# Patient Record
Sex: MALE | Race: WHITE | NOT HISPANIC OR LATINO | Employment: UNEMPLOYED | URBAN - METROPOLITAN AREA
[De-identification: names, ages, dates, MRNs, and addresses within clinical notes are randomized per-mention and may not be internally consistent; named-entity substitution may affect disease eponyms.]

---

## 2020-03-10 ENCOUNTER — TRANSFERRED RECORDS (OUTPATIENT)
Dept: HEALTH INFORMATION MANAGEMENT | Facility: CLINIC | Age: 60
End: 2020-03-10

## 2020-03-12 ENCOUNTER — TRANSFERRED RECORDS (OUTPATIENT)
Dept: HEALTH INFORMATION MANAGEMENT | Facility: CLINIC | Age: 60
End: 2020-03-12

## 2020-03-12 LAB
ALT SERPL-CCNC: 26 U/L (ref 14–63)
AST SERPL-CCNC: 20 U/L (ref 15–37)
CREAT SERPL-MCNC: 0.81 MG/DL (ref 0.67–1.17)
GFR SERPL CREATININE-BSD FRML MDRD: >60 ML/MIN/1.73M 2 (ref 60–150)
GLUCOSE SERPL-MCNC: 99 MG/DL (ref 74–100)
POTASSIUM SERPL-SCNC: 4 MMOL/L (ref 3.5–5.1)
TSH SERPL-ACNC: 0.43 UIU/ML (ref 0.35–3.74)

## 2020-03-13 ENCOUNTER — APPOINTMENT (OUTPATIENT)
Dept: GENERAL RADIOLOGY | Facility: CLINIC | Age: 60
DRG: 820 | End: 2020-03-13
Attending: STUDENT IN AN ORGANIZED HEALTH CARE EDUCATION/TRAINING PROGRAM
Payer: COMMERCIAL

## 2020-03-13 ENCOUNTER — APPOINTMENT (OUTPATIENT)
Dept: GENERAL RADIOLOGY | Facility: CLINIC | Age: 60
DRG: 820 | End: 2020-03-13
Attending: NEUROLOGICAL SURGERY
Payer: COMMERCIAL

## 2020-03-13 ENCOUNTER — HOSPITAL ENCOUNTER (INPATIENT)
Facility: CLINIC | Age: 60
LOS: 17 days | Discharge: HOME | DRG: 820 | End: 2020-03-30
Attending: NEUROLOGICAL SURGERY | Admitting: NEUROLOGICAL SURGERY
Payer: COMMERCIAL

## 2020-03-13 ENCOUNTER — APPOINTMENT (OUTPATIENT)
Dept: MRI IMAGING | Facility: CLINIC | Age: 60
DRG: 820 | End: 2020-03-13
Attending: STUDENT IN AN ORGANIZED HEALTH CARE EDUCATION/TRAINING PROGRAM
Payer: COMMERCIAL

## 2020-03-13 DIAGNOSIS — C83.78 BURKITT LYMPHOMA OF LYMPH NODES OF MULTIPLE REGIONS (H): Primary | ICD-10-CM

## 2020-03-13 PROBLEM — D49.2 SPINAL AXIS TUMOR: Status: ACTIVE | Noted: 2020-03-13

## 2020-03-13 LAB
ABO + RH BLD: NORMAL
ABO + RH BLD: NORMAL
ANION GAP SERPL CALCULATED.3IONS-SCNC: 4 MMOL/L (ref 3–14)
APTT PPP: 34 SEC (ref 22–37)
BASOPHILS # BLD AUTO: 0 10E9/L (ref 0–0.2)
BASOPHILS NFR BLD AUTO: 0.4 %
BLD GP AB SCN SERPL QL: NORMAL
BLOOD BANK CMNT PATIENT-IMP: NORMAL
BUN SERPL-MCNC: 13 MG/DL (ref 7–30)
CALCIUM SERPL-MCNC: 7.6 MG/DL (ref 8.5–10.1)
CHLORIDE SERPL-SCNC: 108 MMOL/L (ref 94–109)
CO2 SERPL-SCNC: 26 MMOL/L (ref 20–32)
CREAT SERPL-MCNC: 0.86 MG/DL (ref 0.66–1.25)
DIFFERENTIAL METHOD BLD: NORMAL
EOSINOPHIL # BLD AUTO: 0.2 10E9/L (ref 0–0.7)
EOSINOPHIL NFR BLD AUTO: 2.3 %
ERYTHROCYTE [DISTWIDTH] IN BLOOD BY AUTOMATED COUNT: 13.1 % (ref 10–15)
GFR SERPL CREATININE-BSD FRML MDRD: >90 ML/MIN/{1.73_M2}
GLUCOSE SERPL-MCNC: 98 MG/DL (ref 70–99)
HCT VFR BLD AUTO: 41.4 % (ref 40–53)
HGB BLD-MCNC: 13.8 G/DL (ref 13.3–17.7)
IMM GRANULOCYTES # BLD: 0 10E9/L (ref 0–0.4)
IMM GRANULOCYTES NFR BLD: 0.2 %
INR PPP: 1.07 (ref 0.86–1.14)
INTERPRETATION ECG - MUSE: NORMAL
LYMPHOCYTES # BLD AUTO: 1.4 10E9/L (ref 0.8–5.3)
LYMPHOCYTES NFR BLD AUTO: 17.1 %
MAGNESIUM SERPL-MCNC: 2.8 MG/DL (ref 1.6–2.3)
MCH RBC QN AUTO: 29.9 PG (ref 26.5–33)
MCHC RBC AUTO-ENTMCNC: 33.3 G/DL (ref 31.5–36.5)
MCV RBC AUTO: 90 FL (ref 78–100)
MONOCYTES # BLD AUTO: 0.8 10E9/L (ref 0–1.3)
MONOCYTES NFR BLD AUTO: 9.3 %
NEUTROPHILS # BLD AUTO: 5.7 10E9/L (ref 1.6–8.3)
NEUTROPHILS NFR BLD AUTO: 70.7 %
NRBC # BLD AUTO: 0 10*3/UL
NRBC BLD AUTO-RTO: 0 /100
PHOSPHATE SERPL-MCNC: 3.6 MG/DL (ref 2.5–4.5)
PLATELET # BLD AUTO: 301 10E9/L (ref 150–450)
POTASSIUM SERPL-SCNC: 4.3 MMOL/L (ref 3.4–5.3)
RBC # BLD AUTO: 4.61 10E12/L (ref 4.4–5.9)
SODIUM SERPL-SCNC: 139 MMOL/L (ref 133–144)
SPECIMEN EXP DATE BLD: NORMAL
WBC # BLD AUTO: 8.1 10E9/L (ref 4–11)

## 2020-03-13 PROCEDURE — 25800030 ZZH RX IP 258 OP 636: Performed by: STUDENT IN AN ORGANIZED HEALTH CARE EDUCATION/TRAINING PROGRAM

## 2020-03-13 PROCEDURE — 36415 COLL VENOUS BLD VENIPUNCTURE: CPT | Performed by: NEUROLOGICAL SURGERY

## 2020-03-13 PROCEDURE — 86901 BLOOD TYPING SEROLOGIC RH(D): CPT | Performed by: STUDENT IN AN ORGANIZED HEALTH CARE EDUCATION/TRAINING PROGRAM

## 2020-03-13 PROCEDURE — 12000004 ZZH R&B IMCU UMMC

## 2020-03-13 PROCEDURE — 80048 BASIC METABOLIC PNL TOTAL CA: CPT | Performed by: NEUROLOGICAL SURGERY

## 2020-03-13 PROCEDURE — 40000556 ZZH STATISTIC PERIPHERAL IV START W US GUIDANCE

## 2020-03-13 PROCEDURE — 72157 MRI CHEST SPINE W/O & W/DYE: CPT

## 2020-03-13 PROCEDURE — 25000132 ZZH RX MED GY IP 250 OP 250 PS 637: Performed by: STUDENT IN AN ORGANIZED HEALTH CARE EDUCATION/TRAINING PROGRAM

## 2020-03-13 PROCEDURE — 83735 ASSAY OF MAGNESIUM: CPT | Performed by: NEUROLOGICAL SURGERY

## 2020-03-13 PROCEDURE — 85025 COMPLETE CBC W/AUTO DIFF WBC: CPT | Performed by: NEUROLOGICAL SURGERY

## 2020-03-13 PROCEDURE — 93010 ELECTROCARDIOGRAM REPORT: CPT | Performed by: INTERNAL MEDICINE

## 2020-03-13 PROCEDURE — 86900 BLOOD TYPING SEROLOGIC ABO: CPT | Performed by: STUDENT IN AN ORGANIZED HEALTH CARE EDUCATION/TRAINING PROGRAM

## 2020-03-13 PROCEDURE — 25500064 ZZH RX 255 OP 636: Performed by: NEUROLOGICAL SURGERY

## 2020-03-13 PROCEDURE — 85610 PROTHROMBIN TIME: CPT | Performed by: STUDENT IN AN ORGANIZED HEALTH CARE EDUCATION/TRAINING PROGRAM

## 2020-03-13 PROCEDURE — 85730 THROMBOPLASTIN TIME PARTIAL: CPT | Performed by: STUDENT IN AN ORGANIZED HEALTH CARE EDUCATION/TRAINING PROGRAM

## 2020-03-13 PROCEDURE — 36415 COLL VENOUS BLD VENIPUNCTURE: CPT | Performed by: STUDENT IN AN ORGANIZED HEALTH CARE EDUCATION/TRAINING PROGRAM

## 2020-03-13 PROCEDURE — A9585 GADOBUTROL INJECTION: HCPCS | Performed by: NEUROLOGICAL SURGERY

## 2020-03-13 PROCEDURE — 86850 RBC ANTIBODY SCREEN: CPT | Performed by: STUDENT IN AN ORGANIZED HEALTH CARE EDUCATION/TRAINING PROGRAM

## 2020-03-13 PROCEDURE — 93005 ELECTROCARDIOGRAM TRACING: CPT

## 2020-03-13 PROCEDURE — 72082 X-RAY EXAM ENTIRE SPI 2/3 VW: CPT

## 2020-03-13 PROCEDURE — 71045 X-RAY EXAM CHEST 1 VIEW: CPT

## 2020-03-13 PROCEDURE — 84100 ASSAY OF PHOSPHORUS: CPT | Performed by: NEUROLOGICAL SURGERY

## 2020-03-13 PROCEDURE — 25000128 H RX IP 250 OP 636: Performed by: STUDENT IN AN ORGANIZED HEALTH CARE EDUCATION/TRAINING PROGRAM

## 2020-03-13 PROCEDURE — 25000131 ZZH RX MED GY IP 250 OP 636 PS 637: Performed by: STUDENT IN AN ORGANIZED HEALTH CARE EDUCATION/TRAINING PROGRAM

## 2020-03-13 RX ORDER — SODIUM CHLORIDE 9 MG/ML
INJECTION, SOLUTION INTRAVENOUS CONTINUOUS
Status: DISCONTINUED | OUTPATIENT
Start: 2020-03-13 | End: 2020-03-13

## 2020-03-13 RX ORDER — LEVOTHYROXINE SODIUM 100 UG/1
200 TABLET ORAL
Status: DISCONTINUED | OUTPATIENT
Start: 2020-03-14 | End: 2020-03-30 | Stop reason: HOSPADM

## 2020-03-13 RX ORDER — LIDOCAINE 40 MG/G
CREAM TOPICAL
Status: DISCONTINUED | OUTPATIENT
Start: 2020-03-13 | End: 2020-03-18

## 2020-03-13 RX ORDER — NALOXONE HYDROCHLORIDE 0.4 MG/ML
.1-.4 INJECTION, SOLUTION INTRAMUSCULAR; INTRAVENOUS; SUBCUTANEOUS
Status: DISCONTINUED | OUTPATIENT
Start: 2020-03-13 | End: 2020-03-27

## 2020-03-13 RX ORDER — OXYCODONE HYDROCHLORIDE 5 MG/1
5-10 TABLET ORAL
Status: DISCONTINUED | OUTPATIENT
Start: 2020-03-13 | End: 2020-03-30 | Stop reason: HOSPADM

## 2020-03-13 RX ORDER — GADOBUTROL 604.72 MG/ML
10 INJECTION INTRAVENOUS ONCE
Status: COMPLETED | OUTPATIENT
Start: 2020-03-13 | End: 2020-03-13

## 2020-03-13 RX ORDER — ACETAMINOPHEN 325 MG/1
650 TABLET ORAL EVERY 4 HOURS PRN
Status: DISCONTINUED | OUTPATIENT
Start: 2020-03-16 | End: 2020-03-23

## 2020-03-13 RX ORDER — AMOXICILLIN 250 MG
2 CAPSULE ORAL 2 TIMES DAILY
Status: DISCONTINUED | OUTPATIENT
Start: 2020-03-13 | End: 2020-03-30 | Stop reason: HOSPADM

## 2020-03-13 RX ORDER — POLYETHYLENE GLYCOL 3350 17 G/17G
17 POWDER, FOR SOLUTION ORAL DAILY
Status: DISCONTINUED | OUTPATIENT
Start: 2020-03-13 | End: 2020-03-24

## 2020-03-13 RX ORDER — PANTOPRAZOLE SODIUM 40 MG/1
40 TABLET, DELAYED RELEASE ORAL
Status: DISCONTINUED | OUTPATIENT
Start: 2020-03-14 | End: 2020-03-27

## 2020-03-13 RX ORDER — LABETALOL 20 MG/4 ML (5 MG/ML) INTRAVENOUS SYRINGE
10-40 EVERY 10 MIN PRN
Status: DISCONTINUED | OUTPATIENT
Start: 2020-03-13 | End: 2020-03-30 | Stop reason: HOSPADM

## 2020-03-13 RX ORDER — AMOXICILLIN 250 MG
1 CAPSULE ORAL 2 TIMES DAILY
Status: DISCONTINUED | OUTPATIENT
Start: 2020-03-13 | End: 2020-03-30 | Stop reason: HOSPADM

## 2020-03-13 RX ORDER — HYDROMORPHONE HYDROCHLORIDE 1 MG/ML
.4-.7 INJECTION, SOLUTION INTRAMUSCULAR; INTRAVENOUS; SUBCUTANEOUS ONCE
Status: COMPLETED | OUTPATIENT
Start: 2020-03-13 | End: 2020-03-13

## 2020-03-13 RX ORDER — ONDANSETRON 2 MG/ML
4 INJECTION INTRAMUSCULAR; INTRAVENOUS EVERY 6 HOURS PRN
Status: DISCONTINUED | OUTPATIENT
Start: 2020-03-13 | End: 2020-03-30 | Stop reason: HOSPADM

## 2020-03-13 RX ORDER — HYDRALAZINE HYDROCHLORIDE 20 MG/ML
10-20 INJECTION INTRAMUSCULAR; INTRAVENOUS EVERY 30 MIN PRN
Status: DISCONTINUED | OUTPATIENT
Start: 2020-03-13 | End: 2020-03-30 | Stop reason: HOSPADM

## 2020-03-13 RX ORDER — ACETAMINOPHEN 325 MG/1
975 TABLET ORAL EVERY 8 HOURS
Status: DISCONTINUED | OUTPATIENT
Start: 2020-03-13 | End: 2020-03-16

## 2020-03-13 RX ORDER — ONDANSETRON 4 MG/1
4 TABLET, ORALLY DISINTEGRATING ORAL EVERY 6 HOURS PRN
Status: DISCONTINUED | OUTPATIENT
Start: 2020-03-13 | End: 2020-03-30 | Stop reason: HOSPADM

## 2020-03-13 RX ORDER — DEXAMETHASONE 4 MG/1
4 TABLET ORAL EVERY 6 HOURS SCHEDULED
Status: DISCONTINUED | OUTPATIENT
Start: 2020-03-13 | End: 2020-03-15

## 2020-03-13 RX ADMIN — ACETAMINOPHEN 975 MG: 325 TABLET, FILM COATED ORAL at 03:37

## 2020-03-13 RX ADMIN — SODIUM CHLORIDE: 9 INJECTION, SOLUTION INTRAVENOUS at 16:04

## 2020-03-13 RX ADMIN — HYDRALAZINE HYDROCHLORIDE 10 MG: 20 INJECTION INTRAMUSCULAR; INTRAVENOUS at 18:09

## 2020-03-13 RX ADMIN — SENNOSIDES AND DOCUSATE SODIUM 2 TABLET: 8.6; 5 TABLET ORAL at 20:48

## 2020-03-13 RX ADMIN — OXYCODONE HYDROCHLORIDE 10 MG: 5 TABLET ORAL at 18:26

## 2020-03-13 RX ADMIN — DEXAMETHASONE 4 MG: 2 TABLET ORAL at 17:29

## 2020-03-13 RX ADMIN — GADOBUTROL 10 ML: 604.72 INJECTION INTRAVENOUS at 20:21

## 2020-03-13 RX ADMIN — ACETAMINOPHEN 975 MG: 325 TABLET, FILM COATED ORAL at 10:40

## 2020-03-13 RX ADMIN — OXYCODONE HYDROCHLORIDE 5 MG: 5 TABLET ORAL at 08:30

## 2020-03-13 RX ADMIN — SODIUM CHLORIDE: 9 INJECTION, SOLUTION INTRAVENOUS at 05:00

## 2020-03-13 RX ADMIN — DEXAMETHASONE 4 MG: 2 TABLET ORAL at 23:49

## 2020-03-13 RX ADMIN — SENNOSIDES AND DOCUSATE SODIUM 1 TABLET: 8.6; 5 TABLET ORAL at 08:31

## 2020-03-13 RX ADMIN — OXYCODONE HYDROCHLORIDE 5 MG: 5 TABLET ORAL at 07:08

## 2020-03-13 RX ADMIN — HYDRALAZINE HYDROCHLORIDE 10 MG: 20 INJECTION INTRAMUSCULAR; INTRAVENOUS at 20:51

## 2020-03-13 RX ADMIN — ACETAMINOPHEN 975 MG: 325 TABLET, FILM COATED ORAL at 20:48

## 2020-03-13 RX ADMIN — HYDROMORPHONE HYDROCHLORIDE 0.4 MG: 1 INJECTION, SOLUTION INTRAMUSCULAR; INTRAVENOUS; SUBCUTANEOUS at 19:27

## 2020-03-13 ASSESSMENT — ACTIVITIES OF DAILY LIVING (ADL)
ADLS_ACUITY_SCORE: 14
ADLS_ACUITY_SCORE: 14
ADLS_ACUITY_SCORE: 13
ADLS_ACUITY_SCORE: 11
ADLS_ACUITY_SCORE: 13

## 2020-03-13 ASSESSMENT — PAIN DESCRIPTION - DESCRIPTORS
DESCRIPTORS: ACHING
DESCRIPTORS: SORE
DESCRIPTORS: SORE

## 2020-03-13 NOTE — PLAN OF CARE
[id number]    Neuro: A&O; scheduled tylenol & prn oxycodone given for back discomfort.   CV: NSR, hydralzine given X1 for SBP >140.  Resp: RA, clear lung sounds  GI: Diet advanced to regular. scheduled senna given, BM this AM.  : voids spontaneously.   MIV discontinued this evening.  2 view spinal xray completed. Thoracic MRI to be completed this evening.   Wife at bedside & updated.     Notify neurosurgery w/ updates or concerns.

## 2020-03-13 NOTE — PROGRESS NOTES
Admission          3/13/2020  2:26 AM  -----------------------------------------------------------  Reason for admission:   Primary team notified of pt arrival.  Admitted from: OSH  Via: stretcher  Accompanied by: family  Belongings: Placed in closet; valuables sent home with family  Admission Profile: complete  Teaching: orientation to unit and call light- call light within reach, call don't fall, use of console, meal times, when to call for the RN, and enforced importance of safety   Access: PIV  Telemetry: no tele orders  Ht./Wt.: complete  2 RN Skin Assessment Completed By:  Kim ESPINAL and Garry RICHARDS   Pt status:    Temp:  [97.8  F (36.6  C)] 97.8  F (36.6  C)  Heart Rate:  [61-71] 61  Resp:  [16] 16  BP: (127-146)/(85-94) 127/85  SpO2:  [98 %] 98 %

## 2020-03-13 NOTE — H&P
[facility]       NEUROSURGERY H&P NOTE    HPI:  Mr. 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 Patient states his pain acutely worsened when he lifted a relative a couple days ago, and sometimes radiates to the left ribs. Also states when he flexes his neck, has shooting pain down to the feet. Denies weakness, numbness/tingling except baseline neuropathy in feet, balance issues except transiently when initiating gait, bowel/bladder incontinence.    PAST MEDICAL HISTORY: hypothyroidism, HLD, no known oncologic history    PAST SURGICAL HISTORY: no history of spinal surgery    FAMILY HISTORY: No oncologic history in family    SOCIAL HISTORY:   Social History     Tobacco Use     Smoking status: Not on file   Substance Use Topics     Alcohol use: Not on file   No smoking, aloohol, or other drugs. Lives in [place]. Works in auto repair.    MEDICATIONS:  No current outpatient medications on file.   Levothyroxine    Allergies:  No Known Allergies    ROS: 10 point ROS of systems including Constitutional, Eyes, Respiratory, Cardiovascular, Gastroenterology, Genitourinary, Integumentary, Muscularskeletal, Psychiatric were all negative except for pertinent positives noted in my HPI.    Physical exam:   Blood pressure 136/85, pulse 75, temperature 98  F (36.7  C), temperature source Oral, resp. rate 16, weight 98.1 kg (216 lb 4.3 oz), SpO2 98 %.  CV: Regular rate  PULM: breathing comfortably on room air  NEUROLOGIC:  -- Awake; Alert; oriented x 3  -- Follows commands briskly  -- Speech fluent, spontaneous. No aphasia or dysarthria.  -- no gaze preference. No apparent hemineglect.  Cranial Nerves:  -- visual fields full to confrontation, PERRL 3-2mm bilat  and brisk, extraocular movements intact  -- face symmetrical, tongue midline  -- sensory V1-V3 intact bilaterally  -- palate elevates symmetrically, uvula midline  -- hearing grossly intact bilat  -- Trapezii 5/5 strength bilat symmetric  -- Cerebellar: Finger nose finger without dysmetria    Motor:  Normal bulk / tone; no tremor, rigidity, or bradykinesia.  No muscle wasting or fasciculations  No Pronator Drift     Delt Bi Tri Hand Flexion/  Extension Iliopsoas Quadriceps Hamstrings Tibialis Anterior Gastroc    C5 C6 C7 C8/T1 L2 L3 L4-S1 L4 S1   R 5 5 5 5 5 5 5 5 5   L 5 5 5 5 5 5 5 5 5   Sensory:  intact to LT x 4 extremities     Reflexes:     Bi Tri BR Alexis Pat Ach Bab    C5-6 C7-8 C6 UMN L2-4 S1 UMN   R 2+ 2+ 2+ Norm 2+ 2+ Norm   L 2+ 2+ 2+ Norm 2+ 2+ Norm     Gait: deferred      IMAGING:  MRI thoracic spine (OSH):  Extradural extramedullary thoracic spine mass at T5-6, with moderate/severe cord compression, without cord signal change.     ASSESSMENT:  Mr. 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 Neurologically intact.    RECOMMENDATIONS:  - pta levothyroxine  - pain control  - bowel regimen, regular diet  - consider MRI cervical spine, thoracic spine with contrast  - surgical plan TBD  - dispo: 6B    The patient was discussed with Dr. Arnold, neurosurgery chief resident, and neurosurgery faculty Dr. Cespedes,and they agree with the above.    Sarath Carbajal MD  Neurosurgery Resident PGY2  I have seen this patient with the resident and formulated a plan and agree with this note.  AMP

## 2020-03-13 NOTE — PLAN OF CARE
Neuro: A&Ox4. Neuros intact.  Cardiac: No tele. HR 60-80s. SBPs 120-140s. Afebrile.   Respiratory: Sating >94% on RA.   GI/: Adequate urine output  Diet/appetite: NPO   Activity:  Up independently   Pain: C/o back and abdominal pain. Scheduled tylenol given, oxy prn available, declines overnight.   Skin: No new deficits noted.  LDA's: PIV x1 with NS @100mL/hr    Plan: Continue with POC. Notify primary team with changes.

## 2020-03-13 NOTE — LETTER
[address] [facility] [address] [address]  Phone: [phone]    March 30, 2020        Kobe Pedroza  [address] [place] [address]          To whom it may concern:    RE: Kobe Pedroza    Please excuse patient from work until 4/30.      Please contact me with any questions or concerns.      Sincerely,        Jocelyn Russell MD

## 2020-03-14 LAB
ALBUMIN SERPL-MCNC: 3.5 G/DL (ref 3.4–5)
ALP SERPL-CCNC: 74 U/L (ref 40–150)
ALT SERPL W P-5'-P-CCNC: 25 U/L (ref 0–70)
ANION GAP SERPL CALCULATED.3IONS-SCNC: 6 MMOL/L (ref 3–14)
AST SERPL W P-5'-P-CCNC: 20 U/L (ref 0–45)
BASOPHILS # BLD AUTO: 0 10E9/L (ref 0–0.2)
BASOPHILS NFR BLD AUTO: 0.2 %
BILIRUB DIRECT SERPL-MCNC: <0.1 MG/DL (ref 0–0.2)
BILIRUB SERPL-MCNC: 0.4 MG/DL (ref 0.2–1.3)
BUN SERPL-MCNC: 15 MG/DL (ref 7–30)
CALCIUM SERPL-MCNC: 8.4 MG/DL (ref 8.5–10.1)
CHLORIDE SERPL-SCNC: 106 MMOL/L (ref 94–109)
CO2 SERPL-SCNC: 23 MMOL/L (ref 20–32)
CREAT SERPL-MCNC: 0.74 MG/DL (ref 0.66–1.25)
DIFFERENTIAL METHOD BLD: ABNORMAL
EOSINOPHIL # BLD AUTO: 0 10E9/L (ref 0–0.7)
EOSINOPHIL NFR BLD AUTO: 0 %
ERYTHROCYTE [DISTWIDTH] IN BLOOD BY AUTOMATED COUNT: 13 % (ref 10–15)
GFR SERPL CREATININE-BSD FRML MDRD: >90 ML/MIN/{1.73_M2}
GLUCOSE BLDC GLUCOMTR-MCNC: 170 MG/DL (ref 70–99)
GLUCOSE SERPL-MCNC: 145 MG/DL (ref 70–99)
HBA1C MFR BLD: 5.5 % (ref 0–5.6)
HCT VFR BLD AUTO: 44.5 % (ref 40–53)
HGB BLD-MCNC: 15 G/DL (ref 13.3–17.7)
IMM GRANULOCYTES # BLD: 0 10E9/L (ref 0–0.4)
IMM GRANULOCYTES NFR BLD: 0.3 %
LYMPHOCYTES # BLD AUTO: 0.6 10E9/L (ref 0.8–5.3)
LYMPHOCYTES NFR BLD AUTO: 6.5 %
MAGNESIUM SERPL-MCNC: 2.6 MG/DL (ref 1.6–2.3)
MCH RBC QN AUTO: 29.8 PG (ref 26.5–33)
MCHC RBC AUTO-ENTMCNC: 33.7 G/DL (ref 31.5–36.5)
MCV RBC AUTO: 88 FL (ref 78–100)
MONOCYTES # BLD AUTO: 0.1 10E9/L (ref 0–1.3)
MONOCYTES NFR BLD AUTO: 1 %
NEUTROPHILS # BLD AUTO: 8.6 10E9/L (ref 1.6–8.3)
NEUTROPHILS NFR BLD AUTO: 92 %
NRBC # BLD AUTO: 0 10*3/UL
NRBC BLD AUTO-RTO: 0 /100
PHOSPHATE SERPL-MCNC: 3.6 MG/DL (ref 2.5–4.5)
PLATELET # BLD AUTO: 331 10E9/L (ref 150–450)
POTASSIUM SERPL-SCNC: 4.3 MMOL/L (ref 3.4–5.3)
PROT SERPL-MCNC: 7.1 G/DL (ref 6.8–8.8)
RBC # BLD AUTO: 5.04 10E12/L (ref 4.4–5.9)
SODIUM SERPL-SCNC: 136 MMOL/L (ref 133–144)
WBC # BLD AUTO: 9.3 10E9/L (ref 4–11)

## 2020-03-14 PROCEDURE — 85025 COMPLETE CBC W/AUTO DIFF WBC: CPT | Performed by: NEUROLOGICAL SURGERY

## 2020-03-14 PROCEDURE — 25000132 ZZH RX MED GY IP 250 OP 250 PS 637: Performed by: STUDENT IN AN ORGANIZED HEALTH CARE EDUCATION/TRAINING PROGRAM

## 2020-03-14 PROCEDURE — 99207 ZZC CONSULT E&M CHANGED TO INITIAL LEVEL: CPT | Performed by: PEDIATRICS

## 2020-03-14 PROCEDURE — 25000132 ZZH RX MED GY IP 250 OP 250 PS 637: Performed by: NURSE PRACTITIONER

## 2020-03-14 PROCEDURE — 80048 BASIC METABOLIC PNL TOTAL CA: CPT | Performed by: NEUROLOGICAL SURGERY

## 2020-03-14 PROCEDURE — 80076 HEPATIC FUNCTION PANEL: CPT | Performed by: NEUROLOGICAL SURGERY

## 2020-03-14 PROCEDURE — 36415 COLL VENOUS BLD VENIPUNCTURE: CPT | Performed by: NEUROLOGICAL SURGERY

## 2020-03-14 PROCEDURE — 84100 ASSAY OF PHOSPHORUS: CPT | Performed by: NEUROLOGICAL SURGERY

## 2020-03-14 PROCEDURE — 83735 ASSAY OF MAGNESIUM: CPT | Performed by: NEUROLOGICAL SURGERY

## 2020-03-14 PROCEDURE — 83036 HEMOGLOBIN GLYCOSYLATED A1C: CPT | Performed by: NEUROLOGICAL SURGERY

## 2020-03-14 PROCEDURE — 99222 1ST HOSP IP/OBS MODERATE 55: CPT | Performed by: PEDIATRICS

## 2020-03-14 PROCEDURE — 25000131 ZZH RX MED GY IP 250 OP 636 PS 637: Performed by: STUDENT IN AN ORGANIZED HEALTH CARE EDUCATION/TRAINING PROGRAM

## 2020-03-14 PROCEDURE — 00000146 ZZHCL STATISTIC GLUCOSE BY METER IP

## 2020-03-14 PROCEDURE — 12000001 ZZH R&B MED SURG/OB UMMC

## 2020-03-14 PROCEDURE — 99207 ZZC APP CREDIT; MD BILLING SHARED VISIT: CPT | Performed by: PHYSICIAN ASSISTANT

## 2020-03-14 RX ORDER — BISACODYL 10 MG
10 SUPPOSITORY, RECTAL RECTAL DAILY
Status: DISCONTINUED | OUTPATIENT
Start: 2020-03-14 | End: 2020-03-17

## 2020-03-14 RX ORDER — MAGNESIUM CARB/ALUMINUM HYDROX 105-160MG
296 TABLET,CHEWABLE ORAL ONCE
Status: COMPLETED | OUTPATIENT
Start: 2020-03-14 | End: 2020-03-14

## 2020-03-14 RX ADMIN — SENNOSIDES AND DOCUSATE SODIUM 2 TABLET: 8.6; 5 TABLET ORAL at 19:05

## 2020-03-14 RX ADMIN — MAGNESIUM CITRATE 296 ML: 1.75 LIQUID ORAL at 12:33

## 2020-03-14 RX ADMIN — ACETAMINOPHEN 975 MG: 325 TABLET, FILM COATED ORAL at 18:35

## 2020-03-14 RX ADMIN — DEXAMETHASONE 4 MG: 2 TABLET ORAL at 06:20

## 2020-03-14 RX ADMIN — SENNOSIDES AND DOCUSATE SODIUM 2 TABLET: 8.6; 5 TABLET ORAL at 08:20

## 2020-03-14 RX ADMIN — ACETAMINOPHEN 975 MG: 325 TABLET, FILM COATED ORAL at 11:59

## 2020-03-14 RX ADMIN — POLYETHYLENE GLYCOL 3350 17 G: 17 POWDER, FOR SOLUTION ORAL at 08:20

## 2020-03-14 RX ADMIN — DEXAMETHASONE 4 MG: 2 TABLET ORAL at 11:59

## 2020-03-14 RX ADMIN — LEVOTHYROXINE SODIUM 200 MCG: 0.1 TABLET ORAL at 08:20

## 2020-03-14 RX ADMIN — DEXAMETHASONE 4 MG: 2 TABLET ORAL at 18:35

## 2020-03-14 RX ADMIN — PANTOPRAZOLE SODIUM 40 MG: 40 TABLET, DELAYED RELEASE ORAL at 08:20

## 2020-03-14 RX ADMIN — ACETAMINOPHEN 975 MG: 325 TABLET, FILM COATED ORAL at 03:49

## 2020-03-14 ASSESSMENT — ACTIVITIES OF DAILY LIVING (ADL)
ADLS_ACUITY_SCORE: 13

## 2020-03-14 NOTE — CONSULTS
Internal Medicine Consult  Department of Internal Medicine     Patient Name: Kobe Pedroza MRN# [id number]   Age: 60 year old YOB: 1960      Date of Admission: 3/13/2020     Primary care provider: Garett Arriaga  Date of Service: 3/14/2020     Reason for consult: I was asked by Dr. Jaimes to consult on this patient for preoperative assessment for this patient undergoing resection of T5-6 epidural mass.      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 He was transferred to [facility] for further management.      Preoperative risk assessment: EKG with NSR, no ischemic changes. Reviewed most recent echo from 1/2018 which was unremarkable. Surgery is urgent and is intermediate risk. Functional capacity is >4 METs without symptoms. Patient's cardiac risk by revised cardiac risk index is 0.4%. ACS NSQIP lists their  cardiac risk similarly at 0.4%. Their risk of any complication is 9.8%, serious complications 7.8%, death 0.2%. They have not required urgent intubation in the past. It is certainly possible that they might require a longer time to recover from anesthesia than normal. Patient is currently euvolemic and is not on supplemental oxygen.      I do not think that any further testing is indicated at this time as all of their chronic issues seem to be maximized. I discussed the risks listed above with the patient and their family and my recommendations for no further testing or change in treatment prior to proceeding with surgery. Resume medications as soon as possible post-operatively.    Gallbladder sludge: There was question of cholecystitis at OSH, though gallbladder wall measuring up to 6 mm with CBD, LFTs,  WBC all wnl. Clinical picture not c/w acute biliary pathology. Discussed with General Surgery.   - No addl work-up at this time   - Follow-up with General Surgery as OP for further evaluation       Management of Medications & Comorbidities:   - No additional work-up or intervention required pre-operatively  - Pt reports hx of pre-diabetes, A1C 5.5% today which is below prediabetes threshold.    - Recommend BG checks qAM for now, increase to TID ac and at bedtime if persistently elevated.       Recommendations reviewed with attending physician, Dr. Gresham.   I have discussed our findings and recommendations with primary team.     Thank you for this opportunity to be involved in your patient's care. As all of the patient's medical conditions are at their baseline will signoff at this time. Please contact the triage hospitalist at job code [phone] if you need medicine to be involved at any time in the future.     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 He was transferred to [facility] for further management. Medicine consulted today for pre-operative evaluation and management of possible biliary pathology.     He had extensive work-up of his pain at OSH including CT a/p, gallbladder US, and KUB. CT a/p 3/10 demonstrated hyperdensity of gallbladder lumen suggestive of stones or sludge, no gallbladder wall edema. RUQ US 3/12 demonstrated tumefactive sludge filling nearly the entire lumen of the gallbladder, gallbladder wall measured up to 6 mmm, CBD wnl at 3 mm. There was concern for chronic cholecystitis, general surgery was  consulted and planned for lap cholecystectomy on 3/13. However, spinal MRI was then obtained and demonstrated his T5-6 mass, felt to be the more likely culprit of his pain so gallbladder was never removed.     He denies any RUQ pain, nausea, vomiting. Has some distention due to no BM in 5 days. Still passing flatus. He has never had any symptoms concerning for gallbladder colic. All of his pain has been mid-thoracic, at times wrapping around the left flank. No transaminitis, fever/chills, leukocytosis, or other s/s concerning for acute illness.     Was having intermittent chest pain in 2017, had negative cardiac work-up at the time. Stress test negative per pt report (results not available), CT coronary arteries 1/2018 with no significant stenosis, echo 1/2018 with LVEF 60-65%, normal systolic function, mild mitral valve regurgitation with no other notable valve dysfunction. Zio patch in 2/2018 also negative per pt report. No recent cardiac symptoms.            Review of Systems:   A 10 point ROS was performed and negative unless otherwise noted in HPI.     Cardiovascular: does not have underlying cardiac disease. At baseline, patient is able to perform work as , ambulate, go up stairs without difficulty. Currently, denies any chest pain or palpitations. No exertional symptoms.     Pulmonary: does not have underlying lung disease. Currently, denies any cough or dyspnea.     Prior Anesthesia: Yes, without complications.   History of DVT/PE: No    VASU: No  Screening: Snoring - No, Tiredness - No, Observed apneas - No, Blood pressure elevation - No, BMI > 35 - No, Age > 50 - Yes, Male - Yes  (0-2 risk factors = low risk, 3-4= intermediate, 5-8= high risk for underlying sleep apnea)    History of stroke - No, seizure - No, kidney disease - No, liver disease - No, thyroid disease - YES. Date-hx of thyroid cancer s/p total thyroidectomy with subsequent hypothyroidism., diabetes - pre-diabetic, neck/jaw pain or  stiffness - No    Past Medical History:   No past medical history on file.     Past Surgical History:   No past surgical history on file.     Social History:   Tobacco use: None  Alcohol use: Minimal  Illicit substances: None     Family History:   No known family history of anesthesia problems, bleeding or clotting problems, or premature cardiac death.     Immunizations:   There is no immunization history for the selected administration types on file for this patient.    Allergies:    No Known Allergies  Medications:     Current Facility-Administered Medications   Medication     [START ON 3/16/2020] acetaminophen (TYLENOL) tablet 650 mg     acetaminophen (TYLENOL) tablet 975 mg     dexamethasone (DECADRON) tablet 4 mg     hydrALAZINE (APRESOLINE) injection 10-20 mg     influenza recomb quadrivalent PF (FLUBLOK) injection 0.5 mL     labetalol (NORMODYNE/TRANDATE) syringe 10-40 mg     levothyroxine (SYNTHROID/LEVOTHROID) tablet 200 mcg     lidocaine (LMX4) cream     lidocaine 1 % 0.1-1 mL     naloxone (NARCAN) injection 0.1-0.4 mg     ondansetron (ZOFRAN-ODT) ODT tab 4 mg    Or     ondansetron (ZOFRAN) injection 4 mg     oxyCODONE (ROXICODONE) tablet 5-10 mg     pantoprazole (PROTONIX) EC tablet 40 mg     polyethylene glycol (MIRALAX) Packet 17 g     senna-docusate (SENOKOT-S/PERICOLACE) 8.6-50 MG per tablet 1 tablet    Or     senna-docusate (SENOKOT-S/PERICOLACE) 8.6-50 MG per tablet 2 tablet     sodium chloride (PF) 0.9% PF flush 3 mL        Review of Systems:   A complete ROS was performed and is negative other than what is stated in the HPI.      Physical Exam:   Blood pressure (!) 143/81, pulse 87, temperature 97.9  F (36.6  C), temperature source Oral, resp. rate 16, weight 98.1 kg (216 lb 4.3 oz), SpO2 94 %.    Constitutional: Awake and alert, in no apparent distress. Sitting up comfortably in bed.   Eyes: Sclera clear, anicteric   ENT: Mucous membranes moist. Dentition intact.   Respiratory: Breathing  comfortably on room air. Clear to auscultation bilaterally with no crackles, wheezing, or rhonchi. Good air entry throughout.   Cardiovascular:  RRR, normal S1/S2. No rubs or murmurs. Intact bilateral pedal pulses. No peripheral edema.   GI: Soft, non-tender, non-distended. Bowel sounds present. Negative Mcwilliams's sign.  Skin: Warm and dry. Good color. No jaundice. No visible rashes, lesions, or bruising of concern.   Neurologic: Alert and fully oriented. No focal deficits.       Data:     EKG 3/13/20: NSR with ventricular rate of 68, QTc 446, no ischemic changes.     ROUTINE IP LABS (Last four results)  Recent Labs   Lab 03/14/20  0439 03/13/20  0554    139   POTASSIUM 4.3 4.3   CHLORIDE 106 108   CO2 23 26   ANIONGAP 6 4   * 98   BUN 15 13   CR 0.74 0.86   RUTH 8.4* 7.6*   MAG 2.6* 2.8*   PHOS 3.6 3.6     Recent Labs   Lab 03/14/20  0439 03/13/20  0554   WBC 9.3 8.1   RBC 5.04 4.61   HGB 15.0 13.8   HCT 44.5 41.4   MCV 88 90   MCH 29.8 29.9   MCHC 33.7 33.3   RDW 13.0 13.1    301     Recent Labs   Lab 03/13/20  0337   INR 1.07        Glucose Values Latest Ref Rng & Units 3/13/2020 3/14/2020   Bedside Glucose (mg/dl )  - -- --   GLUCOSE 70 - 99 mg/dL 98 145(H)   Some recent data might be hidden        All labs personally reviewed in [facility].  See A&P for additional results.     Unresulted Labs Ordered in the Past 30 Days of this Admission     No orders found for last 31 day(s).           All labs and imaging reviewed.

## 2020-03-14 NOTE — PROGRESS NOTES
Neurosurgery Progress Note    Subjective:  MRI thoracic spine with epidural mass at T5-6    Objective:  /76 (BP Location: Right arm)   Pulse 87   Temp 98.5  F (36.9  C) (Oral)   Resp 17   Wt 98.1 kg (216 lb 4.3 oz)   SpO2 96%   Alert, oriented x3  CN 2-12 intact  5/5 strength in all extremities, no pronator drift  Sensation intact to light touch      Assessment:  Mr. 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 Neurologically intact.    Plan:  - pta levothyroxine  - pain control  - bowel regimen, regular diet  - started decadron 4mg q6hrs for spinal cord compression  - MRI thoracic spine completed  - surgical plan TBD  - dispo: 6B    Sarath Carbajal MD  Neurosurgery Resident PGY2    Please contact neurosurgery resident on call with questions.    Dial * [phone], enter [phone] when prompted.   I have seen this patient with the resident and formulated a plan and agree with this note.  AMP

## 2020-03-14 NOTE — PLAN OF CARE
[id number]  Pt admitted 3/13 from OSH for further cares of a spinal tumor around T5-6  Hx:  HLD, hypothyroidism.     Neuro: A/Ox4.   Cardiac: no tele orders.  AVSS except HTN PRN hydralazine given x1 for SBP > 140.    Respiratory: O2 sats stable on RA  GI/: voiding spontaneously. No BM this shift, last BM 3/13   Diet/appetite: regular diet, no nausea.   Activity:  Independent.   Pain: pt reports back pain. Declines pain medication. 0.4mg dilaudid given for MRI. Scheduled tylenol.   Skin: No new deficits noted.  LDA's: PIV SL   Labs/imaging: MRI completed this shift.      Plan: Continue with POC. Notify primary team with changes.

## 2020-03-14 NOTE — PLAN OF CARE
Pt transferred to unit around 1345 from 6B.      Reason for Admission: Pt admitted 3/13 from OSH for further cares of a spinal tumor around T5-6    Activity: Independent    Neuro: A&O x4. Pleasant and calm. Pt hs some numbness/tingling in bilateral feet per baseline.     GI/: Voiding spontaneously without difficulty. Pt having difficulty having a BM. Lat BM was 3/13/20.    Diet: Regular, tolerating well.     Incisions/Drains: None    IV Access: R PIV saline locked.     Vitals: VSS on RA    Pain: pt denied any pain or nausea.     New changes this shift: pt given magnesium citrate on 6B. Pt has dulcolax order if needed.     Plan: Pt scheduled for OR tomorrow morning. Continue with plan of care.

## 2020-03-14 NOTE — PLAN OF CARE
Shift:   VS: Temp: 97.3  F (36.3  C) Temp src: Oral BP: 131/81 Pulse: 87 Heart Rate: 99 Resp: 18 SpO2: 96 % O2 Device: None (Room air)    Pain: Back pain comfortably managed with tylenol  Neuro: A&OX4, calls appropriately  Cardiac:   WNL  Respiratory: RA  GI/Diet/Appetite: Good oral intake, no nausea reported  :  Adequate UO. Given stool softeners for constipation, no outcome yet, please follow up  LDA's: PIV SL  Skin: No new skin deficit noted  Activity: Up ad riki  Tests/Procedures:   Pertinent Labs/Lab Collection: BG with meals and at bedtime     Plan: Pt transferred to , report given to bedside RN. Continue with cares.

## 2020-03-14 NOTE — PLAN OF CARE
A: A&Ox4, calm and cooperative. VS unchanged, room air sating >92% denies and SOB or chest pain. No tele, apical pulse regular. Afebrile. Denies pain and nausea. Regular diet. GI/ WNL. Pt able to make needs known via call light. 6A overflow.     No intake/output data recorded.    Temp:  [97.8  F (36.6  C)-98.5  F (36.9  C)] 98.3  F (36.8  C)  Pulse:  [68-92] 87  Heart Rate:  [72-85] 80  Resp:  [16-17] 16  BP: (110-158)/(63-91) 133/84  SpO2:  [93 %-99 %] 95 %     R: Continue with POC. Notify primary team with changes.

## 2020-03-15 ENCOUNTER — ANESTHESIA EVENT (OUTPATIENT)
Dept: SURGERY | Facility: CLINIC | Age: 60
DRG: 820 | End: 2020-03-15
Payer: COMMERCIAL

## 2020-03-15 ENCOUNTER — APPOINTMENT (OUTPATIENT)
Dept: GENERAL RADIOLOGY | Facility: CLINIC | Age: 60
DRG: 820 | End: 2020-03-15
Attending: NEUROLOGICAL SURGERY
Payer: COMMERCIAL

## 2020-03-15 ENCOUNTER — ANESTHESIA (OUTPATIENT)
Dept: SURGERY | Facility: CLINIC | Age: 60
DRG: 820 | End: 2020-03-15
Payer: COMMERCIAL

## 2020-03-15 ENCOUNTER — APPOINTMENT (OUTPATIENT)
Dept: CT IMAGING | Facility: CLINIC | Age: 60
DRG: 820 | End: 2020-03-15
Attending: STUDENT IN AN ORGANIZED HEALTH CARE EDUCATION/TRAINING PROGRAM
Payer: COMMERCIAL

## 2020-03-15 LAB
ANION GAP SERPL CALCULATED.3IONS-SCNC: 7 MMOL/L (ref 3–14)
APTT PPP: 30 SEC (ref 22–37)
BASOPHILS # BLD AUTO: 0 10E9/L (ref 0–0.2)
BASOPHILS NFR BLD AUTO: 0.1 %
BUN SERPL-MCNC: 25 MG/DL (ref 7–30)
CALCIUM SERPL-MCNC: 8.2 MG/DL (ref 8.5–10.1)
CHLORIDE SERPL-SCNC: 107 MMOL/L (ref 94–109)
CO2 SERPL-SCNC: 23 MMOL/L (ref 20–32)
CREAT SERPL-MCNC: 0.8 MG/DL (ref 0.66–1.25)
DIFFERENTIAL METHOD BLD: ABNORMAL
EOSINOPHIL # BLD AUTO: 0 10E9/L (ref 0–0.7)
EOSINOPHIL NFR BLD AUTO: 0 %
ERYTHROCYTE [DISTWIDTH] IN BLOOD BY AUTOMATED COUNT: 13.1 % (ref 10–15)
GFR SERPL CREATININE-BSD FRML MDRD: >90 ML/MIN/{1.73_M2}
GLUCOSE BLDC GLUCOMTR-MCNC: 149 MG/DL (ref 70–99)
GLUCOSE SERPL-MCNC: 153 MG/DL (ref 70–99)
HCT VFR BLD AUTO: 45.5 % (ref 40–53)
HGB BLD-MCNC: 15 G/DL (ref 13.3–17.7)
IMM GRANULOCYTES # BLD: 0.1 10E9/L (ref 0–0.4)
IMM GRANULOCYTES NFR BLD: 0.5 %
INR PPP: 1.09 (ref 0.86–1.14)
LYMPHOCYTES # BLD AUTO: 0.8 10E9/L (ref 0.8–5.3)
LYMPHOCYTES NFR BLD AUTO: 4.2 %
MAGNESIUM SERPL-MCNC: 2.5 MG/DL (ref 1.6–2.3)
MCH RBC QN AUTO: 29.2 PG (ref 26.5–33)
MCHC RBC AUTO-ENTMCNC: 33 G/DL (ref 31.5–36.5)
MCV RBC AUTO: 89 FL (ref 78–100)
MONOCYTES # BLD AUTO: 0.6 10E9/L (ref 0–1.3)
MONOCYTES NFR BLD AUTO: 3 %
NEUTROPHILS # BLD AUTO: 16.9 10E9/L (ref 1.6–8.3)
NEUTROPHILS NFR BLD AUTO: 92.2 %
NRBC # BLD AUTO: 0 10*3/UL
NRBC BLD AUTO-RTO: 0 /100
PHOSPHATE SERPL-MCNC: 3.1 MG/DL (ref 2.5–4.5)
PLATELET # BLD AUTO: 371 10E9/L (ref 150–450)
POTASSIUM SERPL-SCNC: 4.5 MMOL/L (ref 3.4–5.3)
RBC # BLD AUTO: 5.13 10E12/L (ref 4.4–5.9)
SODIUM SERPL-SCNC: 137 MMOL/L (ref 133–144)
WBC # BLD AUTO: 18.4 10E9/L (ref 4–11)

## 2020-03-15 PROCEDURE — 25000566 ZZH SEVOFLURANE, EA 15 MIN: Performed by: NEUROLOGICAL SURGERY

## 2020-03-15 PROCEDURE — 36000072 ZZH SURGERY LEVEL 5 W FLUORO 1ST 30 MIN - UMMC: Performed by: NEUROLOGICAL SURGERY

## 2020-03-15 PROCEDURE — 27211024 ZZHC OR SUPPLY OTHER OPNP: Performed by: NEUROLOGICAL SURGERY

## 2020-03-15 PROCEDURE — 25000132 ZZH RX MED GY IP 250 OP 250 PS 637: Performed by: STUDENT IN AN ORGANIZED HEALTH CARE EDUCATION/TRAINING PROGRAM

## 2020-03-15 PROCEDURE — 25000125 ZZHC RX 250: Performed by: NEUROLOGICAL SURGERY

## 2020-03-15 PROCEDURE — 40000985 XR CHEST PORT 1 VW

## 2020-03-15 PROCEDURE — 37000008 ZZH ANESTHESIA TECHNICAL FEE, 1ST 30 MIN: Performed by: NEUROLOGICAL SURGERY

## 2020-03-15 PROCEDURE — 71250 CT THORAX DX C-: CPT

## 2020-03-15 PROCEDURE — 25800030 ZZH RX IP 258 OP 636: Performed by: STUDENT IN AN ORGANIZED HEALTH CARE EDUCATION/TRAINING PROGRAM

## 2020-03-15 PROCEDURE — 88239 TISSUE CULTURE TUMOR: CPT | Performed by: NEUROLOGICAL SURGERY

## 2020-03-15 PROCEDURE — 25000128 H RX IP 250 OP 636: Performed by: STUDENT IN AN ORGANIZED HEALTH CARE EDUCATION/TRAINING PROGRAM

## 2020-03-15 PROCEDURE — 40000014 ZZH STATISTIC ARTERIAL MONITORING DAILY

## 2020-03-15 PROCEDURE — 00000160 ZZHCL STATISTIC H-SPECIAL HANDLING: Performed by: NEUROLOGICAL SURGERY

## 2020-03-15 PROCEDURE — 88261 CHROMOSOME ANALYSIS 5: CPT | Performed by: NEUROLOGICAL SURGERY

## 2020-03-15 PROCEDURE — 27210995 ZZH RX 272: Performed by: NEUROLOGICAL SURGERY

## 2020-03-15 PROCEDURE — 88341 IMHCHEM/IMCYTCHM EA ADD ANTB: CPT | Performed by: NEUROLOGICAL SURGERY

## 2020-03-15 PROCEDURE — 12000001 ZZH R&B MED SURG/OB UMMC

## 2020-03-15 PROCEDURE — 71000014 ZZH RECOVERY PHASE 1 LEVEL 2 FIRST HR: Performed by: NEUROLOGICAL SURGERY

## 2020-03-15 PROCEDURE — 36415 COLL VENOUS BLD VENIPUNCTURE: CPT | Performed by: STUDENT IN AN ORGANIZED HEALTH CARE EDUCATION/TRAINING PROGRAM

## 2020-03-15 PROCEDURE — 36000070 ZZH SURGERY LEVEL 5 EA 15 ADDTL MIN - UMMC: Performed by: NEUROLOGICAL SURGERY

## 2020-03-15 PROCEDURE — 85025 COMPLETE CBC W/AUTO DIFF WBC: CPT | Performed by: NEUROLOGICAL SURGERY

## 2020-03-15 PROCEDURE — 88280 CHROMOSOME KARYOTYPE STUDY: CPT | Performed by: NEUROLOGICAL SURGERY

## 2020-03-15 PROCEDURE — 88275 CYTOGENETICS 100-300: CPT | Performed by: NEUROLOGICAL SURGERY

## 2020-03-15 PROCEDURE — 37000009 ZZH ANESTHESIA TECHNICAL FEE, EACH ADDTL 15 MIN: Performed by: NEUROLOGICAL SURGERY

## 2020-03-15 PROCEDURE — 88271 CYTOGENETICS DNA PROBE: CPT | Performed by: NEUROLOGICAL SURGERY

## 2020-03-15 PROCEDURE — 84100 ASSAY OF PHOSPHORUS: CPT | Performed by: NEUROLOGICAL SURGERY

## 2020-03-15 PROCEDURE — 88365 INSITU HYBRIDIZATION (FISH): CPT | Performed by: NEUROLOGICAL SURGERY

## 2020-03-15 PROCEDURE — 27211022 ZZHC OR IOM SUPPLIES OPNP: Performed by: NEUROLOGICAL SURGERY

## 2020-03-15 PROCEDURE — 00000159 ZZHCL STATISTIC H-SEND OUTS PREP: Performed by: NEUROLOGICAL SURGERY

## 2020-03-15 PROCEDURE — 85730 THROMBOPLASTIN TIME PARTIAL: CPT | Performed by: STUDENT IN AN ORGANIZED HEALTH CARE EDUCATION/TRAINING PROGRAM

## 2020-03-15 PROCEDURE — 88264 CHROMOSOME ANALYSIS 20-25: CPT | Performed by: NEUROLOGICAL SURGERY

## 2020-03-15 PROCEDURE — 88331 PATH CONSLTJ SURG 1 BLK 1SPC: CPT | Performed by: NEUROLOGICAL SURGERY

## 2020-03-15 PROCEDURE — 88307 TISSUE EXAM BY PATHOLOGIST: CPT | Performed by: NEUROLOGICAL SURGERY

## 2020-03-15 PROCEDURE — 36415 COLL VENOUS BLD VENIPUNCTURE: CPT | Performed by: NEUROLOGICAL SURGERY

## 2020-03-15 PROCEDURE — 88305 TISSUE EXAM BY PATHOLOGIST: CPT | Performed by: NEUROLOGICAL SURGERY

## 2020-03-15 PROCEDURE — 27210794 ZZH OR GENERAL SUPPLY STERILE: Performed by: NEUROLOGICAL SURGERY

## 2020-03-15 PROCEDURE — 80048 BASIC METABOLIC PNL TOTAL CA: CPT | Performed by: NEUROLOGICAL SURGERY

## 2020-03-15 PROCEDURE — 40000170 ZZH STATISTIC PRE-PROCEDURE ASSESSMENT II: Performed by: NEUROLOGICAL SURGERY

## 2020-03-15 PROCEDURE — 88342 IMHCHEM/IMCYTCHM 1ST ANTB: CPT | Performed by: NEUROLOGICAL SURGERY

## 2020-03-15 PROCEDURE — 25000131 ZZH RX MED GY IP 250 OP 636 PS 637: Performed by: STUDENT IN AN ORGANIZED HEALTH CARE EDUCATION/TRAINING PROGRAM

## 2020-03-15 PROCEDURE — 25000125 ZZHC RX 250: Performed by: STUDENT IN AN ORGANIZED HEALTH CARE EDUCATION/TRAINING PROGRAM

## 2020-03-15 PROCEDURE — 25000132 ZZH RX MED GY IP 250 OP 250 PS 637: Performed by: NURSE PRACTITIONER

## 2020-03-15 PROCEDURE — 40000277 XR SURGERY CARM FLUORO LESS THAN 5 MIN W STILLS: Mod: TC

## 2020-03-15 PROCEDURE — 85610 PROTHROMBIN TIME: CPT | Performed by: STUDENT IN AN ORGANIZED HEALTH CARE EDUCATION/TRAINING PROGRAM

## 2020-03-15 PROCEDURE — 00000146 ZZHCL STATISTIC GLUCOSE BY METER IP

## 2020-03-15 PROCEDURE — 83735 ASSAY OF MAGNESIUM: CPT | Performed by: NEUROLOGICAL SURGERY

## 2020-03-15 PROCEDURE — 40000275 ZZH STATISTIC RCP TIME EA 10 MIN

## 2020-03-15 RX ORDER — LEVOTHYROXINE SODIUM 175 UG/1
175 TABLET ORAL DAILY
COMMUNITY
Start: 2019-12-24 | End: 2022-02-03

## 2020-03-15 RX ORDER — FENTANYL CITRATE 50 UG/ML
INJECTION, SOLUTION INTRAMUSCULAR; INTRAVENOUS PRN
Status: DISCONTINUED | OUTPATIENT
Start: 2020-03-15 | End: 2020-03-15

## 2020-03-15 RX ORDER — SODIUM CHLORIDE, SODIUM LACTATE, POTASSIUM CHLORIDE, CALCIUM CHLORIDE 600; 310; 30; 20 MG/100ML; MG/100ML; MG/100ML; MG/100ML
INJECTION, SOLUTION INTRAVENOUS CONTINUOUS
Status: DISCONTINUED | OUTPATIENT
Start: 2020-03-15 | End: 2020-03-15 | Stop reason: HOSPADM

## 2020-03-15 RX ORDER — DEXAMETHASONE 4 MG/1
4 TABLET ORAL EVERY 12 HOURS SCHEDULED
Status: COMPLETED | OUTPATIENT
Start: 2020-03-15 | End: 2020-03-17

## 2020-03-15 RX ORDER — HYDRALAZINE HYDROCHLORIDE 20 MG/ML
2.5-5 INJECTION INTRAMUSCULAR; INTRAVENOUS EVERY 10 MIN PRN
Status: DISCONTINUED | OUTPATIENT
Start: 2020-03-15 | End: 2020-03-15 | Stop reason: HOSPADM

## 2020-03-15 RX ORDER — FENTANYL CITRATE 50 UG/ML
25-50 INJECTION, SOLUTION INTRAMUSCULAR; INTRAVENOUS
Status: DISCONTINUED | OUTPATIENT
Start: 2020-03-15 | End: 2020-03-15 | Stop reason: HOSPADM

## 2020-03-15 RX ORDER — LIDOCAINE HYDROCHLORIDE AND EPINEPHRINE 10; 10 MG/ML; UG/ML
INJECTION, SOLUTION INFILTRATION; PERINEURAL PRN
Status: DISCONTINUED | OUTPATIENT
Start: 2020-03-15 | End: 2020-03-15 | Stop reason: HOSPADM

## 2020-03-15 RX ORDER — LIDOCAINE HYDROCHLORIDE ANHYDROUS AND DEXTROSE MONOHYDRATE .8; 5 G/100ML; G/100ML
1 INJECTION, SOLUTION INTRAVENOUS CONTINUOUS
Status: DISCONTINUED | OUTPATIENT
Start: 2020-03-15 | End: 2020-03-15 | Stop reason: HOSPADM

## 2020-03-15 RX ORDER — PROPOFOL 10 MG/ML
INJECTION, EMULSION INTRAVENOUS PRN
Status: DISCONTINUED | OUTPATIENT
Start: 2020-03-15 | End: 2020-03-15

## 2020-03-15 RX ORDER — NALOXONE HYDROCHLORIDE 0.4 MG/ML
.1-.4 INJECTION, SOLUTION INTRAMUSCULAR; INTRAVENOUS; SUBCUTANEOUS
Status: ACTIVE | OUTPATIENT
Start: 2020-03-15 | End: 2020-03-16

## 2020-03-15 RX ORDER — LIDOCAINE HYDROCHLORIDE 20 MG/ML
INJECTION, SOLUTION INFILTRATION; PERINEURAL PRN
Status: DISCONTINUED | OUTPATIENT
Start: 2020-03-15 | End: 2020-03-15

## 2020-03-15 RX ORDER — PROPOFOL 10 MG/ML
INJECTION, EMULSION INTRAVENOUS CONTINUOUS PRN
Status: DISCONTINUED | OUTPATIENT
Start: 2020-03-15 | End: 2020-03-15

## 2020-03-15 RX ORDER — LABETALOL 20 MG/4 ML (5 MG/ML) INTRAVENOUS SYRINGE
10
Status: DISCONTINUED | OUTPATIENT
Start: 2020-03-15 | End: 2020-03-15 | Stop reason: HOSPADM

## 2020-03-15 RX ORDER — ONDANSETRON 2 MG/ML
INJECTION INTRAMUSCULAR; INTRAVENOUS PRN
Status: DISCONTINUED | OUTPATIENT
Start: 2020-03-15 | End: 2020-03-15

## 2020-03-15 RX ORDER — CEFAZOLIN SODIUM 1 G/3ML
1 INJECTION, POWDER, FOR SOLUTION INTRAMUSCULAR; INTRAVENOUS SEE ADMIN INSTRUCTIONS
Status: DISCONTINUED | OUTPATIENT
Start: 2020-03-15 | End: 2020-03-15 | Stop reason: HOSPADM

## 2020-03-15 RX ORDER — SODIUM CHLORIDE, SODIUM LACTATE, POTASSIUM CHLORIDE, CALCIUM CHLORIDE 600; 310; 30; 20 MG/100ML; MG/100ML; MG/100ML; MG/100ML
INJECTION, SOLUTION INTRAVENOUS CONTINUOUS PRN
Status: DISCONTINUED | OUTPATIENT
Start: 2020-03-15 | End: 2020-03-15

## 2020-03-15 RX ORDER — DEXAMETHASONE SODIUM PHOSPHATE 4 MG/ML
INJECTION, SOLUTION INTRA-ARTICULAR; INTRALESIONAL; INTRAMUSCULAR; INTRAVENOUS; SOFT TISSUE PRN
Status: DISCONTINUED | OUTPATIENT
Start: 2020-03-15 | End: 2020-03-15

## 2020-03-15 RX ORDER — ACETAMINOPHEN 325 MG/1
975 TABLET ORAL ONCE
Status: COMPLETED | OUTPATIENT
Start: 2020-03-15 | End: 2020-03-15

## 2020-03-15 RX ORDER — ONDANSETRON 2 MG/ML
4 INJECTION INTRAMUSCULAR; INTRAVENOUS EVERY 30 MIN PRN
Status: DISCONTINUED | OUTPATIENT
Start: 2020-03-15 | End: 2020-03-15 | Stop reason: HOSPADM

## 2020-03-15 RX ORDER — DEXAMETHASONE 2 MG/1
2 TABLET ORAL EVERY 12 HOURS SCHEDULED
Status: DISCONTINUED | OUTPATIENT
Start: 2020-03-17 | End: 2020-03-17

## 2020-03-15 RX ORDER — HYDROMORPHONE HYDROCHLORIDE 1 MG/ML
.3-.5 INJECTION, SOLUTION INTRAMUSCULAR; INTRAVENOUS; SUBCUTANEOUS EVERY 5 MIN PRN
Status: DISCONTINUED | OUTPATIENT
Start: 2020-03-15 | End: 2020-03-15 | Stop reason: HOSPADM

## 2020-03-15 RX ORDER — LIDOCAINE HYDROCHLORIDE ANHYDROUS AND DEXTROSE MONOHYDRATE .8; 5 G/100ML; G/100ML
1 INJECTION, SOLUTION INTRAVENOUS CONTINUOUS
Status: DISCONTINUED | OUTPATIENT
Start: 2020-03-15 | End: 2020-03-15

## 2020-03-15 RX ORDER — CEFAZOLIN SODIUM 2 G/100ML
2 INJECTION, SOLUTION INTRAVENOUS
Status: COMPLETED | OUTPATIENT
Start: 2020-03-15 | End: 2020-03-15

## 2020-03-15 RX ORDER — GABAPENTIN 300 MG/1
300 CAPSULE ORAL ONCE
Status: COMPLETED | OUTPATIENT
Start: 2020-03-15 | End: 2020-03-15

## 2020-03-15 RX ORDER — ONDANSETRON 4 MG/1
4 TABLET, ORALLY DISINTEGRATING ORAL EVERY 30 MIN PRN
Status: DISCONTINUED | OUTPATIENT
Start: 2020-03-15 | End: 2020-03-15 | Stop reason: HOSPADM

## 2020-03-15 RX ADMIN — PHENYLEPHRINE HYDROCHLORIDE 0.2 MCG/KG/MIN: 10 INJECTION INTRAVENOUS at 11:40

## 2020-03-15 RX ADMIN — PHENYLEPHRINE HYDROCHLORIDE 100 MCG: 10 INJECTION INTRAVENOUS at 15:05

## 2020-03-15 RX ADMIN — CEFAZOLIN 1 G: 1 INJECTION, POWDER, FOR SOLUTION INTRAMUSCULAR; INTRAVENOUS at 13:30

## 2020-03-15 RX ADMIN — ACETAMINOPHEN 975 MG: 325 TABLET, FILM COATED ORAL at 19:48

## 2020-03-15 RX ADMIN — PROPOFOL 40 MG: 10 INJECTION, EMULSION INTRAVENOUS at 12:05

## 2020-03-15 RX ADMIN — PROPOFOL 100 MCG/KG/MIN: 10 INJECTION, EMULSION INTRAVENOUS at 11:40

## 2020-03-15 RX ADMIN — PHENYLEPHRINE HYDROCHLORIDE 100 MCG: 10 INJECTION INTRAVENOUS at 13:10

## 2020-03-15 RX ADMIN — DEXAMETHASONE SODIUM PHOSPHATE 10 MG: 4 INJECTION, SOLUTION INTRA-ARTICULAR; INTRALESIONAL; INTRAMUSCULAR; INTRAVENOUS; SOFT TISSUE at 11:40

## 2020-03-15 RX ADMIN — ROCURONIUM BROMIDE 50 MG: 10 INJECTION INTRAVENOUS at 11:17

## 2020-03-15 RX ADMIN — PHENYLEPHRINE HYDROCHLORIDE 100 MCG: 10 INJECTION INTRAVENOUS at 11:17

## 2020-03-15 RX ADMIN — LIDOCAINE HYDROCHLORIDE 100 MG: 20 INJECTION, SOLUTION INFILTRATION; PERINEURAL at 11:17

## 2020-03-15 RX ADMIN — DEXAMETHASONE 4 MG: 4 TABLET ORAL at 19:49

## 2020-03-15 RX ADMIN — SENNOSIDES AND DOCUSATE SODIUM 2 TABLET: 8.6; 5 TABLET ORAL at 19:49

## 2020-03-15 RX ADMIN — PHENYLEPHRINE HYDROCHLORIDE 100 MCG: 10 INJECTION INTRAVENOUS at 11:45

## 2020-03-15 RX ADMIN — SODIUM CHLORIDE, POTASSIUM CHLORIDE, SODIUM LACTATE AND CALCIUM CHLORIDE: 600; 310; 30; 20 INJECTION, SOLUTION INTRAVENOUS at 11:09

## 2020-03-15 RX ADMIN — ACETAMINOPHEN 975 MG: 325 TABLET, FILM COATED ORAL at 03:59

## 2020-03-15 RX ADMIN — PROPOFOL 30 MG: 10 INJECTION, EMULSION INTRAVENOUS at 15:49

## 2020-03-15 RX ADMIN — PROPOFOL 20 MG: 10 INJECTION, EMULSION INTRAVENOUS at 16:04

## 2020-03-15 RX ADMIN — PHENYLEPHRINE HYDROCHLORIDE 100 MCG: 10 INJECTION INTRAVENOUS at 11:22

## 2020-03-15 RX ADMIN — FENTANYL CITRATE 50 MCG: 50 INJECTION, SOLUTION INTRAMUSCULAR; INTRAVENOUS at 12:50

## 2020-03-15 RX ADMIN — PROPOFOL 40 MG: 10 INJECTION, EMULSION INTRAVENOUS at 12:50

## 2020-03-15 RX ADMIN — FENTANYL CITRATE 50 MCG: 50 INJECTION, SOLUTION INTRAMUSCULAR; INTRAVENOUS at 11:17

## 2020-03-15 RX ADMIN — PROPOFOL 160 MG: 10 INJECTION, EMULSION INTRAVENOUS at 11:17

## 2020-03-15 RX ADMIN — SUGAMMADEX 50 MG: 100 INJECTION, SOLUTION INTRAVENOUS at 12:00

## 2020-03-15 RX ADMIN — PHENYLEPHRINE HYDROCHLORIDE 100 MCG: 10 INJECTION INTRAVENOUS at 13:25

## 2020-03-15 RX ADMIN — DEXAMETHASONE 4 MG: 2 TABLET ORAL at 00:26

## 2020-03-15 RX ADMIN — GABAPENTIN 300 MG: 100 CAPSULE ORAL at 10:03

## 2020-03-15 RX ADMIN — SODIUM CHLORIDE, POTASSIUM CHLORIDE, SODIUM LACTATE AND CALCIUM CHLORIDE: 600; 310; 30; 20 INJECTION, SOLUTION INTRAVENOUS at 13:43

## 2020-03-15 RX ADMIN — PROPOFOL 100 MCG/KG/MIN: 10 INJECTION, EMULSION INTRAVENOUS at 13:21

## 2020-03-15 RX ADMIN — ACETAMINOPHEN 975 MG: 325 TABLET, FILM COATED ORAL at 10:01

## 2020-03-15 RX ADMIN — ONDANSETRON 4 MG: 2 INJECTION INTRAMUSCULAR; INTRAVENOUS at 15:19

## 2020-03-15 RX ADMIN — LEVOTHYROXINE SODIUM 200 MCG: 0.1 TABLET ORAL at 06:30

## 2020-03-15 RX ADMIN — CEFAZOLIN SODIUM 2 G: 2 INJECTION, SOLUTION INTRAVENOUS at 11:40

## 2020-03-15 RX ADMIN — DEXAMETHASONE 4 MG: 2 TABLET ORAL at 06:30

## 2020-03-15 RX ADMIN — PANTOPRAZOLE SODIUM 40 MG: 40 TABLET, DELAYED RELEASE ORAL at 06:30

## 2020-03-15 RX ADMIN — SUFENTANIL CITRATE 0.2 MCG/KG/HR: 50 INJECTION EPIDURAL; INTRAVENOUS at 11:37

## 2020-03-15 ASSESSMENT — ACTIVITIES OF DAILY LIVING (ADL)
ADLS_ACUITY_SCORE: 13

## 2020-03-15 ASSESSMENT — VISUAL ACUITY: OU: NORMAL ACUITY;GLASSES

## 2020-03-15 NOTE — PLAN OF CARE
Status: Spinal tumor T5-6   Vitals: VSS  Neuros: A/Ox4. N/T to feet at baseline  IV: PIV SL   Diet: Regular. NPO @ midnight   Bowel status: BM x1  : Voiding spontaneously  Skin: WDL  Pain: No c/o pain  Activity: Up ad riki  Plan: Surgery @1000. Continue to follow POC

## 2020-03-15 NOTE — ANESTHESIA PROCEDURE NOTES
Arterial Line Procedure Note  Staff:     Anesthesiologist:  Kobe Morelos MD    Resident/CRNA:  Tony Schmitt MD    Arterial line performed by resident/CRNA in presence of a teaching physician    Location: In OR After Induction  Procedure Start/Stop Times:     patient identified, IV checked, site marked, risks and benefits discussed, informed consent, monitors and equipment checked and pre-op evaluation      Correct Patient: Yes      Correct Position: Yes      Correct Site: Yes      Correct Procedure: Yes      Correct Laterality:  N/A    Site Marked:  N/A  Line Placement:     Procedure:  Arterial Line    Insertion Site:  Radial    Insertion laterality:  Right    Skin Prep: Chloraprep      Patient Prep: mask, sterile gloves, hat and hand hygiene      Local skin infiltration:  None    Ultrasound Guided?: No      Catheter size:  20 gauge, Quick cath    Dressing:  Tegaderm    Complications:  None obvious    Arterial waveform: Yes      IBP within 10% of NIBP: Yes

## 2020-03-15 NOTE — ANESTHESIA CARE TRANSFER NOTE
Patient: Kobe Pedroza    Procedure(s):  LAMINECTOMY, SPINE, THORACIC, 2 levels, T-5, T-6    Diagnosis: Spinal cord tumor [D49.7]  Hemorrhage [R58]  Diagnosis Additional Information: No value filed.    Anesthesia Type:   General     Note:  Airway :Nasal Cannula  Patient transferred to:PACU  Comments: VSS. Breathing spontaneously at a regular rate with adequate tidal volumes and maintaining O2 sats on 2 LPM. Denies nausea or pain. No apparent complications from anesthesia.     Tony Schmitt MD  Anesthesiology Resident, CA-1    Handoff Report: Identifed the Patient, Identified the Reponsible Provider, Reviewed the pertinent medical history, Discussed the surgical course, Reviewed Intra-OP anesthesia mangement and issues during anesthesia, Set expectations for post-procedure period and Allowed opportunity for questions and acknowledgement of understanding      Vitals: (Last set prior to Anesthesia Care Transfer)    CRNA VITALS  3/15/2020 1600 - 3/15/2020 1636      3/15/2020             Resp Rate (set):  16                Electronically Signed By: Tony Schmitt MD  March 15, 2020  4:36 PM

## 2020-03-15 NOTE — ANESTHESIA PREPROCEDURE EVALUATION
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]  Hemorrhage [R58]   Procedure(s):  LAMINECTOMY, SPINE, THORACIC, 2 levels, possibly 3 levels, WITH NEOPLASM EXCISION  Laminoplasty Thoracic Posterior Two Levels     LABS:  CBC:   Lab Results   Component Value Date    WBC 9.3 2020    WBC 8.1 2020    HGB 15.0 2020    HGB 13.8 2020    HCT 44.5 2020    HCT 41.4 2020     2020     2020     BMP:   Lab Results   Component Value Date     2020     2020    POTASSIUM 4.3 2020    POTASSIUM 4.3 2020    CHLORIDE 106 2020    CHLORIDE 108 2020    CO2 23 2020    CO2 26 2020    BUN 15 2020    BUN 13 2020    CR 0.74 2020    CR 0.86 2020     (H) 2020    GLC 98 2020     COAGS:   Lab Results   Component Value Date    PTT 30 03/15/2020    INR 1.09 03/15/2020     POC:   Lab Results   Component Value Date     (H) 2020     OTHER:   Lab Results   Component Value Date    A1C 5.5 2020    RUTH 8.4 (L) 2020    PHOS 3.6 2020    MAG 2.6 (H) 2020    ALBUMIN 3.5 2020    PROTTOTAL 7.1 2020    ALT 25 2020    AST 20 2020    ALKPHOS 74 2020    BILITOTAL 0.4 2020        Preop Vitals    BP Readings from Last 3 Encounters:   03/15/20 119/72    Pulse Readings from Last 3 Encounters:   03/15/20 82      Resp Readings from Last 3 Encounters:   03/15/20 16    SpO2 Readings from Last 3 Encounters:   03/15/20 97%      Temp Readings from Last 1 Encounters:   03/15/20 36.2  C (97.2  F) (Oral)    Ht Readings from Last 1 Encounters:   No data found for Ht      Wt Readings from Last 1 Encounters:   20 98.1 kg (216 lb 4.3 oz)    There is no height or weight on file to calculate BMI.      LDA:  Peripheral IV 03/13/20 Right Lower forearm (Active)   Site Assessment WDL 03/15/20 0100   Line Status Saline locked 03/15/20 0100   Phlebitis Scale 0-->no symptoms 03/15/20 0100   Infiltration Scale 0 03/15/20 0100   Infiltration Site Treatment Method  None 03/15/20 0100   Extravasation? No 03/14/20 1353   Dressing Intervention New dressing  03/13/20 0454   Number of days: 2        No past medical history on file.   No past surgical history on file.   No Known Allergies     Anesthesia Evaluation     .             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 : . . . :. . Previous cardiac testing date:results:date: results:ECG reviewed date:3/13/20 results:NSR date: results:          METS/Exercise Tolerance:     Hematologic:         Musculoskeletal:         GI/Hepatic:  - neg GI/hepatic ROS       Renal/Genitourinary:  - ROS Renal section negative       Endo: Comment: thyroid cancer s/p thyroidectomy (2011), hypothyroidism  hx of pre-diabetes, A1C 5.5%        Psychiatric:  - neg psychiatric ROS       Infectious Disease:  - neg infectious disease ROS       Malignancy:   (+) Malignancy           Other:    - neg other ROS                 JZG FV AN PHYSICAL EXAM    Assessment:   ASA SCORE: 2    H&P: History and physical reviewed and following examination; no interval change.   Smoking Status:  Non-Smoker/Unknown   NPO Status: NPO Appropriate     Plan:   Anes. Type:  General   Pre-Medication: None   Induction:  IV (Standard)   Airway: ETT; Oral   Access/Monitoring: PIV; 2nd PIV; A-Line   Maintenance: Balanced     Drips/Meds: Ketamine; Sufentanil     Advanced Monitoring: BIS (FloTrac)     Postop Plan:   Postop Pain: Opioids  Postop Sedation/Airway: Not planned  Disposition: Inpatient/Admit     PONV Management:   Adult Risk Factors:, Non-Smoker, Postop  Opioids   Prevention: Ondansetron, Dexamethasone     CONSENT: Direct conversation   Plan and risks discussed with: Patient   Blood Products: Consented (ALL Blood Products)       Comments for Plan/Consent:  ERAS protocol for complex spine; goal directed fluid management; SSEP's/MEP's hence will avoid paralytic                 Kelby Torres MD

## 2020-03-15 NOTE — BRIEF OP NOTE
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]  Hemorrhage [R58]  Post-operative diagnosis Same as pre-operative diagnosis    Procedure: Procedure(s):  LAMINECTOMY, SPINE, THORACIC, 2 levels, T-5, T-6  Surgeon: Surgeon(s) and Role:     * Heather Cespedes MD - Primary     * Gurjit Arnold MD - Resident - Assisting     * Aleyda Rand MD - Resident - Assisting  Anesthesia: General   Estimated blood loss: 200 ml  Drains: Hemovac  Specimens:   ID Type Source Tests Collected by Time Destination   A : thoracic mass Tissue Spine, Thoracic SURGICAL PATHOLOGY EXAM Heather Cespedes MD 3/15/2020  2:06 PM    B : thoracic mass #2 Tissue Spine, Thoracic SURGICAL PATHOLOGY EXAM Heather Cespedes MD 3/15/2020  3:05 PM      Findings:   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   Complications: None.  Implants: * No implants in log *

## 2020-03-15 NOTE — PLAN OF CARE
Status: Spinal tumor T5-6   Vitals: VSS  Neuros: A/Ox4. N/T to feet at baseline  IV: PIV SL   Diet: Regular. NPO @ midnight   Bowel status: BM x1  : Voiding spontaneously  Skin: WDL  Pain: No c/o pain  Activity: Up ad riki, walked in cee x1  Social: Wife at bedside  Plan: Surgery tomorrow @1000. Continue to follow POC

## 2020-03-15 NOTE — ANESTHESIA POSTPROCEDURE EVALUATION
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     Anesthesia Type: General       Disposition: Admission   Postop Pain Control: Uneventful            Sign Out: Well controlled pain   PONV: No   Neuro/Psych: Uneventful            Sign Out: Acceptable/Baseline neuro status   Airway/Respiratory: Uneventful            Sign Out: Acceptable/Baseline resp. status   CV/Hemodynamics: Uneventful            Sign Out: Acceptable CV status   Other NRE:    DID A NON-ROUTINE EVENT OCCUR? YES    Event details/Postop Comments:  Arterial catheter failure         Last Anesthesia Record Vitals:  CRNA VITALS  3/15/2020 1600 - 3/15/2020 1700      3/15/2020             Resp Rate (set):  16          Last PACU Vitals:  Vitals Value Taken Time   /78 3/15/2020  5:20 PM   Temp 36.8  C (98.3  F) 3/15/2020  5:15 PM   Pulse 95 3/15/2020  5:20 PM   Resp 15 3/15/2020  5:15 PM   SpO2 97 % 3/15/2020  5:25 PM   Temp src     NIBP     Pulse     SpO2     Resp     Temp     Ht Rate     Temp 2     Vitals shown include unvalidated device data.      Electronically Signed By: Kobe Morelos MD, March 15, 2020, 5:26 PM

## 2020-03-15 NOTE — PROGRESS NOTES
[facility]  Neurosurgery Progress Note    Subjective: no acute events    Objective:   Temp:  [96.5  F (35.8  C)-98.5  F (36.9  C)] 97.2  F (36.2  C)  Pulse:  [79-82] 82  Heart Rate:  [72-99] 85  Resp:  [16-20] 16  BP: (112-143)/(45-81) 119/72  SpO2:  [92 %-97 %] 97 %  I/O last 3 completed shifts:  In: 400 [P.O.:400]  Out: -     Alert, oriented x3  CN II-XII intact  5/5 strength in all extremities, no pronator drift  Sensation intact to light touch    LABS  Recent Labs   Lab Test 03/14/20  0439 03/13/20  0554   WBC 9.3 8.1   HGB 15.0 13.8   MCV 88 90    301       Recent Labs   Lab Test 03/14/20  0439 03/13/20  0554    139   POTASSIUM 4.3 4.3   CHLORIDE 106 108   CO2 23 26   BUN 15 13   CR 0.74 0.86   ANIONGAP 6 4   RUTH 8.4* 7.6*   * 98       IMAGING  none    Assessment: Mr. 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 Neurologically intact.    Plan:  - NPO since midnight  - OR today  - pta levothyroxine  - pain control  - bowel regimen, regular diet  - decadron 4mg q6hrs for spinal cord compression  - dispo: 6B    Niko Jaimes MD  Neurosurgery Resident PGY-1    Please contact neurosurgery resident on call with questions.    Dial * * [phone], enter [phone] when prompted.     I have seen this patient with the resident and formulated a plan and agree with this note.  AMP

## 2020-03-16 ENCOUNTER — APPOINTMENT (OUTPATIENT)
Dept: CARDIOLOGY | Facility: CLINIC | Age: 60
DRG: 820 | End: 2020-03-16
Attending: STUDENT IN AN ORGANIZED HEALTH CARE EDUCATION/TRAINING PROGRAM
Payer: COMMERCIAL

## 2020-03-16 ENCOUNTER — APPOINTMENT (OUTPATIENT)
Dept: PHYSICAL THERAPY | Facility: CLINIC | Age: 60
DRG: 820 | End: 2020-03-16
Attending: STUDENT IN AN ORGANIZED HEALTH CARE EDUCATION/TRAINING PROGRAM
Payer: COMMERCIAL

## 2020-03-16 LAB
ANION GAP SERPL CALCULATED.3IONS-SCNC: 6 MMOL/L (ref 3–14)
BASOPHILS # BLD AUTO: 0 10E9/L (ref 0–0.2)
BASOPHILS NFR BLD AUTO: 0.1 %
BUN SERPL-MCNC: 33 MG/DL (ref 7–30)
CALCIUM SERPL-MCNC: 7.3 MG/DL (ref 8.5–10.1)
CHLORIDE SERPL-SCNC: 106 MMOL/L (ref 94–109)
CO2 SERPL-SCNC: 26 MMOL/L (ref 20–32)
COPATH REPORT: NORMAL
CREAT SERPL-MCNC: 1 MG/DL (ref 0.66–1.25)
DIFFERENTIAL METHOD BLD: ABNORMAL
EOSINOPHIL # BLD AUTO: 0 10E9/L (ref 0–0.7)
EOSINOPHIL NFR BLD AUTO: 0 %
ERYTHROCYTE [DISTWIDTH] IN BLOOD BY AUTOMATED COUNT: 13.6 % (ref 10–15)
GFR SERPL CREATININE-BSD FRML MDRD: 83 ML/MIN/{1.73_M2}
GLUCOSE SERPL-MCNC: 134 MG/DL (ref 70–99)
HCT VFR BLD AUTO: 37.9 % (ref 40–53)
HGB BLD-MCNC: 12.3 G/DL (ref 13.3–17.7)
IMM GRANULOCYTES # BLD: 0.2 10E9/L (ref 0–0.4)
IMM GRANULOCYTES NFR BLD: 1 %
LDH SERPL L TO P-CCNC: 218 U/L (ref 85–227)
LYMPHOCYTES # BLD AUTO: 1 10E9/L (ref 0.8–5.3)
LYMPHOCYTES NFR BLD AUTO: 6.2 %
MAGNESIUM SERPL-MCNC: 2.3 MG/DL (ref 1.6–2.3)
MCH RBC QN AUTO: 29.9 PG (ref 26.5–33)
MCHC RBC AUTO-ENTMCNC: 32.5 G/DL (ref 31.5–36.5)
MCV RBC AUTO: 92 FL (ref 78–100)
MONOCYTES # BLD AUTO: 1.9 10E9/L (ref 0–1.3)
MONOCYTES NFR BLD AUTO: 11.2 %
NEUTROPHILS # BLD AUTO: 13.7 10E9/L (ref 1.6–8.3)
NEUTROPHILS NFR BLD AUTO: 81.5 %
NRBC # BLD AUTO: 0 10*3/UL
NRBC BLD AUTO-RTO: 0 /100
PHOSPHATE SERPL-MCNC: 3.6 MG/DL (ref 2.5–4.5)
PLATELET # BLD AUTO: 337 10E9/L (ref 150–450)
POTASSIUM SERPL-SCNC: 4.7 MMOL/L (ref 3.4–5.3)
RBC # BLD AUTO: 4.12 10E12/L (ref 4.4–5.9)
SODIUM SERPL-SCNC: 138 MMOL/L (ref 133–144)
URATE SERPL-MCNC: 4.5 MG/DL (ref 3.5–7.2)
WBC # BLD AUTO: 16.7 10E9/L (ref 4–11)

## 2020-03-16 PROCEDURE — 88185 FLOWCYTOMETRY/TC ADD-ON: CPT | Performed by: NEUROLOGICAL SURGERY

## 2020-03-16 PROCEDURE — 84550 ASSAY OF BLOOD/URIC ACID: CPT | Performed by: STUDENT IN AN ORGANIZED HEALTH CARE EDUCATION/TRAINING PROGRAM

## 2020-03-16 PROCEDURE — 25000132 ZZH RX MED GY IP 250 OP 250 PS 637: Performed by: STUDENT IN AN ORGANIZED HEALTH CARE EDUCATION/TRAINING PROGRAM

## 2020-03-16 PROCEDURE — 93306 TTE W/DOPPLER COMPLETE: CPT

## 2020-03-16 PROCEDURE — 93306 TTE W/DOPPLER COMPLETE: CPT | Mod: 26 | Performed by: INTERNAL MEDICINE

## 2020-03-16 PROCEDURE — 97116 GAIT TRAINING THERAPY: CPT | Mod: GP

## 2020-03-16 PROCEDURE — 88184 FLOWCYTOMETRY/ TC 1 MARKER: CPT | Performed by: NEUROLOGICAL SURGERY

## 2020-03-16 PROCEDURE — 4A11X4G MONITORING OF PERIPHERAL NERVOUS ELECTRICAL ACTIVITY, INTRAOPERATIVE, EXTERNAL APPROACH: ICD-10-PCS | Performed by: NEUROLOGICAL SURGERY

## 2020-03-16 PROCEDURE — 25000132 ZZH RX MED GY IP 250 OP 250 PS 637: Performed by: NURSE PRACTITIONER

## 2020-03-16 PROCEDURE — 40001005 ZZHCL STATISTIC FLOW >15 ABY TC 88189: Performed by: NEUROLOGICAL SURGERY

## 2020-03-16 PROCEDURE — 85025 COMPLETE CBC W/AUTO DIFF WBC: CPT | Performed by: STUDENT IN AN ORGANIZED HEALTH CARE EDUCATION/TRAINING PROGRAM

## 2020-03-16 PROCEDURE — 36415 COLL VENOUS BLD VENIPUNCTURE: CPT | Performed by: STUDENT IN AN ORGANIZED HEALTH CARE EDUCATION/TRAINING PROGRAM

## 2020-03-16 PROCEDURE — 83735 ASSAY OF MAGNESIUM: CPT | Performed by: STUDENT IN AN ORGANIZED HEALTH CARE EDUCATION/TRAINING PROGRAM

## 2020-03-16 PROCEDURE — 87389 HIV-1 AG W/HIV-1&-2 AB AG IA: CPT | Performed by: STUDENT IN AN ORGANIZED HEALTH CARE EDUCATION/TRAINING PROGRAM

## 2020-03-16 PROCEDURE — 25000131 ZZH RX MED GY IP 250 OP 636 PS 637: Performed by: STUDENT IN AN ORGANIZED HEALTH CARE EDUCATION/TRAINING PROGRAM

## 2020-03-16 PROCEDURE — 0PH404Z INSERTION OF INTERNAL FIXATION DEVICE INTO THORACIC VERTEBRA, OPEN APPROACH: ICD-10-PCS | Performed by: NEUROLOGICAL SURGERY

## 2020-03-16 PROCEDURE — 80048 BASIC METABOLIC PNL TOTAL CA: CPT | Performed by: STUDENT IN AN ORGANIZED HEALTH CARE EDUCATION/TRAINING PROGRAM

## 2020-03-16 PROCEDURE — 97161 PT EVAL LOW COMPLEX 20 MIN: CPT | Mod: GP

## 2020-03-16 PROCEDURE — 86704 HEP B CORE ANTIBODY TOTAL: CPT | Performed by: STUDENT IN AN ORGANIZED HEALTH CARE EDUCATION/TRAINING PROGRAM

## 2020-03-16 PROCEDURE — 00NX0ZZ RELEASE THORACIC SPINAL CORD, OPEN APPROACH: ICD-10-PCS | Performed by: NEUROLOGICAL SURGERY

## 2020-03-16 PROCEDURE — 83615 LACTATE (LD) (LDH) ENZYME: CPT | Performed by: STUDENT IN AN ORGANIZED HEALTH CARE EDUCATION/TRAINING PROGRAM

## 2020-03-16 PROCEDURE — 87340 HEPATITIS B SURFACE AG IA: CPT | Performed by: STUDENT IN AN ORGANIZED HEALTH CARE EDUCATION/TRAINING PROGRAM

## 2020-03-16 PROCEDURE — 12000001 ZZH R&B MED SURG/OB UMMC

## 2020-03-16 PROCEDURE — 0PB40ZZ EXCISION OF THORACIC VERTEBRA, OPEN APPROACH: ICD-10-PCS | Performed by: NEUROLOGICAL SURGERY

## 2020-03-16 PROCEDURE — 00BX0ZX EXCISION OF THORACIC SPINAL CORD, OPEN APPROACH, DIAGNOSTIC: ICD-10-PCS | Performed by: NEUROLOGICAL SURGERY

## 2020-03-16 PROCEDURE — 86706 HEP B SURFACE ANTIBODY: CPT | Performed by: STUDENT IN AN ORGANIZED HEALTH CARE EDUCATION/TRAINING PROGRAM

## 2020-03-16 PROCEDURE — 84100 ASSAY OF PHOSPHORUS: CPT | Performed by: STUDENT IN AN ORGANIZED HEALTH CARE EDUCATION/TRAINING PROGRAM

## 2020-03-16 RX ORDER — ACETAMINOPHEN 325 MG/1
650 TABLET ORAL EVERY 4 HOURS
Status: DISCONTINUED | OUTPATIENT
Start: 2020-03-16 | End: 2020-03-20

## 2020-03-16 RX ORDER — METHOCARBAMOL 750 MG/1
750 TABLET, FILM COATED ORAL EVERY 6 HOURS PRN
Status: DISCONTINUED | OUTPATIENT
Start: 2020-03-16 | End: 2020-03-30 | Stop reason: HOSPADM

## 2020-03-16 RX ADMIN — ACETAMINOPHEN 650 MG: 325 TABLET, FILM COATED ORAL at 04:17

## 2020-03-16 RX ADMIN — DEXAMETHASONE 4 MG: 4 TABLET ORAL at 19:22

## 2020-03-16 RX ADMIN — ACETAMINOPHEN 650 MG: 325 TABLET, FILM COATED ORAL at 12:38

## 2020-03-16 RX ADMIN — ACETAMINOPHEN 650 MG: 325 TABLET, FILM COATED ORAL at 08:14

## 2020-03-16 RX ADMIN — DEXAMETHASONE 4 MG: 4 TABLET ORAL at 08:15

## 2020-03-16 RX ADMIN — OXYCODONE HYDROCHLORIDE 10 MG: 5 TABLET ORAL at 00:56

## 2020-03-16 RX ADMIN — SENNOSIDES AND DOCUSATE SODIUM 2 TABLET: 8.6; 5 TABLET ORAL at 19:22

## 2020-03-16 RX ADMIN — SENNOSIDES AND DOCUSATE SODIUM 2 TABLET: 8.6; 5 TABLET ORAL at 08:14

## 2020-03-16 RX ADMIN — ACETAMINOPHEN 650 MG: 325 TABLET, FILM COATED ORAL at 23:42

## 2020-03-16 RX ADMIN — ACETAMINOPHEN 650 MG: 325 TABLET, FILM COATED ORAL at 15:45

## 2020-03-16 RX ADMIN — ACETAMINOPHEN 650 MG: 325 TABLET, FILM COATED ORAL at 19:55

## 2020-03-16 RX ADMIN — METHOCARBAMOL 750 MG: 750 TABLET, FILM COATED ORAL at 15:08

## 2020-03-16 RX ADMIN — POLYETHYLENE GLYCOL 3350 17 G: 17 POWDER, FOR SOLUTION ORAL at 08:14

## 2020-03-16 RX ADMIN — LEVOTHYROXINE SODIUM 200 MCG: 0.1 TABLET ORAL at 08:15

## 2020-03-16 RX ADMIN — OXYCODONE HYDROCHLORIDE 5 MG: 5 TABLET ORAL at 13:10

## 2020-03-16 RX ADMIN — PANTOPRAZOLE SODIUM 40 MG: 40 TABLET, DELAYED RELEASE ORAL at 08:15

## 2020-03-16 ASSESSMENT — ACTIVITIES OF DAILY LIVING (ADL)
ADLS_ACUITY_SCORE: 15
ADLS_ACUITY_SCORE: 13
ADLS_ACUITY_SCORE: 15
ADLS_ACUITY_SCORE: 15

## 2020-03-16 ASSESSMENT — VISUAL ACUITY
OU: GLASSES;BASELINE
OU: GLASSES;BASELINE

## 2020-03-16 ASSESSMENT — PAIN DESCRIPTION - DESCRIPTORS: DESCRIPTORS: ACHING

## 2020-03-16 NOTE — CONSULTS
Hematology New Consult    Kobe Pedroza MRN# [id number]   Age: 60 year old YOB: 1960   Mar 12, 2020    Reason for consult: Epidural mass of T5-6 concerning for lymphoma.    HPI / COURSE  Kobe Pedroza is a 60 year old yo male with PMH of of thyroid cancer s/p thyroidectomy (2011), hypothyroidism on levothyroxine, who presented as a transfer to [facility] for epidural tumor.  He initially presented to outside hospital for acute on chronic mid back pain and constipation.  Further work-up with MRI showed thoracic extradural spinal tumor of T5-6 for the severe cord compression.  He was then transferred to neurosurgery service for tumor resection.  He underwent gross total resection of epidural tumor with Dr. Cespedes on 3/15/20. 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 Hematolgy was consulted for further management.    Pt states that he is doing well after the surgery. His pain has improved significantly. He does not report fevers, chills, night sweats, dysphagia or dysarthria, chest pain, palpitations, SOB, diarrhea, black stool. He tried to lose weight and lost about 30 lbs over a 6-month period, but has gained 10 lbs back.    ROS: Complete review of systems was negative or non-contributory with the exception of that noted above.    PERTINENT PAST MEDICAL/SURGICAL HISTORY  Thyroid cancer  Hypothyroidism  Thyroidectomy (2011)    SOCIAL / FAMILY HISTORY  Social History     Socioeconomic History     Marital status:      Spouse name: None     Number of children: None     Years of education: None     Highest education level: None   Occupational History     None   Social Needs     Financial resource strain: None     Food insecurity     Worry: None     Inability: None     Transportation needs     Medical: None     Non-medical: None   Tobacco Use     Smoking status: Never Smoker     Smokeless tobacco: Never Used   Substance and Sexual Activity     Alcohol use: None     Drug use: None     Sexual  activity: None   Lifestyle     Physical activity     Days per week: None     Minutes per session: None     Stress: None   Relationships     Social connections     Talks on phone: None     Gets together: None     Attends Scientologist service: None     Active member of club or organization: None     Attends meetings of clubs or organizations: None     Relationship status: None     Intimate partner violence     Fear of current or ex partner: None     Emotionally abused: None     Physically abused: None     Forced sexual activity: None   Other Topics Concern     Parent/sibling w/ CABG, MI or angioplasty before 65F 55M? Not Asked   Social History Narrative     None     Lives with wife. Mother-in-law is staying with them.    His an uncle who was diagnosed with tumor in his 50s and  at 75.  Son in law  from a germ cell tumor    MEDICATIONS  No current outpatient medications on file.     Medications Prior to Admission   Medication Sig Dispense Refill Last Dose     levothyroxine (SYNTHROID/LEVOTHROID) 200 MCG tablet Take 200 mcg by mouth daily        ALLERGIES  No Known Allergies    PHYSICAL EXAMINATION:  /76   Pulse 99   Temp 97.6  F (36.4  C) (Oral)   Resp 16   Wt 98.1 kg (216 lb 4.3 oz)   SpO2 98%   Constitutional: Awake and alert, lying in bed, not in acute distress.  Eyes: No scleral icterus. Eyes exhibit no discharge.  ENT/Mouth: Oral mucosa pink and moist  Cardiovascular: Normal rate, regular rhythm, S1, S2. No murmur or rub. No LE edema.  Respiratory: No respiratory distress. Clear to auscultation bilaterally. No wheezes.  Gastrointestinal: Soft. No distension. No tenderness or garding.  Neurological: AAOX3, grossly non-focal. Spinal drain in place, postop.  Psychiatric: Mentation and affect appear normal.  Skin: Skin is warm, not diaphoretic.  Hematologic/Lymphatic/Immunologic: No overt bleeding. No lymphadenopathy    DATA:  CBC  Recent Labs   Lab Test 20  0540 03/15/20  0733 20  0432  03/13/20  0554   WBC 16.7* 18.4* 9.3 8.1   HGB 12.3* 15.0 15.0 13.8   HCT 37.9* 45.5 44.5 41.4    371 331 301   MCV 92 89 88 90   RBC 4.12* 5.13 5.04 4.61   ANEU 13.7* 16.9* 8.6* 5.7       BMP  Recent Labs   Lab Test 03/16/20  0540 03/15/20  0733 03/14/20  0439 03/13/20  0554    137 136 139   POTASSIUM 4.7 4.5 4.3 4.3   CHLORIDE 106 107 106 108   CO2 26 23 23 26   BUN 33* 25 15 13   CR 1.00 0.80 0.74 0.86   RUTH 7.3* 8.2* 8.4* 7.6*   MAG 2.3 2.5* 2.6* 2.8*       LFT  Recent Labs   Lab Test 03/14/20  0439   PROTTOTAL 7.1   ALBUMIN 3.5   AST 20   ALT 25   BILITOTAL 0.4   ALKPHOS 74       Coagulation  Recent Labs   Lab Test 03/15/20  0428 03/13/20  0337   INR 1.09 1.07   PTT 30 34       MR THORACIC SPINE W/O & W CONTRAST 3/13/2020 8:21 PM     Provided History: T5-6 lesion, suspected epidural hematoma     Findings:  The external marker is at T12. The thoracic vertebrae are normally aligned.  Multilevel disc desiccation.     Crescentic T2 and T1 hypointense epidural lesion with restricted diffusion and mild diffuse enhancement centered in the left posterior epidural spinal canal at T5-T6. The mass also extends into the left T5-6 neural foramen. Severe associated spinal canal stenosis and moderate compression of the adjacent cord. No myelopathic cord signal or abnormal enhancement in the cord. Normal variant ventriculus  terminalis.     Multilevel mild bilateral thoracic facet hypertrophy. Scattered mild degenerative endplate signal changes including edema at L1-2, partially visualized. Mild posterior disc bulge at T7-8.                                                                      Impression: Epidural mass centered in the left posterolateral spinal canal at T5-T6 with severe spinal canal stenosis and cord compression but no myelopathic cord signal. Given enhancement of the mass, favor neoplasm such as meningioma. Less likely metastasis absent a history of known malignancy.]    Assessment and plan  60  year old yo male with PMH of of thyroid cancer s/p thyroidectomy (2011), hypothyroidism on levothyroxine, who presented as a transfer to [facility] for epidural tumor, now s/p gross total resection of epidural tumor, concerning for lymphoma.    Jonnathan is recovering well form surgery. Prelim pathology on frozen section was consistent with lymphoma. Final pathology is pending. Discussed with Jonnathan and wife that the treatment and prognosis will depend on the final pathology. Given the time course of his symptoms, it does not appear to be a very aggressive lymphoma. There is no evidence of tumor lysis. Will need PET/CT for further staging. Will likely need radiation therapy consult. Discussed with pathology, final report will come back on 3/17. Patient will stay in house until final path results. Depending on the final diagnosis, can be discharged home or transfer to  hem malignancy service.    - Await final pathology  - F/u HIV, Hep B, Hep C  - Continue Decadron and taper  - May need radiation oncology consult  - Further recommendations will depend on final pathology    Staffed w/ Dr. Ree Garza MD, PhD  Hem/Onc Fellow  Attending  The patient was seen and examined by me separate from the resident/fellow provider.The note above reflects my assessment and plan. I have personally reviewed today's labs,vital and radiology results. The points of care that were added by me are:    Will review path and do staging , likely need radiation but need specific dx for further therapy  Rob Keenan M.D.  [phone]

## 2020-03-16 NOTE — PROGRESS NOTES
"6A PT Eval     03/16/20 1600   Quick Adds   Type of Visit Initial PT Evaluation   Living Environment   Lives With spouse   Living Arrangements house   Home Accessibility stairs to enter home   Number of Stairs, Main Entrance 1   Transportation Anticipated car, drives self   Living Environment Comment Patient lives in 1 story home with 1 RAFAEL.   Self-Care   Usual Activity Tolerance excellent   Current Activity Tolerance good   Equipment Currently Used at Home none   Activity/Exercise/Self-Care Comment Patient fully IND at baseline, no falls or balance impairment   Functional Level Prior   Ambulation 0-->independent   Transferring 0-->independent   Toileting 0-->independent   Bathing 0-->independent   Communication 0-->understands/communicates without difficulty   Swallowing 0-->swallows foods/liquids without difficulty   Cognition 0 - no cognition issues reported   Fall history within last six months no   Which of the above functional risks had a recent onset or change? ambulation;transferring   Prior Functional Level Comment IND   General Information   Onset of Illness/Injury or Date of Surgery - Date 03/13/20   Referring Physician Jeffrey Morrison MD    Patient/Family Goals Statement To return home   Pertinent History of Current Problem (include personal factors and/or comorbidities that impact the POC) Per H&P \"Mr. 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 Neurologically intact. S/p OR 3/15 for T5-6 laminectomy, with gross total resection of epidural tumor, frozen pathology with lymphoma\"   Precautions/Limitations spinal precautions   General Info Comments Activity: up with assist   Cognitive Status Examination   Level of Consciousness " "alert   Follows Commands and Answers Questions 100% of the time   Personal Safety and Judgment intact   Memory intact   Pain Assessment   Patient Currently in Pain Yes, see Vital Sign flowsheet  (5/10, improved with gait)   Posture    Posture Forward head position   Range of Motion (ROM)   ROM Comment LE ROM grossly WFL   Strength   Strength Comments LE strength grossly WFL, 5/5   Bed Mobility   Bed Mobility Comments patient unable to perform supine<>sit within precautions before verbal instruction   Transfer Skills   Transfer Comments IND sit<>stand   Gait   Gait Comments slow gait speed, supinated foot position bilaterally, good dynamic balance   Balance   Balance Comments IND romberg eyes closed and IND single leg stance, appropriate reactive stepping   General Therapy Interventions   Planned Therapy Interventions ADL retraining;balance training;bed mobility training;gait training;ROM;strengthening;stretching;transfer training   Clinical Impression   Criteria for Skilled Therapeutic Intervention yes, treatment indicated   PT Diagnosis impaired functional mobility   Influenced by the following impairments back pain, impaired functional strenght, decreased activity tolerance   Functional limitations due to impairments bed mobility, gait, stairs   Clinical Presentation Stable/Uncomplicated   Clinical Presentation Rationale clinical judgement   Clinical Decision Making (Complexity) Low complexity   Therapy Frequency 3x/week   Predicted Duration of Therapy Intervention (days/wks) 2 weeks   Anticipated Discharge Disposition Home   Risk & Benefits of therapy have been explained Yes   Patient, Family & other staff in agreement with plan of care Yes   [facility] [employer] TM \"6 Clicks\"   2016, Trustees of [facility], under license to [employer].  All rights reserved.   6 Clicks Short Forms Basic Mobility Inpatient Short Form   [facility] [employer]  \"6 Clicks\" V.2 Basic Mobility Inpatient Short Form   1. " Turning from your back to your side while in a flat bed without using bedrails? 4 - None   2. Moving from lying on your back to sitting on the side of a flat bed without using bedrails? 4 - None   3. Moving to and from a bed to a chair (including a wheelchair)? 4 - None   4. Standing up from a chair using your arms (e.g., wheelchair, or bedside chair)? 4 - None   5. To walk in hospital room? 4 - None   6. Climbing 3-5 steps with a railing? 4 - None   Basic Mobility Raw Score (Score out of 24.Lower scores equate to lower levels of function) 24   Total Evaluation Time   Total Evaluation Time (Minutes) 8       Aneudy Cisse PT, DPT  Pager #[phone]

## 2020-03-16 NOTE — PLAN OF CARE
Status: T5-6 laminectomy  Vitals: VSS  Neuros: baseline neuropathy in feet, BLE 4/5  IV: PIV saline locked  Resp/trach: WNL  Diet: tolerated regular diet without difficulty  Bowel status: passing flatus, BS normoactive  : voiding without difficulty, scant drops of blood noted during first void after sofia removed  Skin: back incision c/d/I, hemovac removed this AM from superior portion of incision. Incision covered, dried drainage marked, no extension  Pain: managed effectively with PO tylenol  Activity: up with 1, GB  Social: no visitors this shift  Plan: possibly transferring to hem/onc this afternoon for treatment and monitoring of newly diagnosed lymphoma

## 2020-03-16 NOTE — PLAN OF CARE
6A PT Eval Complete, Treatment Initiated    PT -   Discharge Planner PT   Patient plan for discharge: home  Current status: patient educated in log roll transfer and performed mod I. Further, patient ambulated x800' IND in hallway for endurance training and performed x9 stairs IND.   Barriers to return to prior living situation: medical status  Recommendations for discharge: home  Rationale for recommendations: Patient demonstrates sufficient functional mobility for safe return home when medically stable.   Entered by: Aneudy Cisse 03/16/2020 5:01 PM

## 2020-03-16 NOTE — PLAN OF CARE
Status: Spinal tumor T5-6. POD #0 Lami T5-6  Vitals: VSS. Capno in place  Neuros: A/Ox4. N/T to feet at baseline  IV: PIVs SL   Diet: Regular  Bowel status: No BM   : Alex in place post surgery  Skin: Incision to lower neck covered with primapores, CDI, with hemovac drain in place   Pain: Pain relieved with scheduled Tylenol   Activity: Up ad riki  Plan:  Continue to follow POC

## 2020-03-16 NOTE — PROGRESS NOTES
[facility]  Neurosurgery Progress Note    Subjective: doing well post-op, back pain improved, frozen pathology lymphoma    Objective:   Temp:  [96  F (35.6  C)-98.8  F (37.1  C)] 97.6  F (36.4  C)  Pulse:  [95-99] 99  Heart Rate:  [74-98] 77  Resp:  [12-23] 16  BP: (115-137)/(60-92) 118/76  SpO2:  [92 %-98 %] 98 %  I/O last 3 completed shifts:  In: 2500 [I.V.:2500]  Out: 885 [Urine:605; Drains:80; Blood:200]    Alert, oriented x3  CN II-XII intact  5/5 strength in all extremities, no pronator drift  Sensation intact to light touch    Assessment: Mr. 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 Neurologically intact. S/p OR 3/15 for T5-6 laminectomy, with gross total resection of epidural tumor, frozen pathology with lymphoma.    Plan:  - Advance as tolerated  - pta levothyroxine  - pain control  - bowel regimen, regular diet  - decadron 4 day taper for treatment of spinal cord compression  - monitor hemovac output, remove when low output  - heme consulted for lymphoma  - PT/OT  - dispo: 6A    Saarth Carbajal MD  Neurosurgery Resident PGY2    Please contact neurosurgery resident on call with questions.    Dial * * [phone], enter [phone] when prompted.   I have seen this patient with the resident and formulated a plan and agree with this note.  AMP

## 2020-03-16 NOTE — OP NOTE
Procedure Date: 03/13/2020      PREOPERATIVE DIAGNOSES:     1.  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   2.  Back pain, thoracic back pain.      POSTOPERATIVE DIAGNOSES:     1.  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   2.  Back pain, thoracic back pain.      PROCEDURES:     1.  T5 to T6 laminoplasty for tumor resection.   2.  Intraoperative neuromonitoring with SSEPs, EMG and MEPs.      SURGEON:  Heather Cespedes MD      ASSISTANT:     1.  Gurjit Arnold MD   2.  Aleyda Rand MD      INDICATION FOR PROCEDURE:  Mr. Pedroza is a 60-year-old male who was transferred to [facility] for definitive care when a mass was found in his thoracic spine.  Mr. Pedroza had about a year's worth of back pain in this area, which was then acutely exacerbated about a week ago when he was lifting his mother up from a sitting position.  His back pain was significant and persisted for about a week.  He was evaluated and I think some of the thought was that it could be his gallbladder, but further investigation with an MRI revealed a T5-6 extradural mass that was compressing his spine.  He did not have any neurologic deficits other than radicular pain in the T5-6 distribution on the left-hand side.  The risks and benefits of the procedure were explained to the patient and he wished to proceed.      DESCRIPTION OF OPERATION:  After consent was obtained, the patient was brought to the operating room.  General endotracheal anesthesia was induced.  The patient's neuromonitoring was established We then proned him onto a Balbir table with a Pablo frame.  Neuromonitoring was established and found to be monitorable at baseline with no deficits.  We then localized with a C-arm from the skull down to the T5-6 area and then secondarily from the sacrum up to the T5-6 area.  This was confirmed to be the operative levels of T5-6.  We injected 10 mL of local anesthetic.  He was prepped and draped in the normal sterile fashion and  a proper timeout was performed.      After proper timeout, the incision was made.  Dissection ensued down the bilateral spinous process and lamina with monopolar cautery.  We established the T6 and T5 levels.  We confirmed these levels with x-ray and this was a proper spine timeout.      With an AM-8 drill bit, we drilled gutters bilaterally and essentially removed the T5 and T6 lamina with a #2 Kerrison.  We then exposed the spine and the tumor.  This seemed to be very vascular in nature; it did not have a capsule.  There was a large portion of the tumor that was attached to the bone.  The tumor was sent for frozen pathology, which returned as lymphoma.  We continued to remove the rest of the tumor. Then we cauterized all bleeding points.  We did not encounter any spinal fluid.  We essentially performed a gross total resection of all abnormal tumor tissue.  We removed the tumor from the bone and placed several dog bones for laminoplasty using the Synthes cranial fixation.  We obtained hemostasis with cautery and Surgiflo and reattached the T5-6 lamina with the cranial fixation set.        We placed a medium Hemovac drain, thoroughly irrigated the wound with antibiotic saline and proceeded with closure using 0 Vicryls for the muscle and fascial layers, 2-0 Vicryl for subcutaneous skin, 3-0 Monocryl and Exofin to finalize.  Sponge and needle counts were correct x2 at the end the procedure.  Neuromonitoring was periodically checked throughout the procedure.  All monitoring was found to be at baseline with no issues.      Dr. Cespedes was present for all critical portions of the procedure and immediately available for opening and closure.         DANIEL ABDI MD             D: 2020   T: 2020   MT: ABBY      Name:     JOHN CHOWDHURY   MRN:      [id number]        Account:        [id number]   :      1960           Procedure Date: 2020      Document: [id number]

## 2020-03-16 NOTE — PLAN OF CARE
Status: Here with spinal tumor T5-6 resulting in cord compression. POD1 Lami T5-6  Vitals: AVSS on RA  Neuros: A&Ox4. Baseline numbness and tingling  IV: PIV SL   Diet: Regular with good PO  Bowel status: No BM this shift  : will remove Alex this AM at 0600  Skin: Incision upper back covered with primipore, CDI, with hemovac drain in place - output of 40ml this shift  Pain: Back pain well controlled with PRN Oxycodone 10mg and Tyelnol   Activity: SBA with gb- has only walked small distances since surgery  Plan:  Discharge to home when medically stable. Will continue to monitor and follow with POC.

## 2020-03-17 ENCOUNTER — APPOINTMENT (OUTPATIENT)
Dept: PHYSICAL THERAPY | Facility: CLINIC | Age: 60
DRG: 820 | End: 2020-03-17
Attending: NEUROLOGICAL SURGERY
Payer: COMMERCIAL

## 2020-03-17 ENCOUNTER — TELEPHONE (OUTPATIENT)
Dept: NEUROSURGERY | Facility: CLINIC | Age: 60
End: 2020-03-17

## 2020-03-17 ENCOUNTER — APPOINTMENT (OUTPATIENT)
Dept: GENERAL RADIOLOGY | Facility: CLINIC | Age: 60
DRG: 820 | End: 2020-03-17
Attending: PHYSICIAN ASSISTANT
Payer: COMMERCIAL

## 2020-03-17 ENCOUNTER — APPOINTMENT (OUTPATIENT)
Dept: CT IMAGING | Facility: CLINIC | Age: 60
DRG: 820 | End: 2020-03-17
Attending: NURSE PRACTITIONER
Payer: COMMERCIAL

## 2020-03-17 LAB
ALBUMIN SERPL-MCNC: 2.7 G/DL (ref 3.4–5)
ALP SERPL-CCNC: 43 U/L (ref 40–150)
ALT SERPL W P-5'-P-CCNC: 18 U/L (ref 0–70)
ANION GAP SERPL CALCULATED.3IONS-SCNC: 6 MMOL/L (ref 3–14)
AST SERPL W P-5'-P-CCNC: 16 U/L (ref 0–45)
B2 MICROGLOB SERPL-MCNC: 2 MG/L
BASOPHILS # BLD AUTO: 0 10E9/L (ref 0–0.2)
BASOPHILS NFR BLD AUTO: 0.1 %
BILIRUB SERPL-MCNC: 0.5 MG/DL (ref 0.2–1.3)
BUN SERPL-MCNC: 25 MG/DL (ref 7–30)
CALCIUM SERPL-MCNC: 7.2 MG/DL (ref 8.5–10.1)
CHLORIDE SERPL-SCNC: 107 MMOL/L (ref 94–109)
CO2 SERPL-SCNC: 26 MMOL/L (ref 20–32)
CREAT SERPL-MCNC: 0.71 MG/DL (ref 0.66–1.25)
DIFFERENTIAL METHOD BLD: ABNORMAL
EOSINOPHIL # BLD AUTO: 0 10E9/L (ref 0–0.7)
EOSINOPHIL NFR BLD AUTO: 0.1 %
ERYTHROCYTE [DISTWIDTH] IN BLOOD BY AUTOMATED COUNT: 13.5 % (ref 10–15)
GFR SERPL CREATININE-BSD FRML MDRD: >90 ML/MIN/{1.73_M2}
GLUCOSE BLDC GLUCOMTR-MCNC: 98 MG/DL (ref 70–99)
GLUCOSE SERPL-MCNC: 120 MG/DL (ref 70–99)
HBV CORE AB SERPL QL IA: NONREACTIVE
HBV SURFACE AB SERPL IA-ACNC: 0 M[IU]/ML
HBV SURFACE AG SERPL QL IA: NONREACTIVE
HCT VFR BLD AUTO: 35.2 % (ref 40–53)
HCV AB SERPL QL IA: NONREACTIVE
HGB BLD-MCNC: 11.4 G/DL (ref 13.3–17.7)
HIV 1+2 AB+HIV1 P24 AG SERPL QL IA: NONREACTIVE
IGA SERPL-MCNC: 184 MG/DL (ref 84–499)
IGG SERPL-MCNC: 549 MG/DL (ref 610–1616)
IGM SERPL-MCNC: 60 MG/DL (ref 35–242)
IMM GRANULOCYTES # BLD: 0.2 10E9/L (ref 0–0.4)
IMM GRANULOCYTES NFR BLD: 1.7 %
KAPPA LC UR-MCNC: 1.4 MG/DL (ref 0.33–1.94)
KAPPA LC/LAMBDA SER: 1.56 {RATIO} (ref 0.26–1.65)
LAMBDA LC SERPL-MCNC: 0.9 MG/DL (ref 0.57–2.63)
LDH SERPL L TO P-CCNC: 272 U/L (ref 85–227)
LYMPHOCYTES # BLD AUTO: 1.1 10E9/L (ref 0.8–5.3)
LYMPHOCYTES NFR BLD AUTO: 8 %
MAGNESIUM SERPL-MCNC: 2.1 MG/DL (ref 1.6–2.3)
MCH RBC QN AUTO: 29.5 PG (ref 26.5–33)
MCHC RBC AUTO-ENTMCNC: 32.4 G/DL (ref 31.5–36.5)
MCV RBC AUTO: 91 FL (ref 78–100)
MONOCYTES # BLD AUTO: 2 10E9/L (ref 0–1.3)
MONOCYTES NFR BLD AUTO: 14.4 %
NEUTROPHILS # BLD AUTO: 10.6 10E9/L (ref 1.6–8.3)
NEUTROPHILS NFR BLD AUTO: 75.7 %
NRBC # BLD AUTO: 0 10*3/UL
NRBC BLD AUTO-RTO: 0 /100
PHOSPHATE SERPL-MCNC: 3.3 MG/DL (ref 2.5–4.5)
PHOSPHATE SERPL-MCNC: 4.1 MG/DL (ref 2.5–4.5)
PLATELET # BLD AUTO: 275 10E9/L (ref 150–450)
POTASSIUM SERPL-SCNC: 4 MMOL/L (ref 3.4–5.3)
PROT SERPL-MCNC: 5.6 G/DL (ref 6.8–8.8)
RBC # BLD AUTO: 3.86 10E12/L (ref 4.4–5.9)
SODIUM SERPL-SCNC: 139 MMOL/L (ref 133–144)
URATE SERPL-MCNC: 3.6 MG/DL (ref 3.5–7.2)
URATE SERPL-MCNC: 3.7 MG/DL (ref 3.5–7.2)
WBC # BLD AUTO: 14.1 10E9/L (ref 4–11)

## 2020-03-17 PROCEDURE — 90682 RIV4 VACC RECOMBINANT DNA IM: CPT | Performed by: NEUROLOGICAL SURGERY

## 2020-03-17 PROCEDURE — 82232 ASSAY OF BETA-2 PROTEIN: CPT | Performed by: NEUROLOGICAL SURGERY

## 2020-03-17 PROCEDURE — 82784 ASSAY IGA/IGD/IGG/IGM EACH: CPT | Performed by: NEUROLOGICAL SURGERY

## 2020-03-17 PROCEDURE — 25000132 ZZH RX MED GY IP 250 OP 250 PS 637: Performed by: STUDENT IN AN ORGANIZED HEALTH CARE EDUCATION/TRAINING PROGRAM

## 2020-03-17 PROCEDURE — 86706 HEP B SURFACE ANTIBODY: CPT | Performed by: NEUROLOGICAL SURGERY

## 2020-03-17 PROCEDURE — 84550 ASSAY OF BLOOD/URIC ACID: CPT | Performed by: NEUROLOGICAL SURGERY

## 2020-03-17 PROCEDURE — 97116 GAIT TRAINING THERAPY: CPT | Mod: GP

## 2020-03-17 PROCEDURE — 12000001 ZZH R&B MED SURG/OB UMMC

## 2020-03-17 PROCEDURE — 80053 COMPREHEN METABOLIC PANEL: CPT | Performed by: NEUROLOGICAL SURGERY

## 2020-03-17 PROCEDURE — 40000894 ZZH STATISTIC OT IP EVAL DEFER

## 2020-03-17 PROCEDURE — 87389 HIV-1 AG W/HIV-1&-2 AB AG IA: CPT | Performed by: NEUROLOGICAL SURGERY

## 2020-03-17 PROCEDURE — 25000131 ZZH RX MED GY IP 250 OP 636 PS 637: Performed by: STUDENT IN AN ORGANIZED HEALTH CARE EDUCATION/TRAINING PROGRAM

## 2020-03-17 PROCEDURE — 83883 ASSAY NEPHELOMETRY NOT SPEC: CPT | Performed by: NEUROLOGICAL SURGERY

## 2020-03-17 PROCEDURE — 25000131 ZZH RX MED GY IP 250 OP 636 PS 637: Performed by: PHYSICIAN ASSISTANT

## 2020-03-17 PROCEDURE — 84165 PROTEIN E-PHORESIS SERUM: CPT | Performed by: NEUROLOGICAL SURGERY

## 2020-03-17 PROCEDURE — 85025 COMPLETE CBC W/AUTO DIFF WBC: CPT | Performed by: NEUROLOGICAL SURGERY

## 2020-03-17 PROCEDURE — 70450 CT HEAD/BRAIN W/O DYE: CPT

## 2020-03-17 PROCEDURE — 00000146 ZZHCL STATISTIC GLUCOSE BY METER IP

## 2020-03-17 PROCEDURE — 84550 ASSAY OF BLOOD/URIC ACID: CPT | Performed by: PHYSICIAN ASSISTANT

## 2020-03-17 PROCEDURE — 40000986 XR CHEST PORT 1 VW

## 2020-03-17 PROCEDURE — 36569 INSJ PICC 5 YR+ W/O IMAGING: CPT

## 2020-03-17 PROCEDURE — 36415 COLL VENOUS BLD VENIPUNCTURE: CPT | Performed by: NEUROLOGICAL SURGERY

## 2020-03-17 PROCEDURE — 83615 LACTATE (LD) (LDH) ENZYME: CPT | Performed by: PHYSICIAN ASSISTANT

## 2020-03-17 PROCEDURE — 25000128 H RX IP 250 OP 636: Performed by: NEUROLOGICAL SURGERY

## 2020-03-17 PROCEDURE — 83735 ASSAY OF MAGNESIUM: CPT | Performed by: NEUROLOGICAL SURGERY

## 2020-03-17 PROCEDURE — 36415 COLL VENOUS BLD VENIPUNCTURE: CPT | Performed by: PHYSICIAN ASSISTANT

## 2020-03-17 PROCEDURE — 25000125 ZZHC RX 250: Performed by: STUDENT IN AN ORGANIZED HEALTH CARE EDUCATION/TRAINING PROGRAM

## 2020-03-17 PROCEDURE — 00000402 ZZHCL STATISTIC TOTAL PROTEIN: Performed by: NEUROLOGICAL SURGERY

## 2020-03-17 PROCEDURE — 84100 ASSAY OF PHOSPHORUS: CPT | Performed by: PHYSICIAN ASSISTANT

## 2020-03-17 PROCEDURE — 27211389 ZZ H KIT, 5 FR DL BIOFLO OPEN ENDED PICC

## 2020-03-17 PROCEDURE — 84100 ASSAY OF PHOSPHORUS: CPT | Performed by: NEUROLOGICAL SURGERY

## 2020-03-17 PROCEDURE — 86704 HEP B CORE ANTIBODY TOTAL: CPT | Performed by: NEUROLOGICAL SURGERY

## 2020-03-17 PROCEDURE — 99233 SBSQ HOSP IP/OBS HIGH 50: CPT | Performed by: INTERNAL MEDICINE

## 2020-03-17 PROCEDURE — 25000132 ZZH RX MED GY IP 250 OP 250 PS 637: Performed by: PHYSICIAN ASSISTANT

## 2020-03-17 PROCEDURE — 86803 HEPATITIS C AB TEST: CPT | Performed by: NEUROLOGICAL SURGERY

## 2020-03-17 PROCEDURE — 87340 HEPATITIS B SURFACE AG IA: CPT | Performed by: NEUROLOGICAL SURGERY

## 2020-03-17 RX ORDER — MAGNESIUM CARB/ALUMINUM HYDROX 105-160MG
296 TABLET,CHEWABLE ORAL ONCE
Status: COMPLETED | OUTPATIENT
Start: 2020-03-17 | End: 2020-03-17

## 2020-03-17 RX ORDER — HEPARIN SODIUM,PORCINE 10 UNIT/ML
2-5 VIAL (ML) INTRAVENOUS
Status: COMPLETED | OUTPATIENT
Start: 2020-03-17 | End: 2020-03-23

## 2020-03-17 RX ORDER — ALLOPURINOL 300 MG/1
300 TABLET ORAL DAILY
Status: DISCONTINUED | OUTPATIENT
Start: 2020-03-18 | End: 2020-03-27

## 2020-03-17 RX ORDER — HEPARIN SODIUM,PORCINE 10 UNIT/ML
5-10 VIAL (ML) INTRAVENOUS EVERY 24 HOURS
Status: DISCONTINUED | OUTPATIENT
Start: 2020-03-17 | End: 2020-03-30 | Stop reason: HOSPADM

## 2020-03-17 RX ORDER — HEPARIN SODIUM,PORCINE 10 UNIT/ML
5-10 VIAL (ML) INTRAVENOUS
Status: DISCONTINUED | OUTPATIENT
Start: 2020-03-17 | End: 2020-03-30 | Stop reason: HOSPADM

## 2020-03-17 RX ORDER — DEXAMETHASONE 4 MG/1
4 TABLET ORAL EVERY 6 HOURS SCHEDULED
Status: DISCONTINUED | OUTPATIENT
Start: 2020-03-17 | End: 2020-03-20

## 2020-03-17 RX ORDER — DEXAMETHASONE 4 MG/1
8 TABLET ORAL EVERY 12 HOURS SCHEDULED
Status: DISCONTINUED | OUTPATIENT
Start: 2020-03-17 | End: 2020-03-17

## 2020-03-17 RX ORDER — IBUPROFEN 200 MG
950 CAPSULE ORAL DAILY
Status: DISCONTINUED | OUTPATIENT
Start: 2020-03-18 | End: 2020-03-30 | Stop reason: HOSPADM

## 2020-03-17 RX ORDER — LIDOCAINE 40 MG/G
CREAM TOPICAL
Status: DISCONTINUED | OUTPATIENT
Start: 2020-03-17 | End: 2020-03-30 | Stop reason: HOSPADM

## 2020-03-17 RX ADMIN — ACETAMINOPHEN 650 MG: 325 TABLET, FILM COATED ORAL at 04:20

## 2020-03-17 RX ADMIN — PANTOPRAZOLE SODIUM 40 MG: 40 TABLET, DELAYED RELEASE ORAL at 07:42

## 2020-03-17 RX ADMIN — ACETAMINOPHEN 650 MG: 325 TABLET, FILM COATED ORAL at 08:26

## 2020-03-17 RX ADMIN — ACETAMINOPHEN 650 MG: 325 TABLET, FILM COATED ORAL at 13:18

## 2020-03-17 RX ADMIN — SENNOSIDES AND DOCUSATE SODIUM 2 TABLET: 8.6; 5 TABLET ORAL at 08:26

## 2020-03-17 RX ADMIN — MAGNESIUM CITRATE 296 ML: 1.75 LIQUID ORAL at 16:18

## 2020-03-17 RX ADMIN — POLYETHYLENE GLYCOL 3350 17 G: 17 POWDER, FOR SOLUTION ORAL at 08:27

## 2020-03-17 RX ADMIN — LEVOTHYROXINE SODIUM 200 MCG: 0.1 TABLET ORAL at 07:42

## 2020-03-17 RX ADMIN — SENNOSIDES AND DOCUSATE SODIUM 2 TABLET: 8.6; 5 TABLET ORAL at 19:31

## 2020-03-17 RX ADMIN — DEXAMETHASONE 4 MG: 4 TABLET ORAL at 18:10

## 2020-03-17 RX ADMIN — DEXAMETHASONE 4 MG: 4 TABLET ORAL at 08:26

## 2020-03-17 RX ADMIN — LIDOCAINE HYDROCHLORIDE 1 ML: 20 INJECTION, SOLUTION INFILTRATION; PERINEURAL at 15:21

## 2020-03-17 RX ADMIN — ACETAMINOPHEN 650 MG: 325 TABLET, FILM COATED ORAL at 16:18

## 2020-03-17 RX ADMIN — ACETAMINOPHEN 650 MG: 325 TABLET, FILM COATED ORAL at 19:32

## 2020-03-17 RX ADMIN — INFLUENZA A VIRUS A/BRISBANE/02/2018 (H1N1) RECOMBINANT HEMAGGLUTININ ANTIGEN, INFLUENZA A VIRUS A/KANSAS/14/2017 (H3N2) RECOMBINANT HEMAGGLUTININ ANTIGEN, INFLUENZA B VIRUS B/PHUKET/3073/2013 RECOMBINANT HEMAGGLUTININ ANTIGEN, AND INFLUENZA B VIRUS B/MARYLAND/15/2016 RECOMBINANT HEMAGGLUTININ ANTIGEN 0.5 ML: 45; 45; 45; 45 INJECTION INTRAMUSCULAR at 13:37

## 2020-03-17 ASSESSMENT — ACTIVITIES OF DAILY LIVING (ADL)
ADLS_ACUITY_SCORE: 15

## 2020-03-17 ASSESSMENT — PAIN DESCRIPTION - DESCRIPTORS
DESCRIPTORS: ACHING
DESCRIPTORS: ACHING

## 2020-03-17 NOTE — H&P
General acute hospital    History & Physical  Hematology / Oncology     Date of Admission:  3/13/2020  Date of Service (when I saw the patient):  03/17/2020    Assessment & Plan   Kobe Pedroza is a 60 year old male with a history of thyroid cancer status post thyroidectomy (2011), CAD, and hyperlipidemia who was transferred from [facility] to [facility] on 3/13 with a newly thoracic extradural mass at T5-6 with severe cord compression, now status post neurosurgical gross resection on 3/15 with initial pathology concerning for high-grade B-cell lymphoma. He was transferred to the Malignant Hematology team on 3/17 for further work-up and management of his newly diagnosed lymphoma.    HEME/ONC  # High-grade B-cell lymphoma  # Thoracic spine mass  # Spinal cord compression  Patient presented on 3/12 to [facility] with acute-on-chronic mid-thoracic back pain with some associated radicular symptoms. Per patient, no B-symptoms, though he did recently intentionally lose 35 lbs. 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 Patient had no appreciable neurological deficits. He was started on dexamethasone and underwent T5-6 laminectomy with gross total resection of epidural tumor on 3/15. Flow cytometry of the specimen was notable for CD10 positive and kappa monotypic B cells (83%). Surgical pathology and cytogenetics pending, but initial report is concerning for high-grade B-cell lymphoma.  - PET CT scheduled for tomorrow, 3/18, at 9:45 AM. Patient to be NPO at midnight in anticipation.  - Bone marrow biopsy scheduled for tomorrow, 3/18, at 12:30 PM.  - Echocardiogram obtained 3/16 with LVEF of 60-65%, no diastolic dysfunction.  - Baseline EKG (3/13) without evidence of ischemia or dysrhythmia.  - PICC line placement requested.  - Check viral serologies: HepC nonreactive, HepB and HIV pending  - Start high-dose  dexamethasone 4 mg Q6H. Continue daily PPI while on steroids.  - Trend TLS labs Q8H  - Start allopurinol 300 mg daily  - Further treatment recommendations pending pathology/cytogenetics results  - Plan discussed at length with patient and his wife (via FaceTime), who understand plan of care and upcoming procedures.    # Thyroid cancer status-post thyroidectomy  Status post thyroidectomy in 2011. TSH normal on admission.  - Continue PTA levothyroxine    # Normocytic anemia  Hgb 11.4 (?15.0 on 3/15). Likely due to acute blood loss in the setting of recent surgical intervention. No evidence of significant hemolysis.   - Follow daily CBC    # Leukocytosis  Presumably due to recent dexamethasone use.  - Follow daily CBC    MSK/NEURO  # Thoracic back pain  Due to T5-6 extradural tumor as detailed above. Patient now status post T5-6 laminectomy with gross total resction on 3/15. Today is POD #2. Would vac removed this morning.  - Post-op activity recommendations per NSG:    Larry Brace to be worn out of bed (orthotics consult placed)    No lifting >10 lbs, twisting, straining, or strenuous activity for at least 2 weeks  - Continue APAP, oxycodone, methocarbamol PRN for pain  - Patient should have scheduled outpatient neurosurgery follow-up in 2 weeks (may be via Tele-Health); NSG to coordinate    CV  # Coronary artery disease  # Hyperlipidemia  Followed by Dr. Zeng at [facility] ([place]). Diagnosed with mild, non-obstructive CAD in 2018. Calcium score at that time was 5.5. Baby ASA and low-dose statin therapy recommended. No previous cardiac caths or stents.  - Hold PTA statin in light of starting chemotherapy    FEN/GI  # Hypocalcemia  Mild. Ca 7.2, corrects to 8.2 for albumin.  - Start PO calcium citrate     # Constipation  Noted prior to presentation (~3/9). Patient reports decreased frequency of bowel movements. Has been responsive to laxative use. Last BM 3/15. No abdominal pain, no N/V. Still  passing gas. Abdominal exam benign; less likely obstruction. Query possible neurogenic component given evidence of cord compression. Taking opioids post-operatively, which may be contributing as well.  - Continue scheduled senna and Miralax  - Give Mag Citrate today, 3/18  - Consider imaging if patient develops abdominal pain, N/V, or other red flag symptoms    FEN:  -No IVF for now; encourage PO intake.  -Lyte replacement per protocol  -Regular diet as tolerated. NPO at midnight prior to planned PET CT.    Prophy/Misc:  -GI: Continue bowel regimen as above; continue PPI while on steroids.  -DVT: Mechanical only for now; hold off on pharmacologic prophylaxis until at least POD #3 per NSG.    Disposition: Anticipate discharge to home pending resolution of acute issues/initiation of treatment as appropriate, likely several more days.    Plan of care discussed with attending physician, Dr. Wright.    Lupis Alvarez PA-C  Hematology/Oncology  Pager: [phone]    Code Status : Full Code    Primary Care Physician   Garett Arriaga    History of Present Illness   History obtained from chart and discussed with the patient.    Kobe Pedroza is a 60 year old male with a history of thyroid cancer status post thyroidectomy (2011), CAD, and hyperlipidemia who was transferred from [facility] to [facility] on 3/13 with a newly thoracic extradural mass at T5-6 with severe cord compression, now status post neurosurgical gross resection on 3/15 with initial pathology concerning for high-grade B-cell lymphoma. He was transferred to the Malignant Hematology team on 3/17 for further work-up and management of his newly diagnosed lymphoma.    Patient reports that he initially presented with mid back pain about 10 days ago.  He saw his primary care provider several times about this problem.  They thought it was perhaps his gallbladder, but things did not improve.  He also had associated constipation which did not respond to  over-the-counter laxatives but did improve with magnesium citrate.  He denied any bowel or bladder incontinence or new lower extremity weakness or paresthesias.  He has chronic, stable numbness and tingling in his right toes dating back a number of years.  Despite attempts at conservative management, patient's pain symptoms did not improve, and he was ultimately referred for imaging of the thoracic spine which demonstrated an extradural paraspinal mass at T5-6 with associated severe cord compression.  In light of these findings, patient was transferred from [facility] to [facility] on 3/13 for further management.  He underwent T5-6 laminectomy and gross excision of the mass lesion on 3/15; definitive surgical pathology still pending, but flow cytometry consistent with a CD10 positive high-grade B-cell lymphoma.  In light of these findings, patient was transferred from the neurosurgical service to the malignant heme service for further work-up and management of his lymphoma.    Patient reports feeling generally well, though somewhat overwhelmed by his recent diagnosis.  He denies any fevers, chills, sweats, or unintentional weight loss, though he does note that he intentionally lost about 35 pounds in the last 6 months due to dietary changes.  He denies any chest pain or shortness of breath.  No abdominal pain, nausea, or vomiting.  He continues to pass gas.  He denies any abnormal bruising or bleeding.  He has no other acute complaints or concerns at this juncture.  Available results and plan of care reviewed with patient and his wife (via FaceTime due to visitor restrictions) who voiced understanding.  Patient will be transferred to  for additional work-up and initiation of chemotherapy.    Past Medical History    History reviewed. No pertinent past medical history.    Past Surgical History   Past Surgical History:   Procedure Laterality Date     LAMINECTOMY, EXCISE TUMOR THORACIC, COMBINED N/A 3/15/2020     Procedure: LAMINECTOMY, SPINE, THORACIC, 2 levels, T-5, T-6;  Surgeon: Heather Cespedes MD;  Location: [facility] OR       Prior to Admission Medications   Prior to Admission Medications   Prescriptions Last Dose Informant Patient Reported? Taking?   levothyroxine (SYNTHROID/LEVOTHROID) 200 MCG tablet   Yes Yes   Sig: Take 200 mcg by mouth daily      Facility-Administered Medications: None     Allergies   No Known Allergies    Social History   Social History     Socioeconomic History     Marital status:      Spouse name: Not on file     Number of children: Not on file     Years of education: Not on file     Highest education level: Not on file   Occupational History     Not on file   Social Needs     Financial resource strain: Not on file     Food insecurity     Worry: Not on file     Inability: Not on file     Transportation needs     Medical: Not on file     Non-medical: Not on file   Tobacco Use     Smoking status: Never Smoker     Smokeless tobacco: Never Used   Substance and Sexual Activity     Alcohol use: Not on file     Drug use: Not on file     Sexual activity: Not on file   Lifestyle     Physical activity     Days per week: Not on file     Minutes per session: Not on file     Stress: Not on file   Relationships     Social connections     Talks on phone: Not on file     Gets together: Not on file     Attends Oriental orthodox service: Not on file     Active member of club or organization: Not on file     Attends meetings of clubs or organizations: Not on file     Relationship status: Not on file     Intimate partner violence     Fear of current or ex partner: Not on file     Emotionally abused: Not on file     Physically abused: Not on file     Forced sexual activity: Not on file   Other Topics Concern     Parent/sibling w/ CABG, MI or angioplasty before 65F 55M? Not Asked   Social History Narrative     Not on file       Family History   History reviewed. No pertinent family history.    Review of Systems   A 10  point ROS is negative unless otherwise noted above in the HPI.    Physical Exam   Vital Signs with Ranges  Temp:  [96.9  F (36.1  C)-98.4  F (36.9  C)] 96.9  F (36.1  C)  Heart Rate:  [73-82] 73  Resp:  [16] 16  BP: (108-134)/(57-71) 122/67  SpO2:  [94 %-96 %] 96 %  216 lbs 4.34 oz    Constitutional: Pleasant and cooperative male. Awake, alert, NAD. Seated in a chair at the bedside.  HEENT: NC/AT, EOMI, sclera clear, conjunctiva normal  Respiratory: No increased work of breathing, CTAB, no crackles or wheezing.  Cardiovascular: RRR, no murmur noted. No peripheral edema, no calf tenderness.  GI: Normal bowel sounds, soft, and non-tender to palpation.  Skin: Warm, dry, well-perfused. No bruising, bleeding, rashes, or lesions on limited exam. Incision site to thoracic back c/d/i without surrounding erythema or drainage.  Neurologic: A&O. Answers questions appropriately. Moves all extremities spontaneously. Plantar/dorsiflexion strength 5/5 and symmetrical in the BLEs. Light touch sensation grossly intact to the bilateral lower extremities. Some subjectively decreased sensation to the toes of the right foot. Slight unsteadiness on standing, then normal gait without ataxia.  Neuropsychiatric: Calm, appropriate affect  Vascular access:  PIV on RUE CDI, non-tender, no surrounding erythema.      Recent Labs  CBC   Recent Labs   Lab 03/17/20  0556 03/16/20  0540 03/15/20  0733 03/14/20  0439   WBC 14.1* 16.7* 18.4* 9.3   RBC 3.86* 4.12* 5.13 5.04   HGB 11.4* 12.3* 15.0 15.0   HCT 35.2* 37.9* 45.5 44.5   MCV 91 92 89 88   MCH 29.5 29.9 29.2 29.8   MCHC 32.4 32.5 33.0 33.7   RDW 13.5 13.6 13.1 13.0    337 371 331       CMP   Recent Labs   Lab 03/17/20  0556 03/16/20  0540 03/15/20  0733 03/14/20  0439  03/12/20    138 137 136   < >  --    POTASSIUM 4.0 4.7 4.5 4.3   < > 4.0   CHLORIDE 107 106 107 106   < >  --    CO2 26 26 23 23   < >  --    ANIONGAP 6 6 7 6   < >  --    * 134* 153* 145*   < > 99   BUN 25  33* 25 15   < >  --    CR 0.71 1.00 0.80 0.74   < > 0.81   GFRESTIMATED >90 83 >90 >90   < > >60   GFRESTBLACK >90 >90 >90 >90   < >  --    RUTH 7.2* 7.3* 8.2* 8.4*   < >  --    MAG 2.1 2.3 2.5* 2.6*   < >  --    PHOS 4.1 3.6 3.1 3.6   < >  --    PROTTOTAL 5.6*  --   --  7.1  --   --    ALBUMIN 2.7*  --   --  3.5  --   --    BILITOTAL 0.5  --   --  0.4  --   --    ALKPHOS 43  --   --  74  --   --    AST 16  --   --  20  --  20   ALT 18  --   --  25  --  26    < > = values in this interval not displayed.       LFTs:   Recent Labs   Lab 03/17/20  0556   PROTTOTAL 5.6*   ALBUMIN 2.7*   BILITOTAL 0.5   ALKPHOS 43   AST 16   ALT 18       Coagulation Studies:   Recent Labs   Lab 03/15/20  0428   INR 1.09   PTT 30

## 2020-03-17 NOTE — PLAN OF CARE
/66 (BP Location: Right arm)   Pulse 99   Temp 97.6  F (36.4  C) (Oral)   Resp 16   Wt 98.1 kg (216 lb 4.3 oz)   SpO2 96%   Status: Pt admitted to unit s/p T5-6 laminectomy. lymphoma found, hemonc consulted  VS: VSS on RA  Neuro: A&O x4. Strengths 5/5. Neuropathy in feet at baseline.   Respiratory: Lung Sounds clear, denies SOB  GI: Regular diet; BS+, passing flatus. No BM this shift.   : voids spontaneously without difficulty  IV: PIV SL  Skin: Back incision dressing marked. No change in drainage.  Pain: Managed with PO Tylenol and PRN Robaxin.   Activity: Assist of one and GB  See flow sheets for further details and assessments. Continue to follow POC, notify MD of significant changes.

## 2020-03-17 NOTE — PROGRESS NOTES
"03/17/20, 2:10 PM, [facility] UNIT [address]D [facility] 7520/7520-01, male, Weight: 216 lbs 4.34 oz, Ordered by: Adenike RODRIGUEZ CNP, Dx: S/P T5-T6 laminoplasty; Spinal Axis Tumor (D49.2); Spinal cord compression, MRN #: [id number].    S:   Patient was seen today at 7520/7520-01 present for the evaluation/fitting/delivery of an OTS Universal clavicle splint (Ref # [id number]) on the back region. Patient appears pleasant.   Health History & Progression: Patient is currently S/P T5-T6 laminoplasty for an extradural mass and spinal compression in the thoracic region. On the Epic order it states a \"Figure of 8 brace; S/P Thoracic Laminectomy\". A Figure of 8 brace is typically worn for comfort and to serve as an aesthetic reminder of posture and shoulder positioning.    O:   Sensory: Patient vocalized sensation as Normal in both upper and lower limbs.  Appearance: WNL, suture lines in thoracic spinal region with stitches.  Patient has full ROM of b/l UE. Patient is able to sit on the side of the bed under his own volition for the fitting today.    A:     Patient can benefit from the OTS clavicle splint as it features an easy wraparound design. Enclosure of the brace is done with a double strap closure that is suitable for any size patients.  Upon fitting the brace the patient vocalized satisfaction with the fit and feel of the orthosis. The circumferences of the brace presented satisfactory upon donning the brace.  Patient signed the delivery ticket and was given the [facility] receipt information sheet.     Goal:   Restrict motion of the upper thoracic region to promote healing.    P:  Patient was given the verbal and written instructions on how to don/doff/care for the brace. Patient will utilize the orthosis for the duration of rehabilitation/PT in the hospital and at home activities upon discharge for the duration of treatment of patient ailment or until use of orthosis is no longer necessary. Will follow-up " PRN.    Electronically signed by Reese Long , LPO, MSPO.

## 2020-03-17 NOTE — PROGRESS NOTES
Neurosurgery Sign-Off Note:    Mr. Pedroza is a 60 year old male with newly diagnosed high-grade Lymphoma post-operative day #3 S/P T5-T6 Laminoplasty for resection of Thoracic Extradural Mass and Spinal Cord Compression. The patient is neurologically intact and stable for neurosurgical perspective. The patient has been transferred to the Heme/Onc Service to begin treatment for his Lymphoma.     Recommendations:   1) Larry Brace to be worn when out of bed as a reminder to patient not to Reach Forward - Orthotics Consult Placed.  2) Recommend No Lifting > 10 Lbs, Twisting, Straining or Strenuous Activity for at least 2 weeks.  3) Please continue Muscle Relaxants and Oxycodone for Post-Operative Pain Management.   4) Patient should follow-up with Tiffanie Trotter, Nurse Practitioner in 2 weeks for wound evaluation (Tele-Medicine Appointment OK -- We will coordinate).   5) OK to begin Chemoprophylaxis on Wednesday 3/18/2020.     Neurosurgery will sign off at this time. Please do not hesitate to call with any questions, concerns, or changes in the patient's condition.     Adenike Pelaez, MSN, ACNP-BC  Department of Neurosurgery  Pager:  *  *  * [phone] then dial [phone] when prompted

## 2020-03-17 NOTE — PROGRESS NOTES
[facility]  Neurosurgery Progress Note    Subjective: hemovac removed, final pathology with B cell lymphoma    Objective:   Temp:  [96.9  F (36.1  C)-98.4  F (36.9  C)] 96.9  F (36.1  C)  Heart Rate:  [73-82] 73  Resp:  [16] 16  BP: (108-134)/(57-71) 122/67  SpO2:  [94 %-96 %] 96 %  I/O last 3 completed shifts:  In: 360 [P.O.:360]  Out: 1775 [Urine:1775]    Alert, oriented x3  CN II-XII intact  5/5 strength in all extremities, no pronator drift  Sensation intact to light touch    Assessment: Mr. Pedroza is a 59 yo M with PMH hypothyroidism, HLD, no known oncologic history presenting with acute on chronic mid-thoracic back pain, found to have new thoracic extradural spinal tumor around T5-6 with severe cord compression, without cord signal change, transferred from [facility] to [facility] for further cares. Neurologically intact. S/p OR 3/15 for T5-6 laminectomy, with gross total resection of epidural tumor, final pathology with B cell lymphoma.    Plan:  - Advance as tolerated  - pta levothyroxine  - pain control  - bowel regimen, regular diet  - decadron 4 day taper for treatment of spinal cord compression  - transfer to heme/onc for further management of B cell lymphoma vs discharge home today  - discuss with radiation/oncology  - PT/OT: home  - dispo: 6A, home    Sarath Carbajal MD  Neurosurgery Resident PGY2    Please contact neurosurgery resident on call with questions.    Dial * * [phone], enter [phone] when prompted.   I have seen this patient with the resident and formulated a plan and agree with this note.  AMP

## 2020-03-17 NOTE — PLAN OF CARE
Status: Pt admitted to unit s/p T5-6 laminectomy.   Vitals: VSS on RA.   Neuros: A&O x4. Strengths 5/5. Neuropathy in feet at baseline.   IV: PIV SL.   Resp/trach: LS clear. Denies SOB.   Diet: Regular.   Bowel status: BS+, passing flatus. No BM this shift.   : Voiding without difficulty.   Skin: Back incision dressing CDI. No extension of drainage.   Pain: Managed with PO Tylenol and PRN Robaxin.   Activity: Assist of one and GB.   Social: Wife at bedside at start of shift.   Plan: Possibly transferring to hem/onc for treatment and monitoring of newly diagnosed lymphoma. Continue to monitor and follow plan of care.

## 2020-03-17 NOTE — PROCEDURES
[facility], [place]    Double Lumen PICC Placement    Date/Time: 3/17/2020 3:45 PM  Performed by: Lovely Hanna RN  Authorized by: Lupis Alvarez PA-C   Indications: vascular access    UNIVERSAL PROTOCOL   Site Marked: Yes  Prior Images Obtained and Reviewed:  Yes  Required items: Required blood products, implants, devices and special equipment available    Patient identity confirmed:  Verbally with patient and arm band  NA - No sedation, light sedation, or local anesthesia  Confirmation Checklist:  Patient's identity using two indicators, relevant allergies, procedure was appropriate and matched the consent or emergent situation and correct equipment/implants were available  Time out: Immediately prior to the procedure a time out was called    Universal Protocol: the Joint Commission Universal Protocol was followed    Preparation: Patient was prepped and draped in usual sterile fashion           ANESTHESIA    Anesthesia: See MAR for details  Local Anesthetic:  Lidocaine 1% without epinephrine  Anesthetic Total (mL):  1      SEDATION    Patient Sedated: No        Preparation: skin prepped with ChloraPrep  Skin prep agent: skin prep agent completely dried prior to procedure  Sterile barriers: maximum sterile barriers were used: cap, mask, sterile gown, sterile gloves, and large sterile sheet  Hand hygiene: hand hygiene performed prior to central venous catheter insertion  Type of line used: PICC  Catheter type: double lumen  Lumen type: non-valved  Catheter size: 5 Fr  : Bioflo DL open ended.  Lot number: [id number]  Placement method: venipuncture, MST, ultrasound and tip confirmation system  Number of attempts: 1  Successful placement: no  Orientation: right  Location: basilic vein (Vein diameter 0.59)  Arm circumference: adults 10 cm  Extremity circumference: 34  Visible catheter length: 1  Total catheter length: 44  Dressing and securement: chlorhexidine disc applied,  dressing applied, occlusive dressing applied, securement device, statlock and sterile dressing applied  Post procedure assessment: blood return through all ports and placement verified by x-ray  PROCEDURE   Patient Tolerance:  Patient tolerated the procedure well with no immediate complications

## 2020-03-17 NOTE — PLAN OF CARE
Discharge Planner OT   Patient plan for discharge: -  Current status: order received. Per discussion with PT, no acute OT needs identified. Will sign off and complete orders.   Barriers to return to prior living situation: defer to PT  Recommendations for discharge: defer to PT  Rationale for recommendations: no acute OT needs identified.        Entered by: Yue Castano 03/17/2020 7:14 AM

## 2020-03-17 NOTE — PLAN OF CARE
Pt transferred to 7D around 1200, all belongings sent w/ patient. 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 AO x 4. 5/5 all extremities, PT cleared pt to be up independent in room and hallways. Education provided for foam in & out. PIV SL. Regular diet. Voids spontaneously w/o difficulty. No BM this shift, pt refused supp, given AM BM meds PO. PET scan scheduled for tomorrow (3/18) at 0945. NPO at midnight.

## 2020-03-17 NOTE — TELEPHONE ENCOUNTER
Called patient to set up a telephone visit for a two week post op with Tiffanie Trottre after Laminectomy by Dr. Cespedes on 3/15/2020.    LVM/pt to call my direct number to discuss a date and time that works for him the week of March 30.

## 2020-03-17 NOTE — PROGRESS NOTES
Pt was a consult for pre-op clearance and we have otherwise signed off, please call medicine if questions or concerns (removing IM from care teams list)

## 2020-03-17 NOTE — PLAN OF CARE
PT: PT demonstrating IND with all mobility, ambulating IND in halls with no safety concerns, at this time pt is safe to ambulate in halls IND, discharge from PT.    Physical Therapy Discharge Summary    Reason for therapy discharge:    All goals and outcomes met, no further needs identified.    Progress towards therapy goal(s). See goals on Care Plan in [facility] electronic health record for goal details.  Goals met    Therapy recommendation(s):    Continue home exercise program.

## 2020-03-17 NOTE — PROGRESS NOTES
Hematology Progress Note    Patient: Kobe Pedroza MRN# [id number]   Age: 60 year old YOB: 1960       ASSESSMENT and PLAN:   60 year old yo male with PMH of of thyroid cancer s/p thyroidectomy (2011), hypothyroidism on levothyroxine, who presented as a transfer to [facility] for epidural tumor, now s/p gross total resection of epidural tumor, concerning for lymphoma.     Jonnathan is recovering well form surgery. Prelim pathology on frozen section was consistent with lymphoma. Discussed with hemepath on 3/17; consistent with a high grade lymphoma. Hemepath will do additional stainings. Discussed with Jonnathan and wife (Facetime); Jonnathan will stay in house for further staging and management. Reviewed that we'll need more information to prognosticate. There is no evidence of tumor lysis. Will transfer to heme malignancy service for further care.     - Await final pathology  - F/u HIV, Hep B, Hep C  - Continue Decadron and taper  - May need radiation oncology consult  - PET/CT ordered  - Echo done  - Heme malignancy team will take over for further management  - Accepting attending is Dr. Wright    Staffed w/ Dr. Ree Garza MD, PhD  Hem/Onc Fellow  Attending  The patient was seen and examined by me separate from the resident/fellow provider.The note above reflects my assessment and plan. I have personally reviewed today's labs,vital and radiology results. The points of care that were added by me are:    Discussed with pt and wife regard path showing high grade lymphoma Still need testing on tissue to determein exact sub type.Will transfer to Hem Malignancy service Discussed with Dr Wright and Dr Coy Keenan M.D.  [phone]          INTERVAL HISTORY:   No acute events reported overnight. Doing well. Denies pain. Denies fever, chills, chest pain, cough, or shortness of breath.    Current Facility-Administered Medications   Medication     acetaminophen (TYLENOL) tablet 650 mg      acetaminophen (TYLENOL) tablet 650 mg     bisacodyl (DULCOLAX) Suppository 10 mg     dexamethasone (DECADRON) tablet 2 mg     hydrALAZINE (APRESOLINE) injection 10-20 mg     influenza recomb quadrivalent PF (FLUBLOK) injection 0.5 mL     labetalol (NORMODYNE/TRANDATE) syringe 10-40 mg     levothyroxine (SYNTHROID/LEVOTHROID) tablet 200 mcg     lidocaine (LMX4) cream     lidocaine 1 % 0.1-1 mL     methocarbamol (ROBAXIN) tablet 750 mg     naloxone (NARCAN) injection 0.1-0.4 mg     ondansetron (ZOFRAN-ODT) ODT tab 4 mg    Or     ondansetron (ZOFRAN) injection 4 mg     oxyCODONE (ROXICODONE) tablet 5-10 mg     pantoprazole (PROTONIX) EC tablet 40 mg     polyethylene glycol (MIRALAX) Packet 17 g     senna-docusate (SENOKOT-S/PERICOLACE) 8.6-50 MG per tablet 1 tablet    Or     senna-docusate (SENOKOT-S/PERICOLACE) 8.6-50 MG per tablet 2 tablet     sodium chloride (PF) 0.9% PF flush 3 mL         PHYSICAL EXAM:   /67   Pulse 99   Temp 96.9  F (36.1  C) (Oral)   Resp 16   Wt 98.1 kg (216 lb 4.3 oz)   SpO2 96%   Wt Readings from Last 3 Encounters:   03/13/20 98.1 kg (216 lb 4.3 oz)     Constitutional: Awake and alert, sitting in chair, not in acute distress.  Eyes: No scleral icterus. Eyes exhibit no discharge.  ENT/Mouth: Oral mucosa pink and moist  Respiratory: Normal work of breathing  Neurological: AAOX3  Skin: Skin is warm, not diaphoretic.      LABS:     CBC  Recent Labs   Lab 03/17/20  0556 03/16/20  0540 03/15/20  0733 03/14/20  0439   WBC 14.1* 16.7* 18.4* 9.3   HGB 11.4* 12.3* 15.0 15.0   HCT 35.2* 37.9* 45.5 44.5    337 371 331   MCV 91 92 89 88   RBC 3.86* 4.12* 5.13 5.04   ANEU 10.6* 13.7* 16.9* 8.6*       BMP  Recent Labs   Lab 03/17/20  0556 03/16/20  0540 03/15/20  0733 03/14/20  0439    138 137 136   POTASSIUM 4.0 4.7 4.5 4.3   CHLORIDE 107 106 107 106   CO2 26 26 23 23   BUN 25 33* 25 15   CR 0.71 1.00 0.80 0.74   RUTH 7.2* 7.3* 8.2* 8.4*   MAG 2.1 2.3 2.5* 2.6*       LFT  Recent Labs    Lab 03/17/20  0556 03/14/20  0439 03/12/20   PROTTOTAL 5.6* 7.1  --    ALBUMIN 2.7* 3.5  --    AST 16 20 20   ALT 18 25 26   BILITOTAL 0.5 0.4  --    ALKPHOS 43 74  --        Coagulation  Recent Labs   Lab 03/15/20  0428 03/13/20  0337   INR 1.09 1.07   PTT 30 34

## 2020-03-17 NOTE — PLAN OF CARE
Pt transferred from  at approx 1300.  Pt c/o constipation but did not want suppository, mag citrate and milk of mag now available will pass on to humberto shift to offer pt. NPO at midnight. Flu shot given in R deltoid, verified w/ team that they wanted him to have it. Pt c/o back pain, tylenol given as scheduled q4hrs.

## 2020-03-18 ENCOUNTER — APPOINTMENT (OUTPATIENT)
Dept: PET IMAGING | Facility: CLINIC | Age: 60
DRG: 820 | End: 2020-03-18
Attending: INTERNAL MEDICINE
Payer: COMMERCIAL

## 2020-03-18 PROBLEM — C83.78: Status: ACTIVE | Noted: 2020-03-18

## 2020-03-18 LAB
ALBUMIN SERPL ELPH-MCNC: 3 G/DL (ref 3.7–5.1)
ALBUMIN SERPL-MCNC: 2.4 G/DL (ref 3.4–5)
ALP SERPL-CCNC: 80 U/L (ref 40–150)
ALPHA1 GLOB SERPL ELPH-MCNC: 0.4 G/DL (ref 0.2–0.4)
ALPHA2 GLOB SERPL ELPH-MCNC: 0.7 G/DL (ref 0.5–0.9)
ALT SERPL W P-5'-P-CCNC: 17 U/L (ref 0–70)
ANION GAP SERPL CALCULATED.3IONS-SCNC: 8 MMOL/L (ref 3–14)
AST SERPL W P-5'-P-CCNC: 16 U/L (ref 0–45)
AST SERPL W P-5'-P-CCNC: 38 U/L (ref 0–45)
B-GLOBULIN SERPL ELPH-MCNC: 0.6 G/DL (ref 0.6–1)
BASOPHILS # BLD AUTO: 0 10E9/L (ref 0–0.2)
BASOPHILS NFR BLD AUTO: 0.2 %
BILIRUB SERPL-MCNC: 0.5 MG/DL (ref 0.2–1.3)
BUN SERPL-MCNC: 18 MG/DL (ref 7–30)
CALCIUM SERPL-MCNC: 6.9 MG/DL (ref 8.5–10.1)
CHLORIDE SERPL-SCNC: 109 MMOL/L (ref 94–109)
CO2 SERPL-SCNC: 21 MMOL/L (ref 20–32)
COPATH REPORT: NORMAL
COPATH REPORT: NORMAL
CREAT SERPL-MCNC: 0.55 MG/DL (ref 0.66–1.25)
CREAT SERPL-MCNC: 0.66 MG/DL (ref 0.66–1.25)
DIFFERENTIAL METHOD BLD: ABNORMAL
EOSINOPHIL # BLD AUTO: 0 10E9/L (ref 0–0.7)
EOSINOPHIL NFR BLD AUTO: 0.2 %
ERYTHROCYTE [DISTWIDTH] IN BLOOD BY AUTOMATED COUNT: 13.4 % (ref 10–15)
FIBRINOGEN PPP-MCNC: 469 MG/DL (ref 200–420)
FIBRINOGEN PPP-MCNC: 546 MG/DL (ref 200–420)
GAMMA GLOB SERPL ELPH-MCNC: 0.5 G/DL (ref 0.7–1.6)
GFR SERPL CREATININE-BSD FRML MDRD: >90 ML/MIN/{1.73_M2}
GFR SERPL CREATININE-BSD FRML MDRD: >90 ML/MIN/{1.73_M2}
GLUCOSE SERPL-MCNC: 128 MG/DL (ref 70–99)
HCT VFR BLD AUTO: 34.5 % (ref 40–53)
HGB BLD-MCNC: 11.3 G/DL (ref 13.3–17.7)
IMM GRANULOCYTES # BLD: 0.2 10E9/L (ref 0–0.4)
IMM GRANULOCYTES NFR BLD: 1.7 %
INR PPP: 1.1 (ref 0.86–1.14)
INR PPP: 1.16 (ref 0.86–1.14)
LDH SERPL L TO P-CCNC: 216 U/L (ref 85–227)
LDH SERPL L TO P-CCNC: 246 U/L (ref 85–227)
LDH SERPL L TO P-CCNC: 298 U/L (ref 85–227)
LDH SERPL L TO P-CCNC: NORMAL U/L (ref 85–227)
LYMPHOCYTES # BLD AUTO: 0.8 10E9/L (ref 0.8–5.3)
LYMPHOCYTES NFR BLD AUTO: 5.9 %
M PROTEIN SERPL ELPH-MCNC: 0 G/DL
MAGNESIUM SERPL-MCNC: 2.3 MG/DL (ref 1.6–2.3)
MCH RBC QN AUTO: 30.3 PG (ref 26.5–33)
MCHC RBC AUTO-ENTMCNC: 32.8 G/DL (ref 31.5–36.5)
MCV RBC AUTO: 93 FL (ref 78–100)
MONOCYTES # BLD AUTO: 1.3 10E9/L (ref 0–1.3)
MONOCYTES NFR BLD AUTO: 9.4 %
NEUTROPHILS # BLD AUTO: 11 10E9/L (ref 1.6–8.3)
NEUTROPHILS NFR BLD AUTO: 82.6 %
NRBC # BLD AUTO: 0 10*3/UL
NRBC BLD AUTO-RTO: 0 /100
PHOSPHATE SERPL-MCNC: 2.7 MG/DL (ref 2.5–4.5)
PHOSPHATE SERPL-MCNC: 3.9 MG/DL (ref 2.5–4.5)
PHOSPHATE SERPL-MCNC: 4 MG/DL (ref 2.5–4.5)
PLATELET # BLD AUTO: 305 10E9/L (ref 150–450)
POTASSIUM SERPL-SCNC: 4.3 MMOL/L (ref 3.4–5.3)
POTASSIUM SERPL-SCNC: 4.8 MMOL/L (ref 3.4–5.3)
PROT PATTERN SERPL ELPH-IMP: ABNORMAL
PROT SERPL-MCNC: 5.4 G/DL (ref 6.8–8.8)
RBC # BLD AUTO: 3.73 10E12/L (ref 4.4–5.9)
SODIUM SERPL-SCNC: 138 MMOL/L (ref 133–144)
URATE SERPL-MCNC: 2.8 MG/DL (ref 3.5–7.2)
URATE SERPL-MCNC: 3.2 MG/DL (ref 3.5–7.2)
URATE SERPL-MCNC: 3.7 MG/DL (ref 3.5–7.2)
WBC # BLD AUTO: 13.3 10E9/L (ref 4–11)

## 2020-03-18 PROCEDURE — A9552 F18 FDG: HCPCS | Performed by: INTERNAL MEDICINE

## 2020-03-18 PROCEDURE — 36592 COLLECT BLOOD FROM PICC: CPT | Performed by: NURSE PRACTITIONER

## 2020-03-18 PROCEDURE — 84100 ASSAY OF PHOSPHORUS: CPT | Performed by: NURSE PRACTITIONER

## 2020-03-18 PROCEDURE — 25800030 ZZH RX IP 258 OP 636: Performed by: INTERNAL MEDICINE

## 2020-03-18 PROCEDURE — 88264 CHROMOSOME ANALYSIS 20-25: CPT | Performed by: PHYSICIAN ASSISTANT

## 2020-03-18 PROCEDURE — 25000128 H RX IP 250 OP 636: Performed by: INTERNAL MEDICINE

## 2020-03-18 PROCEDURE — 84450 TRANSFERASE (AST) (SGOT): CPT | Performed by: NURSE PRACTITIONER

## 2020-03-18 PROCEDURE — 88311 DECALCIFY TISSUE: CPT | Performed by: PHYSICIAN ASSISTANT

## 2020-03-18 PROCEDURE — 88342 IMHCHEM/IMCYTCHM 1ST ANTB: CPT | Performed by: PHYSICIAN ASSISTANT

## 2020-03-18 PROCEDURE — 88305 TISSUE EXAM BY PATHOLOGIST: CPT | Performed by: PHYSICIAN ASSISTANT

## 2020-03-18 PROCEDURE — 84550 ASSAY OF BLOOD/URIC ACID: CPT | Performed by: NURSE PRACTITIONER

## 2020-03-18 PROCEDURE — 25000128 H RX IP 250 OP 636: Performed by: PHYSICIAN ASSISTANT

## 2020-03-18 PROCEDURE — 36592 COLLECT BLOOD FROM PICC: CPT | Performed by: PHYSICIAN ASSISTANT

## 2020-03-18 PROCEDURE — 00000161 ZZHCL STATISTIC H-SPHEME PROCESS B/S: Performed by: PHYSICIAN ASSISTANT

## 2020-03-18 PROCEDURE — 25000132 ZZH RX MED GY IP 250 OP 250 PS 637: Performed by: NURSE PRACTITIONER

## 2020-03-18 PROCEDURE — 71260 CT THORAX DX C+: CPT

## 2020-03-18 PROCEDURE — 83615 LACTATE (LD) (LDH) ENZYME: CPT | Performed by: NURSE PRACTITIONER

## 2020-03-18 PROCEDURE — 85610 PROTHROMBIN TIME: CPT | Performed by: PHYSICIAN ASSISTANT

## 2020-03-18 PROCEDURE — 34300033 ZZH RX 343: Performed by: INTERNAL MEDICINE

## 2020-03-18 PROCEDURE — 12000001 ZZH R&B MED SURG/OB UMMC

## 2020-03-18 PROCEDURE — 82565 ASSAY OF CREATININE: CPT | Performed by: PHYSICIAN ASSISTANT

## 2020-03-18 PROCEDURE — 88185 FLOWCYTOMETRY/TC ADD-ON: CPT | Performed by: NURSE PRACTITIONER

## 2020-03-18 PROCEDURE — 85384 FIBRINOGEN ACTIVITY: CPT | Performed by: PHYSICIAN ASSISTANT

## 2020-03-18 PROCEDURE — 84550 ASSAY OF BLOOD/URIC ACID: CPT | Performed by: PHYSICIAN ASSISTANT

## 2020-03-18 PROCEDURE — 25000131 ZZH RX MED GY IP 250 OP 636 PS 637: Performed by: PHYSICIAN ASSISTANT

## 2020-03-18 PROCEDURE — 88237 TISSUE CULTURE BONE MARROW: CPT | Performed by: PHYSICIAN ASSISTANT

## 2020-03-18 PROCEDURE — 07DR3ZX EXTRACTION OF ILIAC BONE MARROW, PERCUTANEOUS APPROACH, DIAGNOSTIC: ICD-10-PCS | Performed by: PHYSICIAN ASSISTANT

## 2020-03-18 PROCEDURE — 84100 ASSAY OF PHOSPHORUS: CPT | Performed by: PHYSICIAN ASSISTANT

## 2020-03-18 PROCEDURE — 80053 COMPREHEN METABOLIC PANEL: CPT | Performed by: NURSE PRACTITIONER

## 2020-03-18 PROCEDURE — 88271 CYTOGENETICS DNA PROBE: CPT | Performed by: PHYSICIAN ASSISTANT

## 2020-03-18 PROCEDURE — 3E04305 INTRODUCTION OF OTHER ANTINEOPLASTIC INTO CENTRAL VEIN, PERCUTANEOUS APPROACH: ICD-10-PCS | Performed by: INTERNAL MEDICINE

## 2020-03-18 PROCEDURE — 40000424 ZZHCL STATISTIC BONE MARROW CORE PERF TC 38221: Performed by: PHYSICIAN ASSISTANT

## 2020-03-18 PROCEDURE — 85384 FIBRINOGEN ACTIVITY: CPT | Performed by: NURSE PRACTITIONER

## 2020-03-18 PROCEDURE — 88341 IMHCHEM/IMCYTCHM EA ADD ANTB: CPT | Performed by: PHYSICIAN ASSISTANT

## 2020-03-18 PROCEDURE — 99233 SBSQ HOSP IP/OBS HIGH 50: CPT | Performed by: INTERNAL MEDICINE

## 2020-03-18 PROCEDURE — 88313 SPECIAL STAINS GROUP 2: CPT | Performed by: PHYSICIAN ASSISTANT

## 2020-03-18 PROCEDURE — 40000795 ZZHCL STATISTIC DNA PROCESS AND HOLD: Performed by: NURSE PRACTITIONER

## 2020-03-18 PROCEDURE — 83615 LACTATE (LD) (LDH) ENZYME: CPT | Performed by: PHYSICIAN ASSISTANT

## 2020-03-18 PROCEDURE — 84132 ASSAY OF SERUM POTASSIUM: CPT | Performed by: NURSE PRACTITIONER

## 2020-03-18 PROCEDURE — 85025 COMPLETE CBC W/AUTO DIFF WBC: CPT | Performed by: PHYSICIAN ASSISTANT

## 2020-03-18 PROCEDURE — 40000611 ZZHCL STATISTIC MORPHOLOGY W/INTERP HEMEPATH TC 85060: Performed by: PHYSICIAN ASSISTANT

## 2020-03-18 PROCEDURE — 88280 CHROMOSOME KARYOTYPE STUDY: CPT | Performed by: PHYSICIAN ASSISTANT

## 2020-03-18 PROCEDURE — 88161 CYTOPATH SMEAR OTHER SOURCE: CPT | Performed by: PHYSICIAN ASSISTANT

## 2020-03-18 PROCEDURE — 83735 ASSAY OF MAGNESIUM: CPT | Performed by: NURSE PRACTITIONER

## 2020-03-18 PROCEDURE — 40001004 ZZHCL STATISTIC FLOW INT 9-15 ABY TC 88188: Performed by: NURSE PRACTITIONER

## 2020-03-18 PROCEDURE — 88275 CYTOGENETICS 100-300: CPT | Performed by: PHYSICIAN ASSISTANT

## 2020-03-18 PROCEDURE — 88184 FLOWCYTOMETRY/ TC 1 MARKER: CPT | Performed by: NURSE PRACTITIONER

## 2020-03-18 PROCEDURE — 83735 ASSAY OF MAGNESIUM: CPT | Performed by: STUDENT IN AN ORGANIZED HEALTH CARE EDUCATION/TRAINING PROGRAM

## 2020-03-18 PROCEDURE — 85610 PROTHROMBIN TIME: CPT | Performed by: NURSE PRACTITIONER

## 2020-03-18 PROCEDURE — 25000132 ZZH RX MED GY IP 250 OP 250 PS 637: Performed by: PHYSICIAN ASSISTANT

## 2020-03-18 RX ORDER — PROCHLORPERAZINE MALEATE 10 MG
10 TABLET ORAL EVERY 6 HOURS PRN
Status: DISCONTINUED | OUTPATIENT
Start: 2020-03-18 | End: 2020-03-27

## 2020-03-18 RX ORDER — NALOXONE HYDROCHLORIDE 0.4 MG/ML
.1-.4 INJECTION, SOLUTION INTRAMUSCULAR; INTRAVENOUS; SUBCUTANEOUS
Status: DISCONTINUED | OUTPATIENT
Start: 2020-03-18 | End: 2020-03-18

## 2020-03-18 RX ORDER — MEPERIDINE HYDROCHLORIDE 25 MG/ML
25 INJECTION INTRAMUSCULAR; INTRAVENOUS; SUBCUTANEOUS EVERY 30 MIN PRN
Status: DISCONTINUED | OUTPATIENT
Start: 2020-03-18 | End: 2020-03-23

## 2020-03-18 RX ORDER — IOPAMIDOL 755 MG/ML
60-135 INJECTION, SOLUTION INTRAVASCULAR ONCE
Status: COMPLETED | OUTPATIENT
Start: 2020-03-18 | End: 2020-03-18

## 2020-03-18 RX ORDER — LORAZEPAM 2 MG/ML
.5-1 INJECTION INTRAMUSCULAR EVERY 6 HOURS PRN
Status: DISCONTINUED | OUTPATIENT
Start: 2020-03-18 | End: 2020-03-27

## 2020-03-18 RX ORDER — EPINEPHRINE 1 MG/ML
0.3 INJECTION, SOLUTION, CONCENTRATE INTRAVENOUS EVERY 5 MIN PRN
Status: DISCONTINUED | OUTPATIENT
Start: 2020-03-18 | End: 2020-03-23

## 2020-03-18 RX ORDER — LORAZEPAM 0.5 MG/1
.5-1 TABLET ORAL EVERY 6 HOURS PRN
Status: DISCONTINUED | OUTPATIENT
Start: 2020-03-18 | End: 2020-03-27

## 2020-03-18 RX ORDER — DIPHENHYDRAMINE HCL 50 MG
50 CAPSULE ORAL ONCE
Status: COMPLETED | OUTPATIENT
Start: 2020-03-20 | End: 2020-03-20

## 2020-03-18 RX ORDER — SODIUM CHLORIDE 9 MG/ML
1000 INJECTION, SOLUTION INTRAVENOUS CONTINUOUS PRN
Status: DISCONTINUED | OUTPATIENT
Start: 2020-03-18 | End: 2020-03-23

## 2020-03-18 RX ORDER — ALBUTEROL SULFATE 90 UG/1
1-2 AEROSOL, METERED RESPIRATORY (INHALATION)
Status: DISCONTINUED | OUTPATIENT
Start: 2020-03-18 | End: 2020-03-23

## 2020-03-18 RX ORDER — ALLOPURINOL 300 MG/1
300 TABLET ORAL DAILY
Status: DISCONTINUED | OUTPATIENT
Start: 2020-03-18 | End: 2020-03-18

## 2020-03-18 RX ORDER — LEUCOVORIN CALCIUM 350 MG/17.5ML
200 INJECTION, POWDER, LYOPHILIZED, FOR SOLUTION INTRAMUSCULAR; INTRAVENOUS ONCE
Status: CANCELLED | OUTPATIENT
Start: 2020-03-28

## 2020-03-18 RX ORDER — ONDANSETRON 8 MG/1
16 TABLET, FILM COATED ORAL
Status: COMPLETED | OUTPATIENT
Start: 2020-03-18 | End: 2020-03-19

## 2020-03-18 RX ORDER — METHYLPREDNISOLONE SODIUM SUCCINATE 125 MG/2ML
125 INJECTION, POWDER, LYOPHILIZED, FOR SOLUTION INTRAMUSCULAR; INTRAVENOUS
Status: DISCONTINUED | OUTPATIENT
Start: 2020-03-18 | End: 2020-03-23

## 2020-03-18 RX ORDER — DEXAMETHASONE 4 MG/1
12 TABLET ORAL
Status: ACTIVE | OUTPATIENT
Start: 2020-03-18 | End: 2020-03-20

## 2020-03-18 RX ORDER — DIPHENHYDRAMINE HYDROCHLORIDE 50 MG/ML
50 INJECTION INTRAMUSCULAR; INTRAVENOUS
Status: DISCONTINUED | OUTPATIENT
Start: 2020-03-18 | End: 2020-03-23

## 2020-03-18 RX ORDER — ALBUTEROL SULFATE 0.83 MG/ML
2.5 SOLUTION RESPIRATORY (INHALATION)
Status: DISCONTINUED | OUTPATIENT
Start: 2020-03-18 | End: 2020-03-23

## 2020-03-18 RX ORDER — DEXAMETHASONE 4 MG/1
8 TABLET ORAL DAILY
Status: ACTIVE | OUTPATIENT
Start: 2020-03-20 | End: 2020-03-22

## 2020-03-18 RX ORDER — ACETAMINOPHEN 325 MG/1
650 TABLET ORAL ONCE
Status: COMPLETED | OUTPATIENT
Start: 2020-03-20 | End: 2020-03-20

## 2020-03-18 RX ORDER — LEUCOVORIN CALCIUM 350 MG/17.5ML
15 INJECTION, POWDER, LYOPHILIZED, FOR SOLUTION INTRAMUSCULAR; INTRAVENOUS EVERY 6 HOURS
Status: CANCELLED | OUTPATIENT
Start: 2020-03-28

## 2020-03-18 RX ADMIN — ACETAMINOPHEN 650 MG: 325 TABLET, FILM COATED ORAL at 17:00

## 2020-03-18 RX ADMIN — IOPAMIDOL 127 ML: 755 INJECTION, SOLUTION INTRAVENOUS at 10:19

## 2020-03-18 RX ADMIN — ACETAMINOPHEN 650 MG: 325 TABLET, FILM COATED ORAL at 20:21

## 2020-03-18 RX ADMIN — SENNOSIDES AND DOCUSATE SODIUM 2 TABLET: 8.6; 5 TABLET ORAL at 11:24

## 2020-03-18 RX ADMIN — SODIUM CHLORIDE, PRESERVATIVE FREE 5 ML: 5 INJECTION INTRAVENOUS at 13:58

## 2020-03-18 RX ADMIN — DEXAMETHASONE 4 MG: 4 TABLET ORAL at 14:08

## 2020-03-18 RX ADMIN — MIDAZOLAM 1 MG: 1 INJECTION INTRAMUSCULAR; INTRAVENOUS at 12:45

## 2020-03-18 RX ADMIN — SODIUM CHLORIDE 110 MG: 9 INJECTION, SOLUTION INTRAVENOUS at 21:36

## 2020-03-18 RX ADMIN — LEVOTHYROXINE SODIUM 200 MCG: 0.1 TABLET ORAL at 11:25

## 2020-03-18 RX ADMIN — ACETAMINOPHEN 650 MG: 325 TABLET, FILM COATED ORAL at 11:25

## 2020-03-18 RX ADMIN — CALCIUM CITRATE 200 MG (950 MG) TABLET 950 MG: at 11:25

## 2020-03-18 RX ADMIN — ALLOPURINOL 300 MG: 300 TABLET ORAL at 11:24

## 2020-03-18 RX ADMIN — FLUDEOXYGLUCOSE F-18 12.1 MCI.: 500 INJECTION, SOLUTION INTRAVENOUS at 09:15

## 2020-03-18 RX ADMIN — Medication 5 ML: at 14:15

## 2020-03-18 RX ADMIN — Medication 5 ML: at 13:59

## 2020-03-18 RX ADMIN — ACETAMINOPHEN 650 MG: 325 TABLET, FILM COATED ORAL at 00:46

## 2020-03-18 RX ADMIN — DEXAMETHASONE 4 MG: 4 TABLET ORAL at 00:46

## 2020-03-18 RX ADMIN — CYCLOPHOSPHAMIDE 1705 MG: 2 INJECTION, POWDER, FOR SOLUTION INTRAVENOUS; ORAL at 20:21

## 2020-03-18 RX ADMIN — PANTOPRAZOLE SODIUM 40 MG: 40 TABLET, DELAYED RELEASE ORAL at 11:25

## 2020-03-18 RX ADMIN — SENNOSIDES AND DOCUSATE SODIUM 2 TABLET: 8.6; 5 TABLET ORAL at 19:49

## 2020-03-18 RX ADMIN — ACETAMINOPHEN 650 MG: 325 TABLET, FILM COATED ORAL at 04:55

## 2020-03-18 RX ADMIN — DEXAMETHASONE 4 MG: 4 TABLET ORAL at 06:22

## 2020-03-18 RX ADMIN — VINCRISTINE SULFATE 2 MG: 1 INJECTION, SOLUTION INTRAVENOUS at 22:15

## 2020-03-18 RX ADMIN — DEXAMETHASONE 4 MG: 4 TABLET ORAL at 17:56

## 2020-03-18 RX ADMIN — FOSAPREPITANT 150 MG: 150 INJECTION, POWDER, LYOPHILIZED, FOR SOLUTION INTRAVENOUS at 19:50

## 2020-03-18 RX ADMIN — ONDANSETRON HYDROCHLORIDE 16 MG: 8 TABLET, FILM COATED ORAL at 19:49

## 2020-03-18 ASSESSMENT — ACTIVITIES OF DAILY LIVING (ADL)
ADLS_ACUITY_SCORE: 15
ADLS_ACUITY_SCORE: 13

## 2020-03-18 ASSESSMENT — PAIN DESCRIPTION - DESCRIPTORS
DESCRIPTORS: ACHING

## 2020-03-18 NOTE — PLAN OF CARE
Pt alert and oriented x4. Pt VSS. Pt denied nausea. Pt reported lower back pain. Pt receiving scheduled tylenol, and declined other interventions. Pt reported good appetite and ate all of meal. Pt had PICC placed, okay to use. Pt given mag citrate. Pt had small bm. Pt to be NPO @0000. Continue to monitor.

## 2020-03-18 NOTE — PROCEDURES
Bone Marrow Biopsy Procedure Note  Patient's Name: Kobe Pedroza  Date of Procedure: 3/18/2020     PROCEDURE:  Unilateral bone marrow biopsy and unilateral aspirate      INDICATION: Diagnostic Workup for New diagnosis of Burkitt's Lymphoma      PERFORMED BY:  Mckayla Alvarez PA-C     CONSENT:  Informed consent was obtained from the patient. The risks and benefits of the procedure were explained. The patient agreed to undergo the procedure. The consent form was signed and placed in the paper chart.      PREMEDICATION: Versed 1 mg IV     PROCEDURE SUMMARY:  The patient's identification was positively verified by ID band. Patient was laid in the prone position. Premedication with Versed 1 mg IV. The Left posterior iliac crest (LPIC) was prepped and draped in a sterile manner. The skin, deeper tissues, and periosteum of the LPIC were anesthetized with approximately 20 mL 1% lidocaine. Following this, a 3mm incision was made. The trephine needle was advanced into the LPIC bone cavity and a 9 mm core biopsy. Next, bone marrow aspiration was attempted multiple times but was unable to obtain proper bone marrow aspirate. Then a 4 mm core biopsy and 30 mm core biopsy were obtained. A total of 43 mm of core biopsies were obtained. Following the procedure, a sterile pressure dressing was applied to the bone marrow biopsy site.      COMPLICATIONS:  None. The patient tolerated the procedure very well with minimal discomfort.      RECOMMENDATIONS:  The patient was placed in the supine position to maintain pressure on the biopsy site.   The patient was instructed to lie flat for 45-60 minutes and not to remove the dressing or get it wet for 24 hours post-procedure.      TESTS ORDERED:  Morphology  Flow cytometry  Chromosomes  FISH   Molecular (hold)    Mckayla Alvarez PA-C  Hematology/Oncology  Pager: [phone]

## 2020-03-18 NOTE — PROGRESS NOTES
DATE/TIME  (DOT-TD, DOT-NOW) CHEMO CHECK ACTIVITY (REGIMEN & DOSE CHECK, DAY, DOSE #, NAME OF CHEMO #1)  CHEMO DRUG #2  CHEMO DRUG #3 NAME OF RN #1 (USE DOT-ME HERE) NAME OF RN#2 (2ND RN TO LOG IN SEPARATELY)   3/18/2020  5:28 PM   Modified R-CODOX-M / R-IVAC High Risk Protocol Double Check   CHACORTA Norton, CHACORTA     03/18/20  8:00 PM   Cytoxan dose #1   CHACORTA Norton RN     3/18/2020  9:20 PM   Doxorubicin dose #1 double check Vincristine dose #1 double check  CHACORTA Norton RN     03/19/20  4:50 PM   Cytoxan Dose #2   CHACORTA Massey RN     3/20/2020  4:41 PM   Rituxan Dose #1 double check   Mike Gould RN      3/27/2020  7:39 AM   Protocol check   Malou Riley, CHACORTA Bianchi, CHACORTA    3/27/2020  1:24 PM   Vincristine    Malou Riley, CHACORTA      03/27/20  3:36 PM   Methotrexate dose # 1   CHACORTA Vuong, RN

## 2020-03-18 NOTE — PLAN OF CARE
AVSS, afebrile. C/o low back pain, taking scheduled tylenol. Neuros q 4 hours intact. No bm overnight. Plan for PET scan at 09:45 today, NPO since MN. Bmbx planned for 12:30 today. Cont POC.

## 2020-03-18 NOTE — PLAN OF CARE
Jonnathan denies pain, neuro intact and gait is steady.  Had a PET Scan today and BMBX.  Tolerated both procedures well.  BMBX site CDI.  Back incision removed and site CDI.  Neuro surgeon said to leave site open to air if there is no drainage.  Pt said he had 2 stools today so Miralax was held and 2 Senna given.  PICC site CDI. TLS WNL.

## 2020-03-18 NOTE — PROGRESS NOTES
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 He was transferred to the Malignant Hematology team on 3/17 for further work-up and management of his newly diagnosed lymphoma.    HEME/ONC  # Burkitt's lymphoma  # Thoracic spine mass  # Spinal cord compression  Patient presented on 3/12 to [facility] with acute-on-chronic mid-thoracic back pain with some associated radicular symptoms. Per patient, no B-symptoms, though he did recently intentionally lose 35 lbs. 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 Patient had no appreciable neurological deficits. He was started on dexamethasone and underwent T5-6 laminectomy with gross total resection of epidural tumor on 3/15. Flow cytometry of the specimen was notable for CD10 positive and kappa monotypic B cells (83%). Surgical pathology and cytogenetics pending, but initial report is concerning for Burkitt's lymphoma.    - PET scan today at 9:45 AM  - Bone marrow biopsy today at 12:30 PM.  - ECHO (3/16) with LVEF of 60-65%, no diastolic dysfunction.  - Baseline EKG (3/13) without evidence of ischemia or dysrhythmia.  - PICC line placed.    - Continue high-dose dexamethasone 4 mg Q6H.    - Continue daily PPI while on steroids.  - TLS labs Q8H  - Continue allopurinol 300 mg daily    Treatment Plan:   - Further treatment recommendations pending pathology/cytogenetics results     # ID Prophylaxis  - Viral  serologies: HepC nonreactive, HepB negative and HIV negative  - Not currently neutropenic, will consider ID ppx with initiation of chemo or if patient becomes neutropenic    # Normocytic anemia  Hgb 11.3 (?15.0 on 3/15). Likely due to acute blood loss in the setting of recent surgical intervention. No evidence of significant hemolysis.   - Hgb remains steady from 3/16  - CBC daily  - Transfuse if Hgb <7, Platelets <10K     # Thyroid cancer status-post thyroidectomy  Status post thyroidectomy in 2011. TSH normal on admission.  - Continue PTA levothyroxine     # Leukocytosis  Presumably due to recent dexamethasone use.  - CBC daily     MSK/NEURO  # Thoracic back pain  Due to T5-6 extradural tumor as detailed above. Patient now status post T5-6 laminectomy with gross total resction on 3/15. Today is POD #4.   - Dressing is clean, dry and intact  - Post-op activity recommendations per NSG:   - Larry Brace to be worn out of bed (orthotics consult in place)   - No lifting >10 lbs, twisting, straining, or strenuous activity for at least 2 weeks  - Continue APAP, oxycodone, methocarbamol PRN for pain  - Patient should have scheduled outpatient neurosurgery follow-up in 2 weeks (may be via Tele-Health); NSG to coordinate     CV  # Coronary artery disease  # Hyperlipidemia  Followed by Dr. Zeng at [facility] Diagnosed with mild, non-obstructive CAD in 2018. Calcium score at that time was 5.5. Baby ASA and low-dose statin therapy recommended. No previous cardiac caths or stents.  - Hold PTA statin in light of starting chemotherapy     GI  # Hypocalcemia  Mild. Ca 7.2, corrects to 8.2 for albumin.  - Continue PO calcium citrate      # Constipation - resolving  Noted prior to presentation (~3/9). Patient reports decreased frequency of bowel movements. Has been responsive to laxative use.   - Last BM today  - No abdominal pain, no N/V. Still passing gas. Abdominal exam benign; less likely  "obstruction. Query possible neurogenic component given evidence of cord compression. Taking opioids post-operatively, which may be contributing as well.  - Continue scheduled senna and Miralax  - Mag Citrate today, 3/18  - Consider imaging if patient develops abdominal pain, N/V, or other red flag symptoms     FEN:  -No IVF for now; encourage PO intake.   -Lyte replacement per protocol  -Regular diet as tolerated.    Prophy/Misc:  - GI: Continue bowel regimen as above; continue PPI while on steroids.  - DVT: Mechanical only for now; hold off on pharmacologic prophylaxis until at least POD #3 per NSG.  - Activity: as tolerated and per neurosurg recommendations  - Diet: Regular as tolerated     Disposition: Anticipate discharge to home pending resolution of acute issues/initiation of treatment as appropriate, likely several more days.     Code  Full Code     Patient is seen and examined by Dr. Santizo.  Assessment and plan are discussed and delivered to the patient.    Mckayla Alvarez PA-C  Hematology/Oncology  Pager: [phone]    Interval History   This morning, Kobe states he is feeling well and \"getting better everyday\". He appears ready to continue with further workup of his diagnosis and to discuss his treatment options. He states that overall his weakness is improving and he is not having significant pain. He reports he had a bowel movement this morning that was \"stringy\" though does not report it as \"liquidy\".    He states he has been having intermittent chest pain which he has been having for the last few weeks. Reports he last had this pain yesterday but has not had it today. States he feels that it is connected to his back pain. States it is \"pretty similar\" to his previous chest pain from his original workup for chest pain. Denies any radiation of the pain, palpitations, reproducibility of pain.    He denies fever, chills, night sweats, appetite loss, sore throat, headache, change in vision, oral sores, " nausea, vomiting, constipation, abdominal pain, blood in stools, hematuria, dysuria, difficulty urinating, balance issues, changes to sensations, increased focal or non focal weakness, swelling or difficulty ambulating.    Physical Exam   Temp: 97  F (36.1  C) Temp src: Oral BP: 118/69 Pulse: 88 Heart Rate: 69 Resp: 18 SpO2: 94 % O2 Device: None (Room air)    Vitals:    03/13/20 0232 03/18/20 0753   Weight: 98.1 kg (216 lb 4.3 oz) 94 kg (207 lb 4.8 oz)     Vital Signs with Ranges  Temp:  [96.3  F (35.7  C)-98.1  F (36.7  C)] 97  F (36.1  C)  Pulse:  [88-94] 88  Heart Rate:  [69-85] 69  Resp:  [16-20] 18  BP: (118-141)/(60-84) 118/69  SpO2:  [93 %-96 %] 94 %  I/O last 3 completed shifts:  In: 420 [P.O.:400; I.V.:20]  Out: 1770 [Urine:1770]    Constitutional: Pleasant man lying in bed. Awake and conversational. Non- toxic appearing. No acute distress. Well developed, hydrated, and nourished.   HEENT: Normocephalic, atraumatic without tenderness or palpable masses/depressions. Sclerae anicteric. PERRLA. EOM intact. Moist mucus membranes without lesions, thrush, or exudates appreciated  Lymph: Neck supple.   Respiratory: Breathing comfortable with no increased work on room air. Good air exchange. No signs of respiratory distress or accessory muscle use. Lungs clear to auscultation bilaterally, no crackles or wheezing noted.  Cardiovascular: Regular rate and rhythm. Normal S1 and S2. No murmurs, rubs, or gallops. No peripheral edema.    GI: Abdomen is soft, non-distended, non-tender and without distension. Bowel sounds present and normoactive. No masses or hepatosplenomegaly appreciated.  Skin: Skin is clean, dry, intact. No jaundice appreciated.   Musculoskeletal/ Extremities: No redness, warmth, or swelling of the joints appreciated. Distal pulses palpable. Nailbeds pink and without cyanosis or clubbing.   Neurologic: Alert and oriented. Speech normal. Grossly nonfocal. Memory and thought process preserved. Motor  function normal with 5/5 muscle strength bilaterally to UE and LE. Sensation intact bilaterally. CN II-XII intact. Has some difficulty moving in bed on own.  Neuropsychiatric: Calm, affect congruent to situation. Appropriate mood and affect. Good judgment and insight. No visual/auditory hallucinations.         Medications       acetaminophen  650 mg Oral Q4H     allopurinol  300 mg Oral Daily     calcium citrate  950 mg Oral Daily     dexamethasone  4 mg Oral Q6H GUILLAUME     heparin lock flush  5-10 mL Intracatheter Q24H     iopamidol   mL Intravenous Once     levothyroxine  200 mcg Oral QAM AC     pantoprazole  40 mg Oral QAM AC     polyethylene glycol  17 g Oral Daily     senna-docusate  1 tablet Oral BID    Or     senna-docusate  2 tablet Oral BID     sodium chloride (PF)  10 mL Intracatheter Q8H     sodium chloride (PF)   mL Intravenous Once       Data   Results for orders placed or performed during the hospital encounter of 03/13/20 (from the past 24 hour(s))   Lactate Dehydrogenase   Result Value Ref Range    Lactate Dehydrogenase 272 (H) 85 - 227 U/L   XR Chest Port 1 View    Narrative    EXAM: XR CHEST PORT 1 VW  3/17/2020 3:23 PM      HISTORY: PICC placement    COMPARISON: CT: 3/15/2020.    FINDINGS: Single view of the chest. Right PICC tip projects in the  right atrium. Trachea is midline, heart size is normal. No focal  pulmonary opacity, pneumothorax, or pleural effusion.       Impression    IMPRESSION: No acute cardiopulmonary disease. Right upper extremity  PICC line with tip in right atrium.    I have personally reviewed the examination and initial interpretation  and I agree with the findings.    RADHA GARCIA MD   Double Lumen PICC Placement    Narrative    Lovely Hanna RN     3/17/2020  3:49 PM  [facility], [place]    Double Lumen PICC Placement    Date/Time: 3/17/2020 3:45 PM  Performed by: Lovely Hanna RN  Authorized by: Lupis Alvarez  NEDA Cabrera   Indications: vascular access    UNIVERSAL PROTOCOL   Site Marked: Yes  Prior Images Obtained and Reviewed:  Yes  Required items: Required blood products, implants, devices and special   equipment available    Patient identity confirmed:  Verbally with patient and arm band  NA - No sedation, light sedation, or local anesthesia  Confirmation Checklist:  Patient's identity using two indicators, relevant   allergies, procedure was appropriate and matched the consent or emergent   situation and correct equipment/implants were available  Time out: Immediately prior to the procedure a time out was called    Universal Protocol: the Joint [facility] Universal Protocol was followed    Preparation: Patient was prepped and draped in usual sterile fashion           ANESTHESIA    Anesthesia: See MAR for details  Local Anesthetic:  Lidocaine 1% without epinephrine  Anesthetic Total (mL):  1      SEDATION    Patient Sedated: No        Preparation: skin prepped with ChloraPrep  Skin prep agent: skin prep agent completely dried prior to procedure  Sterile barriers: maximum sterile barriers were used: cap, mask, sterile   gown, sterile gloves, and large sterile sheet  Hand hygiene: hand hygiene performed prior to central venous catheter   insertion  Type of line used: PICC  Catheter type: double lumen  Lumen type: non-valved  Catheter size: 5 Fr  : Bioflo DL open ended.  Lot number: [id number]  Placement method: venipuncture, MST, ultrasound and tip confirmation   system  Number of attempts: 1  Successful placement: no  Orientation: right  Location: basilic vein (Vein diameter 0.59)  Arm circumference: adults 10 cm  Extremity circumference: 34  Visible catheter length: 1  Total catheter length: 44  Dressing and securement: chlorhexidine disc applied, dressing applied,   occlusive dressing applied, securement device, statlock and sterile   dressing applied  Post procedure assessment: blood return through all ports and  placement   verified by x-ray  PROCEDURE   Patient Tolerance:  Patient tolerated the procedure well with no immediate   complications       Uric acid   Result Value Ref Range    Uric Acid 3.6 3.5 - 7.2 mg/dL   Phosphorus   Result Value Ref Range    Phosphorus 3.3 2.5 - 4.5 mg/dL   Comprehensive metabolic panel   Result Value Ref Range    Sodium 138 133 - 144 mmol/L    Potassium 4.8 3.4 - 5.3 mmol/L    Chloride 109 94 - 109 mmol/L    Carbon Dioxide 21 20 - 32 mmol/L    Anion Gap 8 3 - 14 mmol/L    Glucose 128 (H) 70 - 99 mg/dL    Urea Nitrogen 18 7 - 30 mg/dL    Creatinine 0.55 (L) 0.66 - 1.25 mg/dL    GFR Estimate >90 >60 mL/min/[1.73_m2]    GFR Estimate If Black >90 >60 mL/min/[1.73_m2]    Calcium 6.9 (L) 8.5 - 10.1 mg/dL    Bilirubin Total 0.5 0.2 - 1.3 mg/dL    Albumin 2.4 (L) 3.4 - 5.0 g/dL    Protein Total 5.4 (L) 6.8 - 8.8 g/dL    Alkaline Phosphatase 80 40 - 150 U/L    ALT 17 0 - 70 U/L    AST 38 0 - 45 U/L   Uric acid   Result Value Ref Range    Uric Acid 3.7 3.5 - 7.2 mg/dL   Phosphorus   Result Value Ref Range    Phosphorus 3.9 2.5 - 4.5 mg/dL   Lactate Dehydrogenase   Result Value Ref Range    Lactate Dehydrogenase Canceled, Test credited 85 - 227 U/L   INR   Result Value Ref Range    INR 1.16 (H) 0.86 - 1.14   Fibrinogen activity   Result Value Ref Range    Fibrinogen 469 (H) 200 - 420 mg/dL   Magnesium   Result Value Ref Range    Magnesium 2.3 1.6 - 2.3 mg/dL   INR   Result Value Ref Range    INR 1.10 0.86 - 1.14   Fibrinogen activity   Result Value Ref Range    Fibrinogen 546 (H) 200 - 420 mg/dL   CBC with platelets differential   Result Value Ref Range    WBC 13.3 (H) 4.0 - 11.0 10e9/L    RBC Count 3.73 (L) 4.4 - 5.9 10e12/L    Hemoglobin 11.3 (L) 13.3 - 17.7 g/dL    Hematocrit 34.5 (L) 40.0 - 53.0 %    MCV 93 78 - 100 fl    MCH 30.3 26.5 - 33.0 pg    MCHC 32.8 31.5 - 36.5 g/dL    RDW 13.4 10.0 - 15.0 %    Platelet Count 305 150 - 450 10e9/L    Diff Method Automated Method     % Neutrophils 82.6 %     % Lymphocytes 5.9 %    % Monocytes 9.4 %    % Eosinophils 0.2 %    % Basophils 0.2 %    % Immature Granulocytes 1.7 %    Nucleated RBCs 0 0 /100    Absolute Neutrophil 11.0 (H) 1.6 - 8.3 10e9/L    Absolute Lymphocytes 0.8 0.8 - 5.3 10e9/L    Absolute Monocytes 1.3 0.0 - 1.3 10e9/L    Absolute Eosinophils 0.0 0.0 - 0.7 10e9/L    Absolute Basophils 0.0 0.0 - 0.2 10e9/L    Abs Immature Granulocytes 0.2 0 - 0.4 10e9/L    Absolute Nucleated RBC 0.0    AST   Result Value Ref Range    AST 16 0 - 45 U/L   Potassium   Result Value Ref Range    Potassium 4.3 3.4 - 5.3 mmol/L   Lactate Dehydrogenase   Result Value Ref Range    Lactate Dehydrogenase 298 (H) 85 - 227 U/L

## 2020-03-19 LAB
ALBUMIN SERPL-MCNC: 2.6 G/DL (ref 3.4–5)
ALP SERPL-CCNC: 40 U/L (ref 40–150)
ALT SERPL W P-5'-P-CCNC: 19 U/L (ref 0–70)
ANION GAP SERPL CALCULATED.3IONS-SCNC: 7 MMOL/L (ref 3–14)
APTT PPP: 31 SEC (ref 22–37)
AST SERPL W P-5'-P-CCNC: 17 U/L (ref 0–45)
BASOPHILS # BLD AUTO: 0 10E9/L (ref 0–0.2)
BASOPHILS NFR BLD AUTO: 0.2 %
BILIRUB SERPL-MCNC: 0.3 MG/DL (ref 0.2–1.3)
BUN SERPL-MCNC: 25 MG/DL (ref 7–30)
CALCIUM SERPL-MCNC: 7.1 MG/DL (ref 8.5–10.1)
CHLORIDE SERPL-SCNC: 107 MMOL/L (ref 94–109)
CO2 SERPL-SCNC: 24 MMOL/L (ref 20–32)
COPATH REPORT: NORMAL
CREAT SERPL-MCNC: 0.64 MG/DL (ref 0.66–1.25)
CREAT SERPL-MCNC: 0.69 MG/DL (ref 0.66–1.25)
DIFFERENTIAL METHOD BLD: ABNORMAL
EOSINOPHIL # BLD AUTO: 0 10E9/L (ref 0–0.7)
EOSINOPHIL NFR BLD AUTO: 0 %
ERYTHROCYTE [DISTWIDTH] IN BLOOD BY AUTOMATED COUNT: 13.3 % (ref 10–15)
FIBRINOGEN PPP-MCNC: 502 MG/DL (ref 200–420)
GFR SERPL CREATININE-BSD FRML MDRD: >90 ML/MIN/{1.73_M2}
GFR SERPL CREATININE-BSD FRML MDRD: >90 ML/MIN/{1.73_M2}
GLUCOSE SERPL-MCNC: 131 MG/DL (ref 70–99)
HCT VFR BLD AUTO: 33.1 % (ref 40–53)
HGB BLD-MCNC: 10.7 G/DL (ref 13.3–17.7)
IMM GRANULOCYTES # BLD: 0.4 10E9/L (ref 0–0.4)
IMM GRANULOCYTES NFR BLD: 2.6 %
INR PPP: 1.12 (ref 0.86–1.14)
LDH SERPL L TO P-CCNC: 247 U/L (ref 85–227)
LDH SERPL L TO P-CCNC: 323 U/L (ref 85–227)
LYMPHOCYTES # BLD AUTO: 0.7 10E9/L (ref 0.8–5.3)
LYMPHOCYTES NFR BLD AUTO: 4.6 %
MAGNESIUM SERPL-MCNC: 2.3 MG/DL (ref 1.6–2.3)
MCH RBC QN AUTO: 29.6 PG (ref 26.5–33)
MCHC RBC AUTO-ENTMCNC: 32.3 G/DL (ref 31.5–36.5)
MCV RBC AUTO: 91 FL (ref 78–100)
MONOCYTES # BLD AUTO: 1 10E9/L (ref 0–1.3)
MONOCYTES NFR BLD AUTO: 6.1 %
NEUTROPHILS # BLD AUTO: 13.4 10E9/L (ref 1.6–8.3)
NEUTROPHILS NFR BLD AUTO: 86.5 %
NRBC # BLD AUTO: 0 10*3/UL
NRBC BLD AUTO-RTO: 0 /100
PHOSPHATE SERPL-MCNC: 3.5 MG/DL (ref 2.5–4.5)
PHOSPHATE SERPL-MCNC: 3.9 MG/DL (ref 2.5–4.5)
PLATELET # BLD AUTO: 299 10E9/L (ref 150–450)
POTASSIUM SERPL-SCNC: 4.2 MMOL/L (ref 3.4–5.3)
PROT SERPL-MCNC: 5.5 G/DL (ref 6.8–8.8)
RBC # BLD AUTO: 3.62 10E12/L (ref 4.4–5.9)
SODIUM SERPL-SCNC: 138 MMOL/L (ref 133–144)
URATE SERPL-MCNC: 2.9 MG/DL (ref 3.5–7.2)
URATE SERPL-MCNC: 3.5 MG/DL (ref 3.5–7.2)
WBC # BLD AUTO: 15.5 10E9/L (ref 4–11)

## 2020-03-19 PROCEDURE — 82565 ASSAY OF CREATININE: CPT | Performed by: PHYSICIAN ASSISTANT

## 2020-03-19 PROCEDURE — 25000132 ZZH RX MED GY IP 250 OP 250 PS 637: Performed by: NURSE PRACTITIONER

## 2020-03-19 PROCEDURE — 12000001 ZZH R&B MED SURG/OB UMMC

## 2020-03-19 PROCEDURE — 25000128 H RX IP 250 OP 636: Performed by: INTERNAL MEDICINE

## 2020-03-19 PROCEDURE — 83615 LACTATE (LD) (LDH) ENZYME: CPT | Performed by: PHYSICIAN ASSISTANT

## 2020-03-19 PROCEDURE — 84100 ASSAY OF PHOSPHORUS: CPT | Performed by: PHYSICIAN ASSISTANT

## 2020-03-19 PROCEDURE — 25000132 ZZH RX MED GY IP 250 OP 250 PS 637: Performed by: PHYSICIAN ASSISTANT

## 2020-03-19 PROCEDURE — 36592 COLLECT BLOOD FROM PICC: CPT | Performed by: NURSE PRACTITIONER

## 2020-03-19 PROCEDURE — 25000131 ZZH RX MED GY IP 250 OP 636 PS 637: Performed by: PHYSICIAN ASSISTANT

## 2020-03-19 PROCEDURE — 25800030 ZZH RX IP 258 OP 636: Performed by: INTERNAL MEDICINE

## 2020-03-19 PROCEDURE — 83735 ASSAY OF MAGNESIUM: CPT | Performed by: PHYSICIAN ASSISTANT

## 2020-03-19 PROCEDURE — 85730 THROMBOPLASTIN TIME PARTIAL: CPT | Performed by: PHYSICIAN ASSISTANT

## 2020-03-19 PROCEDURE — 85610 PROTHROMBIN TIME: CPT | Performed by: PHYSICIAN ASSISTANT

## 2020-03-19 PROCEDURE — 25000128 H RX IP 250 OP 636: Performed by: PHYSICIAN ASSISTANT

## 2020-03-19 PROCEDURE — 84550 ASSAY OF BLOOD/URIC ACID: CPT | Performed by: PHYSICIAN ASSISTANT

## 2020-03-19 PROCEDURE — 85384 FIBRINOGEN ACTIVITY: CPT | Performed by: PHYSICIAN ASSISTANT

## 2020-03-19 PROCEDURE — 84100 ASSAY OF PHOSPHORUS: CPT | Performed by: NURSE PRACTITIONER

## 2020-03-19 PROCEDURE — 80053 COMPREHEN METABOLIC PANEL: CPT | Performed by: PHYSICIAN ASSISTANT

## 2020-03-19 PROCEDURE — 99233 SBSQ HOSP IP/OBS HIGH 50: CPT | Performed by: INTERNAL MEDICINE

## 2020-03-19 PROCEDURE — 36415 COLL VENOUS BLD VENIPUNCTURE: CPT | Performed by: PHYSICIAN ASSISTANT

## 2020-03-19 PROCEDURE — 85025 COMPLETE CBC W/AUTO DIFF WBC: CPT | Performed by: NURSE PRACTITIONER

## 2020-03-19 RX ORDER — MAGNESIUM CARB/ALUMINUM HYDROX 105-160MG
296 TABLET,CHEWABLE ORAL DAILY PRN
Status: DISCONTINUED | OUTPATIENT
Start: 2020-03-19 | End: 2020-03-30 | Stop reason: HOSPADM

## 2020-03-19 RX ADMIN — ACETAMINOPHEN 650 MG: 325 TABLET, FILM COATED ORAL at 13:05

## 2020-03-19 RX ADMIN — ACETAMINOPHEN 650 MG: 325 TABLET, FILM COATED ORAL at 20:44

## 2020-03-19 RX ADMIN — DEXAMETHASONE 4 MG: 4 TABLET ORAL at 13:05

## 2020-03-19 RX ADMIN — CYCLOPHOSPHAMIDE 1705 MG: 2 INJECTION, POWDER, FOR SOLUTION INTRAVENOUS; ORAL at 17:51

## 2020-03-19 RX ADMIN — ACETAMINOPHEN 650 MG: 325 TABLET, FILM COATED ORAL at 08:51

## 2020-03-19 RX ADMIN — ACETAMINOPHEN 650 MG: 325 TABLET, FILM COATED ORAL at 04:59

## 2020-03-19 RX ADMIN — Medication 5 ML: at 19:03

## 2020-03-19 RX ADMIN — SENNOSIDES AND DOCUSATE SODIUM 2 TABLET: 8.6; 5 TABLET ORAL at 08:53

## 2020-03-19 RX ADMIN — ACETAMINOPHEN 650 MG: 325 TABLET, FILM COATED ORAL at 16:54

## 2020-03-19 RX ADMIN — DEXAMETHASONE 4 MG: 4 TABLET ORAL at 06:45

## 2020-03-19 RX ADMIN — POLYETHYLENE GLYCOL 3350 17 G: 17 POWDER, FOR SOLUTION ORAL at 08:52

## 2020-03-19 RX ADMIN — SENNOSIDES AND DOCUSATE SODIUM 2 TABLET: 8.6; 5 TABLET ORAL at 19:01

## 2020-03-19 RX ADMIN — ALLOPURINOL 300 MG: 300 TABLET ORAL at 08:52

## 2020-03-19 RX ADMIN — PANTOPRAZOLE SODIUM 40 MG: 40 TABLET, DELAYED RELEASE ORAL at 08:51

## 2020-03-19 RX ADMIN — ACETAMINOPHEN 650 MG: 325 TABLET, FILM COATED ORAL at 00:33

## 2020-03-19 RX ADMIN — ONDANSETRON HYDROCHLORIDE 16 MG: 8 TABLET, FILM COATED ORAL at 16:54

## 2020-03-19 RX ADMIN — DEXAMETHASONE 4 MG: 4 TABLET ORAL at 17:51

## 2020-03-19 RX ADMIN — Medication 5 ML: at 05:45

## 2020-03-19 RX ADMIN — CALCIUM CITRATE 200 MG (950 MG) TABLET 950 MG: at 08:52

## 2020-03-19 RX ADMIN — LEVOTHYROXINE SODIUM 200 MCG: 0.1 TABLET ORAL at 08:51

## 2020-03-19 RX ADMIN — DEXAMETHASONE 4 MG: 4 TABLET ORAL at 00:33

## 2020-03-19 ASSESSMENT — ACTIVITIES OF DAILY LIVING (ADL)
ADLS_ACUITY_SCORE: 13

## 2020-03-19 ASSESSMENT — MIFFLIN-ST. JEOR: SCORE: 1749.1

## 2020-03-19 ASSESSMENT — PAIN DESCRIPTION - DESCRIPTORS
DESCRIPTORS: ACHING;SORE
DESCRIPTORS: ACHING
DESCRIPTORS: ACHING;SORE
DESCRIPTORS: ACHING;SORE

## 2020-03-19 NOTE — PROGRESS NOTES
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 He is a newly diagnosed with Burkitt Lymphoma. He started R-CODOX-M/IVAC chemotherapy regimen (D1=3/18/2020). Today is Day 2.    Intervention:  Request received to meet with patient to assist with financial/insurance concerns.     SWer met with patient in his hospital room; introduced self and explained role. Patient stated that he is a  and has worked with the same company for over 40 years. Patient concerned about income and finances as he will be in the hospital receiving chemotherapy for an unknown time.     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 SWer encouraged patient to outreach to his employer to determine if he is able to obtain any assistance.     Patient stated that his wife is unemployed and is the primary caregiver to her 80-year-old mother. Due to the patient's new diagnosis, patient and spouse have decided to have his mother-in-law placed at a nursing home for the time being.     Patient is concerned about finances and inquired about options. SWer discussed three different financial assistance programs that the patient may be eligible for: [employer], [employer], and the [employer]. Brochures for organizations provided to patient. Patient inquired if he could  obtain short term disability; this writer explained that the patient would need to have been paying into a program for short term disability prior to this diagnosis. Patient stated that he has not been paying for a short term disability plan. SWer again also encouraged patient to check in with his employer to determine if his company has any assistance to their employees during crisis; patient expressed understanding.     No further questions or concerns reported by patient at this time. SWer provided contact information and encouraged him to call if any further needs arise.     Assessment:  See bedside nurse, medical team, PT/OT notes.    Plan:    Anticipated Disposition:  TBD    Barriers to d/c plan:  Medical stability    Follow Up:  SWer will continue to be available for any discharge planning and needs.    JONAS Redd Hematology/Oncology Social Worker  Phone: [phone]  Pager: [phone]  [email or web address]

## 2020-03-19 NOTE — PROGRESS NOTES
Nursing Focus: Chemotherapy  D: Positive blood return via PICC. Insertion site is clean/dry/intact, dressing intact with no complaints of pain.  Urine output is recorded in intake in Doc Flowsheet.    I: Premedications given per order (see electronic medical administration record). Dose #2 of Cytoxan infused over 1 hour. Reviewed pt teaching on chemotherapy side effects.  Pt denies need for further teaching. Chemotherapy double checked per protocol by two chemotherapy competent RN's.   A: Tolerating procedure well. Denies nausea and or pain.   P: Continue to monitor urine output and symptoms of nausea. Screen for symptoms of toxicity.

## 2020-03-19 NOTE — PROGRESS NOTES
Nursing Focus: Chemotherapy    D: Positive blood return via PICC. Insertion site is clean/dry/intact, dressing intact with no complaints of pain.  Urine output is recorded in intake in Doc Flowsheet.      I: Premedications given per order (see electronic medical administration record). Dose #1 of Cytoxan started to infuse over 1 hour, then Doxorubucin started to infuse over 30min, and finally vincristine infused over gravity. Reviewed pt teaching on chemotherapy side effects.  Pt denies need for further teaching. Chemotherapy double checked per protocol by two chemotherapy competent RN's.     A: Tolerating chemo well. Denies nausea and or pain. Anxious about receiving chemo but motivated to start treatment.    P: Continue to monitor urine output and symptoms of nausea. Screen for symptoms of toxicity.

## 2020-03-19 NOTE — PLAN OF CARE
Vss. Afebrile. Chemo day two this humberto with cytoxan at 1800. Neuros are now Q day, done for today and WDL noted. Kobe will have LP tomorrow at 1300. Rituxan in the humberto, after LP is complete. Pt did have BM today, pt stated it looks different and weird. Stool was dark brown, not black, and small gauge.  Labs not Q12. Back pain continues, but well controlled with scheduled tylenol. Continue with POC.

## 2020-03-19 NOTE — PROGRESS NOTES
[facility]    Hematology / Oncology Progress Note    Date of Service (when I saw the patient): 03/19/2020     Assessment & Plan   Kobe Pedroza is a 60 year old male with a history of thyroid cancer status post thyroidectomy (2011), CAD, and hyperlipidemia who was transferred from [facility] to [facility] on 3/13 with a newly thoracic extradural mass at T5-6 with severe cord compression, now status post neurosurgical gross resection on 3/15 with initial pathology concerning for high-grade B-cell lymphoma. He was transferred to the Malignant Hematology team on 3/17 for further work-up and management of his newly diagnosed lymphoma.   He is a newly diagnosed with Burkitt Lymphoma. He started R-CODOX-M/IVAC chemotherapy regimen (D1=3/18/2020). Today is Day 2.    Today:  - Today is D2  - LP and IT chemo scheduled for tomorrow (3/20) @ 1300 per chemo protocol  - LP and IT chemo scheduled for Monday (3/23) @ 1300 per chemo protocol  - Monitor TLS BID  - Daily Coags  - Start Lovenox on Saturday    - Hold for days with LP (hold tomorrow and Monday)  - Ordered Mag Citrate daily PRN per patient preference    HEME/ONC  # Burkitt's lymphoma  # Thoracic spine mass  # Spinal cord compression  Patient presented on 3/12 to [facility] with acute-on-chronic mid-thoracic back pain with some associated radicular symptoms. Per patient, no B-symptoms, though he did recently intentionally lose 35 lbs. 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 Patient had no appreciable neurological deficits. He was started on dexamethasone and underwent T5-6 laminectomy with gross total resection of epidural tumor on 3/15. Flow cytometry of the specimen was notable for CD10 positive and kappa monotypic B cells (83%). Confirmed Burkitt lymphoma with surgical pathology. Cytogenetics pending.    - PET scan completed 3/18  - Bone marrow  biopsy completed 3/18. Tolerated well with Versed 1mg IV  - ECHO (3/16) with LVEF of 60-65%, no diastolic dysfunction.  - Baseline EKG (3/13) without evidence of ischemia or dysrhythmia.  - PICC line placed.    - Continue high-dose dexamethasone 4 mg Q6H.    - Continue daily PPI while on steroids.  - TLS labs Q12h  - Continue allopurinol 300 mg daily    Treatment Plan: R-CODOX-M/IVAC (D1=3/18/2020). Today is D2  - Cyclophosphamide 1,705 mg IV for D1, D2  - Doxorubicin 110 mg IV D1  - Vincristine 2 mg IV D1, D10  - Rituximab 800 mg   - Methotrexate 3 g/m2 D10  - Leucovorin calcium injection 426 mg D11  - Neupogen injection 480 mcg daily for D1-D6    - LP and IT chemo scheduled for tomorrow (3/20) @ 1300 per chemo protocol   - cytarabine 50 mg, hydrocortisone sodium succinate 50 mg, methotrexate 12 mg   - LP and IT chemo scheduled for Monday (3/23) @ 1300 per chemo protocol    - cytarabine 50 mg, hydrocortisone sodium succinate 50 mg     - Supportive Medications:   - Allopurinol tablet 300 mg    - PRN Anti Emetics   - PRN Bowel Regimen   - PRN Pain control    # ID Prophylaxis  - Viral serologies: HepC nonreactive, HepB negative and HIV negative  - Not currently neutropenic, will consider ID ppx if patient becomes neutropenic    # Normocytic anemia  Hgb 11.3 (?15.0 on 3/15). Likely due to acute blood loss in the setting of recent surgical intervention. No evidence of significant hemolysis.   - Hgb remains steady from 3/16  - CBC daily  - Transfuse if Hgb <7, Platelets <10K     # Thyroid cancer status-post thyroidectomy  Status post thyroidectomy in 2011. TSH normal on admission.  - Continue PTA levothyroxine     # Leukocytosis  Presumably due to recent dexamethasone use.  - CBC daily     MSK/NEURO  # Thoracic back pain  Due to T5-6 extradural tumor as detailed above. Patient now status post T5-6 laminectomy with gross total resction on 3/15. Today is POD #4.   - Dressing is clean, dry and intact  - Post-op activity  recommendations per NSG:   - Godfrey Brace to be worn out of bed (orthotics consult in place)   - No lifting >10 lbs, twisting, straining, or strenuous activity for at least 2 weeks  - Continue APAP, oxycodone, methocarbamol PRN for pain  - Patient should have scheduled outpatient neurosurgery follow-up in 2 weeks (may be via Tele-Health); NSG to coordinate     CV  # Coronary artery disease  # Hyperlipidemia  Followed by Dr. Zeng at [facility] ([place]). Diagnosed with mild, non-obstructive CAD in 2018. Calcium score at that time was 5.5. Baby ASA and low-dose statin therapy recommended. No previous cardiac caths or stents.  - Hold PTA statin in light of starting chemotherapy     GI  # Hypocalcemia  Mild. Ca 7.2, corrects to 8.2 for albumin.  - Continue PO calcium citrate      # Constipation - resolving  Noted prior to presentation (~3/9). Patient reports decreased frequency of bowel movements. Has been responsive to laxative use.   - Last BM today  - No abdominal pain, no N/V. Still passing gas. Abdominal exam benign; less likely obstruction. Query possible neurogenic component given evidence of cord compression. Taking opioids post-operatively, which may be contributing as well.  - Continue scheduled senna and Miralax  - Mag Citrate today, 3/18  - Consider imaging if patient develops abdominal pain, N/V, or other red flag symptoms     FEN:  -No IVF for now; encourage PO intake.   -Lyte replacement per protocol  -Regular diet as tolerated.    Prophy/Misc:  - GI: Continue bowel regimen as above; continue PPI while on steroids.  - DVT: Mechanical only for now; Will start pharmacologic prophylaxis on 3/21; LP planned for tomorrow. Hold Lovenox if platelets <50K or if LP scheduled for the day  - Activity: as tolerated and per neurosurg recommendations   - PT consulted for increased weakness and risk of deconditioning with prolonged stay  - Diet: Regular as tolerated     Disposition: Anticipate  "discharge to home after cycle 1 of CODOX-M, likely 2 weeks from D1 of chemotherapy     Code  Full Code     Patient is seen and examined by Dr. Wahl and JOSHUA.  Assessment and plan are discussed and delivered to the patient.    Mckayla Alvarez PA-C  Hematology/Oncology  Pager: [phone]    Interval History   No acute events overnight.  Kobe states he is feeling \"pretty well\" today. He states overall he is glad there is a plan in place for treatment of this disease. He states the \"only\" side effect he has noticed is increased weakness. Discussed importance of staying as active as possible. Patient continues to be aware of bowel movements, states he has not yet had a bowel movement this morning. Patient requested PRN magnesium citrate to help control bowels. Reports that he has been feeling well otherwise and has not noticed any new side effects from chemotherapy initiation.  He has not noticed any pain from bone marrow biopsy site yesterday.  He denies fever, chills, night sweats, appetite loss, sore throat, headache, change in vision, oral sores, nausea, vomiting, constipation, diarrhea, abdominal pain, blood in stools, hematuria, dysuria, difficulty urinating, balance issues, changes to sensations, increased focal or non focal weakness, swelling or difficulty ambulating.  Discussed importance of nutrition, fluid intake, activity and keeping up with a good bowel regimen while on chemotherapy.   Discussed further plans of treatment, including LP with IT chemo.   Answered all questions at baseline.     Physical Exam   Temp: 97.2  F (36.2  C) Temp src: Oral BP: 120/75   Heart Rate: 74 Resp: 18 SpO2: 94 % O2 Device: None (Room air)    Vitals:    03/13/20 0232 03/18/20 0753   Weight: 98.1 kg (216 lb 4.3 oz) 94 kg (207 lb 4.8 oz)     Vital Signs with Ranges  Temp:  [95.9  F (35.5  C)-97.8  F (36.6  C)] 97.2  F (36.2  C)  Heart Rate:  [66-84] 74  Resp:  [18] 18  BP: (106-123)/(60-76) 120/75  SpO2:  [92 %-97 %] 94 %  I/O last 3 " completed shifts:  In: 1280 [P.O.:400; I.V.:50; IV Piggyback:830]  Out: 1050 [Urine:1050]    Constitutional: Pleasant man lying in bed. Awake and conversational. Non- toxic appearing. No acute distress. Well developed, hydrated, and nourished.   HEENT: Normocephalic, atraumatic without tenderness or palpable masses/depressions. Sclerae anicteric. PERRLA. EOM intact. Moist mucus membranes without lesions, thrush, or exudates appreciated  Lymph: Neck supple.   Respiratory: Breathing comfortable with no increased work on room air. Good air exchange. No signs of respiratory distress or accessory muscle use. Lungs clear to auscultation bilaterally, no crackles or wheezing noted.  Cardiovascular: Regular rate and rhythm. Normal S1 and S2. No murmurs, rubs, or gallops. No peripheral edema.    GI: Abdomen is soft, non-distended, non-tender and without distension. Bowel sounds present.   Skin: Skin is clean, dry, intact. No jaundice appreciated.   Musculoskeletal/ Extremities: No redness, warmth, or swelling of the joints appreciated. Distal pulses palpable. Nailbeds pink and without cyanosis or clubbing.   Neurologic: Alert and oriented. Speech normal. Grossly nonfocal. Memory and thought process preserved. Motor function normal with 5/5 muscle strength bilaterally to UE and LE. Sensation intact bilaterally. CN II-XII intact. Has some difficulty moving in bed on own.  Neuropsychiatric: Calm, affect congruent to situation. Appropriate mood and affect. Good judgment and insight. No visual/auditory hallucinations.         Medications     - MEDICATION INSTRUCTIONS -       sodium chloride         [START ON 3/20/2020] acetaminophen  650 mg Oral Once     acetaminophen  650 mg Oral Q4H     allopurinol  300 mg Oral Daily     calcium citrate  950 mg Oral Daily     cyclophosphamide (CYTOXAN) injection  800 mg/m2 (Treatment Plan Recorded) Intravenous Q22H     [START ON 3/23/2020] INTRATHECAL - Cytarabine and/or methotrexate and/or  Hydrocortisone   Intrathecal Once     INTRATHECAL - Cytarabine and/or methotrexate and/or Hydrocortisone   Intrathecal Once     [Held by provider] dexamethasone  12 mg Oral Q22H     dexamethasone  4 mg Oral Q6H GUILLAUME     [START ON 3/20/2020] dexamethasone  8 mg Oral Daily     [START ON 3/20/2020] diphenhydrAMINE  50 mg Oral Once     [START ON 3/21/2020] filgrastim (NEUPOGEN/GRANIX) subcutaneous  5 mcg/kg (Treatment Plan Recorded) Subcutaneous Daily     heparin lock flush  5-10 mL Intracatheter Q24H     levothyroxine  200 mcg Oral QAM AC     ondansetron  16 mg Oral Q22H     pantoprazole  40 mg Oral QAM AC     polyethylene glycol  17 g Oral Daily     [START ON 3/20/2020] riTUXimab (RITUXAN) infusion  375 mg/m2 (Treatment Plan Recorded) Intravenous Once     senna-docusate  1 tablet Oral BID    Or     senna-docusate  2 tablet Oral BID     sodium chloride (PF)  10 mL Intracatheter Q8H       Data   Results for orders placed or performed during the hospital encounter of 03/13/20 (from the past 24 hour(s))   Uric acid   Result Value Ref Range    Uric Acid 3.2 (L) 3.5 - 7.2 mg/dL   Phosphorus   Result Value Ref Range    Phosphorus 4.0 2.5 - 4.5 mg/dL   Lactate Dehydrogenase   Result Value Ref Range    Lactate Dehydrogenase 246 (H) 85 - 227 U/L   Creatinine   Result Value Ref Range    Creatinine 0.66 0.66 - 1.25 mg/dL    GFR Estimate >90 >60 mL/min/[1.73_m2]    GFR Estimate If Black >90 >60 mL/min/[1.73_m2]   Lactate Dehydrogenase   Result Value Ref Range    Lactate Dehydrogenase 216 85 - 227 U/L   Phosphorus   Result Value Ref Range    Phosphorus 2.7 2.5 - 4.5 mg/dL   Uric acid   Result Value Ref Range    Uric Acid 2.8 (L) 3.5 - 7.2 mg/dL   CBC with platelets differential   Result Value Ref Range    WBC 15.5 (H) 4.0 - 11.0 10e9/L    RBC Count 3.62 (L) 4.4 - 5.9 10e12/L    Hemoglobin 10.7 (L) 13.3 - 17.7 g/dL    Hematocrit 33.1 (L) 40.0 - 53.0 %    MCV 91 78 - 100 fl    MCH 29.6 26.5 - 33.0 pg    MCHC 32.3 31.5 - 36.5 g/dL     RDW 13.3 10.0 - 15.0 %    Platelet Count 299 150 - 450 10e9/L    Diff Method Automated Method     % Neutrophils 86.5 %    % Lymphocytes 4.6 %    % Monocytes 6.1 %    % Eosinophils 0.0 %    % Basophils 0.2 %    % Immature Granulocytes 2.6 %    Nucleated RBCs 0 0 /100    Absolute Neutrophil 13.4 (H) 1.6 - 8.3 10e9/L    Absolute Lymphocytes 0.7 (L) 0.8 - 5.3 10e9/L    Absolute Monocytes 1.0 0.0 - 1.3 10e9/L    Absolute Eosinophils 0.0 0.0 - 0.7 10e9/L    Absolute Basophils 0.0 0.0 - 0.2 10e9/L    Abs Immature Granulocytes 0.4 0 - 0.4 10e9/L    Absolute Nucleated RBC 0.0    INR   Result Value Ref Range    INR 1.12 0.86 - 1.14   Fibrinogen activity   Result Value Ref Range    Fibrinogen 502 (H) 200 - 420 mg/dL   Lactate Dehydrogenase   Result Value Ref Range    Lactate Dehydrogenase 247 (H) 85 - 227 U/L   Partial thromboplastin time   Result Value Ref Range    PTT 31 22 - 37 sec   Comprehensive metabolic panel   Result Value Ref Range    Sodium 138 133 - 144 mmol/L    Potassium 4.2 3.4 - 5.3 mmol/L    Chloride 107 94 - 109 mmol/L    Carbon Dioxide 24 20 - 32 mmol/L    Anion Gap 7 3 - 14 mmol/L    Glucose 131 (H) 70 - 99 mg/dL    Urea Nitrogen 25 7 - 30 mg/dL    Creatinine 0.64 (L) 0.66 - 1.25 mg/dL    GFR Estimate >90 >60 mL/min/[1.73_m2]    GFR Estimate If Black >90 >60 mL/min/[1.73_m2]    Calcium 7.1 (L) 8.5 - 10.1 mg/dL    Bilirubin Total 0.3 0.2 - 1.3 mg/dL    Albumin 2.6 (L) 3.4 - 5.0 g/dL    Protein Total 5.5 (L) 6.8 - 8.8 g/dL    Alkaline Phosphatase 40 40 - 150 U/L    ALT 19 0 - 70 U/L    AST 17 0 - 45 U/L   Magnesium   Result Value Ref Range    Magnesium 2.3 1.6 - 2.3 mg/dL   Phosphorus   Result Value Ref Range    Phosphorus 3.9 2.5 - 4.5 mg/dL   Uric acid   Result Value Ref Range    Uric Acid 3.5 3.5 - 7.2 mg/dL

## 2020-03-19 NOTE — PLAN OF CARE
AVSS, afebrile. C/o low back pain, scheduled tylenol given. Bmbx site dressing has dried blood, no change. Voiding well. Denies nausea. Neuro's unchanged. Cont POC.

## 2020-03-19 NOTE — PLAN OF CARE
AVSS on RA, afebrile. Reports back pain is controlled this evening. Denies n/v, SOB. Began chemotherapy regimen this evening, see chemo note. Overall tolerated chemo well. Educated pt on possible side effects and what to expect the next weeks. Asking appropriate questions. Neuro's remain intact. Bmbx site on lower back with dried blood. Incision site on upper back C/D/I. TLS labs WNL. Ambulating independently in room. Continue to monitor.

## 2020-03-20 ENCOUNTER — APPOINTMENT (OUTPATIENT)
Dept: GENERAL RADIOLOGY | Facility: CLINIC | Age: 60
DRG: 820 | End: 2020-03-20
Attending: PHYSICIAN ASSISTANT
Payer: COMMERCIAL

## 2020-03-20 ENCOUNTER — APPOINTMENT (OUTPATIENT)
Dept: PHYSICAL THERAPY | Facility: CLINIC | Age: 60
DRG: 820 | End: 2020-03-20
Attending: NEUROLOGICAL SURGERY
Payer: COMMERCIAL

## 2020-03-20 LAB
ALBUMIN SERPL-MCNC: 2.6 G/DL (ref 3.4–5)
ALP SERPL-CCNC: 40 U/L (ref 40–150)
ALT SERPL W P-5'-P-CCNC: 20 U/L (ref 0–70)
ANION GAP SERPL CALCULATED.3IONS-SCNC: 5 MMOL/L (ref 3–14)
AST SERPL W P-5'-P-CCNC: 12 U/L (ref 0–45)
BASOPHILS # BLD AUTO: 0 10E9/L (ref 0–0.2)
BASOPHILS NFR BLD AUTO: 0.1 %
BILIRUB SERPL-MCNC: 0.4 MG/DL (ref 0.2–1.3)
BUN SERPL-MCNC: 24 MG/DL (ref 7–30)
CALCIUM SERPL-MCNC: 7.2 MG/DL (ref 8.5–10.1)
CHLORIDE SERPL-SCNC: 108 MMOL/L (ref 94–109)
CO2 SERPL-SCNC: 27 MMOL/L (ref 20–32)
COPATH REPORT: NORMAL
CREAT SERPL-MCNC: 0.6 MG/DL (ref 0.66–1.25)
CREAT SERPL-MCNC: 0.64 MG/DL (ref 0.66–1.25)
DIFFERENTIAL METHOD BLD: ABNORMAL
EOSINOPHIL # BLD AUTO: 0 10E9/L (ref 0–0.7)
EOSINOPHIL NFR BLD AUTO: 0 %
ERYTHROCYTE [DISTWIDTH] IN BLOOD BY AUTOMATED COUNT: 13.4 % (ref 10–15)
FIBRINOGEN PPP-MCNC: 456 MG/DL (ref 200–420)
GFR SERPL CREATININE-BSD FRML MDRD: >90 ML/MIN/{1.73_M2}
GFR SERPL CREATININE-BSD FRML MDRD: >90 ML/MIN/{1.73_M2}
GLUCOSE CSF-MCNC: 76 MG/DL (ref 40–70)
GLUCOSE SERPL-MCNC: 146 MG/DL (ref 70–99)
GRAM STN SPEC: NORMAL
HCT VFR BLD AUTO: 33.2 % (ref 40–53)
HGB BLD-MCNC: 11 G/DL (ref 13.3–17.7)
HSV1 IGG SERPL QL IA: <0.2 AI (ref 0–0.8)
HSV2 IGG SERPL QL IA: <0.2 AI (ref 0–0.8)
IMM GRANULOCYTES # BLD: 0.1 10E9/L (ref 0–0.4)
IMM GRANULOCYTES NFR BLD: 1.1 %
INR PPP: 1.12 (ref 0.86–1.14)
LACTATE BLD-SCNC: 1.7 MMOL/L (ref 0.7–2)
LDH SERPL L TO P-CCNC: 283 U/L (ref 85–227)
LDH SERPL L TO P-CCNC: 288 U/L (ref 85–227)
LYMPHOCYTES # BLD AUTO: 0.4 10E9/L (ref 0.8–5.3)
LYMPHOCYTES NFR BLD AUTO: 2.9 %
MAGNESIUM SERPL-MCNC: 2.2 MG/DL (ref 1.6–2.3)
MCH RBC QN AUTO: 29.6 PG (ref 26.5–33)
MCHC RBC AUTO-ENTMCNC: 33.1 G/DL (ref 31.5–36.5)
MCV RBC AUTO: 90 FL (ref 78–100)
MONOCYTES # BLD AUTO: 1 10E9/L (ref 0–1.3)
MONOCYTES NFR BLD AUTO: 7.5 %
NEUTROPHILS # BLD AUTO: 11.5 10E9/L (ref 1.6–8.3)
NEUTROPHILS NFR BLD AUTO: 88.4 %
NRBC # BLD AUTO: 0 10*3/UL
NRBC BLD AUTO-RTO: 0 /100
PHOSPHATE SERPL-MCNC: 3.5 MG/DL (ref 2.5–4.5)
PHOSPHATE SERPL-MCNC: 3.5 MG/DL (ref 2.5–4.5)
PLATELET # BLD AUTO: 305 10E9/L (ref 150–450)
POTASSIUM SERPL-SCNC: 4.2 MMOL/L (ref 3.4–5.3)
PROT CSF-MCNC: 89 MG/DL (ref 15–60)
PROT SERPL-MCNC: 5.7 G/DL (ref 6.8–8.8)
RBC # BLD AUTO: 3.71 10E12/L (ref 4.4–5.9)
SODIUM SERPL-SCNC: 139 MMOL/L (ref 133–144)
SPECIMEN SOURCE: NORMAL
URATE SERPL-MCNC: 2.7 MG/DL (ref 3.5–7.2)
URATE SERPL-MCNC: 3.6 MG/DL (ref 3.5–7.2)
WBC # BLD AUTO: 13 10E9/L (ref 4–11)

## 2020-03-20 PROCEDURE — 36592 COLLECT BLOOD FROM PICC: CPT | Performed by: INTERNAL MEDICINE

## 2020-03-20 PROCEDURE — 86696 HERPES SIMPLEX TYPE 2 TEST: CPT | Performed by: NURSE PRACTITIONER

## 2020-03-20 PROCEDURE — 84100 ASSAY OF PHOSPHORUS: CPT | Performed by: PHYSICIAN ASSISTANT

## 2020-03-20 PROCEDURE — 84157 ASSAY OF PROTEIN OTHER: CPT | Performed by: PHYSICIAN ASSISTANT

## 2020-03-20 PROCEDURE — 25000132 ZZH RX MED GY IP 250 OP 250 PS 637: Performed by: NURSE PRACTITIONER

## 2020-03-20 PROCEDURE — 84550 ASSAY OF BLOOD/URIC ACID: CPT | Performed by: NURSE PRACTITIONER

## 2020-03-20 PROCEDURE — 12000001 ZZH R&B MED SURG/OB UMMC

## 2020-03-20 PROCEDURE — 25000131 ZZH RX MED GY IP 250 OP 636 PS 637: Performed by: PHYSICIAN ASSISTANT

## 2020-03-20 PROCEDURE — 36592 COLLECT BLOOD FROM PICC: CPT | Performed by: PHYSICIAN ASSISTANT

## 2020-03-20 PROCEDURE — 87070 CULTURE OTHR SPECIMN AEROBIC: CPT | Performed by: PHYSICIAN ASSISTANT

## 2020-03-20 PROCEDURE — 88184 FLOWCYTOMETRY/ TC 1 MARKER: CPT | Performed by: PHYSICIAN ASSISTANT

## 2020-03-20 PROCEDURE — 87205 SMEAR GRAM STAIN: CPT | Performed by: PHYSICIAN ASSISTANT

## 2020-03-20 PROCEDURE — 88185 FLOWCYTOMETRY/TC ADD-ON: CPT | Performed by: PHYSICIAN ASSISTANT

## 2020-03-20 PROCEDURE — 25000128 H RX IP 250 OP 636: Performed by: PHYSICIAN ASSISTANT

## 2020-03-20 PROCEDURE — 83605 ASSAY OF LACTIC ACID: CPT | Performed by: INTERNAL MEDICINE

## 2020-03-20 PROCEDURE — 85025 COMPLETE CBC W/AUTO DIFF WBC: CPT | Performed by: NURSE PRACTITIONER

## 2020-03-20 PROCEDURE — 25000132 ZZH RX MED GY IP 250 OP 250 PS 637: Performed by: INTERNAL MEDICINE

## 2020-03-20 PROCEDURE — 77003 FLUOROGUIDE FOR SPINE INJECT: CPT

## 2020-03-20 PROCEDURE — 97110 THERAPEUTIC EXERCISES: CPT | Mod: GP

## 2020-03-20 PROCEDURE — 97161 PT EVAL LOW COMPLEX 20 MIN: CPT | Mod: GP

## 2020-03-20 PROCEDURE — 83615 LACTATE (LD) (LDH) ENZYME: CPT | Performed by: PHYSICIAN ASSISTANT

## 2020-03-20 PROCEDURE — 99233 SBSQ HOSP IP/OBS HIGH 50: CPT | Performed by: INTERNAL MEDICINE

## 2020-03-20 PROCEDURE — 86695 HERPES SIMPLEX TYPE 1 TEST: CPT | Performed by: NURSE PRACTITIONER

## 2020-03-20 PROCEDURE — 87799 DETECT AGENT NOS DNA QUANT: CPT | Performed by: NURSE PRACTITIONER

## 2020-03-20 PROCEDURE — 80053 COMPREHEN METABOLIC PANEL: CPT | Performed by: NURSE PRACTITIONER

## 2020-03-20 PROCEDURE — 83735 ASSAY OF MAGNESIUM: CPT | Performed by: NURSE PRACTITIONER

## 2020-03-20 PROCEDURE — 89050 BODY FLUID CELL COUNT: CPT | Performed by: PHYSICIAN ASSISTANT

## 2020-03-20 PROCEDURE — 25800030 ZZH RX IP 258 OP 636: Performed by: INTERNAL MEDICINE

## 2020-03-20 PROCEDURE — 25000128 H RX IP 250 OP 636: Performed by: INTERNAL MEDICINE

## 2020-03-20 PROCEDURE — 87015 SPECIMEN INFECT AGNT CONCNTJ: CPT | Performed by: PHYSICIAN ASSISTANT

## 2020-03-20 PROCEDURE — 36592 COLLECT BLOOD FROM PICC: CPT | Performed by: NURSE PRACTITIONER

## 2020-03-20 PROCEDURE — 25000132 ZZH RX MED GY IP 250 OP 250 PS 637: Performed by: PHYSICIAN ASSISTANT

## 2020-03-20 PROCEDURE — 3E0R30M INTRODUCTION OF MONOCLONAL ANTIBODY INTO SPINAL CANAL, PERCUTANEOUS APPROACH: ICD-10-PCS | Performed by: PHYSICIAN ASSISTANT

## 2020-03-20 PROCEDURE — 84550 ASSAY OF BLOOD/URIC ACID: CPT | Performed by: PHYSICIAN ASSISTANT

## 2020-03-20 PROCEDURE — 84100 ASSAY OF PHOSPHORUS: CPT | Performed by: NURSE PRACTITIONER

## 2020-03-20 PROCEDURE — 25000125 ZZHC RX 250: Performed by: RADIOLOGY

## 2020-03-20 PROCEDURE — 82945 GLUCOSE OTHER FLUID: CPT | Performed by: PHYSICIAN ASSISTANT

## 2020-03-20 PROCEDURE — 40001004 ZZHCL STATISTIC FLOW INT 9-15 ABY TC 88188: Performed by: PHYSICIAN ASSISTANT

## 2020-03-20 PROCEDURE — 85610 PROTHROMBIN TIME: CPT | Performed by: NURSE PRACTITIONER

## 2020-03-20 PROCEDURE — 82565 ASSAY OF CREATININE: CPT | Performed by: PHYSICIAN ASSISTANT

## 2020-03-20 PROCEDURE — 85384 FIBRINOGEN ACTIVITY: CPT | Performed by: NURSE PRACTITIONER

## 2020-03-20 PROCEDURE — 83615 LACTATE (LD) (LDH) ENZYME: CPT | Performed by: NURSE PRACTITIONER

## 2020-03-20 RX ORDER — DEXAMETHASONE 0.5 MG/1
0.5 TABLET ORAL DAILY
Status: DISCONTINUED | OUTPATIENT
Start: 2020-03-30 | End: 2020-03-27

## 2020-03-20 RX ORDER — DEXAMETHASONE 1.5 MG/1
3 TABLET ORAL DAILY
Status: COMPLETED | OUTPATIENT
Start: 2020-03-24 | End: 2020-03-25

## 2020-03-20 RX ORDER — ACETAMINOPHEN 325 MG/1
650 TABLET ORAL EVERY 4 HOURS PRN
Status: DISCONTINUED | OUTPATIENT
Start: 2020-03-20 | End: 2020-03-30 | Stop reason: HOSPADM

## 2020-03-20 RX ORDER — DEXAMETHASONE 4 MG/1
4 TABLET ORAL DAILY
Status: COMPLETED | OUTPATIENT
Start: 2020-03-22 | End: 2020-03-23

## 2020-03-20 RX ORDER — PENTAMIDINE ISETHIONATE 300 MG/300MG
300 INHALANT RESPIRATORY (INHALATION)
Status: DISCONTINUED | OUTPATIENT
Start: 2020-03-21 | End: 2020-03-30 | Stop reason: HOSPADM

## 2020-03-20 RX ORDER — DEXAMETHASONE 1 MG
1 TABLET ORAL DAILY
Status: DISCONTINUED | OUTPATIENT
Start: 2020-03-28 | End: 2020-03-27

## 2020-03-20 RX ORDER — DEXAMETHASONE 2 MG/1
2 TABLET ORAL DAILY
Status: COMPLETED | OUTPATIENT
Start: 2020-03-26 | End: 2020-03-27

## 2020-03-20 RX ORDER — LIDOCAINE HYDROCHLORIDE 10 MG/ML
5 INJECTION, SOLUTION EPIDURAL; INFILTRATION; INTRACAUDAL; PERINEURAL ONCE
Status: COMPLETED | OUTPATIENT
Start: 2020-03-20 | End: 2020-03-20

## 2020-03-20 RX ORDER — DEXAMETHASONE 4 MG/1
4 TABLET ORAL EVERY 12 HOURS SCHEDULED
Status: COMPLETED | OUTPATIENT
Start: 2020-03-21 | End: 2020-03-21

## 2020-03-20 RX ORDER — ALBUTEROL SULFATE 0.83 MG/ML
2.5 SOLUTION RESPIRATORY (INHALATION)
Status: DISCONTINUED | OUTPATIENT
Start: 2020-03-21 | End: 2020-03-30 | Stop reason: HOSPADM

## 2020-03-20 RX ADMIN — Medication 5 ML: at 17:19

## 2020-03-20 RX ADMIN — LEVOTHYROXINE SODIUM 200 MCG: 0.1 TABLET ORAL at 08:28

## 2020-03-20 RX ADMIN — Medication 5 ML: at 08:52

## 2020-03-20 RX ADMIN — DIPHENHYDRAMINE HYDROCHLORIDE 50 MG: 50 CAPSULE ORAL at 15:47

## 2020-03-20 RX ADMIN — ACETAMINOPHEN 650 MG: 325 TABLET, FILM COATED ORAL at 00:40

## 2020-03-20 RX ADMIN — LIDOCAINE HYDROCHLORIDE 5 ML: 10 INJECTION, SOLUTION EPIDURAL; INFILTRATION; INTRACAUDAL; PERINEURAL at 13:35

## 2020-03-20 RX ADMIN — DEXAMETHASONE 4 MG: 4 TABLET ORAL at 06:55

## 2020-03-20 RX ADMIN — SENNOSIDES AND DOCUSATE SODIUM 2 TABLET: 8.6; 5 TABLET ORAL at 08:28

## 2020-03-20 RX ADMIN — ACETAMINOPHEN 650 MG: 325 TABLET, FILM COATED ORAL at 12:25

## 2020-03-20 RX ADMIN — Medication 5 ML: at 05:18

## 2020-03-20 RX ADMIN — ALLOPURINOL 300 MG: 300 TABLET ORAL at 08:28

## 2020-03-20 RX ADMIN — SENNOSIDES AND DOCUSATE SODIUM 2 TABLET: 8.6; 5 TABLET ORAL at 19:54

## 2020-03-20 RX ADMIN — DEXAMETHASONE 4 MG: 4 TABLET ORAL at 12:25

## 2020-03-20 RX ADMIN — ACETAMINOPHEN 650 MG: 325 TABLET, FILM COATED ORAL at 15:47

## 2020-03-20 RX ADMIN — CALCIUM CITRATE 200 MG (950 MG) TABLET 950 MG: at 08:28

## 2020-03-20 RX ADMIN — ENOXAPARIN SODIUM 40 MG: 40 INJECTION SUBCUTANEOUS at 19:54

## 2020-03-20 RX ADMIN — RITUXIMAB 800 MG: 10 INJECTION, SOLUTION INTRAVENOUS at 16:34

## 2020-03-20 RX ADMIN — DEXAMETHASONE 4 MG: 4 TABLET ORAL at 00:40

## 2020-03-20 RX ADMIN — PANTOPRAZOLE SODIUM 40 MG: 40 TABLET, DELAYED RELEASE ORAL at 08:28

## 2020-03-20 RX ADMIN — ACETAMINOPHEN 650 MG: 325 TABLET, FILM COATED ORAL at 08:28

## 2020-03-20 RX ADMIN — METHOTREXATE: 25 INJECTION, SOLUTION INTRA-ARTERIAL; INTRAMUSCULAR; INTRATHECAL; INTRAVENOUS at 13:48

## 2020-03-20 RX ADMIN — Medication 5 ML: at 20:45

## 2020-03-20 RX ADMIN — ACETAMINOPHEN 650 MG: 325 TABLET, FILM COATED ORAL at 04:49

## 2020-03-20 ASSESSMENT — PAIN DESCRIPTION - DESCRIPTORS
DESCRIPTORS: CRAMPING
DESCRIPTORS: ACHING;SORE
DESCRIPTORS: ACHING

## 2020-03-20 ASSESSMENT — ACTIVITIES OF DAILY LIVING (ADL)
ADLS_ACUITY_SCORE: 14
ADLS_ACUITY_SCORE: 14
ADLS_ACUITY_SCORE: 13
ADLS_ACUITY_SCORE: 13
ADLS_ACUITY_SCORE: 14
ADLS_ACUITY_SCORE: 13

## 2020-03-20 NOTE — PLAN OF CARE
Alert and oriented X 4. AVSS. Denies SOB or nausea or abdominal pain.  Denies back pain. C/o right leg cramp while ambulating to bathroom. Cramp resolved when pt returned to bed. Sleeping in between cares.

## 2020-03-20 NOTE — PLAN OF CARE
0830 - pt activated Lactic acid protocol with level at 1.7.  VSS and Mckayla Alvarez PA aware of this.    Jonnathan is Cycle 1 Day 3 R-CODOX-M and tolerating it well.  Denies nausea and eating well.  UAL, to shower and and working with PT.  TLS WNL.  No c/o back pain.  Back incision and BMBX site CDI and open to air.  TO IR for LP with IT Chemo - education done.  Plan to get Rituxan this evening.  Decadron taper ordered and Tylenol changed to prn.

## 2020-03-20 NOTE — PROGRESS NOTES
"   03/20/20 1200   Quick Adds   Type of Visit Initial PT Evaluation   Living Environment   Lives With spouse   Living Arrangements house   Home Accessibility stairs to enter home   Number of Stairs, Main Entrance 1   Transportation Anticipated car, drives self   Self-Care   Usual Activity Tolerance excellent   Current Activity Tolerance good   Equipment Currently Used at Home none   Functional Level Prior   Ambulation 0-->independent   Transferring 0-->independent   Toileting 0-->independent   Bathing 0-->independent   Communication 0-->understands/communicates without difficulty   Swallowing 0-->swallows foods/liquids without difficulty   Cognition 0 - no cognition issues reported   Fall history within last six months no   Which of the above functional risks had a recent onset or change? ambulation;transferring   Prior Functional Level Comment IND   General Information   Onset of Illness/Injury or Date of Surgery - Date 03/13/20   Referring Physician Jeffrey Morrison MD    Patient/Family Goals Statement To return home   Pertinent History of Current Problem (include personal factors and/or comorbidities that impact the POC) Per H&P \"Mr. Pedroza is a 61 yo M with PMH hypothyroidism, HLD, no known oncologic history presenting with acute on chronic mid-thoracic back pain, found to have new thoracic extradural spinal tumor around T5-6 with severe cord compression, without cord signal change, transferred [facility] to [facility] for further cares. Neurologically intact. S/p OR 3/15 for T5-6 laminectomy, with gross total resection of epidural tumor, frozen pathology with lymphoma\"   Precautions/Limitations spinal precautions   General Info Comments Activity: up with assist   Cognitive Status Examination   Orientation orientation to person, place and time   Level of Consciousness alert   Follows Commands and Answers Questions 100% of the time   Personal Safety and Judgment intact   Memory intact   Pain " "Assessment   Patient Currently in Pain Yes, see Vital Sign flowsheet  (5/10, improved with gait)   Posture    Posture Forward head position   Range of Motion (ROM)   ROM Comment LE ROM grossly WFL   Strength   Strength Comments LE strength grossly WFL, 5/5   Bed Mobility   Bed Mobility Comments patient unable to perform supine<>sit within precautions before verbal instruction   Transfer Skills   Transfer Comments IND sit<>stand   Gait   Gait Comments slow gait speed, supinated foot position bilaterally, good dynamic balance   General Therapy Interventions   Planned Therapy Interventions strengthening;progressive activity/exercise;home program guidelines;risk factor education   Clinical Impression   Criteria for Skilled Therapeutic Intervention yes, treatment indicated   PT Diagnosis impaired functional mobility   Influenced by the following impairments At risk for deconditioning due to chemo   Clinical Presentation Stable/Uncomplicated   Clinical Presentation Rationale clinical judgement   Clinical Decision Making (Complexity) Low complexity   Therapy Frequency 3x/week   Predicted Duration of Therapy Intervention (days/wks) 2 weeks   Anticipated Discharge Disposition Home   Risk & Benefits of therapy have been explained Yes   Patient, Family & other staff in agreement with plan of care Yes   [facility] [employer] \"6 Clicks\"   2016, Trustees of [facility], under license to [employer].  All rights reserved.   6 Clicks Short Forms Basic Mobility Inpatient Short Form   [facility] [employer]  \"6 Clicks\" V.2 Basic Mobility Inpatient Short Form   1. Turning from your back to your side while in a flat bed without using bedrails? 4 - None   2. Moving from lying on your back to sitting on the side of a flat bed without using bedrails? 4 - None   3. Moving to and from a bed to a chair (including a wheelchair)? 4 - None   4. Standing up from a chair using your arms (e.g., wheelchair, or bedside chair)? 4 - " None   5. To walk in hospital room? 4 - None   6. Climbing 3-5 steps with a railing? 4 - None   Basic Mobility Raw Score (Score out of 24.Lower scores equate to lower levels of function) 24   Total Evaluation Time   Total Evaluation Time (Minutes) 8

## 2020-03-20 NOTE — PROGRESS NOTES
[facility]    Hematology / Oncology Progress Note    Date of Service (when I saw the patient): 03/20/2020     Assessment & Plan   Kobe Pedroza is a 60 year old male with a history of thyroid cancer status post thyroidectomy (2011), CAD, and hyperlipidemia who was transferred from [facility] to [facility] on 3/13 with a newly thoracic extradural mass at T5-6 with severe cord compression, now status post neurosurgical gross resection on 3/15 with initial pathology concerning for high-grade B-cell lymphoma. He was transferred to the Malignant Hematology team on 3/17 for further work-up and management of his newly diagnosed lymphoma.   He is a newly diagnosed with Burkitt Lymphoma. He started R-CODOX-M/IVAC chemotherapy regimen (D1=3/18/2020). Today is Day 3.    Today:  - Today is D3  - LP and IT chemo scheduled for today (3/20) @ 1300 per chemo protocol  - LP and IT chemo scheduled for Monday (3/23) @ 1300 per chemo protocol  - Start dexamethasone taper today for 12 days  - Pentamidine ordered for Saturday (3/21)  - Monitor TLS BID  - Daily Coags  - Start Lovenox on Saturday    - Hold for days with LP (hold tomorrow and Monday)  - Ordered Mag Citrate daily PRN per patient preference    HEME/ONC  # Burkitt's lymphoma  # Thoracic spine mass  # Spinal cord compression  Patient presented on 3/12 to [facility] with acute-on-chronic mid-thoracic back pain with some associated radicular symptoms. Per patient, no B-symptoms, though he did recently intentionally lose 35 lbs. 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 Patient had no appreciable neurological deficits. He was started on dexamethasone and underwent T5-6 laminectomy with gross total resection of epidural tumor on 3/15. Flow cytometry of the specimen was notable for CD10 positive and kappa monotypic B cells (83%). Confirmed Burkitt lymphoma with  surgical pathology. Cytogenetics pending.    - PET scan completed 3/18  - Bone marrow biopsy completed 3/18. Tolerated well with Versed 1mg IV  - ECHO (3/16) with LVEF of 60-65%, no diastolic dysfunction.  - Baseline EKG (3/13) without evidence of ischemia or dysrhythmia.  - PICC line placed.    - Continue high-dose dexamethasone 4 mg Q6H.    - Continue daily PPI while on steroids.  - Start dexamethasone taper today for 12 days   - Start on 3/21 Dexamethasone 4 g Q12h po x 1 day; on 3/22 Dexamethasone 4 g Q24h po x 2 days; on 3/24 Dexamethasone 3 g Q24h po x 2 days;  on 3/26 Dexamethasone 2 g Q24h po x 2 days;  on 3/28 Dexamethasone 1 g Q24h po x 2 days;  on 3/30 Dexamethasone 0.5 g Q24h po x 2 days    - TLS labs Q12h  - Continue allopurinol 300 mg daily    Treatment Plan: R-CODOX-M/IVAC (D1=3/18/2020). Today is D3  - Cyclophosphamide 1,705 mg IV for D1, D2  - Doxorubicin 110 mg IV D1  - Vincristine 2 mg IV D1, D10  - Rituximab 800 mg   - Methotrexate 3 g/m2 D10  - Leucovorin calcium injection 426 mg D11  - Neupogen injection 480 mcg daily for D1-D6  - LP and IT chemo scheduled for tomorrow (3/20) @ 1300 per chemo protocol   - cytarabine 50 mg, hydrocortisone sodium succinate 50 mg, methotrexate 12 mg   - LP and IT chemo scheduled for Monday (3/23) @ 1300 per chemo protocol    - cytarabine 50 mg, hydrocortisone sodium succinate 50 mg   - Supportive Medications:   - Allopurinol tablet 300 mg    - PRN Anti Emetics   - PRN Bowel Regimen   - PRN Pain control    # ID Prophylaxis  - Viral serologies: HepC nonreactive, HepB negative and HIV negative  - Not currently neutropenic, will consider ID ppx if patient becomes neutropenic  - Negative HSV 1/2  - Pentamidine ordered for this Saturday (3/21)    # Normocytic anemia  Hgb 11.3 (?15.0 on 3/15). Likely due to acute blood loss in the setting of recent surgical intervention. No evidence of significant hemolysis.   - Hgb remains steady from 3/16  - CBC daily  - Transfuse if  Hgb <7, Platelets <10K     # Thyroid cancer status-post thyroidectomy  Status post thyroidectomy in 2011. TSH normal on admission.  - Continue PTA levothyroxine     # Leukocytosis  Presumably due to recent dexamethasone use.  - CBC daily     MSK/NEURO  # Thoracic back pain  Due to T5-6 extradural tumor as detailed above. Patient now status post T5-6 laminectomy with gross total resction on 3/15. Today is POD #4.   - Dressing is clean, dry and intact  - Post-op activity recommendations per NSG:   - [place] Brace to be worn out of bed (orthotics consult in place)   - No lifting >10 lbs, twisting, straining, or strenuous activity for at least 2 weeks  - Continue APAP, oxycodone, methocarbamol PRN for pain  - Patient should have scheduled outpatient neurosurgery follow-up in 2 weeks (may be via Tele-Health); NSG to coordinate     CV  # Coronary artery disease  # Hyperlipidemia  Followed by Dr. Zeng at [facility] Diagnosed with mild, non-obstructive CAD in 2018. Calcium score at that time was 5.5. Baby ASA and low-dose statin therapy recommended. No previous cardiac caths or stents.  - Hold PTA statin in light of starting chemotherapy     GI  # Hypocalcemia  Mild. Ca 7.2, corrects to 8.2 for albumin.  - Continue PO calcium citrate      # Constipation - resolved  Noted prior to presentation (~3/9). Patient reports decreased frequency of bowel movements. Has been responsive to laxative use.   - Has been having regular BM with current bowel regimen  - Continue scheduled senna and Miralax  - Mag Citrate provided PRN  - Consider imaging if patient develops abdominal pain, N/V, or other red flag symptoms     FEN:  -No IVF for now; encourage PO intake.   -Lyte replacement per protocol  -Regular diet as tolerated.    Prophy/Misc:  - GI: Continue bowel regimen as above; continue PPI while on steroids.  - DVT: Mechanical only for now; Will start pharmacologic prophylaxis on 3/21; LP planned for  "tomorrow. Hold Lovenox if platelets <50K or if LP scheduled for the day  - Activity: as tolerated and per neurosurg recommendations   - PT consulted for increased weakness and risk of deconditioning with prolonged stay  - Diet: Regular as tolerated     Disposition: Anticipate discharge to home after cycle 1 of CODOX-M, likely 2 weeks from D1 of chemotherapy     Code  Full Code     Patient is seen and examined by Dr. Wahl and JOSHUA.  Assessment and plan are discussed and delivered to the patient.    Mckayla Alvarez PA-C  Hematology/Oncology  Pager: [phone]    Interval History   No acute events overnight.  Kobe states he is feeling \"pretty good\" today. His spirits seem high today. Was making jokes throughout discussion. States he does not feel he is getting progressively weaker. Further emphasized importance of staying as active as possible. Patient continues to be aware of bowel movements and was able to go yesterday. Reports that he has been feeling well otherwise and has not noticed any new side effects from chemotherapy initiation.  States his back pain has improved and is not having any pain from the incision site.   He denies fever, chills, night sweats, appetite loss, sore throat, headache, change in vision, oral sores, nausea, vomiting, constipation, diarrhea, abdominal pain, blood in stools, hematuria, dysuria, difficulty urinating, balance issues, changes to sensations, increased focal or non focal weakness, swelling or difficulty ambulating.    Answered all questions at baseline.     Physical Exam   Temp: 96.2  F (35.7  C) Temp src: Oral BP: 139/72 Pulse: 68 Heart Rate: 83 Resp: 24 SpO2: 98 % O2 Device: None (Room air)    Vitals:    03/13/20 0232 03/18/20 0753 03/19/20 0900   Weight: 98.1 kg (216 lb 4.3 oz) 94 kg (207 lb 4.8 oz) 94.7 kg (208 lb 11.2 oz)     Vital Signs with Ranges  Temp:  [96.2  F (35.7  C)-97.8  F (36.6  C)] 96.2  F (35.7  C)  Pulse:  [68] 68  Heart Rate:  [66-83] 83  Resp:  [18-24] 24  BP: " (119-139)/(61-76) 139/72  SpO2:  [94 %-98 %] 98 %  I/O last 3 completed shifts:  In: 1615 [P.O.:960; I.V.:20; IV Piggyback:635]  Out: 2125 [Urine:2125]    Constitutional: Pleasant man lying in bed. Awake and conversational. Non- toxic appearing. No acute distress. Well developed, hydrated, and nourished.   HEENT: Normocephalic, atraumatic without tenderness or palpable masses/depressions. Sclerae anicteric. EOM intact. Moist mucus membranes without lesions, thrush, or exudates appreciated  Lymph: Neck supple.   Respiratory: Breathing comfortable with no increased work on room air. Good air exchange. No signs of respiratory distress or accessory muscle use. Lungs clear to auscultation bilaterally, no crackles or wheezing noted.  Cardiovascular: Regular rate and rhythm. Normal S1 and S2. No murmurs, rubs, or gallops. No peripheral edema.    GI: Abdomen is soft, non-distended, non-tender and without distension. Bowel sounds present.   Skin: Skin is clean, dry, intact. No jaundice appreciated.   Musculoskeletal/ Extremities: No redness, warmth, or swelling of the joints appreciated. Distal pulses palpable. Nailbeds pink and without cyanosis or clubbing.   Neurologic: Alert and oriented. Speech normal. Grossly nonfocal. Memory and thought process preserved. Motor function grossly intact. Sensation grossly intact. Neuropsychiatric: Calm, affect congruent to situation. Appropriate mood and affect. Good judgment and insight. No visual/auditory hallucinations.         Medications     - MEDICATION INSTRUCTIONS -       sodium chloride         acetaminophen  650 mg Oral Once     acetaminophen  650 mg Oral Q4H     allopurinol  300 mg Oral Daily     calcium citrate  950 mg Oral Daily     [START ON 3/23/2020] INTRATHECAL - Cytarabine and/or methotrexate and/or Hydrocortisone   Intrathecal Once     INTRATHECAL - Cytarabine and/or methotrexate and/or Hydrocortisone   Intrathecal Once     [Held by provider] dexamethasone  12 mg Oral  Q22H     dexamethasone  4 mg Oral Q6H [facility]     [Held by provider] dexamethasone  8 mg Oral Daily     diphenhydrAMINE  50 mg Oral Once     [START ON 3/21/2020] filgrastim (NEUPOGEN/GRANIX) subcutaneous  5 mcg/kg (Treatment Plan Recorded) Subcutaneous Daily     heparin lock flush  5-10 mL Intracatheter Q24H     levothyroxine  200 mcg Oral QAM AC     lidocaine (PF)  5 mL Subcutaneous Once     pantoprazole  40 mg Oral QAM AC     polyethylene glycol  17 g Oral Daily     riTUXimab (RITUXAN) infusion  375 mg/m2 (Treatment Plan Recorded) Intravenous Once     senna-docusate  1 tablet Oral BID    Or     senna-docusate  2 tablet Oral BID     sodium chloride (PF)  10 mL Intracatheter Q8H       Data   Results for orders placed or performed during the hospital encounter of 03/13/20 (from the past 24 hour(s))   Phosphorus   Result Value Ref Range    Phosphorus 3.5 2.5 - 4.5 mg/dL   Uric acid   Result Value Ref Range    Uric Acid 2.9 (L) 3.5 - 7.2 mg/dL   Lactate Dehydrogenase   Result Value Ref Range    Lactate Dehydrogenase 323 (H) 85 - 227 U/L   Creatinine   Result Value Ref Range    Creatinine 0.69 0.66 - 1.25 mg/dL    GFR Estimate >90 >60 mL/min/[1.73_m2]    GFR Estimate If Black >90 >60 mL/min/[1.73_m2]   CBC with platelets differential   Result Value Ref Range    WBC 13.0 (H) 4.0 - 11.0 10e9/L    RBC Count 3.71 (L) 4.4 - 5.9 10e12/L    Hemoglobin 11.0 (L) 13.3 - 17.7 g/dL    Hematocrit 33.2 (L) 40.0 - 53.0 %    MCV 90 78 - 100 fl    MCH 29.6 26.5 - 33.0 pg    MCHC 33.1 31.5 - 36.5 g/dL    RDW 13.4 10.0 - 15.0 %    Platelet Count 305 150 - 450 10e9/L    Diff Method Automated Method     % Neutrophils 88.4 %    % Lymphocytes 2.9 %    % Monocytes 7.5 %    % Eosinophils 0.0 %    % Basophils 0.1 %    % Immature Granulocytes 1.1 %    Nucleated RBCs 0 0 /100    Absolute Neutrophil 11.5 (H) 1.6 - 8.3 10e9/L    Absolute Lymphocytes 0.4 (L) 0.8 - 5.3 10e9/L    Absolute Monocytes 1.0 0.0 - 1.3 10e9/L    Absolute Eosinophils 0.0 0.0 - 0.7  10e9/L    Absolute Basophils 0.0 0.0 - 0.2 10e9/L    Abs Immature Granulocytes 0.1 0 - 0.4 10e9/L    Absolute Nucleated RBC 0.0    Magnesium   Result Value Ref Range    Magnesium 2.2 1.6 - 2.3 mg/dL   INR   Result Value Ref Range    INR 1.12 0.86 - 1.14   Fibrinogen activity   Result Value Ref Range    Fibrinogen 456 (H) 200 - 420 mg/dL   Comprehensive metabolic panel   Result Value Ref Range    Sodium 139 133 - 144 mmol/L    Potassium 4.2 3.4 - 5.3 mmol/L    Chloride 108 94 - 109 mmol/L    Carbon Dioxide 27 20 - 32 mmol/L    Anion Gap 5 3 - 14 mmol/L    Glucose 146 (H) 70 - 99 mg/dL    Urea Nitrogen 24 7 - 30 mg/dL    Creatinine 0.64 (L) 0.66 - 1.25 mg/dL    GFR Estimate >90 >60 mL/min/[1.73_m2]    GFR Estimate If Black >90 >60 mL/min/[1.73_m2]    Calcium 7.2 (L) 8.5 - 10.1 mg/dL    Bilirubin Total 0.4 0.2 - 1.3 mg/dL    Albumin 2.6 (L) 3.4 - 5.0 g/dL    Protein Total 5.7 (L) 6.8 - 8.8 g/dL    Alkaline Phosphatase 40 40 - 150 U/L    ALT 20 0 - 70 U/L    AST 12 0 - 45 U/L   Phosphorus   Result Value Ref Range    Phosphorus 3.5 2.5 - 4.5 mg/dL   Uric acid   Result Value Ref Range    Uric Acid 3.6 3.5 - 7.2 mg/dL   Lactate Dehydrogenase   Result Value Ref Range    Lactate Dehydrogenase 288 (H) 85 - 227 U/L   Herpes Simplex Virus 1 and 2 IgG   Result Value Ref Range    Herpes Simplex Virus Type 1 IgG <0.2 0.0 - 0.8 AI    Herpes Simplex Virus Type 2 IgG <0.2 0.0 - 0.8 AI   Lactic acid level STAT   Result Value Ref Range    Lactate for Sepsis Protocol 1.7 0.7 - 2.0 mmol/L

## 2020-03-20 NOTE — PLAN OF CARE
VSS, afebrile. Received Dose #2 Cytoxan w/o issues. BMBx site dressing removed, slight bruise noted, band aid placed. Continues w/ scheduled tylenol, denies other interventions for lower back pain. Plan is to have LP tomorrow (3/20), scheduled around 1300; to receive Rituxan post LP. No acute events. Continue to monitor & w/ POC.

## 2020-03-20 NOTE — PROGRESS NOTES
Nursing Focus: Chemotherapy    D: Positive blood return via PICC. Insertion site is clean/dry/intact, dressing intact with no complaints of pain.  Urine output is recorded in intake in Doc Flowsheet.      I: Premedications given per order (see electronic medical administration record). Dose #1 of Rituxan started to infuse via ramp-up method. Reviewed pt teaching on chemotherapy side effects.  Pt denies need for further teaching. Chemotherapy double checked per protocol by two chemotherapy competent RN's. Educated on s/s of reaction.     A: Tolerating Rituxan well. Denies nausea and or pain.     P: Continue to monitor urine output and symptoms of nausea. Screen for symptoms of toxicity.

## 2020-03-20 NOTE — OP NOTE
Procedure Date: 03/15/2020      PREOPERATIVE DIAGNOSES:   1. 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   2. Back pain, thoracic back pain.      POSTOPERATIVE DIAGNOSES:   1. 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   2. Back pain, thoracic back pain.      PROCEDURES:   1. T5 to T6 laminoplasty for tumor resection.   2. Intraoperative neuromonitoring with SSEPs, EMG and MEPs.      SURGEON:  Heather Cespedes MD      ASSISTANT:   1. Gurjit Arnold MD   2. Aleyda Rand MD      INDICATION FOR PROCEDURE:  Mr. Pedroza is a 60-year-old male who was transferred to [facility] for definitive care when a mass was found in his thoracic spine.  Mr. Pedroza had about a year's worth of back pain in this area, which was then acutely exacerbated about a week ago when he was lifting his mother up from a sitting position.  His back pain was significant and persisted for about a week.  He was evaluated and I think some of the thought was that it could be his gallbladder, but further investigation with an MRI revealed a T5-6 extradural mass that was compressing his spine.  He did not have any neurologic deficits other than radicular pain in the T5-6 distribution on the left-hand side.  The risks and benefits of the procedure were explained to the patient and he wished to proceed.      DESCRIPTION OF OPERATION:  After consent was obtained, the patient was brought to the operating room.  General endotracheal anesthesia was induced.  The patient's neuromonitoring was established We then proned him onto a Balbir table with a Pablo frame.  Neuromonitoring was established and found to be monitorable at baseline with no deficits.  We then localized with a C-arm from the skull down to the T5-6 area and then secondarily from the sacrum up to the T5-6 area.  This was confirmed to be the operative levels of T5-6.  We injected 10 mL of local anesthetic.  He was prepped and draped in the normal sterile fashion and a proper  timeout was performed.      After proper timeout, the incision was made.  Dissection ensued down the bilateral spinous process and lamina with monopolar cautery.  We established the T6 and T5 levels.  We confirmed these levels with x-ray and this was a proper spine timeout.      With an AM-8 drill bit, we drilled gutters bilaterally and essentially removed the T5 and T6 lamina with a #2 Kerrison.  We then exposed the spine and the tumor.  This seemed to be very vascular in nature; it did not have a capsule.  There was a large portion of the tumor that was attached to the bone.  The tumor was sent for frozen pathology, which returned as lymphoma.  We continued to remove the rest of the tumor as we could see.  Then we cauterized all bleeding points.  We did not encounter any spinal fluid.  We essentially performed a gross total resection of all abnormal tumor tissue.  We removed the tumor from the bone and placed several dog bones for laminoplasty using the Synthes cranial fixation.  We obtained hemostasis with cautery and Surgiflo and reattached the T5-6 lamina with the cranial fixation set.      We placed a medium Hemovac drain, thoroughly irrigated the wound with antibiotic saline and proceeded with closure using 0 Vicryls for the muscle and fascial layers, 2-0 Vicryl for subcutaneous skin, 3-0 Monocryl and Exofin to finalize.  Sponge and needle counts were correct x2 at the end the procedure.  Neuromonitoring was periodically checked throughout the procedure.  All monitoring was found to be at baseline with no issues.      Dr. Cespedes was present for all critical portions of the procedure and immediately available for opening and closure.      Revised DOS 03/20/2020  collette LEWIS was present for the critical portions of the operation and immediately available for the remainder.  WING ABDI MD             D: 03/16/2020   T: 03/16/2020   MT: ABBY      Name:     JOHN CHOWDHURY   MRN:      [id number]         Account:        [id number]   :      1960           Procedure Date: 03/15/2020      Document: [id number]

## 2020-03-20 NOTE — PROCEDURES
Date of Procedure: 3/20/2020  DIAGNOSIS: Burkitt Lymphoma   PROCEDURE: Intrathecal chemo administration   LOCATION: Radiology  INDICATION: Burkitt Lymphoma with R-CODOX-M/IVAC chemotherapy protocol  Lumbar puncture performed and CSF samples collected by the General Radiology team under fluoroscopy.    Chemotherapy was double-checked by this writer and Radiology RN. This writer then administered cytarabine 50 mg, hydrocortisone sodium succinate 50 mg, methotrexate 12 mg in sodium chloride 0.9% 6 mL intrathecal inj without apparent complication.  Complications: None immediately.   Chemo instillation performed by: Mckayla Alvarez PA-C under supervision by Lupis Alvarez PA-C  [phone]

## 2020-03-20 NOTE — PROGRESS NOTES
CLINICAL NUTRITION SERVICES    Reviewed nutrition risk factors due to LOS. Pt is tolerating diet, eating well per nursing documentation. No nutrition issues identified at this time. RD will follow via rounds at this time, unless consulted.    Selena Diaz RD, LD

## 2020-03-20 NOTE — PLAN OF CARE
Discharge Planner PT   Patient plan for discharge: home  Current status:  All bed mobility and transfers IND.  Ambulates 200ft with IND in order to increase functional activity tolerance. NuStep performed x10 min.    Barriers to return to prior living situation: medical status   Recommendations for discharge: currently mobilizing IND  Rationale for recommendations: baseline mobility       Entered by: Hiro Myers 03/20/2020 1:03 PM

## 2020-03-21 LAB
ALBUMIN SERPL-MCNC: 2.4 G/DL (ref 3.4–5)
ALP SERPL-CCNC: 42 U/L (ref 40–150)
ALT SERPL W P-5'-P-CCNC: 18 U/L (ref 0–70)
ANION GAP SERPL CALCULATED.3IONS-SCNC: 5 MMOL/L (ref 3–14)
AST SERPL W P-5'-P-CCNC: 5 U/L (ref 0–45)
BASOPHILS # BLD AUTO: 0 10E9/L (ref 0–0.2)
BASOPHILS NFR BLD AUTO: 0.1 %
BILIRUB SERPL-MCNC: 0.5 MG/DL (ref 0.2–1.3)
BUN SERPL-MCNC: 26 MG/DL (ref 7–30)
CALCIUM SERPL-MCNC: 7.2 MG/DL (ref 8.5–10.1)
CHLORIDE SERPL-SCNC: 108 MMOL/L (ref 94–109)
CO2 SERPL-SCNC: 26 MMOL/L (ref 20–32)
CREAT SERPL-MCNC: 0.65 MG/DL (ref 0.66–1.25)
CREAT SERPL-MCNC: 0.66 MG/DL (ref 0.66–1.25)
DIFFERENTIAL METHOD BLD: ABNORMAL
EOSINOPHIL # BLD AUTO: 0 10E9/L (ref 0–0.7)
EOSINOPHIL NFR BLD AUTO: 0.1 %
ERYTHROCYTE [DISTWIDTH] IN BLOOD BY AUTOMATED COUNT: 13.5 % (ref 10–15)
FIBRINOGEN PPP-MCNC: 415 MG/DL (ref 200–420)
GFR SERPL CREATININE-BSD FRML MDRD: >90 ML/MIN/{1.73_M2}
GFR SERPL CREATININE-BSD FRML MDRD: >90 ML/MIN/{1.73_M2}
GLUCOSE SERPL-MCNC: 103 MG/DL (ref 70–99)
HCT VFR BLD AUTO: 33.3 % (ref 40–53)
HGB BLD-MCNC: 10.9 G/DL (ref 13.3–17.7)
IMM GRANULOCYTES # BLD: 0.1 10E9/L (ref 0–0.4)
IMM GRANULOCYTES NFR BLD: 0.6 %
INR PPP: 1.08 (ref 0.86–1.14)
LDH SERPL L TO P-CCNC: 216 U/L (ref 85–227)
LDH SERPL L TO P-CCNC: 224 U/L (ref 85–227)
LYMPHOCYTES # BLD AUTO: 0.3 10E9/L (ref 0.8–5.3)
LYMPHOCYTES NFR BLD AUTO: 3.5 %
MAGNESIUM SERPL-MCNC: 2.2 MG/DL (ref 1.6–2.3)
MCH RBC QN AUTO: 29.5 PG (ref 26.5–33)
MCHC RBC AUTO-ENTMCNC: 32.7 G/DL (ref 31.5–36.5)
MCV RBC AUTO: 90 FL (ref 78–100)
MONOCYTES # BLD AUTO: 0.5 10E9/L (ref 0–1.3)
MONOCYTES NFR BLD AUTO: 6.2 %
NEUTROPHILS # BLD AUTO: 7.2 10E9/L (ref 1.6–8.3)
NEUTROPHILS NFR BLD AUTO: 89.5 %
NRBC # BLD AUTO: 0 10*3/UL
NRBC BLD AUTO-RTO: 0 /100
PHOSPHATE SERPL-MCNC: 2.9 MG/DL (ref 2.5–4.5)
PHOSPHATE SERPL-MCNC: 3.1 MG/DL (ref 2.5–4.5)
PLATELET # BLD AUTO: 269 10E9/L (ref 150–450)
POTASSIUM SERPL-SCNC: 3.9 MMOL/L (ref 3.4–5.3)
PROT SERPL-MCNC: 5.4 G/DL (ref 6.8–8.8)
RBC # BLD AUTO: 3.7 10E12/L (ref 4.4–5.9)
SODIUM SERPL-SCNC: 140 MMOL/L (ref 133–144)
URATE SERPL-MCNC: 2.5 MG/DL (ref 3.5–7.2)
URATE SERPL-MCNC: 3.2 MG/DL (ref 3.5–7.2)
WBC # BLD AUTO: 8.1 10E9/L (ref 4–11)

## 2020-03-21 PROCEDURE — 36592 COLLECT BLOOD FROM PICC: CPT | Performed by: PHYSICIAN ASSISTANT

## 2020-03-21 PROCEDURE — 25000132 ZZH RX MED GY IP 250 OP 250 PS 637: Performed by: PHYSICIAN ASSISTANT

## 2020-03-21 PROCEDURE — 84550 ASSAY OF BLOOD/URIC ACID: CPT | Performed by: PHYSICIAN ASSISTANT

## 2020-03-21 PROCEDURE — 85025 COMPLETE CBC W/AUTO DIFF WBC: CPT | Performed by: PHYSICIAN ASSISTANT

## 2020-03-21 PROCEDURE — 25000132 ZZH RX MED GY IP 250 OP 250 PS 637: Performed by: NURSE PRACTITIONER

## 2020-03-21 PROCEDURE — 25000125 ZZHC RX 250: Performed by: PHYSICIAN ASSISTANT

## 2020-03-21 PROCEDURE — 84100 ASSAY OF PHOSPHORUS: CPT | Performed by: PHYSICIAN ASSISTANT

## 2020-03-21 PROCEDURE — 82565 ASSAY OF CREATININE: CPT | Performed by: PHYSICIAN ASSISTANT

## 2020-03-21 PROCEDURE — 94642 AEROSOL INHALATION TREATMENT: CPT

## 2020-03-21 PROCEDURE — 83615 LACTATE (LD) (LDH) ENZYME: CPT | Performed by: PHYSICIAN ASSISTANT

## 2020-03-21 PROCEDURE — 25000131 ZZH RX MED GY IP 250 OP 636 PS 637: Performed by: PHYSICIAN ASSISTANT

## 2020-03-21 PROCEDURE — 80053 COMPREHEN METABOLIC PANEL: CPT | Performed by: PHYSICIAN ASSISTANT

## 2020-03-21 PROCEDURE — 25000128 H RX IP 250 OP 636: Performed by: INTERNAL MEDICINE

## 2020-03-21 PROCEDURE — 25000125 ZZHC RX 250: Performed by: INTERNAL MEDICINE

## 2020-03-21 PROCEDURE — 12000001 ZZH R&B MED SURG/OB UMMC

## 2020-03-21 PROCEDURE — 94640 AIRWAY INHALATION TREATMENT: CPT

## 2020-03-21 PROCEDURE — 83735 ASSAY OF MAGNESIUM: CPT | Performed by: PHYSICIAN ASSISTANT

## 2020-03-21 PROCEDURE — 85610 PROTHROMBIN TIME: CPT | Performed by: PHYSICIAN ASSISTANT

## 2020-03-21 PROCEDURE — 85384 FIBRINOGEN ACTIVITY: CPT | Performed by: PHYSICIAN ASSISTANT

## 2020-03-21 PROCEDURE — 25000128 H RX IP 250 OP 636: Performed by: PHYSICIAN ASSISTANT

## 2020-03-21 RX ADMIN — ALLOPURINOL 300 MG: 300 TABLET ORAL at 08:41

## 2020-03-21 RX ADMIN — SENNOSIDES AND DOCUSATE SODIUM 2 TABLET: 8.6; 5 TABLET ORAL at 08:42

## 2020-03-21 RX ADMIN — DEXAMETHASONE 4 MG: 4 TABLET ORAL at 08:42

## 2020-03-21 RX ADMIN — LEVOTHYROXINE SODIUM 200 MCG: 0.1 TABLET ORAL at 08:42

## 2020-03-21 RX ADMIN — PENTAMIDINE ISETHIONATE 300 MG: 300 INHALANT RESPIRATORY (INHALATION) at 10:27

## 2020-03-21 RX ADMIN — ACETAMINOPHEN 650 MG: 325 TABLET, FILM COATED ORAL at 19:59

## 2020-03-21 RX ADMIN — ALBUTEROL SULFATE 2.5 MG: 2.5 SOLUTION RESPIRATORY (INHALATION) at 10:27

## 2020-03-21 RX ADMIN — FILGRASTIM 480 MCG: 480 INJECTION, SOLUTION INTRAVENOUS; SUBCUTANEOUS at 08:42

## 2020-03-21 RX ADMIN — SODIUM CHLORIDE, PRESERVATIVE FREE 10 ML: 5 INJECTION INTRAVENOUS at 15:56

## 2020-03-21 RX ADMIN — PANTOPRAZOLE SODIUM 40 MG: 40 TABLET, DELAYED RELEASE ORAL at 08:42

## 2020-03-21 RX ADMIN — SENNOSIDES AND DOCUSATE SODIUM 2 TABLET: 8.6; 5 TABLET ORAL at 19:59

## 2020-03-21 RX ADMIN — Medication 5 ML: at 17:00

## 2020-03-21 RX ADMIN — CALCIUM CITRATE 200 MG (950 MG) TABLET 950 MG: at 08:42

## 2020-03-21 RX ADMIN — ENOXAPARIN SODIUM 40 MG: 40 INJECTION SUBCUTANEOUS at 19:58

## 2020-03-21 ASSESSMENT — ACTIVITIES OF DAILY LIVING (ADL)
ADLS_ACUITY_SCORE: 14

## 2020-03-21 ASSESSMENT — PAIN DESCRIPTION - DESCRIPTORS
DESCRIPTORS: SORE
DESCRIPTORS: ACHING;SORE

## 2020-03-21 ASSESSMENT — MIFFLIN-ST. JEOR: SCORE: 1731.86

## 2020-03-21 NOTE — PLAN OF CARE
AVSS on RA, afebrile. Had some back pain and soreness from LP done today, tylenol helped relieve. Had first dose of Rituxan this evening, tolerated the ramp-up method well without any s/s of reaction. Good appetite. Slept most of the evening. Connecting with family via [employer]. Continue to monitor.

## 2020-03-21 NOTE — PLAN OF CARE
AVSS. Acknowledges mild back pain r/t LP yesterday but declines interventions. LP site C/D/I and spinal incision WDL. Voiding well in urinal. No BM this shift. Slept between cares; pt felt like his nose was stuffy overnight causing him to breathe through his mouth but declined offers for nasal spray at this time. Up independently. Continue with POC.

## 2020-03-21 NOTE — PLAN OF CARE
Jonnathan is doing well today.  Denies any pain.  Back incision, BMBX and LP sites all CDI and open to air.  Denies nausea and eating well.  C/O being tired - pt encouraged to get OOB and walk around.  First dose of Neupogen started, teaching done. Plan for another IT chemo on Monday.

## 2020-03-21 NOTE — PROGRESS NOTES
[facility], [place]    Hematology / Oncology Progress Note    Date of Service (when I saw the patient): 03/21/2020     Assessment & Plan   Kobe Pedroza is a 60 year old male with a history of thyroid cancer status post thyroidectomy (2011), CAD, and hyperlipidemia who was transferred from [facility] to [facility] on 3/13 with a newly thoracic extradural mass at T5-6 with severe cord compression, now status post neurosurgical gross resection on 3/15 with initial pathology concerning for high-grade B-cell lymphoma. He transferred to the Malignant Hematology team on 3/17 for further work-up and management of his newly diagnosed with Burkitt Lymphoma. He started R-CODOX-M/IVAC chemotherapy regimen (D1=3/18/2020). Today is Day 4.    Today:  - Today is D4  - LP and IT chemo scheduled for Monday (3/23) @ 1300 per chemo protocol  - Dexamethasone taper today for 12 days  - Pentamidine today (3/21)  - Monitor TLS BID  - Lovenox today, hold tomorrow for LP on Monday    HEME/ONC  # Burkitt's lymphoma  # Thoracic spine mass  # Spinal cord compression  Patient presented on 3/12 to [facility] with acute-on-chronic mid-thoracic back pain with some associated radicular symptoms. Per patient, no B-symptoms, though he did recently intentionally lose 35 lbs. 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 Patient had no appreciable neurological deficits. He was started on dexamethasone and underwent T5-6 laminectomy with gross total resection of epidural tumor on 3/15. Flow cytometry of the specimen was notable for CD10 positive and kappa monotypic B cells (83%). Confirmed Burkitt lymphoma with surgical pathology. Cytogenetics pending.  - PET scan completed 3/18  - Bone marrow biopsy completed 3/18. Tolerated well with Versed 1mg IV  - ECHO (3/16) with LVEF of 60-65%, no diastolic dysfunction.  - Baseline EKG (3/13) without evidence  of ischemia or dysrhythmia.  - PICC line placed.  - Continue high-dose dexamethasone 4 mg Q6H.    - Continue daily PPI while on steroids.  - Start dexamethasone taper today for 12 days   - Start on 3/21 Dexamethasone 4 g Q12h po x 1 day; on 3/22 Dexamethasone 4 g Q24h po x 2 days; on 3/24 Dexamethasone 3 g Q24h po x 2 days;  on 3/26 Dexamethasone 2 g Q24h po x 2 days;  on 3/28 Dexamethasone 1 g Q24h po x 2 days;  on 3/30 Dexamethasone 0.5 g Q24h po x 2 days  - TLS labs Q12h, DIC labs daily   - Continue allopurinol 300 mg daily    Treatment Plan: R-CODOX-M/IVAC (D1=3/18/2020). Today is D4  - Cyclophosphamide 1,705 mg IV for D1, D2  - Doxorubicin 110 mg IV D1  - Vincristine 2 mg IV D1, D10  - Rituximab 800 mg   - Methotrexate 3 g/m2 D10  - Leucovorin calcium injection 426 mg D11  - Neupogen injection 480 mcg daily for D1-D6  - LP and IT chemo scheduled for tomorrow (3/20) @ 1300 per chemo protocol   - cytarabine 50 mg, hydrocortisone sodium succinate 50 mg, methotrexate 12 mg   - LP and IT chemo scheduled for Monday (3/23) @ 1300 per chemo protocol    - cytarabine 50 mg, hydrocortisone sodium succinate 50 mg   - Supportive Medications:   - Allopurinol tablet 300 mg    - PRN Anti Emetics   - PRN Bowel Regimen   - PRN Pain control    # ID Prophylaxis  - Viral serologies: HepC nonreactive, HepB negative and HIV negative  - Not currently neutropenic, will consider ID ppx if patient becomes neutropenic  - Negative HSV 1/2  - Pentamidine today (3/21)    # Normocytic anemia  Hgb 10.9. Likely due to acute blood loss in the setting of recent surgical intervention. No evidence of significant hemolysis.   - CBC daily  - Transfuse if Hgb <7, Platelets <10K     # Thyroid cancer status-post thyroidectomy  Status post thyroidectomy in 2011. TSH normal on admission.  - Continue PTA levothyroxine     # Leukocytosis, resolved  Presumably due to recent dexamethasone use.  - CBC daily     MSK/NEURO  # Thoracic back pain  Due to T5-6  extradural tumor as detailed above. Patient now status post T5-6 laminectomy with gross total resction on 3/15. Today is POD #4.   - Post-op activity recommendations per NSG:   - Larry Brace to be worn out of bed (orthotics consult in place)   - No lifting >10 lbs, twisting, straining, or strenuous activity for at least 2 weeks  - Continue APAP, oxycodone, methocarbamol PRN for pain  - Patient should have scheduled outpatient neurosurgery follow-up in 2 weeks (may be via Tele-Health); NSG will coordinate      CV  # Coronary artery disease  # Hyperlipidemia  Followed by Dr. Zeng at [facility] [place] ([place] Diagnosed with mild, non-obstructive CAD in 2018. Calcium score at that time was 5.5. Baby ASA and low-dose statin therapy recommended. No previous cardiac caths or stents.  - Hold PTA statin in light of starting chemotherapy     GI  # Hypocalcemia  Mild. Ca 7.2 today, corrects to 8.2 for albumin.  - Continue PO calcium citrate      # Constipation - resolved  Noted prior to presentation. Patient reports decreased frequency of bowel movements. Has been responsive to laxative use.   - Has been having regular BM with current bowel regimen  - Continue scheduled senna and Miralax  - Mag Citrate provided PRN     FEN:  - No IVF for now; encourage PO intake.   - Lyte replacement per protocol  - Regular diet as tolerated.    Prophy/Misc:  - GI: Continue bowel regimen as above; continue PPI while on steroids.  - DVT: Prophylaxis with lovenox today 3/21; Hold Lovenox if platelets <50K. Also will hold 3/22 prior to LP.  - Activity: as tolerated and per neurosurg recommendations, PT consulted  - Diet: Regular as tolerated     Disposition: Anticipate discharge to home after cycle 1 of CODOX-M, likely 2 weeks from D1 of chemotherapy     Code  Full Code     Patient discussed with Dr. Wahl.     Viktoria Garcia MD  Heme-Onc [facility]  Pager [phone]    Interval History   No acute events overnight. He denies  any significant pain. His appetite is good today and he is eating Serbian toast this morning. He denies any nausea or vomiting. He plans to walk more today. Afebrile. No other concerns.     Physical Exam   Temp: 96.6  F (35.9  C) Temp src: Oral BP: 125/66   Heart Rate: 60 Resp: 16 SpO2: 97 % O2 Device: None (Room air)    Vitals:    03/13/20 0232 03/18/20 0753 03/19/20 0900   Weight: 98.1 kg (216 lb 4.3 oz) 94 kg (207 lb 4.8 oz) 94.7 kg (208 lb 11.2 oz)     Vital Signs with Ranges  Temp:  [96.2  F (35.7  C)-98  F (36.7  C)] 96.6  F (35.9  C)  Heart Rate:  [60-93] 60  Resp:  [16-24] 16  BP: (104-139)/(49-80) 125/66  SpO2:  [94 %-98 %] 97 %  I/O last 3 completed shifts:  In: 950 [P.O.:880; I.V.:70]  Out: 2200 [Urine:2200]    Constitutional: Alert, pleasant, sitting up in bed eating Serbian toast, NAD.  HEENT: AT/NC. Anicteric sclera. EOMI. No oral lesions. MMM.  Respiratory: Clear to auscultation bilaterally. No wheezing or crackles. Breathing comfortably on room air.   Cardiovascular: RRR. Normal S1/S2. No murmurs.  GI: Soft, non-tender, non-distended. Normoactive bowel sounds. No guarding or rebound.  Skin: Warm and well perfused. No rash or lesions.   Musculoskeletal/ Extremities: No peripheral edema.   Neurologic: Alert and oriented x3. CNII-XII grossly intact. Moving all extremities. No focal deficits.   Access: E PIC c/d/i.     Medications     - MEDICATION INSTRUCTIONS -       sodium chloride         albuterol  2.5 mg Nebulization Q28 Days    And     pentamidine  300 mg Inhalation Q28 Days     allopurinol  300 mg Oral Daily     calcium citrate  950 mg Oral Daily     [START ON 3/23/2020] INTRATHECAL - Cytarabine and/or methotrexate and/or Hydrocortisone   Intrathecal Once     [START ON 3/30/2020] dexamethasone  0.5 mg Oral Daily     [START ON 3/28/2020] dexamethasone  1 mg Oral Daily     [START ON 3/26/2020] dexamethasone  2 mg Oral Daily     [START ON 3/24/2020] dexamethasone  3 mg Oral Daily     dexamethasone   4 mg Oral Q12H GUILLAUME     [START ON 3/22/2020] dexamethasone  4 mg Oral Daily     [Held by provider] dexamethasone  8 mg Oral Daily     enoxaparin ANTICOAGULANT  40 mg Subcutaneous Q24H     filgrastim (NEUPOGEN/GRANIX) subcutaneous  5 mcg/kg (Treatment Plan Recorded) Subcutaneous Daily     heparin lock flush  5-10 mL Intracatheter Q24H     levothyroxine  200 mcg Oral QAM AC     pantoprazole  40 mg Oral QAM AC     polyethylene glycol  17 g Oral Daily     senna-docusate  1 tablet Oral BID    Or     senna-docusate  2 tablet Oral BID     sodium chloride (PF)  10 mL Intracatheter Q8H       Data   Results for orders placed or performed during the hospital encounter of 03/13/20 (from the past 24 hour(s))   Lactic acid level STAT   Result Value Ref Range    Lactate for Sepsis Protocol 1.7 0.7 - 2.0 mmol/L   Glucose CSF:   Result Value Ref Range    Glucose CSF 76 (H) 40 - 70 mg/dL   Protein total CSF:   Result Value Ref Range    Protein Total CSF 89 (H) 15 - 60 mg/dL   Gram stain    Specimen: Cerebral spinal fluid; Cerebrospinal fluid   Result Value Ref Range    Specimen Description Cerebrospinal fluid     Gram Stain No organisms seen     Gram Stain       Moderate  WBC'S seen  predominantly mononuclear cells      Gram Stain       Quantification of host cells and microbiological organisms was done on a cytocentrifuged   preparation.     Cell count with differential CSF:   Result Value Ref Range    WBC CSF 2 0 - 5 /uL    RBC CSF 11 (H) 0 - 2 /uL    Tube Number 4 #    Color CSF Colorless CLRL^Colorless    Appearance CSF Clear CLER^Clear   XR Lumbar Punc Intrathecal Chemo Admin    Narrative    Intrathecal chemo instillation induction by fluoroscopic-guided Lumbar  Puncture 3/20/2020    History: New Dx of Burkitt Lymphoma for IT chemotherapy    Procedure note: Verbal and written consent for lumbar puncture was  obtained from the patient, and benefits and risk of the procedure were  explained, including but not limited to worsening  headache,  hemorrhage, infection, lower extremity pain, or nerve root injury. The  patient was sterilely prepped and draped with the patient in the left  lateral decubitus position, over the lower back. Under fluoroscopic  guidance, the interlaminar spaces were noted. 1% lidocaine was  administered for local anesthetic over the L2-3 interlaminar space,  and a 22 gauge 3.5 inch needle was advanced into the thecal sac under  fluoroscopic guidance. There was initial show of clear CSF.  Approximately 13 cc of CSF were collected.    The needle was removed with the stylet in place. There was no  immediate complication associated with the procedure. Samples were  sent for the requested laboratory testing.    Estimated blood loss: Less than 1 cc.    Fluoroscopic time: 18 seconds    Dose: 9.57 mGy    Oncology team was present for chemo instillation.      Impression    Impression: Successful fluoroscopic guided lumbar puncture and  subsequent chemo instillation without immediate complication.    I, KENAN SEHIKH MD, attest that I was immediately available to  provide guidance and assistance during the entirety of the procedure.     I have personally reviewed the examination and initial interpretation  and I agree with the findings.    KENAN SHEIKH MD   Phosphorus   Result Value Ref Range    Phosphorus 3.5 2.5 - 4.5 mg/dL   Uric acid   Result Value Ref Range    Uric Acid 2.7 (L) 3.5 - 7.2 mg/dL   Lactate Dehydrogenase   Result Value Ref Range    Lactate Dehydrogenase 283 (H) 85 - 227 U/L   Creatinine   Result Value Ref Range    Creatinine 0.60 (L) 0.66 - 1.25 mg/dL    GFR Estimate >90 >60 mL/min/[1.73_m2]    GFR Estimate If Black >90 >60 mL/min/[1.73_m2]   INR   Result Value Ref Range    INR 1.08 0.86 - 1.14   Fibrinogen activity   Result Value Ref Range    Fibrinogen 415 200 - 420 mg/dL   Lactate Dehydrogenase   Result Value Ref Range    Lactate Dehydrogenase 216 85 - 227 U/L   Phosphorus   Result Value Ref Range     Phosphorus 3.1 2.5 - 4.5 mg/dL   Uric acid   Result Value Ref Range    Uric Acid 3.2 (L) 3.5 - 7.2 mg/dL   Comprehensive metabolic panel   Result Value Ref Range    Sodium 140 133 - 144 mmol/L    Potassium 3.9 3.4 - 5.3 mmol/L    Chloride 108 94 - 109 mmol/L    Carbon Dioxide 26 20 - 32 mmol/L    Anion Gap 5 3 - 14 mmol/L    Glucose 103 (H) 70 - 99 mg/dL    Urea Nitrogen 26 7 - 30 mg/dL    Creatinine 0.66 0.66 - 1.25 mg/dL    GFR Estimate >90 >60 mL/min/[1.73_m2]    GFR Estimate If Black >90 >60 mL/min/[1.73_m2]    Calcium 7.2 (L) 8.5 - 10.1 mg/dL    Bilirubin Total 0.5 0.2 - 1.3 mg/dL    Albumin 2.4 (L) 3.4 - 5.0 g/dL    Protein Total 5.4 (L) 6.8 - 8.8 g/dL    Alkaline Phosphatase 42 40 - 150 U/L    ALT 18 0 - 70 U/L    AST 5 0 - 45 U/L   CBC with platelets differential   Result Value Ref Range    WBC 8.1 4.0 - 11.0 10e9/L    RBC Count 3.70 (L) 4.4 - 5.9 10e12/L    Hemoglobin 10.9 (L) 13.3 - 17.7 g/dL    Hematocrit 33.3 (L) 40.0 - 53.0 %    MCV 90 78 - 100 fl    MCH 29.5 26.5 - 33.0 pg    MCHC 32.7 31.5 - 36.5 g/dL    RDW 13.5 10.0 - 15.0 %    Platelet Count 269 150 - 450 10e9/L    Diff Method Automated Method     % Neutrophils 89.5 %    % Lymphocytes 3.5 %    % Monocytes 6.2 %    % Eosinophils 0.1 %    % Basophils 0.1 %    % Immature Granulocytes 0.6 %    Nucleated RBCs 0 0 /100    Absolute Neutrophil 7.2 1.6 - 8.3 10e9/L    Absolute Lymphocytes 0.3 (L) 0.8 - 5.3 10e9/L    Absolute Monocytes 0.5 0.0 - 1.3 10e9/L    Absolute Eosinophils 0.0 0.0 - 0.7 10e9/L    Absolute Basophils 0.0 0.0 - 0.2 10e9/L    Abs Immature Granulocytes 0.1 0 - 0.4 10e9/L    Absolute Nucleated RBC 0.0    Magnesium   Result Value Ref Range    Magnesium 2.2 1.6 - 2.3 mg/dL

## 2020-03-22 LAB
ALBUMIN SERPL-MCNC: 2.4 G/DL (ref 3.4–5)
ALP SERPL-CCNC: 43 U/L (ref 40–150)
ALT SERPL W P-5'-P-CCNC: 16 U/L (ref 0–70)
ANION GAP SERPL CALCULATED.3IONS-SCNC: 6 MMOL/L (ref 3–14)
AST SERPL W P-5'-P-CCNC: 6 U/L (ref 0–45)
BASOPHILS # BLD AUTO: 0 10E9/L (ref 0–0.2)
BASOPHILS NFR BLD AUTO: 0 %
BILIRUB SERPL-MCNC: 0.6 MG/DL (ref 0.2–1.3)
BUN SERPL-MCNC: 26 MG/DL (ref 7–30)
BURR CELLS BLD QL SMEAR: SLIGHT
CALCIUM SERPL-MCNC: 7.4 MG/DL (ref 8.5–10.1)
CHLORIDE SERPL-SCNC: 108 MMOL/L (ref 94–109)
CO2 SERPL-SCNC: 25 MMOL/L (ref 20–32)
CREAT SERPL-MCNC: 0.6 MG/DL (ref 0.66–1.25)
CREAT SERPL-MCNC: 0.64 MG/DL (ref 0.66–1.25)
DIFFERENTIAL METHOD BLD: ABNORMAL
EOSINOPHIL # BLD AUTO: 0 10E9/L (ref 0–0.7)
EOSINOPHIL NFR BLD AUTO: 0 %
ERYTHROCYTE [DISTWIDTH] IN BLOOD BY AUTOMATED COUNT: 13.5 % (ref 10–15)
FIBRINOGEN PPP-MCNC: 380 MG/DL (ref 200–420)
GFR SERPL CREATININE-BSD FRML MDRD: >90 ML/MIN/{1.73_M2}
GFR SERPL CREATININE-BSD FRML MDRD: >90 ML/MIN/{1.73_M2}
GLUCOSE SERPL-MCNC: 93 MG/DL (ref 70–99)
HCT VFR BLD AUTO: 31.7 % (ref 40–53)
HGB BLD-MCNC: 10.3 G/DL (ref 13.3–17.7)
INR PPP: 1.07 (ref 0.86–1.14)
LDH SERPL L TO P-CCNC: 173 U/L (ref 85–227)
LDH SERPL L TO P-CCNC: 185 U/L (ref 85–227)
LYMPHOCYTES # BLD AUTO: 0.8 10E9/L (ref 0.8–5.3)
LYMPHOCYTES NFR BLD AUTO: 2.5 %
MAGNESIUM SERPL-MCNC: 1.9 MG/DL (ref 1.6–2.3)
MCH RBC QN AUTO: 29.8 PG (ref 26.5–33)
MCHC RBC AUTO-ENTMCNC: 32.5 G/DL (ref 31.5–36.5)
MCV RBC AUTO: 92 FL (ref 78–100)
MONOCYTES # BLD AUTO: 0 10E9/L (ref 0–1.3)
MONOCYTES NFR BLD AUTO: 0 %
NEUTROPHILS # BLD AUTO: 30.9 10E9/L (ref 1.6–8.3)
NEUTROPHILS NFR BLD AUTO: 97.5 %
OVALOCYTES BLD QL SMEAR: SLIGHT
PHOSPHATE SERPL-MCNC: 3.2 MG/DL (ref 2.5–4.5)
PHOSPHATE SERPL-MCNC: 3.3 MG/DL (ref 2.5–4.5)
PLATELET # BLD AUTO: 244 10E9/L (ref 150–450)
PLATELET # BLD EST: ABNORMAL 10*3/UL
POIKILOCYTOSIS BLD QL SMEAR: SLIGHT
POTASSIUM SERPL-SCNC: 3.4 MMOL/L (ref 3.4–5.3)
POTASSIUM SERPL-SCNC: 3.6 MMOL/L (ref 3.4–5.3)
PROT SERPL-MCNC: 5.3 G/DL (ref 6.8–8.8)
RBC # BLD AUTO: 3.46 10E12/L (ref 4.4–5.9)
SODIUM SERPL-SCNC: 140 MMOL/L (ref 133–144)
URATE SERPL-MCNC: 2.4 MG/DL (ref 3.5–7.2)
URATE SERPL-MCNC: 2.9 MG/DL (ref 3.5–7.2)
WBC # BLD AUTO: 31.7 10E9/L (ref 4–11)

## 2020-03-22 PROCEDURE — 36592 COLLECT BLOOD FROM PICC: CPT | Performed by: PHYSICIAN ASSISTANT

## 2020-03-22 PROCEDURE — 84100 ASSAY OF PHOSPHORUS: CPT | Performed by: PHYSICIAN ASSISTANT

## 2020-03-22 PROCEDURE — 25000128 H RX IP 250 OP 636: Performed by: INTERNAL MEDICINE

## 2020-03-22 PROCEDURE — 25000131 ZZH RX MED GY IP 250 OP 636 PS 637: Performed by: PHYSICIAN ASSISTANT

## 2020-03-22 PROCEDURE — 82565 ASSAY OF CREATININE: CPT | Performed by: PHYSICIAN ASSISTANT

## 2020-03-22 PROCEDURE — 84132 ASSAY OF SERUM POTASSIUM: CPT | Performed by: PHYSICIAN ASSISTANT

## 2020-03-22 PROCEDURE — 84550 ASSAY OF BLOOD/URIC ACID: CPT | Performed by: PHYSICIAN ASSISTANT

## 2020-03-22 PROCEDURE — 85025 COMPLETE CBC W/AUTO DIFF WBC: CPT | Performed by: NURSE PRACTITIONER

## 2020-03-22 PROCEDURE — 25000132 ZZH RX MED GY IP 250 OP 250 PS 637: Performed by: NURSE PRACTITIONER

## 2020-03-22 PROCEDURE — 83735 ASSAY OF MAGNESIUM: CPT | Performed by: PHYSICIAN ASSISTANT

## 2020-03-22 PROCEDURE — 25000128 H RX IP 250 OP 636: Performed by: PHYSICIAN ASSISTANT

## 2020-03-22 PROCEDURE — 25000132 ZZH RX MED GY IP 250 OP 250 PS 637: Performed by: PHYSICIAN ASSISTANT

## 2020-03-22 PROCEDURE — 80053 COMPREHEN METABOLIC PANEL: CPT | Performed by: PHYSICIAN ASSISTANT

## 2020-03-22 PROCEDURE — 83615 LACTATE (LD) (LDH) ENZYME: CPT | Performed by: PHYSICIAN ASSISTANT

## 2020-03-22 PROCEDURE — 85384 FIBRINOGEN ACTIVITY: CPT | Performed by: PHYSICIAN ASSISTANT

## 2020-03-22 PROCEDURE — 25000132 ZZH RX MED GY IP 250 OP 250 PS 637: Performed by: STUDENT IN AN ORGANIZED HEALTH CARE EDUCATION/TRAINING PROGRAM

## 2020-03-22 PROCEDURE — 99233 SBSQ HOSP IP/OBS HIGH 50: CPT | Mod: GC | Performed by: INTERNAL MEDICINE

## 2020-03-22 PROCEDURE — 12000001 ZZH R&B MED SURG/OB UMMC

## 2020-03-22 PROCEDURE — 85610 PROTHROMBIN TIME: CPT | Performed by: PHYSICIAN ASSISTANT

## 2020-03-22 PROCEDURE — 36592 COLLECT BLOOD FROM PICC: CPT | Performed by: NURSE PRACTITIONER

## 2020-03-22 RX ORDER — LORATADINE 10 MG/1
10 TABLET ORAL DAILY
Status: DISCONTINUED | OUTPATIENT
Start: 2020-03-22 | End: 2020-03-30 | Stop reason: HOSPADM

## 2020-03-22 RX ADMIN — LEVOTHYROXINE SODIUM 200 MCG: 0.1 TABLET ORAL at 08:20

## 2020-03-22 RX ADMIN — FILGRASTIM 480 MCG: 480 INJECTION, SOLUTION INTRAVENOUS; SUBCUTANEOUS at 08:21

## 2020-03-22 RX ADMIN — LORATADINE 10 MG: 10 TABLET ORAL at 12:04

## 2020-03-22 RX ADMIN — DEXAMETHASONE 4 MG: 4 TABLET ORAL at 08:20

## 2020-03-22 RX ADMIN — Medication 5 ML: at 05:21

## 2020-03-22 RX ADMIN — Medication 5 ML: at 17:41

## 2020-03-22 RX ADMIN — CALCIUM CITRATE 200 MG (950 MG) TABLET 950 MG: at 08:20

## 2020-03-22 RX ADMIN — SODIUM CHLORIDE, PRESERVATIVE FREE 5 ML: 5 INJECTION INTRAVENOUS at 12:05

## 2020-03-22 RX ADMIN — SENNOSIDES AND DOCUSATE SODIUM 2 TABLET: 8.6; 5 TABLET ORAL at 19:28

## 2020-03-22 RX ADMIN — ALLOPURINOL 300 MG: 300 TABLET ORAL at 08:20

## 2020-03-22 RX ADMIN — ACETAMINOPHEN 650 MG: 325 TABLET, FILM COATED ORAL at 19:28

## 2020-03-22 RX ADMIN — PANTOPRAZOLE SODIUM 40 MG: 40 TABLET, DELAYED RELEASE ORAL at 08:20

## 2020-03-22 RX ADMIN — SENNOSIDES AND DOCUSATE SODIUM 2 TABLET: 8.6; 5 TABLET ORAL at 08:21

## 2020-03-22 RX ADMIN — ACETAMINOPHEN 650 MG: 325 TABLET, FILM COATED ORAL at 11:07

## 2020-03-22 ASSESSMENT — ACTIVITIES OF DAILY LIVING (ADL)
ADLS_ACUITY_SCORE: 14

## 2020-03-22 ASSESSMENT — PAIN DESCRIPTION - DESCRIPTORS
DESCRIPTORS: ACHING
DESCRIPTORS: ACHING

## 2020-03-22 ASSESSMENT — MIFFLIN-ST. JEOR: SCORE: 1725.96

## 2020-03-22 NOTE — PLAN OF CARE
AVSS, afebrile. Denies pain/nausea. Voiding well in urinal; up independently to bathroom. Slept between cares. WBC count 31.7 this AM compared to 8.1 yesterday and ANC was 30.9 compared to 7.2 yesterday; received first dose of neupogen yesterday. Continue with POC.

## 2020-03-22 NOTE — PROGRESS NOTES
[facility], [place]    Hematology / Oncology Progress Note    Date of Service (when I saw the patient): 03/22/2020     Assessment & Plan   Kobe Pedroza is a 60 year old male with a history of thyroid cancer status post thyroidectomy (2011), CAD, and hyperlipidemia who was transferred from [facility] to [facility] on 3/13 with a newly thoracic extradural mass at T5-6 with severe cord compression, now status post neurosurgical gross resection on 3/15 with initial pathology concerning for high-grade B-cell lymphoma. He transferred to the Malignant Hematology team on 3/17 for further work-up and management of his newly diagnosed with Burkitt Lymphoma. He started R-CODOX-M/IVAC chemotherapy regimen (D1=3/18/2020). Today is Day 5.    Today:  - Today is D5  - LP and IT chemo scheduled for Monday (3/23) @ 1300 per chemo protocol  - Continue Dexamethasone taper for 12 days  - Monitor TLS BID  - Lovenox on hold today for LP on Monday  -Start claritin for aches, potentially from neupogen     HEME/ONC  # Burkitt's lymphoma  # Thoracic spine mass  # Spinal cord compression  Patient presented on 3/12 to [facility] with acute-on-chronic mid-thoracic back pain with some associated radicular symptoms. Per patient, no B-symptoms, though he did recently intentionally lose 35 lbs. 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 Patient had no appreciable neurological deficits. He was started on dexamethasone and underwent T5-6 laminectomy with gross total resection of epidural tumor on 3/15. Flow cytometry of the specimen was notable for CD10 positive and kappa monotypic B cells (83%). Confirmed Burkitt lymphoma with surgical pathology. Cytogenetics pending.  - PET scan completed 3/18  - Bone marrow biopsy completed 3/18. Tolerated well with Versed 1mg IV  - ECHO (3/16) with LVEF of 60-65%, no diastolic dysfunction.  - Baseline EKG  (3/13) without evidence of ischemia or dysrhythmia.  - PICC line placed.   - Continue daily PPI while on steroids.  - dexamethasone taper for 12 days   - Started on 3/21 Dexamethasone 4 g Q12h po x 1 day; on 3/22 Dexamethasone 4 g Q24h po x 2 days; on 3/24 Dexamethasone 3 g Q24h po x 2 days;  on 3/26 Dexamethasone 2 g Q24h po x 2 days;  on 3/28 Dexamethasone 1 g Q24h po x 2 days;  on 3/30 Dexamethasone 0.5 g Q24h po x 2 days  - TLS labs Q12h, DIC labs daily   - Continue allopurinol 300 mg daily    Treatment Plan: R-CODOX-M/IVAC (D1=3/18/2020). Today is D5  - Cyclophosphamide 1,705 mg IV for D1, D2  - Doxorubicin 110 mg IV D1  - Vincristine 2 mg IV D1, D10  - Rituximab 800 mg   - Methotrexate 3 g/m2 D10  - Leucovorin calcium injection 426 mg D11  - Neupogen injection 480 mcg daily for D1-D6  - LP and IT chemo scheduled for tomorrow (3/20) @ 1300 per chemo protocol   - cytarabine 50 mg, hydrocortisone sodium succinate 50 mg, methotrexate 12 mg   - LP and IT chemo scheduled for Monday (3/23) @ 1300 per chemo protocol    - cytarabine 50 mg, hydrocortisone sodium succinate 50 mg   - Supportive Medications:   - Allopurinol tablet 300 mg    - PRN Anti Emetics   - PRN Bowel Regimen   - PRN Pain control    # ID Prophylaxis  - Viral serologies: HepC nonreactive, HepB negative and HIV negative  - Not currently neutropenic, will consider ID ppx if patient becomes neutropenic  - Negative HSV 1/2  - Pentamidine given on (3/21)    # Normocytic anemia  Hgb 10.9. Likely due to acute blood loss in the setting of recent surgical intervention. No evidence of significant hemolysis.   - CBC daily  - Transfuse if Hgb <7, Platelets <10K     # Thyroid cancer status-post thyroidectomy  Status post thyroidectomy in 2011. TSH normal on admission.  - Continue PTA levothyroxine     # Leukocytosis  Presumably due to recent dexamethasone use and neupogen  No infectious symptoms  - CBC daily     MSK/NEURO  # Thoracic back pain  Due to T5-6  extradural tumor as detailed above. Patient now status post T5-6 laminectomy with gross total resction on 3/15. Today is POD #4.   - Post-op activity recommendations per NSG:   - [place] Brace to be worn out of bed (orthotics consult in place)   - No lifting >10 lbs, twisting, straining, or strenuous activity for at least 2 weeks  - Continue APAP, oxycodone, methocarbamol PRN for pain  - Patient should have scheduled outpatient neurosurgery follow-up in 2 weeks (may be via Tele-Health); NSG will coordinate      CV  # Coronary artery disease  # Hyperlipidemia  Followed by Dr. Zeng at [facility] ([place]). Diagnosed with mild, non-obstructive CAD in 2018. Calcium score at that time was 5.5. Baby ASA and low-dose statin therapy recommended. No previous cardiac caths or stents.  - Hold PTA statin in light of starting chemotherapy     GI  # Hypocalcemia  Mild. Ca 7.4 today, corrects to 8.4 for albumin.  - Continue PO calcium citrate      # Constipation - resolved  Noted prior to presentation. Patient reports decreased frequency of bowel movements. Has been responsive to laxative use.   - Has been having regular BM with current bowel regimen  - Continue scheduled senna and Miralax  - Mag Citrate provided PRN     FEN:  - No IVF for now; encourage PO intake.   - Lyte replacement per protocol  - Regular diet as tolerated.    Prophy/Misc:  - GI: Continue bowel regimen as above; continue PPI while on steroids.  - DVT: Prophylaxis with lovenox today 3/21; Hold Lovenox if platelets <50K. Also will hold 3/22 prior to LP.  - Activity: as tolerated and per neurosurg recommendations, PT consulted  - Diet: Regular as tolerated     Disposition: Anticipate discharge to home after cycle 1 of CODOX-M, likely 2 weeks from D1 of chemotherapy     Code  Full Code     Patient discussed with Dr. Wahl.     Interval History   No events overnight. Feels okay this morning. No nausea/vomiting. Appetite is okay. BMs are fair.  Does not feel consipated or bloated.     Physical Exam   Temp: 97.2  F (36.2  C) Temp src: Oral BP: 135/69   Heart Rate: 68 Resp: 18 SpO2: 99 % O2 Device: None (Room air)    Vitals:    03/18/20 0753 03/19/20 0900 03/21/20 1143   Weight: 94 kg (207 lb 4.8 oz) 94.7 kg (208 lb 11.2 oz) 92.9 kg (204 lb 14.4 oz)     Vital Signs with Ranges  Temp:  [96.6  F (35.9  C)-98.7  F (37.1  C)] 97.2  F (36.2  C)  Heart Rate:  [68-79] 68  Resp:  [16-18] 18  BP: (119-135)/(62-77) 135/69  SpO2:  [95 %-99 %] 99 %  I/O last 3 completed shifts:  In: 400 [P.O.:400]  Out: 730 [Urine:730]    Constitutional: Alert, pleasant, sitting up in bed eating [place] toast, NAD.  HEENT: AT/NC. Anicteric sclera. EOMI. No oral lesions. MMM.  Respiratory: Clear to auscultation bilaterally. No wheezing or crackles. Breathing comfortably on room air.   Cardiovascular: RRR. Normal S1/S2. No murmurs.  GI: Soft, non-tender, non-distended. Normoactive bowel sounds. No guarding or rebound.  Skin: Warm and well perfused. No rash or lesions.   Musculoskeletal/ Extremities: No peripheral edema.   Neurologic: Alert and oriented x3. CNII-XII grossly intact. Moving all extremities. No focal deficits.   Access: E PICC c/d/i.     Medications     - MEDICATION INSTRUCTIONS -       sodium chloride         albuterol  2.5 mg Nebulization Q28 Days    And     pentamidine  300 mg Inhalation Q28 Days     allopurinol  300 mg Oral Daily     calcium citrate  950 mg Oral Daily     [START ON 3/23/2020] INTRATHECAL - Cytarabine and/or methotrexate and/or Hydrocortisone   Intrathecal Once     [START ON 3/30/2020] dexamethasone  0.5 mg Oral Daily     [START ON 3/28/2020] dexamethasone  1 mg Oral Daily     [START ON 3/26/2020] dexamethasone  2 mg Oral Daily     [START ON 3/24/2020] dexamethasone  3 mg Oral Daily     dexamethasone  4 mg Oral Daily     filgrastim (NEUPOGEN/GRANIX) subcutaneous  5 mcg/kg (Treatment Plan Recorded) Subcutaneous Daily     heparin lock flush  5-10 mL  Intracatheter Q24H     levothyroxine  200 mcg Oral QAM AC     pantoprazole  40 mg Oral QAM AC     polyethylene glycol  17 g Oral Daily     senna-docusate  1 tablet Oral BID    Or     senna-docusate  2 tablet Oral BID     sodium chloride (PF)  10 mL Intracatheter Q8H       Data   Results for orders placed or performed during the hospital encounter of 03/13/20 (from the past 24 hour(s))   Creatinine   Result Value Ref Range    Creatinine 0.65 (L) 0.66 - 1.25 mg/dL    GFR Estimate >90 >60 mL/min/[1.73_m2]    GFR Estimate If Black >90 >60 mL/min/[1.73_m2]   Phosphorus   Result Value Ref Range    Phosphorus 2.9 2.5 - 4.5 mg/dL   Uric acid   Result Value Ref Range    Uric Acid 2.5 (L) 3.5 - 7.2 mg/dL   Lactate Dehydrogenase   Result Value Ref Range    Lactate Dehydrogenase 224 85 - 227 U/L   CBC with platelets differential   Result Value Ref Range    WBC 31.7 (H) 4.0 - 11.0 10e9/L    RBC Count 3.46 (L) 4.4 - 5.9 10e12/L    Hemoglobin 10.3 (L) 13.3 - 17.7 g/dL    Hematocrit 31.7 (L) 40.0 - 53.0 %    MCV 92 78 - 100 fl    MCH 29.8 26.5 - 33.0 pg    MCHC 32.5 31.5 - 36.5 g/dL    RDW 13.5 10.0 - 15.0 %    Platelet Count 244 150 - 450 10e9/L    Diff Method Manual Differential     % Neutrophils 97.5 %    % Lymphocytes 2.5 %    % Monocytes 0.0 %    % Eosinophils 0.0 %    % Basophils 0.0 %    Absolute Neutrophil 30.9 (H) 1.6 - 8.3 10e9/L    Absolute Lymphocytes 0.8 0.8 - 5.3 10e9/L    Absolute Monocytes 0.0 0.0 - 1.3 10e9/L    Absolute Eosinophils 0.0 0.0 - 0.7 10e9/L    Absolute Basophils 0.0 0.0 - 0.2 10e9/L    Poikilocytosis Slight     Ovalocytes Slight     Vidhi Cells Slight     Platelet Estimate Confirming automated cell count    INR   Result Value Ref Range    INR 1.07 0.86 - 1.14   Fibrinogen activity   Result Value Ref Range    Fibrinogen 380 200 - 420 mg/dL   Comprehensive metabolic panel   Result Value Ref Range    Sodium 140 133 - 144 mmol/L    Potassium 3.6 3.4 - 5.3 mmol/L    Chloride 108 94 - 109 mmol/L    Carbon  Dioxide 25 20 - 32 mmol/L    Anion Gap 6 3 - 14 mmol/L    Glucose 93 70 - 99 mg/dL    Urea Nitrogen 26 7 - 30 mg/dL    Creatinine 0.60 (L) 0.66 - 1.25 mg/dL    GFR Estimate >90 >60 mL/min/[1.73_m2]    GFR Estimate If Black >90 >60 mL/min/[1.73_m2]    Calcium 7.4 (L) 8.5 - 10.1 mg/dL    Bilirubin Total 0.6 0.2 - 1.3 mg/dL    Albumin 2.4 (L) 3.4 - 5.0 g/dL    Protein Total 5.3 (L) 6.8 - 8.8 g/dL    Alkaline Phosphatase 43 40 - 150 U/L    ALT 16 0 - 70 U/L    AST 6 0 - 45 U/L   Phosphorus   Result Value Ref Range    Phosphorus 3.3 2.5 - 4.5 mg/dL   Uric acid   Result Value Ref Range    Uric Acid 2.9 (L) 3.5 - 7.2 mg/dL   Lactate Dehydrogenase   Result Value Ref Range    Lactate Dehydrogenase 173 85 - 227 U/L   Magnesium   Result Value Ref Range    Magnesium 1.9 1.6 - 2.3 mg/dL

## 2020-03-22 NOTE — PLAN OF CARE
AVSS on RA, afebrile. C/o mild back pain, tylenol given x1 with a decrease in pain. Denies n/v, SOB. Continues to complain of fatigue, encouraged pt to ambulate around unit which he did twice this evening. Good appetite, voiding well. Continue to monitor.

## 2020-03-22 NOTE — PLAN OF CARE
Jonnathan is Cycle 1 Day 5 of GRAAL-R.  Denies nausea and eating well.  Neupogen given and pt c/o increased aches.  Tylenol given and Claritin stared for bone aches.  Two hours later pt said he felt better.  TLS labs WNL and checked bid.  UAL and encouraged to walk halls.

## 2020-03-23 ENCOUNTER — APPOINTMENT (OUTPATIENT)
Dept: GENERAL RADIOLOGY | Facility: CLINIC | Age: 60
DRG: 820 | End: 2020-03-23
Attending: PHYSICIAN ASSISTANT
Payer: COMMERCIAL

## 2020-03-23 ENCOUNTER — APPOINTMENT (OUTPATIENT)
Dept: PHYSICAL THERAPY | Facility: CLINIC | Age: 60
DRG: 820 | End: 2020-03-23
Attending: NEUROLOGICAL SURGERY
Payer: COMMERCIAL

## 2020-03-23 LAB
ALBUMIN SERPL-MCNC: 2.5 G/DL (ref 3.4–5)
ALP SERPL-CCNC: 50 U/L (ref 40–150)
ALT SERPL W P-5'-P-CCNC: 17 U/L (ref 0–70)
ANION GAP SERPL CALCULATED.3IONS-SCNC: 6 MMOL/L (ref 3–14)
APPEARANCE CSF: CLEAR
AST SERPL W P-5'-P-CCNC: 8 U/L (ref 0–45)
BASOPHILS # BLD AUTO: 0 10E9/L (ref 0–0.2)
BASOPHILS NFR BLD AUTO: 0 %
BILIRUB SERPL-MCNC: 0.5 MG/DL (ref 0.2–1.3)
BUN SERPL-MCNC: 20 MG/DL (ref 7–30)
CALCIUM SERPL-MCNC: 7.1 MG/DL (ref 8.5–10.1)
CHLORIDE SERPL-SCNC: 106 MMOL/L (ref 94–109)
CO2 SERPL-SCNC: 26 MMOL/L (ref 20–32)
COLOR CSF: COLORLESS
COPATH REPORT: NORMAL
CREAT SERPL-MCNC: 0.51 MG/DL (ref 0.66–1.25)
CREAT SERPL-MCNC: 0.62 MG/DL (ref 0.66–1.25)
DIFFERENTIAL METHOD BLD: ABNORMAL
EBV DNA # SPEC NAA+PROBE: 5262 {COPIES}/ML
EBV DNA SPEC NAA+PROBE-LOG#: 3.7 {LOG_COPIES}/ML
EOSINOPHIL # BLD AUTO: 0 10E9/L (ref 0–0.7)
EOSINOPHIL NFR BLD AUTO: 0 %
ERYTHROCYTE [DISTWIDTH] IN BLOOD BY AUTOMATED COUNT: 13.2 % (ref 10–15)
FIBRINOGEN PPP-MCNC: 392 MG/DL (ref 200–420)
GFR SERPL CREATININE-BSD FRML MDRD: >90 ML/MIN/{1.73_M2}
GFR SERPL CREATININE-BSD FRML MDRD: >90 ML/MIN/{1.73_M2}
GLUCOSE CSF-MCNC: 64 MG/DL (ref 40–70)
GLUCOSE SERPL-MCNC: 88 MG/DL (ref 70–99)
GRAM STN SPEC: NORMAL
HCT VFR BLD AUTO: 28.2 % (ref 40–53)
HGB BLD-MCNC: 9.5 G/DL (ref 13.3–17.7)
INR PPP: 1.07 (ref 0.86–1.14)
LDH SERPL L TO P-CCNC: 189 U/L (ref 85–227)
LDH SERPL L TO P-CCNC: 190 U/L (ref 85–227)
LYMPHOCYTES # BLD AUTO: 0.9 10E9/L (ref 0.8–5.3)
LYMPHOCYTES NFR BLD AUTO: 4.2 %
Lab: NORMAL
MAGNESIUM SERPL-MCNC: 1.9 MG/DL (ref 1.6–2.3)
MCH RBC QN AUTO: 30.4 PG (ref 26.5–33)
MCHC RBC AUTO-ENTMCNC: 33.7 G/DL (ref 31.5–36.5)
MCV RBC AUTO: 90 FL (ref 78–100)
MONOCYTES # BLD AUTO: 0 10E9/L (ref 0–1.3)
MONOCYTES NFR BLD AUTO: 0 %
NEUTROPHILS # BLD AUTO: 21.1 10E9/L (ref 1.6–8.3)
NEUTROPHILS NFR BLD AUTO: 95.8 %
PHOSPHATE SERPL-MCNC: 2.8 MG/DL (ref 2.5–4.5)
PHOSPHATE SERPL-MCNC: 3.5 MG/DL (ref 2.5–4.5)
PLATELET # BLD AUTO: 217 10E9/L (ref 150–450)
PLATELET # BLD EST: ABNORMAL 10*3/UL
POTASSIUM SERPL-SCNC: 3.3 MMOL/L (ref 3.4–5.3)
POTASSIUM SERPL-SCNC: 4 MMOL/L (ref 3.4–5.3)
PROT CSF-MCNC: 69 MG/DL (ref 15–60)
PROT SERPL-MCNC: 5.3 G/DL (ref 6.8–8.8)
RBC # BLD AUTO: 3.13 10E12/L (ref 4.4–5.9)
RBC # CSF MANUAL: 11 /UL (ref 0–2)
RBC MORPH BLD: NORMAL
SODIUM SERPL-SCNC: 139 MMOL/L (ref 133–144)
SPECIMEN SOURCE: NORMAL
TUBE # CSF: 4 #
URATE SERPL-MCNC: 2.2 MG/DL (ref 3.5–7.2)
URATE SERPL-MCNC: 2.6 MG/DL (ref 3.5–7.2)
WBC # BLD AUTO: 22 10E9/L (ref 4–11)
WBC # CSF MANUAL: 2 /UL (ref 0–5)

## 2020-03-23 PROCEDURE — 84100 ASSAY OF PHOSPHORUS: CPT | Performed by: PHYSICIAN ASSISTANT

## 2020-03-23 PROCEDURE — 82945 GLUCOSE OTHER FLUID: CPT | Performed by: PHYSICIAN ASSISTANT

## 2020-03-23 PROCEDURE — 3E0R305 INTRODUCTION OF OTHER ANTINEOPLASTIC INTO SPINAL CANAL, PERCUTANEOUS APPROACH: ICD-10-PCS | Performed by: PHYSICIAN ASSISTANT

## 2020-03-23 PROCEDURE — 84550 ASSAY OF BLOOD/URIC ACID: CPT | Performed by: PHYSICIAN ASSISTANT

## 2020-03-23 PROCEDURE — 25000132 ZZH RX MED GY IP 250 OP 250 PS 637: Performed by: STUDENT IN AN ORGANIZED HEALTH CARE EDUCATION/TRAINING PROGRAM

## 2020-03-23 PROCEDURE — 88184 FLOWCYTOMETRY/ TC 1 MARKER: CPT | Performed by: PHYSICIAN ASSISTANT

## 2020-03-23 PROCEDURE — 85610 PROTHROMBIN TIME: CPT | Performed by: PHYSICIAN ASSISTANT

## 2020-03-23 PROCEDURE — 36592 COLLECT BLOOD FROM PICC: CPT | Performed by: NURSE PRACTITIONER

## 2020-03-23 PROCEDURE — 36592 COLLECT BLOOD FROM PICC: CPT | Performed by: PHYSICIAN ASSISTANT

## 2020-03-23 PROCEDURE — 25000132 ZZH RX MED GY IP 250 OP 250 PS 637: Performed by: NURSE PRACTITIONER

## 2020-03-23 PROCEDURE — 25000131 ZZH RX MED GY IP 250 OP 636 PS 637: Performed by: PHYSICIAN ASSISTANT

## 2020-03-23 PROCEDURE — 84132 ASSAY OF SERUM POTASSIUM: CPT | Performed by: PHYSICIAN ASSISTANT

## 2020-03-23 PROCEDURE — 25000132 ZZH RX MED GY IP 250 OP 250 PS 637: Performed by: PHYSICIAN ASSISTANT

## 2020-03-23 PROCEDURE — 87205 SMEAR GRAM STAIN: CPT | Performed by: PHYSICIAN ASSISTANT

## 2020-03-23 PROCEDURE — 40001004 ZZHCL STATISTIC FLOW INT 9-15 ABY TC 88188: Performed by: PHYSICIAN ASSISTANT

## 2020-03-23 PROCEDURE — 82565 ASSAY OF CREATININE: CPT | Performed by: PHYSICIAN ASSISTANT

## 2020-03-23 PROCEDURE — 83615 LACTATE (LD) (LDH) ENZYME: CPT | Performed by: PHYSICIAN ASSISTANT

## 2020-03-23 PROCEDURE — 89050 BODY FLUID CELL COUNT: CPT | Performed by: PHYSICIAN ASSISTANT

## 2020-03-23 PROCEDURE — 87070 CULTURE OTHR SPECIMN AEROBIC: CPT | Performed by: PHYSICIAN ASSISTANT

## 2020-03-23 PROCEDURE — 83735 ASSAY OF MAGNESIUM: CPT | Performed by: PHYSICIAN ASSISTANT

## 2020-03-23 PROCEDURE — 12000001 ZZH R&B MED SURG/OB UMMC

## 2020-03-23 PROCEDURE — 25000128 H RX IP 250 OP 636: Performed by: PHYSICIAN ASSISTANT

## 2020-03-23 PROCEDURE — 25000128 H RX IP 250 OP 636: Performed by: INTERNAL MEDICINE

## 2020-03-23 PROCEDURE — 96450 CHEMOTHERAPY INTO CNS: CPT

## 2020-03-23 PROCEDURE — 25000125 ZZHC RX 250: Performed by: RADIOLOGY

## 2020-03-23 PROCEDURE — 87015 SPECIMEN INFECT AGNT CONCNTJ: CPT | Performed by: PHYSICIAN ASSISTANT

## 2020-03-23 PROCEDURE — 85384 FIBRINOGEN ACTIVITY: CPT | Performed by: PHYSICIAN ASSISTANT

## 2020-03-23 PROCEDURE — 85025 COMPLETE CBC W/AUTO DIFF WBC: CPT | Performed by: NURSE PRACTITIONER

## 2020-03-23 PROCEDURE — 97110 THERAPEUTIC EXERCISES: CPT | Mod: GP

## 2020-03-23 PROCEDURE — 84157 ASSAY OF PROTEIN OTHER: CPT | Performed by: PHYSICIAN ASSISTANT

## 2020-03-23 PROCEDURE — 99233 SBSQ HOSP IP/OBS HIGH 50: CPT | Performed by: INTERNAL MEDICINE

## 2020-03-23 PROCEDURE — 88185 FLOWCYTOMETRY/TC ADD-ON: CPT | Performed by: PHYSICIAN ASSISTANT

## 2020-03-23 PROCEDURE — 80053 COMPREHEN METABOLIC PANEL: CPT | Performed by: PHYSICIAN ASSISTANT

## 2020-03-23 RX ORDER — NICOTINE POLACRILEX 4 MG
15-30 LOZENGE BUCCAL
Status: DISCONTINUED | OUTPATIENT
Start: 2020-03-23 | End: 2020-03-30 | Stop reason: HOSPADM

## 2020-03-23 RX ORDER — POTASSIUM CHLORIDE 750 MG/1
20-40 TABLET, EXTENDED RELEASE ORAL
Status: DISCONTINUED | OUTPATIENT
Start: 2020-03-23 | End: 2020-03-30 | Stop reason: HOSPADM

## 2020-03-23 RX ORDER — LIDOCAINE HYDROCHLORIDE 10 MG/ML
10 INJECTION, SOLUTION EPIDURAL; INFILTRATION; INTRACAUDAL; PERINEURAL ONCE
Status: COMPLETED | OUTPATIENT
Start: 2020-03-23 | End: 2020-03-23

## 2020-03-23 RX ORDER — POTASSIUM CL/LIDO/0.9 % NACL 10MEQ/0.1L
10 INTRAVENOUS SOLUTION, PIGGYBACK (ML) INTRAVENOUS
Status: DISCONTINUED | OUTPATIENT
Start: 2020-03-23 | End: 2020-03-30 | Stop reason: HOSPADM

## 2020-03-23 RX ORDER — POTASSIUM CHLORIDE 1.5 G/1.58G
20-40 POWDER, FOR SOLUTION ORAL
Status: DISCONTINUED | OUTPATIENT
Start: 2020-03-23 | End: 2020-03-30 | Stop reason: HOSPADM

## 2020-03-23 RX ORDER — DEXTROSE MONOHYDRATE 25 G/50ML
25-50 INJECTION, SOLUTION INTRAVENOUS
Status: DISCONTINUED | OUTPATIENT
Start: 2020-03-23 | End: 2020-03-30 | Stop reason: HOSPADM

## 2020-03-23 RX ORDER — POTASSIUM CHLORIDE 29.8 MG/ML
20 INJECTION INTRAVENOUS
Status: DISCONTINUED | OUTPATIENT
Start: 2020-03-23 | End: 2020-03-30 | Stop reason: HOSPADM

## 2020-03-23 RX ORDER — POTASSIUM CHLORIDE 7.45 MG/ML
10 INJECTION INTRAVENOUS
Status: DISCONTINUED | OUTPATIENT
Start: 2020-03-23 | End: 2020-03-30 | Stop reason: HOSPADM

## 2020-03-23 RX ORDER — MAGNESIUM SULFATE HEPTAHYDRATE 40 MG/ML
4 INJECTION, SOLUTION INTRAVENOUS EVERY 4 HOURS PRN
Status: DISCONTINUED | OUTPATIENT
Start: 2020-03-23 | End: 2020-03-30 | Stop reason: HOSPADM

## 2020-03-23 RX ADMIN — SENNOSIDES AND DOCUSATE SODIUM 1 TABLET: 8.6; 5 TABLET ORAL at 20:42

## 2020-03-23 RX ADMIN — Medication 5 ML: at 05:19

## 2020-03-23 RX ADMIN — ALLOPURINOL 300 MG: 300 TABLET ORAL at 08:00

## 2020-03-23 RX ADMIN — Medication 5 ML: at 17:04

## 2020-03-23 RX ADMIN — SODIUM CHLORIDE, PRESERVATIVE FREE 10 ML: 5 INJECTION INTRAVENOUS at 11:35

## 2020-03-23 RX ADMIN — FILGRASTIM 480 MCG: 480 INJECTION, SOLUTION INTRAVENOUS; SUBCUTANEOUS at 08:00

## 2020-03-23 RX ADMIN — DEXAMETHASONE 4 MG: 4 TABLET ORAL at 08:01

## 2020-03-23 RX ADMIN — PANTOPRAZOLE SODIUM 40 MG: 40 TABLET, DELAYED RELEASE ORAL at 08:01

## 2020-03-23 RX ADMIN — ACETAMINOPHEN 650 MG: 325 TABLET, FILM COATED ORAL at 11:56

## 2020-03-23 RX ADMIN — SENNOSIDES AND DOCUSATE SODIUM 2 TABLET: 8.6; 5 TABLET ORAL at 08:00

## 2020-03-23 RX ADMIN — LIDOCAINE HYDROCHLORIDE 5 ML: 10 INJECTION, SOLUTION EPIDURAL; INFILTRATION; INTRACAUDAL; PERINEURAL at 13:59

## 2020-03-23 RX ADMIN — ACETAMINOPHEN 650 MG: 325 TABLET, FILM COATED ORAL at 16:02

## 2020-03-23 RX ADMIN — LEVOTHYROXINE SODIUM 200 MCG: 0.1 TABLET ORAL at 08:01

## 2020-03-23 RX ADMIN — CALCIUM CITRATE 200 MG (950 MG) TABLET 950 MG: at 08:00

## 2020-03-23 RX ADMIN — CYTARABINE: 100 INJECTION INTRATHECAL; INTRAVENOUS; SUBCUTANEOUS at 13:45

## 2020-03-23 RX ADMIN — LORATADINE 10 MG: 10 TABLET ORAL at 08:01

## 2020-03-23 ASSESSMENT — ACTIVITIES OF DAILY LIVING (ADL)
ADLS_ACUITY_SCORE: 14
ADLS_ACUITY_SCORE: 13
ADLS_ACUITY_SCORE: 14

## 2020-03-23 ASSESSMENT — PAIN DESCRIPTION - DESCRIPTORS
DESCRIPTORS: ACHING
DESCRIPTORS: ACHING

## 2020-03-23 ASSESSMENT — MIFFLIN-ST. JEOR: SCORE: 1728.23

## 2020-03-23 NOTE — PROCEDURES
Date of Procedure: 3/23/2020    DIAGNOSIS:  Burkitt Lymphoma   PROCEDURE: Intrathecal chemo administration   LOCATION: Radiology  INDICATION: Burkitt Lymphoma with R-CODOX-M/IVAC chemotherapy protocol  Lumbar puncture performed and CSF samples collected by the General Radiology team under fluoroscopy.    Chemotherapy was double-checked by this writer and Radiology RN. This writer then administered cytarabine (PF) (CYTOSAR) 50 mg, hydrocortisone sodium succinate PF (solu-CORTEF) 50 mg in sodium chloride (PF) 0.9% PF 6 mL intrathecal inj (PF) over 3-5 minutes without apparent complication.  Complications: None immediately.   Chemo instillation performed by: Mckayla Alvarez PA-C under supervision by Lupis Alvarez PA-C  [phone]

## 2020-03-23 NOTE — PLAN OF CARE
[id number]: AVSS. Denied pain overnight. Good urine output. LP with IT chemo today @ 1300. Continue with POC.

## 2020-03-23 NOTE — PLAN OF CARE
Discharge Planner PT   Patient plan for discharge: Home  Current status:  All bed mobility and transfers IND. Complaint of calf tightness today R>L. Participates in NuStep for strength/endurance. Reviewed in room HEP.  Barriers to return to prior living situation: Medical status   Recommendations for discharge: Home  Rationale for recommendations: Pt mobilizing IND. May benefit from OP cancer rehab pending progression       Entered by: Jose Fuentes 03/23/2020 11:01 AM      7D

## 2020-03-23 NOTE — PLAN OF CARE
AVSS on RA, afebrile. C/o upper back discomfort, tylenol x1 with relief. Denies n/v, SOB. Lovenox held this evening in anticipation for LP w/ IT chemo tomorrow. Good appetite, voiding well. TLS labs WNL. Continue to monitor.

## 2020-03-23 NOTE — PROCEDURES
[facility] Procedure Note          Lumbar Puncture and Intrathecal Chemotherapy Administration:      Time: 2:00 PM  Performed by: Estelle Cuello MD  Authorized by: Tanja Smith MD    Indications: Intrathecal Chemotherapy Administration  Consent given by: Patient who states understanding of the procedure being performed after discussing the risks, benefits and alternatives.    Prior to the start of the procedure and with procedural staff participation, I verbally confirmed the patient s identity using two indicators, relevant allergies, that the procedure was appropriate and matched the consent or emergent situation, and that the correct equipment/implants were available. Immediately prior to starting the procedure I conducted the Time Out with the procedural staff and re-confirmed the patient s name, procedure, and site/side. (The Joint Commission universal protocol was followed.) Yes    Under sterile conditions the patient was positioned prone. Betadine solution and sterile drapes were utilized.  Local anesthetic at the site: 2 ml of lidocaine 1% without epinephrine from the LP tray  A 22 G spinal needle was inserted at the L 3-4 interspace.  Opening Pressurewas not checked.  A total of 6mL of clear and colorless spinal fluid was obtained and sent to the laboratory.   After the needle was removed, a bandaid and pressure were applied and the patient was instructed to stay horizontal until the results were back.    Complications:  None    Patient tolerance: Patient tolerated the procedure well with no immediate complications.

## 2020-03-23 NOTE — PLAN OF CARE
"  Vitals stable, afebrile. Continues w/ mild back pain and bone aches, tylenol given x1 per pt request. Denies nausea, good PO intake. Up ad riki. Voiding well, reports normal BM's. Potassium 3.3, pt refused K replacement, stated he wanted to \"eat some bananas\" instead. Bananas ordered and pt has been snacking on them. PA aware. LP complete at 1300, pt tolerated well. Continue to monitor and w/ POC.   "

## 2020-03-24 LAB
ALBUMIN SERPL-MCNC: 2.7 G/DL (ref 3.4–5)
ALP SERPL-CCNC: 58 U/L (ref 40–150)
ALT SERPL W P-5'-P-CCNC: 15 U/L (ref 0–70)
ANION GAP SERPL CALCULATED.3IONS-SCNC: 7 MMOL/L (ref 3–14)
AST SERPL W P-5'-P-CCNC: 11 U/L (ref 0–45)
BASOPHILS # BLD AUTO: 0 10E9/L (ref 0–0.2)
BASOPHILS NFR BLD AUTO: 0 %
BILIRUB SERPL-MCNC: 0.6 MG/DL (ref 0.2–1.3)
BUN SERPL-MCNC: 17 MG/DL (ref 7–30)
CALCIUM SERPL-MCNC: 7.2 MG/DL (ref 8.5–10.1)
CHLORIDE SERPL-SCNC: 106 MMOL/L (ref 94–109)
CO2 SERPL-SCNC: 26 MMOL/L (ref 20–32)
COPATH REPORT: NORMAL
CREAT SERPL-MCNC: 0.55 MG/DL (ref 0.66–1.25)
CREAT SERPL-MCNC: 0.56 MG/DL (ref 0.66–1.25)
DIFFERENTIAL METHOD BLD: ABNORMAL
EOSINOPHIL # BLD AUTO: 0 10E9/L (ref 0–0.7)
EOSINOPHIL NFR BLD AUTO: 0 %
ERYTHROCYTE [DISTWIDTH] IN BLOOD BY AUTOMATED COUNT: 13.2 % (ref 10–15)
FIBRINOGEN PPP-MCNC: 374 MG/DL (ref 200–420)
GFR SERPL CREATININE-BSD FRML MDRD: >90 ML/MIN/{1.73_M2}
GFR SERPL CREATININE-BSD FRML MDRD: >90 ML/MIN/{1.73_M2}
GLUCOSE SERPL-MCNC: 153 MG/DL (ref 70–99)
HCT VFR BLD AUTO: 29.8 % (ref 40–53)
HGB BLD-MCNC: 9.8 G/DL (ref 13.3–17.7)
INR PPP: 1.03 (ref 0.86–1.14)
LDH SERPL L TO P-CCNC: 159 U/L (ref 85–227)
LDH SERPL L TO P-CCNC: 170 U/L (ref 85–227)
LYMPHOCYTES # BLD AUTO: 0.1 10E9/L (ref 0.8–5.3)
LYMPHOCYTES NFR BLD AUTO: 0.9 %
MAGNESIUM SERPL-MCNC: 1.9 MG/DL (ref 1.6–2.3)
MCH RBC QN AUTO: 29.6 PG (ref 26.5–33)
MCHC RBC AUTO-ENTMCNC: 32.9 G/DL (ref 31.5–36.5)
MCV RBC AUTO: 90 FL (ref 78–100)
MONOCYTES # BLD AUTO: 0 10E9/L (ref 0–1.3)
MONOCYTES NFR BLD AUTO: 0 %
NEUTROPHILS # BLD AUTO: 11.9 10E9/L (ref 1.6–8.3)
NEUTROPHILS NFR BLD AUTO: 99.1 %
PHOSPHATE SERPL-MCNC: 2.8 MG/DL (ref 2.5–4.5)
PHOSPHATE SERPL-MCNC: 2.9 MG/DL (ref 2.5–4.5)
PLATELET # BLD AUTO: 214 10E9/L (ref 150–450)
PLATELET # BLD EST: ABNORMAL 10*3/UL
POTASSIUM SERPL-SCNC: 3.2 MMOL/L (ref 3.4–5.3)
POTASSIUM SERPL-SCNC: 4 MMOL/L (ref 3.4–5.3)
POTASSIUM SERPL-SCNC: 4.4 MMOL/L (ref 3.4–5.3)
PROT SERPL-MCNC: 5.4 G/DL (ref 6.8–8.8)
RBC # BLD AUTO: 3.31 10E12/L (ref 4.4–5.9)
RBC MORPH BLD: NORMAL
SODIUM SERPL-SCNC: 139 MMOL/L (ref 133–144)
URATE SERPL-MCNC: 1.8 MG/DL (ref 3.5–7.2)
URATE SERPL-MCNC: 2.3 MG/DL (ref 3.5–7.2)
WBC # BLD AUTO: 12 10E9/L (ref 4–11)

## 2020-03-24 PROCEDURE — 84550 ASSAY OF BLOOD/URIC ACID: CPT | Performed by: PHYSICIAN ASSISTANT

## 2020-03-24 PROCEDURE — 36592 COLLECT BLOOD FROM PICC: CPT | Performed by: INTERNAL MEDICINE

## 2020-03-24 PROCEDURE — 84100 ASSAY OF PHOSPHORUS: CPT | Performed by: PHYSICIAN ASSISTANT

## 2020-03-24 PROCEDURE — 25000128 H RX IP 250 OP 636: Performed by: PHYSICIAN ASSISTANT

## 2020-03-24 PROCEDURE — 25000132 ZZH RX MED GY IP 250 OP 250 PS 637: Performed by: STUDENT IN AN ORGANIZED HEALTH CARE EDUCATION/TRAINING PROGRAM

## 2020-03-24 PROCEDURE — 25000132 ZZH RX MED GY IP 250 OP 250 PS 637: Performed by: NURSE PRACTITIONER

## 2020-03-24 PROCEDURE — 83735 ASSAY OF MAGNESIUM: CPT | Performed by: PHYSICIAN ASSISTANT

## 2020-03-24 PROCEDURE — 80053 COMPREHEN METABOLIC PANEL: CPT | Performed by: PHYSICIAN ASSISTANT

## 2020-03-24 PROCEDURE — 82565 ASSAY OF CREATININE: CPT | Performed by: PHYSICIAN ASSISTANT

## 2020-03-24 PROCEDURE — 25000132 ZZH RX MED GY IP 250 OP 250 PS 637: Performed by: PHYSICIAN ASSISTANT

## 2020-03-24 PROCEDURE — 85025 COMPLETE CBC W/AUTO DIFF WBC: CPT | Performed by: PHYSICIAN ASSISTANT

## 2020-03-24 PROCEDURE — 36592 COLLECT BLOOD FROM PICC: CPT | Performed by: PHYSICIAN ASSISTANT

## 2020-03-24 PROCEDURE — 83615 LACTATE (LD) (LDH) ENZYME: CPT | Performed by: PHYSICIAN ASSISTANT

## 2020-03-24 PROCEDURE — 99233 SBSQ HOSP IP/OBS HIGH 50: CPT | Performed by: INTERNAL MEDICINE

## 2020-03-24 PROCEDURE — 25000131 ZZH RX MED GY IP 250 OP 636 PS 637: Performed by: PHYSICIAN ASSISTANT

## 2020-03-24 PROCEDURE — 84132 ASSAY OF SERUM POTASSIUM: CPT | Performed by: PHYSICIAN ASSISTANT

## 2020-03-24 PROCEDURE — 25000128 H RX IP 250 OP 636: Performed by: INTERNAL MEDICINE

## 2020-03-24 PROCEDURE — 84132 ASSAY OF SERUM POTASSIUM: CPT | Performed by: INTERNAL MEDICINE

## 2020-03-24 PROCEDURE — 85610 PROTHROMBIN TIME: CPT | Performed by: PHYSICIAN ASSISTANT

## 2020-03-24 PROCEDURE — 85384 FIBRINOGEN ACTIVITY: CPT | Performed by: PHYSICIAN ASSISTANT

## 2020-03-24 PROCEDURE — 12000001 ZZH R&B MED SURG/OB UMMC

## 2020-03-24 RX ORDER — POLYETHYLENE GLYCOL 3350 17 G/17G
17 POWDER, FOR SOLUTION ORAL DAILY PRN
Status: DISCONTINUED | OUTPATIENT
Start: 2020-03-24 | End: 2020-03-30 | Stop reason: HOSPADM

## 2020-03-24 RX ADMIN — SODIUM CHLORIDE, PRESERVATIVE FREE 5 ML: 5 INJECTION INTRAVENOUS at 12:37

## 2020-03-24 RX ADMIN — FILGRASTIM 480 MCG: 480 INJECTION, SOLUTION INTRAVENOUS; SUBCUTANEOUS at 09:13

## 2020-03-24 RX ADMIN — PANTOPRAZOLE SODIUM 40 MG: 40 TABLET, DELAYED RELEASE ORAL at 09:00

## 2020-03-24 RX ADMIN — SENNOSIDES AND DOCUSATE SODIUM 1 TABLET: 8.6; 5 TABLET ORAL at 16:02

## 2020-03-24 RX ADMIN — CALCIUM CITRATE 200 MG (950 MG) TABLET 950 MG: at 09:00

## 2020-03-24 RX ADMIN — Medication 5 ML: at 05:08

## 2020-03-24 RX ADMIN — ENOXAPARIN SODIUM 40 MG: 40 INJECTION SUBCUTANEOUS at 09:54

## 2020-03-24 RX ADMIN — ALLOPURINOL 300 MG: 300 TABLET ORAL at 09:00

## 2020-03-24 RX ADMIN — Medication 5 ML: at 17:43

## 2020-03-24 RX ADMIN — POTASSIUM CHLORIDE 40 MEQ: 750 TABLET, EXTENDED RELEASE ORAL at 09:02

## 2020-03-24 RX ADMIN — POTASSIUM CHLORIDE 20 MEQ: 750 TABLET, EXTENDED RELEASE ORAL at 11:45

## 2020-03-24 RX ADMIN — LEVOTHYROXINE SODIUM 200 MCG: 0.1 TABLET ORAL at 09:01

## 2020-03-24 RX ADMIN — LORATADINE 10 MG: 10 TABLET ORAL at 09:01

## 2020-03-24 RX ADMIN — DEXAMETHASONE 3 MG: 1.5 TABLET ORAL at 09:00

## 2020-03-24 RX ADMIN — SENNOSIDES AND DOCUSATE SODIUM 1 TABLET: 8.6; 5 TABLET ORAL at 09:01

## 2020-03-24 ASSESSMENT — ACTIVITIES OF DAILY LIVING (ADL)
ADLS_ACUITY_SCORE: 13

## 2020-03-24 ASSESSMENT — MIFFLIN-ST. JEOR: SCORE: 1729.14

## 2020-03-24 NOTE — PLAN OF CARE
"/74 (BP Location: Left arm)   Pulse 69   Temp 97.8  F (36.6  C) (Oral)   Resp 18   Ht 1.74 m (5' 8.5\")   Wt 92.6 kg (204 lb 1.6 oz)   SpO2 99%   BMI 30.58 kg/m      VSS. Afebrile. Pt denies shortness of breath or chest pain. LS clear. Satting in high 90s on RA. CMS intact. Laminectomy incision w/ erythema, CDI. LP site w/ band aide, CDI. Pt sleeping well overnight, up ad riki to use bathroom. Pt denies pain or nausea.  Continue plan of care   "

## 2020-03-24 NOTE — PLAN OF CARE
AVSS.  Pt A/Ox4.  Had LP with IT chemo today, tolerated well.  Complained of mild backache after, relief with tylenol.  Up independently.  Neuros intact.  Will continue to monitor.

## 2020-03-24 NOTE — PROGRESS NOTES
[facility]    Hematology / Oncology Progress Note    Date of Service (when I saw the patient): 03/24/2020     Assessment & Plan   Kobe Pedroza is a 60 year old male with a history of thyroid cancer status post thyroidectomy (2011), CAD, and hyperlipidemia who was transferred from [facility] to [facility] on 3/13 with a newly thoracic extradural mass at T5-6 with severe cord compression, now status post neurosurgical gross resection on 3/15 with initial pathology concerning for high-grade B-cell lymphoma. He transferred to the Malignant Hematology team on 3/17 for further work-up and management of his newly diagnosed with Burkitt Lymphoma. He started R-CODOX-M/IVAC chemotherapy regimen (D1=3/18/2020). Today is Day 7    Today:  - Today is D7  - LP and IT chemo completed yesterday, tolerated well. No signs of headache or dizziness  - Continue Dexamethasone taper for 12 days  - Monitor TLS BID  - Restarted Lovenox today for VTE ppx as platelets are at 214K    HEME/ONC  # Burkitt's lymphoma  # Thoracic spine mass  # Spinal cord compression  Patient presented on 3/12 to [facility] with acute-on-chronic mid-thoracic back pain with some associated radicular symptoms. Per patient, no B-symptoms, though he did recently intentionally lose 35 lbs. 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 Patient had no appreciable neurological deficits. He was started on dexamethasone and underwent T5-6 laminectomy with gross total resection of epidural tumor on 3/15. Flow cytometry of the specimen was notable for CD10 positive and kappa monotypic B cells (83%). Confirmed Burkitt lymphoma with surgical pathology. Cytogenetics pending.  - PET scan completed 3/18  - Bone marrow biopsy completed 3/18. Tolerated well with Versed 1mg IV  - ECHO (3/16) with LVEF of 60-65%, no diastolic dysfunction.  - Baseline EKG (3/13) without  evidence of ischemia or dysrhythmia.  - PICC line placed.   - Continue daily PPI while on steroids.  - Dexamethasone taper for 12 days   - on 3/24 Dexamethasone 3 g Q24h po x 2 days;  on 3/26 Dexamethasone 2 g Q24h po x 2 days;  on 3/28 Dexamethasone 1 g Q24h po x 2 days;  on 3/30 Dexamethasone 0.5 g Q24h po x 2 days  - TLS labs Q12h, DIC labs daily   - Continue allopurinol 300 mg daily    Treatment Plan: R-CODOX-M/IVAC (D1=3/18/2020). Today is D6  - Cyclophosphamide 1,705 mg IV for D1, D2  - Doxorubicin 110 mg IV D1  - Vincristine 2 mg IV D1, D10  - Rituximab 800 mg   - Methotrexate 3 g/m2 D10  - Leucovorin calcium injection 426 mg D11  - Neupogen injection 480 mcg daily for D1-D6  - LP and IT chemo scheduled for tomorrow (3/20) @ 1300 per chemo protocol   - cytarabine 50 mg, hydrocortisone sodium succinate 50 mg, methotrexate 12 mg   - LP and IT chemo scheduled for Monday (3/23) @ 1300 per chemo protocol    - cytarabine 50 mg, hydrocortisone sodium succinate 50 mg   - Supportive Medications:   - Allopurinol tablet 300 mg    - PRN Anti Emetics   - PRN Bowel Regimen   - PRN Pain control    # ID Prophylaxis  - Viral serologies: HepC nonreactive, HepB negative and HIV negative  - Not currently neutropenic, will consider ID ppx if patient becomes neutropenic  - Negative HSV 1/2  - Pentamidine given on (3/21)    # Normocytic anemia  - CBC daily  - Transfuse if Hgb <7, Platelets <10K     # Thyroid cancer status-post thyroidectomy  Status post thyroidectomy in 2011. TSH normal on admission.  - Continue PTA levothyroxine     # Leukocytosis - resolving  Presumably due to recent dexamethasone use and neupogen  No infectious symptoms  - CBC daily     MSK/NEURO  # Thoracic back pain - resolving  Due to T5-6 extradural tumor as detailed above. Patient now status post T5-6 laminectomy with gross total resction on 3/15. Today is POD #4.   - Post-op activity recommendations per NSG:   - Larry Brace to be worn out of bed  (orthotics consult in place)   - No lifting >10 lbs, twisting, straining, or strenuous activity for at least 2 weeks  - Continue APAP, oxycodone, methocarbamol PRN for pain  - Patient should have scheduled outpatient neurosurgery follow-up in 2 weeks (may be via Tele-Health); NSG will coordinate      CV  # Coronary artery disease  # Hyperlipidemia  Follow[place] by Dr. Zeng at [facility] [facility] [place] Diagnosed with mild, non-obstructive CAD in 2018. Calcium score at that time was 5.5. Baby ASA and low-dose statin therapy recommended. No previous cardiac caths or stents.  - Hold PTA statin in light of starting chemotherapy     GI  # Hypocalcemia  Mild. Ca 7.4 today, corrects to 8.4 for albumin.  - Continue PO calcium citrate      # Constipation - resolved  Noted prior to presentation. Patient reports decreased frequency of bowel movements. Has been responsive to laxative use.   - Has been having regular BM with current bowel regimen  - Continue scheduled senna   - Change scheduled Miralax to PRN  - Mag Citrate provided PRN     # Hypokalemia  - intermittent hypokalemia  - continue replacements per protocol  - will consider adding K 20 meq po daily if continues to remain low persistently  - Continue to monitor    FEN:  - No IVF for now; encourage PO intake.   - Lyte replacement per protocol  - Regular diet as tolerated.    Prophy/Misc:  - GI: Continue bowel regimen as above; continue PPI while on steroids.  - DVT: Restarted Lovenox 40mg subQ today; Hold Lovenox if platelets <50K.  - Activity: as tolerated and per neurosurg recommendations, PT consulted  - Diet: Regular as tolerated     Disposition: Anticipate discharge to home after cycle 1 of CODOX-M, likely 2 weeks from D1 of chemotherapy. Will get methotrexate at the end of the week. Will likely be able to discharge once methotrexate levels clear.      Code  Full Code     Patient is seen and examined by Dr. Wright and I who agrees with the assessment  "and plan are discussed above.    Mckayla Alvarez PA-C  Hematology/Oncology  Pager: #[phone]    Interval History   No acute events overnight.   Kobe reports that he is feeling \"great\" today. States he has not had any pain today and was able to control any pain he had yesterday with \"only a couple tylenol\". Reports his bowel movements are regular with the regimen he is currently on. Appetite intact. Encourage po fluid intake. Denies headache, vision changes, sore throat, oral sores, SOB, cough, chest pain, abdominal pain, constipation, diarrhea, N/V, hematuria, dysuria, tingling in hands or feet, difficulty ambulating or new swelling.  All questions answered at bedside.    Physical Exam   Temp: 98  F (36.7  C) Temp src: Oral BP: 111/49 Pulse: 69 Heart Rate: 77 Resp: 20 SpO2: 98 % O2 Device: None (Room air)    Vitals:    03/21/20 1143 03/22/20 0836 03/23/20 0744   Weight: 92.9 kg (204 lb 14.4 oz) 92.4 kg (203 lb 9.6 oz) 92.6 kg (204 lb 1.6 oz)     Vital Signs with Ranges  Temp:  [96.5  F (35.8  C)-98.4  F (36.9  C)] 98  F (36.7  C)  Pulse:  [69-70] 69  Heart Rate:  [70-83] 77  Resp:  [16-20] 20  BP: (111-138)/(49-77) 111/49  SpO2:  [96 %-99 %] 98 %  I/O last 3 completed shifts:  In: 1250 [P.O.:1200; I.V.:50]  Out: 1100 [Urine:1100]    Constitutional: Pleasant man sitting at edge of bed. Awake and conversational. Non- toxic appearing. No acute distress. Well developed, hydrated, and nourished.   HEENT: Normocephalic, atraumatic without tenderness or palpable masses/depressions. Sclerae anicteric. EOM intact. Moist mucus membranes without lesions, thrush, or exudates appreciated  Lymph: Neck supple.   Respiratory: Breathing comfortable with no increased work on room air. Good air exchange. No signs of respiratory distress or accessory muscle use. Lungs clear to auscultation bilaterally, no crackles or wheezing noted.  Cardiovascular: Regular rate and rhythm. Normal S1 and S2. No murmurs, rubs, or gallops. No peripheral " edema.    GI: Abdomen is soft, non-distended, non-tender and without distension. Bowel sounds present.   Skin: Skin is clean, dry, intact. No jaundice appreciated.   Musculoskeletal/ Extremities: No redness, warmth, or swelling of the joints appreciated. Distal pulses palpable. Nailbeds pink and without cyanosis or clubbing.   Neurologic: Alert and oriented. Speech normal. Grossly nonfocal. Memory and thought process preserved. Motor function grossly intact. Sensation grossly intact.   Neuropsychiatric: Calm, affect congruent to situation. Appropriate mood and affect. Good judgment and insight. No visual/auditory hallucinations.     Medications       albuterol  2.5 mg Nebulization Q28 Days    And     pentamidine  300 mg Inhalation Q28 Days     allopurinol  300 mg Oral Daily     calcium citrate  950 mg Oral Daily     [START ON 3/30/2020] dexamethasone  0.5 mg Oral Daily     [START ON 3/28/2020] dexamethasone  1 mg Oral Daily     [START ON 3/26/2020] dexamethasone  2 mg Oral Daily     dexamethasone  3 mg Oral Daily     filgrastim (NEUPOGEN/GRANIX) subcutaneous  5 mcg/kg (Treatment Plan Recorded) Subcutaneous Daily     heparin lock flush  5-10 mL Intracatheter Q24H     levothyroxine  200 mcg Oral QAM AC     loratadine  10 mg Oral Daily     pantoprazole  40 mg Oral QAM AC     polyethylene glycol  17 g Oral Daily     senna-docusate  1 tablet Oral BID    Or     senna-docusate  2 tablet Oral BID     sodium chloride (PF)  10 mL Intracatheter Q8H       Data   Results for orders placed or performed during the hospital encounter of 03/13/20 (from the past 24 hour(s))   Glucose CSF:   Result Value Ref Range    Glucose CSF 64 40 - 70 mg/dL   Protein total CSF:   Result Value Ref Range    Protein Total CSF 69 (H) 15 - 60 mg/dL   Gram stain    Specimen: Cerebral spinal fluid; Cerebrospinal fluid   Result Value Ref Range    Specimen Description Cerebrospinal fluid     Special Requests TUBE 2     Gram Stain No organisms seen      Gram Stain Few  WBC'S seen       Gram Stain       Quantification of host cells and microbiological organisms was done on a cytocentrifuged   preparation.     CSF Culture Aerobic Bacterial    Specimen: Cerebral spinal fluid; Cerebrospinal fluid   Result Value Ref Range    Specimen Description Cerebrospinal fluid     Special Requests TUBE 2     Culture Micro Culture negative monitoring continues    Cell count with differential CSF:   Result Value Ref Range    WBC CSF 1 0 - 5 /uL    RBC CSF 20 (H) 0 - 2 /uL    Tube Number 4 #    Color CSF Colorless CLRL^Colorless    Appearance CSF Clear CLER^Clear   XR Lumbar Punc Intrathecal Chemo Admin    Narrative    Lumbar Puncture and Intrathecal Chemotherapy Administration using  Fluoroscopy    History:  Burkitt Lymphoma for IT chemotherapy..    Procedure note: Verbal and written consent for lumbar puncture was  obtained from the patient, and benefits and risk of the procedure were  explained, including but not limited to worsening headache,  hemorrhage, infection, lower extremity pain, or nerve root injury. The  patient was sterilely prepped and draped with the patient in the prone  position, over the lower back. Under fluoroscopic guidance, the  interlaminar spaces were noted. 1% lidocaine was administered for  local anesthetic over the L2-3 interlaminar space, and a 22 gauge 3.5  inch needle was advanced into the thecal sac under fluoroscopic  guidance.  There was initial show of clear CSF. Approximately 6 cc of  CSF were collected. Intrathecal chemotherapy is administered by  Hematology team.    The needle was removed with the stylet in place. There was no  immediate complication associated with the procedure. Samples were  sent for the requested laboratory testing.      Estimated blood loss: Less than 1 cc.    Fluoroscopic time: 29 seconds.    Dose: 9.02 mGy.      Impression    Impression: Successful fluoroscopic guided lumbar puncture and  subsequent chemotherapy  administration without immediate complication.    I, CORBIN TREVINO MD, attest that I was present for all critical  portions of the procedure and was immediately available to provide  guidance and assistance during the remainder of the procedure.        I have personally reviewed the examination and initial interpretation  and I agree with the findings.    CORBIN TREVINO MD   Creatinine   Result Value Ref Range    Creatinine 0.51 (L) 0.66 - 1.25 mg/dL    GFR Estimate >90 >60 mL/min/[1.73_m2]    GFR Estimate If Black >90 >60 mL/min/[1.73_m2]   Phosphorus   Result Value Ref Range    Phosphorus 2.8 2.5 - 4.5 mg/dL   Uric acid   Result Value Ref Range    Uric Acid 2.2 (L) 3.5 - 7.2 mg/dL   Lactate Dehydrogenase   Result Value Ref Range    Lactate Dehydrogenase 190 85 - 227 U/L   Potassium   Result Value Ref Range    Potassium 4.0 3.4 - 5.3 mmol/L   INR   Result Value Ref Range    INR 1.03 0.86 - 1.14   Fibrinogen activity   Result Value Ref Range    Fibrinogen 374 200 - 420 mg/dL   Comprehensive metabolic panel   Result Value Ref Range    Sodium 139 133 - 144 mmol/L    Potassium 3.2 (L) 3.4 - 5.3 mmol/L    Chloride 106 94 - 109 mmol/L    Carbon Dioxide 26 20 - 32 mmol/L    Anion Gap 7 3 - 14 mmol/L    Glucose 153 (H) 70 - 99 mg/dL    Urea Nitrogen 17 7 - 30 mg/dL    Creatinine 0.55 (L) 0.66 - 1.25 mg/dL    GFR Estimate >90 >60 mL/min/[1.73_m2]    GFR Estimate If Black >90 >60 mL/min/[1.73_m2]    Calcium 7.2 (L) 8.5 - 10.1 mg/dL    Bilirubin Total 0.6 0.2 - 1.3 mg/dL    Albumin 2.7 (L) 3.4 - 5.0 g/dL    Protein Total 5.4 (L) 6.8 - 8.8 g/dL    Alkaline Phosphatase 58 40 - 150 U/L    ALT 15 0 - 70 U/L    AST 11 0 - 45 U/L   Phosphorus   Result Value Ref Range    Phosphorus 2.8 2.5 - 4.5 mg/dL   Uric acid   Result Value Ref Range    Uric Acid 2.3 (L) 3.5 - 7.2 mg/dL   Lactate Dehydrogenase   Result Value Ref Range    Lactate Dehydrogenase 170 85 - 227 U/L   CBC with platelets differential   Result Value Ref Range    WBC  12.0 (H) 4.0 - 11.0 10e9/L    RBC Count 3.31 (L) 4.4 - 5.9 10e12/L    Hemoglobin 9.8 (L) 13.3 - 17.7 g/dL    Hematocrit 29.8 (L) 40.0 - 53.0 %    MCV 90 78 - 100 fl    MCH 29.6 26.5 - 33.0 pg    MCHC 32.9 31.5 - 36.5 g/dL    RDW 13.2 10.0 - 15.0 %    Platelet Count 214 150 - 450 10e9/L    Diff Method Manual Differential     % Neutrophils 99.1 %    % Lymphocytes 0.9 %    % Monocytes 0.0 %    % Eosinophils 0.0 %    % Basophils 0.0 %    Absolute Neutrophil 11.9 (H) 1.6 - 8.3 10e9/L    Absolute Lymphocytes 0.1 (L) 0.8 - 5.3 10e9/L    Absolute Monocytes 0.0 0.0 - 1.3 10e9/L    Absolute Eosinophils 0.0 0.0 - 0.7 10e9/L    Absolute Basophils 0.0 0.0 - 0.2 10e9/L    RBC Morphology Normal     Platelet Estimate Confirming automated cell count    Magnesium   Result Value Ref Range    Magnesium 1.9 1.6 - 2.3 mg/dL

## 2020-03-24 NOTE — PLAN OF CARE
"/71   Pulse 69   Temp 96.7  F (35.9  C) (Axillary)   Resp 20   Ht 1.74 m (5' 8.5\")   Wt 92.7 kg (204 lb 4.8 oz)   SpO2 96%   BMI 30.61 kg/m      Neuro: A&Ox4.   Cardiac: VSS. Afebrile   Respiratory: Sating 96% on RA.  GI/: Good urine output per urinal. No BM  Diet/appetite: Tolerating diet. Eating well.  Activity:  Independent in room. Took a shower   Pain: Denies  Skin: Upper back with sutures- slightly red- OLIVER, Lower back puncture site with band aid c/d/i  LDA's: R picc HL'd    Replaced K+ 3.2 per order. Level recheck scheduled at 3:45pm     "

## 2020-03-25 ENCOUNTER — APPOINTMENT (OUTPATIENT)
Dept: PHYSICAL THERAPY | Facility: CLINIC | Age: 60
DRG: 820 | End: 2020-03-25
Attending: NEUROLOGICAL SURGERY
Payer: COMMERCIAL

## 2020-03-25 LAB
ALBUMIN SERPL-MCNC: 2.9 G/DL (ref 3.4–5)
ALP SERPL-CCNC: 55 U/L (ref 40–150)
ALT SERPL W P-5'-P-CCNC: 18 U/L (ref 0–70)
ANION GAP SERPL CALCULATED.3IONS-SCNC: 6 MMOL/L (ref 3–14)
APPEARANCE CSF: CLEAR
AST SERPL W P-5'-P-CCNC: 7 U/L (ref 0–45)
BACTERIA SPEC CULT: NO GROWTH
BASOPHILS # BLD AUTO: 0 10E9/L (ref 0–0.2)
BASOPHILS NFR BLD AUTO: 1.8 %
BILIRUB SERPL-MCNC: 0.4 MG/DL (ref 0.2–1.3)
BUN SERPL-MCNC: 21 MG/DL (ref 7–30)
CALCIUM SERPL-MCNC: 7.5 MG/DL (ref 8.5–10.1)
CHLORIDE SERPL-SCNC: 105 MMOL/L (ref 94–109)
CO2 SERPL-SCNC: 27 MMOL/L (ref 20–32)
COLOR CSF: COLORLESS
CREAT SERPL-MCNC: 0.57 MG/DL (ref 0.66–1.25)
CREAT SERPL-MCNC: 0.6 MG/DL (ref 0.66–1.25)
DIFFERENTIAL METHOD BLD: ABNORMAL
EOSINOPHIL # BLD AUTO: 0.1 10E9/L (ref 0–0.7)
EOSINOPHIL NFR BLD AUTO: 4.6 %
ERYTHROCYTE [DISTWIDTH] IN BLOOD BY AUTOMATED COUNT: 13.1 % (ref 10–15)
FIBRINOGEN PPP-MCNC: 380 MG/DL (ref 200–420)
GFR SERPL CREATININE-BSD FRML MDRD: >90 ML/MIN/{1.73_M2}
GFR SERPL CREATININE-BSD FRML MDRD: >90 ML/MIN/{1.73_M2}
GLUCOSE SERPL-MCNC: 99 MG/DL (ref 70–99)
HCT VFR BLD AUTO: 30.8 % (ref 40–53)
HGB BLD-MCNC: 10 G/DL (ref 13.3–17.7)
INR PPP: 1.02 (ref 0.86–1.14)
LDH SERPL L TO P-CCNC: 148 U/L (ref 85–227)
LDH SERPL L TO P-CCNC: 153 U/L (ref 85–227)
LYMPHOCYTES # BLD AUTO: 0.5 10E9/L (ref 0.8–5.3)
LYMPHOCYTES NFR BLD AUTO: 25.7 %
MAGNESIUM SERPL-MCNC: 1.9 MG/DL (ref 1.6–2.3)
MCH RBC QN AUTO: 29.5 PG (ref 26.5–33)
MCHC RBC AUTO-ENTMCNC: 32.5 G/DL (ref 31.5–36.5)
MCV RBC AUTO: 91 FL (ref 78–100)
MONOCYTES # BLD AUTO: 0 10E9/L (ref 0–1.3)
MONOCYTES NFR BLD AUTO: 0 %
NEUTROPHILS # BLD AUTO: 1.3 10E9/L (ref 1.6–8.3)
NEUTROPHILS NFR BLD AUTO: 67.9 %
PHOSPHATE SERPL-MCNC: 2.3 MG/DL (ref 2.5–4.5)
PHOSPHATE SERPL-MCNC: 3.3 MG/DL (ref 2.5–4.5)
PLATELET # BLD AUTO: 215 10E9/L (ref 150–450)
PLATELET # BLD EST: ABNORMAL 10*3/UL
POTASSIUM SERPL-SCNC: 3.6 MMOL/L (ref 3.4–5.3)
POTASSIUM SERPL-SCNC: 3.7 MMOL/L (ref 3.4–5.3)
PROT SERPL-MCNC: 5.6 G/DL (ref 6.8–8.8)
RBC # BLD AUTO: 3.39 10E12/L (ref 4.4–5.9)
RBC # CSF MANUAL: 20 /UL (ref 0–2)
RBC MORPH BLD: NORMAL
SODIUM SERPL-SCNC: 138 MMOL/L (ref 133–144)
SPECIMEN SOURCE: NORMAL
TUBE # CSF: 4 #
URATE SERPL-MCNC: 1.9 MG/DL (ref 3.5–7.2)
URATE SERPL-MCNC: 2.2 MG/DL (ref 3.5–7.2)
WBC # BLD AUTO: 1.9 10E9/L (ref 4–11)
WBC # CSF MANUAL: 1 /UL (ref 0–5)

## 2020-03-25 PROCEDURE — 25000131 ZZH RX MED GY IP 250 OP 636 PS 637: Performed by: PHYSICIAN ASSISTANT

## 2020-03-25 PROCEDURE — 84550 ASSAY OF BLOOD/URIC ACID: CPT | Performed by: PHYSICIAN ASSISTANT

## 2020-03-25 PROCEDURE — 25000132 ZZH RX MED GY IP 250 OP 250 PS 637: Performed by: PHYSICIAN ASSISTANT

## 2020-03-25 PROCEDURE — 83735 ASSAY OF MAGNESIUM: CPT | Performed by: NURSE PRACTITIONER

## 2020-03-25 PROCEDURE — 84100 ASSAY OF PHOSPHORUS: CPT | Performed by: PHYSICIAN ASSISTANT

## 2020-03-25 PROCEDURE — 25000132 ZZH RX MED GY IP 250 OP 250 PS 637: Performed by: STUDENT IN AN ORGANIZED HEALTH CARE EDUCATION/TRAINING PROGRAM

## 2020-03-25 PROCEDURE — 12000001 ZZH R&B MED SURG/OB UMMC

## 2020-03-25 PROCEDURE — 83615 LACTATE (LD) (LDH) ENZYME: CPT | Performed by: PHYSICIAN ASSISTANT

## 2020-03-25 PROCEDURE — 97110 THERAPEUTIC EXERCISES: CPT | Mod: GP | Performed by: PHYSICAL THERAPIST

## 2020-03-25 PROCEDURE — 25000132 ZZH RX MED GY IP 250 OP 250 PS 637: Performed by: NURSE PRACTITIONER

## 2020-03-25 PROCEDURE — 85610 PROTHROMBIN TIME: CPT | Performed by: PHYSICIAN ASSISTANT

## 2020-03-25 PROCEDURE — 85384 FIBRINOGEN ACTIVITY: CPT | Performed by: PHYSICIAN ASSISTANT

## 2020-03-25 PROCEDURE — 25800030 ZZH RX IP 258 OP 636: Performed by: PHYSICIAN ASSISTANT

## 2020-03-25 PROCEDURE — 25000125 ZZHC RX 250: Performed by: PHYSICIAN ASSISTANT

## 2020-03-25 PROCEDURE — 36592 COLLECT BLOOD FROM PICC: CPT | Performed by: PHYSICIAN ASSISTANT

## 2020-03-25 PROCEDURE — 83735 ASSAY OF MAGNESIUM: CPT | Performed by: PHYSICIAN ASSISTANT

## 2020-03-25 PROCEDURE — 25000128 H RX IP 250 OP 636: Performed by: PHYSICIAN ASSISTANT

## 2020-03-25 PROCEDURE — 85025 COMPLETE CBC W/AUTO DIFF WBC: CPT | Performed by: PHYSICIAN ASSISTANT

## 2020-03-25 PROCEDURE — 80053 COMPREHEN METABOLIC PANEL: CPT | Performed by: PHYSICIAN ASSISTANT

## 2020-03-25 PROCEDURE — 97530 THERAPEUTIC ACTIVITIES: CPT | Mod: GP | Performed by: PHYSICAL THERAPIST

## 2020-03-25 PROCEDURE — 25000128 H RX IP 250 OP 636: Performed by: INTERNAL MEDICINE

## 2020-03-25 PROCEDURE — 84132 ASSAY OF SERUM POTASSIUM: CPT | Performed by: PHYSICIAN ASSISTANT

## 2020-03-25 PROCEDURE — 82565 ASSAY OF CREATININE: CPT | Performed by: PHYSICIAN ASSISTANT

## 2020-03-25 RX ADMIN — PANTOPRAZOLE SODIUM 40 MG: 40 TABLET, DELAYED RELEASE ORAL at 08:31

## 2020-03-25 RX ADMIN — DEXAMETHASONE 3 MG: 1.5 TABLET ORAL at 08:31

## 2020-03-25 RX ADMIN — CALCIUM CITRATE 200 MG (950 MG) TABLET 950 MG: at 08:31

## 2020-03-25 RX ADMIN — LORATADINE 10 MG: 10 TABLET ORAL at 08:31

## 2020-03-25 RX ADMIN — Medication 5 ML: at 17:52

## 2020-03-25 RX ADMIN — SENNOSIDES AND DOCUSATE SODIUM 1 TABLET: 8.6; 5 TABLET ORAL at 20:21

## 2020-03-25 RX ADMIN — ALLOPURINOL 300 MG: 300 TABLET ORAL at 08:31

## 2020-03-25 RX ADMIN — FILGRASTIM 480 MCG: 480 INJECTION, SOLUTION INTRAVENOUS; SUBCUTANEOUS at 08:31

## 2020-03-25 RX ADMIN — SODIUM CHLORIDE, PRESERVATIVE FREE 5 ML: 5 INJECTION INTRAVENOUS at 09:41

## 2020-03-25 RX ADMIN — SENNOSIDES AND DOCUSATE SODIUM 1 TABLET: 8.6; 5 TABLET ORAL at 08:31

## 2020-03-25 RX ADMIN — ENOXAPARIN SODIUM 40 MG: 40 INJECTION SUBCUTANEOUS at 08:31

## 2020-03-25 RX ADMIN — POTASSIUM PHOSPHATE, MONOBASIC AND POTASSIUM PHOSPHATE, DIBASIC 15 MMOL: 224; 236 INJECTION, SOLUTION INTRAVENOUS at 20:21

## 2020-03-25 RX ADMIN — LEVOTHYROXINE SODIUM 200 MCG: 0.1 TABLET ORAL at 08:31

## 2020-03-25 ASSESSMENT — ACTIVITIES OF DAILY LIVING (ADL)
ADLS_ACUITY_SCORE: 14
ADLS_ACUITY_SCORE: 13
ADLS_ACUITY_SCORE: 14
ADLS_ACUITY_SCORE: 14

## 2020-03-25 ASSESSMENT — MIFFLIN-ST. JEOR: SCORE: 1725.96

## 2020-03-25 NOTE — PLAN OF CARE
[id number]    No acute change overnight, pt slept most of the night.     NEURO: Alert and oriented x4.   Pt c/o being feeling foggy at 0430, vitals stable.      RESPIRATORY: Stable at RA, denies dizziness, and SOB.    CARDIO: AVSS, denies dizziness, and extremity pain.      GI/:  Denies N/V, BS active, void urine spontaneously without saving.       SKIN: Intact.      ACTIVITY: Independent.      PAIN: Denies pain.    DLA: PICC, heparin locked.     BG: No     PLAN: Continue monitoring.

## 2020-03-25 NOTE — PLAN OF CARE
Jonnathan has been afe and WBC dropping while on Neupogen subcutaneous.  Reviewed chemo side effects and lab valves today with Jonnathan.  Denies nausea and eating well  Up to shower and needs encouragement to get out of bed.  Back incision slightly red and swelling -assessed by MD. Sarah WRAY contacted surgeons -pt has self dissolving sutures and will monitor site.  LP and BMBX sites are CDI.  PICC site also CDI

## 2020-03-25 NOTE — PLAN OF CARE
AVSS. Afebrile, no c/o pain, up ind. K+ recheck 4.4; no further replacement necessary. Pt resting comfortably this shift; says he's a bit more tired today. 1 bowel movement this shift    Order for daily neuro checks; neuros intact

## 2020-03-25 NOTE — PROGRESS NOTES
[facility]    Hematology / Oncology Progress Note    Date of Service (when I saw the patient): 03/25/2020     Assessment & Plan   Kobe Pedroza is a 60 year old male with a history of thyroid cancer status post thyroidectomy (2011), CAD, and hyperlipidemia who was transferred from [facility] to [facility] on 3/13 with a newly thoracic extradural mass at T5-6 with severe cord compression, now status post neurosurgical gross resection on 3/15 with initial pathology concerning for high-grade B-cell lymphoma. He transferred to the Malignant Hematology team on 3/17 for further work-up and management of his newly diagnosed with Burkitt Lymphoma. He started R-CODOX-M/IVAC chemotherapy regimen (D1=3/18/2020). Today is Day 8    Today:  - Today is D8  - Continue Dexamethasone taper for 12 days  - Monitor TLS BID  - Not neutropenic, will start fluconazole and levaquin when he becomes neutropenic  - Plan for high dose methotrexate and vincristine on D10 (Friday)   - Off neupogen per protocol    HEME/ONC  # Burkitt's lymphoma  # Thoracic spine mass  # Spinal cord compression  Patient presented on 3/12 to [facility] with acute-on-chronic mid-thoracic back pain with some associated radicular symptoms. Per patient, no B-symptoms, though he did recently intentionally lose 35 lbs. 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 Patient had no appreciable neurological deficits. He was started on dexamethasone and underwent T5-6 laminectomy with gross total resection of epidural tumor on 3/15. Flow cytometry of the specimen was notable for CD10 positive and kappa monotypic B cells (83%). Confirmed Burkitt lymphoma with surgical pathology. Cytogenetics pending.  - PET scan completed 3/18  - Bone marrow biopsy completed 3/18. Tolerated well with Versed 1mg IV  - ECHO (3/16) with LVEF of 60-65%, no diastolic dysfunction.  -  Baseline EKG (3/13) without evidence of ischemia or dysrhythmia.  - PICC line placed.   - Continue daily PPI while on steroids.  - Dexamethasone taper for 12 days   - on 3/24 Dexamethasone 3 g Q24h po x 2 days;  on 3/26 Dexamethasone 2 g Q24h po x 2 days;  on 3/28 Dexamethasone 1 g Q24h po x 2 days;  on 3/30 Dexamethasone 0.5 g Q24h po x 2 days  - TLS labs Q12h, DIC labs daily   - Continue allopurinol 300 mg daily    Treatment Plan: R-CODOX-M/IVAC (D1=3/18/2020). Today is D8  - Cyclophosphamide 1,705 mg IV for D1, D2  - Doxorubicin 110 mg IV D1  - Vincristine 2 mg IV D1, D10  - Rituximab 800 mg   - Methotrexate 3 g/m2 D10  - Leucovorin calcium injection 426 mg D11  - Neupogen injection 480 mcg daily for D1-D6  - LP and IT chemo scheduled for tomorrow (3/20) @ 1300 per chemo protocol   - cytarabine 50 mg, hydrocortisone sodium succinate 50 mg, methotrexate 12 mg   - LP and IT chemo scheduled for Monday (3/23) @ 1300 per chemo protocol    - cytarabine 50 mg, hydrocortisone sodium succinate 50 mg   - Supportive Medications:   - Allopurinol tablet 300 mg    - PRN Anti Emetics   - PRN Bowel Regimen   - PRN Pain control    # ID Prophylaxis  - Viral serologies: HepC nonreactive, HepB negative and HIV negative  - Not yet neutropenic, will start fluconazole and levaquin when his counts drop below ANC <1000  - Negative HSV 1/2, no need for acyclovir  - Pentamidine given on (3/21)    # Leukopenia 2/2 to chemotherapy  # Normocytic anemia  - CBC daily  - Transfuse if Hgb <7, Platelets <10K     # Thyroid cancer status-post thyroidectomy  Status post thyroidectomy in 2011. TSH normal on admission.  - Continue PTA levothyroxine     MSK/NEURO  # Thoracic back pain - resolving  Due to T5-6 extradural tumor as detailed above. Patient now status post T5-6 laminectomy with gross total resction on 3/15. Today is POD #4.   - Post-op activity recommendations per NSG:   - [employer] [employer] to be worn out of bed (orthotics consult in place)   -  No lifting >10 lbs, twisting, straining, or strenuous activity for at least 2 weeks  - Continue APAP, oxycodone, methocarbamol PRN for pain  - Patient should have scheduled outpatient neurosurgery follow-up in 2 weeks (may be via Tele-Health); NSG will coordinate  - Surgical site erythematous, though no warmth or fluctuance noted   - Continue to monitor, Sutures used were absorbable      CV  # Coronary artery disease  # Hyperlipidemia  Followed by Dr. Zeng at [facility] ([place]). Diagnosed with mild, non-obstructive CAD in 2018. Calcium score at that time was 5.5. Baby ASA and low-dose statin therapy recommended. No previous cardiac caths or stents.  - Hold PTA statin in light of starting chemotherapy     GI  # Hypocalcemia  Mild. Ca 7.4 today, corrects to 8.4 for albumin.  - Continue PO calcium citrate      # Constipation - resolved  Noted prior to presentation. Patient reports decreased frequency of bowel movements. Has been responsive to laxative use.   - Has been having regular BM with current bowel regimen  - Continue scheduled senna   - Change scheduled Miralax to PRN  - Mag Citrate provided PRN     # Hypokalemia  - intermittent hypokalemia  - continue replacements per protocol  - will consider adding K 20 meq po daily if continues to remain low persistently  - Continue to monitor    FEN:  - No IVF for now; encourage PO intake.   - Lyte replacement per protocol  - Regular diet as tolerated.    Prophy/Misc:  - GI: Continue bowel regimen as above; continue PPI while on steroids.  - DVT: Lovenox 40mg subQ; Hold Lovenox if platelets <50K.  - Activity: as tolerated and per neurosurg recommendations, PT consulted  - Diet: Regular as tolerated     Disposition: Anticipate discharge to home after cycle 1 of CODOX-M, likely 2 weeks from D1 of chemotherapy. Will get methotrexate at the end of the week. Will likely be able to discharge once methotrexate levels clear.      Code  Full Code     Patient is  "seen and examined individually by . Drew and I who agrees with the assessment and plan are discussed above.    Mckayla Alvarez PA-C  Hematology/Oncology  Pager: #[phone]    Interval History   No acute events overnight.   Kobe reports that he is feeling well. States that he had some \"fuzziness\" yesterday though states he was not too concerned and may just be related to being in the hospital room. Denies any pain today. Reports his bowel movements are regular with the regimen he is currently on. Appetite intact. Encourage po fluid intake. Denies headache, vision changes, sore throat, oral sores, SOB, cough, chest pain, abdominal pain, constipation, diarrhea, N/V, hematuria, dysuria, tingling in hands or feet, difficulty ambulating or new swelling.  All questions answered at bedside.    Physical Exam   Temp: 98  F (36.7  C) Temp src: Oral BP: 121/69 Pulse: 70 Heart Rate: 79 Resp: 20 SpO2: 96 % O2 Device: None (Room air)    Vitals:    03/23/20 0744 03/24/20 0900 03/25/20 0749   Weight: 92.6 kg (204 lb 1.6 oz) 92.7 kg (204 lb 4.8 oz) 92.4 kg (203 lb 9.6 oz)     Vital Signs with Ranges  Temp:  [96.7  F (35.9  C)-98.8  F (37.1  C)] 98  F (36.7  C)  Pulse:  [70] 70  Heart Rate:  [71-82] 79  Resp:  [20] 20  BP: (108-121)/(50-82) 121/69  SpO2:  [96 %-97 %] 96 %  I/O last 3 completed shifts:  In: 1040 [P.O.:980; I.V.:60]  Out: 2390 [Urine:2390]    Constitutional: Pleasant man lying in bed. Awake and conversational. Non- toxic appearing. No acute distress. Well developed, hydrated, and nourished.   HEENT: Normocephalic, atraumatic without tenderness or palpable masses/depressions. Sclerae anicteric. EOM intact. Moist mucus membranes without lesions, thrush, or exudates appreciated  Lymph: Neck supple.   Respiratory: Breathing comfortable with no increased work on room air. Good air exchange. No signs of respiratory distress or accessory muscle use. Lungs clear to auscultation bilaterally, no crackles or wheezing " noted.  Cardiovascular: Regular rate and rhythm. Normal S1 and S2. No murmurs, rubs, or gallops. No peripheral edema.    GI: Abdomen is soft, non-distended, non-tender and without distension. Bowel sounds present.   Skin: Skin is clean, dry, intact. No jaundice appreciated.   Musculoskeletal/ Extremities: No redness, warmth, or swelling of the joints appreciated. Distal pulses palpable. Nailbeds pink and without cyanosis or clubbing.   Neurologic: Alert and oriented. Speech normal. Grossly nonfocal. Memory and thought process preserved. Motor function grossly intact. Sensation grossly intact. Able to turn in bed with ease  Neuropsychiatric: Calm, affect congruent to situation. Appropriate mood and affect. Good judgment and insight. No visual/auditory hallucinations.     Medications       albuterol  2.5 mg Nebulization Q28 Days    And     pentamidine  300 mg Inhalation Q28 Days     allopurinol  300 mg Oral Daily     calcium citrate  950 mg Oral Daily     [START ON 3/30/2020] dexamethasone  0.5 mg Oral Daily     [START ON 3/28/2020] dexamethasone  1 mg Oral Daily     [START ON 3/26/2020] dexamethasone  2 mg Oral Daily     enoxaparin ANTICOAGULANT  40 mg Subcutaneous Q24H     filgrastim (NEUPOGEN/GRANIX) subcutaneous  5 mcg/kg (Treatment Plan Recorded) Subcutaneous Daily     heparin lock flush  5-10 mL Intracatheter Q24H     levothyroxine  200 mcg Oral QAM AC     loratadine  10 mg Oral Daily     pantoprazole  40 mg Oral QAM AC     senna-docusate  1 tablet Oral BID    Or     senna-docusate  2 tablet Oral BID     sodium chloride (PF)  10 mL Intracatheter Q8H       Data   Results for orders placed or performed during the hospital encounter of 03/13/20 (from the past 24 hour(s))   Potassium   Result Value Ref Range    Potassium 4.0 3.4 - 5.3 mmol/L   Creatinine   Result Value Ref Range    Creatinine 0.56 (L) 0.66 - 1.25 mg/dL    GFR Estimate >90 >60 mL/min/[1.73_m2]    GFR Estimate If Black >90 >60 mL/min/[1.73_m2]    Phosphorus   Result Value Ref Range    Phosphorus 2.9 2.5 - 4.5 mg/dL   Uric acid   Result Value Ref Range    Uric Acid 1.8 (L) 3.5 - 7.2 mg/dL   Lactate Dehydrogenase   Result Value Ref Range    Lactate Dehydrogenase 159 85 - 227 U/L   Potassium   Result Value Ref Range    Potassium 4.4 3.4 - 5.3 mmol/L   INR   Result Value Ref Range    INR 1.02 0.86 - 1.14   Fibrinogen activity   Result Value Ref Range    Fibrinogen 380 200 - 420 mg/dL   Comprehensive metabolic panel   Result Value Ref Range    Sodium 138 133 - 144 mmol/L    Potassium 3.6 3.4 - 5.3 mmol/L    Chloride 105 94 - 109 mmol/L    Carbon Dioxide 27 20 - 32 mmol/L    Anion Gap 6 3 - 14 mmol/L    Glucose 99 70 - 99 mg/dL    Urea Nitrogen 21 7 - 30 mg/dL    Creatinine 0.57 (L) 0.66 - 1.25 mg/dL    GFR Estimate >90 >60 mL/min/[1.73_m2]    GFR Estimate If Black >90 >60 mL/min/[1.73_m2]    Calcium 7.5 (L) 8.5 - 10.1 mg/dL    Bilirubin Total 0.4 0.2 - 1.3 mg/dL    Albumin 2.9 (L) 3.4 - 5.0 g/dL    Protein Total 5.6 (L) 6.8 - 8.8 g/dL    Alkaline Phosphatase 55 40 - 150 U/L    ALT 18 0 - 70 U/L    AST 7 0 - 45 U/L   Phosphorus   Result Value Ref Range    Phosphorus 3.3 2.5 - 4.5 mg/dL   Uric acid   Result Value Ref Range    Uric Acid 2.2 (L) 3.5 - 7.2 mg/dL   Lactate Dehydrogenase   Result Value Ref Range    Lactate Dehydrogenase 148 85 - 227 U/L   CBC with platelets differential   Result Value Ref Range    WBC 1.9 (L) 4.0 - 11.0 10e9/L    RBC Count 3.39 (L) 4.4 - 5.9 10e12/L    Hemoglobin 10.0 (L) 13.3 - 17.7 g/dL    Hematocrit 30.8 (L) 40.0 - 53.0 %    MCV 91 78 - 100 fl    MCH 29.5 26.5 - 33.0 pg    MCHC 32.5 31.5 - 36.5 g/dL    RDW 13.1 10.0 - 15.0 %    Platelet Count 215 150 - 450 10e9/L    Diff Method Manual Differential     % Neutrophils 67.9 %    % Lymphocytes 25.7 %    % Monocytes 0.0 %    % Eosinophils 4.6 %    % Basophils 1.8 %    Absolute Neutrophil 1.3 (L) 1.6 - 8.3 10e9/L    Absolute Lymphocytes 0.5 (L) 0.8 - 5.3 10e9/L    Absolute Monocytes 0.0  0.0 - 1.3 10e9/L    Absolute Eosinophils 0.1 0.0 - 0.7 10e9/L    Absolute Basophils 0.0 0.0 - 0.2 10e9/L    RBC Morphology Normal     Platelet Estimate Confirming automated cell count    Magnesium   Result Value Ref Range    Magnesium 1.9 1.6 - 2.3 mg/dL

## 2020-03-25 NOTE — PLAN OF CARE
Discharge Planner PT   Patient plan for discharge: home  Current status: Pt up ambulated 150 ft x 2 to/from rehab gym with SBA. Endorses mild unsteadiness 2/2 neuropathy and prolonged hospital stay. Tolerated 19 minutes of ambulation on TM for aerobic exercise, 1.4-2.2 mph, BUE support on TM for safety. VSS.   Barriers to return to prior living situation: medical status  Recommendations for discharge: home with OP cancer rehab  Rationale for recommendations: improve activity tolerance, strength and balance.        Entered by: Mckayla Garcia 03/25/2020 4:28 PM

## 2020-03-26 ENCOUNTER — APPOINTMENT (OUTPATIENT)
Dept: PHYSICAL THERAPY | Facility: CLINIC | Age: 60
DRG: 820 | End: 2020-03-26
Attending: NEUROLOGICAL SURGERY
Payer: COMMERCIAL

## 2020-03-26 LAB
ALBUMIN SERPL-MCNC: 2.8 G/DL (ref 3.4–5)
ALP SERPL-CCNC: 54 U/L (ref 40–150)
ALT SERPL W P-5'-P-CCNC: 23 U/L (ref 0–70)
ANION GAP SERPL CALCULATED.3IONS-SCNC: 6 MMOL/L (ref 3–14)
AST SERPL W P-5'-P-CCNC: 9 U/L (ref 0–45)
BILIRUB SERPL-MCNC: 0.3 MG/DL (ref 0.2–1.3)
BUN SERPL-MCNC: 23 MG/DL (ref 7–30)
CALCIUM SERPL-MCNC: 7.2 MG/DL (ref 8.5–10.1)
CHLORIDE SERPL-SCNC: 106 MMOL/L (ref 94–109)
CO2 SERPL-SCNC: 26 MMOL/L (ref 20–32)
CREAT SERPL-MCNC: 0.6 MG/DL (ref 0.66–1.25)
CREAT SERPL-MCNC: 0.6 MG/DL (ref 0.66–1.25)
DIFFERENTIAL METHOD BLD: ABNORMAL
ERYTHROCYTE [DISTWIDTH] IN BLOOD BY AUTOMATED COUNT: 12.9 % (ref 10–15)
FIBRINOGEN PPP-MCNC: 398 MG/DL (ref 200–420)
GFR SERPL CREATININE-BSD FRML MDRD: >90 ML/MIN/{1.73_M2}
GFR SERPL CREATININE-BSD FRML MDRD: >90 ML/MIN/{1.73_M2}
GLUCOSE SERPL-MCNC: 96 MG/DL (ref 70–99)
HCT VFR BLD AUTO: 30 % (ref 40–53)
HGB BLD-MCNC: 9.7 G/DL (ref 13.3–17.7)
INR PPP: 0.96 (ref 0.86–1.14)
LDH SERPL L TO P-CCNC: 128 U/L (ref 85–227)
LDH SERPL L TO P-CCNC: 131 U/L (ref 85–227)
MAGNESIUM SERPL-MCNC: 1.9 MG/DL (ref 1.6–2.3)
MCH RBC QN AUTO: 29.6 PG (ref 26.5–33)
MCHC RBC AUTO-ENTMCNC: 32.3 G/DL (ref 31.5–36.5)
MCV RBC AUTO: 92 FL (ref 78–100)
PHOSPHATE SERPL-MCNC: 2.8 MG/DL (ref 2.5–4.5)
PHOSPHATE SERPL-MCNC: 3.5 MG/DL (ref 2.5–4.5)
PLATELET # BLD AUTO: 192 10E9/L (ref 150–450)
POTASSIUM SERPL-SCNC: 3.6 MMOL/L (ref 3.4–5.3)
POTASSIUM SERPL-SCNC: 3.9 MMOL/L (ref 3.4–5.3)
PROT SERPL-MCNC: 5.7 G/DL (ref 6.8–8.8)
RBC # BLD AUTO: 3.28 10E12/L (ref 4.4–5.9)
SODIUM SERPL-SCNC: 139 MMOL/L (ref 133–144)
URATE SERPL-MCNC: 1.7 MG/DL (ref 3.5–7.2)
URATE SERPL-MCNC: 1.8 MG/DL (ref 3.5–7.2)
WBC # BLD AUTO: 0.4 10E9/L (ref 4–11)

## 2020-03-26 PROCEDURE — 97110 THERAPEUTIC EXERCISES: CPT | Mod: GP | Performed by: PHYSICAL THERAPIST

## 2020-03-26 PROCEDURE — 25000132 ZZH RX MED GY IP 250 OP 250 PS 637: Performed by: PHYSICIAN ASSISTANT

## 2020-03-26 PROCEDURE — 25000128 H RX IP 250 OP 636: Performed by: PHYSICIAN ASSISTANT

## 2020-03-26 PROCEDURE — 84132 ASSAY OF SERUM POTASSIUM: CPT | Performed by: PHYSICIAN ASSISTANT

## 2020-03-26 PROCEDURE — 83735 ASSAY OF MAGNESIUM: CPT | Performed by: PHYSICIAN ASSISTANT

## 2020-03-26 PROCEDURE — 84550 ASSAY OF BLOOD/URIC ACID: CPT | Performed by: PHYSICIAN ASSISTANT

## 2020-03-26 PROCEDURE — 25000132 ZZH RX MED GY IP 250 OP 250 PS 637: Performed by: NURSE PRACTITIONER

## 2020-03-26 PROCEDURE — 36592 COLLECT BLOOD FROM PICC: CPT | Performed by: PHYSICIAN ASSISTANT

## 2020-03-26 PROCEDURE — 83615 LACTATE (LD) (LDH) ENZYME: CPT | Performed by: PHYSICIAN ASSISTANT

## 2020-03-26 PROCEDURE — 84100 ASSAY OF PHOSPHORUS: CPT | Performed by: PHYSICIAN ASSISTANT

## 2020-03-26 PROCEDURE — 85027 COMPLETE CBC AUTOMATED: CPT

## 2020-03-26 PROCEDURE — 36415 COLL VENOUS BLD VENIPUNCTURE: CPT | Performed by: PHYSICIAN ASSISTANT

## 2020-03-26 PROCEDURE — 85610 PROTHROMBIN TIME: CPT | Performed by: PHYSICIAN ASSISTANT

## 2020-03-26 PROCEDURE — 25000131 ZZH RX MED GY IP 250 OP 636 PS 637: Performed by: PHYSICIAN ASSISTANT

## 2020-03-26 PROCEDURE — 97112 NEUROMUSCULAR REEDUCATION: CPT | Mod: GP | Performed by: PHYSICAL THERAPIST

## 2020-03-26 PROCEDURE — 80053 COMPREHEN METABOLIC PANEL: CPT | Performed by: PHYSICIAN ASSISTANT

## 2020-03-26 PROCEDURE — 12000001 ZZH R&B MED SURG/OB UMMC

## 2020-03-26 PROCEDURE — 85384 FIBRINOGEN ACTIVITY: CPT | Performed by: PHYSICIAN ASSISTANT

## 2020-03-26 PROCEDURE — 83735 ASSAY OF MAGNESIUM: CPT | Performed by: NURSE PRACTITIONER

## 2020-03-26 PROCEDURE — 82565 ASSAY OF CREATININE: CPT | Performed by: PHYSICIAN ASSISTANT

## 2020-03-26 PROCEDURE — 25000132 ZZH RX MED GY IP 250 OP 250 PS 637: Performed by: STUDENT IN AN ORGANIZED HEALTH CARE EDUCATION/TRAINING PROGRAM

## 2020-03-26 PROCEDURE — 25000128 H RX IP 250 OP 636: Performed by: INTERNAL MEDICINE

## 2020-03-26 RX ORDER — ACYCLOVIR 200 MG/1
400 CAPSULE ORAL 2 TIMES DAILY
Status: DISCONTINUED | OUTPATIENT
Start: 2020-03-26 | End: 2020-03-30 | Stop reason: HOSPADM

## 2020-03-26 RX ORDER — LEVOFLOXACIN 250 MG/1
250 TABLET, FILM COATED ORAL DAILY
Status: DISCONTINUED | OUTPATIENT
Start: 2020-03-26 | End: 2020-03-30 | Stop reason: HOSPADM

## 2020-03-26 RX ORDER — FLUCONAZOLE 200 MG/1
200 TABLET ORAL DAILY
Status: DISCONTINUED | OUTPATIENT
Start: 2020-03-26 | End: 2020-03-30 | Stop reason: HOSPADM

## 2020-03-26 RX ADMIN — Medication 5 ML: at 05:33

## 2020-03-26 RX ADMIN — LEVOFLOXACIN 250 MG: 250 TABLET, FILM COATED ORAL at 11:37

## 2020-03-26 RX ADMIN — PANTOPRAZOLE SODIUM 40 MG: 40 TABLET, DELAYED RELEASE ORAL at 09:37

## 2020-03-26 RX ADMIN — LORATADINE 10 MG: 10 TABLET ORAL at 09:37

## 2020-03-26 RX ADMIN — ALLOPURINOL 300 MG: 300 TABLET ORAL at 09:37

## 2020-03-26 RX ADMIN — ACYCLOVIR 400 MG: 200 CAPSULE ORAL at 11:37

## 2020-03-26 RX ADMIN — LEVOTHYROXINE SODIUM 200 MCG: 0.1 TABLET ORAL at 09:37

## 2020-03-26 RX ADMIN — SENNOSIDES AND DOCUSATE SODIUM 2 TABLET: 8.6; 5 TABLET ORAL at 19:03

## 2020-03-26 RX ADMIN — CALCIUM CITRATE 200 MG (950 MG) TABLET 950 MG: at 09:37

## 2020-03-26 RX ADMIN — ENOXAPARIN SODIUM 40 MG: 40 INJECTION SUBCUTANEOUS at 09:38

## 2020-03-26 RX ADMIN — DEXAMETHASONE 2 MG: 2 TABLET ORAL at 09:37

## 2020-03-26 RX ADMIN — FLUCONAZOLE 200 MG: 200 TABLET ORAL at 11:37

## 2020-03-26 RX ADMIN — SODIUM CHLORIDE, PRESERVATIVE FREE 5 ML: 5 INJECTION INTRAVENOUS at 14:30

## 2020-03-26 RX ADMIN — SENNOSIDES AND DOCUSATE SODIUM 2 TABLET: 8.6; 5 TABLET ORAL at 09:38

## 2020-03-26 RX ADMIN — FILGRASTIM 480 MCG: 480 INJECTION, SOLUTION INTRAVENOUS; SUBCUTANEOUS at 09:38

## 2020-03-26 RX ADMIN — Medication 5 ML: at 00:22

## 2020-03-26 RX ADMIN — ACYCLOVIR 400 MG: 200 CAPSULE ORAL at 19:03

## 2020-03-26 ASSESSMENT — MIFFLIN-ST. JEOR: SCORE: 1735.94

## 2020-03-26 ASSESSMENT — ACTIVITIES OF DAILY LIVING (ADL)
ADLS_ACUITY_SCORE: 13
ADLS_ACUITY_SCORE: 13
ADLS_ACUITY_SCORE: 14
ADLS_ACUITY_SCORE: 13

## 2020-03-26 NOTE — PLAN OF CARE
AVSS, no c/o pain or nausea. Phos replaced for a value of 2.3.; phos is getting drawn b.i.d. at 1730 and 0530. Back incision still somewhat red with some swelling will continue to monitor site.  LP and BMBX sites are CDI

## 2020-03-26 NOTE — PLAN OF CARE
[id number]    Pt slept most of the night, no acute event.     NEURO: Alert and oriented x4.      RESPIRATORY: Room Air, denies dizziness, and SOB. Lungs sound, clear, equal bilateral.     CARDIO: Afebrile, VSS, denies dizziness, and extremity pain.      GI/:  Denies N/V, BS active, AUOP.      SKIN: Intact.      ACTIVITY: Independent      PAIN: Denies pain.    DLA: PICC, double lumen. Hep locked.     BG: No     PLAN: Continue monitoring.

## 2020-03-26 NOTE — PLAN OF CARE
Discharge Planner PT   Patient plan for discharge: home  Current status: Pt ambulated 900 ft with SBA. Completed static and dynamic balance activities. Issued strength and balance HEPs.   Barriers to return to prior living situation: medical status  Recommendations for discharge: home with OP cancer rehab  Rationale for recommendations: improve activity tolerance, strength and balance.

## 2020-03-26 NOTE — PROGRESS NOTES
[facility], [place]    Hematology / Oncology Progress Note    Date of Service (when I saw the patient): 03/26/2020     Assessment & Plan   Kobe Pedroza is a 60 year old male with a history of thyroid cancer status post thyroidectomy (2011), CAD, and hyperlipidemia who was transferred from [facility] to [facility] on 3/13 with a newly thoracic extradural mass at T5-6 with severe cord compression, now status post neurosurgical gross resection on 3/15 with initial pathology concerning for high-grade B-cell lymphoma. He transferred to the Malignant Hematology team on 3/17 for further work-up and management of his newly diagnosed with Burkitt Lymphoma. He started R-CODOX-M/IVAC chemotherapy regimen (D1=3/18/2020). Today is Day 9    Today:  - Today is D9  - Will get high dose methotrexate and vincristine on D10 (tomorrow)  - Will likely be able to discharge when methotrexate clears    - Plan for twice weekly labs and a follow up appointment roughly one week after discharge  - Stopper daily neuro checks; patient has been stable without any focal neuro changes  - Continue Dexamethasone taper for 12 days  - Monitor TLS BID  - Now neutropenic, started acyclovir, fluconazole and levaquin today for ID ppx  - Off neupogen per protocol    HEME/ONC  # Burkitt's lymphoma  # Thoracic spine mass  # Spinal cord compression  Patient presented on 3/12 to [facility] with acute-on-chronic mid-thoracic back pain with some associated radicular symptoms. Per patient, no B-symptoms, though he did recently intentionally lose 35 lbs. 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 Patient had no appreciable neurological deficits. He was started on dexamethasone and underwent T5-6 laminectomy with gross total resection of epidural tumor on 3/15. Flow cytometry of the specimen was notable for CD10 positive and kappa monotypic B cells (83%).  Confirmed Burkitt lymphoma with surgical pathology. Cytogenetics pending.  - PET scan completed 3/18  - Bone marrow biopsy completed 3/18. Tolerated well with Versed 1mg IV  - ECHO (3/16) with LVEF of 60-65%, no diastolic dysfunction.  - Baseline EKG (3/13) without evidence of ischemia or dysrhythmia.  - PICC line placed.   - Continue daily PPI while on steroids.  - Dexamethasone taper for 12 days   - on 3/24 Dexamethasone 3 g Q24h po x 2 days;  on 3/26 Dexamethasone 2 g Q24h po x 2 days;  on 3/28 Dexamethasone 1 g Q24h po x 2 days;  on 3/30 Dexamethasone 0.5 g Q24h po x 2 days  - TLS labs Q12h, DIC labs daily     Treatment Plan: R-CODOX-M/IVAC (D1=3/18/2020). Today is D9  - Cyclophosphamide 1,705 mg IV for D1, D2  - Doxorubicin 110 mg IV D1  - Vincristine 2 mg IV D1, D10  - Rituximab 800 mg   - Methotrexate 3 g/m2 D10  - Leucovorin calcium injection 426 mg D11  - Neupogen injection 480 mcg daily for D1-D6  - LP and IT chemo (3/20); cytarabine 50 mg, hydrocortisone sodium succinate 50 mg, methotrexate 12 mg   - LP and IT chemo (3/23); cytarabine 50 mg, hydrocortisone sodium succinate 50 mg   - Supportive Medications:   - Allopurinol tablet 300 mg    - PRN Anti Emetics; PRN Bowel Regimen; PRN Pain control    # ID Prophylaxis  - Viral serologies: HepC nonreactive, HepB negative and HIV negative  - Now neutropenic  - Start fluconazole 200mg daily and levaquin 250 mg daily   - Start acyclovir 400 mg BID due to past exposure of VZV  - Negative HSV 1/2, no need for acyclovir  - Pentamidine given on (3/21)    # Leukopenia 2/2 to chemotherapy  # Normocytic anemia  - CBC daily  - Transfuse if Hgb <7, Platelets <10K     # Thyroid cancer status-post thyroidectomy  Status post thyroidectomy in 2011. TSH normal on admission.  - Continue PTA levothyroxine     MSK/NEURO  # Thoracic back pain - resolving  Due to T5-6 extradural tumor as detailed above. Patient now status post T5-6 laminectomy with gross total resction on 3/15.  Today is POD #4.   - Post-op activity recommendations per NSG:   - Stanley Brace to be worn out of bed (orthotics consult in place)   - No lifting >10 lbs, twisting, straining, or strenuous activity for at least 2 weeks  - Continue APAP, oxycodone, methocarbamol PRN for pain  - Patient should have scheduled outpatient neurosurgery follow-up in 2 weeks (may be via Tele-Health); NSG will coordinate  - Surgical site erythematous, though no warmth or fluctuance noted   - Continue to monitor, Sutures used were absorbable      CV  # Coronary artery disease  # Hyperlipidemia  Followed by Dr. Zeng at [facility] Diagnosed with mild, non-obstructive CAD in 2018. Calcium score at that time was 5.5. Baby ASA and low-dose statin therapy recommended. No previous cardiac caths or stents.  - Hold PTA statin in light of starting chemotherapy     GI  # Hypocalcemia  Mild. Ca 7.4 today, corrects to 8.4 for albumin.  - Continue PO calcium citrate      # Constipation - resolved  Noted prior to presentation. Patient reports decreased frequency of bowel movements. Has been responsive to laxative use.   - Has been having regular BM with current bowel regimen  - Continue scheduled senna   - Change scheduled Miralax to PRN  - Mag Citrate provided PRN     # Hypokalemia  - intermittent hypokalemia  - continue replacements per protocol  - will consider adding K 20 meq po daily if continues to remain low persistently  - Continue to monitor    FEN:  - No IVF for now; encourage PO intake.   - Lyte replacement per protocol  - Regular diet as tolerated.    Prophy/Misc:  - GI: Continue bowel regimen as above; continue PPI while on steroids.  - DVT: Lovenox 40mg subQ; Hold Lovenox if platelets <50K.  - Activity: as tolerated and per neurosurg recommendations, PT consulted  - Diet: Regular as tolerated     Disposition: Anticipate discharge to home after cycle 1 of CODOX-M, likely 2 weeks from D1 of chemotherapy. Will get  "methotrexate at the end of the week. Will likely be able to discharge once methotrexate levels clear. Plan for twice weekly labs and a follow up appointment roughly one week after discharge     Code  Full Code     Patient is seen and examined individually by Dr. Russell and I who agrees with the assessment and plan are discussed above.    Mckayla Alvarez PA-C  Hematology/Oncology  Pager: #[phone]    Interval History   No acute events overnight.   Kobe was ambulating the halls with rehab today. States he has been \"doing pretty well\". Reports some pain in his feet after ambulating. Denies any other pain today. Reports his last bowel movement was yesterday. Appetite intact. Encourage po fluid intake. States he may have had some balance \"issues\" while walking the halls today. Denies headache, vision changes, sore throat, oral sores, SOB, cough, chest pain, abdominal pain, constipation, diarrhea, N/V, hematuria, dysuria, tingling in hands or feet, difficulty ambulating or new swelling.  Discussed need for outpatient labs at discharge, patient is in the [facility] and states that [employer] or [employer] will work but thinks that [employer] is closer.  All questions answered at bedside.    Physical Exam   Temp: 96.7  F (35.9  C) Temp src: Oral BP: 118/69 Pulse: 77 Heart Rate: 90 Resp: 18 SpO2: 98 % O2 Device: None (Room air)    Vitals:    03/24/20 0900 03/25/20 0749 03/26/20 0815   Weight: 92.7 kg (204 lb 4.8 oz) 92.4 kg (203 lb 9.6 oz) 93.4 kg (205 lb 12.8 oz)     Vital Signs with Ranges  Temp:  [96.7  F (35.9  C)-98.8  F (37.1  C)] 96.7  F (35.9  C)  Pulse:  [77] 77  Heart Rate:  [72-92] 90  Resp:  [18-20] 18  BP: (117-124)/(67-75) 118/69  SpO2:  [96 %-98 %] 98 %  I/O last 3 completed shifts:  In: 50 [I.V.:50]  Out: 600 [Urine:600]    Constitutional: Pleasant man sitting up in chair. Awake and conversational. Non- toxic appearing. No acute distress. Well developed, hydrated, and nourished.   HEENT: Normocephalic, atraumatic " without tenderness or palpable masses/depressions. Sclerae anicteric. EOM intact. Moist mucus membranes without lesions, thrush, or exudates appreciated  Lymph: Neck supple.   Respiratory: Breathing comfortable with no increased work on room air. Good air exchange. No signs of respiratory distress or accessory muscle use. Lungs clear to auscultation bilaterally, no crackles or wheezing noted.  Cardiovascular: Regular rate and rhythm. Normal S1 and S2. No murmurs, rubs, or gallops. No peripheral edema.    GI: Abdomen is soft, non-distended, non-tender and without distension. Bowel sounds present.   Skin: Skin is clean, dry, intact. No jaundice appreciated.   Musculoskeletal/ Extremities: No redness, warmth, or swelling of the joints appreciated. Distal pulses palpable. Nailbeds pink and without cyanosis or clubbing. Walking the halls with ease.  Neurologic: Alert and oriented. Speech normal. Grossly nonfocal. Memory and thought process preserved. Motor function grossly intact. Sensation grossly intact.    Neuropsychiatric: Calm, affect congruent to situation. Appropriate mood and affect. Good judgment and insight. No visual/auditory hallucinations.     Medications       acyclovir  400 mg Oral BID     albuterol  2.5 mg Nebulization Q28 Days    And     pentamidine  300 mg Inhalation Q28 Days     allopurinol  300 mg Oral Daily     calcium citrate  950 mg Oral Daily     [START ON 3/30/2020] dexamethasone  0.5 mg Oral Daily     [START ON 3/28/2020] dexamethasone  1 mg Oral Daily     dexamethasone  2 mg Oral Daily     enoxaparin ANTICOAGULANT  40 mg Subcutaneous Q24H     fluconazole  200 mg Oral Daily     heparin lock flush  5-10 mL Intracatheter Q24H     levofloxacin  250 mg Oral Daily     levothyroxine  200 mcg Oral QAM AC     loratadine  10 mg Oral Daily     pantoprazole  40 mg Oral QAM AC     senna-docusate  1 tablet Oral BID    Or     senna-docusate  2 tablet Oral BID     sodium chloride (PF)  10 mL Intracatheter Q8H        Data   Results for orders placed or performed during the hospital encounter of 03/13/20 (from the past 24 hour(s))   Creatinine   Result Value Ref Range    Creatinine 0.60 (L) 0.66 - 1.25 mg/dL    GFR Estimate >90 >60 mL/min/[1.73_m2]    GFR Estimate If Black >90 >60 mL/min/[1.73_m2]   Phosphorus   Result Value Ref Range    Phosphorus 2.3 (L) 2.5 - 4.5 mg/dL   Uric acid   Result Value Ref Range    Uric Acid 1.9 (L) 3.5 - 7.2 mg/dL   Lactate Dehydrogenase   Result Value Ref Range    Lactate Dehydrogenase 153 85 - 227 U/L   Potassium   Result Value Ref Range    Potassium 3.7 3.4 - 5.3 mmol/L   INR   Result Value Ref Range    INR 0.96 0.86 - 1.14   Fibrinogen activity   Result Value Ref Range    Fibrinogen 398 200 - 420 mg/dL   Comprehensive metabolic panel   Result Value Ref Range    Sodium 139 133 - 144 mmol/L    Potassium 3.6 3.4 - 5.3 mmol/L    Chloride 106 94 - 109 mmol/L    Carbon Dioxide 26 20 - 32 mmol/L    Anion Gap 6 3 - 14 mmol/L    Glucose 96 70 - 99 mg/dL    Urea Nitrogen 23 7 - 30 mg/dL    Creatinine 0.60 (L) 0.66 - 1.25 mg/dL    GFR Estimate >90 >60 mL/min/[1.73_m2]    GFR Estimate If Black >90 >60 mL/min/[1.73_m2]    Calcium 7.2 (L) 8.5 - 10.1 mg/dL    Bilirubin Total 0.3 0.2 - 1.3 mg/dL    Albumin 2.8 (L) 3.4 - 5.0 g/dL    Protein Total 5.7 (L) 6.8 - 8.8 g/dL    Alkaline Phosphatase 54 40 - 150 U/L    ALT 23 0 - 70 U/L    AST 9 0 - 45 U/L   Phosphorus   Result Value Ref Range    Phosphorus 3.5 2.5 - 4.5 mg/dL   Uric acid   Result Value Ref Range    Uric Acid 1.8 (L) 3.5 - 7.2 mg/dL   Lactate Dehydrogenase   Result Value Ref Range    Lactate Dehydrogenase 128 85 - 227 U/L   CBC with platelets differential   Result Value Ref Range    WBC 0.4 (LL) 4.0 - 11.0 10e9/L    RBC Count 3.28 (L) 4.4 - 5.9 10e12/L    Hemoglobin 9.7 (L) 13.3 - 17.7 g/dL    Hematocrit 30.0 (L) 40.0 - 53.0 %    MCV 92 78 - 100 fl    MCH 29.6 26.5 - 33.0 pg    MCHC 32.3 31.5 - 36.5 g/dL    RDW 12.9 10.0 - 15.0 %    Platelet Count  192 150 - 450 10e9/L    Diff Method WBC <0.5, Diff not done    Magnesium   Result Value Ref Range    Magnesium 1.9 1.6 - 2.3 mg/dL

## 2020-03-26 NOTE — PLAN OF CARE
Jonnathan has been afe with WBC dropping to 400.  On Wan 1 Day 9 of protocol.  Plan for Vincristine and Methotrexate tomorrow. Is c/o fatigue and has been up in chair and working with PT.  Eating well, denies nausea.  No change in back incision

## 2020-03-27 LAB
ALBUMIN SERPL-MCNC: 2.9 G/DL (ref 3.4–5)
ALP SERPL-CCNC: 58 U/L (ref 40–150)
ALT SERPL W P-5'-P-CCNC: 20 U/L (ref 0–70)
ANION GAP SERPL CALCULATED.3IONS-SCNC: 6 MMOL/L (ref 3–14)
AST SERPL W P-5'-P-CCNC: 6 U/L (ref 0–45)
BILIRUB SERPL-MCNC: 0.4 MG/DL (ref 0.2–1.3)
BUN SERPL-MCNC: 22 MG/DL (ref 7–30)
CALCIUM SERPL-MCNC: 7.5 MG/DL (ref 8.5–10.1)
CHLORIDE SERPL-SCNC: 106 MMOL/L (ref 94–109)
CO2 SERPL-SCNC: 27 MMOL/L (ref 20–32)
CREAT SERPL-MCNC: 0.55 MG/DL (ref 0.66–1.25)
CREAT SERPL-MCNC: 0.63 MG/DL (ref 0.66–1.25)
DIFFERENTIAL METHOD BLD: ABNORMAL
ERYTHROCYTE [DISTWIDTH] IN BLOOD BY AUTOMATED COUNT: 12.8 % (ref 10–15)
FIBRINOGEN PPP-MCNC: 401 MG/DL (ref 200–420)
GFR SERPL CREATININE-BSD FRML MDRD: >90 ML/MIN/{1.73_M2}
GFR SERPL CREATININE-BSD FRML MDRD: >90 ML/MIN/{1.73_M2}
GLUCOSE SERPL-MCNC: 103 MG/DL (ref 70–99)
HCT VFR BLD AUTO: 28.5 % (ref 40–53)
HGB BLD-MCNC: 9.4 G/DL (ref 13.3–17.7)
INR PPP: 0.97 (ref 0.86–1.14)
LDH SERPL L TO P-CCNC: 134 U/L (ref 85–227)
LDH SERPL L TO P-CCNC: 134 U/L (ref 85–227)
MAGNESIUM SERPL-MCNC: 1.9 MG/DL (ref 1.6–2.3)
MCH RBC QN AUTO: 30 PG (ref 26.5–33)
MCHC RBC AUTO-ENTMCNC: 33 G/DL (ref 31.5–36.5)
MCV RBC AUTO: 91 FL (ref 78–100)
PH UR STRIP: 7.5 PH (ref 5–7)
PH UR STRIP: 7.5 PH (ref 5–7)
PH UR STRIP: 8 PH (ref 5–7)
PHOSPHATE SERPL-MCNC: 2.4 MG/DL (ref 2.5–4.5)
PHOSPHATE SERPL-MCNC: 3.4 MG/DL (ref 2.5–4.5)
PLATELET # BLD AUTO: 168 10E9/L (ref 150–450)
POTASSIUM SERPL-SCNC: 3.7 MMOL/L (ref 3.4–5.3)
POTASSIUM SERPL-SCNC: 3.7 MMOL/L (ref 3.4–5.3)
PROT SERPL-MCNC: 5.6 G/DL (ref 6.8–8.8)
RBC # BLD AUTO: 3.13 10E12/L (ref 4.4–5.9)
SODIUM SERPL-SCNC: 138 MMOL/L (ref 133–144)
URATE SERPL-MCNC: 1.8 MG/DL (ref 3.5–7.2)
URATE SERPL-MCNC: 2 MG/DL (ref 3.5–7.2)
WBC # BLD AUTO: 0.4 10E9/L (ref 4–11)

## 2020-03-27 PROCEDURE — 80053 COMPREHEN METABOLIC PANEL: CPT | Performed by: PHYSICIAN ASSISTANT

## 2020-03-27 PROCEDURE — 36592 COLLECT BLOOD FROM PICC: CPT | Performed by: PHYSICIAN ASSISTANT

## 2020-03-27 PROCEDURE — 25000128 H RX IP 250 OP 636: Performed by: PHYSICIAN ASSISTANT

## 2020-03-27 PROCEDURE — 83615 LACTATE (LD) (LDH) ENZYME: CPT | Performed by: PHYSICIAN ASSISTANT

## 2020-03-27 PROCEDURE — 85610 PROTHROMBIN TIME: CPT | Performed by: PHYSICIAN ASSISTANT

## 2020-03-27 PROCEDURE — 84132 ASSAY OF SERUM POTASSIUM: CPT | Performed by: PHYSICIAN ASSISTANT

## 2020-03-27 PROCEDURE — 25000132 ZZH RX MED GY IP 250 OP 250 PS 637: Performed by: NURSE PRACTITIONER

## 2020-03-27 PROCEDURE — 12000001 ZZH R&B MED SURG/OB UMMC

## 2020-03-27 PROCEDURE — 25000131 ZZH RX MED GY IP 250 OP 636 PS 637: Performed by: INTERNAL MEDICINE

## 2020-03-27 PROCEDURE — 82565 ASSAY OF CREATININE: CPT | Performed by: PHYSICIAN ASSISTANT

## 2020-03-27 PROCEDURE — 85027 COMPLETE CBC AUTOMATED: CPT

## 2020-03-27 PROCEDURE — 85384 FIBRINOGEN ACTIVITY: CPT | Performed by: PHYSICIAN ASSISTANT

## 2020-03-27 PROCEDURE — 25000132 ZZH RX MED GY IP 250 OP 250 PS 637: Performed by: STUDENT IN AN ORGANIZED HEALTH CARE EDUCATION/TRAINING PROGRAM

## 2020-03-27 PROCEDURE — 25000132 ZZH RX MED GY IP 250 OP 250 PS 637: Performed by: PHYSICIAN ASSISTANT

## 2020-03-27 PROCEDURE — 81003 URINALYSIS AUTO W/O SCOPE: CPT | Performed by: INTERNAL MEDICINE

## 2020-03-27 PROCEDURE — 25000125 ZZHC RX 250: Performed by: INTERNAL MEDICINE

## 2020-03-27 PROCEDURE — 84100 ASSAY OF PHOSPHORUS: CPT | Performed by: PHYSICIAN ASSISTANT

## 2020-03-27 PROCEDURE — 25800030 ZZH RX IP 258 OP 636: Performed by: PHYSICIAN ASSISTANT

## 2020-03-27 PROCEDURE — 25000128 H RX IP 250 OP 636: Performed by: INTERNAL MEDICINE

## 2020-03-27 PROCEDURE — 84550 ASSAY OF BLOOD/URIC ACID: CPT | Performed by: PHYSICIAN ASSISTANT

## 2020-03-27 PROCEDURE — 25000125 ZZHC RX 250: Performed by: PHYSICIAN ASSISTANT

## 2020-03-27 PROCEDURE — 25000131 ZZH RX MED GY IP 250 OP 636 PS 637: Performed by: PHYSICIAN ASSISTANT

## 2020-03-27 PROCEDURE — 83735 ASSAY OF MAGNESIUM: CPT | Performed by: PHYSICIAN ASSISTANT

## 2020-03-27 PROCEDURE — 25800030 ZZH RX IP 258 OP 636: Performed by: INTERNAL MEDICINE

## 2020-03-27 RX ORDER — METHYLPREDNISOLONE SODIUM SUCCINATE 125 MG/2ML
125 INJECTION, POWDER, LYOPHILIZED, FOR SOLUTION INTRAMUSCULAR; INTRAVENOUS
Status: DISCONTINUED | OUTPATIENT
Start: 2020-03-27 | End: 2020-03-30 | Stop reason: HOSPADM

## 2020-03-27 RX ORDER — LEUCOVORIN CALCIUM 350 MG/17.5ML
200 INJECTION, POWDER, LYOPHILIZED, FOR SOLUTION INTRAMUSCULAR; INTRAVENOUS ONCE
Status: COMPLETED | OUTPATIENT
Start: 2020-03-28 | End: 2020-03-28

## 2020-03-27 RX ORDER — ALLOPURINOL 300 MG/1
300 TABLET ORAL DAILY
Status: DISCONTINUED | OUTPATIENT
Start: 2020-03-28 | End: 2020-03-30 | Stop reason: HOSPADM

## 2020-03-27 RX ORDER — SODIUM BICARBONATE 84 MG/ML
50 INJECTION, SOLUTION INTRAVENOUS
Status: DISCONTINUED | OUTPATIENT
Start: 2020-03-27 | End: 2020-03-30 | Stop reason: HOSPADM

## 2020-03-27 RX ORDER — EPINEPHRINE 1 MG/ML
0.3 INJECTION, SOLUTION, CONCENTRATE INTRAVENOUS EVERY 5 MIN PRN
Status: DISCONTINUED | OUTPATIENT
Start: 2020-03-27 | End: 2020-03-30 | Stop reason: HOSPADM

## 2020-03-27 RX ORDER — DEXAMETHASONE 4 MG/1
8 TABLET ORAL DAILY
Status: COMPLETED | OUTPATIENT
Start: 2020-03-28 | End: 2020-03-29

## 2020-03-27 RX ORDER — NALOXONE HYDROCHLORIDE 0.4 MG/ML
.1-.4 INJECTION, SOLUTION INTRAMUSCULAR; INTRAVENOUS; SUBCUTANEOUS
Status: DISCONTINUED | OUTPATIENT
Start: 2020-03-27 | End: 2020-03-30 | Stop reason: HOSPADM

## 2020-03-27 RX ORDER — LEUCOVORIN CALCIUM 350 MG/17.5ML
15 INJECTION, POWDER, LYOPHILIZED, FOR SOLUTION INTRAMUSCULAR; INTRAVENOUS EVERY 6 HOURS
Status: DISCONTINUED | OUTPATIENT
Start: 2020-03-27 | End: 2020-03-30 | Stop reason: HOSPADM

## 2020-03-27 RX ORDER — MEPERIDINE HYDROCHLORIDE 25 MG/ML
25 INJECTION INTRAMUSCULAR; INTRAVENOUS; SUBCUTANEOUS EVERY 30 MIN PRN
Status: DISCONTINUED | OUTPATIENT
Start: 2020-03-27 | End: 2020-03-30 | Stop reason: HOSPADM

## 2020-03-27 RX ORDER — LORAZEPAM 0.5 MG/1
.5-1 TABLET ORAL EVERY 6 HOURS PRN
Status: DISCONTINUED | OUTPATIENT
Start: 2020-03-27 | End: 2020-03-30 | Stop reason: HOSPADM

## 2020-03-27 RX ORDER — ONDANSETRON 8 MG/1
16 TABLET, FILM COATED ORAL ONCE
Status: COMPLETED | OUTPATIENT
Start: 2020-03-27 | End: 2020-03-27

## 2020-03-27 RX ORDER — PROCHLORPERAZINE MALEATE 10 MG
10 TABLET ORAL EVERY 6 HOURS PRN
Status: DISCONTINUED | OUTPATIENT
Start: 2020-03-27 | End: 2020-03-30 | Stop reason: HOSPADM

## 2020-03-27 RX ORDER — SODIUM CHLORIDE 9 MG/ML
1000 INJECTION, SOLUTION INTRAVENOUS CONTINUOUS PRN
Status: DISCONTINUED | OUTPATIENT
Start: 2020-03-27 | End: 2020-03-30 | Stop reason: HOSPADM

## 2020-03-27 RX ORDER — FAMOTIDINE 20 MG/1
20 TABLET, FILM COATED ORAL 2 TIMES DAILY
Status: DISCONTINUED | OUTPATIENT
Start: 2020-03-28 | End: 2020-03-30 | Stop reason: HOSPADM

## 2020-03-27 RX ORDER — DEXAMETHASONE 4 MG/1
12 TABLET ORAL ONCE
Status: COMPLETED | OUTPATIENT
Start: 2020-03-27 | End: 2020-03-27

## 2020-03-27 RX ORDER — DIPHENHYDRAMINE HYDROCHLORIDE 50 MG/ML
50 INJECTION INTRAMUSCULAR; INTRAVENOUS
Status: DISCONTINUED | OUTPATIENT
Start: 2020-03-27 | End: 2020-03-30 | Stop reason: HOSPADM

## 2020-03-27 RX ORDER — ALBUTEROL SULFATE 0.83 MG/ML
2.5 SOLUTION RESPIRATORY (INHALATION)
Status: DISCONTINUED | OUTPATIENT
Start: 2020-03-27 | End: 2020-03-30 | Stop reason: HOSPADM

## 2020-03-27 RX ORDER — ALBUTEROL SULFATE 90 UG/1
1-2 AEROSOL, METERED RESPIRATORY (INHALATION)
Status: DISCONTINUED | OUTPATIENT
Start: 2020-03-27 | End: 2020-03-30 | Stop reason: HOSPADM

## 2020-03-27 RX ORDER — LORAZEPAM 2 MG/ML
.5-1 INJECTION INTRAMUSCULAR EVERY 6 HOURS PRN
Status: DISCONTINUED | OUTPATIENT
Start: 2020-03-27 | End: 2020-03-30 | Stop reason: HOSPADM

## 2020-03-27 RX ADMIN — LEUCOVORIN CALCIUM 32 MG: 350 INJECTION, POWDER, LYOPHILIZED, FOR SOLUTION INTRAMUSCULAR; INTRAVENOUS at 22:03

## 2020-03-27 RX ADMIN — VINCRISTINE SULFATE 2 MG: 1 INJECTION, SOLUTION INTRAVENOUS at 13:31

## 2020-03-27 RX ADMIN — CALCIUM CITRATE 200 MG (950 MG) TABLET 950 MG: at 09:04

## 2020-03-27 RX ADMIN — METHOTREXATE 6.39 G: 25 INJECTION, SOLUTION INTRA-ARTERIAL; INTRAMUSCULAR; INTRATHECAL; INTRAVENOUS at 15:47

## 2020-03-27 RX ADMIN — ACYCLOVIR 400 MG: 200 CAPSULE ORAL at 09:04

## 2020-03-27 RX ADMIN — SODIUM BICARBONATE: 84 INJECTION, SOLUTION INTRAVENOUS at 20:24

## 2020-03-27 RX ADMIN — ONDANSETRON HYDROCHLORIDE 16 MG: 8 TABLET, FILM COATED ORAL at 14:58

## 2020-03-27 RX ADMIN — LEVOFLOXACIN 250 MG: 250 TABLET, FILM COATED ORAL at 11:55

## 2020-03-27 RX ADMIN — PANTOPRAZOLE SODIUM 40 MG: 40 TABLET, DELAYED RELEASE ORAL at 09:05

## 2020-03-27 RX ADMIN — ACYCLOVIR 400 MG: 200 CAPSULE ORAL at 20:32

## 2020-03-27 RX ADMIN — Medication 5 ML: at 05:35

## 2020-03-27 RX ADMIN — ENOXAPARIN SODIUM 40 MG: 40 INJECTION SUBCUTANEOUS at 09:05

## 2020-03-27 RX ADMIN — ALLOPURINOL 300 MG: 300 TABLET ORAL at 09:04

## 2020-03-27 RX ADMIN — LORATADINE 10 MG: 10 TABLET ORAL at 09:05

## 2020-03-27 RX ADMIN — SODIUM BICARBONATE: 84 INJECTION, SOLUTION INTRAVENOUS at 14:56

## 2020-03-27 RX ADMIN — POTASSIUM PHOSPHATE, MONOBASIC AND POTASSIUM PHOSPHATE, DIBASIC 15 MMOL: 224; 236 INJECTION, SOLUTION INTRAVENOUS at 20:33

## 2020-03-27 RX ADMIN — DEXAMETHASONE 12 MG: 4 TABLET ORAL at 14:59

## 2020-03-27 RX ADMIN — SODIUM BICARBONATE: 84 INJECTION, SOLUTION INTRAVENOUS at 10:48

## 2020-03-27 RX ADMIN — SENNOSIDES AND DOCUSATE SODIUM 1 TABLET: 8.6; 5 TABLET ORAL at 20:32

## 2020-03-27 RX ADMIN — LEVOTHYROXINE SODIUM 200 MCG: 0.1 TABLET ORAL at 09:05

## 2020-03-27 RX ADMIN — FLUCONAZOLE 200 MG: 200 TABLET ORAL at 11:54

## 2020-03-27 RX ADMIN — SENNOSIDES AND DOCUSATE SODIUM 1 TABLET: 8.6; 5 TABLET ORAL at 09:52

## 2020-03-27 RX ADMIN — DEXAMETHASONE 2 MG: 2 TABLET ORAL at 09:05

## 2020-03-27 ASSESSMENT — ACTIVITIES OF DAILY LIVING (ADL)
ADLS_ACUITY_SCORE: 13

## 2020-03-27 ASSESSMENT — MIFFLIN-ST. JEOR: SCORE: 1736.4

## 2020-03-27 NOTE — PROGRESS NOTES
"CLINICAL NUTRITION SERVICES - ASSESSMENT NOTE     Nutrition Prescription    RECOMMENDATIONS FOR MDs/PROVIDERS TO ORDER:  None at this time     Malnutrition Status:    Unable to determine due to unable to obtain all parameters of malnutrition validation    Recommendations already ordered by Registered Dietitian (RD):  None at this time     Future/Additional Recommendations:  Encourage patient to consume at least 75% of meals TID or the equivalent with snacks/supplements.  If consuming less than this offer supplements, scheduled snacks, and/or consider ordering calorie counts to assess PO intake adequacy.       REASON FOR ASSESSMENT  Kobe Pedroza is a/an 60 year old male assessed by the dietitian for [facility]    NUTRITION HISTORY  Obtained information from chart, pt unavailable during attempts to interview. Per chart, PMH includes newly diagnosed Burkitt Lymphoma.  Pt admit with R-CODOX-M/IVAC chemo regimen.    CURRENT NUTRITION ORDERS  Diet: Regular  Intake/Tolerance: good appetite and eating mostly 100% of meals documented since admission.    LABS  Labs reviewed  - Cr 0.55 (L), may indicate low muscle mass  - K+, Mg++, Phos WNL    MEDICATIONS  Medications reviewed  - Calcium citrate (950 mg/day)    ANTHROPOMETRICS  Height: 174 cm (5' 8.5\")  Most Recent Weight: 93.4 kg (205 lb 14.4 oz)    IBW: 64.8 kg  BMI: Normal BMI  Weight History:   Weights this admission (limited weights available from PTA).  Wt has been mostly stable since admission, question accuracy of admission weight as it dropped 9 lbs 5 days later.    03/27/20 93.4 kg (205 lb 14.4 oz)   03/26/20  93.4 kg (205 lb 12.8 oz)    03/25/20  92.4 kg (203 lb 9.6 oz)    03/24/20  92.7 kg (204 lb 4.8 oz)    03/23/20  92.6 kg (204 lb 1.6 oz)    03/22/20  92.4 kg (203 lb 9.6 oz)    03/21/20  92.9 kg (204 lb 14.4 oz)    03/19/20  94.7 kg (208 lb 11.2 oz)    03/18/20   94 kg (207 lb 4.8 oz)    03/13/20 98.1 kg (216 lb 4.3 oz)      Dosing Weight: 72 kg (adjusted based on " actual/lowest wt this admit 92.4 kg on 3/22 and IBW)    ASSESSED NUTRITION NEEDS  Estimated Energy Needs: 9778-7707 kcals/day (30 - 35 kcals/kg )  Justification: Increased needs with chemo  Estimated Protein Needs:  grams protein/day (1.2 - 1.5 grams of pro/kg)  Justification: Increased needs with chemo  Estimated Fluid Needs: (1 mL/kcal)   Justification: Maintenance, or other per provider pending fluid status    PHYSICAL FINDINGS  See malnutrition section below.     MALNUTRITION  % Intake: Unable to assess  % Weight Loss: Unable to assess  Subcutaneous Fat Loss: Available per MD/provider request  Muscle Loss: Available per MD/provider request  Fluid Accumulation/Edema: None noted per chart review  Malnutrition Diagnosis: Unable to determine due to unable to obtain all parameters of malnutrition validation    NUTRITION DIAGNOSIS  Predicted inadequate nutrient intake (protein-energy) related to good appetite and eating mostly 100% of meals documented, but potential for PO intake decline with hospital LOS/medical course     INTERVENTIONS  Implementation  Nutrition Education: Unable to complete due to pt unavailable     Goals  Patient to consume % of nutritionally adequate meal trays TID, or the equivalent with supplements/snacks.     Monitoring/Evaluation  Progress toward goals will be monitored and evaluated per protocol.     Stella Partida, PRINCESS, LD   7D RD Pager: [phone]

## 2020-03-27 NOTE — PLAN OF CARE
D AVSS with sat's 97% on room air. Heart regular and lungs clear. Voiced no c/o pain or nausea during the night and slept well between cares. Up to bathroom independently. Upper back dissolvable sutures are c/d/I. Patient in good spirits and slept well between cares.   I Vital's, assessment and med's per order.   A Resting in bed with call light in reach.   P Continue to monitor and update MD with changes.

## 2020-03-27 NOTE — PLAN OF CARE
Jonnathan has been afe, up in chair and eating well.  Is on Cycle 1 Day 10 of protocol.  Received Vincristine this afternoon.  Infused via PICC with + blood return over 15 minutes.  IV Bicarb flush started at 1100 in prep for Methotrexate this evening.  Education done regarding Vincristine and Methotrexate.   Plan to check urine pH at 1445.  BM x2 this am.

## 2020-03-27 NOTE — PLAN OF CARE
Pt alert and oriented x4. Pt VSS. Pt denied pain and nausea. Pt reported fair appetite and ate 75% of meal. Pt ambulating in room independently. Pt's last bm 3/25/20. Pt having good U/O. Pt to have Vincristine and Methotrexate tomorrow. Continue to monitor.

## 2020-03-27 NOTE — PROGRESS NOTES
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 He transferred to the Malignant Hematology team on 3/17 for further work-up and management of his newly diagnosed with Burkitt Lymphoma. He started R-CODOX-M/IVAC chemotherapy regimen (D1=3/18/2020). Today is Day 10    Today:  - Today is D10  - High dose methotrexate and vincristine today, okay to give with ANC of 400  - Will likely be able to discharge when methotrexate clears    - Plan for twice weekly labs and a follow up appointment roughly one week after discharge  - Finished dexamethasone taper, will get 12mg today and 8mg tomorrow per protocol for R-CODOX-M  - Monitor TLS BID  - Continue acyclovir, fluconazole and levaquin today for ID ppx  - Switched to H2 blockers for GI ppx from PPIs due to methotrexate    HEME/ONC  # Burkitt's lymphoma  # Thoracic spine mass  # Spinal cord compression  Patient presented on 3/12 to [facility] with acute-on-chronic mid-thoracic back pain with some associated radicular symptoms. Per patient, no B-symptoms, though he did recently intentionally lose 35 lbs. 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 Patient had no appreciable neurological deficits. He was started on dexamethasone and underwent T5-6 laminectomy with gross total resection of epidural tumor on 3/15. Flow cytometry of the specimen was notable for CD10 positive and kappa monotypic B cells (83%).  Confirmed Burkitt lymphoma with surgical pathology. Cytogenetics pending.  - PET scan completed 3/18  - Bone marrow biopsy completed 3/18. Tolerated well with Versed 1mg IV  - ECHO (3/16) with LVEF of 60-65%, no diastolic dysfunction.  - Baseline EKG (3/13) without evidence of ischemia or dysrhythmia.  - PICC line placed.   - Continue daily PPI while on steroids.  - Dexamethasone taper finished   - TLS labs Q12h, DIC labs daily     Treatment Plan: R-CODOX-M/IVAC (D1=3/18/2020). Today is D10  - Cyclophosphamide 1,705 mg IV for D1, D2  - Doxorubicin 110 mg IV D1  - Vincristine 2 mg IV D1, D10  - Rituximab 800 mg   - Methotrexate 3 g/m2 D10  - Leucovorin calcium injection 426 mg D11  - Will get Dexamethasone 12mg today and 8mg tomorrow per protocol today  - Neupogen injection 480 mcg daily for D1-D6  - LP and IT chemo (3/20); cytarabine 50 mg, hydrocortisone sodium succinate 50 mg, methotrexate 12 mg   - LP and IT chemo (3/23); cytarabine 50 mg, hydrocortisone sodium succinate 50 mg   - Supportive Medications:   - Allopurinol tablet 300 mg    - PRN Anti Emetics; PRN Bowel Regimen; PRN Pain control    # ID Prophylaxis  - Viral serologies: HepC nonreactive, HepB negative and HIV negative  - Now neutropenic  - Continue fluconazole 200mg daily and levaquin 250 mg daily   - Continue acyclovir 400 mg BID due to past exposure of VZV  - Negative HSV 1/2, no need for acyclovir  - Pentamidine given on (3/21)    # Leukopenia 2/2 to chemotherapy  # Normocytic anemia  - CBC daily  - Transfuse if Hgb <7, Platelets <10K     # Thyroid cancer status-post thyroidectomy  Status post thyroidectomy in 2011. TSH normal on admission.  - Continue PTA levothyroxine     MSK/NEURO  # Thoracic back pain - resolving  Due to T5-6 extradural tumor as detailed above. Patient now status post T5-6 laminectomy with gross total resction on 3/15. Today is POD #4.   - Post-op activity recommendations per NSG:   - [place] [employer]ace to be worn out of bed  (orthotics consult in place)   - No lifting >10 lbs, twisting, straining, or strenuous activity for at least 2 weeks  - Continue APAP, oxycodone, methocarbamol PRN for pain  - Patient should have scheduled outpatient neurosurgery follow-up in 2 weeks (may be via Tele-Health); NSG will coordinate  - Surgical site erythematous, though no warmth or fluctuance noted   - Continue to monitor, Sutures used were absorbable      CV  # Coronary artery disease  # Hyperlipidemia  Followed by Dr. Zeng at [facility] ([place] Diagnosed with mild, non-obstructive CAD in 2018. Calcium score at that time was 5.5. Baby ASA and low-dose statin therapy recommended. No previous cardiac caths or stents.  - Hold PTA statin in light of starting chemotherapy     GI  # Hypocalcemia  Mild. Ca 7.4 today, corrects to 8.4 for albumin.  - Continue PO calcium citrate      # Constipation - resolved  Noted prior to presentation. Patient reports decreased frequency of bowel movements. Has been responsive to laxative use.   - Has been having regular BM with current bowel regimen  - Continue scheduled senna   - Change scheduled Miralax to PRN  - Mag Citrate provided PRN     # Hypokalemia  - intermittent hypokalemia  - continue replacements per protocol  - will consider adding K 20 meq po daily if continues to remain low persistently  - Continue to monitor    FEN:  - No IVF for now; encourage PO intake.   - Lyte replacement per protocol  - Regular diet as tolerated.    Prophy/Misc:  - GI: Continue bowel regimen as above; - Switched to H2 blockers for GI ppx from PPIs due to methotrexate  - DVT: Lovenox 40mg subQ; Hold Lovenox if platelets <50K.  - Activity: as tolerated and per neurosurg recommendations, PT consulted  - Diet: Regular as tolerated     Disposition: Anticipate discharge to home after cycle 1 of CODOX-M, likely 2 weeks from D1 of chemotherapy. Will get methotrexate at the end of the week. Will likely be able to discharge  once methotrexate levels clear. Plan for twice weekly labs and a follow up appointment roughly one week after discharge     Code  Full Code     Patient is seen and examined individually by Dr. Russell and I who agrees with the assessment and plan are discussed above.    Mckayla Alvarez PA-C  Hematology/Oncology  Pager: #[phone]    Interval History   No acute events overnight.   Kobe was in good spirits today and reports that he is feeling well. Reports some tingling and numbness in his feet though states that this has been normal for the past few years. Reports pain in his feet at night which resolves with walking around. Denies any other pain today. Reports his last bowel movement was today. Appetite intact. Encourage po fluid intake. Denies headache, vision changes, sore throat, oral sores, SOB, cough, chest pain, abdominal pain, constipation, diarrhea, N/V, hematuria, dysuria, tingling in hands or feet, difficulty ambulating or new swelling.  All questions answered at bedside.    Physical Exam   Temp: 97.3  F (36.3  C) Temp src: Oral BP: 123/70   Heart Rate: 73 Resp: 18 SpO2: 97 % O2 Device: None (Room air)    Vitals:    03/25/20 0749 03/26/20 0815 03/27/20 0728   Weight: 92.4 kg (203 lb 9.6 oz) 93.4 kg (205 lb 12.8 oz) 93.4 kg (205 lb 14.4 oz)     Vital Signs with Ranges  Temp:  [97.3  F (36.3  C)-98.5  F (36.9  C)] 97.3  F (36.3  C)  Heart Rate:  [] 73  Resp:  [18] 18  BP: (115-126)/(67-71) 123/70  SpO2:  [96 %-98 %] 97 %  I/O last 3 completed shifts:  In: 440 [P.O.:400; I.V.:40]  Out: 775 [Urine:775]    Constitutional: Pleasant man sitting up in chair. Awake and conversational. Non- toxic appearing. No acute distress. Well developed, hydrated, and nourished.   HEENT: Normocephalic, atraumatic without tenderness or palpable masses/depressions. Sclerae anicteric. EOM intact. Moist mucus membranes without lesions, thrush, or exudates appreciated  Lymph: Neck supple.   Respiratory: Breathing comfortable with no  increased work on room air. Good air exchange. No signs of respiratory distress or accessory muscle use. Lungs clear to auscultation bilaterally, no crackles or wheezing noted.  Cardiovascular: Regular rate and rhythm. Normal S1 and S2. No murmurs, rubs, or gallops. No peripheral edema.    GI: Abdomen is soft, non-distended, non-tender and without distension. Bowel sounds present.   Skin: Skin is clean, dry, intact. No jaundice appreciated.   Musculoskeletal/ Extremities: No redness, warmth, or swelling of the joints appreciated. Distal pulses palpable. Nailbeds pink and without cyanosis or clubbing. Walking the halls with ease.  Neurologic: Alert and oriented. Speech normal. Grossly nonfocal. Memory and thought process preserved. Motor function grossly intact. Sensation grossly intact.    Neuropsychiatric: Calm, affect congruent to situation. Appropriate mood and affect. Good judgment and insight. No visual/auditory hallucinations.     Medications     IV infusion builder WITH additives       - MEDICATION INSTRUCTIONS -       sodium chloride         acyclovir  400 mg Oral BID     albuterol  2.5 mg Nebulization Q28 Days    And     pentamidine  300 mg Inhalation Q28 Days     allopurinol  300 mg Oral Daily     allopurinol  300 mg Oral Daily     calcium citrate  950 mg Oral Daily     IV infusion builder WITH additives   Intravenous Once     [START ON 3/30/2020] dexamethasone  0.5 mg Oral Daily     [START ON 3/28/2020] dexamethasone  1 mg Oral Daily     dexamethasone  12 mg Oral Once     [START ON 3/28/2020] dexamethasone  8 mg Oral Daily     enoxaparin ANTICOAGULANT  40 mg Subcutaneous Q24H     [START ON 3/29/2020] filgrastim (NEUPOGEN/GRANIX) subcutaneous  5 mcg/kg (Treatment Plan Recorded) Subcutaneous Daily     fluconazole  200 mg Oral Daily     heparin lock flush  5-10 mL Intracatheter Q24H     [START ON 3/28/2020] leucovorin calcium  15 mg/m2 (Treatment Plan Recorded) Intravenous Q6H     [START ON 3/28/2020]  leucovorin calcium  200 mg/m2 (Treatment Plan Recorded) Intravenous Once     levofloxacin  250 mg Oral Daily     levothyroxine  200 mcg Oral QAM AC     loratadine  10 mg Oral Daily     methotrexate (RHEUMATREX) infusion  3 g/m2 (Treatment Plan Recorded) Intravenous Once     ondansetron  16 mg Oral Once     pantoprazole  40 mg Oral QAM AC     senna-docusate  1 tablet Oral BID    Or     senna-docusate  2 tablet Oral BID     sodium chloride (PF)  10 mL Intracatheter Q8H     vinCRIStine (ONCOVIN) infusion  2 mg Intravenous Once       Data   Results for orders placed or performed during the hospital encounter of 03/13/20 (from the past 24 hour(s))   Creatinine   Result Value Ref Range    Creatinine 0.60 (L) 0.66 - 1.25 mg/dL    GFR Estimate >90 >60 mL/min/[1.73_m2]    GFR Estimate If Black >90 >60 mL/min/[1.73_m2]   Phosphorus   Result Value Ref Range    Phosphorus 2.8 2.5 - 4.5 mg/dL   Uric acid   Result Value Ref Range    Uric Acid 1.7 (L) 3.5 - 7.2 mg/dL   Lactate Dehydrogenase   Result Value Ref Range    Lactate Dehydrogenase 131 85 - 227 U/L   Potassium   Result Value Ref Range    Potassium 3.9 3.4 - 5.3 mmol/L   INR   Result Value Ref Range    INR 0.97 0.86 - 1.14   Fibrinogen activity   Result Value Ref Range    Fibrinogen 401 200 - 420 mg/dL   Comprehensive metabolic panel   Result Value Ref Range    Sodium 138 133 - 144 mmol/L    Potassium 3.7 3.4 - 5.3 mmol/L    Chloride 106 94 - 109 mmol/L    Carbon Dioxide 27 20 - 32 mmol/L    Anion Gap 6 3 - 14 mmol/L    Glucose 103 (H) 70 - 99 mg/dL    Urea Nitrogen 22 7 - 30 mg/dL    Creatinine 0.55 (L) 0.66 - 1.25 mg/dL    GFR Estimate >90 >60 mL/min/[1.73_m2]    GFR Estimate If Black >90 >60 mL/min/[1.73_m2]    Calcium 7.5 (L) 8.5 - 10.1 mg/dL    Bilirubin Total 0.4 0.2 - 1.3 mg/dL    Albumin 2.9 (L) 3.4 - 5.0 g/dL    Protein Total 5.6 (L) 6.8 - 8.8 g/dL    Alkaline Phosphatase 58 40 - 150 U/L    ALT 20 0 - 70 U/L    AST 6 0 - 45 U/L   Phosphorus   Result Value Ref Range     Phosphorus 3.4 2.5 - 4.5 mg/dL   Uric acid   Result Value Ref Range    Uric Acid 1.8 (L) 3.5 - 7.2 mg/dL   Lactate Dehydrogenase   Result Value Ref Range    Lactate Dehydrogenase 134 85 - 227 U/L   CBC with platelets differential   Result Value Ref Range    WBC 0.4 (LL) 4.0 - 11.0 10e9/L    RBC Count 3.13 (L) 4.4 - 5.9 10e12/L    Hemoglobin 9.4 (L) 13.3 - 17.7 g/dL    Hematocrit 28.5 (L) 40.0 - 53.0 %    MCV 91 78 - 100 fl    MCH 30.0 26.5 - 33.0 pg    MCHC 33.0 31.5 - 36.5 g/dL    RDW 12.8 10.0 - 15.0 %    Platelet Count 168 150 - 450 10e9/L    Diff Method WBC <0.5, Diff not done    Magnesium   Result Value Ref Range    Magnesium 1.9 1.6 - 2.3 mg/dL

## 2020-03-28 LAB
ALBUMIN SERPL-MCNC: 2.8 G/DL (ref 3.4–5)
ALP SERPL-CCNC: 58 U/L (ref 40–150)
ALT SERPL W P-5'-P-CCNC: 27 U/L (ref 0–70)
ANION GAP SERPL CALCULATED.3IONS-SCNC: 7 MMOL/L (ref 3–14)
AST SERPL W P-5'-P-CCNC: 13 U/L (ref 0–45)
BACTERIA SPEC CULT: NO GROWTH
BASOPHILS # BLD AUTO: 0 10E9/L (ref 0–0.2)
BASOPHILS NFR BLD AUTO: 0 %
BILIRUB SERPL-MCNC: 0.4 MG/DL (ref 0.2–1.3)
BUN SERPL-MCNC: 13 MG/DL (ref 7–30)
CALCIUM SERPL-MCNC: 7 MG/DL (ref 8.5–10.1)
CHLORIDE SERPL-SCNC: 102 MMOL/L (ref 94–109)
CO2 SERPL-SCNC: 29 MMOL/L (ref 20–32)
CREAT SERPL-MCNC: 0.6 MG/DL (ref 0.66–1.25)
CREAT SERPL-MCNC: 0.7 MG/DL (ref 0.66–1.25)
DIFFERENTIAL METHOD BLD: ABNORMAL
EOSINOPHIL # BLD AUTO: 0 10E9/L (ref 0–0.7)
EOSINOPHIL NFR BLD AUTO: 0 %
ERYTHROCYTE [DISTWIDTH] IN BLOOD BY AUTOMATED COUNT: 12.3 % (ref 10–15)
FIBRINOGEN PPP-MCNC: 401 MG/DL (ref 200–420)
GFR SERPL CREATININE-BSD FRML MDRD: >90 ML/MIN/{1.73_M2}
GFR SERPL CREATININE-BSD FRML MDRD: >90 ML/MIN/{1.73_M2}
GLUCOSE SERPL-MCNC: 152 MG/DL (ref 70–99)
HCT VFR BLD AUTO: 27.1 % (ref 40–53)
HGB BLD-MCNC: 9.2 G/DL (ref 13.3–17.7)
INR PPP: 1.03 (ref 0.86–1.14)
LDH SERPL L TO P-CCNC: 136 U/L (ref 85–227)
LDH SERPL L TO P-CCNC: 143 U/L (ref 85–227)
LYMPHOCYTES # BLD AUTO: 0.2 10E9/L (ref 0.8–5.3)
LYMPHOCYTES NFR BLD AUTO: 17.8 %
Lab: NORMAL
MAGNESIUM SERPL-MCNC: 2.1 MG/DL (ref 1.6–2.3)
MCH RBC QN AUTO: 29.5 PG (ref 26.5–33)
MCHC RBC AUTO-ENTMCNC: 33.9 G/DL (ref 31.5–36.5)
MCV RBC AUTO: 87 FL (ref 78–100)
METAMYELOCYTES # BLD: 0 10E9/L
METAMYELOCYTES NFR BLD MANUAL: 4.1 %
MONOCYTES # BLD AUTO: 0.2 10E9/L (ref 0–1.3)
MONOCYTES NFR BLD AUTO: 23.3 %
MTX SERPL-SCNC: 5.37 UMOL/L
NEUTROPHILS # BLD AUTO: 0.5 10E9/L (ref 1.6–8.3)
NEUTROPHILS NFR BLD AUTO: 54.8 %
PH UR STRIP: 8 PH (ref 5–7)
PHOSPHATE SERPL-MCNC: 2.1 MG/DL (ref 2.5–4.5)
PHOSPHATE SERPL-MCNC: 3.4 MG/DL (ref 2.5–4.5)
PLATELET # BLD AUTO: 174 10E9/L (ref 150–450)
POTASSIUM SERPL-SCNC: 3.4 MMOL/L (ref 3.4–5.3)
POTASSIUM SERPL-SCNC: 3.4 MMOL/L (ref 3.4–5.3)
PROT SERPL-MCNC: 5.5 G/DL (ref 6.8–8.8)
RBC # BLD AUTO: 3.12 10E12/L (ref 4.4–5.9)
RBC MORPH BLD: NORMAL
SODIUM SERPL-SCNC: 139 MMOL/L (ref 133–144)
SPECIMEN SOURCE: NORMAL
URATE SERPL-MCNC: 2.1 MG/DL (ref 3.5–7.2)
URATE SERPL-MCNC: 2.6 MG/DL (ref 3.5–7.2)
WBC # BLD AUTO: 0.9 10E9/L (ref 4–11)

## 2020-03-28 PROCEDURE — 36592 COLLECT BLOOD FROM PICC: CPT | Performed by: PHYSICIAN ASSISTANT

## 2020-03-28 PROCEDURE — 83615 LACTATE (LD) (LDH) ENZYME: CPT | Performed by: PHYSICIAN ASSISTANT

## 2020-03-28 PROCEDURE — 83735 ASSAY OF MAGNESIUM: CPT | Performed by: PHYSICIAN ASSISTANT

## 2020-03-28 PROCEDURE — 85384 FIBRINOGEN ACTIVITY: CPT | Performed by: PHYSICIAN ASSISTANT

## 2020-03-28 PROCEDURE — 81003 URINALYSIS AUTO W/O SCOPE: CPT | Performed by: INTERNAL MEDICINE

## 2020-03-28 PROCEDURE — 85610 PROTHROMBIN TIME: CPT | Performed by: PHYSICIAN ASSISTANT

## 2020-03-28 PROCEDURE — 82565 ASSAY OF CREATININE: CPT | Performed by: PHYSICIAN ASSISTANT

## 2020-03-28 PROCEDURE — 25000132 ZZH RX MED GY IP 250 OP 250 PS 637: Performed by: INTERNAL MEDICINE

## 2020-03-28 PROCEDURE — 85025 COMPLETE CBC W/AUTO DIFF WBC: CPT | Performed by: PHYSICIAN ASSISTANT

## 2020-03-28 PROCEDURE — 25000128 H RX IP 250 OP 636: Performed by: PHYSICIAN ASSISTANT

## 2020-03-28 PROCEDURE — 25000128 H RX IP 250 OP 636: Performed by: INTERNAL MEDICINE

## 2020-03-28 PROCEDURE — 84100 ASSAY OF PHOSPHORUS: CPT | Performed by: PHYSICIAN ASSISTANT

## 2020-03-28 PROCEDURE — 25000132 ZZH RX MED GY IP 250 OP 250 PS 637: Performed by: NURSE PRACTITIONER

## 2020-03-28 PROCEDURE — 25800030 ZZH RX IP 258 OP 636: Performed by: PHYSICIAN ASSISTANT

## 2020-03-28 PROCEDURE — 25800030 ZZH RX IP 258 OP 636: Performed by: INTERNAL MEDICINE

## 2020-03-28 PROCEDURE — 80053 COMPREHEN METABOLIC PANEL: CPT | Performed by: PHYSICIAN ASSISTANT

## 2020-03-28 PROCEDURE — 84550 ASSAY OF BLOOD/URIC ACID: CPT | Performed by: PHYSICIAN ASSISTANT

## 2020-03-28 PROCEDURE — 80299 QUANTITATIVE ASSAY DRUG: CPT | Performed by: PHYSICIAN ASSISTANT

## 2020-03-28 PROCEDURE — 25000132 ZZH RX MED GY IP 250 OP 250 PS 637: Performed by: PHYSICIAN ASSISTANT

## 2020-03-28 PROCEDURE — 25000132 ZZH RX MED GY IP 250 OP 250 PS 637: Performed by: STUDENT IN AN ORGANIZED HEALTH CARE EDUCATION/TRAINING PROGRAM

## 2020-03-28 PROCEDURE — 25000131 ZZH RX MED GY IP 250 OP 636 PS 637: Performed by: INTERNAL MEDICINE

## 2020-03-28 PROCEDURE — 12000001 ZZH R&B MED SURG/OB UMMC

## 2020-03-28 PROCEDURE — 25000125 ZZHC RX 250: Performed by: PHYSICIAN ASSISTANT

## 2020-03-28 PROCEDURE — 25000125 ZZHC RX 250: Performed by: INTERNAL MEDICINE

## 2020-03-28 PROCEDURE — 84132 ASSAY OF SERUM POTASSIUM: CPT | Performed by: PHYSICIAN ASSISTANT

## 2020-03-28 RX ADMIN — ENOXAPARIN SODIUM 40 MG: 40 INJECTION SUBCUTANEOUS at 08:51

## 2020-03-28 RX ADMIN — FLUCONAZOLE 200 MG: 200 TABLET ORAL at 12:16

## 2020-03-28 RX ADMIN — DEXAMETHASONE 8 MG: 4 TABLET ORAL at 08:52

## 2020-03-28 RX ADMIN — SENNOSIDES AND DOCUSATE SODIUM 1 TABLET: 8.6; 5 TABLET ORAL at 20:18

## 2020-03-28 RX ADMIN — ALLOPURINOL 300 MG: 300 TABLET ORAL at 08:52

## 2020-03-28 RX ADMIN — SODIUM BICARBONATE: 84 INJECTION, SOLUTION INTRAVENOUS at 21:23

## 2020-03-28 RX ADMIN — ACYCLOVIR 400 MG: 200 CAPSULE ORAL at 20:18

## 2020-03-28 RX ADMIN — LEVOTHYROXINE SODIUM 200 MCG: 0.1 TABLET ORAL at 08:52

## 2020-03-28 RX ADMIN — SODIUM BICARBONATE: 84 INJECTION, SOLUTION INTRAVENOUS at 05:00

## 2020-03-28 RX ADMIN — LEVOFLOXACIN 250 MG: 250 TABLET, FILM COATED ORAL at 12:16

## 2020-03-28 RX ADMIN — ACYCLOVIR 400 MG: 200 CAPSULE ORAL at 08:52

## 2020-03-28 RX ADMIN — Medication 5 ML: at 04:04

## 2020-03-28 RX ADMIN — LEUCOVORIN CALCIUM 32 MG: 350 INJECTION, POWDER, LYOPHILIZED, FOR SOLUTION INTRAMUSCULAR; INTRAVENOUS at 04:02

## 2020-03-28 RX ADMIN — FAMOTIDINE 20 MG: 20 TABLET ORAL at 08:52

## 2020-03-28 RX ADMIN — FAMOTIDINE 20 MG: 20 TABLET ORAL at 20:18

## 2020-03-28 RX ADMIN — Medication 5 ML: at 05:41

## 2020-03-28 RX ADMIN — POTASSIUM PHOSPHATE, MONOBASIC AND POTASSIUM PHOSPHATE, DIBASIC 15 MMOL: 224; 236 INJECTION, SOLUTION INTRAVENOUS at 21:23

## 2020-03-28 RX ADMIN — LEUCOVORIN CALCIUM 32 MG: 350 INJECTION, POWDER, LYOPHILIZED, FOR SOLUTION INTRAMUSCULAR; INTRAVENOUS at 22:09

## 2020-03-28 RX ADMIN — LORATADINE 10 MG: 10 TABLET ORAL at 08:52

## 2020-03-28 RX ADMIN — CALCIUM CITRATE 200 MG (950 MG) TABLET 950 MG: at 08:52

## 2020-03-28 RX ADMIN — SODIUM BICARBONATE: 84 INJECTION, SOLUTION INTRAVENOUS at 09:26

## 2020-03-28 RX ADMIN — SODIUM BICARBONATE: 84 INJECTION, SOLUTION INTRAVENOUS at 15:08

## 2020-03-28 RX ADMIN — LEUCOVORIN CALCIUM 426 MG: 350 INJECTION, POWDER, LYOPHILIZED, FOR SOLUTION INTRAMUSCULAR; INTRAVENOUS at 16:22

## 2020-03-28 RX ADMIN — SENNOSIDES AND DOCUSATE SODIUM 1 TABLET: 8.6; 5 TABLET ORAL at 08:52

## 2020-03-28 RX ADMIN — SODIUM BICARBONATE: 84 INJECTION, SOLUTION INTRAVENOUS at 00:43

## 2020-03-28 ASSESSMENT — ACTIVITIES OF DAILY LIVING (ADL)
ADLS_ACUITY_SCORE: 13

## 2020-03-28 ASSESSMENT — PAIN DESCRIPTION - DESCRIPTORS: DESCRIPTORS: DISCOMFORT;BURNING

## 2020-03-28 ASSESSMENT — MIFFLIN-ST. JEOR: SCORE: 1754.99

## 2020-03-28 NOTE — PLAN OF CARE
Nursing Focus: Chemotherapy    D: Positive blood return via PICC. Insertion site is clean/dry/intact, dressing intact with no complaints of pain.  Urine output is recorded in intake in Doc Flowsheet.      I: Premedications given per order (see electronic medical administration record). Dose #1 of methotrexate started to infuse over 4 hours. Reviewed pt teaching on chemotherapy side effects.  Pt denies need for further teaching. Chemotherapy double checked per protocol by two chemotherapy competent RN's.     A: Tolerating procedure well. Denies nausea and or pain.     P: Continue to monitor urine output and symptoms of nausea. Screen for symptoms of toxicity.

## 2020-03-28 NOTE — PLAN OF CARE
[id number]:    VSS, afebrile, A&Ox4. Denies pain, nausea, SOB. Methotrexate level 5.37 this morning. Urine pH 8.0 @ 1100. Next pH check due at 1900. 426mg leucovorin dose given at 1600. Next dose scheduled for 2200. D5 + 0.2% NS + sodium bicarb infusing continuously via PICC. BM x 1 this morning. Voiding adequately. Regular diet, good appetite. No acute events. Continue to monitor & follow POC.

## 2020-03-28 NOTE — PROGRESS NOTES
[facility]    Hematology / Oncology Progress Note     Assessment & Plan   Kobe Pedroza is a 60 year old male with a history of thyroid cancer status post thyroidectomy (2011), CAD, and hyperlipidemia who was transferred from [facility] to [facility] on 3/13 with a newly thoracic extradural mass at T5-6 with severe cord compression, now status post neurosurgical gross resection on 3/15 with initial pathology concerning for high-grade B-cell lymphoma. He transferred to the Malignant Hematology team on 3/17 for further work-up and management of his newly diagnosed Burkitt Lymphoma. He started R-CODOX-M/IVAC chemotherapy regimen (D1=3/18/2020).    Plan today:  - Today is D11  - Methotrexate level 5.37, CTM  - Will likely be able to discharge when methotrexate clears    - Plan for twice weekly labs and a follow up appointment roughly one week after discharge  - Finished dexamethasone taper, will get 8mg today per chemo protocol   - Monitor TLS BID    HEME/ONC  # Burkitt's lymphoma  # Thoracic spine mass  # Spinal cord compression  Patient presented on 3/12 to [facility] with acute-on-chronic mid-thoracic back pain with some associated radicular symptoms. Per patient, no B-symptoms, though he did recently intentionally lose 35 lbs. 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 Patient had no appreciable neurological deficits. He was started on dexamethasone and underwent T5-6 laminectomy with gross total resection of epidural tumor on 3/15. Flow cytometry of the specimen was notable for CD10 positive and kappa monotypic B cells (83%). Confirmed Burkitt lymphoma with surgical pathology. Cytogenetics pending.  - PET scan completed 3/18  - Bone marrow biopsy completed 3/18. Tolerated well with Versed 1mg IV  - ECHO (3/16) with LVEF of 60-65%, no diastolic dysfunction.  - Baseline EKG (3/13) without evidence of  ischemia or dysrhythmia.  - PICC line placed.  - Dexamethasone taper finished   - TLS labs Q12h, DIC labs daily     Treatment Plan: R-CODOX-M/IVAC (D1=3/18/2020). Today is D11  - Cyclophosphamide 1,705 mg IV for D1, D2  - Doxorubicin 110 mg IV D1  - Vincristine 2 mg IV D1, D10  - Rituximab 800 mg   - Methotrexate 3 g/m2 D10  - Leucovorin calcium injection 426 mg D11  - Will get Dexamethasone 8mg today per protocol  - Neupogen injection 480 mcg daily for D1-D6  - LP and IT chemo (3/20); cytarabine 50 mg, hydrocortisone sodium succinate 50 mg, methotrexate 12 mg   - LP and IT chemo (3/23); cytarabine 50 mg, hydrocortisone sodium succinate 50 mg   - Supportive Medications:   - Allopurinol tablet 300 mg    - PRN Anti Emetics; PRN Bowel Regimen; PRN Pain control    # ID Prophylaxis  - Viral serologies: HepC nonreactive, HepB negative and HIV negative  - Now neutropenic  - Continue fluconazole 200mg daily and levaquin 250 mg daily   - Continue acyclovir 400 mg BID due to past exposure of VZV  - Negative HSV 1/2, no need for acyclovir  - Pentamidine given on (3/21)    # Leukopenia 2/2 to chemotherapy  # Normocytic anemia  - CBC daily  - Transfuse if Hgb <7, Platelets <10K     # Thyroid cancer status-post thyroidectomy  Status post thyroidectomy in 2011. TSH normal on admission.  - Continue PTA levothyroxine     MSK/NEURO  # Thoracic back pain - resolving  Due to T5-6 extradural tumor as detailed above. Patient now status post T5-6 laminectomy with gross total resction on 3/15. Today is POD #5.   - Post-op activity recommendations per NSG:   - Larry Brace to be worn out of bed (orthotics consult in place)   - No lifting >10 lbs, twisting, straining, or strenuous activity for at least 2 weeks  - Continue APAP, oxycodone, methocarbamol PRN for pain  - Patient should have scheduled outpatient neurosurgery follow-up in 2 weeks (may be via [facility]); NSG will coordinate  - CTM surgical site; sutures used were absorbable       CV  # Coronary artery disease  # Hyperlipidemia  Followed by Dr. Zeng at [facility] ([place]). Diagnosed with mild, non-obstructive CAD in 2018. Calcium score at that time was 5.5. Baby ASA and low-dose statin therapy recommended. No previous cardiac caths or stents.  - Hold PTA statin in light of starting chemotherapy     GI  # Hypocalcemia  Mild, corrects for low albumin.   - Continue PO calcium citrate      # Constipation - resolved  Noted prior to presentation. Patient reports decreased frequency of bowel movements. Has been responsive to laxative use.   - Has been having regular BM with current bowel regimen  - Continue scheduled senna   - Change scheduled Miralax to PRN  - Mag Citrate provided PRN     # Hypokalemia  - intermittent hypokalemia  - continue replacements per protocol  - will consider adding K 20 meq po daily if continues to remain low persistently  - Continue to monitor    FEN:  - No IVF for now; encourage PO intake.   - Lyte replacement per protocol  - Regular diet as tolerated.    Prophy/Misc:  - GI: Continue bowel regimen as above; Switched to H2 blockers for GI ppx from PPIs due to methotrexate  - DVT: Lovenox 40mg subQ; Hold Lovenox if platelets <50K.  - Activity: as tolerated and per neurosurg recommendations, PT consulted  - Diet: Regular as tolerated     Disposition: Anticipate discharge to home after cycle 1 of CODOX-M, likely 2 weeks from D1 of chemotherapy. Will get methotrexate at the end of the week. Will likely be able to discharge once methotrexate levels clear. Plan for twice weekly labs and a follow up appointment roughly one week after discharge     Code  Full Code     Mine Gaines DNP, APRN, NP-C  Hematology/Oncology  Pager: [phone]    Interval History   No acute events overnight.   Jonnathan reports feeling fine today. Denies significant back pain, maybe some tightness over sutures. Denies need for opioids. Had a good BM this AM. Denies fever, chills,  dizziness, chest or abdominal pain, N/V/D/C. Urinating frequently. Denies sore throat, mouth sores. Up ad riki.    Physical Exam   Temp: 98  F (36.7  C) Temp src: Oral BP: 138/70   Heart Rate: 98 Resp: 18 SpO2: 98 % O2 Device: None (Room air)    Vitals:    03/26/20 0815 03/27/20 0728 03/28/20 0900   Weight: 93.4 kg (205 lb 12.8 oz) 93.4 kg (205 lb 14.4 oz) 95.3 kg (210 lb)     Vital Signs with Ranges  Temp:  [96.1  F (35.6  C)-98  F (36.7  C)] 98  F (36.7  C)  Heart Rate:  [67-98] 98  Resp:  [18] 18  BP: (114-138)/(59-74) 138/70  SpO2:  [95 %-98 %] 98 %  I/O last 3 completed shifts:  In: 4677.5 [P.O.:240; I.V.:3937.5; IV Piggyback:500]  Out: 5450 [Urine:5450]    Constitutional: Pleasant man resting in bed in NAD. Alert and interactive.   HEENT: NCAT. Sclerae anicteric. MMM, thick film over tongue does not look like thrush, no redness, no ulcers.  Respiratory: Non labored breathing on room air. Good air exchange. No signs of respiratory distress or accessory muscle use. Lungs clear to auscultation bilaterally, no crackles or wheezing noted.  Cardiovascular: Regular rate and rhythm. No murmur or rub. No peripheral edema.    GI: NABS. Abdomen is soft, non-distended, non-tender.  Skin: Skin is clean, dry, intact. No jaundice appreciated.   Musculoskeletal/ Extremities: Grossly normal, no tenderness or swelling, moves independently. T-spine surgical site mild erythema, no discharge, no crepitus, mild crusting over sutures, non tender.   Neurologic: Alert and oriented. Speech normal. Grossly nonfocal.    Neuropsychiatric: Calm, affect congruent to situation.      Medications     IV infusion builder WITH additives 225 mL/hr at 03/28/20 0926     - MEDICATION INSTRUCTIONS -       sodium chloride         acyclovir  400 mg Oral BID     albuterol  2.5 mg Nebulization Q28 Days    And     pentamidine  300 mg Inhalation Q28 Days     allopurinol  300 mg Oral Daily     calcium citrate  950 mg Oral Daily     dexamethasone  8 mg Oral  Daily     enoxaparin ANTICOAGULANT  40 mg Subcutaneous Q24H     famotidine  20 mg Oral BID     [START ON 3/29/2020] filgrastim (NEUPOGEN/GRANIX) subcutaneous  5 mcg/kg (Treatment Plan Recorded) Subcutaneous Daily     fluconazole  200 mg Oral Daily     heparin lock flush  5-10 mL Intracatheter Q24H     leucovorin calcium  15 mg/m2 (Treatment Plan Recorded) Intravenous Q6H     leucovorin calcium  200 mg/m2 (Treatment Plan Recorded) Intravenous Once     levofloxacin  250 mg Oral Daily     levothyroxine  200 mcg Oral QAM AC     loratadine  10 mg Oral Daily     senna-docusate  1 tablet Oral BID    Or     senna-docusate  2 tablet Oral BID     sodium chloride (PF)  10 mL Intracatheter Q8H       Data   Results for orders placed or performed during the hospital encounter of 03/13/20 (from the past 24 hour(s))   pH urine POCT   Result Value Ref Range    pH Urine 8.0 5.0 - 7.0 pH   Creatinine   Result Value Ref Range    Creatinine 0.63 (L) 0.66 - 1.25 mg/dL    GFR Estimate >90 >60 mL/min/[1.73_m2]    GFR Estimate If Black >90 >60 mL/min/[1.73_m2]   Phosphorus   Result Value Ref Range    Phosphorus 2.4 (L) 2.5 - 4.5 mg/dL   Uric acid   Result Value Ref Range    Uric Acid 2.0 (L) 3.5 - 7.2 mg/dL   Lactate Dehydrogenase   Result Value Ref Range    Lactate Dehydrogenase 134 85 - 227 U/L   Potassium   Result Value Ref Range    Potassium 3.7 3.4 - 5.3 mmol/L   pH urine POCT   Result Value Ref Range    pH Urine 7.5 5.0 - 7.0 pH   pH urine POCT   Result Value Ref Range    pH Urine 7.5 5.0 - 7.0 pH   pH urine POCT   Result Value Ref Range    pH Urine 8.0 5.0 - 7.0 pH   INR   Result Value Ref Range    INR 1.03 0.86 - 1.14   Fibrinogen activity   Result Value Ref Range    Fibrinogen 401 200 - 420 mg/dL   Comprehensive metabolic panel   Result Value Ref Range    Sodium 139 133 - 144 mmol/L    Potassium 3.4 3.4 - 5.3 mmol/L    Chloride 102 94 - 109 mmol/L    Carbon Dioxide 29 20 - 32 mmol/L    Anion Gap 7 3 - 14 mmol/L    Glucose 152 (H)  70 - 99 mg/dL    Urea Nitrogen 13 7 - 30 mg/dL    Creatinine 0.60 (L) 0.66 - 1.25 mg/dL    GFR Estimate >90 >60 mL/min/[1.73_m2]    GFR Estimate If Black >90 >60 mL/min/[1.73_m2]    Calcium 7.0 (L) 8.5 - 10.1 mg/dL    Bilirubin Total 0.4 0.2 - 1.3 mg/dL    Albumin 2.8 (L) 3.4 - 5.0 g/dL    Protein Total 5.5 (L) 6.8 - 8.8 g/dL    Alkaline Phosphatase 58 40 - 150 U/L    ALT 27 0 - 70 U/L    AST 13 0 - 45 U/L   Phosphorus   Result Value Ref Range    Phosphorus 3.4 2.5 - 4.5 mg/dL   Uric acid   Result Value Ref Range    Uric Acid 2.6 (L) 3.5 - 7.2 mg/dL   Lactate Dehydrogenase   Result Value Ref Range    Lactate Dehydrogenase 136 85 - 227 U/L   Methotrexate level   Result Value Ref Range    Methotrexate Level 5.37 umol/L   CBC with platelets differential   Result Value Ref Range    WBC 0.9 (LL) 4.0 - 11.0 10e9/L    RBC Count 3.12 (L) 4.4 - 5.9 10e12/L    Hemoglobin 9.2 (L) 13.3 - 17.7 g/dL    Hematocrit 27.1 (L) 40.0 - 53.0 %    MCV 87 78 - 100 fl    MCH 29.5 26.5 - 33.0 pg    MCHC 33.9 31.5 - 36.5 g/dL    RDW 12.3 10.0 - 15.0 %    Platelet Count 174 150 - 450 10e9/L    Diff Method Manual Differential     % Neutrophils 54.8 %    % Lymphocytes 17.8 %    % Monocytes 23.3 %    % Eosinophils 0.0 %    % Basophils 0.0 %    % Metamyelocytes 4.1 %    Absolute Neutrophil 0.5 (L) 1.6 - 8.3 10e9/L    Absolute Lymphocytes 0.2 (L) 0.8 - 5.3 10e9/L    Absolute Monocytes 0.2 0.0 - 1.3 10e9/L    Absolute Eosinophils 0.0 0.0 - 0.7 10e9/L    Absolute Basophils 0.0 0.0 - 0.2 10e9/L    Absolute Metamyelocytes 0.0 0 10e9/L    RBC Morphology Normal    Magnesium   Result Value Ref Range    Magnesium 2.1 1.6 - 2.3 mg/dL   pH urine POCT   Result Value Ref Range    pH Urine 8.0 5.0 - 7.0 pH       I have seen, interviewed, and examined the patient independently.  I have reviewed the vital signs and labs.  This note reflects my assessment and plan.  Feels well, tolerated chemo well, monitor MTX  Mackenzie Lerner MD/PhD

## 2020-03-28 NOTE — PLAN OF CARE
[id number]: Afebrile, VSS on RA. Denies pain/nausea. Urine pH at 0300 was 8.0 Next pH due at 1100. Voiding spontaneously, good UO. Up independently, ambulating halls. Sleeping between cares. Continue with POC.

## 2020-03-28 NOTE — PLAN OF CARE
VSS. Pt up independently. Methotrexate finished this shift. Pt denies pain or nausea. Will need urine pH at 2300. Phosphorus replaced, recheck in am. Hourly rounding done. Will continue to monitor.     Problem: Adult Inpatient Plan of Care  Goal: Plan of Care Review  3/27/2020 2244 by Micaela Lopez, RN  Outcome: No Change     Problem: Adult Inpatient Plan of Care  Goal: Patient-Specific Goal (Individualization)  3/27/2020 2244 by Micaela Lopez, RN  Outcome: No Change     Problem: Adult Inpatient Plan of Care  Goal: Absence of Hospital-Acquired Illness or Injury  3/27/2020 2244 by Micaela Lopez, RN  Outcome: No Change     Problem: Adult Inpatient Plan of Care  Goal: Optimal Comfort and Wellbeing  3/27/2020 2244 by Micaela Lopez, RN  Outcome: No Change     Problem: Adult Inpatient Plan of Care  Goal: Readiness for Transition of Care  3/27/2020 2244 by Micaela Lopez, RN  Outcome: No Change     Problem: Pain Acute  Goal: Optimal Pain Control  3/27/2020 2244 by Micaela Lopez, RN  Outcome: No Change

## 2020-03-29 LAB
ALBUMIN SERPL-MCNC: 2.6 G/DL (ref 3.4–5)
ALP SERPL-CCNC: 56 U/L (ref 40–150)
ALT SERPL W P-5'-P-CCNC: 29 U/L (ref 0–70)
ANION GAP SERPL CALCULATED.3IONS-SCNC: 6 MMOL/L (ref 3–14)
AST SERPL W P-5'-P-CCNC: 14 U/L (ref 0–45)
BASOPHILS # BLD AUTO: 0 10E9/L (ref 0–0.2)
BASOPHILS NFR BLD AUTO: 0 %
BILIRUB SERPL-MCNC: 0.3 MG/DL (ref 0.2–1.3)
BUN SERPL-MCNC: 16 MG/DL (ref 7–30)
CALCIUM SERPL-MCNC: 7.1 MG/DL (ref 8.5–10.1)
CHLORIDE SERPL-SCNC: 103 MMOL/L (ref 94–109)
CO2 SERPL-SCNC: 32 MMOL/L (ref 20–32)
CREAT SERPL-MCNC: 0.75 MG/DL (ref 0.66–1.25)
CREAT SERPL-MCNC: 0.85 MG/DL (ref 0.66–1.25)
DIFFERENTIAL METHOD BLD: ABNORMAL
EOSINOPHIL # BLD AUTO: 0 10E9/L (ref 0–0.7)
EOSINOPHIL NFR BLD AUTO: 0 %
ERYTHROCYTE [DISTWIDTH] IN BLOOD BY AUTOMATED COUNT: 12.6 % (ref 10–15)
FIBRINOGEN PPP-MCNC: 358 MG/DL (ref 200–420)
GFR SERPL CREATININE-BSD FRML MDRD: >90 ML/MIN/{1.73_M2}
GFR SERPL CREATININE-BSD FRML MDRD: >90 ML/MIN/{1.73_M2}
GLUCOSE SERPL-MCNC: 172 MG/DL (ref 70–99)
HCT VFR BLD AUTO: 25.7 % (ref 40–53)
HGB BLD-MCNC: 8.6 G/DL (ref 13.3–17.7)
INR PPP: 1.02 (ref 0.86–1.14)
LDH SERPL L TO P-CCNC: 151 U/L (ref 85–227)
LDH SERPL L TO P-CCNC: 171 U/L (ref 85–227)
LYMPHOCYTES # BLD AUTO: 0.2 10E9/L (ref 0.8–5.3)
LYMPHOCYTES NFR BLD AUTO: 15 %
MAGNESIUM SERPL-MCNC: 2.1 MG/DL (ref 1.6–2.3)
MCH RBC QN AUTO: 29.9 PG (ref 26.5–33)
MCHC RBC AUTO-ENTMCNC: 33.5 G/DL (ref 31.5–36.5)
MCV RBC AUTO: 89 FL (ref 78–100)
MONOCYTES # BLD AUTO: 0 10E9/L (ref 0–1.3)
MONOCYTES NFR BLD AUTO: 2 %
MTX SERPL-SCNC: 0.26 UMOL/L
NEUTROPHILS # BLD AUTO: 1.1 10E9/L (ref 1.6–8.3)
NEUTROPHILS NFR BLD AUTO: 83 %
PH UR STRIP: 7.5 PH (ref 5–7)
PH UR STRIP: 8 PH (ref 5–7)
PH UR STRIP: 8.5 PH (ref 5–7)
PHOSPHATE SERPL-MCNC: 2.9 MG/DL (ref 2.5–4.5)
PHOSPHATE SERPL-MCNC: 3.8 MG/DL (ref 2.5–4.5)
PLATELET # BLD AUTO: 159 10E9/L (ref 150–450)
PLATELET # BLD EST: ABNORMAL 10*3/UL
POTASSIUM SERPL-SCNC: 3.3 MMOL/L (ref 3.4–5.3)
POTASSIUM SERPL-SCNC: 3.4 MMOL/L (ref 3.4–5.3)
POTASSIUM SERPL-SCNC: 3.5 MMOL/L (ref 3.4–5.3)
PROT SERPL-MCNC: 5.2 G/DL (ref 6.8–8.8)
RBC # BLD AUTO: 2.88 10E12/L (ref 4.4–5.9)
RBC MORPH BLD: NORMAL
SODIUM SERPL-SCNC: 140 MMOL/L (ref 133–144)
URATE SERPL-MCNC: 2.5 MG/DL (ref 3.5–7.2)
URATE SERPL-MCNC: 2.6 MG/DL (ref 3.5–7.2)
WBC # BLD AUTO: 1.3 10E9/L (ref 4–11)

## 2020-03-29 PROCEDURE — 25000132 ZZH RX MED GY IP 250 OP 250 PS 637: Performed by: NURSE PRACTITIONER

## 2020-03-29 PROCEDURE — 85025 COMPLETE CBC W/AUTO DIFF WBC: CPT | Performed by: NURSE PRACTITIONER

## 2020-03-29 PROCEDURE — 25000132 ZZH RX MED GY IP 250 OP 250 PS 637: Performed by: STUDENT IN AN ORGANIZED HEALTH CARE EDUCATION/TRAINING PROGRAM

## 2020-03-29 PROCEDURE — 25000132 ZZH RX MED GY IP 250 OP 250 PS 637: Performed by: INTERNAL MEDICINE

## 2020-03-29 PROCEDURE — 83735 ASSAY OF MAGNESIUM: CPT | Performed by: NURSE PRACTITIONER

## 2020-03-29 PROCEDURE — 85610 PROTHROMBIN TIME: CPT | Performed by: NURSE PRACTITIONER

## 2020-03-29 PROCEDURE — 81003 URINALYSIS AUTO W/O SCOPE: CPT | Performed by: INTERNAL MEDICINE

## 2020-03-29 PROCEDURE — 25000128 H RX IP 250 OP 636: Performed by: PHYSICIAN ASSISTANT

## 2020-03-29 PROCEDURE — 25800030 ZZH RX IP 258 OP 636: Performed by: INTERNAL MEDICINE

## 2020-03-29 PROCEDURE — 36592 COLLECT BLOOD FROM PICC: CPT | Performed by: INTERNAL MEDICINE

## 2020-03-29 PROCEDURE — 84132 ASSAY OF SERUM POTASSIUM: CPT | Performed by: PHYSICIAN ASSISTANT

## 2020-03-29 PROCEDURE — 25000132 ZZH RX MED GY IP 250 OP 250 PS 637: Performed by: PHYSICIAN ASSISTANT

## 2020-03-29 PROCEDURE — 36592 COLLECT BLOOD FROM PICC: CPT | Performed by: PHYSICIAN ASSISTANT

## 2020-03-29 PROCEDURE — 25000131 ZZH RX MED GY IP 250 OP 636 PS 637: Performed by: INTERNAL MEDICINE

## 2020-03-29 PROCEDURE — 85384 FIBRINOGEN ACTIVITY: CPT | Performed by: NURSE PRACTITIONER

## 2020-03-29 PROCEDURE — 36415 COLL VENOUS BLD VENIPUNCTURE: CPT | Performed by: NURSE PRACTITIONER

## 2020-03-29 PROCEDURE — 83615 LACTATE (LD) (LDH) ENZYME: CPT | Performed by: PHYSICIAN ASSISTANT

## 2020-03-29 PROCEDURE — 83615 LACTATE (LD) (LDH) ENZYME: CPT | Performed by: NURSE PRACTITIONER

## 2020-03-29 PROCEDURE — 25000128 H RX IP 250 OP 636: Performed by: INTERNAL MEDICINE

## 2020-03-29 PROCEDURE — 80299 QUANTITATIVE ASSAY DRUG: CPT | Performed by: NURSE PRACTITIONER

## 2020-03-29 PROCEDURE — 84132 ASSAY OF SERUM POTASSIUM: CPT | Performed by: INTERNAL MEDICINE

## 2020-03-29 PROCEDURE — 82565 ASSAY OF CREATININE: CPT | Performed by: PHYSICIAN ASSISTANT

## 2020-03-29 PROCEDURE — 99233 SBSQ HOSP IP/OBS HIGH 50: CPT | Performed by: INTERNAL MEDICINE

## 2020-03-29 PROCEDURE — 25000125 ZZHC RX 250: Performed by: INTERNAL MEDICINE

## 2020-03-29 PROCEDURE — 80053 COMPREHEN METABOLIC PANEL: CPT | Performed by: NURSE PRACTITIONER

## 2020-03-29 PROCEDURE — 84100 ASSAY OF PHOSPHORUS: CPT | Performed by: NURSE PRACTITIONER

## 2020-03-29 PROCEDURE — 12000001 ZZH R&B MED SURG/OB UMMC

## 2020-03-29 PROCEDURE — 84100 ASSAY OF PHOSPHORUS: CPT | Performed by: PHYSICIAN ASSISTANT

## 2020-03-29 PROCEDURE — 84550 ASSAY OF BLOOD/URIC ACID: CPT | Performed by: NURSE PRACTITIONER

## 2020-03-29 PROCEDURE — 84550 ASSAY OF BLOOD/URIC ACID: CPT | Performed by: PHYSICIAN ASSISTANT

## 2020-03-29 RX ADMIN — POTASSIUM CHLORIDE 20 MEQ: 29.8 INJECTION, SOLUTION INTRAVENOUS at 10:05

## 2020-03-29 RX ADMIN — LEUCOVORIN CALCIUM 32 MG: 350 INJECTION, POWDER, LYOPHILIZED, FOR SOLUTION INTRAMUSCULAR; INTRAVENOUS at 21:29

## 2020-03-29 RX ADMIN — LORATADINE 10 MG: 10 TABLET ORAL at 08:09

## 2020-03-29 RX ADMIN — LEUCOVORIN CALCIUM 32 MG: 350 INJECTION, POWDER, LYOPHILIZED, FOR SOLUTION INTRAMUSCULAR; INTRAVENOUS at 10:05

## 2020-03-29 RX ADMIN — SODIUM BICARBONATE: 84 INJECTION, SOLUTION INTRAVENOUS at 07:24

## 2020-03-29 RX ADMIN — FAMOTIDINE 20 MG: 20 TABLET ORAL at 08:09

## 2020-03-29 RX ADMIN — ACYCLOVIR 400 MG: 200 CAPSULE ORAL at 08:09

## 2020-03-29 RX ADMIN — Medication 5 ML: at 23:36

## 2020-03-29 RX ADMIN — SENNOSIDES AND DOCUSATE SODIUM 1 TABLET: 8.6; 5 TABLET ORAL at 19:45

## 2020-03-29 RX ADMIN — SODIUM BICARBONATE: 84 INJECTION, SOLUTION INTRAVENOUS at 17:09

## 2020-03-29 RX ADMIN — SODIUM BICARBONATE: 84 INJECTION, SOLUTION INTRAVENOUS at 01:59

## 2020-03-29 RX ADMIN — POTASSIUM CHLORIDE 20 MEQ: 29.8 INJECTION, SOLUTION INTRAVENOUS at 08:08

## 2020-03-29 RX ADMIN — LEUCOVORIN CALCIUM 32 MG: 350 INJECTION, POWDER, LYOPHILIZED, FOR SOLUTION INTRAMUSCULAR; INTRAVENOUS at 04:02

## 2020-03-29 RX ADMIN — SODIUM BICARBONATE: 84 INJECTION, SOLUTION INTRAVENOUS at 11:41

## 2020-03-29 RX ADMIN — LEVOFLOXACIN 250 MG: 250 TABLET, FILM COATED ORAL at 11:41

## 2020-03-29 RX ADMIN — FLUCONAZOLE 200 MG: 200 TABLET ORAL at 11:41

## 2020-03-29 RX ADMIN — CALCIUM CITRATE 200 MG (950 MG) TABLET 950 MG: at 08:09

## 2020-03-29 RX ADMIN — ACYCLOVIR 400 MG: 200 CAPSULE ORAL at 19:45

## 2020-03-29 RX ADMIN — SENNOSIDES AND DOCUSATE SODIUM 1 TABLET: 8.6; 5 TABLET ORAL at 08:09

## 2020-03-29 RX ADMIN — ALLOPURINOL 300 MG: 300 TABLET ORAL at 08:09

## 2020-03-29 RX ADMIN — FAMOTIDINE 20 MG: 20 TABLET ORAL at 19:45

## 2020-03-29 RX ADMIN — Medication 5 ML: at 04:02

## 2020-03-29 RX ADMIN — DEXAMETHASONE 8 MG: 4 TABLET ORAL at 08:09

## 2020-03-29 RX ADMIN — LEVOTHYROXINE SODIUM 200 MCG: 0.1 TABLET ORAL at 08:09

## 2020-03-29 RX ADMIN — LEUCOVORIN CALCIUM 32 MG: 350 INJECTION, POWDER, LYOPHILIZED, FOR SOLUTION INTRAMUSCULAR; INTRAVENOUS at 16:18

## 2020-03-29 RX ADMIN — SODIUM BICARBONATE: 84 INJECTION, SOLUTION INTRAVENOUS at 21:51

## 2020-03-29 ASSESSMENT — ACTIVITIES OF DAILY LIVING (ADL)
ADLS_ACUITY_SCORE: 13

## 2020-03-29 ASSESSMENT — MIFFLIN-ST. JEOR: SCORE: 1755.9

## 2020-03-29 NOTE — PROGRESS NOTES
[facility], [place]    Hematology / Oncology Progress Note     Assessment & Plan   Kobe Pedroza is a 60 year old male with a history of thyroid cancer status post thyroidectomy (2011), CAD, and hyperlipidemia who was transferred from [facility] to [facility] on 3/13 with a newly thoracic extradural mass at T5-6 with severe cord compression now s/p neurosurgical gross resection on 3/15 with initial pathology concerning for high-grade B-cell lymphoma. He transferred to the Heme-Malignancy team on 3/17 for further work-up and management of his newly diagnosed Burkitt Lymphoma. He started R-CODOX-M/IVAC chemotherapy regimen (D1=3/18/2020).    Plan today:  - Today is D12  - Methotrexate level 0.26  - Will likely be able to discharge when methotrexate clears, possibly in the next 1-2 days   - Plan for twice weekly labs and a follow up appointment roughly one week after discharge  - Finished dexamethasone taper, will get 8mg today per chemo protocol   - Monitor TLS BID    HEME/ONC  # Burkitt's lymphoma  # Thoracic spine mass  # Spinal cord compression  Patient presented on 3/12 to [facility] with acute-on-chronic mid-thoracic back pain with some associated radicular symptoms. Per patient, no B-symptoms, though he did recently intentionally lose 35 lbs. 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 Patient had no appreciable neurological deficits. He was started on dexamethasone and underwent T5-6 laminectomy with gross total resection of epidural tumor on 3/15. Flow cytometry of the specimen was notable for CD10 positive and kappa monotypic B cells (83%). Confirmed Burkitt lymphoma with surgical pathology. Cytogenetics pending.  - PET scan completed 3/18  - Bone marrow biopsy completed 3/18. Tolerated well with Versed 1mg IV.  - ECHO (3/16) with LVEF of 60-65%, no diastolic dysfunction. Baseline EKG (3/13) without  evidence of ischemia or dysrhythmia.  - PICC line placed.  - Dexamethasone taper finished   - TLS labs Q12h, DIC labs daily     Treatment Plan: R-CODOX-M/IVAC (D1=3/18/2020). Today is D12  - Cyclophosphamide 1,705 mg IV for D1, D2  - Doxorubicin 110 mg IV D1  - Vincristine 2 mg IV D1, D10  - Rituximab 800 mg   - Methotrexate 3 g/m2 D10  - Leucovorin calcium injection 426 mg D11  - Will get Dexamethasone 8mg today per protocol  - Neupogen injection 480 mcg daily for D1-D6  - LP and IT chemo (3/20); cytarabine 50 mg, hydrocortisone sodium succinate 50 mg, methotrexate 12 mg   - LP and IT chemo (3/23); cytarabine 50 mg, hydrocortisone sodium succinate 50 mg   - Supportive Medications:   - Allopurinol tablet 300 mg    - PRN anti-emetics; PRN bowel Regimen; PRN pain control    # ID Prophylaxis  - Viral serologies: HepC nonreactive, HepB negative and HIV negative  - Continue fluconazole 200mg daily and levaquin 250 mg daily   - Continue acyclovir 400 mg BID due to past exposure of VZV  - Negative HSV 1/2, no need for acyclovir  - Pentamidine given 3/21    # Leukopenia 2/2 to chemotherapy  # Normocytic anemia  - CBC daily  - Transfuse if Hgb <7, Platelets <10K     # Thyroid cancer status-post thyroidectomy  Status post thyroidectomy in 2011. TSH normal on admission.  - Continue PTA levothyroxine     MSK/NEURO  # Thoracic back pain, resolving  Due to T5-6 extradural tumor as detailed above. Patient now status post T5-6 laminectomy with gross total resction on 3/15.   - Post-op activity recommendations per NSG:   - [place] Brace to be worn out of bed (orthotics consult in place)   - No lifting >10 lbs, twisting, straining, or strenuous activity for at least 2 weeks  - Continue APAP, oxycodone, methocarbamol PRN for pain  - Patient should have scheduled outpatient neurosurgery follow-up in 2 weeks (may be via [facility]); NSG will coordinate  - CTM surgical site; sutures used were absorbable      CV  # Coronary artery  disease  # Hyperlipidemia  Followed by Dr. Zeng at [facility] ([place]). Diagnosed with mild, non-obstructive CAD in 2018. Calcium score at that time was 5.5. Baby ASA and low-dose statin therapy recommended. No previous cardiac caths or stents.  - Hold PTA statin in light of starting chemotherapy     GI  # Hypocalcemia  Mild, corrects for low albumin.   - Continue PO calcium citrate      # Constipation, resolved  Noted prior to presentation. Patient reports decreased frequency of bowel movements. Has been responsive to laxative use.   - Regular BM with current bowel regimen  - Continue scheduled senna   - Change scheduled Miralax to PRN  - Mag Citrate provided PRN     # Hypokalemia  - Continue replacements per protocol    FEN:  - No IVF for now; encourage PO intake.   - Lyte replacement per protocol  - Regular diet as tolerated.    Prophy/Misc:  - GI: Continue bowel regimen as above; Switched to H2 blockers for GI ppx from PPIs due to methotrexate  - DVT: Lovenox 40mg subQ; Hold Lovenox if platelets <50K.  - Activity: As tolerated and per neurosurg recommendations, PT consulted  - Diet: Regular as tolerated     Disposition: Anticipate discharge to home after cycle 1 of CODOX-M, will likely be able to discharge once methotrexate levels clear. Plan for twice weekly labs and a follow up appointment roughly one week after discharge     Code  Full Code     Viktoria Garcia MD  Heme-Onc [facility]  Pager: [phone]    Interval History   No acute events overnight. He is up independently and took a shower this morning. He denies any pain. He had a bowel movement this morning. He reports his appetite is good. He is eating 3 meals per day. He denies any nausea or vomiting. He is looking forward to getting out of the hospital.    Physical Exam   Temp: 97.2  F (36.2  C) Temp src: Oral BP: 117/78 Pulse: 87 Heart Rate: 77 Resp: 18 SpO2: 96 % O2 Device: None (Room air)    Vitals:    03/26/20 0815 03/27/20 0728  03/28/20 0900   Weight: 93.4 kg (205 lb 12.8 oz) 93.4 kg (205 lb 14.4 oz) 95.3 kg (210 lb)     Vital Signs with Ranges  Temp:  [96.7  F (35.9  C)-98.4  F (36.9  C)] 97.2  F (36.2  C)  Pulse:  [74-87] 87  Heart Rate:  [77-98] 77  Resp:  [18-20] 18  BP: (112-138)/(59-78) 117/78  SpO2:  [94 %-98 %] 96 %  I/O last 3 completed shifts:  In: 2740 [I.V.:2740]  Out: 4925 [Urine:4925]    Constitutional: Alert, pleasant, NAD.  HEENT: NC/AT. Anicteric sclera. EOMI. Nares patent without congestion. MMM. No oral lesions.  Respiratory: Clear to auscultation bilaterally. No wheezing or crackles. Good air entry bilaterally.   Cardiovascular: RRR. Normal S1/S2. No murmurs.   GI: Soft, non-tender, non-distended. Normoactive bowel sounds. No guarding and rebound.  Skin: Warm and well perfused. No rash or lesions.   Musculoskeletal/ Extremities: No peripheral edema.  Neurologic: Alert, oriented x3. CNII-XII grossly intact. Moving all extremities.   Neuropsychiatric: Calm, affect congruent to situation.   Access: E PICC c/d/i.     Medications     IV infusion builder WITH additives 225 mL/hr at 03/29/20 0159     - MEDICATION INSTRUCTIONS -       sodium chloride         acyclovir  400 mg Oral BID     albuterol  2.5 mg Nebulization Q28 Days    And     pentamidine  300 mg Inhalation Q28 Days     allopurinol  300 mg Oral Daily     calcium citrate  950 mg Oral Daily     dexamethasone  8 mg Oral Daily     enoxaparin ANTICOAGULANT  40 mg Subcutaneous Q24H     famotidine  20 mg Oral BID     filgrastim (NEUPOGEN/GRANIX) subcutaneous  5 mcg/kg (Treatment Plan Recorded) Subcutaneous Daily     fluconazole  200 mg Oral Daily     heparin lock flush  5-10 mL Intracatheter Q24H     leucovorin calcium  15 mg/m2 (Treatment Plan Recorded) Intravenous Q6H     levofloxacin  250 mg Oral Daily     levothyroxine  200 mcg Oral QAM AC     loratadine  10 mg Oral Daily     senna-docusate  1 tablet Oral BID    Or     senna-docusate  2 tablet Oral BID     sodium  chloride (PF)  10 mL Intracatheter Q8H       Data   Results for orders placed or performed during the hospital encounter of 03/13/20 (from the past 24 hour(s))   pH urine POCT   Result Value Ref Range    pH Urine 8.0 5.0 - 7.0 pH   Creatinine   Result Value Ref Range    Creatinine 0.70 0.66 - 1.25 mg/dL    GFR Estimate >90 >60 mL/min/[1.73_m2]    GFR Estimate If Black >90 >60 mL/min/[1.73_m2]   Phosphorus   Result Value Ref Range    Phosphorus 2.1 (L) 2.5 - 4.5 mg/dL   Uric acid   Result Value Ref Range    Uric Acid 2.1 (L) 3.5 - 7.2 mg/dL   Lactate Dehydrogenase   Result Value Ref Range    Lactate Dehydrogenase 143 85 - 227 U/L   Potassium   Result Value Ref Range    Potassium 3.4 3.4 - 5.3 mmol/L   pH urine POCT   Result Value Ref Range    pH Urine 8.0 5.0 - 7.0 pH   pH urine POCT   Result Value Ref Range    pH Urine 7.5 5.0 - 7.0 pH   CBC with platelets differential   Result Value Ref Range    WBC 1.3 (L) 4.0 - 11.0 10e9/L    RBC Count 2.88 (L) 4.4 - 5.9 10e12/L    Hemoglobin 8.6 (L) 13.3 - 17.7 g/dL    Hematocrit 25.7 (L) 40.0 - 53.0 %    MCV 89 78 - 100 fl    MCH 29.9 26.5 - 33.0 pg    MCHC 33.5 31.5 - 36.5 g/dL    RDW 12.6 10.0 - 15.0 %    Platelet Count 159 150 - 450 10e9/L    Diff Method Manual Differential     % Neutrophils 83.0 %    % Lymphocytes 15.0 %    % Monocytes 2.0 %    % Eosinophils 0.0 %    % Basophils 0.0 %    Absolute Neutrophil 1.1 (L) 1.6 - 8.3 10e9/L    Absolute Lymphocytes 0.2 (L) 0.8 - 5.3 10e9/L    Absolute Monocytes 0.0 0.0 - 1.3 10e9/L    Absolute Eosinophils 0.0 0.0 - 0.7 10e9/L    Absolute Basophils 0.0 0.0 - 0.2 10e9/L    RBC Morphology Normal     Platelet Estimate Confirming automated cell count    Magnesium   Result Value Ref Range    Magnesium 2.1 1.6 - 2.3 mg/dL   INR   Result Value Ref Range    INR 1.02 0.86 - 1.14   Fibrinogen activity   Result Value Ref Range    Fibrinogen 358 200 - 420 mg/dL   Comprehensive metabolic panel   Result Value Ref Range    Sodium 140 133 - 144  mmol/L    Potassium 3.3 (L) 3.4 - 5.3 mmol/L    Chloride 103 94 - 109 mmol/L    Carbon Dioxide 32 20 - 32 mmol/L    Anion Gap 6 3 - 14 mmol/L    Glucose 172 (H) 70 - 99 mg/dL    Urea Nitrogen 16 7 - 30 mg/dL    Creatinine 0.75 0.66 - 1.25 mg/dL    GFR Estimate >90 >60 mL/min/[1.73_m2]    GFR Estimate If Black >90 >60 mL/min/[1.73_m2]    Calcium 7.1 (L) 8.5 - 10.1 mg/dL    Bilirubin Total 0.3 0.2 - 1.3 mg/dL    Albumin 2.6 (L) 3.4 - 5.0 g/dL    Protein Total 5.2 (L) 6.8 - 8.8 g/dL    Alkaline Phosphatase 56 40 - 150 U/L    ALT 29 0 - 70 U/L    AST 14 0 - 45 U/L   Phosphorus   Result Value Ref Range    Phosphorus 3.8 2.5 - 4.5 mg/dL   Uric acid   Result Value Ref Range    Uric Acid 2.6 (L) 3.5 - 7.2 mg/dL   Lactate Dehydrogenase   Result Value Ref Range    Lactate Dehydrogenase 151 85 - 227 U/L   Methotrexate level   Result Value Ref Range    Methotrexate Level 0.26 umol/L     I have seen, interviewed, and examined the patient independently.  I have reviewed the vital signs and labs.  This note reflects my assessment and plan.      Feels well, eager to go home. Methotrexate now at 0.26, may or may not be low enough to discharge tomorrow, possible Tuesday. I told him that.    Mackenzie Lerner MD/PhD

## 2020-03-29 NOTE — PLAN OF CARE
[id number]:    VSS, afebrile, A&Ox4. Denies pain, nausea, SOB. Methotrexate level 0.26 this AM. Continue w/ scheduled leucovorin rescue & sodium bicarb infusion. Urine pH 8.0 at 1130, next check due at 1930. Potassium replaced for 3.2. Recheck 3.4. Appetite is good. Voiding adequately. Up ad riki, ambulating hallways. No acute events. Patient is hoping he will be able to discharge in the morning if his methotrexate level clears.

## 2020-03-29 NOTE — PLAN OF CARE
[id number]: Afebrile, VSS on RA. Denies pain/nausea. Urine pH at 0345 was 7.5. Next pH due at 1145. Up independently to the bathroom, good UO. Unable to sleep through the night because up frequently to void. Showered.Continue to monitor.

## 2020-03-30 ENCOUNTER — VIRTUAL VISIT (OUTPATIENT)
Dept: NEUROSURGERY | Facility: CLINIC | Age: 60
End: 2020-03-30
Payer: COMMERCIAL

## 2020-03-30 VITALS
BODY MASS INDEX: 31.73 KG/M2 | OXYGEN SATURATION: 97 % | DIASTOLIC BLOOD PRESSURE: 71 MMHG | WEIGHT: 214.2 LBS | HEIGHT: 69 IN | RESPIRATION RATE: 18 BRPM | SYSTOLIC BLOOD PRESSURE: 126 MMHG | TEMPERATURE: 98.6 F | HEART RATE: 90 BPM

## 2020-03-30 DIAGNOSIS — D49.2 SPINAL AXIS TUMOR: Primary | ICD-10-CM

## 2020-03-30 LAB
ALBUMIN SERPL-MCNC: 2.7 G/DL (ref 3.4–5)
ALP SERPL-CCNC: 52 U/L (ref 40–150)
ALT SERPL W P-5'-P-CCNC: 33 U/L (ref 0–70)
ANION GAP SERPL CALCULATED.3IONS-SCNC: 5 MMOL/L (ref 3–14)
AST SERPL W P-5'-P-CCNC: 17 U/L (ref 0–45)
BASOPHILS # BLD AUTO: 0 10E9/L (ref 0–0.2)
BASOPHILS NFR BLD AUTO: 0 %
BILIRUB SERPL-MCNC: 0.2 MG/DL (ref 0.2–1.3)
BUN SERPL-MCNC: 17 MG/DL (ref 7–30)
CALCIUM SERPL-MCNC: 6.7 MG/DL (ref 8.5–10.1)
CHLORIDE SERPL-SCNC: 102 MMOL/L (ref 94–109)
CO2 SERPL-SCNC: 32 MMOL/L (ref 20–32)
CREAT SERPL-MCNC: 0.79 MG/DL (ref 0.66–1.25)
DIFFERENTIAL METHOD BLD: ABNORMAL
EOSINOPHIL # BLD AUTO: 0 10E9/L (ref 0–0.7)
EOSINOPHIL NFR BLD AUTO: 0 %
ERYTHROCYTE [DISTWIDTH] IN BLOOD BY AUTOMATED COUNT: 12.7 % (ref 10–15)
FIBRINOGEN PPP-MCNC: 310 MG/DL (ref 200–420)
GFR SERPL CREATININE-BSD FRML MDRD: >90 ML/MIN/{1.73_M2}
GLUCOSE SERPL-MCNC: 150 MG/DL (ref 70–99)
HCT VFR BLD AUTO: 25.9 % (ref 40–53)
HGB BLD-MCNC: 8.5 G/DL (ref 13.3–17.7)
IMM GRANULOCYTES # BLD: 0 10E9/L (ref 0–0.4)
IMM GRANULOCYTES NFR BLD: 3.6 %
INR PPP: 1.05 (ref 0.86–1.14)
LDH SERPL L TO P-CCNC: 175 U/L (ref 85–227)
LYMPHOCYTES # BLD AUTO: 0.1 10E9/L (ref 0.8–5.3)
LYMPHOCYTES NFR BLD AUTO: 15.7 %
MAGNESIUM SERPL-MCNC: 2 MG/DL (ref 1.6–2.3)
MCH RBC QN AUTO: 29.7 PG (ref 26.5–33)
MCHC RBC AUTO-ENTMCNC: 32.8 G/DL (ref 31.5–36.5)
MCV RBC AUTO: 91 FL (ref 78–100)
MONOCYTES # BLD AUTO: 0 10E9/L (ref 0–1.3)
MONOCYTES NFR BLD AUTO: 2.4 %
MTX SERPL-SCNC: 0.05 UMOL/L
NEUTROPHILS # BLD AUTO: 0.7 10E9/L (ref 1.6–8.3)
NEUTROPHILS NFR BLD AUTO: 78.3 %
NRBC # BLD AUTO: 0 10*3/UL
NRBC BLD AUTO-RTO: 0 /100
PH UR STRIP: 8 PH (ref 5–7)
PHOSPHATE SERPL-MCNC: 3.9 MG/DL (ref 2.5–4.5)
PLATELET # BLD AUTO: 131 10E9/L (ref 150–450)
POTASSIUM SERPL-SCNC: 3.2 MMOL/L (ref 3.4–5.3)
PROT SERPL-MCNC: 5.2 G/DL (ref 6.8–8.8)
RBC # BLD AUTO: 2.86 10E12/L (ref 4.4–5.9)
SODIUM SERPL-SCNC: 140 MMOL/L (ref 133–144)
URATE SERPL-MCNC: 2.4 MG/DL (ref 3.5–7.2)
WBC # BLD AUTO: 0.8 10E9/L (ref 4–11)

## 2020-03-30 PROCEDURE — 25000125 ZZHC RX 250: Performed by: INTERNAL MEDICINE

## 2020-03-30 PROCEDURE — 80299 QUANTITATIVE ASSAY DRUG: CPT | Performed by: PHYSICIAN ASSISTANT

## 2020-03-30 PROCEDURE — 25800030 ZZH RX IP 258 OP 636: Performed by: INTERNAL MEDICINE

## 2020-03-30 PROCEDURE — 85384 FIBRINOGEN ACTIVITY: CPT | Performed by: PHYSICIAN ASSISTANT

## 2020-03-30 PROCEDURE — 25000132 ZZH RX MED GY IP 250 OP 250 PS 637: Performed by: PHYSICIAN ASSISTANT

## 2020-03-30 PROCEDURE — 81003 URINALYSIS AUTO W/O SCOPE: CPT | Performed by: INTERNAL MEDICINE

## 2020-03-30 PROCEDURE — 25000132 ZZH RX MED GY IP 250 OP 250 PS 637: Performed by: NURSE PRACTITIONER

## 2020-03-30 PROCEDURE — 80053 COMPREHEN METABOLIC PANEL: CPT | Performed by: PHYSICIAN ASSISTANT

## 2020-03-30 PROCEDURE — 84550 ASSAY OF BLOOD/URIC ACID: CPT | Performed by: PHYSICIAN ASSISTANT

## 2020-03-30 PROCEDURE — 84132 ASSAY OF SERUM POTASSIUM: CPT | Performed by: PHYSICIAN ASSISTANT

## 2020-03-30 PROCEDURE — 25000132 ZZH RX MED GY IP 250 OP 250 PS 637: Performed by: INTERNAL MEDICINE

## 2020-03-30 PROCEDURE — 25000128 H RX IP 250 OP 636: Performed by: PHYSICIAN ASSISTANT

## 2020-03-30 PROCEDURE — 25000128 H RX IP 250 OP 636: Performed by: INTERNAL MEDICINE

## 2020-03-30 PROCEDURE — 82565 ASSAY OF CREATININE: CPT | Performed by: PHYSICIAN ASSISTANT

## 2020-03-30 PROCEDURE — 85025 COMPLETE CBC W/AUTO DIFF WBC: CPT | Performed by: NURSE PRACTITIONER

## 2020-03-30 PROCEDURE — 84100 ASSAY OF PHOSPHORUS: CPT | Performed by: PHYSICIAN ASSISTANT

## 2020-03-30 PROCEDURE — 36592 COLLECT BLOOD FROM PICC: CPT | Performed by: NURSE PRACTITIONER

## 2020-03-30 PROCEDURE — 83735 ASSAY OF MAGNESIUM: CPT | Performed by: PHYSICIAN ASSISTANT

## 2020-03-30 PROCEDURE — 85610 PROTHROMBIN TIME: CPT | Performed by: PHYSICIAN ASSISTANT

## 2020-03-30 PROCEDURE — 25000132 ZZH RX MED GY IP 250 OP 250 PS 637: Performed by: STUDENT IN AN ORGANIZED HEALTH CARE EDUCATION/TRAINING PROGRAM

## 2020-03-30 PROCEDURE — 83615 LACTATE (LD) (LDH) ENZYME: CPT | Performed by: PHYSICIAN ASSISTANT

## 2020-03-30 RX ORDER — ACYCLOVIR 200 MG/1
400 CAPSULE ORAL 2 TIMES DAILY
Qty: 60 CAPSULE | Refills: 0 | Status: SHIPPED | OUTPATIENT
Start: 2020-03-30 | End: 2020-04-14

## 2020-03-30 RX ORDER — PENTAMIDINE ISETHIONATE 300 MG/300MG
300 INHALANT RESPIRATORY (INHALATION)
Status: ON HOLD
Start: 2020-03-30 | End: 2020-04-08

## 2020-03-30 RX ORDER — LEVOFLOXACIN 250 MG/1
250 TABLET, FILM COATED ORAL DAILY
Qty: 30 TABLET | Refills: 0 | Status: SHIPPED | OUTPATIENT
Start: 2020-03-31 | End: 2020-04-07

## 2020-03-30 RX ORDER — FLUCONAZOLE 200 MG/1
200 TABLET ORAL DAILY
Qty: 30 TABLET | Refills: 0 | Status: SHIPPED | OUTPATIENT
Start: 2020-03-31 | End: 2020-04-07

## 2020-03-30 RX ORDER — POTASSIUM CHLORIDE 1.5 G/1.58G
40 POWDER, FOR SOLUTION ORAL DAILY
Qty: 14 PACKET | Refills: 0 | Status: SHIPPED | OUTPATIENT
Start: 2020-03-30 | End: 2020-04-07

## 2020-03-30 RX ORDER — AMOXICILLIN 250 MG
1 CAPSULE ORAL 2 TIMES DAILY
Qty: 60 TABLET | Refills: 0 | Status: SHIPPED | OUTPATIENT
Start: 2020-03-30 | End: 2020-12-10

## 2020-03-30 RX ADMIN — ACYCLOVIR 400 MG: 200 CAPSULE ORAL at 08:09

## 2020-03-30 RX ADMIN — CALCIUM CITRATE 200 MG (950 MG) TABLET 950 MG: at 08:09

## 2020-03-30 RX ADMIN — LEVOFLOXACIN 250 MG: 250 TABLET, FILM COATED ORAL at 11:55

## 2020-03-30 RX ADMIN — FAMOTIDINE 20 MG: 20 TABLET ORAL at 08:09

## 2020-03-30 RX ADMIN — Medication 5 ML: at 05:44

## 2020-03-30 RX ADMIN — SENNOSIDES AND DOCUSATE SODIUM 1 TABLET: 8.6; 5 TABLET ORAL at 08:10

## 2020-03-30 RX ADMIN — Medication 5 ML: at 04:02

## 2020-03-30 RX ADMIN — ALLOPURINOL 300 MG: 300 TABLET ORAL at 08:09

## 2020-03-30 RX ADMIN — LEUCOVORIN CALCIUM 32 MG: 350 INJECTION, POWDER, LYOPHILIZED, FOR SOLUTION INTRAMUSCULAR; INTRAVENOUS at 04:00

## 2020-03-30 RX ADMIN — LEVOTHYROXINE SODIUM 200 MCG: 0.1 TABLET ORAL at 08:10

## 2020-03-30 RX ADMIN — LORATADINE 10 MG: 10 TABLET ORAL at 08:09

## 2020-03-30 RX ADMIN — FLUCONAZOLE 200 MG: 200 TABLET ORAL at 11:55

## 2020-03-30 RX ADMIN — POTASSIUM CHLORIDE 40 MEQ: 750 TABLET, EXTENDED RELEASE ORAL at 08:15

## 2020-03-30 RX ADMIN — SODIUM BICARBONATE: 84 INJECTION, SOLUTION INTRAVENOUS at 02:49

## 2020-03-30 ASSESSMENT — MIFFLIN-ST. JEOR: SCORE: 1774.04

## 2020-03-30 ASSESSMENT — ACTIVITIES OF DAILY LIVING (ADL)
ADLS_ACUITY_SCORE: 13

## 2020-03-30 NOTE — PLAN OF CARE
Jonnathan's Methotrexate level was .05 this am.  Bicarb IVF and Leucovorin stopped.  Plan for discharge to home today.  PICC removed without difficulty and site CDI.  Drsg with bacitracin and gauze covered by Tegaderm.  Pt instructed to remove it tomorrow.  Reviewed discharge medications and instructions.  Pt is aware of [facility] clinic appt for Neulasta at 1215 tomorrow.  Then April 1 with jay.  Wife was able to talk with Dr Russell over the phone.

## 2020-03-30 NOTE — CONSULTS
Brief Social Work Services Progress Note    SW consult placed to assist patient with financial/insurance concerns.    Moon met with patient in his hospital room. Patient stated that he started the process of applying for Social Security Disability. Patient explained that he will need his medical records sent to the Social Security Administration for determination. He continued by stating that his records will need to indicate a need to out of work for a year or more. This writer explained that the Social Security Administration will request and receive medical documentation confirming disability from his healthcare providers. Patient confirmed understanding and stated that he just wanted to make sure his medical team indicates his need to be away from work due to his diagnosis and treatment needs. Patient further explained that he had already discussed needs/request with medical providers and hopes he will get approval.     Patient expected to discharge to home today. Patient reports no further concerns or needs from this writer. Patient thanked this writer for visiting.    JONAS Redd  7D Hematology/Oncology Social Worker  Phone: [phone]  Pager: [phone]  [email or web address]

## 2020-03-30 NOTE — PLAN OF CARE
[id number]: Afebrile, VSS on RA. Denies pain/nausea. Continues w/ leucovorin rescue and sodium bicarb infusion. Urine pH at 0345 was 8.0. Next pH due at 1145. Pt eager for methotrexate level this morning as he is hoping to go home.

## 2020-03-30 NOTE — DISCHARGE INSTRUCTIONS
[facility] Triage and after hours / weekends / holidays:  [phone]    Please call the triage or after hours line if you experience a temperature greater than or equal to 100.5, shaking chills, have uncontrolled nausea, vomiting and/or diarrhea, dizziness, shortness of breath, chest pain, bleeding, unexplained bruising, or if you have any other new/concerning symptoms, questions or concerns.      If you are having any concerning symptoms or wish to speak to a provider before your next infusion visit, please call your care coordinator or triage to notify them so we can adequately serve you.     If you need a refill on a narcotic prescription or other medication, please call before your infusion appointment.       Discharge Instructions for Chemotherapy  Your healthcare provider prescribed a type of medicine therapy for you called chemotherapy. Healthcare providers prescribe chemotherapy for many different types of illnesses, including cancer. There are many types of chemotherapy. This sheet provides some general  information on how you can take care of yourself after your chemotherapy. Talk with your provider about specific things you should do based on your individual treatment plan.   Mouth care  Don t be discouraged if you get mouth sores, even if you are following all your healthcare provider s instructions. Many people get mouth sores as a side effect of chemotherapy. Here s what you can do to prevent mouth sores:    Keep your mouth clean. Brush your teeth with a soft-bristle toothbrush after every meal.    Ask if you should use a toothpaste with fluoride, or a mixture of 1 teaspoon of salt in 8-ounces of water to brush your teeth.     Use an oral swab or special soft toothbrush if your gums bleed during regular brushing.    Don't use dental floss if it causes your gums to bleed.    Use any mouthwashes given to you as directed.    If you can t tolerate regular methods, use salt and baking soda to clean your  mouth. Mix 1 teaspoon of salt and 1 teaspoon of baking soda in 1 quart of warm water. Swish and spit.    If you wear dentures, you may be told to wear them only when you eat, ask your healthcare provider. Clean dentures twice a day and soak in antimicrobial solution when you aren't wearing them. Rinse your mouth after each meal.     Watch your mouth and tongue for white patches. This may be a sign of a type of yeast infection (thrush), a common side effect of chemotherapy. Be sure to tell your healthcare provider about these patches. Medicine can be prescribed to treat it.  Other home care  Here's what else you can do:    Try to exercise. Exercise keeps you strong and keeps your heart and lungs active. Walking and yoga are good types of exercise.     Keep clean. During chemotherapy, your body can t fight infection very well. Take short baths or showers.  ? Wash your hands before you eat and after going to the bathroom.  ? Use moisturizing soap. Chemotherapy can make your skin dry.  ? Apply moisturizing lotion several times a day to help relieve dry skin.  ? Don t take very hot or very cold showers or baths.    Don t be surprised if your chemotherapy causes slight burns to your skin--usually on the hands and feet. Some medicines used in high doses cause this to happen. Ask for a special cream to help relieve the burn and protect your skin.    Avoid people who are sick with illnesses and diseases you could catch, such as colds, flu, measles, or chicken pox as well as people who have recently had vaccinations for these illnesses.     Let your healthcare provider know if your throat is sore. You may have an infection that needs treatment.    Remember, many patients feel sick and lose their appetites during treatment. Eat small meals several times a day to keep your strength up:  ? Choose bland foods with little taste or smell if you are reacting strongly to food.  ? Be sure to cook all food thoroughly. This kills  bacteria and helps you avoid infection.  ? Eat foods that are soft. Soft foods are less likely to cause stomach irritation.  ? Try to eat a variety of foods for a well-balanced diet. Drink plenty of fluids and eat foods with fiber to avoid constipation if okay with your provider.   When to call your healthcare provider  Call your healthcare provider right away if you have any of the following:    Unexplained bleeding    Trouble concentrating    Ongoing fatigue    Shortness of breath, wheezing, trouble breathing, or bad cough    Rapid, irregular heartbeat, or chest pain    Dizziness, lightheadedness    Constant feeling of being cold    Hives or a cut or rash that swells, turns red, feels hot or painful, or begins to ooze    Burning when you urinate    Fever of 100.4 F (38 C) or higher, or as directed by your healthcare provider  Date Last Reviewed: 5/1/2016 2000-2019 [employer]. [address] [place] [address], [place] [address]. All rights reserved. This information is not intended as a substitute for professional medical care. Always follow your healthcare professional's instructions.

## 2020-03-30 NOTE — PLAN OF CARE
Physical Therapy Discharge Summary    Reason for therapy discharge:    Discharged to home.    Progress towards therapy goal(s). See goals on Care Plan in [facility] electronic health record for goal details.  Goals met    Therapy recommendation(s):    Continued therapy is recommended.  Rationale/Recommendations:  Pt would benefit from additional skilled [facility] Cancer Rehab Program at discharge.

## 2020-03-30 NOTE — DISCHARGE SUMMARY
[facility], [place]    Discharge Summary  Hematology / Oncology    Date of Admission:  3/13/2020  Date of Discharge:  3/30/2020  Discharging Provider: Adenike Muñoz PA-C    Discharge Diagnoses   Patient Active Problem List   Diagnosis     Spinal axis tumor     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)   Pancytopenia secondary to chemotherapy     History of Present Illness   Kobe Pedroza is a 60 year old male with a history of thyroid cancer status post thyroidectomy (2011), CAD, and hyperlipidemia who was transferred from [facility] to [facility] on 3/13 with a newly thoracic extradural mass at T5-6 with severe cord compression now s/p neurosurgical gross resection on 3/15 with initial pathology concerning for high-grade B-cell lymphoma. He transferred to the Heme-Malignancy team on 3/17 for further work-up and management of his newly diagnosed Burkitt Lymphoma. He started R-CODOX-M/IVAC chemotherapy regimen (D1=3/18/2020), today is day 13.    Hospital Course   The following problems were addressed during this stay:     HEME/ONC  # Burkitt's lymphoma  # Thoracic spine mass  # Spinal cord compression  Patient presented on 3/12 to [facility] with acute-on-chronic mid-thoracic back pain with some associated radicular symptoms. Per patient, no B-symptoms, though he did recently intentionally lose 35 lbs. 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 Patient had no appreciable neurological deficits. He was started on dexamethasone and underwent T5-6 laminectomy with gross total resection of epidural tumor on 3/15. Flow cytometry of the specimen was notable for CD10 positive and kappa monotypic B cells (83%). Confirmed Burkitt lymphoma with surgical pathology. Cytogenetics pending. PET scan and bone marrow biopsy completed on 3/18.  Started R-CODOX-M/IVAC (D1=3/18/2020), tolerated well,  methotrexate 0.05 and ready to go home on day of discharge.      --Will need 3x weekly labs, [employer] messaged with request  --Appointment for neulasta made for tomorrow  --Has follow-up on 4/1 w/BOB and labs  --Has follow-up with neurosurgery today     # ID Prophylaxis  - Viral serologies: HepC nonreactive, HepB negative and HIV negative  - Continue fluconazole 200mg, evaquin 250 mg and ACV daily  - Pentamidine given 3/21, will need q28 days     # Thyroid cancer status-post thyroidectomy  Status post thyroidectomy in 2011. TSH normal on admission.  - Continue PTA levothyroxine     MSK/NEURO  # Thoracic back pain, resolving  Due to T5-6 extradural tumor as detailed above. Patient now status post T5-6 laminectomy with gross total resction on 3/15.   - Post-op activity recommendations per NSG:               - Larry Brace to be worn out of bed (orthotics consult in place)               - No lifting >10 lbs, twisting, straining, or strenuous activity for at least 2 weeks  - Has NSG outpatient follow-up scheduled for today  - CTM surgical site; sutures used were absorbable      CV  # Coronary artery disease  # Hyperlipidemia  Followed by Dr. Zeng at [facility] ([place]). Diagnosed with mild, non-obstructive CAD in 2018. Calcium score at that time was 5.5. Baby ASA and low-dose statin therapy recommended. No previous cardiac caths or stents.  - Hold PTA statin in light of starting chemotherapy     GI  # Hypocalcemia  Mild, corrects for low albumin.   - Continue PO calcium citrate      # Hypokalemia  - Continue at home for another week, further orders per primary onc     #Misc  - Activity: As tolerated and per neurosurg recommendations     Disposition: Discharge today follow-up per above    Adenike Muñoz PA-C        Pending Results   Unresulted Labs Ordered in the Past 30 Days of this Admission     Date and Time Order Name Status Description    3/17/2020 1800 CHROMOSOME BONE MARROW With  Professional Interpretation In process     3/16/2020 1007 CHROMOSOME MALIGNANT TISSUE In process         Constitutional: very pleasant, in NAD  ENT: mmm, neck is supple  Respiratory: fine crackles right base otherwise CTAB, normal effort  Cardiovascular: nl s1/s2 no MRGs  GI: abdomen is soft, NT, +BS  Skin: warm, dry  Musculoskeletal: trace edema bilat lower extremities  Neurologic: awake/alert, speech is clear, answers questions appropriately      Code Status   Full Code    Primary Care Physician   Garett Arriaga      Discharge Disposition   Discharged to home  Condition at discharge: Good    Consultations This Hospital Stay   VASCULAR ACCESS CARE ADULT IP CONSULT  INTERNAL MEDICINE ADULT IP CONSULT FOR [place]  HEMATOLOGY ADULT IP CONSULT  PHYSICAL THERAPY ADULT IP CONSULT  OCCUPATIONAL THERAPY ADULT IP CONSULT  VASCULAR ACCESS ADULT IP CONSULT  ORTHOSIS BRACE IP CONSULT  PHYSICAL THERAPY ADULT IP CONSULT  SOCIAL WORK IP CONSULT    Discharge Orders   No discharge procedures on file.  Discharge Medications   Current Discharge Medication List      CONTINUE these medications which have NOT CHANGED    Details   levothyroxine (SYNTHROID/LEVOTHROID) 200 MCG tablet Take 200 mcg by mouth daily           Allergies   No Known Allergies  Data   Most Recent 3 CBC's:  Recent Labs   Lab Test 03/30/20  0552 03/29/20  0534 03/28/20  0542   WBC 0.8* 1.3* 0.9*   HGB 8.5* 8.6* 9.2*   MCV 91 89 87   * 159 174      Most Recent 3 BMP's:  Recent Labs   Lab Test 03/30/20  0552 03/29/20  1730 03/29/20  1324 03/29/20  0534  03/28/20  0542     --   --  140  --  139   POTASSIUM 3.2* 3.5 3.4 3.3*   < > 3.4   CHLORIDE 102  --   --  103  --  102   CO2 32  --   --  32  --  29   BUN 17  --   --  16  --  13   CR 0.79 0.85  --  0.75   < > 0.60*   ANIONGAP 5  --   --  6  --  7   RUTH 6.7*  --   --  7.1*  --  7.0*   *  --   --  172*  --  152*    < > = values in this interval not displayed.     Most Recent 2 LFT's:  Recent Labs    Lab Test 03/30/20  0552 03/29/20  0534   AST 17 14   ALT 33 29   ALKPHOS 52 56   BILITOTAL 0.2 0.3     Most Recent INR's and Anticoagulation Dosing History:  Anticoagulation Dose History     Recent Dosing and Labs Latest Ref Rng & Units 3/24/2020 3/25/2020 3/26/2020 3/27/2020 3/28/2020 3/29/2020 3/30/2020    INR 0.86 - 1.14 1.03 1.02 0.96 0.97 1.03 1.02 1.05        Most Recent 3 Troponin's:No lab results found.  Most Recent Cholesterol Panel:No lab results found.  Most Recent 6 Bacteria Isolates From Any Culture (See EPIC Reports for Culture Details):  Recent Labs   Lab Test 03/23/20  1345 03/20/20  1340   CULT No growth No growth     Most Recent TSH, T4 and A1c Labs:  Recent Labs   Lab Test 03/14/20  0439 03/12/20   TSH  --  0.43   A1C 5.5  --

## 2020-03-30 NOTE — PROGRESS NOTES
[id number]:    AVSS on RA. Pt offers no complaints of pain or nausea. Urine pH at 1930 was 8.5, next pH due to 2330. Pt remains on leucovorin rescue and sodium bicarb infusion. Pt anxious to go home tomorrow pending AM methotrexate levels. Continue plan of care.

## 2020-03-30 NOTE — PROGRESS NOTES
"Kobe Pedroza is a 60 year old male who is being evaluated via telephone visit for 90-day global post-operative period     The patient has been notified of following:     \"This telephone visit will be conducted via a call between you and your physician/provider. We have found that certain health care needs can be provided without the need for a physical exam.  This service lets us provide the care you need with a short phone conversation.  If a prescription is necessary we can send it directly to your pharmacy.  If lab work is needed we can place an order for that and you can then stop by our lab to have the test done at a later time.      Physician has received verbal consent for a Telephone Visit from the patient?    Yes     Kobe Pedroza complains of    Back pain - surgical follow up   Chief Complaint   Patient presents with     Telephone     [facility] TELEPHONE VISIT - 2 WK POST OP     PROCEDURES:      3/16/2020   1. T5 to T6 laminoplasty for tumor resection.        History of Present Illness:   Mr. Pedroza is a 60-year-old male who was transferred to [facility] for definitive care when a mass was found in his thoracic spine.  Mr. Pedroza had about a year's worth of back pain in this area, which was then acutely exacerbated when he was lifting his mother up from a sitting position.  His back pain was significant and persisted for about a week.  He was evaluated and imaging with MRI revealed a T5-6 extradural mass that was compressing his spine.  He did not have any neurologic deficits other than radicular pain in the T5-6 distribution on the left-hand side.  The risks and benefits of the procedure were explained to the patient and he wished to proceed.       I have reviewed and updated the patient's Allergies and Medication List.    Today, He is being discharged from the hospital.    He states that his back pain is \"better than ever\"  Pre-op pain in mid-back is \"gone\".   He noticed this pain resolved immediately " "following his surgery.    He just has some \"tension\" at the incision site.   Nurses looked at incision every day while inpatient and reported;  No redness, or swelling ,or drainage.   He has no radiating pain, numbness, or tingling around chest or left side.   No nerve pain .   Baseline neuropathy in feet   Ambulating  independently without assistive device  He has not needed pain medication, just occas Tylenol       ALLERGIES  Patient has no known allergies.      Assessment:   1. T5 to T6 laminoplasty for tumor resection.       Plan:    ~He will upload photo of incision  ~Continue Jewitt Brace when upright for approx 4-6 weeks post-op  ~No need for further Neurosurgery follow-up at this time  ~Continue per recommendations of Oncology service      At the end of the visit, all the patient's questions and concerns had been addressed and the patient was agreeable with the plan of care as outlined above. The patient has our office contact information and knows to call with any questions, concerns, or changes in his condition.       Phone call duration:  14 minutes    Tiffanie Trotter DNP  Neurosurgery Nurse Practitioner  [facility]  [phone]      "

## 2020-03-31 ENCOUNTER — ALLIED HEALTH/NURSE VISIT (OUTPATIENT)
Dept: ONCOLOGY | Facility: CLINIC | Age: 60
End: 2020-03-31
Attending: INTERNAL MEDICINE
Payer: COMMERCIAL

## 2020-03-31 ENCOUNTER — APPOINTMENT (OUTPATIENT)
Dept: LAB | Facility: CLINIC | Age: 60
End: 2020-03-31
Attending: INTERNAL MEDICINE
Payer: COMMERCIAL

## 2020-03-31 VITALS
DIASTOLIC BLOOD PRESSURE: 72 MMHG | OXYGEN SATURATION: 99 % | SYSTOLIC BLOOD PRESSURE: 131 MMHG | WEIGHT: 213.9 LBS | HEART RATE: 75 BPM | TEMPERATURE: 97.8 F | BODY MASS INDEX: 32.05 KG/M2 | RESPIRATION RATE: 16 BRPM

## 2020-03-31 DIAGNOSIS — C83.78 BURKITT LYMPHOMA OF LYMPH NODES OF MULTIPLE REGIONS (H): Primary | ICD-10-CM

## 2020-03-31 LAB
ALBUMIN SERPL-MCNC: 3.2 G/DL (ref 3.4–5)
ALP SERPL-CCNC: 62 U/L (ref 40–150)
ALT SERPL W P-5'-P-CCNC: 41 U/L (ref 0–70)
ANION GAP SERPL CALCULATED.3IONS-SCNC: 6 MMOL/L (ref 3–14)
AST SERPL W P-5'-P-CCNC: 15 U/L (ref 0–45)
BASOPHILS # BLD AUTO: 0 10E9/L (ref 0–0.2)
BASOPHILS NFR BLD AUTO: 0.9 %
BILIRUB SERPL-MCNC: 0.2 MG/DL (ref 0.2–1.3)
BUN SERPL-MCNC: 22 MG/DL (ref 7–30)
CALCIUM SERPL-MCNC: 7.7 MG/DL (ref 8.5–10.1)
CHLORIDE SERPL-SCNC: 104 MMOL/L (ref 94–109)
CO2 SERPL-SCNC: 27 MMOL/L (ref 20–32)
CREAT SERPL-MCNC: 0.87 MG/DL (ref 0.66–1.25)
DIFFERENTIAL METHOD BLD: ABNORMAL
EOSINOPHIL # BLD AUTO: 0 10E9/L (ref 0–0.7)
EOSINOPHIL NFR BLD AUTO: 0 %
ERYTHROCYTE [DISTWIDTH] IN BLOOD BY AUTOMATED COUNT: 12.6 % (ref 10–15)
GFR SERPL CREATININE-BSD FRML MDRD: >90 ML/MIN/{1.73_M2}
GLUCOSE SERPL-MCNC: 90 MG/DL (ref 70–99)
HCT VFR BLD AUTO: 31.7 % (ref 40–53)
HGB BLD-MCNC: 10.7 G/DL (ref 13.3–17.7)
IMM GRANULOCYTES # BLD: 0 10E9/L (ref 0–0.4)
IMM GRANULOCYTES NFR BLD: 2.6 %
LYMPHOCYTES # BLD AUTO: 0.4 10E9/L (ref 0.8–5.3)
LYMPHOCYTES NFR BLD AUTO: 33 %
MCH RBC QN AUTO: 29.8 PG (ref 26.5–33)
MCHC RBC AUTO-ENTMCNC: 33.8 G/DL (ref 31.5–36.5)
MCV RBC AUTO: 88 FL (ref 78–100)
MONOCYTES # BLD AUTO: 0.1 10E9/L (ref 0–1.3)
MONOCYTES NFR BLD AUTO: 4.3 %
NEUTROPHILS # BLD AUTO: 0.7 10E9/L (ref 1.6–8.3)
NEUTROPHILS NFR BLD AUTO: 59.2 %
NRBC # BLD AUTO: 0 10*3/UL
NRBC BLD AUTO-RTO: 0 /100
PLATELET # BLD AUTO: 170 10E9/L (ref 150–450)
POTASSIUM SERPL-SCNC: 4 MMOL/L (ref 3.4–5.3)
PROT SERPL-MCNC: 6.3 G/DL (ref 6.8–8.8)
RBC # BLD AUTO: 3.59 10E12/L (ref 4.4–5.9)
SODIUM SERPL-SCNC: 138 MMOL/L (ref 133–144)
WBC # BLD AUTO: 1.2 10E9/L (ref 4–11)

## 2020-03-31 PROCEDURE — 96372 THER/PROPH/DIAG INJ SC/IM: CPT

## 2020-03-31 PROCEDURE — 85025 COMPLETE CBC W/AUTO DIFF WBC: CPT | Performed by: INTERNAL MEDICINE

## 2020-03-31 PROCEDURE — 80053 COMPREHEN METABOLIC PANEL: CPT | Performed by: PHYSICIAN ASSISTANT

## 2020-03-31 PROCEDURE — 85025 COMPLETE CBC W/AUTO DIFF WBC: CPT | Performed by: PHYSICIAN ASSISTANT

## 2020-03-31 PROCEDURE — 25000128 H RX IP 250 OP 636: Mod: ZF | Performed by: INTERNAL MEDICINE

## 2020-03-31 RX ADMIN — PEGFILGRASTIM 6 MG: 6 INJECTION SUBCUTANEOUS at 13:08

## 2020-03-31 ASSESSMENT — PAIN SCALES - GENERAL: PAINLEVEL: NO PAIN (0)

## 2020-03-31 NOTE — NURSING NOTE
Chief Complaint   Patient presents with     Blood Draw     no lab orders. JIC green gel and purple sent to lab. vs taken      Blood drawn via vpt by RN in lab. JIC green gel and purple sent to lab. VS taken. Pt checked into next appointment.   Sandhya Heard RN

## 2020-03-31 NOTE — PATIENT INSTRUCTIONS
Patient Education     Pegfilgrastim injection  Brand Names: Fulphila, Neuflorence, UDENYCA  What is this medicine?  PEGFILGRASTIM (PEG chacha gra stim) is a long-acting granulocyte colony-stimulating factor that stimulates the growth of neutrophils, a type of white blood cell important in the body's fight against infection. It is used to reduce the incidence of fever and infection in patients with certain types of cancer who are receiving chemotherapy that affects the bone marrow, and to increase survival after being exposed to high doses of radiation.  How should I use this medicine?  This medicine is for injection under the skin. If you get this medicine at home, you will be taught how to prepare and give the pre-filled syringe or how to use the On-body Injector. Refer to the patient Instructions for Use for detailed instructions. Use exactly as directed. Tell your healthcare provider immediately if you suspect that the On-body Injector may not have performed as intended or if you suspect the use of the On-body Injector resulted in a missed or partial dose.  It is important that you put your used needles and syringes in a special sharps container. Do not put them in a trash can. If you do not have a sharps container, call your pharmacist or healthcare provider to get one.  Talk to your pediatrician regarding the use of this medicine in children. While this drug may be prescribed for selected conditions, precautions do apply.  What side effects may I notice from receiving this medicine?  Side effects that you should report to your doctor or health care professional as soon as possible:    allergic reactions like skin rash, itching or hives, swelling of the face, lips, or tongue    back pain    dizziness    fever    pain, redness, or irritation at site where injected    pinpoint red spots on the skin    red or dark-brown urine    shortness of breath or breathing problems    stomach or side pain, or pain at the  shoulder    swelling    tiredness    trouble passing urine or change in the amount of urine  Side effects that usually do not require medical attention (report to your doctor or health care professional if they continue or are bothersome):    bone pain    muscle pain  What may interact with this medicine?  Interactions have not been studied.  Give your health care provider a list of all the medicines, herbs, non-prescription drugs, or dietary supplements you use. Also tell them if you smoke, drink alcohol, or use illegal drugs. Some items may interact with your medicine.  What if I miss a dose?  It is important not to miss your dose. Call your doctor or health care professional if you miss your dose. If you miss a dose due to an On-body Injector failure or leakage, a new dose should be administered as soon as possible using a single prefilled syringe for manual use.  Where should I keep my medicine?  Keep out of the reach of children.  If you are using this medicine at home, you will be instructed on how to store it. Throw away any unused medicine after the expiration date on the label.  What should I tell my health care provider before I take this medicine?  They need to know if you have any of these conditions:    kidney disease    latex allergy    ongoing radiation therapy    sickle cell disease    skin reactions to acrylic adhesives (On-Body Injector only)    an unusual or allergic reaction to pegfilgrastim, filgrastim, other medicines, foods, dyes, or preservatives    pregnant or trying to get pregnant    breast-feeding  What should I watch for while using this medicine?  You may need blood work done while you are taking this medicine.  If you are going to need a MRI, CT scan, or other procedure, tell your doctor that you are using this medicine (On-Body Injector only).  NOTE:This sheet is a summary. It may not cover all possible information. If you have questions about this medicine, talk to your doctor,  pharmacist, or health care provider. Copyright  2019 [employer]

## 2020-04-01 ENCOUNTER — VIRTUAL VISIT (OUTPATIENT)
Dept: ONCOLOGY | Facility: CLINIC | Age: 60
End: 2020-04-01
Attending: PHYSICIAN ASSISTANT
Payer: COMMERCIAL

## 2020-04-01 DIAGNOSIS — C83.78 BURKITT LYMPHOMA OF LYMPH NODES OF MULTIPLE REGIONS (H): Primary | ICD-10-CM

## 2020-04-01 PROCEDURE — 99214 OFFICE O/P EST MOD 30 MIN: CPT | Mod: TEL | Performed by: PHYSICIAN ASSISTANT

## 2020-04-01 PROCEDURE — 40000114 ZZH STATISTIC NO CHARGE CLINIC VISIT

## 2020-04-01 NOTE — PROGRESS NOTES
"Kobe Pedroza is a 60 year old male who is being evaluated via a billable telephone visit.      The patient has been notified of following:     \"This telephone visit will be conducted via a call between you and your physician/provider. We have found that certain health care needs can be provided without the need for a physical exam.  This service lets us provide the care you need with a short phone conversation.  If a prescription is necessary we can send it directly to your pharmacy.  If lab work is needed we can place an order for that and you can then stop by our lab to have the test done at a later time.    If during the course of the call the physician/provider feels a telephone visit is not appropriate, you will not be charged for this service.\"     Patient has given verbal consent for Telephone visit?  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    ALLERGIES  Patient has no known allergies.     No needs. No concerns.  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 He was transferred to the Malignant Hematology team on 3/17 for further work-up and management of his newly diagnosed lymphoma.Per patient, no B-symptoms, though he did recently intentionally lose 35 lbs. MRI of the T-spine on 3/13 demonstrated an extradural thoracic spine " "mass at T5-6 with severe cord compression. Patient had no appreciable neurological deficits. He was started on dexamethasone and underwent T5-6 laminectomy with gross total resection of epidural tumor on 3/15. Flow cytometry of the specimen was notable for CD10 positive and kappa monotypic B cells (83%). Surgical pathology and cytogenetics confirming Burkitts.     C1D1 R-CODOX-M/IVAC 3/18/2020    INTERVAL HISTORY: I talked to Jonnathan and his wife Mckayla over the phone today.  Overall he is feeling pretty stable. No nausea/vomiting.  Eating pretty well, drinking about 4-5 glasses of fluids a day. No dizziness or lightheadedness.  Was feeling puffy from all the fluids and feels he has been urinating quite a bit and feeling less puffy. Weight relatively stable. His back feels \"tight\" but not painful.  He hasn't been doing too much exercise or moving around.  No radicular pain.  No rash.  Mood OK.  Sleeping OK. 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  He has not been going to work, but is concerned given he is the sole income for he and his wife.        Current Outpatient Medications:      acyclovir (ZOVIRAX) 200 MG capsule, Take 2 capsules (400 mg) by mouth 2 times daily, Disp: 60 capsule, Rfl: 0     fluconazole (DIFLUCAN) 200 MG tablet, Take 1 tablet (200 mg) by mouth daily, Disp: 30 tablet, Rfl: 0     levofloxacin (LEVAQUIN) 250 MG tablet, Take 1 tablet (250 mg) by mouth daily, Disp: 30 tablet, Rfl: 0     levothyroxine (SYNTHROID/LEVOTHROID) 200 MCG tablet, Take 200 mcg by mouth daily, Disp: , Rfl:      pentamidine (NEBUPENT) 300 MG neb solution, Inhale 300 mg into the lungs every 28 days, Disp:  , Rfl:      potassium chloride (KLOR-CON) 20 MEQ packet, Take 40 mEq by mouth daily, Disp: 14 packet, Rfl: 0     senna-docusate (SENOKOT-S/PERICOLACE) 8.6-50 MG tablet, Take 1 tablet by mouth 2 times daily, Disp: 60 tablet, Rfl: 0     pegfilgrastim (NEULASTA) 6 MG/0.6ML injection, Inject 0.6 mLs (6 mg) Subcutaneous " once for 1 dose, Disp: 0.6 mL, Rfl: 0       3/28/2020 05:42 3/29/2020 05:34 3/30/2020 05:52 3/31/2020 12:28   WBC 0.9 (LL) 1.3 (L) 0.8 (LL) 1.2 (L)   Hemoglobin 9.2 (L) 8.6 (L) 8.5 (L) 10.7 (L)   Hematocrit 27.1 (L) 25.7 (L) 25.9 (L) 31.7 (L)   Platelet Count 174 159 131 (L) 170   RBC Count 3.12 (L) 2.88 (L) 2.86 (L) 3.59 (L)   MCV 87 89 91 88   MCH 29.5 29.9 29.7 29.8   MCHC 33.9 33.5 32.8 33.8   RDW 12.3 12.6 12.7 12.6   Diff Method Manual Differential Manual Differential Automated Method Automated Method   % Neutrophils 54.8 83.0 78.3 59.2   % Lymphocytes 17.8 15.0 15.7 33.0   % Monocytes 23.3 2.0 2.4 4.3   % Eosinophils 0.0 0.0 0.0 0.0   % Basophils 0.0 0.0 0.0 0.9   % Metamyelocytes 4.1      % Immature Granulocytes   3.6 2.6   Nucleated RBCs   0 0   Absolute Neutrophil 0.5 (L) 1.1 (L) 0.7 (L) 0.7 (L)      3/31/2020 12:28   Sodium 138   Potassium 4.0   Chloride 104   Carbon Dioxide 27   Urea Nitrogen 22   Creatinine 0.87   GFR Estimate >90   GFR Estimate If Black >90   Calcium 7.7 (L)   Anion Gap 6   Albumin 3.2 (L)   Protein Total 6.3 (L)   Bilirubin Total 0.2   Alkaline Phosphatase 62   ALT 41   AST 15     ASSESSMENT/PLAN:  60 year old with newly diagnosed Burkitt's lymphoma, presented with spinal thoracic mass, s/p gross resection.        HEME/ONC  # Burkitt's lymphoma, initial presentation with a thoracic mass.    Patient presented on 3/12 to [facility] with acute-on-chronic mid-thoracic back pain with some associated radicular symptoms. Per patient, no B-symptoms, though he did recently intentionally lose 35 lbs. 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 Patient had no appreciable neurological deficits. He was started on dexamethasone and underwent T5-6 laminectomy with gross total resection of epidural tumor on 3/15. Flow cytometry of the specimen was notable for CD10 positive and kappa monotypic B cells (83%). Confirmed Burkitt  lymphoma with surgical pathology. Cytogenetics pending. PET scan and bone marrow biopsy completed on 3/18. No disease in marrow or CSF.  Started R-CODOX-M/IVAC (D1=3/18/2020).  Had Neulasta on 3/31.  Doing well today, would anticipate admission next week.    --labs 4/3 and 4/6  --anticipate admission for C2 next week, given counts     # ID Prophylaxis  - Viral serologies: HepC nonreactive, HepB negative and HIV negative  - Continue fluconazole 200mg, levaquin 250 mg and ACV daily  - Pentamidine given 3/21, will need q28 days     # Thyroid cancer status-post thyroidectomy  Status post thyroidectomy in 2011. TSH normal on admission.  - Continue PTA levothyroxine    MSK/NEURO  # Thoracic back pain - resolving  Due to T5-6 extradural tumor as detailed above. Patient now status post T5-6 laminectomy with gross total resction on 3/15.  Recently saw NSG.  Encouraged walking.  Not taking any analgesics, OK for Tylenol .    Final plan: labs every ~3 days, anticipate admission next week  Patient would like to discuss with SW options for financial assistance. Will discuss with Kaylee.     Phone call duration: 20 minutes    yLubov Sin PA-C

## 2020-04-02 ENCOUNTER — CARE COORDINATION (OUTPATIENT)
Dept: ONCOLOGY | Facility: CLINIC | Age: 60
End: 2020-04-02

## 2020-04-02 ENCOUNTER — TELEPHONE (OUTPATIENT)
Dept: ONCOLOGY | Facility: CLINIC | Age: 60
End: 2020-04-02

## 2020-04-02 NOTE — PROGRESS NOTES
Writer was going to call patient to go over plan for labs at nearby [facility] when able and noted that wife had called in for Jonnathan with pain. Writer called Mckayla (wife) in case Jonnathan not able to talk, he was in the background. He was awakened in the middle of the night with severe sudden sharp pain, originating at his LP site down his back., as described in earlier note. He has since took the Ibuprofen as Doctor Adriana ok 'd and put a heating pad on it. The pain was at 7-8 per him, now at a 3. Denies any neurological symptoms, no loss of bowel or bladder.     He had neulasta on Tuesday.They had not been notified that they recalled to take Claritin. Instructed they could take Claritin. Educated on Neulasta and how it works and why the pain could be related to it. He is much better. He will continue to monitor and will call back if it does not continue to keep improving or symptoms get worse again.     Doctor Adriana aware of the above.     Labs stable, on their way up. [facility] is tylor to do labs when asked. They also can do transfusions pending labs. Writer talked to [facility] infusions at [phone]    Writer called back talked with Jonnathan  this afternoon after talking with [facility]. Pain continues to get better. Minimal pain now although he states he has not been up much walking to see. Neurologically intact, no symptoms.     Plan will be for writer to call Jonnathan in the am to access his pain and situation then decided labs and plan from there. Currently he is scheduled to see Daya and labs at [facility] tomorrow. Daya added on when pain was severe and source unknown.     They instructed to call sooner if any changes. They have 24 hour resource number and writer number if needed.     Jonnathan and Mckayla expressed good understanding of the above.

## 2020-04-02 NOTE — TELEPHONE ENCOUNTER
Pt and wife calling to triage to report severe pain at the LP site from 3/23/20. Wraps around to his hips. Does not appear red, swollen or tender to touch. Woke him from sleep this am. Has taken 1000mg tylenol every 4 hours without much improvement.    Paged to Dr Wright @ [phone]    Ok to try one dose of ibuprofen 400mg. Continue tylenol 1000mg every 4 hours.    See Daya Sin PA-C for in person visit on Friday. If sx worsen or pain not controlled, to be seen in ED. If site becomes red, swollen, tender to touch or rash develops, to notify triage.     Pts wife Mckayla notified and understands plan.     Msg sent to scheduling to add

## 2020-04-03 ENCOUNTER — CARE COORDINATION (OUTPATIENT)
Dept: ONCOLOGY | Facility: CLINIC | Age: 60
End: 2020-04-03

## 2020-04-03 ENCOUNTER — TELEPHONE (OUTPATIENT)
Dept: CARE COORDINATION | Facility: CLINIC | Age: 60
End: 2020-04-03

## 2020-04-03 ENCOUNTER — TRANSFERRED RECORDS (OUTPATIENT)
Dept: HEALTH INFORMATION MANAGEMENT | Facility: CLINIC | Age: 60
End: 2020-04-03

## 2020-04-03 ENCOUNTER — DOCUMENTATION ONLY (OUTPATIENT)
Dept: ONCOLOGY | Facility: CLINIC | Age: 60
End: 2020-04-03

## 2020-04-03 LAB — COPATH REPORT: NORMAL

## 2020-04-03 NOTE — PROGRESS NOTES
Verbally got report that labs done today at [facility] were all improved over 4/1. They will fax the results to [facility] now. Writer will call patient with preliminary results. To have repeat labs and provider visit on Monday 4/6. No transfusions needed this weekend. Not neutropenic. Can stop Levaquin and Fluconazole per Doctor Ever Wright.     When called they said they got word that they do not need to come in on Monday. Writer will clarify as the plan is not to admit until mid week for next cycle.     Jonnathan and Mckayla expressed good understanding of the above.      Appreciative of call and update. They said the process at [facility] went slick. It is only 10 minutes from their house. Informed him when stable we can do this. They are able to do transfusions also. He does need to see/video conference providers at [facility].

## 2020-04-03 NOTE — PROGRESS NOTES
Lab orders faxed to [facility] @ [phone].    Gayle Springer CMA ([facility])

## 2020-04-03 NOTE — TELEPHONE ENCOUNTER
Social Work Note: Telephone Call  Oncology Clinic    Data/Intervention:  Patient Name:  Kobe Pedroaz  /Age:  1960 (60 year old)    Call From:  GHASSAN contacted patient and wife by phone.  Reason for Call:  Referral from NEDA to reach out to patient regarding financial resources.     Assessment:    SW spoke with patient and wife over the phone.  Both expressed concerns about finances as patient was sole income earner for the household and is unable to work at this time due to his diagnosis and treatment.  SW gave education about eligibility for financial assistance through oncology grants.  Patient has completed application for SSDI at this time.  SW discussed bridging waiting period with grants through [employer] and [employer].  General kaitlin and [employer] applications completed and submitted. SW gave wife education on setting up patient portal through [employer] and applying for Patient Aid Fund.  She indicated ability of follow up on this independently. SW also gave education about copay assistance program through S; however, fund is closed to pt's diagnosis at this time.  Family directed to check on this periodically and encouraged to apply when fund is open again. Wife indicated understanding of education provided and appreciation for assistance.    Plan:  GHASSAN contact information provided for any other needs, questions or concerns.  SW available to assist.     Soo Yeon Han, MSW, [facility]  Pager: [phone]  Phone: [phone]

## 2020-04-06 ENCOUNTER — TRANSFERRED RECORDS (OUTPATIENT)
Dept: HEALTH INFORMATION MANAGEMENT | Facility: CLINIC | Age: 60
End: 2020-04-06

## 2020-04-06 ENCOUNTER — DOCUMENTATION ONLY (OUTPATIENT)
Dept: ONCOLOGY | Facility: CLINIC | Age: 60
End: 2020-04-06

## 2020-04-06 DIAGNOSIS — C83.78 BURKITT LYMPHOMA OF LYMPH NODES OF MULTIPLE REGIONS (H): Primary | ICD-10-CM

## 2020-04-06 LAB
% IMMATURE GRANULOCYTES: 24.3 (ref 0–1)
ABS BASOPHILS: 0.07 CELLS/MM3 (ref 0–0.2)
ABS BASOPHILS: 0.09 CELLS/MM3 (ref 0–0.2)
ABS EOSINOPHILS: 0 CELLS/MM3 (ref 0.04–0.54)
ABS EOSINOPHILS: 0 CELLS/MM3 (ref 0.04–0.54)
ABS IMMATURE GRANULOCYTES: 1.19 (ref 0–0.1)
ABS IMMATURE GRANULOCYTES: 5.12 (ref 0–0.1)
ABS LYMPHOCYTES: 0.48 CELLS/MM3 (ref 0.76–4)
ABS LYMPHOCYTES: 0.96 CELLS/MM3 (ref 0.76–4)
ABS MONOCYTE: 1.12 CELLS/MM3 (ref 0–0.9)
ABS MONOCYTE: 4.33 CELLS/MM3 (ref 0–0.9)
ABS NEUTROPHILS: 10.53 CELLS/MM3 (ref 2.1–7.5)
ABS NEUTROPHILS: 4.13 CELLS/MM3 (ref 2.1–7.5)
ALBUMIN SERPL-MCNC: 3.4 G/DL (ref 3.4–5)
ALP SERPL-CCNC: 95 U/L (ref 46–116)
ALT SERPL-CCNC: 49 U/L (ref 14–63)
AST SERPL-CCNC: 25 U/L (ref 15–37)
BASOPHILS NFR BLD AUTO: 0.4 % (ref 0–1)
BILIRUB SERPL-MCNC: 0.2 MG/DL (ref 0.2–1)
BUN SERPL-MCNC: 27 MG/DL (ref 7–18)
CALCIUM SERPL-MCNC: 8.2 MG/DL (ref 8.5–10.1)
CHLORIDE SERPLBLD-SCNC: 103 MMOL/L (ref 98–107)
CREAT SERPL-MCNC: 1.23 MG/DL (ref 0.67–1.17)
EOSINOPHIL NFR BLD AUTO: 0 % (ref 0–5)
EOSINOPHIL NFR BLD AUTO: 0 % (ref 0–5)
GFR SERPL CREATININE-BSD FRML MDRD: >60 ML/MIN/1.73M2
GLOBULIN: 3.4 G/DL (ref 1.4–4.8)
GLUCOSE SERPL-MCNC: 112 MG/DL (ref 74–100)
HCO3 SERPL-SCNC: 26 MMOL/L (ref 21–32)
HCT VFR BLD AUTO: 30.5 % (ref 40–52)
HCT VFR BLD AUTO: 32.9 % (ref 40–52)
HEMOGLOBIN: 10.2 G/DL (ref 13–18)
HEMOGLOBIN: 11.1 G/DL (ref 13–18)
IMMATURE GRANULOCYTES: 17 % (ref 0–1)
LYMPHOCYTES NFR BLD AUTO: 4.6 % (ref 18–40)
LYMPHOCYTES NFR BLD AUTO: 6.9 % (ref 18–40)
MCH RBC QN AUTO: 30 PG (ref 27–34)
MCH RBC QN AUTO: 30 PG (ref 27–34)
MCHC RBC AUTO-ENTMCNC: 33 G/DL (ref 32–36)
MCHC RBC AUTO-ENTMCNC: 34 G/DL (ref 32–36)
MCV RBC AUTO: 88 FL (ref 80–96)
MCV RBC AUTO: 90 FL (ref 80–96)
MONOCYTES # BLD AUTO: 1 10*3/UL (ref 0–1)
MONOCYTES NFR BLD AUTO: 16 % (ref 3–13)
MONOCYTES NFR BLD AUTO: 20.6 % (ref 3–13)
NEUTROPHILS NFR BLD AUTO: 50.1 % (ref 45–75)
NEUTROPHILS NFR BLD AUTO: 59.1 % (ref 45–75)
NUCLEATED RBC/100 WBC: 0.8 (ref 0–0)
NUCLEATED RBC/100 WBC: 0.9 (ref 0–0)
NUCLEATED RBCS: 0.06 (ref 0–0)
NUCLEATED RBCS: 0.16 (ref 0–0)
OTHER: 12.9 % (ref 11.6–14.4)
OTHER: 14.4 % (ref 11.6–14.4)
OTHER: 41.2 FL (ref 35.1–43.9)
OTHER: 42.6 % (ref 35.1–43.9)
PLATELET # BLD AUTO: 126 10^9/L (ref 150–420)
PLATELET # BLD AUTO: 210 10^9/L (ref 450–420)
POTASSIUM SERPL-SCNC: 4.1 MMOL/L (ref 3.5–5.1)
PROT SERPL-MCNC: 6.8 G/DL (ref 6.4–8.2)
RBC # BLD AUTO: 3.38 10^12/L (ref 4.4–6)
RBC # BLD AUTO: 3.72 10^12/L (ref 4.4–6)
SODIUM SERPL-SCNC: 141 MMOL/L (ref 136–145)
WBC # BLD AUTO: 21 10^9/L (ref 4–11)
WBC # BLD AUTO: 7 10^9/L (ref 4–11)

## 2020-04-06 NOTE — PROGRESS NOTES
Lab orders printed and faxed to [facility], fax # [phone] per request of [facility].  Confirmation was verified via rightfax.    Whitley Huffman, CMA

## 2020-04-07 ENCOUNTER — VIRTUAL VISIT (OUTPATIENT)
Dept: ONCOLOGY | Facility: CLINIC | Age: 60
End: 2020-04-07
Attending: PHYSICIAN ASSISTANT
Payer: COMMERCIAL

## 2020-04-07 DIAGNOSIS — C83.78 BURKITT LYMPHOMA OF LYMPH NODES OF MULTIPLE REGIONS (H): ICD-10-CM

## 2020-04-07 LAB — COPATH REPORT: NORMAL

## 2020-04-07 PROCEDURE — 99215 OFFICE O/P EST HI 40 MIN: CPT | Mod: TEL | Performed by: PHYSICIAN ASSISTANT

## 2020-04-07 PROCEDURE — 40000114 ZZH STATISTIC NO CHARGE CLINIC VISIT

## 2020-04-07 RX ORDER — FLUCONAZOLE 200 MG/1
200 TABLET ORAL DAILY
Qty: 30 TABLET | Refills: 0 | Status: ON HOLD | COMMUNITY
Start: 2020-04-07 | End: 2020-05-26

## 2020-04-07 RX ORDER — LEVOFLOXACIN 250 MG/1
250 TABLET, FILM COATED ORAL DAILY
Qty: 30 TABLET | Refills: 0 | Status: ON HOLD | COMMUNITY
Start: 2020-04-07 | End: 2020-05-26

## 2020-04-07 NOTE — LETTER
"4/7/2020      RE: Kobe Pedroza  [address]  [facility] [address]       Kobe Pedroza is a 60 year old male who is being evaluated via a billable telephone visit.      The patient has been notified of following:     \"This telephone visit will be conducted via a call between you and your physician/provider. We have found that certain health care needs can be provided without the need for a physical exam.  This service lets us provide the care you need with a short phone conversation.  If a prescription is necessary we can send it directly to your pharmacy.  If lab work is needed we can place an order for that and you can then stop by our lab to have the test done at a later time.    Telephone visits are billed at different rates depending on your insurance coverage. During this emergency period, for some insurers they may be billed the same as an in-person visit.  Please reach out to your insurance provider with any questions.    If during the course of the call the physician/provider feels a telephone visit is not appropriate, you will not be charged for this service.\"    Patient has given verbal consent for Telephone visit?  Yes    Kobe Pedroza complains of    Chief Complaint   Patient presents with     Telephone     Burkitt Lymphoma   Pt has headaches.  Ritu Howard MA      I have reviewed and updated the patient's Past Medical History, Social History, Family History and Medication List.    ALLERGIES  Patient has no known allergies.     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 " "He was transferred to the Malignant Hematology team on 3/17 for further work-up and management of his newly diagnosed lymphoma.Per patient, no B-symptoms, though he did recently intentionally lose 35 lbs. 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 Patient had no appreciable neurological deficits. He was started on dexamethasone and underwent T5-6 laminectomy with gross total resection of epidural tumor on 3/15. Flow cytometry of the specimen was notable for CD10 positive and kappa monotypic B cells (83%). Surgical pathology and cytogenetics confirming Burkitts.      C1D1 R-CODOX-M/IVAC 3/18/2020     INTERVAL HISTORY: I talked to Jonnathan and his wife Mckayla over the phone today.  Overall he is feeling pretty well and getting stronger daily. No nausea/vomiting.  Eating pretty well, pushed his fluids to 64 oz daily.  Water, gatorade, OJ, etc. No dizziness or lightheadedness.  Was feeling puffy from all the fluids initially but weight is low 203 range and stable. No more MADHAVI. His back feels \"tight\" but not painful.  He hasn't been doing too much exercise but did try and walk outside yesterday and today and overall things are feeling better and better.  No radicular pain.  No rash.  Mood OK.  Sleeping OK. Occasional mild headache resolves with Tylenol.     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  He has not been going to work, but is concerned given he is the sole income for he and his wife.  I had Kaylee our SW call him and he felt this was helpful.      Current Outpatient Medications   Medication Sig Dispense Refill     acyclovir (ZOVIRAX) 200 MG capsule Take 2 capsules (400 mg) by mouth 2 times daily 60 capsule 0     fluconazole (DIFLUCAN) 200 MG tablet ONLY TAKE IF ANC <1000 30 tablet 0     levofloxacin (LEVAQUIN) 250 MG tablet ONLY TAKE IF ANC <1000 30 tablet 0     levothyroxine (SYNTHROID/LEVOTHROID) 200 MCG tablet Take 200 mcg by mouth daily "       senna-docusate (SENOKOT-S/PERICOLACE) 8.6-50 MG tablet Take 1 tablet by mouth 2 times daily 60 tablet 0     pentamidine (NEBUPENT) 300 MG neb solution Inhale 300 mg into the lungs every 28 days               3/31/2020 12:28 4/3/2020 00:00 4/6/2020 00:00   WBC 1.2 (L) 7.0 21.0 (A)   Hemoglobin 10.7 (L) 11.1 (A) 10.2 (A)   Hematocrit 31.7 (L) 32.9 (A) 30.5 (A)   Platelet Count 170 126 (A) 210 (A)   RBC Count 3.59 (L) 3.72 (A) 3.38 (A)   MCV 88 88 90   MCH 29.8 30 30   MCHC 33.8 34 33   RDW 12.6     Diff Method Automated Method     % Neutrophils 59.2 59.1 50.1   % Lymphocytes 33.0 6.9 (A) 4.6 (A)   % Monocytes 4.3 16 (A) 20.6 (A)   % Eosinophils 0.0 0 0   % Basophils 0.9  0.4   Immature Granulocytes  17 (A)    % Immature Granulocytes 2.6  24.3 (A)   Nucleated RBCs 0 0.06 (A) 0.16 (A)   Nucleated RBC/100 WBC  0.9 (A) 0.8 (A)   Absolute Neutrophil 0.7 (L)     Abs Neutrophils  4.13 10.53 (A)      3/31/2020 12:28 4/6/2020 00:00   Sodium 138 141   Potassium 4.0 4.1   Chloride 104 103   Carbon Dioxide 27    Urea Nitrogen 22 27 (A)   Creatinine 0.87 1.23 (A)   GFR Estimate >90 >60   GFR Estimate If Black >90    Calcium 7.7 (L) 8.2 (A)   Anion Gap 6    Albumin 3.2 (L) 3.4   Protein Total 6.3 (L) 6.8   Bilirubin Total 0.2 0.2   Alkaline Phosphatase 62 95   ALT 41 49   AST 15 25   Globulin  3.4       ASSESSMENT/PLAN:  60 year old with newly diagnosed Burkitt's lymphoma, presented with spinal thoracic mass, s/p gross resection.          HEME/ONC  # Burkitt's lymphoma, initial presentation with a thoracic mass.    Patient presented on 3/12 to [facility] with acute-on-chronic mid-thoracic back pain with some associated radicular symptoms. Per patient, no B-symptoms, though he did recently intentionally lose 35 lbs. 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 Patient had no appreciable neurological deficits. He was started on dexamethasone and underwent  T5-6 laminectomy with gross total resection of epidural tumor on 3/15. Flow cytometry of the specimen was notable for CD10 positive and kappa monotypic B cells (83%). Confirmed Burkitt lymphoma with surgical pathology. Cytogenetics pending. PET scan showed extensive visceral and bony disease. Bone marrow biopsy completed on 3/18 showed suspicious invovlement. No disease in CSF.  Started R-CODOX-M/IVAC (D1=3/18/2020).  Had Neulasta on 3/31.  Doing well today, counts are recovered. We reviewed the IVAC portion of this next cycle, 5 days of chemo and LP with IT chemo.   --4/7 PICC and admission    Follow up (daya to set this up!):   4/15- labs/Neulasta  4/17- local labs    4/18- possible transfusions [facility]  4/20- local labs  4/21- possible transfusions [facility] and Daya televisit  4/22- local labs with 4/23 possible transfusions at [facility]  4/27- PET/CT and 4/29 Dr Wright with admission for C2     # ID Prophylaxis  - Viral serologies: HepC nonreactive, HepB negative and HIV negative  - If ANC <1.0 fluconazole 200mg, levaquin 250 mg  - ppx ACV   - Pentamidine given 3/21, will need q28 days     # Thyroid cancer status-post thyroidectomy  Status post thyroidectomy in 2011. TSH normal on admission.  - Continue PTA levothyroxine     # Thoracic back pain - resolving  Due to T5-6 extradural tumor as detailed above. Patient now status post T5-6 laminectomy with gross total resction on 3/15.  Recently saw NSG.  Encouraged walking.  Not taking any analgesics, OK for Tylenol. Overall doing extremely well.             Phone call duration: 40 minutes     NEDA Stinson PA-C

## 2020-04-07 NOTE — PROGRESS NOTES
"Kobe Pedroza is a 60 year old male who is being evaluated via a billable telephone visit.      The patient has been notified of following:     \"This telephone visit will be conducted via a call between you and your physician/provider. We have found that certain health care needs can be provided without the need for a physical exam.  This service lets us provide the care you need with a short phone conversation.  If a prescription is necessary we can send it directly to your pharmacy.  If lab work is needed we can place an order for that and you can then stop by our lab to have the test done at a later time.    Telephone visits are billed at different rates depending on your insurance coverage. During this emergency period, for some insurers they may be billed the same as an in-person visit.  Please reach out to your insurance provider with any questions.    If during the course of the call the physician/provider feels a telephone visit is not appropriate, you will not be charged for this service.\"    Patient has given verbal consent for Telephone visit?  Yes    Kobe Pedroza complains of    Chief Complaint   Patient presents with     Telephone     Burkitt Lymphoma   Pt has headaches.  Ritu Howard MA      I have reviewed and updated the patient's Past Medical History, Social History, Family History and Medication List.    ALLERGIES  Patient has no known allergies.     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 He was transferred to the Malignant Hematology team on 3/17 for further work-up and " "management of his newly diagnosed lymphoma.Per patient, no B-symptoms, though he did recently intentionally lose 35 lbs. 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 Patient had no appreciable neurological deficits. He was started on dexamethasone and underwent T5-6 laminectomy with gross total resection of epidural tumor on 3/15. Flow cytometry of the specimen was notable for CD10 positive and kappa monotypic B cells (83%). Surgical pathology and cytogenetics confirming Burkitts.      C1D1 R-CODOX-M/IVAC 3/18/2020     INTERVAL HISTORY: I talked to Jonnathan and his wife Mckayla over the phone today.  Overall he is feeling pretty well and getting stronger daily. No nausea/vomiting.  Eating pretty well, pushed his fluids to 64 oz daily.  Water, gatorade, OJ, etc. No dizziness or lightheadedness.  Was feeling puffy from all the fluids initially but weight is low 203 range and stable. No more MADHAVI. His back feels \"tight\" but not painful.  He hasn't been doing too much exercise but did try and walk outside yesterday and today and overall things are feeling better and better.  No radicular pain.  No rash.  Mood OK.  Sleeping OK. Occasional mild headache resolves with Tylenol.     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  He has not been going to work, but is concerned given he is the sole income for he and his wife.  I had Kaylee our SW call him and he felt this was helpful.      Current Outpatient Medications   Medication Sig Dispense Refill     acyclovir (ZOVIRAX) 200 MG capsule Take 2 capsules (400 mg) by mouth 2 times daily 60 capsule 0     fluconazole (DIFLUCAN) 200 MG tablet ONLY TAKE IF ANC <1000 30 tablet 0     levofloxacin (LEVAQUIN) 250 MG tablet ONLY TAKE IF ANC <1000 30 tablet 0     levothyroxine (SYNTHROID/LEVOTHROID) 200 MCG tablet Take 200 mcg by mouth daily       senna-docusate (SENOKOT-S/PERICOLACE) 8.6-50 MG tablet Take 1 tablet by mouth " 2 times daily 60 tablet 0     pentamidine (NEBUPENT) 300 MG neb solution Inhale 300 mg into the lungs every 28 days               3/31/2020 12:28 4/3/2020 00:00 4/6/2020 00:00   WBC 1.2 (L) 7.0 21.0 (A)   Hemoglobin 10.7 (L) 11.1 (A) 10.2 (A)   Hematocrit 31.7 (L) 32.9 (A) 30.5 (A)   Platelet Count 170 126 (A) 210 (A)   RBC Count 3.59 (L) 3.72 (A) 3.38 (A)   MCV 88 88 90   MCH 29.8 30 30   MCHC 33.8 34 33   RDW 12.6     Diff Method Automated Method     % Neutrophils 59.2 59.1 50.1   % Lymphocytes 33.0 6.9 (A) 4.6 (A)   % Monocytes 4.3 16 (A) 20.6 (A)   % Eosinophils 0.0 0 0   % Basophils 0.9  0.4   Immature Granulocytes  17 (A)    % Immature Granulocytes 2.6  24.3 (A)   Nucleated RBCs 0 0.06 (A) 0.16 (A)   Nucleated RBC/100 WBC  0.9 (A) 0.8 (A)   Absolute Neutrophil 0.7 (L)     Abs Neutrophils  4.13 10.53 (A)      3/31/2020 12:28 4/6/2020 00:00   Sodium 138 141   Potassium 4.0 4.1   Chloride 104 103   Carbon Dioxide 27    Urea Nitrogen 22 27 (A)   Creatinine 0.87 1.23 (A)   GFR Estimate >90 >60   GFR Estimate If Black >90    Calcium 7.7 (L) 8.2 (A)   Anion Gap 6    Albumin 3.2 (L) 3.4   Protein Total 6.3 (L) 6.8   Bilirubin Total 0.2 0.2   Alkaline Phosphatase 62 95   ALT 41 49   AST 15 25   Globulin  3.4       ASSESSMENT/PLAN:  60 year old with newly diagnosed Burkitt's lymphoma, presented with spinal thoracic mass, s/p gross resection.          HEME/ONC  # Burkitt's lymphoma, initial presentation with a thoracic mass.    Patient presented on 3/12 to [facility] with acute-on-chronic mid-thoracic back pain with some associated radicular symptoms. Per patient, no B-symptoms, though he did recently intentionally lose 35 lbs. 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 Patient had no appreciable neurological deficits. He was started on dexamethasone and underwent T5-6 laminectomy with gross total resection of epidural tumor on 3/15. Flow  cytometry of the specimen was notable for CD10 positive and kappa monotypic B cells (83%). Confirmed Burkitt lymphoma with surgical pathology. Cytogenetics pending. PET scan showed extensive visceral and bony disease. Bone marrow biopsy completed on 3/18 showed suspicious invovlement. No disease in CSF.  Started R-CODOX-M/IVAC (D1=3/18/2020).  Had Neulasta on 3/31.  Doing well today, counts are recovered. We reviewed the IVAC portion of this next cycle, 5 days of chemo and LP with IT chemo.   --4/7 PICC and admission    Follow up (daya to set this up!):   4/15- labs/Neulasta  4/17- local labs    4/18- possible transfusions [facility]  4/20- local labs  4/21- possible transfusions [facility] and [facility] televisit  4/22- local labs with 4/23 possible transfusions at [facility]  4/27- PET/CT and 4/29 Dr Wright with admission for C2     # ID Prophylaxis  - Viral serologies: HepC nonreactive, HepB negative and HIV negative  - If ANC <1.0 fluconazole 200mg, levaquin 250 mg  - ppx ACV   - Pentamidine given 3/21, will need q28 days     # Thyroid cancer status-post thyroidectomy  Status post thyroidectomy in 2011. TSH normal on admission.  - Continue PTA levothyroxine     # Thoracic back pain - resolving  Due to T5-6 extradural tumor as detailed above. Patient now status post T5-6 laminectomy with gross total resction on 3/15.  Recently saw NSG.  Encouraged walking.  Not taking any analgesics, OK for Tylenol. Overall doing extremely well.             Phone call duration: 40 minutes     Lyubov Sin PA-C

## 2020-04-08 ENCOUNTER — HOSPITAL ENCOUNTER (INPATIENT)
Facility: CLINIC | Age: 60
LOS: 5 days | Discharge: HOME | DRG: 847 | End: 2020-04-13
Attending: INTERNAL MEDICINE | Admitting: INTERNAL MEDICINE
Payer: COMMERCIAL

## 2020-04-08 ENCOUNTER — HOSPITAL ENCOUNTER (OUTPATIENT)
Dept: VASCULAR ULTRASOUND | Facility: CLINIC | Age: 60
DRG: 847 | End: 2020-04-08
Attending: PHYSICIAN ASSISTANT
Payer: COMMERCIAL

## 2020-04-08 ENCOUNTER — HOSPITAL ENCOUNTER (OUTPATIENT)
Dept: GENERAL RADIOLOGY | Facility: CLINIC | Age: 60
End: 2020-04-08
Attending: PHYSICIAN ASSISTANT
Payer: COMMERCIAL

## 2020-04-08 DIAGNOSIS — C83.78 BURKITT LYMPHOMA OF LYMPH NODES OF MULTIPLE REGIONS (H): Primary | ICD-10-CM

## 2020-04-08 DIAGNOSIS — C83.78 BURKITT LYMPHOMA OF LYMPH NODES OF MULTIPLE REGIONS (H): ICD-10-CM

## 2020-04-08 PROBLEM — C83.70 BURKITT LYMPHOMA (H): Status: ACTIVE | Noted: 2020-04-08

## 2020-04-08 LAB
ABO + RH BLD: NORMAL
ABO + RH BLD: NORMAL
ALBUMIN SERPL-MCNC: 3.4 G/DL (ref 3.4–5)
ALP SERPL-CCNC: 104 U/L (ref 40–150)
ALT SERPL W P-5'-P-CCNC: 43 U/L (ref 0–70)
ANION GAP SERPL CALCULATED.3IONS-SCNC: 8 MMOL/L (ref 3–14)
ANISOCYTOSIS BLD QL SMEAR: SLIGHT
APTT PPP: 30 SEC (ref 22–37)
AST SERPL W P-5'-P-CCNC: 21 U/L (ref 0–45)
BASOPHILS # BLD AUTO: 0.2 10E9/L (ref 0–0.2)
BASOPHILS NFR BLD AUTO: 0.8 %
BILIRUB SERPL-MCNC: 0.2 MG/DL (ref 0.2–1.3)
BLD GP AB SCN SERPL QL: NORMAL
BLOOD BANK CMNT PATIENT-IMP: NORMAL
BUN SERPL-MCNC: 21 MG/DL (ref 7–30)
CALCIUM SERPL-MCNC: 8.3 MG/DL (ref 8.5–10.1)
CHLORIDE SERPL-SCNC: 106 MMOL/L (ref 94–109)
CO2 SERPL-SCNC: 26 MMOL/L (ref 20–32)
CREAT SERPL-MCNC: 0.88 MG/DL (ref 0.66–1.25)
DIFFERENTIAL METHOD BLD: ABNORMAL
EOSINOPHIL # BLD AUTO: 0 10E9/L (ref 0–0.7)
EOSINOPHIL NFR BLD AUTO: 0 %
ERYTHROCYTE [DISTWIDTH] IN BLOOD BY AUTOMATED COUNT: 15 % (ref 10–15)
FIBRINOGEN PPP-MCNC: 401 MG/DL (ref 200–420)
GFR SERPL CREATININE-BSD FRML MDRD: >90 ML/MIN/{1.73_M2}
GLUCOSE SERPL-MCNC: 86 MG/DL (ref 70–99)
HCT VFR BLD AUTO: 29.9 % (ref 40–53)
HGB BLD-MCNC: 9.8 G/DL (ref 13.3–17.7)
INR PPP: 1.07 (ref 0.86–1.14)
LACTATE BLD-SCNC: 2.6 MMOL/L (ref 0.7–2)
LDH SERPL L TO P-CCNC: 377 U/L (ref 85–227)
LDH SERPL L TO P-CCNC: 508 U/L (ref 85–227)
LYMPHOCYTES # BLD AUTO: 1 10E9/L (ref 0.8–5.3)
LYMPHOCYTES NFR BLD AUTO: 3.4 %
MAGNESIUM SERPL-MCNC: 2.3 MG/DL (ref 1.6–2.3)
MCH RBC QN AUTO: 30.3 PG (ref 26.5–33)
MCHC RBC AUTO-ENTMCNC: 32.8 G/DL (ref 31.5–36.5)
MCV RBC AUTO: 93 FL (ref 78–100)
METAMYELOCYTES # BLD: 1.5 10E9/L
METAMYELOCYTES NFR BLD MANUAL: 5.1 %
MONOCYTES # BLD AUTO: 3.7 10E9/L (ref 0–1.3)
MONOCYTES NFR BLD AUTO: 12.7 %
MYELOCYTES # BLD: 2.2 10E9/L
MYELOCYTES NFR BLD MANUAL: 7.6 %
NEUTROPHILS # BLD AUTO: 20.6 10E9/L (ref 1.6–8.3)
NEUTROPHILS NFR BLD AUTO: 70.4 %
PHOSPHATE SERPL-MCNC: 4 MG/DL (ref 2.5–4.5)
PLATELET # BLD AUTO: 244 10E9/L (ref 150–450)
PLATELET # BLD EST: ABNORMAL 10*3/UL
POLYCHROMASIA BLD QL SMEAR: SLIGHT
POTASSIUM SERPL-SCNC: 3.7 MMOL/L (ref 3.4–5.3)
POTASSIUM SERPL-SCNC: 3.9 MMOL/L (ref 3.4–5.3)
PROT SERPL-MCNC: 6.7 G/DL (ref 6.8–8.8)
RBC # BLD AUTO: 3.23 10E12/L (ref 4.4–5.9)
SODIUM SERPL-SCNC: 140 MMOL/L (ref 133–144)
SPECIMEN EXP DATE BLD: NORMAL
URATE SERPL-MCNC: 4.2 MG/DL (ref 3.5–7.2)
URATE SERPL-MCNC: 5.6 MG/DL (ref 3.5–7.2)
WBC # BLD AUTO: 29.3 10E9/L (ref 4–11)

## 2020-04-08 PROCEDURE — 25000132 ZZH RX MED GY IP 250 OP 250 PS 637: Performed by: PHYSICIAN ASSISTANT

## 2020-04-08 PROCEDURE — 25000131 ZZH RX MED GY IP 250 OP 636 PS 637: Performed by: PHYSICIAN ASSISTANT

## 2020-04-08 PROCEDURE — 83605 ASSAY OF LACTIC ACID: CPT | Performed by: INTERNAL MEDICINE

## 2020-04-08 PROCEDURE — 83735 ASSAY OF MAGNESIUM: CPT | Performed by: PHYSICIAN ASSISTANT

## 2020-04-08 PROCEDURE — 86900 BLOOD TYPING SEROLOGIC ABO: CPT | Performed by: PHYSICIAN ASSISTANT

## 2020-04-08 PROCEDURE — 85384 FIBRINOGEN ACTIVITY: CPT | Performed by: INTERNAL MEDICINE

## 2020-04-08 PROCEDURE — 25800030 ZZH RX IP 258 OP 636: Performed by: PHYSICIAN ASSISTANT

## 2020-04-08 PROCEDURE — 25000128 H RX IP 250 OP 636: Performed by: PHYSICIAN ASSISTANT

## 2020-04-08 PROCEDURE — 40000986 XR CHEST 1 VW

## 2020-04-08 PROCEDURE — 80053 COMPREHEN METABOLIC PANEL: CPT | Performed by: PHYSICIAN ASSISTANT

## 2020-04-08 PROCEDURE — 84132 ASSAY OF SERUM POTASSIUM: CPT | Performed by: INTERNAL MEDICINE

## 2020-04-08 PROCEDURE — 84550 ASSAY OF BLOOD/URIC ACID: CPT | Performed by: INTERNAL MEDICINE

## 2020-04-08 PROCEDURE — 86850 RBC ANTIBODY SCREEN: CPT | Performed by: PHYSICIAN ASSISTANT

## 2020-04-08 PROCEDURE — 84550 ASSAY OF BLOOD/URIC ACID: CPT | Performed by: PHYSICIAN ASSISTANT

## 2020-04-08 PROCEDURE — C1751 CATH, INF, PER/CENT/MIDLINE: HCPCS

## 2020-04-08 PROCEDURE — 3E0430M INTRODUCTION OF MONOCLONAL ANTIBODY INTO CENTRAL VEIN, PERCUTANEOUS APPROACH: ICD-10-PCS | Performed by: INTERNAL MEDICINE

## 2020-04-08 PROCEDURE — 3E04305 INTRODUCTION OF OTHER ANTINEOPLASTIC INTO CENTRAL VEIN, PERCUTANEOUS APPROACH: ICD-10-PCS | Performed by: INTERNAL MEDICINE

## 2020-04-08 PROCEDURE — 12000012 ZZH R&B MS OVERFLOW UMMC

## 2020-04-08 PROCEDURE — 83615 LACTATE (LD) (LDH) ENZYME: CPT | Performed by: INTERNAL MEDICINE

## 2020-04-08 PROCEDURE — 27211417 ZZ H KIT, VPS RHYTHM STYLET

## 2020-04-08 PROCEDURE — 86901 BLOOD TYPING SEROLOGIC RH(D): CPT | Performed by: PHYSICIAN ASSISTANT

## 2020-04-08 PROCEDURE — 85730 THROMBOPLASTIN TIME PARTIAL: CPT | Performed by: INTERNAL MEDICINE

## 2020-04-08 PROCEDURE — 85025 COMPLETE CBC W/AUTO DIFF WBC: CPT | Performed by: PHYSICIAN ASSISTANT

## 2020-04-08 PROCEDURE — 25000125 ZZHC RX 250: Performed by: PHYSICIAN ASSISTANT

## 2020-04-08 PROCEDURE — 83615 LACTATE (LD) (LDH) ENZYME: CPT | Performed by: PHYSICIAN ASSISTANT

## 2020-04-08 PROCEDURE — 84100 ASSAY OF PHOSPHORUS: CPT | Performed by: PHYSICIAN ASSISTANT

## 2020-04-08 PROCEDURE — 36569 INSJ PICC 5 YR+ W/O IMAGING: CPT

## 2020-04-08 PROCEDURE — 85610 PROTHROMBIN TIME: CPT | Performed by: INTERNAL MEDICINE

## 2020-04-08 RX ORDER — ACYCLOVIR 200 MG/1
400 CAPSULE ORAL 2 TIMES DAILY
Status: DISCONTINUED | OUTPATIENT
Start: 2020-04-08 | End: 2020-04-13 | Stop reason: HOSPADM

## 2020-04-08 RX ORDER — ALLOPURINOL 300 MG/1
300 TABLET ORAL DAILY
Status: DISCONTINUED | OUTPATIENT
Start: 2020-04-08 | End: 2020-04-08

## 2020-04-08 RX ORDER — ALBUTEROL SULFATE 0.83 MG/ML
2.5 SOLUTION RESPIRATORY (INHALATION)
Status: CANCELLED | OUTPATIENT
Start: 2020-04-08

## 2020-04-08 RX ORDER — METHYLPREDNISOLONE SODIUM SUCCINATE 125 MG/2ML
125 INJECTION, POWDER, LYOPHILIZED, FOR SOLUTION INTRAMUSCULAR; INTRAVENOUS
Status: DISCONTINUED | OUTPATIENT
Start: 2020-04-08 | End: 2020-04-13 | Stop reason: HOSPADM

## 2020-04-08 RX ORDER — SODIUM CHLORIDE 9 MG/ML
1000 INJECTION, SOLUTION INTRAVENOUS CONTINUOUS PRN
Status: CANCELLED
Start: 2020-04-08

## 2020-04-08 RX ORDER — PREDNISOLONE ACETATE 10 MG/ML
2 SUSPENSION/ DROPS OPHTHALMIC 4 TIMES DAILY
Status: CANCELLED | OUTPATIENT
Start: 2020-04-08

## 2020-04-08 RX ORDER — ALBUTEROL SULFATE 0.83 MG/ML
2.5 SOLUTION RESPIRATORY (INHALATION)
Status: DISCONTINUED | OUTPATIENT
Start: 2020-04-08 | End: 2020-04-13 | Stop reason: HOSPADM

## 2020-04-08 RX ORDER — HEPARIN SODIUM,PORCINE 10 UNIT/ML
2-5 VIAL (ML) INTRAVENOUS
Status: DISCONTINUED | OUTPATIENT
Start: 2020-04-08 | End: 2020-04-09 | Stop reason: HOSPADM

## 2020-04-08 RX ORDER — NALOXONE HYDROCHLORIDE 0.4 MG/ML
.1-.4 INJECTION, SOLUTION INTRAMUSCULAR; INTRAVENOUS; SUBCUTANEOUS
Status: CANCELLED | OUTPATIENT
Start: 2020-04-08

## 2020-04-08 RX ORDER — ACETAMINOPHEN 325 MG/1
650 TABLET ORAL ONCE
Status: CANCELLED
Start: 2020-04-08

## 2020-04-08 RX ORDER — DIPHENHYDRAMINE HCL 25 MG
50 CAPSULE ORAL ONCE
Status: CANCELLED
Start: 2020-04-08

## 2020-04-08 RX ORDER — LORAZEPAM 0.5 MG/1
.5-1 TABLET ORAL EVERY 6 HOURS PRN
Status: CANCELLED
Start: 2020-04-08

## 2020-04-08 RX ORDER — ACETAMINOPHEN 325 MG/1
650 TABLET ORAL ONCE
Status: COMPLETED | OUTPATIENT
Start: 2020-04-08 | End: 2020-04-08

## 2020-04-08 RX ORDER — METHYLPREDNISOLONE SODIUM SUCCINATE 125 MG/2ML
125 INJECTION, POWDER, LYOPHILIZED, FOR SOLUTION INTRAMUSCULAR; INTRAVENOUS
Status: CANCELLED
Start: 2020-04-08

## 2020-04-08 RX ORDER — ALBUTEROL SULFATE 90 UG/1
1-2 AEROSOL, METERED RESPIRATORY (INHALATION)
Status: CANCELLED
Start: 2020-04-08

## 2020-04-08 RX ORDER — EPINEPHRINE 1 MG/ML
0.3 INJECTION, SOLUTION, CONCENTRATE INTRAVENOUS EVERY 5 MIN PRN
Status: DISCONTINUED | OUTPATIENT
Start: 2020-04-08 | End: 2020-04-13 | Stop reason: HOSPADM

## 2020-04-08 RX ORDER — MEPERIDINE HYDROCHLORIDE 25 MG/ML
25 INJECTION INTRAMUSCULAR; INTRAVENOUS; SUBCUTANEOUS EVERY 30 MIN PRN
Status: DISCONTINUED | OUTPATIENT
Start: 2020-04-08 | End: 2020-04-13 | Stop reason: HOSPADM

## 2020-04-08 RX ORDER — BUTALBITAL, ACETAMINOPHEN AND CAFFEINE 50; 325; 40 MG/1; MG/1; MG/1
1 TABLET ORAL EVERY 4 HOURS PRN
Status: DISCONTINUED | OUTPATIENT
Start: 2020-04-08 | End: 2020-04-13 | Stop reason: HOSPADM

## 2020-04-08 RX ORDER — ONDANSETRON 2 MG/ML
4 INJECTION INTRAMUSCULAR; INTRAVENOUS EVERY 6 HOURS PRN
Status: DISCONTINUED | OUTPATIENT
Start: 2020-04-08 | End: 2020-04-13 | Stop reason: HOSPADM

## 2020-04-08 RX ORDER — FAMOTIDINE 10 MG
10 TABLET ORAL 2 TIMES DAILY
Status: DISCONTINUED | OUTPATIENT
Start: 2020-04-08 | End: 2020-04-13 | Stop reason: HOSPADM

## 2020-04-08 RX ORDER — ALLOPURINOL 300 MG/1
300 TABLET ORAL DAILY
Status: DISCONTINUED | OUTPATIENT
Start: 2020-04-08 | End: 2020-04-13 | Stop reason: HOSPADM

## 2020-04-08 RX ORDER — DEXAMETHASONE 4 MG/1
8 TABLET ORAL DAILY
Status: CANCELLED | OUTPATIENT
Start: 2020-04-13

## 2020-04-08 RX ORDER — NALOXONE HYDROCHLORIDE 0.4 MG/ML
.1-.4 INJECTION, SOLUTION INTRAMUSCULAR; INTRAVENOUS; SUBCUTANEOUS
Status: DISCONTINUED | OUTPATIENT
Start: 2020-04-08 | End: 2020-04-13 | Stop reason: HOSPADM

## 2020-04-08 RX ORDER — PROCHLORPERAZINE MALEATE 10 MG
10 TABLET ORAL EVERY 6 HOURS PRN
Status: CANCELLED
Start: 2020-04-08

## 2020-04-08 RX ORDER — AMOXICILLIN 250 MG
1 CAPSULE ORAL 2 TIMES DAILY
Status: DISCONTINUED | OUTPATIENT
Start: 2020-04-08 | End: 2020-04-11

## 2020-04-08 RX ORDER — LORAZEPAM 2 MG/ML
.5-1 INJECTION INTRAMUSCULAR EVERY 6 HOURS PRN
Status: CANCELLED | OUTPATIENT
Start: 2020-04-08

## 2020-04-08 RX ORDER — DEXAMETHASONE 4 MG/1
12 TABLET ORAL EVERY 24 HOURS
Status: CANCELLED | OUTPATIENT
Start: 2020-04-08

## 2020-04-08 RX ORDER — EPINEPHRINE 1 MG/ML
0.3 INJECTION, SOLUTION INTRAMUSCULAR; SUBCUTANEOUS EVERY 5 MIN PRN
Status: CANCELLED | OUTPATIENT
Start: 2020-04-08

## 2020-04-08 RX ORDER — ONDANSETRON 8 MG/1
8 TABLET, FILM COATED ORAL EVERY 12 HOURS
Status: CANCELLED | OUTPATIENT
Start: 2020-04-08

## 2020-04-08 RX ORDER — SODIUM CHLORIDE 9 MG/ML
1000 INJECTION, SOLUTION INTRAVENOUS CONTINUOUS PRN
Status: DISCONTINUED | OUTPATIENT
Start: 2020-04-08 | End: 2020-04-13 | Stop reason: HOSPADM

## 2020-04-08 RX ORDER — LORAZEPAM 2 MG/ML
.5-1 INJECTION INTRAMUSCULAR EVERY 6 HOURS PRN
Status: DISCONTINUED | OUTPATIENT
Start: 2020-04-08 | End: 2020-04-13 | Stop reason: HOSPADM

## 2020-04-08 RX ORDER — LORAZEPAM 0.5 MG/1
.5-1 TABLET ORAL EVERY 6 HOURS PRN
Status: DISCONTINUED | OUTPATIENT
Start: 2020-04-08 | End: 2020-04-13 | Stop reason: HOSPADM

## 2020-04-08 RX ORDER — DIPHENHYDRAMINE HCL 50 MG
50 CAPSULE ORAL ONCE
Status: COMPLETED | OUTPATIENT
Start: 2020-04-08 | End: 2020-04-08

## 2020-04-08 RX ORDER — ALBUTEROL SULFATE 90 UG/1
1-2 AEROSOL, METERED RESPIRATORY (INHALATION)
Status: DISCONTINUED | OUTPATIENT
Start: 2020-04-08 | End: 2020-04-13 | Stop reason: HOSPADM

## 2020-04-08 RX ORDER — DIPHENHYDRAMINE HYDROCHLORIDE 50 MG/ML
50 INJECTION INTRAMUSCULAR; INTRAVENOUS
Status: DISCONTINUED | OUTPATIENT
Start: 2020-04-08 | End: 2020-04-13 | Stop reason: HOSPADM

## 2020-04-08 RX ORDER — DEXAMETHASONE 4 MG/1
12 TABLET ORAL EVERY 24 HOURS
Status: COMPLETED | OUTPATIENT
Start: 2020-04-08 | End: 2020-04-12

## 2020-04-08 RX ORDER — MEPERIDINE HYDROCHLORIDE 25 MG/ML
25 INJECTION INTRAMUSCULAR; INTRAVENOUS; SUBCUTANEOUS EVERY 30 MIN PRN
Status: CANCELLED | OUTPATIENT
Start: 2020-04-08

## 2020-04-08 RX ORDER — LIDOCAINE 40 MG/G
CREAM TOPICAL
Status: DISCONTINUED | OUTPATIENT
Start: 2020-04-08 | End: 2020-04-09 | Stop reason: HOSPADM

## 2020-04-08 RX ORDER — LEVOFLOXACIN 250 MG/1
250 TABLET, FILM COATED ORAL DAILY
Status: DISCONTINUED | OUTPATIENT
Start: 2020-04-08 | End: 2020-04-08

## 2020-04-08 RX ORDER — ACETAMINOPHEN 325 MG/1
650 TABLET ORAL EVERY 4 HOURS PRN
Status: DISCONTINUED | OUTPATIENT
Start: 2020-04-08 | End: 2020-04-13 | Stop reason: HOSPADM

## 2020-04-08 RX ORDER — ONDANSETRON 8 MG/1
8 TABLET, FILM COATED ORAL EVERY 12 HOURS
Status: DISCONTINUED | OUTPATIENT
Start: 2020-04-08 | End: 2020-04-11

## 2020-04-08 RX ORDER — POTASSIUM CHLORIDE 1.5 G/1.58G
40 POWDER, FOR SOLUTION ORAL DAILY
COMMUNITY
End: 2020-04-14

## 2020-04-08 RX ORDER — FLUCONAZOLE 200 MG/1
200 TABLET ORAL DAILY
Status: DISCONTINUED | OUTPATIENT
Start: 2020-04-08 | End: 2020-04-08

## 2020-04-08 RX ORDER — DEXAMETHASONE 4 MG/1
8 TABLET ORAL DAILY
Status: DISCONTINUED | OUTPATIENT
Start: 2020-04-13 | End: 2020-04-13 | Stop reason: HOSPADM

## 2020-04-08 RX ORDER — DIPHENHYDRAMINE HYDROCHLORIDE 50 MG/ML
50 INJECTION INTRAMUSCULAR; INTRAVENOUS
Status: CANCELLED
Start: 2020-04-08

## 2020-04-08 RX ORDER — PROCHLORPERAZINE MALEATE 5 MG
10 TABLET ORAL EVERY 6 HOURS PRN
Status: DISCONTINUED | OUTPATIENT
Start: 2020-04-08 | End: 2020-04-13 | Stop reason: HOSPADM

## 2020-04-08 RX ORDER — PREDNISOLONE ACETATE 10 MG/ML
2 SUSPENSION/ DROPS OPHTHALMIC 4 TIMES DAILY
Status: DISPENSED | OUTPATIENT
Start: 2020-04-08 | End: 2020-04-12

## 2020-04-08 RX ORDER — ALLOPURINOL 300 MG/1
300 TABLET ORAL DAILY
Status: CANCELLED | OUTPATIENT
Start: 2020-04-08

## 2020-04-08 RX ADMIN — ACETAMINOPHEN 650 MG: 325 TABLET, FILM COATED ORAL at 15:51

## 2020-04-08 RX ADMIN — ENOXAPARIN SODIUM 40 MG: 40 INJECTION SUBCUTANEOUS at 14:22

## 2020-04-08 RX ADMIN — ONDANSETRON HYDROCHLORIDE 8 MG: 8 TABLET, FILM COATED ORAL at 17:23

## 2020-04-08 RX ADMIN — FAMOTIDINE 10 MG: 10 TABLET, FILM COATED ORAL at 19:27

## 2020-04-08 RX ADMIN — IFOSFAMIDE 3195 MG: 1 INJECTION, SOLUTION INTRAVENOUS at 22:30

## 2020-04-08 RX ADMIN — PREDNISOLONE ACETATE 2 DROP: 10 SUSPENSION/ DROPS OPHTHALMIC at 17:23

## 2020-04-08 RX ADMIN — DIPHENHYDRAMINE HYDROCHLORIDE 50 MG: 50 CAPSULE ORAL at 15:50

## 2020-04-08 RX ADMIN — ACYCLOVIR 400 MG: 200 CAPSULE ORAL at 19:28

## 2020-04-08 RX ADMIN — DEXAMETHASONE 12 MG: 4 TABLET ORAL at 17:23

## 2020-04-08 RX ADMIN — ETOPOSIDE 130 MG: 20 INJECTION, SOLUTION, CONCENTRATE INTRAVENOUS at 21:18

## 2020-04-08 RX ADMIN — FAMOTIDINE 10 MG: 10 TABLET, FILM COATED ORAL at 14:21

## 2020-04-08 RX ADMIN — MESNA 1070 MG: 100 INJECTION, SOLUTION INTRAVENOUS at 21:51

## 2020-04-08 RX ADMIN — ALLOPURINOL 300 MG: 300 TABLET ORAL at 14:22

## 2020-04-08 RX ADMIN — PREDNISOLONE ACETATE 2 DROP: 10 SUSPENSION/ DROPS OPHTHALMIC at 19:27

## 2020-04-08 RX ADMIN — CYTARABINE 4260 MG: 100 INJECTION, SOLUTION INTRATHECAL; INTRAVENOUS; SUBCUTANEOUS at 18:09

## 2020-04-08 RX ADMIN — SENNOSIDES AND DOCUSATE SODIUM 1 TABLET: 8.6; 5 TABLET ORAL at 19:28

## 2020-04-08 RX ADMIN — RITUXIMAB 800 MG: 10 INJECTION, SOLUTION INTRAVENOUS at 16:27

## 2020-04-08 ASSESSMENT — MIFFLIN-ST. JEOR: SCORE: 1757.5

## 2020-04-08 ASSESSMENT — ACTIVITIES OF DAILY LIVING (ADL)
ADLS_ACUITY_SCORE: 13

## 2020-04-08 NOTE — PROCEDURES
[facility] [place]    Double Lumen PICC Placement    Date/Time: 4/8/2020 10:28 AM  Performed by: Karla Capps RN  Authorized by: Lyubov Sin PA-C   Indications: vascular access    UNIVERSAL PROTOCOL   Site Marked: Yes  Prior Images Obtained and Reviewed:  Yes  Required items: Required blood products, implants, devices and special equipment available    Patient identity confirmed:  Verbally with patient and arm band  NA - No sedation, light sedation, or local anesthesia  Confirmation Checklist:  Patient's identity using two indicators, relevant allergies, procedure was appropriate and matched the consent or emergent situation and correct equipment/implants were available  Time out: Immediately prior to the procedure a time out was called    Universal Protocol: the Joint Commission Universal Protocol was followed    Preparation: Patient was prepped and draped in usual sterile fashion           ANESTHESIA    Local Anesthetic: Lidocaine 1% without epinephrine  Anesthetic Total (mL):  4.5      SEDATION    Patient Sedated: No        Preparation: skin prepped with ChloraPrep  Skin prep agent: skin prep agent completely dried prior to procedure  Sterile barriers: maximum sterile barriers were used: cap, mask, sterile gown, sterile gloves, and large sterile sheet  Hand hygiene: hand hygiene performed prior to central venous catheter insertion  Type of line used: PICC and Power PICC  Catheter type: double lumen  Lumen type: valved  Catheter size: 5 Fr  Brand: other (see comment) ([employer])  Lot number: [id number]  Placement method: venipuncture, MST, ultrasound and tip confirmation system  Number of attempts: 1  Successful placement: yes  Orientation: right  Location: basilic vein (0.57 cm vein diameter)  Arm circumference: adults 10 cm  Extremity circumference: 34  Visible catheter length: 1  Total catheter length: 38  Dressing and securement: chlorhexidine disc applied, statlock  and sterile dressing applied  Post procedure assessment: placement verified by x-ray, free fluid flow and blood return through all ports  PROCEDURE   Patient Tolerance:  Patient tolerated the procedure well with no immediate complications

## 2020-04-08 NOTE — PROGRESS NOTES
..Patient admitted to:5c  Admitted from:home  Arrived by:car  Reason for admission:chemo  Patient accompanied by:self  Belongings:in room  Teaching:admission  Skin double check completed by:Lisa huffman RN

## 2020-04-08 NOTE — LETTER
April 8, 2020      Kobe Pedroza  [address]  [facility] [address]        To Whom It May Concern:    Kobe Pedroza  was first admitted March 13th, 2020. Please excuse him from work due to illness March 13th, 2020 through July 31st, 2020      Sincerely,    Jeanine Hernandez PA-C   Hematology/Oncology   Pager: [phone]

## 2020-04-08 NOTE — H&P
[facility]    History and Physical  Hematology / Oncology     Date of Admission:  4/8/2020  Date of Service (when I saw the patient): 04/08/20    Assessment & Plan   Kobe Pedroza is a 60 year old male with a history of thyroid cancer status post thyroidectomy (2011), CAD, hyperlipidemia and newly diagnosed Burkitt's Lymphoma. Started R-CODOX-M / R-IVAC chemotherapy regimen (D1=3/18/2020). Now admitted for scheduled Cycle 2 R-IVAC.       HEME/ONC  # Burkitt's lymphoma  Followed by Dr. Wright. Presented on 3/12/ 20 to [facility] with acute-on-chronic mid-thoracic back pain with some associated radicular symptoms. Per patient, no B-symptoms, though he did recently have an intentional 35 lb wt loss. 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 Patient had no appreciable neurological deficits. He was started on dexamethasone and underwent T5-6 laminectomy with gross total resection of epidural tumor on 3/15. Flow cytometry of the specimen was notable for CD10 positive and kappa monotypic B cells (83%). Confirmed Burkitt lymphoma with surgical pathology. Flow cytometry of the specimen was notable for CD10 positive and kappa monotypic B cells (83%). Confirmed Burkitt lymphoma with surgical pathology. PET scan showed extensive visceral and bony disease. Bone marrow biopsy completed on 3/18 showed suspicious involvement with rare polytypic B cells (0.8%). No disease in CSF. Started  R-CODOX-M / R-IVAC (D1=3/18/2020), tolerated Cycle 1 very well without complications. Had Neulasta on 3/31.   - PICC line placed on admission 4/8/20.   - CBC and CMP stable on admission, counts recovered. Meeting parameters to proceed with chemotherapy.   - TLS labs Q12h, DIC labs daily. Allopurinol 300 mg daily      Treatment Plan: Cycle 2 R-/IVAC (D1=4/8/2020). Today is D1.   - Cytarabine 2g/m2 (4,260 mg) BID D1, D2   -  Etoposide 60 mg/m2 (130 mg) daily Days 1-5    - Ifosfamide 1500 mg/m2 (3,195 mg) Days 1-5    - Rituximab 375 mg/m2 (800 mg) Day 1     - Mesna 500 mg/m2 Days 1-5    - Hydrocortisone sodium soccinate 50 mg, Methotrexate 12 mg IT Day 5 --- scheduled with XR 4/13 @ 1300    - Pre Meds: Tylenol 650 mg, Benadryl 50 mg, Zofran 8 mg BID, Dex 12 mg BID Days 1-5, 8 mg daily Day 6-7    - Neulasta Day 6 -- requested 4/15 with labs outpatient at [facility]     - Supportive: Allopurinol tablet 300 mg, Pred Forte susp QID Days 1-4     # ID Prophylaxis  - Viral serologies: HepC nonreactive, HepB negative and HIV negative  - HOLD fluconazole 200mg daily and levaquin 250 mg daily while ANC >1.0   - Continue acyclovir 400 mg BID due to past exposure of VZV   - Pentamidine given 3/21      # Thoracic back pain, resolving  Due to T5-6 extradural tumor as detailed above. Patient now status post T5-6 laminectomy with gross total resction on 3/15/20.  Recently saw NSG.  Encouraged walking. Not taking any analgesics.   - PRN Tylenol       CV  # Coronary artery disease  # Hyperlipidemia  Followed by Dr. Zeng at [facility] Diagnosed with mild, non-obstructive CAD in 2018. 81 mg ASA and low-dose statin therapy recommended. No previous cardiac caths or stents.  - Hold PTA statin and ASA      ENDO  # Hx of Thyroid cancer status-post thyroidectomy  Status post thyroidectomy in 2011. TSH normal on 3/12/20.  - Continue PTA levothyroxine     FEN  - No IVF for now; encourage PO intake.   - Lyte replacement per protocol  - Regular diet as tolerated     Ppx  - GI: Pepcid (Avoid PPI while on Methotrexate)   - DVT: Lovenox 40mg subQ; Hold Lovenox if platelets <50K.     Code: FULL (confirmed on admission)      Follow up:   4/15- labs/Neulasta   4/17- local labs ([facility])    4/18- possible transfusions [facility]   4/20- local labs ([facility])    4/21- possible transfusions [facility] and [facility] televisit   4/22- local labs with 4/23  possible transfusions at [facility]   4/27- PET/CT and 4/29 Dr Wright with admission for C2    Dispo: Anticipate discharge to home after cycle 1 of CODOX-M, will likely be able to discharge once methotrexate levels clear. Plan for twice weekly labs and a follow up appointment roughly one week after discharge    Patient discussed with staff attending, Dr Valle.     Jeanine Hernandez PA-C   Hematology/Oncology   Pager: [phone]     Patient has been seen and evaluated by me. I have reviewed today's vital signs, medications, labs and imaging results. I have discussed the plan with the team and agree with the findings and plan in this Admission note.      Admitted for cycle 2 R IVAC.   No ongoing GI  or resp sx. No recent infections.  Eating well by his report and prior pain in back and leg weakness mostly gone.  Exam with healing and dry T spine incision.  Lungs clear;  Abd soft  No LE edema  ENT neg with no mucosal lesions.    Reivewed chemo plan again briefly.  Questions answered.  Orders checked and ok to proceed.  Deng Valle MD        Code Status   Full Code    Primary Care Physician   Garett Arriaga    Chief Complaint   # Burkitt's lymphoma    History is obtained from the patient and chart review     History of Present Illness   Kobe Pedroza is a 60 year old male with a history of thyroid cancer status post thyroidectomy (2011), CAD, hyperlipidemia and newly diagnosed Burkitt Lymphoma. He started R-CODOX-M/IVAC chemotherapy regimen (D1=3/18/2020). Now admitted for scheduled R-IVAC.     On admission reports reports to be feeling very well. Tolerated first cycle without issue. Appetite is good, drinking lots of fluids. Back pain nearly resolved, not taking any analgesics at home. Mild frontal headache yesterday after eating too much ice cream, since improved. No vision changes, nausea, vomiting, incontinence, weakness or paraesthesias. Taking Senna daily, regular BMs. Hardest part right now is  being away from family members, grand kids able to drive by yesterday. Overall doing well.     Past Medical History    I have reviewed this patient's medical history and updated it with pertinent information if needed.   No past medical history on file.    Past Surgical History   I have reviewed this patient's surgical history and updated it with pertinent information if needed.  Past Surgical History:   Procedure Laterality Date     LAMINECTOMY, EXCISE TUMOR THORACIC, COMBINED N/A 3/15/2020    Procedure: LAMINECTOMY, SPINE, THORACIC, 2 levels, T-5, T-6;  Surgeon: Heather Cespedes MD;  Location: [facility]       Prior to Admission Medications   Prior to Admission Medications   Prescriptions Last Dose Informant Patient Reported? Taking?   acyclovir (ZOVIRAX) 200 MG capsule   No No   Sig: Take 2 capsules (400 mg) by mouth 2 times daily   fluconazole (DIFLUCAN) 200 MG tablet   Yes No   Sig: ONLY TAKE IF ANC <1000   levofloxacin (LEVAQUIN) 250 MG tablet   Yes No   Sig: ONLY TAKE IF ANC <1000   levothyroxine (SYNTHROID/LEVOTHROID) 200 MCG tablet   Yes No   Sig: Take 200 mcg by mouth daily   pentamidine (NEBUPENT) 300 MG neb solution   No No   Sig: Inhale 300 mg into the lungs every 28 days   senna-docusate (SENOKOT-S/PERICOLACE) 8.6-50 MG tablet   No No   Sig: Take 1 tablet by mouth 2 times daily      Facility-Administered Medications: None     Allergies   No Known Allergies    Social History   I have reviewed this patient's social history and updated it with pertinent information if needed. Kobe Pedroza  reports that he has never smoked. He has never used smokeless tobacco.    Family History   I have reviewed this patient's family history and updated it with pertinent information if needed.   No family history on file.    Review of Systems   The 10 point Review of Systems is negative other than noted in the HPI or here.     Physical Exam                      Vital Signs with Ranges     0 lbs 0 oz    Constitutional:  Pleasant male seen sitting in chair. No apparent distress, and appears stated age.   Eyes: Lids and lashes normal, sclera clear, conjunctiva normal.   ENT: Normocephalic, without obvious abnormality, atraumatic, oral pharynx with moist mucus membranes, tonsils without erythema or exudates, gums normal and good dentition.   Respiratory: No increased work of breathing, good air exchange, clear to auscultation bilaterally, no crackles or wheezing.  Cardiovascular: Normal apical impulse, regular rate and rhythm, normal S1 and S2, and no murmur noted.  GI: No masses or scars. +BS. Soft. No tenderness on palpation.  Lymph/Hematologic: No cervical lymphadenopathy and no supraclavicular lymphadenopathy.  Skin: No bruising or bleeding, normal skin color, no rashes, no lesions, no jaundice. Alopecia.   Extremities: There is no redness, warmth, or swelling of the joints. No lower extremity edema. No cyanosis.  Neurologic: Awake, alert, oriented to name, place and time.    Vascular access: R PICC c/d/i     Data   Results for orders placed or performed during the hospital encounter of 04/08/20 (from the past 24 hour(s))   XR Chest 1 View    Narrative    Exam: XR CHEST 1 VW, 4/8/2020 10:14 AM    Indication: PICC verification    Comparison: 3/17/2019, 3/15/2019    Findings:   A single PA view of the chest was obtained. The right arm PICC tip  projects over the low SVC, retracted since the previous exam. The  cardiomediastinal silhouette is within normal limits. Mildly decreased  lung volumes. No pneumothorax or pleural effusion. Streaky perihilar  opacities likely represent vascular crowding or atelectasis given low  lung volumes. No focal airspace opacities. The visualized upper  abdomen appears normal. No acute osseous abnormalities.      Impression    Impression:   The right arm PICC tip projects over the low SVC. No pneumothorax.    KENAN HANNAH MD   Double Lumen PICC Placement    Narrative    Karla Capps RN      4/8/2020 10:32 AM  [facility] [place]    Double Lumen PICC Placement    Date/Time: 4/8/2020 10:28 AM  Performed by: Karla Capps RN  Authorized by: Lyubov Sin PA-C   Indications: vascular access    UNIVERSAL PROTOCOL   Site Marked: Yes  Prior Images Obtained and Reviewed:  Yes  Required items: Required blood products, implants, devices and special   equipment available    Patient identity confirmed:  Verbally with patient and arm band  NA - No sedation, light sedation, or local anesthesia  Confirmation Checklist:  Patient's identity using two indicators, relevant   allergies, procedure was appropriate and matched the consent or emergent   situation and correct equipment/implants were available  Time out: Immediately prior to the procedure a time out was called    Universal Protocol: the Joint Commission Universal Protocol was followed    Preparation: Patient was prepped and draped in usual sterile fashion           ANESTHESIA    Local Anesthetic: Lidocaine 1% without epinephrine  Anesthetic Total (mL):  4.5      SEDATION    Patient Sedated: No        Preparation: skin prepped with ChloraPrep  Skin prep agent: skin prep agent completely dried prior to procedure  Sterile barriers: maximum sterile barriers were used: cap, mask, sterile   gown, sterile gloves, and large sterile sheet  Hand hygiene: hand hygiene performed prior to central venous catheter   insertion  Type of line used: PICC and Power PICC  Catheter type: double lumen  Lumen type: valved  Catheter size: 5 Fr  Brand: other (see comment) ([employer])  Lot number: [id number]  Placement method: venipuncture, MST, ultrasound and tip confirmation   system  Number of attempts: 1  Successful placement: yes  Orientation: right  Location: basilic vein (0.57 cm vein diameter)  Arm circumference: adults 10 cm  Extremity circumference: 34  Visible catheter length: 1  Total catheter length: 38  Dressing and securement:  chlorhexidine disc applied, statlock and sterile   dressing applied  Post procedure assessment: placement verified by x-ray, free fluid flow   and blood return through all ports  PROCEDURE   Patient Tolerance:  Patient tolerated the procedure well with no immediate   complications

## 2020-04-08 NOTE — PROGRESS NOTES
D/I:  Patient arrived to  for R-IVAC chemotherapy.  PICC placed prior to admission.  Chemotherapy released & double checked by 2 chemo competent RNs.  Admission assessment done and admission questions completed.  Oriented patient to room, call light & menu.    A:  Patient appears to be resting comfortably in a recliner chair.    P:  Initiate plan of care.  Notify MD with any changes in patient's status.

## 2020-04-08 NOTE — PHARMACY-ADMISSION MEDICATION HISTORY
Admission medication history interview status for the 4/8/2020 admission is complete. See Epic admission navigator for allergy information, pharmacy, prior to admission medications and immunization status.     Medication history interview sources:  Patient, Surescripts fill history    Changes made to PTA medication list (reason)  Added: None  Deleted: None  Changed: None    Additional medication history information (including reliability of information, actions taken by pharmacist):  -Patient was an accurate historian of his medications  -He reports not taking his fluconazole or levofloxacin for the last few days  -He also hasn't taken the potassium chloride packets in about 3-4 days      Prior to Admission medications    Medication Sig Last Dose Taking? Auth Provider   acyclovir (ZOVIRAX) 200 MG capsule Take 2 capsules (400 mg) by mouth 2 times daily 4/8/2020 at Unknown time Yes Adenike Ríos PA-C   fluconazole (DIFLUCAN) 200 MG tablet ONLY TAKE IF ANC <1000 Past Week at Unknown time Yes Lyubov Sin PA-C   levofloxacin (LEVAQUIN) 250 MG tablet ONLY TAKE IF ANC <1000 Past Week at Unknown time Yes Lyubov Sin PA-C   levothyroxine (SYNTHROID/LEVOTHROID) 200 MCG tablet Take 200 mcg by mouth daily 4/8/2020 at Unknown time Yes Reported, Patient   potassium chloride (KLOR-CON) 20 MEQ packet Take 40 mEq by mouth daily Past Week at Unknown time Yes Reported, Patient   senna-docusate (SENOKOT-S/PERICOLACE) 8.6-50 MG tablet Take 1 tablet by mouth 2 times daily 4/7/2020 at Unknown time Yes Adenike Ríos PA-C     Medication history completed by:  Gris Gonsales, Pharm D  April 8, 2020

## 2020-04-08 NOTE — PROGRESS NOTES
Nursing Focus: Chemotherapy  D: Positive blood return via PICC. Insertion site is clean/dry/intact, dressing intact with no complaints of pain.  Urine output is recorded in intake in Doc Flowsheet.    I: acetaminophen, Benadryl and Zofran, dexamethasone given per order (see electronic medical administration record). Dose #1 of 1 rituxan started to infuse over 90 minutes. Dose #1 of 4 cytarabine started to infuse over 3 hours. Dose #1 of 4 etoposide started to infuse over 1 hour. Dose #1 of 4 mesna started to infuse over 15 minutes. Dose #1 of 4 Ifosfamide started to infuse over 1 hour. Reviewed pt teaching on chemotherapy side effects.  Pt denies need for further teaching. Chemotherapy double checked per protocol by two chemotherapy competent RN's.   A: Tolerating procedure well. Denies nausea and or pain.   P: Continue to monitor urine output and symptoms of nausea. Screen for symptoms of toxicity.

## 2020-04-09 LAB
ALBUMIN SERPL-MCNC: 3 G/DL (ref 3.4–5)
ALP SERPL-CCNC: 85 U/L (ref 40–150)
ALT SERPL W P-5'-P-CCNC: 39 U/L (ref 0–70)
ANION GAP SERPL CALCULATED.3IONS-SCNC: 7 MMOL/L (ref 3–14)
ANISOCYTOSIS BLD QL SMEAR: SLIGHT
APTT PPP: 26 SEC (ref 22–37)
AST SERPL W P-5'-P-CCNC: 14 U/L (ref 0–45)
BASOPHILS # BLD AUTO: 0 10E9/L (ref 0–0.2)
BASOPHILS NFR BLD AUTO: 0 %
BILIRUB SERPL-MCNC: 0.2 MG/DL (ref 0.2–1.3)
BUN SERPL-MCNC: 21 MG/DL (ref 7–30)
CALCIUM SERPL-MCNC: 7.7 MG/DL (ref 8.5–10.1)
CHLORIDE SERPL-SCNC: 108 MMOL/L (ref 94–109)
CO2 SERPL-SCNC: 25 MMOL/L (ref 20–32)
CREAT SERPL-MCNC: 0.84 MG/DL (ref 0.66–1.25)
DIFFERENTIAL METHOD BLD: ABNORMAL
EOSINOPHIL # BLD AUTO: 0 10E9/L (ref 0–0.7)
EOSINOPHIL NFR BLD AUTO: 0 %
ERYTHROCYTE [DISTWIDTH] IN BLOOD BY AUTOMATED COUNT: 15.5 % (ref 10–15)
FIBRINOGEN PPP-MCNC: 440 MG/DL (ref 200–420)
GFR SERPL CREATININE-BSD FRML MDRD: >90 ML/MIN/{1.73_M2}
GLUCOSE SERPL-MCNC: 201 MG/DL (ref 70–99)
HCT VFR BLD AUTO: 28.1 % (ref 40–53)
HGB BLD-MCNC: 9.1 G/DL (ref 13.3–17.7)
INR PPP: 1.09 (ref 0.86–1.14)
LDH SERPL L TO P-CCNC: 404 U/L (ref 85–227)
LYMPHOCYTES # BLD AUTO: 0.5 10E9/L (ref 0.8–5.3)
LYMPHOCYTES NFR BLD AUTO: 1.7 %
MAGNESIUM SERPL-MCNC: 2.1 MG/DL (ref 1.6–2.3)
MCH RBC QN AUTO: 30.3 PG (ref 26.5–33)
MCHC RBC AUTO-ENTMCNC: 32.4 G/DL (ref 31.5–36.5)
MCV RBC AUTO: 94 FL (ref 78–100)
METAMYELOCYTES # BLD: 0.7 10E9/L
METAMYELOCYTES NFR BLD MANUAL: 2.6 %
MONOCYTES # BLD AUTO: 0.7 10E9/L (ref 0–1.3)
MONOCYTES NFR BLD AUTO: 2.6 %
MYELOCYTES # BLD: 0.7 10E9/L
MYELOCYTES NFR BLD MANUAL: 2.6 %
NEUTROPHILS # BLD AUTO: 24.7 10E9/L (ref 1.6–8.3)
NEUTROPHILS NFR BLD AUTO: 89.6 %
NRBC # BLD AUTO: 0.2 10*3/UL
NRBC BLD AUTO-RTO: 1 /100
PHOSPHATE SERPL-MCNC: 2.6 MG/DL (ref 2.5–4.5)
PLATELET # BLD AUTO: 251 10E9/L (ref 150–450)
PLATELET # BLD EST: ABNORMAL 10*3/UL
POLYCHROMASIA BLD QL SMEAR: SLIGHT
POTASSIUM SERPL-SCNC: 4.2 MMOL/L (ref 3.4–5.3)
PROMYELOCYTES # BLD MANUAL: 0.2 10E9/L
PROMYELOCYTES NFR BLD MANUAL: 0.9 %
PROT SERPL-MCNC: 6.1 G/DL (ref 6.8–8.8)
RBC # BLD AUTO: 3 10E12/L (ref 4.4–5.9)
SODIUM SERPL-SCNC: 140 MMOL/L (ref 133–144)
URATE SERPL-MCNC: 4.8 MG/DL (ref 3.5–7.2)
WBC # BLD AUTO: 27.6 10E9/L (ref 4–11)

## 2020-04-09 PROCEDURE — 25000132 ZZH RX MED GY IP 250 OP 250 PS 637: Performed by: PHYSICIAN ASSISTANT

## 2020-04-09 PROCEDURE — 83735 ASSAY OF MAGNESIUM: CPT | Performed by: PHYSICIAN ASSISTANT

## 2020-04-09 PROCEDURE — 12000012 ZZH R&B MS OVERFLOW UMMC

## 2020-04-09 PROCEDURE — 84550 ASSAY OF BLOOD/URIC ACID: CPT | Performed by: PHYSICIAN ASSISTANT

## 2020-04-09 PROCEDURE — 80053 COMPREHEN METABOLIC PANEL: CPT | Performed by: PHYSICIAN ASSISTANT

## 2020-04-09 PROCEDURE — 25000128 H RX IP 250 OP 636: Performed by: PHYSICIAN ASSISTANT

## 2020-04-09 PROCEDURE — 25800030 ZZH RX IP 258 OP 636: Performed by: PHYSICIAN ASSISTANT

## 2020-04-09 PROCEDURE — 25000131 ZZH RX MED GY IP 250 OP 636 PS 637: Performed by: PHYSICIAN ASSISTANT

## 2020-04-09 PROCEDURE — 85025 COMPLETE CBC W/AUTO DIFF WBC: CPT | Performed by: PHYSICIAN ASSISTANT

## 2020-04-09 PROCEDURE — 84100 ASSAY OF PHOSPHORUS: CPT | Performed by: PHYSICIAN ASSISTANT

## 2020-04-09 PROCEDURE — 85610 PROTHROMBIN TIME: CPT | Performed by: PHYSICIAN ASSISTANT

## 2020-04-09 PROCEDURE — 85384 FIBRINOGEN ACTIVITY: CPT | Performed by: PHYSICIAN ASSISTANT

## 2020-04-09 PROCEDURE — 83615 LACTATE (LD) (LDH) ENZYME: CPT | Performed by: PHYSICIAN ASSISTANT

## 2020-04-09 PROCEDURE — 85730 THROMBOPLASTIN TIME PARTIAL: CPT | Performed by: PHYSICIAN ASSISTANT

## 2020-04-09 RX ORDER — HEPARIN SODIUM,PORCINE 10 UNIT/ML
5 VIAL (ML) INTRAVENOUS
Status: CANCELLED | OUTPATIENT
Start: 2020-04-09

## 2020-04-09 RX ORDER — HEPARIN SODIUM (PORCINE) LOCK FLUSH IV SOLN 100 UNIT/ML 100 UNIT/ML
5 SOLUTION INTRAVENOUS
Status: CANCELLED | OUTPATIENT
Start: 2020-04-09

## 2020-04-09 RX ADMIN — ACYCLOVIR 400 MG: 200 CAPSULE ORAL at 19:58

## 2020-04-09 RX ADMIN — ACETAMINOPHEN 650 MG: 325 TABLET, FILM COATED ORAL at 23:05

## 2020-04-09 RX ADMIN — CYTARABINE 4260 MG: 100 INJECTION, SOLUTION INTRATHECAL; INTRAVENOUS; SUBCUTANEOUS at 18:07

## 2020-04-09 RX ADMIN — FAMOTIDINE 10 MG: 10 TABLET, FILM COATED ORAL at 19:58

## 2020-04-09 RX ADMIN — SENNOSIDES AND DOCUSATE SODIUM 1 TABLET: 8.6; 5 TABLET ORAL at 08:21

## 2020-04-09 RX ADMIN — MESNA 1070 MG: 100 INJECTION, SOLUTION INTRAVENOUS at 06:48

## 2020-04-09 RX ADMIN — MESNA 1070 MG: 100 INJECTION, SOLUTION INTRAVENOUS at 22:26

## 2020-04-09 RX ADMIN — MESNA 1070 MG: 100 INJECTION, SOLUTION INTRAVENOUS at 02:52

## 2020-04-09 RX ADMIN — PREDNISOLONE ACETATE 2 DROP: 10 SUSPENSION/ DROPS OPHTHALMIC at 08:23

## 2020-04-09 RX ADMIN — FAMOTIDINE 10 MG: 10 TABLET, FILM COATED ORAL at 08:21

## 2020-04-09 RX ADMIN — DEXAMETHASONE 12 MG: 4 TABLET ORAL at 17:33

## 2020-04-09 RX ADMIN — IFOSFAMIDE 3195 MG: 1 INJECTION, SOLUTION INTRAVENOUS at 22:52

## 2020-04-09 RX ADMIN — PREDNISOLONE ACETATE 2 DROP: 10 SUSPENSION/ DROPS OPHTHALMIC at 20:00

## 2020-04-09 RX ADMIN — SENNOSIDES AND DOCUSATE SODIUM 1 TABLET: 8.6; 5 TABLET ORAL at 19:58

## 2020-04-09 RX ADMIN — ONDANSETRON HYDROCHLORIDE 8 MG: 8 TABLET, FILM COATED ORAL at 03:04

## 2020-04-09 RX ADMIN — CYTARABINE 4260 MG: 100 INJECTION, SOLUTION INTRATHECAL; INTRAVENOUS; SUBCUTANEOUS at 06:52

## 2020-04-09 RX ADMIN — ACETAMINOPHEN 650 MG: 325 TABLET, FILM COATED ORAL at 16:14

## 2020-04-09 RX ADMIN — ETOPOSIDE 130 MG: 20 INJECTION, SOLUTION, CONCENTRATE INTRAVENOUS at 21:18

## 2020-04-09 RX ADMIN — ACYCLOVIR 400 MG: 200 CAPSULE ORAL at 08:20

## 2020-04-09 RX ADMIN — ALLOPURINOL 300 MG: 300 TABLET ORAL at 08:21

## 2020-04-09 RX ADMIN — ACETAMINOPHEN 650 MG: 325 TABLET, FILM COATED ORAL at 09:37

## 2020-04-09 RX ADMIN — PREDNISOLONE ACETATE 2 DROP: 10 SUSPENSION/ DROPS OPHTHALMIC at 11:52

## 2020-04-09 RX ADMIN — PREDNISOLONE ACETATE 2 DROP: 10 SUSPENSION/ DROPS OPHTHALMIC at 17:33

## 2020-04-09 RX ADMIN — ONDANSETRON HYDROCHLORIDE 8 MG: 8 TABLET, FILM COATED ORAL at 17:33

## 2020-04-09 RX ADMIN — LEVOTHYROXINE SODIUM 200 MCG: 0.07 TABLET ORAL at 08:21

## 2020-04-09 RX ADMIN — ENOXAPARIN SODIUM 40 MG: 40 INJECTION SUBCUTANEOUS at 11:50

## 2020-04-09 ASSESSMENT — ACTIVITIES OF DAILY LIVING (ADL)
ADLS_ACUITY_SCORE: 13

## 2020-04-09 ASSESSMENT — PAIN DESCRIPTION - DESCRIPTORS
DESCRIPTORS: HEADACHE
DESCRIPTORS: HEADACHE

## 2020-04-09 NOTE — PROGRESS NOTES
[facility], [place]    Hematology / Oncology Progress Note    Date of Service (when I saw the patient): 04/09/2020     Assessment & Plan   Kobe Pedroza is a 60 year old male with a history of thyroid cancer status post thyroidectomy (2011), CAD, hyperlipidemia and newly diagnosed Burkitt's Lymphoma. Started R-CODOX-M / R-IVAC chemotherapy regimen (D1=3/18/2020). Now admitted for scheduled Cycle 2 R-IVAC.         HEME/ONC  # Burkitt's lymphoma  Followed by Dr. Wright. Presented on 3/12/ 20 to [facility] with acute-on-chronic mid-thoracic back pain with some associated radicular symptoms. Per patient, no B-symptoms, though he did recently have an intentional 35 lb wt loss. 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 Patient had no appreciable neurological deficits. He was started on dexamethasone and underwent T5-6 laminectomy with gross total resection of epidural tumor on 3/15. Flow cytometry of the specimen was notable for CD10 positive and kappa monotypic B cells (83%). Confirmed Burkitt lymphoma with surgical pathology. Flow cytometry of the specimen was notable for CD10 positive and kappa monotypic B cells (83%). Confirmed Burkitt lymphoma with surgical pathology. PET scan showed extensive visceral and bony disease. Bone marrow biopsy completed on 3/18 showed suspicious involvement with rare polytypic B cells (0.8%). No disease in CSF. Started  R-CODOX-M / R-IVAC (D1=3/18/2020), tolerated Cycle 1 very well without complications. Had Neulasta on 3/31.   - PICC line placed on admission 4/8/20.   - CBC and CMP stable on admission, counts recovered. Met parameters to proceed with chemotherapy.   - TLS and DIC labs daily. Allopurinol 300 mg daily.       Treatment Plan: Cycle 2 R-/IVAC (D1=4/8/2020). Today is Day 2.               - Cytarabine 2g/m2 (4,260 mg) BID D1, D2               - Etoposide 60  mg/m2 (130 mg) daily Days 1-5                - Ifosfamide 1500 mg/m2 (3,195 mg) Days 1-5                - Rituximab 375 mg/m2 (800 mg) Day 1                 - Mesna 500 mg/m2 Days 1-5                - Hydrocortisone sodium soccinate 50 mg, Methotrexate 12 mg IT Day 5 --- scheduled with XR 4/13 @ 1300                - Pre Meds: Tylenol 650 mg, Benadryl 50 mg, Zofran 8 mg BID, Dex 12 mg BID Days 1-5, 8 mg daily Day 6-7                - Neulasta Day 6 -- requested 4/15 with labs outpatient at [facility]                 - Supportive: Allopurinol tablet 300 mg, Pred Forte susp QID Days 1-4     # ID Prophylaxis  - Viral serologies: HepC nonreactive, HepB negative and HIV negative  - HOLD fluconazole 200mg daily and levaquin 250 mg daily while ANC >1.0   - Continue acyclovir 400 mg BID due to past exposure of VZV   - Pentamidine given 3/21      # Thoracic back pain, resolving  Due to T5-6 extradural tumor as detailed above. Patient now status post T5-6 laminectomy with gross total resction on 3/15/20.  Recently saw NSG.  Encouraged walking. Not taking any analgesics.   - PRN Tylenol       CV  # Coronary artery disease  # Hyperlipidemia  Followed by Dr. Zeng at [facility] ([place]). Diagnosed with mild, non-obstructive CAD in 2018. 81 mg ASA and low-dose statin therapy recommended. No previous cardiac caths or stents.  - Hold PTA statin and ASA      ENDO  # Hx of Thyroid cancer status-post thyroidectomy  Status post thyroidectomy in 2011. TSH normal on 3/12/20.  - Continue PTA levothyroxine      FEN  - No IVF for now; encourage PO intake.   - Lyte replacement per protocol  - Regular diet as tolerated     Ppx  - GI: Pepcid (Avoid PPI while on Methotrexate)   - DVT: Lovenox 40mg; Hold if platelets <50K.     Code: FULL (confirmed on admission)       Follow up:               4/15- labs/Neulasta               4/17- local labs ([facility])                4/18- possible transfusions [facility]               4/20- local  labs ([facility])                4/21- possible transfusions [facility] [place] [facility] [place]visit               4/22- local labs with 4/23 possible transfusions [place] [facility]               4/27- PET/CT and 4/29 Dr Wright with admission for C2     Dispo: Anticipate discharge to home after completion of chemotherapy 4/13.      Patient discussed with staff attending, Dr Valle.      Jeanine Hernandez PA-C   Hematology/Oncology   Pager: [phone]                         Patient has been seen and evaluated by me. I have reviewed today's vital signs, medications, labs and imaging results. I have discussed the plan with the team and agree with the findings and plan in this note.      Chemo started s acute complications.  Feeling tired but no GI  or resp sx  Lungs clear and cor reg. No rash or edema; No ptech  Abd not tender and no rebound    Continguing chemo as written.  No new issues as outlined.    Deng Valle MD        Interval History   No acute events overnight. Denies any side effects noticed with chemotherapy. Slept well. Appetite remains good, trying to increase fluid intake. BM yesterday, good UOP. Afebrile, denies NV, dyspnea, pain or edema. All questions answered and plan reviewed.      Physical Exam   Temp: 97.8  F (36.6  C) Temp src: Oral BP: 112/72 Pulse: 75 Heart Rate: 77 Resp: 16 SpO2: 97 % O2 Device: None (Room air)    Vitals:    04/08/20 1049   Weight: 95.5 kg (210 lb 8.6 oz)     Vital Signs with Ranges  Temp:  [97.8  F (36.6  C)-98.6  F (37  C)] 97.8  F (36.6  C)  Pulse:  [75-84] 75  Heart Rate:  [77-87] 77  Resp:  [16-24] 16  BP: (111-135)/(64-95) 112/72  SpO2:  [94 %-98 %] 97 %  I/O last 3 completed shifts:  In: 2572 [P.O.:720; I.V.:240; IV Piggyback:1612]  Out: 950 [Urine:950]    Constitutional: Pleasant male seen sitting in chair. No apparent distress, and appears stated age.   Eyes: Lids and lashes normal, sclera clear, conjunctiva normal.   ENT: Normocephalic, without obvious abnormality,  atraumatic, oral pharynx with moist mucus membranes, tonsils without erythema or exudates, gums normal and good dentition.   Respiratory: No increased work of breathing, good air exchange, clear to auscultation bilaterally, no crackles or wheezing.  Cardiovascular: Normal apical impulse, regular rate and rhythm, normal S1 and S2, and no murmur noted.  GI: No masses or scars. +BS. Soft. No tenderness on palpation.  Lymph/Hematologic: No cervical lymphadenopathy and no supraclavicular lymphadenopathy.  Skin: No bruising or bleeding, normal skin color, no rashes, no jaundice. Alopecia. Well healing linear lesion along spine.  Extremities: There is no redness, warmth, or swelling of the joints. No lower extremity edema. No cyanosis.  Neurologic: Awake, alert, oriented to name, place and time.    Vascular access: R PICC c/d/i         Medications     - MEDICATION INSTRUCTIONS -       - MEDICATION INSTRUCTIONS -       sodium chloride         acyclovir  400 mg Oral BID     allopurinol  300 mg Oral Daily     cytarabine (CYTOSAR) infusion  2 g/m2 (Treatment Plan Recorded) Intravenous Q12H     dexamethasone  12 mg Oral Q24H     [START ON 4/13/2020] dexamethasone  8 mg Oral Daily     enoxaparin ANTICOAGULANT  40 mg Subcutaneous Q24H     etoposide (TOPOSAR) infusion  60 mg/m2 (Treatment Plan Recorded) Intravenous Q24H     famotidine  10 mg Oral BID     [START ON 4/13/2020] filgrastim (NEUPOGEN/GRANIX) subcutaneous  5 mcg/kg (Treatment Plan Recorded) Subcutaneous Daily at 8 pm     [START ON 4/13/2020] INTRATHECAL - Cytarabine and/or methotrexate and/or Hydrocortisone   Intrathecal Once     ifosfamide (IFEX) infusion  1,500 mg/m2 (Treatment Plan Recorded) Intravenous Q24H     levothyroxine  200 mcg Oral Daily     mesna (MESNEX) infusion  500 mg/m2 (Treatment Plan Recorded) Intravenous Q24H     mesna (MESNEX) infusion  500 mg/m2 (Treatment Plan Recorded) Intravenous Q24H     mesna (MESNEX) infusion  500 mg/m2 (Treatment Plan  Recorded) Intravenous Q24H     ondansetron  8 mg Oral Q12H     prednisoLONE acetate  2 drop Both Eyes 4x Daily     senna-docusate  1 tablet Oral BID       Data   Results for orders placed or performed during the hospital encounter of 04/08/20 (from the past 24 hour(s))   CBC with platelets differential   Result Value Ref Range    WBC 29.3 (H) 4.0 - 11.0 10e9/L    RBC Count 3.23 (L) 4.4 - 5.9 10e12/L    Hemoglobin 9.8 (L) 13.3 - 17.7 g/dL    Hematocrit 29.9 (L) 40.0 - 53.0 %    MCV 93 78 - 100 fl    MCH 30.3 26.5 - 33.0 pg    MCHC 32.8 31.5 - 36.5 g/dL    RDW 15.0 10.0 - 15.0 %    Platelet Count 244 150 - 450 10e9/L    Diff Method Manual Differential     % Neutrophils 70.4 %    % Lymphocytes 3.4 %    % Monocytes 12.7 %    % Eosinophils 0.0 %    % Basophils 0.8 %    % Metamyelocytes 5.1 %    % Myelocytes 7.6 %    Absolute Neutrophil 20.6 (H) 1.6 - 8.3 10e9/L    Absolute Lymphocytes 1.0 0.8 - 5.3 10e9/L    Absolute Monocytes 3.7 (H) 0.0 - 1.3 10e9/L    Absolute Eosinophils 0.0 0.0 - 0.7 10e9/L    Absolute Basophils 0.2 0.0 - 0.2 10e9/L    Absolute Metamyelocytes 1.5 (H) 0 10e9/L    Absolute Myelocytes 2.2 (H) 0 10e9/L    Anisocytosis Slight     Polychromasia Slight     Platelet Estimate Confirming automated cell count    Comprehensive metabolic panel   Result Value Ref Range    Sodium 140 133 - 144 mmol/L    Potassium 3.9 3.4 - 5.3 mmol/L    Chloride 106 94 - 109 mmol/L    Carbon Dioxide 26 20 - 32 mmol/L    Anion Gap 8 3 - 14 mmol/L    Glucose 86 70 - 99 mg/dL    Urea Nitrogen 21 7 - 30 mg/dL    Creatinine 0.88 0.66 - 1.25 mg/dL    GFR Estimate >90 >60 mL/min/[1.73_m2]    GFR Estimate If Black >90 >60 mL/min/[1.73_m2]    Calcium 8.3 (L) 8.5 - 10.1 mg/dL    Bilirubin Total 0.2 0.2 - 1.3 mg/dL    Albumin 3.4 3.4 - 5.0 g/dL    Protein Total 6.7 (L) 6.8 - 8.8 g/dL    Alkaline Phosphatase 104 40 - 150 U/L    ALT 43 0 - 70 U/L    AST 21 0 - 45 U/L   Magnesium   Result Value Ref Range    Magnesium 2.3 1.6 - 2.3 mg/dL    Phosphorus   Result Value Ref Range    Phosphorus 4.0 2.5 - 4.5 mg/dL   Uric acid   Result Value Ref Range    Uric Acid 5.6 3.5 - 7.2 mg/dL   Lactate Dehydrogenase   Result Value Ref Range    Lactate Dehydrogenase 508 (H) 85 - 227 U/L   ABO/Rh type and screen   Result Value Ref Range    ABO A     RH(D) Pos     Antibody Screen Neg     Test Valid Only At          [facility]    Specimen Expires 04/11/2020    Lactic acid level STAT   Result Value Ref Range    Lactate for Sepsis Protocol 2.6 (H) 0.7 - 2.0 mmol/L   Partial thromboplastin time   Result Value Ref Range    PTT 30 22 - 37 sec   Potassium   Result Value Ref Range    Potassium 3.7 3.4 - 5.3 mmol/L   Uric acid   Result Value Ref Range    Uric Acid 4.2 3.5 - 7.2 mg/dL   Lactate Dehydrogenase   Result Value Ref Range    Lactate Dehydrogenase 377 (H) 85 - 227 U/L   INR   Result Value Ref Range    INR 1.07 0.86 - 1.14   Fibrinogen activity   Result Value Ref Range    Fibrinogen 401 200 - 420 mg/dL   CBC with platelets differential   Result Value Ref Range    WBC 27.6 (H) 4.0 - 11.0 10e9/L    RBC Count 3.00 (L) 4.4 - 5.9 10e12/L    Hemoglobin 9.1 (L) 13.3 - 17.7 g/dL    Hematocrit 28.1 (L) 40.0 - 53.0 %    MCV 94 78 - 100 fl    MCH 30.3 26.5 - 33.0 pg    MCHC 32.4 31.5 - 36.5 g/dL    RDW 15.5 (H) 10.0 - 15.0 %    Platelet Count 251 150 - 450 10e9/L    Diff Method Manual Differential     % Neutrophils 89.6 %    % Lymphocytes 1.7 %    % Monocytes 2.6 %    % Eosinophils 0.0 %    % Basophils 0.0 %    % Metamyelocytes 2.6 %    % Myelocytes 2.6 %    % Promyelocytes 0.9 %    Nucleated RBCs 1 (H) 0 /100    Absolute Neutrophil 24.7 (H) 1.6 - 8.3 10e9/L    Absolute Lymphocytes 0.5 (L) 0.8 - 5.3 10e9/L    Absolute Monocytes 0.7 0.0 - 1.3 10e9/L    Absolute Eosinophils 0.0 0.0 - 0.7 10e9/L    Absolute Basophils 0.0 0.0 - 0.2 10e9/L    Absolute Metamyelocytes 0.7 (H) 0 10e9/L    Absolute Myelocytes 0.7 (H) 0 10e9/L    Absolute  Promyeloctyes 0.2 (H) 0 10e9/L    Absolute Nucleated RBC 0.2     Anisocytosis Slight     Polychromasia Slight     Platelet Estimate Confirming automated cell count    Comprehensive metabolic panel   Result Value Ref Range    Sodium 140 133 - 144 mmol/L    Potassium 4.2 3.4 - 5.3 mmol/L    Chloride 108 94 - 109 mmol/L    Carbon Dioxide 25 20 - 32 mmol/L    Anion Gap 7 3 - 14 mmol/L    Glucose 201 (H) 70 - 99 mg/dL    Urea Nitrogen 21 7 - 30 mg/dL    Creatinine 0.84 0.66 - 1.25 mg/dL    GFR Estimate >90 >60 mL/min/[1.73_m2]    GFR Estimate If Black >90 >60 mL/min/[1.73_m2]    Calcium 7.7 (L) 8.5 - 10.1 mg/dL    Bilirubin Total 0.2 0.2 - 1.3 mg/dL    Albumin 3.0 (L) 3.4 - 5.0 g/dL    Protein Total 6.1 (L) 6.8 - 8.8 g/dL    Alkaline Phosphatase 85 40 - 150 U/L    ALT 39 0 - 70 U/L    AST 14 0 - 45 U/L   Uric acid   Result Value Ref Range    Uric Acid 4.8 3.5 - 7.2 mg/dL   INR   Result Value Ref Range    INR 1.09 0.86 - 1.14   Partial thromboplastin time   Result Value Ref Range    PTT 26 22 - 37 sec   Fibrinogen activity   Result Value Ref Range    Fibrinogen 440 (H) 200 - 420 mg/dL   Phosphorus   Result Value Ref Range    Phosphorus 2.6 2.5 - 4.5 mg/dL   Magnesium   Result Value Ref Range    Magnesium 2.1 1.6 - 2.3 mg/dL   Lactate Dehydrogenase   Result Value Ref Range    Lactate Dehydrogenase 404 (H) 85 - 227 U/L

## 2020-04-09 NOTE — PLAN OF CARE
"Vital signs:  Temp: 97.6  F (36.4  C) Temp src: Oral BP: 126/80 Pulse: 75 Heart Rate: 82 Resp: 16 SpO2: 96 % O2 Device: None (Room air)   Height: 174 cm (5' 8.5\") Weight: 95.5 kg (210 lb 8.6 oz)  Estimated body mass index is 31.54 kg/m  as calculated from the following:    Height as of this encounter: 1.74 m (5' 8.5\").    Weight as of this encounter: 95.5 kg (210 lb 8.6 oz).     Kobe's cytarabine completed mid-morning without issue.  Both ports are currently saline-locked.  His only complaint this shift has been a headache and he received tylenol times one per his request.  He has been on his phone talking with family and watching TV to help pass the time along with walking in his room.  Appetite remains solid and he denied nausea.  He is to receive additional chemotherapies this evening.      Problem: Adult Inpatient Plan of Care  Goal: Plan of Care Review  4/9/2020 1322 by Silva Montelongo RN  Flowsheets (Taken 4/9/2020 1322)  Plan of Care Reviewed With: patient  Progress: no change     Problem: Neurotoxicity (Chemotherapy Effects)  Goal: Neurotoxicity Symptom Control  Outcome: No Change     Problem: Neutropenia (Chemotherapy Effects)  Goal: Absence of Infection  Outcome: No Change     "

## 2020-04-09 NOTE — PLAN OF CARE
Vss. Denies pain and nausea. Cytarabine running over 3 hours per orders. Please pay special attention to mesna doses in relation to ifosfamide.     Problem: Adult Inpatient Plan of Care  Goal: Plan of Care Review  Outcome: No Change  Flowsheets (Taken 4/8/2020 8924 by Monica Jimenez RN)  Plan of Care Reviewed With: patient

## 2020-04-09 NOTE — PROGRESS NOTES
Day 1 of IVAC. Rituxan, cytarabine, etoposide, and mesna infused without complications. Good blood return. Ifosfamide started to infuse. Cerebellar neuros intact and baseline initiated. TLS labs q 12 hours. RR 24. Lactic acid level 2.6, drawn for sepsis protocol. MD paged and came to see pt. Vitals stable. Denies nausea and pain. Good urine output and oral intake.

## 2020-04-09 NOTE — PROGRESS NOTES
Sepsis Evaluation Progress Note    I was called to see Kobe Pedroza due to abnormal vital signs triggering the Sepsis SIRS screening alert. He is not known to have an infection.     Physical Exam   Vital Signs:  Temp: 98.6  F (37  C) Temp src: Oral BP: 124/64 Pulse: 75 Heart Rate: 80 Resp: 24 SpO2: 95 % O2 Device: None (Room air)      Lab:  Lactate for Sepsis Protocol   Date Value Ref Range Status   04/08/2020 2.6 (H) 0.7 - 2.0 mmol/L Final     Comment:     Significant value called to and read back by   JAZMYN PEDERSON 5C RN @ 2113 4/8/2020 BY AW         The patient is at baseline mental status.     The rest of their physical exam is significant for clear lungs    Assessment & Plan   NO EVIDENCE OF SEPSIS at this time.  Vital sign, physical exam, and lab findings are likely due to chemotherapy administration, malignancy.    Disposition: The patient will remain on the current unit. We will continue to monitor this patient closely..  Kennedy Orellana MD    Sepsis Criteria   Sepsis: 2+ SIRS criteria due to infection  Severe Sepsis: Sepsis AND 1+ new sign of acute organ dysfunction (Note: lactate >2 is organ dysfunction)  Septic Shock: Sepsis AND hypotension despite volume resuscitation with 30 ml/kg crystalloid

## 2020-04-10 LAB
ALBUMIN SERPL-MCNC: 2.9 G/DL (ref 3.4–5)
ALP SERPL-CCNC: 78 U/L (ref 40–150)
ALT SERPL W P-5'-P-CCNC: 36 U/L (ref 0–70)
ANION GAP SERPL CALCULATED.3IONS-SCNC: 5 MMOL/L (ref 3–14)
ANISOCYTOSIS BLD QL SMEAR: SLIGHT
APTT PPP: 26 SEC (ref 22–37)
AST SERPL W P-5'-P-CCNC: 15 U/L (ref 0–45)
BASOPHILS # BLD AUTO: 0 10E9/L (ref 0–0.2)
BASOPHILS NFR BLD AUTO: 0 %
BILIRUB SERPL-MCNC: 0.2 MG/DL (ref 0.2–1.3)
BUN SERPL-MCNC: 21 MG/DL (ref 7–30)
CALCIUM SERPL-MCNC: 7.6 MG/DL (ref 8.5–10.1)
CHLORIDE SERPL-SCNC: 109 MMOL/L (ref 94–109)
CO2 SERPL-SCNC: 26 MMOL/L (ref 20–32)
CREAT SERPL-MCNC: 0.73 MG/DL (ref 0.66–1.25)
DIFFERENTIAL METHOD BLD: ABNORMAL
EOSINOPHIL # BLD AUTO: 0 10E9/L (ref 0–0.7)
EOSINOPHIL NFR BLD AUTO: 0 %
ERYTHROCYTE [DISTWIDTH] IN BLOOD BY AUTOMATED COUNT: 15.3 % (ref 10–15)
FIBRINOGEN PPP-MCNC: 369 MG/DL (ref 200–420)
GFR SERPL CREATININE-BSD FRML MDRD: >90 ML/MIN/{1.73_M2}
GLUCOSE SERPL-MCNC: 164 MG/DL (ref 70–99)
HCT VFR BLD AUTO: 24.9 % (ref 40–53)
HGB BLD-MCNC: 8.1 G/DL (ref 13.3–17.7)
INR PPP: 1.08 (ref 0.86–1.14)
LDH SERPL L TO P-CCNC: 333 U/L (ref 85–227)
LYMPHOCYTES # BLD AUTO: 0.2 10E9/L (ref 0.8–5.3)
LYMPHOCYTES NFR BLD AUTO: 0.9 %
MAGNESIUM SERPL-MCNC: 2.3 MG/DL (ref 1.6–2.3)
MCH RBC QN AUTO: 30 PG (ref 26.5–33)
MCHC RBC AUTO-ENTMCNC: 32.5 G/DL (ref 31.5–36.5)
MCV RBC AUTO: 92 FL (ref 78–100)
METAMYELOCYTES # BLD: 0.2 10E9/L
METAMYELOCYTES NFR BLD MANUAL: 0.9 %
MONOCYTES # BLD AUTO: 0.2 10E9/L (ref 0–1.3)
MONOCYTES NFR BLD AUTO: 0.9 %
NEUTROPHILS # BLD AUTO: 17 10E9/L (ref 1.6–8.3)
NEUTROPHILS NFR BLD AUTO: 97.3 %
PHOSPHATE SERPL-MCNC: 3.8 MG/DL (ref 2.5–4.5)
PLATELET # BLD AUTO: 228 10E9/L (ref 150–450)
PLATELET # BLD EST: ABNORMAL 10*3/UL
POTASSIUM SERPL-SCNC: 3.8 MMOL/L (ref 3.4–5.3)
PROT SERPL-MCNC: 5.8 G/DL (ref 6.8–8.8)
RBC # BLD AUTO: 2.7 10E12/L (ref 4.4–5.9)
SODIUM SERPL-SCNC: 140 MMOL/L (ref 133–144)
URATE SERPL-MCNC: 4.5 MG/DL (ref 3.5–7.2)
WBC # BLD AUTO: 17.5 10E9/L (ref 4–11)

## 2020-04-10 PROCEDURE — 85384 FIBRINOGEN ACTIVITY: CPT | Performed by: PHYSICIAN ASSISTANT

## 2020-04-10 PROCEDURE — 85025 COMPLETE CBC W/AUTO DIFF WBC: CPT | Performed by: PHYSICIAN ASSISTANT

## 2020-04-10 PROCEDURE — 83735 ASSAY OF MAGNESIUM: CPT | Performed by: PHYSICIAN ASSISTANT

## 2020-04-10 PROCEDURE — 84550 ASSAY OF BLOOD/URIC ACID: CPT | Performed by: PHYSICIAN ASSISTANT

## 2020-04-10 PROCEDURE — 25000131 ZZH RX MED GY IP 250 OP 636 PS 637: Performed by: PHYSICIAN ASSISTANT

## 2020-04-10 PROCEDURE — 85730 THROMBOPLASTIN TIME PARTIAL: CPT | Performed by: PHYSICIAN ASSISTANT

## 2020-04-10 PROCEDURE — 83615 LACTATE (LD) (LDH) ENZYME: CPT | Performed by: PHYSICIAN ASSISTANT

## 2020-04-10 PROCEDURE — 25800030 ZZH RX IP 258 OP 636: Performed by: PHYSICIAN ASSISTANT

## 2020-04-10 PROCEDURE — 80053 COMPREHEN METABOLIC PANEL: CPT | Performed by: PHYSICIAN ASSISTANT

## 2020-04-10 PROCEDURE — 25000128 H RX IP 250 OP 636: Performed by: PHYSICIAN ASSISTANT

## 2020-04-10 PROCEDURE — 85610 PROTHROMBIN TIME: CPT | Performed by: PHYSICIAN ASSISTANT

## 2020-04-10 PROCEDURE — 12000012 ZZH R&B MS OVERFLOW UMMC

## 2020-04-10 PROCEDURE — 25000132 ZZH RX MED GY IP 250 OP 250 PS 637: Performed by: PHYSICIAN ASSISTANT

## 2020-04-10 PROCEDURE — 84100 ASSAY OF PHOSPHORUS: CPT | Performed by: PHYSICIAN ASSISTANT

## 2020-04-10 RX ADMIN — ACYCLOVIR 400 MG: 200 CAPSULE ORAL at 19:05

## 2020-04-10 RX ADMIN — MESNA 1070 MG: 100 INJECTION, SOLUTION INTRAVENOUS at 03:11

## 2020-04-10 RX ADMIN — LEVOTHYROXINE SODIUM 200 MCG: 0.07 TABLET ORAL at 08:56

## 2020-04-10 RX ADMIN — ACYCLOVIR 400 MG: 200 CAPSULE ORAL at 08:55

## 2020-04-10 RX ADMIN — FAMOTIDINE 10 MG: 10 TABLET, FILM COATED ORAL at 08:57

## 2020-04-10 RX ADMIN — ETOPOSIDE 130 MG: 20 INJECTION, SOLUTION, CONCENTRATE INTRAVENOUS at 20:46

## 2020-04-10 RX ADMIN — MESNA 1070 MG: 100 INJECTION, SOLUTION INTRAVENOUS at 06:19

## 2020-04-10 RX ADMIN — BUTALBITAL, ACETAMINOPHEN, AND CAFFEINE 1 TABLET: 50; 325; 40 TABLET ORAL at 06:37

## 2020-04-10 RX ADMIN — CYTARABINE 4260 MG: 100 INJECTION, SOLUTION INTRATHECAL; INTRAVENOUS; SUBCUTANEOUS at 06:11

## 2020-04-10 RX ADMIN — IFOSFAMIDE 3195 MG: 1 INJECTION, SOLUTION INTRAVENOUS at 21:51

## 2020-04-10 RX ADMIN — DEXAMETHASONE 12 MG: 4 TABLET ORAL at 17:32

## 2020-04-10 RX ADMIN — BUTALBITAL, ACETAMINOPHEN, AND CAFFEINE 1 TABLET: 50; 325; 40 TABLET ORAL at 11:41

## 2020-04-10 RX ADMIN — MESNA 1070 MG: 100 INJECTION, SOLUTION INTRAVENOUS at 21:32

## 2020-04-10 RX ADMIN — ENOXAPARIN SODIUM 40 MG: 40 INJECTION SUBCUTANEOUS at 11:36

## 2020-04-10 RX ADMIN — FAMOTIDINE 10 MG: 10 TABLET, FILM COATED ORAL at 19:06

## 2020-04-10 RX ADMIN — PREDNISOLONE ACETATE 2 DROP: 10 SUSPENSION/ DROPS OPHTHALMIC at 11:37

## 2020-04-10 RX ADMIN — SENNOSIDES AND DOCUSATE SODIUM 1 TABLET: 8.6; 5 TABLET ORAL at 08:57

## 2020-04-10 RX ADMIN — ALLOPURINOL 300 MG: 300 TABLET ORAL at 08:57

## 2020-04-10 RX ADMIN — PREDNISOLONE ACETATE 2 DROP: 10 SUSPENSION/ DROPS OPHTHALMIC at 17:33

## 2020-04-10 RX ADMIN — PREDNISOLONE ACETATE 2 DROP: 10 SUSPENSION/ DROPS OPHTHALMIC at 08:59

## 2020-04-10 RX ADMIN — ONDANSETRON HYDROCHLORIDE 8 MG: 8 TABLET, FILM COATED ORAL at 17:32

## 2020-04-10 RX ADMIN — SENNOSIDES AND DOCUSATE SODIUM 1 TABLET: 8.6; 5 TABLET ORAL at 17:36

## 2020-04-10 RX ADMIN — PREDNISOLONE ACETATE 2 DROP: 10 SUSPENSION/ DROPS OPHTHALMIC at 22:29

## 2020-04-10 RX ADMIN — BUTALBITAL, ACETAMINOPHEN, AND CAFFEINE 1 TABLET: 50; 325; 40 TABLET ORAL at 03:08

## 2020-04-10 ASSESSMENT — MIFFLIN-ST. JEOR: SCORE: 1776.37

## 2020-04-10 ASSESSMENT — PAIN DESCRIPTION - DESCRIPTORS
DESCRIPTORS: HEADACHE
DESCRIPTORS: HEADACHE

## 2020-04-10 ASSESSMENT — ACTIVITIES OF DAILY LIVING (ADL)
ADLS_ACUITY_SCORE: 13

## 2020-04-10 NOTE — PROGRESS NOTES
Nursing Focus: Chemotherapy  D: Positive blood return via PICC. Insertion site is clean/dry/intact, dressing intact with no complaints of pain.  Urine output is recorded in intake in Doc Flowsheet.      I: Premedications given per order (see electronic medical administration record). Dose #2 of etoposide started to infuse over 1 hour. Reviewed pt teaching on chemotherapy side effects.  Pt denies need for further teaching. Chemotherapy double checked per protocol by two chemotherapy competent RN's.     A: Tolerating procedure well. Denies nausea and or pain.     P: Continue to monitor urine output and symptoms of nausea. Screen for symptoms of toxicity.

## 2020-04-10 NOTE — PLAN OF CARE
VSS on RA. Afebrile. Denies nausea and SOB. C/o headache, tylenol given 2 with some relief. Up independently voiding using urinal with good UOP. Tolerating chemo well. Good appetite. No acute events, continue to monitor.

## 2020-04-10 NOTE — PLAN OF CARE
VSS. Denies nausea. Received prn x1 for c/o H/A and held Zofran. Cytarabine currently infusion.     Problem: Adult Inpatient Plan of Care  Goal: Plan of Care Review  4/10/2020 0603 by Deborah Blanc RN  Outcome: No Change  Flowsheets (Taken 4/10/2020 0000)  Plan of Care Reviewed With: patient

## 2020-04-10 NOTE — PROGRESS NOTES
Nursing Focus: Chemotherapy    D: Positive blood return via PICC. Insertion site is clean/dry/intact, dressing intact with no complaints of pain.  Urine output is recorded in intake in Doc Flowsheet.      I: Premedications given per order (see electronic medical administration record). Dose #2 of ifosfamide started to infuse over 1 hour. Reviewed pt teaching on chemotherapy side effects.  Pt denies need for further teaching. Chemotherapy double checked per protocol by two chemotherapy competent RN's.     A: Tolerating chemo well. Denies nausea and or pain.     P: Continue to monitor urine output and symptoms of nausea. Screen for symptoms of toxicity.

## 2020-04-10 NOTE — PLAN OF CARE
"Vital signs:  Temp: 97.9  F (36.6  C) Temp src: Oral BP: 107/65 Pulse: 84 Heart Rate: 91 Resp: 16 SpO2: 100 % O2 Device: None (Room air)   Height: 174 cm (5' 8.5\") Weight: 97.4 kg (214 lb 11.2 oz)  Estimated body mass index is 32.17 kg/m  as calculated from the following:    Height as of this encounter: 1.74 m (5' 8.5\").    Weight as of this encounter: 97.4 kg (214 lb 11.2 oz).     Jonnathan's cytarabine (4th dose) completed mid-morning without any issue.  Ports are saline-locked and he has been up and independent in his room.  Appetite remains stable and he denies having any nausea.  Fioricet given times one per his request.  He has been walking and talking on his phone in his room.  Additional chemotherapies due tonight.  To continue his plan of care.       Problem: Adult Inpatient Plan of Care  Goal: Plan of Care Review  4/10/2020 1308 by Silva Montelongo RN  Flowsheets (Taken 4/10/2020 1308)  Plan of Care Reviewed With: patient  Progress: no change     Problem: Nausea and Vomiting (Chemotherapy Effects)  Goal: Fluid and Electrolyte Balance  Outcome: No Change     Problem: Neurotoxicity (Chemotherapy Effects)  Goal: Neurotoxicity Symptom Control  Outcome: No Change     Problem: Neutropenia (Chemotherapy Effects)  Goal: Absence of Infection  Outcome: No Change     "

## 2020-04-10 NOTE — PROGRESS NOTES
Nursing Focus: Chemotherapy    D: Positive blood return via PICC. Insertion site is clean/dry/intact, dressing intact with no complaints of pain.  Urine output is recorded in intake in Doc Flowsheet.      I: Premedications given per order (see electronic medical administration record). Dose #3 of Cytarabine started to infuse over 3hours. Reviewed pt teaching on chemotherapy side effects.  Pt denies need for further teaching. Chemotherapy double checked per protocol by two chemotherapy competent RN's.     A: Tolerating chemo well. Denies nausea and or pain. Neuro check intact and unchanged from baseline.    P: Continue to monitor urine output and symptoms of nausea. Screen for symptoms of toxicity.

## 2020-04-10 NOTE — PROGRESS NOTES
[facility], [place]    Hematology / Oncology Progress Note     Assessment & Plan   Kobe Pedroza is a 60 year old male with a history of thyroid cancer status post thyroidectomy (2011), CAD, hyperlipidemia and newly diagnosed Burkitt's Lymphoma. Started R-CODOX-M / R-IVAC chemotherapy regimen (D1=3/18/2020). Now admitted for scheduled Cycle 2 R-IVAC.         HEME/ONC  # Burkitt's lymphoma  Followed by Dr. Wright. Presented on 3/12/ 20 to [facility] with acute-on-chronic mid-thoracic back pain with some associated radicular symptoms. Per patient, no B-symptoms, though he did recently have an intentional 35 lb wt loss. 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 Patient had no appreciable neurological deficits. He was started on dexamethasone and underwent T5-6 laminectomy with gross total resection of epidural tumor on 3/15. Flow cytometry of the specimen was notable for CD10 positive and kappa monotypic B cells (83%). Confirmed Burkitt lymphoma with surgical pathology. Flow cytometry of the specimen was notable for CD10 positive and kappa monotypic B cells (83%). Confirmed Burkitt lymphoma with surgical pathology. PET scan showed extensive visceral and bony disease. Bone marrow biopsy completed on 3/18 showed suspicious involvement with rare polytypic B cells (0.8%). No disease in CSF. Started  R-CODOX-M / R-IVAC (D1=3/18/2020), tolerated Cycle 1 very well without complications. Had Neulasta on 3/31.   - PICC line placed on admission 4/8/20.   - CBC and CMP stable on admission, counts recovered. Met parameters to proceed with chemotherapy.   - TLS and DIC labs daily. Allopurinol 300 mg daily.                   Treatment Plan: Cycle 2 R-/IVAC (D1=4/8/2020). Today is Day 3.               - Cytarabine 2g/m2 (4,260 mg) BID D1, D2               - Etoposide 60 mg/m2 (130 mg) daily Days 1-5                -  Ifosfamide 1500 mg/m2 (3,195 mg) Days 1-5                - Rituximab 375 mg/m2 (800 mg) Day 1                 - Mesna 500 mg/m2 Days 1-5                - Hydrocortisone sodium soccinate 50 mg, Methotrexate 12 mg IT Day 5 --- scheduled with XR 4/13 @  1300                - Pre Meds: Tylenol 650 mg, Benadryl 50 mg, Zofran 8 mg BID, Dex 12 mg BID Days 1-5, 8 mg daily  Day 6-7                - Neulasta Day 6 -- requested 4/15 with labs outpatient at [facility] [place]                 - Supportive: Allopurinol tablet 300 mg, Pred Forte susp QID Days 1-4     # ID Prophylaxis  - Viral serologies: HepC nonreactive, HepB negative and HIV negative  - HOLD fluconazole 200mg daily and levaquin 250 mg daily while ANC >1.0   - Continue acyclovir 400 mg BID due to past exposure of VZV   - Pentamidine given 3/21      # Thoracic back pain, resolving  Due to T5-6 extradural tumor as detailed above. Patient now status post T5-6 laminectomy with gross total resction on 3/15/20.  Recently saw NSG.  Encouraged walking. Not taking any analgesics.   - PRN Tylenol     # Intermittent headache  Patient c/o headache on 4/10, resolved with fioricet.  He denies any other neurological complaints.   - continue fioricet as needed  - continue to monitor for any CNS symptoms      CV  # Coronary artery disease  # Hyperlipidemia  [facility] Diagnosed with mild, non-obstructive CAD in 2018. 81 mg ASA and low-dose statin therapy recommended. No previous cardiac caths or stents.  - Hold PTA statin and ASA      ENDO  # Hx of Thyroid cancer status-post thyroidectomy  Status post thyroidectomy in 2011. TSH normal on 3/12/20.  - Continue PTA levothyroxine      FEN  - No IVF for now; encourage PO intake.   - Lyte replacement per protocol  - Regular diet as tolerated     Ppx  - GI: Pepcid (Avoid PPI while on Methotrexate)   - DVT: Lovenox 40mg; Hold if platelets <50K.     Code: FULL (confirmed on admission)       Follow  up:               4/15- labs/Neulasta               4/17- local labs ([facility])                4/18- possible transfusions [facility]               4/20- local labs ([facility])                4/21- possible transfusions [facility] and [facility] televisit               4/22- local labs with 4/23 possible transfusions at [facility]               4/27- PET/CT and 4/29 Dr Wright with admission for C2     Dispo: Anticipate discharge to home after completion of chemotherapy 4/13.     The plan was staffed with Dr. Tori Muñoz PA-C     Patient has been seen and evaluated by me. I have reviewed today's vital signs, medications, labs and imaging results. I have discussed the plan with the team and agree with the findings and plan in this note.    Ongoing chemo started s GI upset. Some HA but no fcs or bleeding  Lungs clear and abd soft. No notable ankle edema   No signs of bleeding    Counts ok and metabolic parameters stable.  other plans as noted.  Deng Valle MD          Interval History   He c/o intermittent headaches.  Last headache resolved around 2am this morning.  He denies vision changes, focal weakness, dizziness, chest pain, sob, cough, abdominal pain, diarrhea, urinary complaints.  His back pain is well-controlled.      Physical Exam   Temp: 97.8  F (36.6  C) Temp src: Oral BP: 119/71 Pulse: 84 Heart Rate: 79 Resp: 16 SpO2: 98 % O2 Device: None (Room air)    Vitals:    04/08/20 1049 04/10/20 0831   Weight: 95.5 kg (210 lb 8.6 oz) 97.4 kg (214 lb 11.2 oz)     Vital Signs with Ranges  Temp:  [97.3  F (36.3  C)-98.5  F (36.9  C)] 97.8  F (36.6  C)  Pulse:  [84-97] 84  Heart Rate:  [77-84] 79  Resp:  [16-18] 16  BP: (112-126)/(62-80) 119/71  SpO2:  [95 %-98 %] 98 %  I/O last 3 completed shifts:  In: 2084 [P.O.:400; I.V.:1154; IV Piggyback:530]  Out: 2925 [Urine:2925]    Constitutional: very pleasant, in NAD  ENT: mmm, neck is supple  Respiratory: CTAB, normal effort  Cardiovascular: nl s1/s2  no MRGs  GI: abdomen is soft, NT, +BS  Skin: warm, dry, incision on thoracic back c/d/i appears to be healing appropriately  Musculoskeletal: trace edema bilat lower extremities  Neurologic: awake/alert, speech is clear, answers questions appropriately        Medications     - MEDICATION INSTRUCTIONS -       - MEDICATION INSTRUCTIONS -       sodium chloride         acyclovir  400 mg Oral BID     allopurinol  300 mg Oral Daily     cytarabine (CYTOSAR) infusion  2 g/m2 (Treatment Plan Recorded) Intravenous Q12H     dexamethasone  12 mg Oral Q24H     [START ON 4/13/2020] dexamethasone  8 mg Oral Daily     enoxaparin ANTICOAGULANT  40 mg Subcutaneous Q24H     etoposide (TOPOSAR) infusion  60 mg/m2 (Treatment Plan Recorded) Intravenous Q24H     famotidine  10 mg Oral BID     [START ON 4/13/2020] filgrastim (NEUPOGEN/GRANIX) subcutaneous  5 mcg/kg (Treatment Plan Recorded) Subcutaneous Daily at 8 pm     [START ON 4/13/2020] INTRATHECAL - Cytarabine and/or methotrexate and/or Hydrocortisone   Intrathecal Once     ifosfamide (IFEX) infusion  1,500 mg/m2 (Treatment Plan Recorded) Intravenous Q24H     levothyroxine  200 mcg Oral Daily     mesna (MESNEX) infusion  500 mg/m2 (Treatment Plan Recorded) Intravenous Q24H     mesna (MESNEX) infusion  500 mg/m2 (Treatment Plan Recorded) Intravenous Q24H     mesna (MESNEX) infusion  500 mg/m2 (Treatment Plan Recorded) Intravenous Q24H     ondansetron  8 mg Oral Q12H     prednisoLONE acetate  2 drop Both Eyes 4x Daily     senna-docusate  1 tablet Oral BID       Data   Results for orders placed or performed during the hospital encounter of 04/08/20 (from the past 24 hour(s))   CBC with platelets differential   Result Value Ref Range    WBC 17.5 (H) 4.0 - 11.0 10e9/L    RBC Count 2.70 (L) 4.4 - 5.9 10e12/L    Hemoglobin 8.1 (L) 13.3 - 17.7 g/dL    Hematocrit 24.9 (L) 40.0 - 53.0 %    MCV 92 78 - 100 fl    MCH 30.0 26.5 - 33.0 pg    MCHC 32.5 31.5 - 36.5 g/dL    RDW 15.3 (H) 10.0 - 15.0 %     Platelet Count 228 150 - 450 10e9/L    Diff Method Manual Differential     % Neutrophils 97.3 %    % Lymphocytes 0.9 %    % Monocytes 0.9 %    % Eosinophils 0.0 %    % Basophils 0.0 %    % Metamyelocytes 0.9 %    Absolute Neutrophil 17.0 (H) 1.6 - 8.3 10e9/L    Absolute Lymphocytes 0.2 (L) 0.8 - 5.3 10e9/L    Absolute Monocytes 0.2 0.0 - 1.3 10e9/L    Absolute Eosinophils 0.0 0.0 - 0.7 10e9/L    Absolute Basophils 0.0 0.0 - 0.2 10e9/L    Absolute Metamyelocytes 0.2 (H) 0 10e9/L    Anisocytosis Slight     Platelet Estimate Confirming automated cell count    Comprehensive metabolic panel   Result Value Ref Range    Sodium 140 133 - 144 mmol/L    Potassium 3.8 3.4 - 5.3 mmol/L    Chloride 109 94 - 109 mmol/L    Carbon Dioxide 26 20 - 32 mmol/L    Anion Gap 5 3 - 14 mmol/L    Glucose 164 (H) 70 - 99 mg/dL    Urea Nitrogen 21 7 - 30 mg/dL    Creatinine 0.73 0.66 - 1.25 mg/dL    GFR Estimate >90 >60 mL/min/[1.73_m2]    GFR Estimate If Black >90 >60 mL/min/[1.73_m2]    Calcium 7.6 (L) 8.5 - 10.1 mg/dL    Bilirubin Total 0.2 0.2 - 1.3 mg/dL    Albumin 2.9 (L) 3.4 - 5.0 g/dL    Protein Total 5.8 (L) 6.8 - 8.8 g/dL    Alkaline Phosphatase 78 40 - 150 U/L    ALT 36 0 - 70 U/L    AST 15 0 - 45 U/L   Uric acid   Result Value Ref Range    Uric Acid 4.5 3.5 - 7.2 mg/dL   INR   Result Value Ref Range    INR 1.08 0.86 - 1.14   Partial thromboplastin time   Result Value Ref Range    PTT 26 22 - 37 sec   Fibrinogen activity   Result Value Ref Range    Fibrinogen 369 200 - 420 mg/dL   Phosphorus   Result Value Ref Range    Phosphorus 3.8 2.5 - 4.5 mg/dL   Magnesium   Result Value Ref Range    Magnesium 2.3 1.6 - 2.3 mg/dL   Lactate Dehydrogenase   Result Value Ref Range    Lactate Dehydrogenase 333 (H) 85 - 227 U/L

## 2020-04-11 LAB
ALBUMIN SERPL-MCNC: 3 G/DL (ref 3.4–5)
ALP SERPL-CCNC: 72 U/L (ref 40–150)
ALT SERPL W P-5'-P-CCNC: 36 U/L (ref 0–70)
ANION GAP SERPL CALCULATED.3IONS-SCNC: 6 MMOL/L (ref 3–14)
APTT PPP: 24 SEC (ref 22–37)
AST SERPL W P-5'-P-CCNC: 18 U/L (ref 0–45)
BASOPHILS # BLD AUTO: 0 10E9/L (ref 0–0.2)
BASOPHILS NFR BLD AUTO: 0.1 %
BILIRUB SERPL-MCNC: 0.2 MG/DL (ref 0.2–1.3)
BUN SERPL-MCNC: 20 MG/DL (ref 7–30)
CALCIUM SERPL-MCNC: 7.1 MG/DL (ref 8.5–10.1)
CHLORIDE SERPL-SCNC: 110 MMOL/L (ref 94–109)
CO2 SERPL-SCNC: 25 MMOL/L (ref 20–32)
CREAT SERPL-MCNC: 0.76 MG/DL (ref 0.66–1.25)
DIFFERENTIAL METHOD BLD: ABNORMAL
EOSINOPHIL # BLD AUTO: 0 10E9/L (ref 0–0.7)
EOSINOPHIL NFR BLD AUTO: 0 %
ERYTHROCYTE [DISTWIDTH] IN BLOOD BY AUTOMATED COUNT: 15.6 % (ref 10–15)
FIBRINOGEN PPP-MCNC: 366 MG/DL (ref 200–420)
GFR SERPL CREATININE-BSD FRML MDRD: >90 ML/MIN/{1.73_M2}
GLUCOSE SERPL-MCNC: 164 MG/DL (ref 70–99)
HCT VFR BLD AUTO: 25.5 % (ref 40–53)
HGB BLD-MCNC: 8.3 G/DL (ref 13.3–17.7)
IMM GRANULOCYTES # BLD: 0.1 10E9/L (ref 0–0.4)
IMM GRANULOCYTES NFR BLD: 0.6 %
INR PPP: 1.07 (ref 0.86–1.14)
LDH SERPL L TO P-CCNC: 290 U/L (ref 85–227)
LYMPHOCYTES # BLD AUTO: 0.1 10E9/L (ref 0.8–5.3)
LYMPHOCYTES NFR BLD AUTO: 0.5 %
MAGNESIUM SERPL-MCNC: 2.3 MG/DL (ref 1.6–2.3)
MCH RBC QN AUTO: 30 PG (ref 26.5–33)
MCHC RBC AUTO-ENTMCNC: 32.5 G/DL (ref 31.5–36.5)
MCV RBC AUTO: 92 FL (ref 78–100)
MONOCYTES # BLD AUTO: 0.2 10E9/L (ref 0–1.3)
MONOCYTES NFR BLD AUTO: 1.5 %
NEUTROPHILS # BLD AUTO: 10.1 10E9/L (ref 1.6–8.3)
NEUTROPHILS NFR BLD AUTO: 97.3 %
NRBC # BLD AUTO: 0 10*3/UL
NRBC BLD AUTO-RTO: 0 /100
PHOSPHATE SERPL-MCNC: 3.3 MG/DL (ref 2.5–4.5)
PLATELET # BLD AUTO: 193 10E9/L (ref 150–450)
POTASSIUM SERPL-SCNC: 3.9 MMOL/L (ref 3.4–5.3)
PROT SERPL-MCNC: 5.9 G/DL (ref 6.8–8.8)
RBC # BLD AUTO: 2.77 10E12/L (ref 4.4–5.9)
SODIUM SERPL-SCNC: 141 MMOL/L (ref 133–144)
URATE SERPL-MCNC: 3.8 MG/DL (ref 3.5–7.2)
WBC # BLD AUTO: 10.4 10E9/L (ref 4–11)

## 2020-04-11 PROCEDURE — 25000128 H RX IP 250 OP 636: Performed by: PHYSICIAN ASSISTANT

## 2020-04-11 PROCEDURE — 80053 COMPREHEN METABOLIC PANEL: CPT | Performed by: PHYSICIAN ASSISTANT

## 2020-04-11 PROCEDURE — 25000132 ZZH RX MED GY IP 250 OP 250 PS 637: Performed by: PHYSICIAN ASSISTANT

## 2020-04-11 PROCEDURE — 85384 FIBRINOGEN ACTIVITY: CPT | Performed by: PHYSICIAN ASSISTANT

## 2020-04-11 PROCEDURE — 25800030 ZZH RX IP 258 OP 636: Performed by: PHYSICIAN ASSISTANT

## 2020-04-11 PROCEDURE — 25000128 H RX IP 250 OP 636: Performed by: STUDENT IN AN ORGANIZED HEALTH CARE EDUCATION/TRAINING PROGRAM

## 2020-04-11 PROCEDURE — 84550 ASSAY OF BLOOD/URIC ACID: CPT | Performed by: PHYSICIAN ASSISTANT

## 2020-04-11 PROCEDURE — 12000012 ZZH R&B MS OVERFLOW UMMC

## 2020-04-11 PROCEDURE — 83615 LACTATE (LD) (LDH) ENZYME: CPT | Performed by: PHYSICIAN ASSISTANT

## 2020-04-11 PROCEDURE — 85610 PROTHROMBIN TIME: CPT | Performed by: PHYSICIAN ASSISTANT

## 2020-04-11 PROCEDURE — 85025 COMPLETE CBC W/AUTO DIFF WBC: CPT | Performed by: PHYSICIAN ASSISTANT

## 2020-04-11 PROCEDURE — 25000132 ZZH RX MED GY IP 250 OP 250 PS 637: Performed by: STUDENT IN AN ORGANIZED HEALTH CARE EDUCATION/TRAINING PROGRAM

## 2020-04-11 PROCEDURE — 83735 ASSAY OF MAGNESIUM: CPT | Performed by: PHYSICIAN ASSISTANT

## 2020-04-11 PROCEDURE — 85730 THROMBOPLASTIN TIME PARTIAL: CPT | Performed by: PHYSICIAN ASSISTANT

## 2020-04-11 PROCEDURE — 25000131 ZZH RX MED GY IP 250 OP 636 PS 637: Performed by: PHYSICIAN ASSISTANT

## 2020-04-11 PROCEDURE — 84100 ASSAY OF PHOSPHORUS: CPT | Performed by: PHYSICIAN ASSISTANT

## 2020-04-11 RX ORDER — CELECOXIB 200 MG/1
200 CAPSULE ORAL ONCE
Status: COMPLETED | OUTPATIENT
Start: 2020-04-11 | End: 2020-04-11

## 2020-04-11 RX ORDER — AMOXICILLIN 250 MG
2 CAPSULE ORAL 2 TIMES DAILY
Status: DISCONTINUED | OUTPATIENT
Start: 2020-04-11 | End: 2020-04-11

## 2020-04-11 RX ORDER — POLYETHYLENE GLYCOL 3350 17 G/17G
17 POWDER, FOR SOLUTION ORAL DAILY
Status: DISCONTINUED | OUTPATIENT
Start: 2020-04-11 | End: 2020-04-13 | Stop reason: HOSPADM

## 2020-04-11 RX ORDER — AMOXICILLIN 250 MG
3 CAPSULE ORAL 2 TIMES DAILY
Status: DISCONTINUED | OUTPATIENT
Start: 2020-04-11 | End: 2020-04-13 | Stop reason: HOSPADM

## 2020-04-11 RX ORDER — ONDANSETRON 4 MG/1
4 TABLET, FILM COATED ORAL EVERY 12 HOURS
Status: DISCONTINUED | OUTPATIENT
Start: 2020-04-11 | End: 2020-04-13 | Stop reason: HOSPADM

## 2020-04-11 RX ORDER — CELECOXIB 50 MG/1
50 CAPSULE ORAL ONCE
Status: DISCONTINUED | OUTPATIENT
Start: 2020-04-11 | End: 2020-04-11

## 2020-04-11 RX ADMIN — DEXAMETHASONE 12 MG: 4 TABLET ORAL at 17:49

## 2020-04-11 RX ADMIN — ALLOPURINOL 300 MG: 300 TABLET ORAL at 07:50

## 2020-04-11 RX ADMIN — PREDNISOLONE ACETATE 2 DROP: 10 SUSPENSION/ DROPS OPHTHALMIC at 12:42

## 2020-04-11 RX ADMIN — MESNA 1070 MG: 100 INJECTION, SOLUTION INTRAVENOUS at 21:42

## 2020-04-11 RX ADMIN — SENNOSIDES AND DOCUSATE SODIUM 1 TABLET: 8.6; 5 TABLET ORAL at 07:50

## 2020-04-11 RX ADMIN — LEVOTHYROXINE SODIUM 200 MCG: 0.07 TABLET ORAL at 07:49

## 2020-04-11 RX ADMIN — ACYCLOVIR 400 MG: 200 CAPSULE ORAL at 07:48

## 2020-04-11 RX ADMIN — FAMOTIDINE 10 MG: 10 TABLET, FILM COATED ORAL at 07:50

## 2020-04-11 RX ADMIN — CELECOXIB 200 MG: 200 CAPSULE ORAL at 10:32

## 2020-04-11 RX ADMIN — PREDNISOLONE ACETATE 2 DROP: 10 SUSPENSION/ DROPS OPHTHALMIC at 16:00

## 2020-04-11 RX ADMIN — ONDANSETRON HYDROCHLORIDE 4 MG: 4 TABLET, FILM COATED ORAL at 17:50

## 2020-04-11 RX ADMIN — ETOPOSIDE 130 MG: 20 INJECTION, SOLUTION, CONCENTRATE INTRAVENOUS at 20:37

## 2020-04-11 RX ADMIN — MESNA 1070 MG: 100 INJECTION, SOLUTION INTRAVENOUS at 03:40

## 2020-04-11 RX ADMIN — SENNOSIDES AND DOCUSATE SODIUM 2 TABLET: 8.6; 5 TABLET ORAL at 19:11

## 2020-04-11 RX ADMIN — FAMOTIDINE 10 MG: 10 TABLET, FILM COATED ORAL at 19:11

## 2020-04-11 RX ADMIN — PREDNISOLONE ACETATE 2 DROP: 10 SUSPENSION/ DROPS OPHTHALMIC at 20:31

## 2020-04-11 RX ADMIN — PREDNISOLONE ACETATE 2 DROP: 10 SUSPENSION/ DROPS OPHTHALMIC at 07:50

## 2020-04-11 RX ADMIN — POLYETHYLENE GLYCOL 3350 17 G: 17 POWDER, FOR SOLUTION ORAL at 10:34

## 2020-04-11 RX ADMIN — MESNA 1070 MG: 100 INJECTION, SOLUTION INTRAVENOUS at 06:14

## 2020-04-11 RX ADMIN — ONDANSETRON HYDROCHLORIDE 8 MG: 8 TABLET, FILM COATED ORAL at 06:14

## 2020-04-11 RX ADMIN — BUTALBITAL, ACETAMINOPHEN, AND CAFFEINE 1 TABLET: 50; 325; 40 TABLET ORAL at 07:48

## 2020-04-11 RX ADMIN — ENOXAPARIN SODIUM 40 MG: 40 INJECTION SUBCUTANEOUS at 10:38

## 2020-04-11 RX ADMIN — ACYCLOVIR 400 MG: 200 CAPSULE ORAL at 19:11

## 2020-04-11 RX ADMIN — IFOSFAMIDE 3195 MG: 1 INJECTION, SOLUTION INTRAVENOUS at 21:53

## 2020-04-11 ASSESSMENT — PAIN DESCRIPTION - DESCRIPTORS
DESCRIPTORS: HEADACHE

## 2020-04-11 ASSESSMENT — ACTIVITIES OF DAILY LIVING (ADL)
ADLS_ACUITY_SCORE: 13

## 2020-04-11 ASSESSMENT — MIFFLIN-ST. JEOR: SCORE: 1769.5

## 2020-04-11 NOTE — PLAN OF CARE
AVSS. pt. c/o headache this morning. Took fioricet with little relief. Also tried celebrex, heat and cold. Had some relief with this. Had straining with stooling yesterday. Wanted senna increased so that start tonight. Also miralax added on and took that this morning. No stool yet today. Will get chemo again tonight. Continue POC.   Problem: Adult Inpatient Plan of Care  Goal: Plan of Care Review  4/11/2020 1353 by Jessica Dave RN  Outcome: No Change     Problem: Adult Inpatient Plan of Care  Goal: Patient-Specific Goal (Individualization)  4/11/2020 1353 by Jessica Dave, RN  Outcome: No Change     Problem: Adult Inpatient Plan of Care  Goal: Absence of Hospital-Acquired Illness or Injury  4/11/2020 1353 by Jessica Dave, RN  Outcome: No Change     Problem: Adult Inpatient Plan of Care  Goal: Optimal Comfort and Wellbeing  4/11/2020 1353 by Jessica Dave, RN  Outcome: No Change     Problem: Neutropenia (Chemotherapy Effects)  Goal: Absence of Infection  4/11/2020 1353 by Jessica Dave, RN  Outcome: No Change     Problem: Thrombocytopenia Bleeding Risk (Chemotherapy Effects)  Goal: Absence of Bleeding  4/11/2020 1353 by Jessica Dave, RN  Outcome: No Change

## 2020-04-11 NOTE — PROGRESS NOTES
[facility], [place]    Hematology / Oncology Progress Note     Assessment & Plan   Kobe Pedroza is a 60 year old male with a history of thyroid cancer status post thyroidectomy (2011), CAD, hyperlipidemia and newly diagnosed Burkitt's Lymphoma. Started R-CODOX-M / R-IVAC chemotherapy regimen (D1=3/18/2020). Now admitted for scheduled Cycle 2 R-IVAC.      TODAY:  - C2D4 of R-IVAC  - Adding miralax daily and increasing senna from 1 tab to 2 tabs  - Trying one time dose of celebrex for headache, resistant to fioricet. Celebrex interacts with high dose methotrexate, but methotrexate not being given until tomorrow    HEME/ONC  # Burkitt's lymphoma  Followed by Dr. Wright. Presented on 3/12/ 20 to [facility] with acute-on-chronic mid-thoracic back pain with some associated radicular symptoms. Per patient, no B-symptoms, though he did recently have an intentional 35 lb wt loss. 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 Patient had no appreciable neurological deficits. He was started on dexamethasone and underwent T5-6 laminectomy with gross total resection of epidural tumor on 3/15. Flow cytometry of the specimen was notable for CD10 positive and kappa monotypic B cells (83%). Confirmed Burkitt lymphoma with surgical pathology. Flow cytometry of the specimen was notable for CD10 positive and kappa monotypic B cells (83%). Confirmed Burkitt lymphoma with surgical pathology. PET scan showed extensive visceral and bony disease. Bone marrow biopsy completed on 3/18 showed suspicious involvement with rare polytypic B cells (0.8%). No disease in CSF. Started  R-CODOX-M / R-IVAC (D1=3/18/2020), tolerated Cycle 1 very well without complications. Had Neulasta on 3/31.   - PICC line placed on admission 4/8/20.   - CBC and CMP stable on admission, counts recovered. Met parameters to proceed with chemotherapy.   -  TLS and DIC labs daily. Allopurinol 300 mg daily.                   Treatment Plan: Cycle 2 R-/IVAC (D1=4/8/2020). Today is Day 4.               - Cytarabine 2g/m2 (4,260 mg) BID D1, D2               - Etoposide 60 mg/m2 (130 mg) daily Days 1-5                - Ifosfamide 1500 mg/m2 (3,195 mg) Days 1-5                - Rituximab 375 mg/m2 (800 mg) Day 1                 - Mesna 500 mg/m2 Days 1-5                - Hydrocortisone sodium soccinate 50 mg, Methotrexate 12 mg IT Day 5 --- scheduled with XR 4/13 @  1300                - Pre Meds: Tylenol 650 mg, Benadryl 50 mg, Zofran 8 mg BID, Dex 12 mg BID Days 1-5, 8 mg daily  Day 6-7                - Neulasta Day 6 -- requested 4/15 with [place] [facility]                 - Supportive: Allopurinol tablet 300 mg, Pred Forte susp QID Days 1-4     # ID Prophylaxis  - Viral serologies: HepC nonreactive, HepB negative and HIV negative  - HOLD fluconazole 200mg daily and levaquin 250 mg daily while ANC >1.0   - Continue acyclovir 400 mg BID due to past exposure of VZV   - Pentamidine given 3/21      # Thoracic back pain, resolving  Due to T5-6 extradural tumor as detailed above. Patient now status post T5-6 laminectomy with gross total resction on 3/15/20.  Recently saw NSG.  Encouraged walking. Not taking any analgesics.   - PRN Tylenol     # Intermittent headache  Patient c/o headache on 4/10, resolved with fioricet.  He denies any other neurological complaints.   - continue fioricet as needed  - one time celebrex on 4/11  - continue to monitor for any CNS symptoms      CV  # Coronary artery disease  # Hyperlipidemia  Followed by Dr. Zeng at [facility] [place] ([place] Diagnosed with mild, non-obstructive CAD in 2018. 81 mg ASA and low-dose statin therapy recommended. No previous cardiac caths or stents.  - Hold PTA statin and ASA      ENDO  # Hx of Thyroid cancer status-post thyroidectomy  Status post thyroidectomy in 2011. TSH normal on 3/12/20.  -  Continue PTA levothyroxine      FEN  - No IVF for now; encourage PO intake.   - Lyte replacement per protocol  - Regular diet as tolerated     Ppx  - GI: Pepcid (Avoid PPI while on Methotrexate)   - DVT: Lovenox 40mg; Hold if platelets <50K.     Code: FULL (confirmed on admission)       Follow up:               4/15- labs/Neulasta               4/17- local labs ([facility])                4/18- possible transfusions [facility] [place] [facility]               4/20- local labs ([facility])                4/21- possible transfusions [facility] and [facility] televisit               4/22- local labs with 4/23 possible transfusions at [facility]               4/27- PET/CT and 4/29 Dr Wright with admission for C2     Dispo: Anticipate discharge to home after completion of chemotherapy 4/13.     The plan was staffed with Dr. Valle    --  Kennedy Orellana MD PhD  Hematology, Oncology, and Bone Marrow Transplantation Service, [facility], [place]  Pager: [phone]  Please see sticky note for cross cover information    Patient has been seen and evaluated by me. I have reviewed today's vital signs, medications, labs and imaging results. I have discussed the plan with the team and agree with the findings and plan in this note.    Chemo ongoing; HA and constipation--perhaps due to ondansetron.  No fcs or bleeding.  Eating some. No new areas of pain; no bleedign    Lungs clear; Abd soft; No rash or LE edema   cor reg  No oral lesions.     Labs as expected with no new acute changes other than due to acute chemo infusions.  Plans as noted  Deng Valle MD        Interval History   Vitals normal, no acute events overnight. Night went well. Ate PB&J. Had a BM at 2am today. Is getting up and walking around his room, going to the bathroom. Primary complaint is headaches that follow his chemotherapy. Headaches are diffuse, bilateral worse in the front and extending to the back. Not accompanied by changes in  vision. Is straining to have bowel movements and wants his stool softener regimen increased.    Physical Exam   Temp: 98.2  F (36.8  C) Temp src: Oral BP: 125/78 Pulse: 81 Heart Rate: 79 Resp: 18 SpO2: 98 % O2 Device: None (Room air)    Vitals:    04/08/20 1049 04/10/20 0831 04/11/20 0742   Weight: 95.5 kg (210 lb 8.6 oz) 97.4 kg (214 lb 11.2 oz) 96.7 kg (213 lb 3 oz)     Vital Signs with Ranges  Temp:  [97.9  F (36.6  C)-98.5  F (36.9  C)] 98.2  F (36.8  C)  Pulse:  [81-91] 81  Heart Rate:  [79-93] 79  Resp:  [16-18] 18  BP: (107-128)/(65-78) 125/78  SpO2:  [97 %-100 %] 98 %  I/O last 3 completed shifts:  In: 3515 [P.O.:2220; I.V.:977; IV Piggyback:318]  Out: 3200 [Urine:3200]    Constitutional: very pleasant, in NAD  ENT: no mucositis  Respiratory: CTAB, normal effort  Cardiovascular: nl s1/s2 no MRGs  GI: abdomen is soft, NT, +BS  Skin: warm, dry, incision on thoracic back c/d/i appears to be healing appropriately  Musculoskeletal: trace bilateral lower extremity edema  Neurologic: awake/alert, speech is clear, answers questions appropriately        Medications     - MEDICATION INSTRUCTIONS -       - MEDICATION INSTRUCTIONS -       sodium chloride         acyclovir  400 mg Oral BID     allopurinol  300 mg Oral Daily     celecoxib  200 mg Oral Once     dexamethasone  12 mg Oral Q24H     [START ON 4/13/2020] dexamethasone  8 mg Oral Daily     enoxaparin ANTICOAGULANT  40 mg Subcutaneous Q24H     etoposide (TOPOSAR) infusion  60 mg/m2 (Treatment Plan Recorded) Intravenous Q24H     famotidine  10 mg Oral BID     [START ON 4/13/2020] filgrastim (NEUPOGEN/GRANIX) subcutaneous  5 mcg/kg (Treatment Plan Recorded) Subcutaneous Daily at 8 pm     [START ON 4/13/2020] INTRATHECAL - Cytarabine and/or methotrexate and/or Hydrocortisone   Intrathecal Once     ifosfamide (IFEX) infusion  1,500 mg/m2 (Treatment Plan Recorded) Intravenous Q24H     levothyroxine  200 mcg Oral Daily     mesna (MESNEX) infusion  500 mg/m2 (Treatment  Plan Recorded) Intravenous Q24H     mesna (MESNEX) infusion  500 mg/m2 (Treatment Plan Recorded) Intravenous Q24H     mesna (MESNEX) infusion  500 mg/m2 (Treatment Plan Recorded) Intravenous Q24H     ondansetron  8 mg Oral Q12H     polyethylene glycol  17 g Oral Daily     prednisoLONE acetate  2 drop Both Eyes 4x Daily     senna-docusate  2 tablet Oral BID       Data   Results for orders placed or performed during the hospital encounter of 04/08/20 (from the past 24 hour(s))   CBC with platelets differential   Result Value Ref Range    WBC 10.4 4.0 - 11.0 10e9/L    RBC Count 2.77 (L) 4.4 - 5.9 10e12/L    Hemoglobin 8.3 (L) 13.3 - 17.7 g/dL    Hematocrit 25.5 (L) 40.0 - 53.0 %    MCV 92 78 - 100 fl    MCH 30.0 26.5 - 33.0 pg    MCHC 32.5 31.5 - 36.5 g/dL    RDW 15.6 (H) 10.0 - 15.0 %    Platelet Count 193 150 - 450 10e9/L    Diff Method Automated Method     % Neutrophils 97.3 %    % Lymphocytes 0.5 %    % Monocytes 1.5 %    % Eosinophils 0.0 %    % Basophils 0.1 %    % Immature Granulocytes 0.6 %    Nucleated RBCs 0 0 /100    Absolute Neutrophil 10.1 (H) 1.6 - 8.3 10e9/L    Absolute Lymphocytes 0.1 (L) 0.8 - 5.3 10e9/L    Absolute Monocytes 0.2 0.0 - 1.3 10e9/L    Absolute Eosinophils 0.0 0.0 - 0.7 10e9/L    Absolute Basophils 0.0 0.0 - 0.2 10e9/L    Abs Immature Granulocytes 0.1 0 - 0.4 10e9/L    Absolute Nucleated RBC 0.0    Comprehensive metabolic panel   Result Value Ref Range    Sodium 141 133 - 144 mmol/L    Potassium 3.9 3.4 - 5.3 mmol/L    Chloride 110 (H) 94 - 109 mmol/L    Carbon Dioxide 25 20 - 32 mmol/L    Anion Gap 6 3 - 14 mmol/L    Glucose 164 (H) 70 - 99 mg/dL    Urea Nitrogen 20 7 - 30 mg/dL    Creatinine 0.76 0.66 - 1.25 mg/dL    GFR Estimate >90 >60 mL/min/[1.73_m2]    GFR Estimate If Black >90 >60 mL/min/[1.73_m2]    Calcium 7.1 (L) 8.5 - 10.1 mg/dL    Bilirubin Total 0.2 0.2 - 1.3 mg/dL    Albumin 3.0 (L) 3.4 - 5.0 g/dL    Protein Total 5.9 (L) 6.8 - 8.8 g/dL    Alkaline Phosphatase 72 40 - 150  U/L    ALT 36 0 - 70 U/L    AST 18 0 - 45 U/L   Uric acid   Result Value Ref Range    Uric Acid 3.8 3.5 - 7.2 mg/dL   INR   Result Value Ref Range    INR 1.07 0.86 - 1.14   Partial thromboplastin time   Result Value Ref Range    PTT 24 22 - 37 sec   Fibrinogen activity   Result Value Ref Range    Fibrinogen 366 200 - 420 mg/dL   Phosphorus   Result Value Ref Range    Phosphorus 3.3 2.5 - 4.5 mg/dL   Magnesium   Result Value Ref Range    Magnesium 2.3 1.6 - 2.3 mg/dL   Lactate Dehydrogenase   Result Value Ref Range    Lactate Dehydrogenase 290 (H) 85 - 227 U/L

## 2020-04-11 NOTE — PLAN OF CARE
"/75 (BP Location: Left arm)   Pulse 91   Temp 98.5  F (36.9  C) (Oral)   Resp 16   Ht 1.74 m (5' 8.5\")   Wt 97.4 kg (214 lb 11.2 oz)   SpO2 97%   BMI 32.17 kg/m      AVSS. No c/o of pain, nausea and vomiting overnight. Pt saline locked. Adequate urine output. Will continue POC.            Problem: Adult Inpatient Plan of Care  Goal: Rounds/Family Conference  Outcome: No Change     Problem: Anemia (Chemotherapy Effects)  Goal: Anemia Symptom Improvement  Outcome: No Change     Problem: Urinary Bleeding Risk or Actual (Chemotherapy Effects)  Goal: Absence of Hematuria  Outcome: No Change     "

## 2020-04-11 NOTE — PROGRESS NOTES
Type of chemo infused:iv etopicide and ifosfamide  Pt tolerated infusion: well-denies dizziness or lightheadedness-no nausea-denied HA  Interventions:bld rtn on both ports. Administered chemos after crosschecking with rn  Response to interventions used:infusion completed  Plan: continue with monitoring it chems. And fluid status- offer antiemetics for break thru nausea.     s-pt up in room and self care. Reports having large stool but very hard needing straining to pass. On senna- it scares me cause I don't want that prob.  b-chemo therapy ongoing  A-pt with contipation in which it is concerning to pt.  r-reach out to md to ordered senna 2 tabs and mirlax

## 2020-04-12 LAB
ALBUMIN SERPL-MCNC: 2.9 G/DL (ref 3.4–5)
ALP SERPL-CCNC: 67 U/L (ref 40–150)
ALT SERPL W P-5'-P-CCNC: 47 U/L (ref 0–70)
ANION GAP SERPL CALCULATED.3IONS-SCNC: 6 MMOL/L (ref 3–14)
APTT PPP: 25 SEC (ref 22–37)
AST SERPL W P-5'-P-CCNC: 23 U/L (ref 0–45)
BASOPHILS # BLD AUTO: 0 10E9/L (ref 0–0.2)
BASOPHILS NFR BLD AUTO: 0 %
BILIRUB SERPL-MCNC: 0.3 MG/DL (ref 0.2–1.3)
BUN SERPL-MCNC: 21 MG/DL (ref 7–30)
CALCIUM SERPL-MCNC: 7.2 MG/DL (ref 8.5–10.1)
CHLORIDE SERPL-SCNC: 108 MMOL/L (ref 94–109)
CO2 SERPL-SCNC: 26 MMOL/L (ref 20–32)
CREAT SERPL-MCNC: 0.71 MG/DL (ref 0.66–1.25)
DIFFERENTIAL METHOD BLD: ABNORMAL
EOSINOPHIL # BLD AUTO: 0 10E9/L (ref 0–0.7)
EOSINOPHIL NFR BLD AUTO: 0 %
ERYTHROCYTE [DISTWIDTH] IN BLOOD BY AUTOMATED COUNT: 15.4 % (ref 10–15)
FIBRINOGEN PPP-MCNC: 405 MG/DL (ref 200–420)
GFR SERPL CREATININE-BSD FRML MDRD: >90 ML/MIN/{1.73_M2}
GLUCOSE SERPL-MCNC: 164 MG/DL (ref 70–99)
HCT VFR BLD AUTO: 25.1 % (ref 40–53)
HGB BLD-MCNC: 8.1 G/DL (ref 13.3–17.7)
INR PPP: 1.07 (ref 0.86–1.14)
LDH SERPL L TO P-CCNC: 274 U/L (ref 85–227)
LYMPHOCYTES # BLD AUTO: 0 10E9/L (ref 0.8–5.3)
LYMPHOCYTES NFR BLD AUTO: 0 %
MAGNESIUM SERPL-MCNC: 2.3 MG/DL (ref 1.6–2.3)
MCH RBC QN AUTO: 29.8 PG (ref 26.5–33)
MCHC RBC AUTO-ENTMCNC: 32.3 G/DL (ref 31.5–36.5)
MCV RBC AUTO: 92 FL (ref 78–100)
MONOCYTES # BLD AUTO: 0 10E9/L (ref 0–1.3)
MONOCYTES NFR BLD AUTO: 0 %
NEUTROPHILS # BLD AUTO: 7.1 10E9/L (ref 1.6–8.3)
NEUTROPHILS NFR BLD AUTO: 100 %
PHOSPHATE SERPL-MCNC: 2.6 MG/DL (ref 2.5–4.5)
PLATELET # BLD AUTO: 177 10E9/L (ref 150–450)
POTASSIUM SERPL-SCNC: 4.3 MMOL/L (ref 3.4–5.3)
PROT SERPL-MCNC: 5.8 G/DL (ref 6.8–8.8)
RBC # BLD AUTO: 2.72 10E12/L (ref 4.4–5.9)
RBC MORPH BLD: NORMAL
SODIUM SERPL-SCNC: 140 MMOL/L (ref 133–144)
URATE SERPL-MCNC: 3.5 MG/DL (ref 3.5–7.2)
WBC # BLD AUTO: 7.1 10E9/L (ref 4–11)

## 2020-04-12 PROCEDURE — 12000012 ZZH R&B MS OVERFLOW UMMC

## 2020-04-12 PROCEDURE — 84100 ASSAY OF PHOSPHORUS: CPT | Performed by: PHYSICIAN ASSISTANT

## 2020-04-12 PROCEDURE — 83735 ASSAY OF MAGNESIUM: CPT | Performed by: PHYSICIAN ASSISTANT

## 2020-04-12 PROCEDURE — 85025 COMPLETE CBC W/AUTO DIFF WBC: CPT | Performed by: PHYSICIAN ASSISTANT

## 2020-04-12 PROCEDURE — 85730 THROMBOPLASTIN TIME PARTIAL: CPT | Performed by: PHYSICIAN ASSISTANT

## 2020-04-12 PROCEDURE — 25000132 ZZH RX MED GY IP 250 OP 250 PS 637: Performed by: STUDENT IN AN ORGANIZED HEALTH CARE EDUCATION/TRAINING PROGRAM

## 2020-04-12 PROCEDURE — 84550 ASSAY OF BLOOD/URIC ACID: CPT | Performed by: PHYSICIAN ASSISTANT

## 2020-04-12 PROCEDURE — 25000128 H RX IP 250 OP 636: Performed by: PHYSICIAN ASSISTANT

## 2020-04-12 PROCEDURE — 85610 PROTHROMBIN TIME: CPT | Performed by: PHYSICIAN ASSISTANT

## 2020-04-12 PROCEDURE — 80053 COMPREHEN METABOLIC PANEL: CPT | Performed by: PHYSICIAN ASSISTANT

## 2020-04-12 PROCEDURE — 25000131 ZZH RX MED GY IP 250 OP 636 PS 637: Performed by: PHYSICIAN ASSISTANT

## 2020-04-12 PROCEDURE — 83615 LACTATE (LD) (LDH) ENZYME: CPT | Performed by: PHYSICIAN ASSISTANT

## 2020-04-12 PROCEDURE — 85384 FIBRINOGEN ACTIVITY: CPT | Performed by: PHYSICIAN ASSISTANT

## 2020-04-12 PROCEDURE — 25000128 H RX IP 250 OP 636: Performed by: STUDENT IN AN ORGANIZED HEALTH CARE EDUCATION/TRAINING PROGRAM

## 2020-04-12 PROCEDURE — 25000125 ZZHC RX 250: Performed by: STUDENT IN AN ORGANIZED HEALTH CARE EDUCATION/TRAINING PROGRAM

## 2020-04-12 PROCEDURE — 25800030 ZZH RX IP 258 OP 636: Performed by: STUDENT IN AN ORGANIZED HEALTH CARE EDUCATION/TRAINING PROGRAM

## 2020-04-12 PROCEDURE — 25000132 ZZH RX MED GY IP 250 OP 250 PS 637: Performed by: PHYSICIAN ASSISTANT

## 2020-04-12 PROCEDURE — 25800030 ZZH RX IP 258 OP 636: Performed by: PHYSICIAN ASSISTANT

## 2020-04-12 RX ADMIN — ENOXAPARIN SODIUM 40 MG: 40 INJECTION SUBCUTANEOUS at 12:21

## 2020-04-12 RX ADMIN — DEXAMETHASONE 12 MG: 4 TABLET ORAL at 16:58

## 2020-04-12 RX ADMIN — MESNA 1070 MG: 100 INJECTION, SOLUTION INTRAVENOUS at 06:12

## 2020-04-12 RX ADMIN — MESNA 1070 MG: 100 INJECTION, SOLUTION INTRAVENOUS at 03:34

## 2020-04-12 RX ADMIN — POLYETHYLENE GLYCOL 3350 17 G: 17 POWDER, FOR SOLUTION ORAL at 07:56

## 2020-04-12 RX ADMIN — ALLOPURINOL 300 MG: 300 TABLET ORAL at 07:53

## 2020-04-12 RX ADMIN — ONDANSETRON HYDROCHLORIDE 4 MG: 4 TABLET, FILM COATED ORAL at 06:12

## 2020-04-12 RX ADMIN — MESNA 1070 MG: 100 INJECTION, SOLUTION INTRAVENOUS at 21:25

## 2020-04-12 RX ADMIN — FAMOTIDINE 10 MG: 10 TABLET, FILM COATED ORAL at 20:14

## 2020-04-12 RX ADMIN — ETOPOSIDE 130 MG: 20 INJECTION, SOLUTION, CONCENTRATE INTRAVENOUS at 21:05

## 2020-04-12 RX ADMIN — ONDANSETRON HYDROCHLORIDE 4 MG: 4 TABLET, FILM COATED ORAL at 16:58

## 2020-04-12 RX ADMIN — ACYCLOVIR 400 MG: 200 CAPSULE ORAL at 20:14

## 2020-04-12 RX ADMIN — POTASSIUM PHOSPHATE, MONOBASIC AND POTASSIUM PHOSPHATE, DIBASIC 10 MMOL: 224; 236 INJECTION, SOLUTION INTRAVENOUS at 15:51

## 2020-04-12 RX ADMIN — FAMOTIDINE 10 MG: 10 TABLET, FILM COATED ORAL at 07:53

## 2020-04-12 RX ADMIN — PREDNISOLONE ACETATE 2 DROP: 10 SUSPENSION/ DROPS OPHTHALMIC at 08:01

## 2020-04-12 RX ADMIN — IFOSFAMIDE 3195 MG: 1 INJECTION, SOLUTION INTRAVENOUS at 22:18

## 2020-04-12 RX ADMIN — LEVOTHYROXINE SODIUM 200 MCG: 0.07 TABLET ORAL at 08:00

## 2020-04-12 RX ADMIN — SENNOSIDES AND DOCUSATE SODIUM 3 TABLET: 8.6; 5 TABLET ORAL at 07:53

## 2020-04-12 RX ADMIN — ACYCLOVIR 400 MG: 200 CAPSULE ORAL at 07:53

## 2020-04-12 RX ADMIN — SENNOSIDES AND DOCUSATE SODIUM 1 TABLET: 8.6; 5 TABLET ORAL at 20:14

## 2020-04-12 ASSESSMENT — ACTIVITIES OF DAILY LIVING (ADL)
ADLS_ACUITY_SCORE: 13

## 2020-04-12 NOTE — PROGRESS NOTES
s-pt with on going headache diminishing as day goes on. zofran ordered at lower dose this humberto. vss-eating well despite hard stool yesterday feeling constipated.  b-chemo tx continues.  A-pt self care and reeports improving comfort from headache  r-offer compazine preventively for nausea if headache returns.     Type of chemo infused: etoposide over 1 hrs and ifosfamide over 1 hrs. With mesna prior ifosfamide.  Pt tolerated infusion: well-reports no change of subjective status.  Interventions:bld rtn checked and positive on purple port.  Response to interventions used: safe adminstration of chemo agents  Plan: continue with next doses of chemo treatment scheduled for in the am.

## 2020-04-12 NOTE — PLAN OF CARE
AVSS. Patient is getting phos replaced. Recheck tomorrow. Will get last of chemos tonight. No stool today. Continue POC.   Problem: Adult Inpatient Plan of Care  Goal: Plan of Care Review  4/12/2020 1830 by Jessica Dave RN  Outcome: No Change     Problem: Adult Inpatient Plan of Care  Goal: Patient-Specific Goal (Individualization)  4/12/2020 1830 by Jessica Dave RN  Outcome: No Change     Problem: Adult Inpatient Plan of Care  Goal: Absence of Hospital-Acquired Illness or Injury  4/12/2020 1830 by Jessica Dave RN  Outcome: No Change     Problem: Adult Inpatient Plan of Care  Goal: Optimal Comfort and Wellbeing  4/12/2020 1830 by Jessica Dave RN  Outcome: No Change     Problem: Adult Inpatient Plan of Care  Goal: Readiness for Transition of Care  4/12/2020 1830 by Jessica Dave RN  Outcome: No Change     Problem: Adult Inpatient Plan of Care  Goal: Rounds/Family Conference  4/12/2020 1830 by Jessica Dave RN  Outcome: No Change     Problem: Anemia (Chemotherapy Effects)  Goal: Anemia Symptom Improvement  4/12/2020 1830 by Jessica Dave RN  Outcome: No Change     Problem: Urinary Bleeding Risk or Actual (Chemotherapy Effects)  Goal: Absence of Hematuria  4/12/2020 1830 by Jessica Dave RN  Outcome: No Change     Problem: Nausea and Vomiting (Chemotherapy Effects)  Goal: Fluid and Electrolyte Balance  4/12/2020 1830 by Jessica Dave RN  Outcome: No Change     Problem: Neurotoxicity (Chemotherapy Effects)  Goal: Neurotoxicity Symptom Control  4/12/2020 1830 by Jessica Dave RN  Outcome: No Change     Problem: Neutropenia (Chemotherapy Effects)  Goal: Absence of Infection  4/12/2020 1830 by Jessica Dave RN  Outcome: No Change     Problem: Oral Mucositis (Chemotherapy Effects)  Goal: Improved Oral Mucous Membrane Integrity  4/12/2020 1830 by Jessica Dave RN  Outcome: No Change     Problem: Thrombocytopenia Bleeding Risk (Chemotherapy Effects)  Goal: Absence of  Bleeding  4/12/2020 1830 by Jessica Dave, RN  Outcome: No Change

## 2020-04-12 NOTE — PLAN OF CARE
"/76 (BP Location: Left arm)   Pulse 82   Temp 98.2  F (36.8  C) (Oral)   Resp 18   Ht 1.74 m (5' 8.5\")   Wt 96.7 kg (213 lb 3 oz)   SpO2 96%   BMI 31.94 kg/m        AVSS. No complaints of pain, nausea, vomiting overnight. Slept well. No replacments needed. Caps changed. Wants to take a shower this morning, will SL PICC before shower. Will continue POC.      Problem: Adult Inpatient Plan of Care  Goal: Plan of Care Review  Outcome: No Change  Flowsheets  Taken 4/12/2020 0000 by Long Emerson, RN  Plan of Care Reviewed With: patient  Taken 4/10/2020 1308 by Silva Montelongo RN  Progress: no change  Goal: Patient-Specific Goal (Individualization)  Outcome: No Change  Goal: Absence of Hospital-Acquired Illness or Injury  Outcome: No Change  Goal: Optimal Comfort and Wellbeing  Outcome: No Change  Goal: Rounds/Family Conference  Outcome: No Change     Problem: Urinary Bleeding Risk or Actual (Chemotherapy Effects)  Goal: Absence of Hematuria  Outcome: No Change     Problem: Neurotoxicity (Chemotherapy Effects)  Goal: Neurotoxicity Symptom Control  Outcome: No Change     Problem: Neutropenia (Chemotherapy Effects)  Goal: Absence of Infection  Outcome: No Change     Problem: Oral Mucositis (Chemotherapy Effects)  Goal: Improved Oral Mucous Membrane Integrity  Outcome: No Change     Problem: Thrombocytopenia Bleeding Risk (Chemotherapy Effects)  Goal: Absence of Bleeding  Outcome: No Change     "

## 2020-04-12 NOTE — PROGRESS NOTES
[facility], [place]    Hematology / Oncology Progress Note     Assessment & Plan   Kobe Pedroza is a 60 year old male with a history of thyroid cancer status post thyroidectomy (2011), CAD, hyperlipidemia and newly diagnosed Burkitt's Lymphoma. Started R-CODOX-M / R-IVAC chemotherapy regimen (D1=3/18/2020). Now admitted for scheduled Cycle 2 R-IVAC.       TODAY:  - C2D5 of R-IVAC, getting etoposide, ifosfamide, mesna. Will get IT chemo tomorrow (4/13).   - Phosphorus replacement  - Headaches have resolved. Still no BM but pt does not want to escalate bowel regimen.    HEME/ONC  # Burkitt's lymphoma  Followed by Dr. Wright. Presented on 3/12/ 20 to [facility] with acute-on-chronic mid-thoracic back pain with some associated radicular symptoms. Per patient, no B-symptoms, though he did recently have an intentional 35 lb wt loss. 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 Patient had no appreciable neurological deficits. He was started on dexamethasone and underwent T5-6 laminectomy with gross total resection of epidural tumor on 3/15. Flow cytometry of the specimen was notable for CD10 positive and kappa monotypic B cells (83%). Confirmed Burkitt lymphoma with surgical pathology. Flow cytometry of the specimen was notable for CD10 positive and kappa monotypic B cells (83%). Confirmed Burkitt lymphoma with surgical pathology. PET scan showed extensive visceral and bony disease. Bone marrow biopsy completed on 3/18 showed suspicious involvement with rare polytypic B cells (0.8%). No disease in CSF. Started  R-CODOX-M / R-IVAC (D1=3/18/2020), tolerated Cycle 1 very well without complications. Had Neulasta on 3/31.   - PICC line placed on admission 4/8/20.   - CBC and CMP stable on admission, counts recovered. Met parameters to proceed with chemotherapy.   - TLS and DIC labs daily. Allopurinol 300 mg  daily.                   Treatment Plan: Cycle 2 R-/IVAC (D1=4/8/2020). Today is Day 5.               - Cytarabine 2g/m2 (4,260 mg) BID D1, D2               - Etoposide 60 mg/m2 (130 mg) daily Days 1-5                - Ifosfamide 1500 mg/m2 (3,195 mg) Days 1-5                - Rituximab 375 mg/m2 (800 mg) Day 1                 - Mesna 500 mg/m2 Days 1-5                - Hydrocortisone sodium soccinate 50 mg, Methotrexate 12 mg IT Day 5 --- scheduled with XR 4/13 @  1300                - Pre Meds: Tylenol 650 mg, Benadryl 50 mg, Zofran 8 mg BID, Dex 12 mg BID Days 1-5, 8 mg daily  Day 6-7                - Neulasta Day 6 -- requested 4/15 with labs outpatient at [facility] [place]                 - Supportive: Allopurinol tablet 300 mg, Pred Forte susp QID Days 1-4     # ID Prophylaxis  - Viral serologies: HepC nonreactive, HepB negative and HIV negative  - HOLD fluconazole 200mg daily and levaquin 250 mg daily while ANC >1.0   - Continue acyclovir 400 mg BID due to past exposure of VZV   - Pentamidine given 3/21      # Thoracic back pain, resolving  Due to T5-6 extradural tumor as detailed above. Patient now status post T5-6 laminectomy with gross total resction on 3/15/20.  Recently saw NSG.  Encouraged walking. Not taking any analgesics.   - PRN Tylenol     # Intermittent headache  Patient c/o headache on 4/10, resolved with fioricet.  He denies any other neurological complaints.   - continue fioricet as needed  - one time celebrex on 4/11  - continue to monitor for any CNS symptoms      CV  # Coronary artery disease  # Hyperlipidemia  Followed by Dr. Zeng at [facility] ([place]). Diagnosed with mild, non-obstructive CAD in 2018. 81 mg ASA and low-dose statin therapy recommended. No previous cardiac caths or stents.  - Hold PTA statin and ASA      ENDO  # Hx of Thyroid cancer status-post thyroidectomy  Status post thyroidectomy in 2011. TSH normal on 3/12/20.  - Continue PTA levothyroxine      FEN  - No IVF for  "now; encourage PO intake.   - Lyte replacement per protocol  - Regular diet as tolerated     Ppx  - GI: Pepcid (Avoid PPI while on Methotrexate)   - DVT: Lovenox 40mg; Hold if platelets <50K.     Code: FULL (confirmed on admission)       Follow up:               4/15- labs/Neulasta               4/17- local labs ([facility])                4/18- possible transfusions [facility] [place]               4/20- local labs ([facility])                4/21- possible transfusions [facility] [place] and [facility] televisit               4/22- local labs with 4/23 possible transfusions at [facility] [place]               4/27- PET/CT and 4/29 Dr Wright with admission for C2     Dispo: Anticipate discharge to home after completion of chemotherapy 4/13.     The plan was staffed with Dr. Valle    --  Kennedy Orellana MD PhD  Hematology, Oncology, and Bone Marrow Transplantation Service, [facility], [place]  Pager: [phone]  Please see sticky note for cross cover information    Patient has been seen and evaluated by me. I have reviewed today's vital signs, medications, labs and imaging results. I have discussed the plan with the team and agree with the findings and plan in this note.    Chemo going well. Less HA today.  No fcs  No bleeding. Eating some and no resp or ENT sx.  lungs clear; cor reg  abd not tender and no big HS or rebound. No rash or edema    Cont antiemetics and stool softeners as needed.  IT chemo tomorrow.  Deng Valle MD        Interval History   Vitals were normal overnight. There were no acute events. Headaches have resolved. Ate chicken stir mi. Had a BM the day before yesterday. Today Mr Pedroza tells me he has not had a bowel movement since the day before yesterday. He does not want to escalate his bowel regimen because he \"can feel something coming.\"    Physical Exam   Temp: 98.4  F (36.9  C) Temp src: Oral BP: 136/80 Pulse: 81 Heart Rate: 80 Resp: 18 SpO2: 97 % O2 Device: None " (Room air)    Vitals:    04/08/20 1049 04/10/20 0831 04/11/20 0742   Weight: 95.5 kg (210 lb 8.6 oz) 97.4 kg (214 lb 11.2 oz) 96.7 kg (213 lb 3 oz)     Vital Signs with Ranges  Temp:  [98.2  F (36.8  C)-98.4  F (36.9  C)] 98.4  F (36.9  C)  Pulse:  [76-94] 81  Heart Rate:  [80] 80  Resp:  [18] 18  BP: (105-136)/(63-80) 136/80  SpO2:  [96 %-98 %] 97 %  I/O last 3 completed shifts:  In: 2484 [P.O.:1550; I.V.:934]  Out: 2425 [Urine:2425]    Constitutional: very pleasant, in NAD  ENT: no mucositis  Respiratory: CTAB, normal effort  Cardiovascular: nl s1/s2 no MRGs  GI: abdomen is soft, NT, +BS  Skin: warm, dry, incision on thoracic back c/d/i appears to be healing appropriately  Musculoskeletal: no pretibial pitting edema  Neurologic: awake/alert, speech is clear, answers questions appropriately        Medications     - MEDICATION INSTRUCTIONS -       - MEDICATION INSTRUCTIONS -       sodium chloride         acyclovir  400 mg Oral BID     allopurinol  300 mg Oral Daily     dexamethasone  12 mg Oral Q24H     [START ON 4/13/2020] dexamethasone  8 mg Oral Daily     enoxaparin ANTICOAGULANT  40 mg Subcutaneous Q24H     etoposide (TOPOSAR) infusion  60 mg/m2 (Treatment Plan Recorded) Intravenous Q24H     famotidine  10 mg Oral BID     [START ON 4/13/2020] filgrastim (NEUPOGEN/GRANIX) subcutaneous  5 mcg/kg (Treatment Plan Recorded) Subcutaneous Daily at 8 pm     [START ON 4/13/2020] INTRATHECAL - Cytarabine and/or methotrexate and/or Hydrocortisone   Intrathecal Once     ifosfamide (IFEX) infusion  1,500 mg/m2 (Treatment Plan Recorded) Intravenous Q24H     levothyroxine  200 mcg Oral Daily     mesna (MESNEX) infusion  500 mg/m2 (Treatment Plan Recorded) Intravenous Q24H     mesna (MESNEX) infusion  500 mg/m2 (Treatment Plan Recorded) Intravenous Q24H     mesna (MESNEX) infusion  500 mg/m2 (Treatment Plan Recorded) Intravenous Q24H     ondansetron  4 mg Oral Q12H     polyethylene glycol  17 g Oral Daily     prednisoLONE  acetate  2 drop Both Eyes 4x Daily     senna-docusate  3 tablet Oral BID       Data   Results for orders placed or performed during the hospital encounter of 04/08/20 (from the past 24 hour(s))   CBC with platelets differential   Result Value Ref Range    WBC 7.1 4.0 - 11.0 10e9/L    RBC Count 2.72 (L) 4.4 - 5.9 10e12/L    Hemoglobin 8.1 (L) 13.3 - 17.7 g/dL    Hematocrit 25.1 (L) 40.0 - 53.0 %    MCV 92 78 - 100 fl    MCH 29.8 26.5 - 33.0 pg    MCHC 32.3 31.5 - 36.5 g/dL    RDW 15.4 (H) 10.0 - 15.0 %    Platelet Count 177 150 - 450 10e9/L    Diff Method Manual Differential     % Neutrophils 100.0 %    % Lymphocytes 0.0 %    % Monocytes 0.0 %    % Eosinophils 0.0 %    % Basophils 0.0 %    Absolute Neutrophil 7.1 1.6 - 8.3 10e9/L    Absolute Lymphocytes 0.0 (L) 0.8 - 5.3 10e9/L    Absolute Monocytes 0.0 0.0 - 1.3 10e9/L    Absolute Eosinophils 0.0 0.0 - 0.7 10e9/L    Absolute Basophils 0.0 0.0 - 0.2 10e9/L    RBC Morphology Normal    Comprehensive metabolic panel   Result Value Ref Range    Sodium 140 133 - 144 mmol/L    Potassium 4.3 3.4 - 5.3 mmol/L    Chloride 108 94 - 109 mmol/L    Carbon Dioxide 26 20 - 32 mmol/L    Anion Gap 6 3 - 14 mmol/L    Glucose 164 (H) 70 - 99 mg/dL    Urea Nitrogen 21 7 - 30 mg/dL    Creatinine 0.71 0.66 - 1.25 mg/dL    GFR Estimate >90 >60 mL/min/[1.73_m2]    GFR Estimate If Black >90 >60 mL/min/[1.73_m2]    Calcium 7.2 (L) 8.5 - 10.1 mg/dL    Bilirubin Total 0.3 0.2 - 1.3 mg/dL    Albumin 2.9 (L) 3.4 - 5.0 g/dL    Protein Total 5.8 (L) 6.8 - 8.8 g/dL    Alkaline Phosphatase 67 40 - 150 U/L    ALT 47 0 - 70 U/L    AST 23 0 - 45 U/L   Uric acid   Result Value Ref Range    Uric Acid 3.5 3.5 - 7.2 mg/dL   INR   Result Value Ref Range    INR 1.07 0.86 - 1.14   Partial thromboplastin time   Result Value Ref Range    PTT 25 22 - 37 sec   Fibrinogen activity   Result Value Ref Range    Fibrinogen 405 200 - 420 mg/dL   Phosphorus   Result Value Ref Range    Phosphorus 2.6 2.5 - 4.5 mg/dL    Magnesium   Result Value Ref Range    Magnesium 2.3 1.6 - 2.3 mg/dL   Lactate Dehydrogenase   Result Value Ref Range    Lactate Dehydrogenase 274 (H) 85 - 227 U/L

## 2020-04-13 ENCOUNTER — APPOINTMENT (OUTPATIENT)
Dept: GENERAL RADIOLOGY | Facility: CLINIC | Age: 60
DRG: 847 | End: 2020-04-13
Attending: PHYSICIAN ASSISTANT
Payer: COMMERCIAL

## 2020-04-13 VITALS
HEIGHT: 69 IN | DIASTOLIC BLOOD PRESSURE: 83 MMHG | OXYGEN SATURATION: 97 % | HEART RATE: 79 BPM | TEMPERATURE: 98.6 F | WEIGHT: 214.2 LBS | SYSTOLIC BLOOD PRESSURE: 130 MMHG | RESPIRATION RATE: 16 BRPM | BODY MASS INDEX: 31.73 KG/M2

## 2020-04-13 LAB
ALBUMIN SERPL-MCNC: 2.8 G/DL (ref 3.4–5)
ALP SERPL-CCNC: 66 U/L (ref 40–150)
ALT SERPL W P-5'-P-CCNC: 48 U/L (ref 0–70)
ANION GAP SERPL CALCULATED.3IONS-SCNC: 4 MMOL/L (ref 3–14)
APTT PPP: 23 SEC (ref 22–37)
AST SERPL W P-5'-P-CCNC: 22 U/L (ref 0–45)
BASOPHILS # BLD AUTO: 0 10E9/L (ref 0–0.2)
BASOPHILS NFR BLD AUTO: 0 %
BILIRUB SERPL-MCNC: 0.3 MG/DL (ref 0.2–1.3)
BUN SERPL-MCNC: 23 MG/DL (ref 7–30)
CALCIUM SERPL-MCNC: 6.7 MG/DL (ref 8.5–10.1)
CHLORIDE SERPL-SCNC: 110 MMOL/L (ref 94–109)
CO2 SERPL-SCNC: 25 MMOL/L (ref 20–32)
CREAT SERPL-MCNC: 0.75 MG/DL (ref 0.66–1.25)
DIFFERENTIAL METHOD BLD: ABNORMAL
EOSINOPHIL # BLD AUTO: 0 10E9/L (ref 0–0.7)
EOSINOPHIL NFR BLD AUTO: 0 %
ERYTHROCYTE [DISTWIDTH] IN BLOOD BY AUTOMATED COUNT: 14.9 % (ref 10–15)
FIBRINOGEN PPP-MCNC: 389 MG/DL (ref 200–420)
GFR SERPL CREATININE-BSD FRML MDRD: >90 ML/MIN/{1.73_M2}
GLUCOSE CSF-MCNC: 74 MG/DL (ref 40–70)
GLUCOSE SERPL-MCNC: 159 MG/DL (ref 70–99)
GRAM STN SPEC: NORMAL
HCT VFR BLD AUTO: 22.8 % (ref 40–53)
HGB BLD-MCNC: 7.4 G/DL (ref 13.3–17.7)
IMM GRANULOCYTES # BLD: 0 10E9/L (ref 0–0.4)
IMM GRANULOCYTES NFR BLD: 0.6 %
INR PPP: 1.03 (ref 0.86–1.14)
LDH SERPL L TO P-CCNC: 264 U/L (ref 85–227)
LYMPHOCYTES # BLD AUTO: 0.1 10E9/L (ref 0.8–5.3)
LYMPHOCYTES NFR BLD AUTO: 1.3 %
MAGNESIUM SERPL-MCNC: 2.2 MG/DL (ref 1.6–2.3)
MCH RBC QN AUTO: 29.8 PG (ref 26.5–33)
MCHC RBC AUTO-ENTMCNC: 32.5 G/DL (ref 31.5–36.5)
MCV RBC AUTO: 92 FL (ref 78–100)
MONOCYTES # BLD AUTO: 0 10E9/L (ref 0–1.3)
MONOCYTES NFR BLD AUTO: 0.2 %
NEUTROPHILS # BLD AUTO: 4.7 10E9/L (ref 1.6–8.3)
NEUTROPHILS NFR BLD AUTO: 97.9 %
NRBC # BLD AUTO: 0 10*3/UL
NRBC BLD AUTO-RTO: 0 /100
PHOSPHATE SERPL-MCNC: 2.6 MG/DL (ref 2.5–4.5)
PLATELET # BLD AUTO: 128 10E9/L (ref 150–450)
POTASSIUM SERPL-SCNC: 3.8 MMOL/L (ref 3.4–5.3)
PROT CSF-MCNC: 84 MG/DL (ref 15–60)
PROT SERPL-MCNC: 5.9 G/DL (ref 6.8–8.8)
RBC # BLD AUTO: 2.48 10E12/L (ref 4.4–5.9)
SODIUM SERPL-SCNC: 139 MMOL/L (ref 133–144)
SPECIMEN SOURCE: NORMAL
URATE SERPL-MCNC: 2.5 MG/DL (ref 3.5–7.2)
WBC # BLD AUTO: 4.8 10E9/L (ref 4–11)

## 2020-04-13 PROCEDURE — 88184 FLOWCYTOMETRY/ TC 1 MARKER: CPT | Performed by: PHYSICIAN ASSISTANT

## 2020-04-13 PROCEDURE — 25800030 ZZH RX IP 258 OP 636: Performed by: STUDENT IN AN ORGANIZED HEALTH CARE EDUCATION/TRAINING PROGRAM

## 2020-04-13 PROCEDURE — 25000132 ZZH RX MED GY IP 250 OP 250 PS 637: Performed by: STUDENT IN AN ORGANIZED HEALTH CARE EDUCATION/TRAINING PROGRAM

## 2020-04-13 PROCEDURE — 85730 THROMBOPLASTIN TIME PARTIAL: CPT | Performed by: PHYSICIAN ASSISTANT

## 2020-04-13 PROCEDURE — 82945 GLUCOSE OTHER FLUID: CPT | Performed by: PHYSICIAN ASSISTANT

## 2020-04-13 PROCEDURE — 80053 COMPREHEN METABOLIC PANEL: CPT | Performed by: PHYSICIAN ASSISTANT

## 2020-04-13 PROCEDURE — 87205 SMEAR GRAM STAIN: CPT | Performed by: PHYSICIAN ASSISTANT

## 2020-04-13 PROCEDURE — 83735 ASSAY OF MAGNESIUM: CPT | Performed by: PHYSICIAN ASSISTANT

## 2020-04-13 PROCEDURE — 40001004 ZZHCL STATISTIC FLOW INT 9-15 ABY TC 88188: Performed by: PHYSICIAN ASSISTANT

## 2020-04-13 PROCEDURE — 25000132 ZZH RX MED GY IP 250 OP 250 PS 637: Performed by: PHYSICIAN ASSISTANT

## 2020-04-13 PROCEDURE — 84100 ASSAY OF PHOSPHORUS: CPT | Performed by: PHYSICIAN ASSISTANT

## 2020-04-13 PROCEDURE — 009U3ZX DRAINAGE OF SPINAL CANAL, PERCUTANEOUS APPROACH, DIAGNOSTIC: ICD-10-PCS | Performed by: PHYSICIAN ASSISTANT

## 2020-04-13 PROCEDURE — 25000125 ZZHC RX 250: Performed by: STUDENT IN AN ORGANIZED HEALTH CARE EDUCATION/TRAINING PROGRAM

## 2020-04-13 PROCEDURE — 85610 PROTHROMBIN TIME: CPT | Performed by: PHYSICIAN ASSISTANT

## 2020-04-13 PROCEDURE — 89050 BODY FLUID CELL COUNT: CPT | Performed by: PHYSICIAN ASSISTANT

## 2020-04-13 PROCEDURE — 83615 LACTATE (LD) (LDH) ENZYME: CPT | Performed by: PHYSICIAN ASSISTANT

## 2020-04-13 PROCEDURE — 87015 SPECIMEN INFECT AGNT CONCNTJ: CPT | Performed by: PHYSICIAN ASSISTANT

## 2020-04-13 PROCEDURE — 77003 FLUOROGUIDE FOR SPINE INJECT: CPT

## 2020-04-13 PROCEDURE — 25800030 ZZH RX IP 258 OP 636: Performed by: PHYSICIAN ASSISTANT

## 2020-04-13 PROCEDURE — 25000128 H RX IP 250 OP 636: Performed by: PHYSICIAN ASSISTANT

## 2020-04-13 PROCEDURE — 88185 FLOWCYTOMETRY/TC ADD-ON: CPT | Performed by: PHYSICIAN ASSISTANT

## 2020-04-13 PROCEDURE — 84550 ASSAY OF BLOOD/URIC ACID: CPT | Performed by: PHYSICIAN ASSISTANT

## 2020-04-13 PROCEDURE — 85025 COMPLETE CBC W/AUTO DIFF WBC: CPT | Performed by: PHYSICIAN ASSISTANT

## 2020-04-13 PROCEDURE — 84157 ASSAY OF PROTEIN OTHER: CPT | Performed by: PHYSICIAN ASSISTANT

## 2020-04-13 PROCEDURE — 3E0R305 INTRODUCTION OF OTHER ANTINEOPLASTIC INTO SPINAL CANAL, PERCUTANEOUS APPROACH: ICD-10-PCS | Performed by: INTERNAL MEDICINE

## 2020-04-13 PROCEDURE — 85384 FIBRINOGEN ACTIVITY: CPT | Performed by: PHYSICIAN ASSISTANT

## 2020-04-13 PROCEDURE — 87070 CULTURE OTHR SPECIMN AEROBIC: CPT | Performed by: PHYSICIAN ASSISTANT

## 2020-04-13 PROCEDURE — 25000131 ZZH RX MED GY IP 250 OP 636 PS 637: Performed by: PHYSICIAN ASSISTANT

## 2020-04-13 RX ORDER — ONDANSETRON 4 MG/1
4 TABLET, FILM COATED ORAL EVERY 12 HOURS
COMMUNITY
Start: 2020-04-13 | End: 2020-04-14

## 2020-04-13 RX ORDER — LIDOCAINE HYDROCHLORIDE 10 MG/ML
10 INJECTION, SOLUTION INFILTRATION; PERINEURAL ONCE
Status: DISCONTINUED | OUTPATIENT
Start: 2020-04-13 | End: 2020-04-13 | Stop reason: HOSPADM

## 2020-04-13 RX ORDER — ACETAMINOPHEN 325 MG/1
650 TABLET ORAL EVERY 4 HOURS PRN
COMMUNITY
Start: 2020-04-13 | End: 2020-12-10

## 2020-04-13 RX ORDER — DEXAMETHASONE 4 MG/1
8 TABLET ORAL DAILY
Qty: 2 TABLET | Refills: 0 | Status: ON HOLD | OUTPATIENT
Start: 2020-04-14 | End: 2020-04-19

## 2020-04-13 RX ADMIN — ACYCLOVIR 400 MG: 200 CAPSULE ORAL at 09:00

## 2020-04-13 RX ADMIN — DEXAMETHASONE 8 MG: 4 TABLET ORAL at 09:00

## 2020-04-13 RX ADMIN — SENNOSIDES AND DOCUSATE SODIUM 2 TABLET: 8.6; 5 TABLET ORAL at 09:00

## 2020-04-13 RX ADMIN — ACETAMINOPHEN 650 MG: 325 TABLET, FILM COATED ORAL at 06:30

## 2020-04-13 RX ADMIN — POTASSIUM PHOSPHATE, MONOBASIC AND POTASSIUM PHOSPHATE, DIBASIC 10 MMOL: 224; 236 INJECTION, SOLUTION INTRAVENOUS at 06:21

## 2020-04-13 RX ADMIN — LEVOTHYROXINE SODIUM 200 MCG: 0.07 TABLET ORAL at 08:59

## 2020-04-13 RX ADMIN — FAMOTIDINE 10 MG: 10 TABLET, FILM COATED ORAL at 09:00

## 2020-04-13 RX ADMIN — METHOTREXATE: 25 INJECTION, SOLUTION INTRA-ARTERIAL; INTRAMUSCULAR; INTRATHECAL; INTRAVENOUS at 13:31

## 2020-04-13 RX ADMIN — MESNA 1070 MG: 100 INJECTION, SOLUTION INTRAVENOUS at 03:29

## 2020-04-13 RX ADMIN — ALLOPURINOL 300 MG: 300 TABLET ORAL at 09:00

## 2020-04-13 RX ADMIN — ACETAMINOPHEN 650 MG: 325 TABLET, FILM COATED ORAL at 10:56

## 2020-04-13 RX ADMIN — MESNA 1070 MG: 100 INJECTION, SOLUTION INTRAVENOUS at 06:09

## 2020-04-13 ASSESSMENT — ACTIVITIES OF DAILY LIVING (ADL)
ADLS_ACUITY_SCORE: 15
ADLS_ACUITY_SCORE: 13
ADLS_ACUITY_SCORE: 15
ADLS_ACUITY_SCORE: 13
ADLS_ACUITY_SCORE: 13

## 2020-04-13 ASSESSMENT — PAIN DESCRIPTION - DESCRIPTORS
DESCRIPTORS: ACHING;DISCOMFORT;HEADACHE
DESCRIPTORS: ACHING;DISCOMFORT;HEADACHE

## 2020-04-13 ASSESSMENT — MIFFLIN-ST. JEOR: SCORE: 1774.1

## 2020-04-13 NOTE — PROGRESS NOTES
Pt discharged to: Home  Via: Self Transport  Accompanied by: Wife  Belongings: Sent home with patient  Teaching: Done per unit routine   Clinic appointment: 4/15.   Report called/faxed: n/a

## 2020-04-13 NOTE — PLAN OF CARE
"/82 (BP Location: Left arm)   Pulse 87   Temp 98.5  F (36.9  C) (Oral)   Resp 16   Ht 1.74 m (5' 8.5\")   Wt 96.7 kg (213 lb 3 oz)   SpO2 98%   BMI 31.94 kg/m        AVSS. No c/o of pain, N/V/D. Refused zofran this morning. Needs phos replacement this morning. Recheck scheduled. Planned to discharge today after last chemo treatment. Will continue POC.        Problem: Adult Inpatient Plan of Care  Goal: Plan of Care Review  4/13/2020 0600 by Long Emerson RN  Outcome: No Change  4/12/2020 1830 by Jessica Dave RN  Outcome: No Change  Goal: Patient-Specific Goal (Individualization)  4/13/2020 0600 by Long Emerson RN  Outcome: No Change  4/12/2020 1830 by Jessica Dave RN  Outcome: No Change  Goal: Absence of Hospital-Acquired Illness or Injury  4/13/2020 0600 by Long Emerson RN  Outcome: No Change  4/12/2020 1830 by Jessica Dave RN  Outcome: No Change  Goal: Optimal Comfort and Wellbeing  4/13/2020 0600 by Long Emerson RN  Outcome: No Change  4/12/2020 1830 by Jessica Dave RN  Outcome: No Change  Goal: Rounds/Family Conference  4/13/2020 0600 by Long Emerson RN  Outcome: No Change  4/12/2020 1830 by Jessica Dave RN  Outcome: No Change     Problem: Anemia (Chemotherapy Effects)  Goal: Anemia Symptom Improvement  4/13/2020 0600 by Long Emerson RN  Outcome: No Change  4/12/2020 1830 by Jessica Dave RN  Outcome: No Change     Problem: Urinary Bleeding Risk or Actual (Chemotherapy Effects)  Goal: Absence of Hematuria  4/13/2020 0600 by Long Emerson RN  Outcome: No Change  4/12/2020 1830 by Jessica Dave RN  Outcome: No Change     Problem: Nausea and Vomiting (Chemotherapy Effects)  Goal: Fluid and Electrolyte Balance  4/13/2020 0600 by Long Emerson RN  Outcome: No Change  4/12/2020 1830 by Jessica Dave, RN  Outcome: No Change     Problem: Neurotoxicity (Chemotherapy Effects)  Goal: Neurotoxicity Symptom Control  4/13/2020 0600 by Long Emerson " B, RN  Outcome: No Change  4/12/2020 1830 by Jessica Dave RN  Outcome: No Change     Problem: Neutropenia (Chemotherapy Effects)  Goal: Absence of Infection  4/13/2020 0600 by Long Emerson RN  Outcome: No Change  4/12/2020 1830 by Jessica Dave RN  Outcome: No Change     Problem: Oral Mucositis (Chemotherapy Effects)  Goal: Improved Oral Mucous Membrane Integrity  4/13/2020 0600 by Long Emerson RN  Outcome: No Change  4/12/2020 1830 by Jessica Dave RN  Outcome: No Change     Problem: Thrombocytopenia Bleeding Risk (Chemotherapy Effects)  Goal: Absence of Bleeding  4/13/2020 0600 by Long Emerson RN  Outcome: No Change  4/12/2020 1830 by Jessica Dave RN  Outcome: No Change

## 2020-04-13 NOTE — DISCHARGE SUMMARY
"[facility], [place]    Discharge Summary  Hematology / Oncology    Date of Admission:  4/8/2020  Date of Discharge:  4/13/2020  Discharging Provider: Mckayla Alvarez  Date of Service (when I saw the patient): 04/13/20    Discharge Diagnoses   Active Problems:    Burkitt lymphoma (H)      History of Present Illness   Kobe Pedroza is a 60 year old male with a history of thyroid cancer status post thyroidectomy (2011), CAD, hyperlipidemia and newly diagnosed Burkitt's Lymphoma. Patient was admitted for scheduled C2 R-IVAC.  Jonnathan states that over all he is feeling \"alright\". States that he continues to have a headache that does not improve with tylenol. He reports that he feels it is related to continued IV fluids. Appetite intact. Looking forward to going home. Denies any other pain, headache, SOB, ELIZALDE, cough, wheezing, abdominal pain, nausea, vomiting, diarrhea, constipation, dysuria, hematuria or difficulty ambulating.    Hospital Course   Kobe Pedroza was admitted on 4/8/2020 for the R-IVAC portion of his R-CODOX-M / R-IVAC therapy regimen. He started the R-CODOX-M portion of his treatment on D1=3/18/2020. He overall tolerated this cycle of chemotherapy (R-IVAC) well which started on 4/8/20. No significant complications of fever or TLS. He received IT chemo prior to discharge today. Laid flat for 1 hour prior to leaving.    The following problems were addressed during his hospitalization:    HEME/ONC  # Burkitt's lymphoma  Followed by Dr. Wright. Presented on 3/12/ 20 to [facility] with acute-on-chronic mid-thoracic back pain with some associated radicular symptoms. Per patient, no B-symptoms, though he did an intentional 35 lb wt loss. 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 Patient had no appreciable neurological deficits. He was started on dexamethasone and underwent T5-6 laminectomy with " gross total resection of epidural tumor on 3/15. Flow cytometry of the specimen was notable for CD10 positive and kappa monotypic B cells (83%). Confirmed Burkitt lymphoma with surgical pathology. Flow cytometry of the specimen was notable for CD10 positive and kappa monotypic B cells (83%). Confirmed Burkitt lymphoma with surgical pathology. PET scan showed extensive visceral and bony disease. Bone marrow biopsy completed on 3/18 showed suspicious involvement with rare polytypic B cells (0.8%). No disease in CSF.   - Started  R-CODOX-M / R-IVAC (D1=3/18/2020), tolerated Cycle 1 very well without complications. Had Neulasta on 3/31.   - PICC line removed prior to discharge  - CBC and CMP remain stable over admission. Will likely have drop in counts after discharge.  - Will have twice weekly labs in [place] clinic outpatient.   - Uric acid remains stable, stop Allopurinol 300 mg daily at discharge  -  Successfully completed treatment plan without significant complications               Treatment Plan: Cycle 2 R-/IVAC (D1=4/8/2020).               - Cytarabine 2g/m2 (4,260 mg) BID D1, D2               - Etoposide 60 mg/m2 (130 mg) daily Days 1-5                - Ifosfamide 1500 mg/m2 (3,195 mg) Days 1-5                - Rituximab 375 mg/m2 (800 mg) Day 1                 - Mesna 500 mg/m2 Days 1-5                - Hydrocortisone sodium soccinate 50 mg, Methotrexate 12 mg IT scheduled with XR 4/13 @1300                - Pre Meds: Tylenol 650 mg, Benadryl 50 mg, Zofran 8 mg BID, Dex 12 mg BID Days 1-5, 8 mg daily  Day 6-7                - Neulasta Day 6 -- requested 4/15 with labs outpatient [place] [facility]                 - Supportive: Allopurinol tablet 300 mg, Pred Forte susp QID Days 1-4     # ID Prophylaxis  - Viral serologies: HepC nonreactive, HepB negative and HIV negative  - Restart fluconazole 200mg daily and levaquin 250 mg daily at discharge in preparation that patient may become neutropenic. Can hold these medications  while ANC >1.0   - Continue acyclovir 400 mg BID due to past exposure of VZV   - Pentamidine last given 3/21      # Thoracic back pain, resolving  Due to T5-6 extradural tumor as detailed above. Patient now status post T5-6 laminectomy with gross total resction on 3/15/20.  Recently saw NSG.  Encouraged walking. Not taking any analgesics.   - PRN Tylenol      # Intermittent headache  Patient c/o headache and states that fioricet and tylenol have not helped.  He denies any other neurological complaints.   - Continue tylenol as needed      CV  # Coronary artery disease  # Hyperlipidemia  Followed by Dr. Zeng at [facility] ([place] Diagnosed with mild, non-obstructive CAD in 2018. 81 mg ASA and low-dose statin therapy recommended. No previous cardiac caths or stents.  - Continue to hold PTA statin and ASA      ENDO  # Hx of Thyroid cancer status-post thyroidectomy  Status post thyroidectomy in 2011. TSH normal on 3/12/20.  - Continue PTA levothyroxine       Code: FULL (confirmed on admission)       Follow up scheduled:               4/15- labs/Neulasta               4/17- local labs ([facility])                4/18- possible transf[place] [facility]               4/20- local labs ([facility])                4/21- possible transfusions [facility] and [facility] [place]visit               4/22- local labs with 4/23 possible transfusions at [facility]               4/27- PET/CT and 4/29 Dr Wright with admission for C2    Follow up has been discussed with patient. Patient states he still has his ID ppx medications at home and can continue to take them as directed. This cycle has been well tolerated by the patient with no significant complications    Mckayla Alvarez PA-C  Hematology/Oncology    Patient has been seen and evaluated by me. I have reviewed today's vital signs, medications, labs and imaging results. I have discussed the plan with the team and agree with the findings and plan in this note.      Completing chemo  and IT therpay today;   HA but no acute other complications during this phase of treatment. No acute new phys findings.  No fcs. No bleeding.  Lungs clear and abd soft. Cor reg  No edema or new rash  OK for discharge and out pt f/u    Deng Valle MD          Significant Results and Procedures   Lumbar puncture with IT chemo 4/13 prior to discharge    Pending Results   These results will be followed up by Outpatient follow up  Unresulted Labs Ordered in the Past 30 Days of this Admission     No orders found from 3/9/2020 to 4/9/2020.          Code Status   Full Code    Primary Care Physician   Garett Arriaga    Physical Exam   Temp: 98  F (36.7  C) Temp src: Oral BP: 112/87 Pulse: 98   Resp: 16 SpO2: 97 % O2 Device: None (Room air)    Vitals:    04/10/20 0831 04/11/20 0742 04/13/20 0832   Weight: 97.4 kg (214 lb 11.2 oz) 96.7 kg (213 lb 3 oz) 97.2 kg (214 lb 3.2 oz)     Vital Signs with Ranges  Temp:  [97.8  F (36.6  C)-98.5  F (36.9  C)] 98  F (36.7  C)  Pulse:  [75-98] 98  Resp:  [16-18] 16  BP: (112-128)/(71-87) 112/87  SpO2:  [93 %-98 %] 97 %  I/O last 3 completed shifts:  In: 2154 [P.O.:1150; I.V.:1004]  Out: 3125 [Urine:3125]    Constitutional: Pleasant male sitting at edge of bed eating breakfast. Awake, alert, cooperative, no apparent distress, and appears stated age.  Eyes: Lids and lashes normal, extra ocular muscles grossly intact  ENT: Normocephalic, moist mucus membranes  Respiratory: No increased work of breathing, good air exchange, clear to auscultation bilaterally, no crackles or wheezing.  Cardiovascular: Regular rate and rhythm, normal S1 and S2, no S3 or S4, and no murmur noted.   GI: Normal bowel sounds, soft, non-distended, non-tender.  Skin: No bruising or bleeding, no redness, warmth, or swelling, no rashes, and no jaundice appreciated on limited exam of skin  Musculoskeletal: There is no redness, warmth, or swelling of the joints.  No peripheral edema.  Neurologic: Awake, alert,  oriented.  Grossly non focal. Motor function grossly intact. Able to move in bed with ease.    Neuropsychiatric: Calm, normal eye contact, alert, normal affect, and thought process normal.    Time Spent on this Encounter   I, Mckayla Alvarez PA-C, personally saw the patient today and spent greater than 30 minutes discharging this patient.    Discharge Disposition   Discharged to home  Condition at discharge: Stable    Consultations This Hospital Stay   MEDICATION HISTORY IP PHARMACY CONSULT    Discharge Orders   No discharge procedures on file.  Discharge Medications   Current Discharge Medication List      CONTINUE these medications which have NOT CHANGED    Details   acyclovir (ZOVIRAX) 200 MG capsule Take 2 capsules (400 mg) by mouth 2 times daily  Qty: 60 capsule, Refills: 0    Associated Diagnoses: 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)      fluconazole (DIFLUCAN) 200 MG tablet ONLY TAKE IF ANC <1000  Qty: 30 tablet, Refills: 0    Associated Diagnoses: 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)      levofloxacin (LEVAQUIN) 250 MG tablet ONLY TAKE IF ANC <1000  Qty: 30 tablet, Refills: 0    Associated Diagnoses: 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)      levothyroxine (SYNTHROID/LEVOTHROID) 200 MCG tablet Take 200 mcg by mouth daily      potassium chloride (KLOR-CON) 20 MEQ packet Take 40 mEq by mouth daily      senna-docusate (SENOKOT-S/PERICOLACE) 8.6-50 MG tablet Take 1 tablet by mouth 2 times daily  Qty: 60 tablet, Refills: 0    Associated Diagnoses: 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)           Allergies   No Known Allergies  Data   Most Recent 3 CBC's:  Recent Labs   Lab Test 04/13/20 0335 04/12/20 0340 04/11/20 0343   WBC 4.8 7.1 10.4   HGB 7.4* 8.1* 8.3*   MCV 92 92 92   * 177 193      Most Recent 3 BMP's:  Recent Labs   Lab Test 04/13/20 0335 04/12/20 0340 04/11/20  0343    140 141   POTASSIUM 3.8 4.3 3.9   CHLORIDE  110* 108 110*   CO2 25 26 25   BUN 23 21 20   CR 0.75 0.71 0.76   ANIONGAP 4 6 6   RUTH 6.7* 7.2* 7.1*   * 164* 164*     Most Recent 2 LFT's:  Recent Labs   Lab Test 04/13/20  0335 04/12/20  0340   AST 22 23   ALT 48 47   ALKPHOS 66 67   BILITOTAL 0.3 0.3     Most Recent INR's and Anticoagulation Dosing History:  Anticoagulation Dose History     Recent Dosing and Labs Latest Ref Rng & Units 3/30/2020 4/8/2020 4/9/2020 4/10/2020 4/11/2020 4/12/2020 4/13/2020    INR 0.86 - 1.14 1.05 1.07 1.09 1.08 1.07 1.07 1.03        Most Recent 3 Troponin's:No lab results found.  Most Recent Cholesterol Panel:No lab results found.  Most Recent 6 Bacteria Isolates From Any Culture (See EPIC Reports for Culture Details):  Recent Labs   Lab Test 03/23/20  1345 03/20/20  1340   CULT No growth No growth     Most Recent TSH, T4 and A1c Labs:  Recent Labs   Lab Test 03/14/20  0439 03/12/20   TSH  --  0.43   A1C 5.5  --      Results for orders placed or performed during the hospital encounter of 04/08/20   IR PICC Vascular    Narrative    This exam was marked as non-reportable because it will not be read by a   radiologist or a [place] non-radiologist provider.         XR Chest 1 View    Narrative    Exam: XR CHEST 1 VW, 4/8/2020 10:14 AM    Indication: PICC verification    Comparison: 3/17/2019, 3/15/2019    Findings:   A single PA view of the chest was obtained. The right arm PICC tip  projects over the low SVC, retracted since the previous exam. The  cardiomediastinal silhouette is within normal limits. Mildly decreased  lung volumes. No pneumothorax or pleural effusion. Streaky perihilar  opacities likely represent vascular crowding or atelectasis given low  lung volumes. No focal airspace opacities. The visualized upper  abdomen appears normal. No acute osseous abnormalities.      Impression    Impression:   The right arm PICC tip projects over the low SVC. No pneumothorax.    KENAN HANNAH MD

## 2020-04-13 NOTE — PLAN OF CARE
"/87 (BP Location: Left arm)   Pulse 98   Temp 98  F (36.7  C) (Oral)   Resp 16   Ht 1.74 m (5' 8.5\")   Wt 97.2 kg (214 lb 3.2 oz)   SpO2 97%   BMI 32.09 kg/m    PICC is C/D/I and infusing. Patient went down in the IR for chemotherapy infusion and will be discharge post treatments. Patient c/o headache and received ice/hot packs x 2 and tylenol 650 mg po x 1 with relief. Patient has to stay on his back for an hour post treatment. Incoming RN will explain to the discharge instructions and medications. Follow up appointment and his wife will pick him up around 1600. Continue with plan of care and notify MD for status changes.    Problem: Adult Inpatient Plan of Care  Goal: Patient-Specific Goal (Individualization)  4/13/2020 1402 by Lita Brito RN  Outcome: No Change     Problem: Adult Inpatient Plan of Care  Goal: Absence of Hospital-Acquired Illness or Injury  4/13/2020 1402 by Lita Brito RN  Outcome: No Change     Problem: Adult Inpatient Plan of Care  Goal: Optimal Comfort and Wellbeing  4/13/2020 1402 by Lita Brito RN  Outcome: No Change     Problem: Adult Inpatient Plan of Care  Goal: Readiness for Transition of Care  4/13/2020 1402 by Lita Brito RN  Outcome: No Change     Problem: Adult Inpatient Plan of Care  Goal: Rounds/Family Conference  4/13/2020 1402 by Lita Brito RN  Outcome: No Change     Problem: Anemia (Chemotherapy Effects)  Goal: Anemia Symptom Improvement  4/13/2020 1402 by Lita Brito RN  Outcome: No Change     Problem: Urinary Bleeding Risk or Actual (Chemotherapy Effects)  Goal: Absence of Hematuria  4/13/2020 1402 by Lita Brito RN  Outcome: No Change     Problem: Nausea and Vomiting (Chemotherapy Effects)  Goal: Fluid and Electrolyte Balance  4/13/2020 1402 by Lita Brito RN  Outcome: No Change     Problem: Neurotoxicity (Chemotherapy Effects)  Goal: Neurotoxicity Symptom " Control  4/13/2020 1402 by Lita Brito RN  Outcome: No Change     Problem: Neutropenia (Chemotherapy Effects)  Goal: Absence of Infection  4/13/2020 1402 by Lita Brito RN  Outcome: No Change     Problem: Thrombocytopenia Bleeding Risk (Chemotherapy Effects)  Goal: Absence of Bleeding  4/13/2020 1402 by Lita Brito RN  Outcome: No Change     Problem: Adult Inpatient Plan of Care  Goal: Plan of Care Review  4/13/2020 1402 by Lita Brito RN  Outcome: Improving

## 2020-04-13 NOTE — PROCEDURES
DIAGNOSIS: Burkitt lymphoma    PROCEDURE: Intrathecal chemo administration     LOCATION: Radiology    INDICATION: Burkitt Lymphoma with R-CODOX-M/IVAC chemotherapy protocol    Lumbar puncture performed and CSF samples collected by the General Radiology team under fluoroscopy.      Chemotherapy was double-checked by this writer and Radiology RN. This writer then administered hydrocortisone sodium succinate PF (solu-CORTEF) 50mg, methotrexate (PF) 12mg in sodium chloride (PF) 0.9% PF 6mL intrathecal inj (PF) over 3-5 minutes without apparent complication.    Complications: None immediately.     Chemo instillation performed by: NEDA Stokes PA-C  Hematology/Oncology  Pager: [phone]

## 2020-04-14 ENCOUNTER — CARE COORDINATION (OUTPATIENT)
Dept: ONCOLOGY | Facility: CLINIC | Age: 60
End: 2020-04-14

## 2020-04-14 ENCOUNTER — PATIENT OUTREACH (OUTPATIENT)
Dept: CARE COORDINATION | Facility: CLINIC | Age: 60
End: 2020-04-14

## 2020-04-14 DIAGNOSIS — C83.78 BURKITT LYMPHOMA OF LYMPH NODES OF MULTIPLE REGIONS (H): ICD-10-CM

## 2020-04-14 DIAGNOSIS — G89.3 CANCER RELATED PAIN: Primary | ICD-10-CM

## 2020-04-14 DIAGNOSIS — D70.1 CHEMOTHERAPY-INDUCED NEUTROPENIA (H): Primary | ICD-10-CM

## 2020-04-14 DIAGNOSIS — C83.78 BURKITT LYMPHOMA OF LYMPH NODES OF MULTIPLE REGIONS (H): Primary | ICD-10-CM

## 2020-04-14 DIAGNOSIS — T45.1X5A CHEMOTHERAPY-INDUCED NEUTROPENIA (H): Primary | ICD-10-CM

## 2020-04-14 LAB — COPATH REPORT: NORMAL

## 2020-04-14 RX ORDER — POTASSIUM CHLORIDE 1.5 G/1.58G
40 POWDER, FOR SOLUTION ORAL DAILY
Qty: 60 PACKET | Refills: 0 | Status: ON HOLD | OUTPATIENT
Start: 2020-04-14 | End: 2020-05-26

## 2020-04-14 RX ORDER — ONDANSETRON 4 MG/1
4-8 TABLET, FILM COATED ORAL EVERY 6 HOURS PRN
Qty: 90 TABLET | Refills: 3 | Status: SHIPPED | OUTPATIENT
Start: 2020-04-14 | End: 2020-12-10

## 2020-04-14 RX ORDER — OXYCODONE HYDROCHLORIDE 5 MG/1
5-10 TABLET ORAL EVERY 6 HOURS PRN
Qty: 20 TABLET | Refills: 0 | Status: ON HOLD | OUTPATIENT
Start: 2020-04-14 | End: 2020-05-11

## 2020-04-14 RX ORDER — ACYCLOVIR 200 MG/1
400 CAPSULE ORAL 2 TIMES DAILY
Qty: 60 CAPSULE | Refills: 3 | Status: ON HOLD | OUTPATIENT
Start: 2020-04-14 | End: 2020-05-11

## 2020-04-14 NOTE — PROGRESS NOTES
"[facility]: Post-Discharge Note  SITUATION                                                      Admission:    Admission Date: 04/08/20   Reason for Admission: Burkitt lymphoma  Discharge:   Discharge Date: 04/13/20  Discharge Diagnosis: Burkitt lymphoma  Discharge Service: Hem/Onc    BACKGROUND                                                      Kobe Pedroza is a 60 year old male with a history of thyroid cancer status post thyroidectomy (2011), CAD, hyperlipidemia and newly diagnosed Burkitt's Lymphoma. Patient was admitted for scheduled C2 R-IVAC.  Jonnathan states that over all he is feeling \"alright\". States that he continues to have a headache that does not improve with tylenol. He reports that he feels it is related to continued IV fluids. Appetite intact. Looking forward to going home. Denies any other pain, headache, SOB, ELIZALDE, cough, wheezing, abdominal pain, nausea, vomiting, diarrhea, constipation, dysuria, hematuria or difficulty ambulating.    ASSESSMENT      Discharge Assessment  Patient reports symptoms are: Unchanged  Does the patient have all of their medications?: Yes  Does patient know what their new medications are for?: Not applicable  Does patient have a follow-up appointment scheduled?: Yes(Tomorrow for lab/injection)  Does patient have any other questions or concerns?: No    Post-op  Did the patient have surgery or a procedure: No  Fever: No  Chills: No  Eating & Drinking: eating and drinking without complaints/concerns  PO Intake: regular diet  Bowel Function: normal  Urinary Status: voiding without complaint/concerns    PLAN                                                      Future Appointments   Date Time Provider Department Center   4/15/2020 10:00 AM  [facility] DRAW ONL [facility]   4/15/2020 10:30 AM Nurse,  Oncology Injection [facility]   4/21/2020 12:30 PM Lyubov Sin PA-C [facility]   4/27/2020 10:30 AM [facility] NATHAN ISLAS " Robbin, CMA

## 2020-04-14 NOTE — PROGRESS NOTES
Writer called Kobe Pedroza to go over Huey discharge plan, med's and follow-up appointments. Reviewed med's and discharge plan. Answered questions, provided information and support.     Writer will continue to follow.

## 2020-04-15 ENCOUNTER — ALLIED HEALTH/NURSE VISIT (OUTPATIENT)
Dept: ONCOLOGY | Facility: CLINIC | Age: 60
End: 2020-04-15
Attending: INTERNAL MEDICINE
Payer: COMMERCIAL

## 2020-04-15 ENCOUNTER — APPOINTMENT (OUTPATIENT)
Dept: LAB | Facility: CLINIC | Age: 60
End: 2020-04-15
Attending: INTERNAL MEDICINE
Payer: COMMERCIAL

## 2020-04-15 VITALS
TEMPERATURE: 98.1 F | HEART RATE: 86 BPM | SYSTOLIC BLOOD PRESSURE: 117 MMHG | DIASTOLIC BLOOD PRESSURE: 84 MMHG | BODY MASS INDEX: 31.61 KG/M2 | OXYGEN SATURATION: 98 % | WEIGHT: 211 LBS | RESPIRATION RATE: 18 BRPM

## 2020-04-15 DIAGNOSIS — T45.1X5A CHEMOTHERAPY-INDUCED NEUTROPENIA (H): ICD-10-CM

## 2020-04-15 DIAGNOSIS — D70.1 CHEMOTHERAPY-INDUCED NEUTROPENIA (H): ICD-10-CM

## 2020-04-15 DIAGNOSIS — C83.78 BURKITT LYMPHOMA OF LYMPH NODES OF MULTIPLE REGIONS (H): Primary | ICD-10-CM

## 2020-04-15 LAB
ABO + RH BLD: NORMAL
ABO + RH BLD: NORMAL
ALBUMIN SERPL-MCNC: 3.6 G/DL (ref 3.4–5)
ALP SERPL-CCNC: 68 U/L (ref 40–150)
ALT SERPL W P-5'-P-CCNC: 71 U/L (ref 0–70)
ANION GAP SERPL CALCULATED.3IONS-SCNC: 8 MMOL/L (ref 3–14)
APPEARANCE CSF: CLEAR
AST SERPL W P-5'-P-CCNC: 33 U/L (ref 0–45)
BASOPHILS # BLD AUTO: 0 10E9/L (ref 0–0.2)
BASOPHILS NFR BLD AUTO: 0.9 %
BILIRUB SERPL-MCNC: 0.4 MG/DL (ref 0.2–1.3)
BLD GP AB SCN SERPL QL: NORMAL
BLOOD BANK CMNT PATIENT-IMP: NORMAL
BUN SERPL-MCNC: 25 MG/DL (ref 7–30)
CALCIUM SERPL-MCNC: 7.7 MG/DL (ref 8.5–10.1)
CHLORIDE SERPL-SCNC: 108 MMOL/L (ref 94–109)
CO2 SERPL-SCNC: 22 MMOL/L (ref 20–32)
COLOR CSF: COLORLESS
CREAT SERPL-MCNC: 0.82 MG/DL (ref 0.66–1.25)
DIFFERENTIAL METHOD BLD: ABNORMAL
EOSINOPHIL # BLD AUTO: 0 10E9/L (ref 0–0.7)
EOSINOPHIL NFR BLD AUTO: 0 %
ERYTHROCYTE [DISTWIDTH] IN BLOOD BY AUTOMATED COUNT: 14.8 % (ref 10–15)
GFR SERPL CREATININE-BSD FRML MDRD: >90 ML/MIN/{1.73_M2}
GLUCOSE SERPL-MCNC: 104 MG/DL (ref 70–99)
HCT VFR BLD AUTO: 24.6 % (ref 40–53)
HGB BLD-MCNC: 8.4 G/DL (ref 13.3–17.7)
LYMPHOCYTES # BLD AUTO: 0.1 10E9/L (ref 0.8–5.3)
LYMPHOCYTES NFR BLD AUTO: 8.7 %
MCH RBC QN AUTO: 30.8 PG (ref 26.5–33)
MCHC RBC AUTO-ENTMCNC: 34.1 G/DL (ref 31.5–36.5)
MCV RBC AUTO: 90 FL (ref 78–100)
MONOCYTES # BLD AUTO: 0 10E9/L (ref 0–1.3)
MONOCYTES NFR BLD AUTO: 0.9 %
NEUTROPHILS # BLD AUTO: 1.3 10E9/L (ref 1.6–8.3)
NEUTROPHILS NFR BLD AUTO: 89.5 %
PLATELET # BLD AUTO: 95 10E9/L (ref 150–450)
PLATELET # BLD EST: ABNORMAL 10*3/UL
POTASSIUM SERPL-SCNC: 3.6 MMOL/L (ref 3.4–5.3)
PROT SERPL-MCNC: 7.2 G/DL (ref 6.8–8.8)
RBC # BLD AUTO: 2.73 10E12/L (ref 4.4–5.9)
RBC # CSF MANUAL: 2 /UL (ref 0–2)
RBC MORPH BLD: NORMAL
SODIUM SERPL-SCNC: 139 MMOL/L (ref 133–144)
SPECIMEN EXP DATE BLD: NORMAL
TUBE # CSF: 4 #
WBC # BLD AUTO: 1.4 10E9/L (ref 4–11)
WBC # CSF MANUAL: 0 /UL (ref 0–5)

## 2020-04-15 PROCEDURE — 96372 THER/PROPH/DIAG INJ SC/IM: CPT

## 2020-04-15 PROCEDURE — 85025 COMPLETE CBC W/AUTO DIFF WBC: CPT | Performed by: PHYSICIAN ASSISTANT

## 2020-04-15 PROCEDURE — 86901 BLOOD TYPING SEROLOGIC RH(D): CPT | Performed by: PHYSICIAN ASSISTANT

## 2020-04-15 PROCEDURE — 25000128 H RX IP 250 OP 636: Mod: ZF | Performed by: INTERNAL MEDICINE

## 2020-04-15 PROCEDURE — 86900 BLOOD TYPING SEROLOGIC ABO: CPT | Performed by: PHYSICIAN ASSISTANT

## 2020-04-15 PROCEDURE — 80053 COMPREHEN METABOLIC PANEL: CPT | Performed by: PHYSICIAN ASSISTANT

## 2020-04-15 PROCEDURE — 86850 RBC ANTIBODY SCREEN: CPT | Performed by: PHYSICIAN ASSISTANT

## 2020-04-15 RX ADMIN — PEGFILGRASTIM 6 MG: 6 INJECTION SUBCUTANEOUS at 10:52

## 2020-04-15 ASSESSMENT — PAIN SCALES - GENERAL: PAINLEVEL: NO PAIN (0)

## 2020-04-15 NOTE — NURSING NOTE
Chief Complaint   Patient presents with     Blood Draw     Labs drawn via  by RN in lab. VS taken.      Kyler Brooks RN,

## 2020-04-15 NOTE — PROGRESS NOTES
Patient presents to the [facility] for pegfilgrastim (NEULASTA) injection 6 mg. Order written by Dr. Wright was completed today. Name and  were verified by patient. See MAR for medication details. Patient was asked if they have any new symptoms or questions/concerns. Medication was given in the following site: left lower abdomen. Provider came and saw patient and discussed blood consent form, patient acknowledged and singed form. Patient tolerated injection well and was discharged to home.    -Yue CARTAGENA CMA

## 2020-04-16 ENCOUNTER — DOCUMENTATION ONLY (OUTPATIENT)
Dept: ONCOLOGY | Facility: CLINIC | Age: 60
End: 2020-04-16

## 2020-04-16 NOTE — PROGRESS NOTES
Two forms received from patient - Attending Physician Statement from [employer] and Continuing Disability Claim from [employer]. Both forms to be filled out and placed in provider folder for approval when complete.    When signed:  Fax [facility] form to [phone].  Fax [employer] form to [phone].    [facility] Policy Number: [id number].  [employer] Policy Number: [id number].    Whitley Huffman [facility]

## 2020-04-17 ENCOUNTER — CARE COORDINATION (OUTPATIENT)
Dept: ONCOLOGY | Facility: CLINIC | Age: 60
End: 2020-04-17

## 2020-04-17 NOTE — PROGRESS NOTES
Signed forms received. Forms faxed to appropriate destinations, both confirmations verified via rightfax. Copy of forms sent to scanning. Original forms mailed to patient.      Whitley Huffman CMA

## 2020-04-17 NOTE — PROGRESS NOTES
"Writer received calls from [facility] and from Jonnathan Block's wife asking about labs done today. Hui RN from [facility] called writer with today's labs WBC/ANC 0.1/0.01  Hgb 7.6   Plts 33  No transfusions needed,     Hui reports some mix up as Jonnathan went to lab instead of 2nd floor as they were expecting.    Michaela  reports the same, mix up, \"like they did not know what they were doing\".    Writer called them back, talked with Mckayla De Santiago in the background. Talked to them about that the plan was for them to report to the 2nd floor. That was were they had the 8:30 appointment. They were expecting them. They are an infusion unit just like we have a [facility]. They would of drawn the labs, sent them to lab and then reviewed labs with Jonnathan and told him if and any transfusions were needed. Michaela in background was saying, she know he was supposed to go to the second floor.     Plan will be to directly go to the second floor on Monday, 4/20 at 10:30 for labs and possible transfusions. They will then given him Wednesday 4/22 appointment.    Writer reviewed labs again with Jonnathan. Reviewed neutropenic/bleeding precautions, including good handwashing, staying away from sick people/crowds and calling temperature over 100.4 day or night.      Received Neulasta on 4/15. He reports minimal pain this time. Took Claritin before his dose and day of dose, none since. Says he has not needed it. Has not needed any Oxycodone.    They have our 24 hour resource number.     They appreciated the call back and clarification.   "

## 2020-04-18 ENCOUNTER — TRANSFERRED RECORDS (OUTPATIENT)
Dept: HEALTH INFORMATION MANAGEMENT | Facility: CLINIC | Age: 60
End: 2020-04-18

## 2020-04-18 ENCOUNTER — NURSE TRIAGE (OUTPATIENT)
Dept: NURSING | Facility: CLINIC | Age: 60
End: 2020-04-18

## 2020-04-18 ENCOUNTER — TELEPHONE (OUTPATIENT)
Dept: ONCOLOGY | Facility: CLINIC | Age: 60
End: 2020-04-18

## 2020-04-18 LAB
ALT SERPL-CCNC: 43 U/L (ref 14–63)
AST SERPL-CCNC: 13 U/L (ref 15–37)
BACTERIA SPEC CULT: NO GROWTH
CREAT SERPL-MCNC: 0.74 MG/DL (ref 0.67–1.17)
GFR SERPL CREATININE-BSD FRML MDRD: >60 ML/MIN/1.73ME2 (ref 60–150)
GLUCOSE SERPL-MCNC: 129 MG/DL (ref 74–100)
POTASSIUM SERPL-SCNC: 3.3 MMOL/L (ref 3.5–5.1)
SPECIMEN SOURCE: NORMAL

## 2020-04-18 NOTE — TELEPHONE ENCOUNTER
Patient's wife, Mckayla, calling - no consent on file.  Verbal consent given by patient over the phone.  Wife says he has been having a temperature of 99.8 today taken orally.  Says he had the chills earlier.  He had a coat, hat and heating pad on in bed.  Patient no longer feels chilled.  Patient says he is feeling better.  He had a big glass of milk just a little while ago.  He recently urinated.     Wife says she was concerned since he recently received chemo treatment.  She is wondering what temperature they should be concerned about.    Triaged to disposition of Home care.  Advised to call back if he develops fever over 100.0, any signs of infection (burnign with urination, mouth or skin sores, cough, vomiting, diarrhea or redness/tendernes/swelling of skin) or if he becomes worse.    Nelly Angelo RN  Triage Nurse Advisor        Additional Information    Negative: Bluish (or gray) lips or face now    Negative: New onset rash with many purple (or blood-colored) spots or dots    Negative: Difficult to awaken or acting confused (e.g., disoriented, slurred speech)    Negative: Shock suspected (e.g., cold/pale/clammy skin, too weak to stand, low BP, rapid pulse)    Negative: Other symptom is present, see that guideline  (e.g., symptoms of cough, runny nose, sore throat, earache, abdominal pain, diarrhea, vomiting)    Negative: Sounds like a life-threatening emergency to the triager    Negative: Fever > 104 F (40 C)    Negative: [1] Neutropenia known or suspected (e.g., recent cancer chemotherapy) AND [2] fever > 100.4 F (38.0 C)    Negative: [1] Neutropenia known or suspected (e.g., recent cancer chemotherapy) AND [2] signs or symptoms of suspected infection are present    Negative: [1] Headache AND [2] stiff neck (can't touch chin to chest)    Negative: Difficulty breathing    Negative: [1] Drinking very little AND [2] dehydration suspected (e.g., no urine > 12 hours, very dry mouth, very lightheaded)    Negative:  Patient sounds very sick or weak to the triager  (Exception: mild weakness and hasn't taken fever medicine)    Negative: [1] Fever > 101 F (38.3 C) AND [2] age > 60    Negative: [1] Fever > 101 F (38.3 C) AND [2] co-morbidity risk factor for serious infection (e.g., COPD, heart failure, liver failure, renal failure with dialysis )    Negative: [1] Fever > 100.0 F (37.8 C) AND [2] bedridden (e.g., nursing home patient, CVA, chronic illness, recovering from surgery)    Negative: [1] Fever > 100.0 F (37.8 C) AND [2] diabetes mellitus or weak immune system (e.g., HIV positive, cancer chemo, splenectomy, chronic steroids)    Negative: [1] Fever > 100.0 F (37.8 C) AND [2] indwelling urinary catheter (e.g., Alex, Coude)    Negative: [1] Fever > 100.0 F (37.8 C) AND [2] port (portacath), central line, or PICC line    Negative: [1] Fever > 100.0 F (37.8 C) AND [2] surgery in the last month    Negative: Fever present > 3 days (72 hours)    Negative: [1] Fever > 100.0 F (37.8 C) AND [2] foreign travel to a developing country in the last month    Neutropenia (low white cell count), questions about    Protocols used: CANCER - FEVER-A-

## 2020-04-19 ENCOUNTER — HOSPITAL ENCOUNTER (INPATIENT)
Facility: CLINIC | Age: 60
LOS: 3 days | Discharge: HOME | DRG: 871 | End: 2020-04-22
Attending: INTERNAL MEDICINE | Admitting: INTERNAL MEDICINE
Payer: COMMERCIAL

## 2020-04-19 DIAGNOSIS — R50.81 NEUTROPENIC FEVER (H): ICD-10-CM

## 2020-04-19 DIAGNOSIS — R78.81 GRAM-POSITIVE BACTEREMIA: Primary | ICD-10-CM

## 2020-04-19 DIAGNOSIS — C83.78 BURKITT LYMPHOMA OF LYMPH NODES OF MULTIPLE REGIONS (H): ICD-10-CM

## 2020-04-19 DIAGNOSIS — D70.9 NEUTROPENIC FEVER (H): ICD-10-CM

## 2020-04-19 LAB
ALBUMIN SERPL-MCNC: 3.2 G/DL (ref 3.4–5)
ALBUMIN UR-MCNC: 10 MG/DL
ALP SERPL-CCNC: 63 U/L (ref 40–150)
ALT SERPL W P-5'-P-CCNC: 37 U/L (ref 0–70)
ANION GAP SERPL CALCULATED.3IONS-SCNC: 9 MMOL/L (ref 3–14)
APPEARANCE UR: CLEAR
APTT PPP: 33 SEC (ref 22–37)
AST SERPL W P-5'-P-CCNC: 12 U/L (ref 0–45)
BILIRUB SERPL-MCNC: 0.7 MG/DL (ref 0.2–1.3)
BILIRUB UR QL STRIP: NEGATIVE
BLD PROD TYP BPU: NORMAL
BLD UNIT ID BPU: 0
BLD UNIT ID BPU: 0
BLOOD PRODUCT CODE: NORMAL
BLOOD PRODUCT CODE: NORMAL
BPU ID: NORMAL
BPU ID: NORMAL
BUN SERPL-MCNC: 16 MG/DL (ref 7–30)
CALCIUM SERPL-MCNC: 7.4 MG/DL (ref 8.5–10.1)
CHLORIDE SERPL-SCNC: 106 MMOL/L (ref 94–109)
CO2 SERPL-SCNC: 24 MMOL/L (ref 20–32)
COLOR UR AUTO: YELLOW
CREAT SERPL-MCNC: 0.74 MG/DL (ref 0.66–1.25)
DIFFERENTIAL METHOD BLD: ABNORMAL
ERYTHROCYTE [DISTWIDTH] IN BLOOD BY AUTOMATED COUNT: 14 % (ref 10–15)
FIBRINOGEN PPP-MCNC: 546 MG/DL (ref 200–420)
GFR SERPL CREATININE-BSD FRML MDRD: >90 ML/MIN/{1.73_M2}
GLUCOSE SERPL-MCNC: 115 MG/DL (ref 70–99)
GLUCOSE UR STRIP-MCNC: NEGATIVE MG/DL
HCT VFR BLD AUTO: 18.2 % (ref 40–53)
HGB BLD-MCNC: 5.9 G/DL (ref 13.3–17.7)
HGB UR QL STRIP: NEGATIVE
INR PPP: 1.08 (ref 0.86–1.14)
KETONES UR STRIP-MCNC: NEGATIVE MG/DL
LDH SERPL L TO P-CCNC: 170 U/L (ref 85–227)
LEUKOCYTE ESTERASE UR QL STRIP: NEGATIVE
MAGNESIUM SERPL-MCNC: 1.7 MG/DL (ref 1.6–2.3)
MCH RBC QN AUTO: 29.1 PG (ref 26.5–33)
MCHC RBC AUTO-ENTMCNC: 32.4 G/DL (ref 31.5–36.5)
MCV RBC AUTO: 90 FL (ref 78–100)
NITRATE UR QL: NEGATIVE
NUM BPU REQUESTED: 1
PH UR STRIP: 5.5 PH (ref 5–7)
PHOSPHATE SERPL-MCNC: 3 MG/DL (ref 2.5–4.5)
PLATELET # BLD AUTO: 6 10E9/L (ref 150–450)
POTASSIUM SERPL-SCNC: 3.3 MMOL/L (ref 3.4–5.3)
PROT SERPL-MCNC: 6.4 G/DL (ref 6.8–8.8)
RBC # BLD AUTO: 2.03 10E12/L (ref 4.4–5.9)
RBC #/AREA URNS AUTO: 1 /HPF (ref 0–2)
SARS-COV-2 PCR COMMENT: NORMAL
SARS-COV-2 RNA SPEC QL NAA+PROBE: NEGATIVE
SARS-COV-2 RNA SPEC QL NAA+PROBE: NORMAL
SODIUM SERPL-SCNC: 139 MMOL/L (ref 133–144)
SOURCE: ABNORMAL
SP GR UR STRIP: 1.02 (ref 1–1.03)
SPECIMEN SOURCE: NORMAL
SPECIMEN SOURCE: NORMAL
TRANS CELLS #/AREA URNS HPF: <1 /HPF (ref 0–1)
TRANSFUSION STATUS PATIENT QL: NORMAL
URATE SERPL-MCNC: 2.1 MG/DL (ref 3.5–7.2)
UROBILINOGEN UR STRIP-MCNC: NORMAL MG/DL (ref 0–2)
WBC # BLD AUTO: 0.1 10E9/L (ref 4–11)
WBC #/AREA URNS AUTO: 2 /HPF (ref 0–5)

## 2020-04-19 PROCEDURE — 86923 COMPATIBILITY TEST ELECTRIC: CPT | Performed by: INTERNAL MEDICINE

## 2020-04-19 PROCEDURE — 87040 BLOOD CULTURE FOR BACTERIA: CPT | Performed by: STUDENT IN AN ORGANIZED HEALTH CARE EDUCATION/TRAINING PROGRAM

## 2020-04-19 PROCEDURE — 87635 SARS-COV-2 COVID-19 AMP PRB: CPT | Performed by: STUDENT IN AN ORGANIZED HEALTH CARE EDUCATION/TRAINING PROGRAM

## 2020-04-19 PROCEDURE — 25800030 ZZH RX IP 258 OP 636: Performed by: STUDENT IN AN ORGANIZED HEALTH CARE EDUCATION/TRAINING PROGRAM

## 2020-04-19 PROCEDURE — 80053 COMPREHEN METABOLIC PANEL: CPT | Performed by: INTERNAL MEDICINE

## 2020-04-19 PROCEDURE — 12000012 ZZH R&B MS OVERFLOW UMMC

## 2020-04-19 PROCEDURE — 81001 URINALYSIS AUTO W/SCOPE: CPT | Performed by: STUDENT IN AN ORGANIZED HEALTH CARE EDUCATION/TRAINING PROGRAM

## 2020-04-19 PROCEDURE — 85730 THROMBOPLASTIN TIME PARTIAL: CPT | Performed by: INTERNAL MEDICINE

## 2020-04-19 PROCEDURE — 36415 COLL VENOUS BLD VENIPUNCTURE: CPT | Performed by: INTERNAL MEDICINE

## 2020-04-19 PROCEDURE — 85384 FIBRINOGEN ACTIVITY: CPT | Performed by: INTERNAL MEDICINE

## 2020-04-19 PROCEDURE — 85610 PROTHROMBIN TIME: CPT | Performed by: INTERNAL MEDICINE

## 2020-04-19 PROCEDURE — 83615 LACTATE (LD) (LDH) ENZYME: CPT | Performed by: INTERNAL MEDICINE

## 2020-04-19 PROCEDURE — 83735 ASSAY OF MAGNESIUM: CPT | Performed by: INTERNAL MEDICINE

## 2020-04-19 PROCEDURE — 84550 ASSAY OF BLOOD/URIC ACID: CPT | Performed by: INTERNAL MEDICINE

## 2020-04-19 PROCEDURE — 87040 BLOOD CULTURE FOR BACTERIA: CPT | Performed by: INTERNAL MEDICINE

## 2020-04-19 PROCEDURE — 86901 BLOOD TYPING SEROLOGIC RH(D): CPT | Performed by: INTERNAL MEDICINE

## 2020-04-19 PROCEDURE — 86900 BLOOD TYPING SEROLOGIC ABO: CPT | Performed by: INTERNAL MEDICINE

## 2020-04-19 PROCEDURE — 86850 RBC ANTIBODY SCREEN: CPT | Performed by: INTERNAL MEDICINE

## 2020-04-19 PROCEDURE — 25000132 ZZH RX MED GY IP 250 OP 250 PS 637: Performed by: STUDENT IN AN ORGANIZED HEALTH CARE EDUCATION/TRAINING PROGRAM

## 2020-04-19 PROCEDURE — 84100 ASSAY OF PHOSPHORUS: CPT | Performed by: INTERNAL MEDICINE

## 2020-04-19 PROCEDURE — P9016 RBC LEUKOCYTES REDUCED: HCPCS | Performed by: INTERNAL MEDICINE

## 2020-04-19 PROCEDURE — 85027 COMPLETE CBC AUTOMATED: CPT

## 2020-04-19 PROCEDURE — 40000556 ZZH STATISTIC PERIPHERAL IV START W US GUIDANCE

## 2020-04-19 PROCEDURE — P9037 PLATE PHERES LEUKOREDU IRRAD: HCPCS | Performed by: STUDENT IN AN ORGANIZED HEALTH CARE EDUCATION/TRAINING PROGRAM

## 2020-04-19 PROCEDURE — 25000128 H RX IP 250 OP 636: Performed by: STUDENT IN AN ORGANIZED HEALTH CARE EDUCATION/TRAINING PROGRAM

## 2020-04-19 PROCEDURE — 87086 URINE CULTURE/COLONY COUNT: CPT | Performed by: STUDENT IN AN ORGANIZED HEALTH CARE EDUCATION/TRAINING PROGRAM

## 2020-04-19 RX ORDER — POLYETHYLENE GLYCOL 3350 17 G/17G
17 POWDER, FOR SOLUTION ORAL DAILY PRN
Status: DISCONTINUED | OUTPATIENT
Start: 2020-04-19 | End: 2020-04-22 | Stop reason: HOSPADM

## 2020-04-19 RX ORDER — POTASSIUM CHLORIDE 1.5 G/1.58G
20-40 POWDER, FOR SOLUTION ORAL
Status: DISCONTINUED | OUTPATIENT
Start: 2020-04-19 | End: 2020-04-22 | Stop reason: HOSPADM

## 2020-04-19 RX ORDER — ACYCLOVIR 200 MG/1
400 CAPSULE ORAL 2 TIMES DAILY
Status: DISCONTINUED | OUTPATIENT
Start: 2020-04-19 | End: 2020-04-22 | Stop reason: HOSPADM

## 2020-04-19 RX ORDER — FLUCONAZOLE 200 MG/1
200 TABLET ORAL DAILY
Status: DISCONTINUED | OUTPATIENT
Start: 2020-04-19 | End: 2020-04-22 | Stop reason: HOSPADM

## 2020-04-19 RX ORDER — ACETAMINOPHEN 325 MG/1
650 TABLET ORAL EVERY 4 HOURS PRN
Status: DISCONTINUED | OUTPATIENT
Start: 2020-04-19 | End: 2020-04-22 | Stop reason: HOSPADM

## 2020-04-19 RX ORDER — AMOXICILLIN 250 MG
2-3 CAPSULE ORAL 2 TIMES DAILY
Status: DISCONTINUED | OUTPATIENT
Start: 2020-04-19 | End: 2020-04-22 | Stop reason: HOSPADM

## 2020-04-19 RX ORDER — POTASSIUM CHLORIDE 29.8 MG/ML
20 INJECTION INTRAVENOUS
Status: DISCONTINUED | OUTPATIENT
Start: 2020-04-19 | End: 2020-04-22 | Stop reason: HOSPADM

## 2020-04-19 RX ORDER — MAGNESIUM SULFATE HEPTAHYDRATE 40 MG/ML
4 INJECTION, SOLUTION INTRAVENOUS EVERY 4 HOURS PRN
Status: DISCONTINUED | OUTPATIENT
Start: 2020-04-19 | End: 2020-04-22 | Stop reason: HOSPADM

## 2020-04-19 RX ORDER — NALOXONE HYDROCHLORIDE 0.4 MG/ML
.1-.4 INJECTION, SOLUTION INTRAMUSCULAR; INTRAVENOUS; SUBCUTANEOUS
Status: DISCONTINUED | OUTPATIENT
Start: 2020-04-19 | End: 2020-04-22 | Stop reason: HOSPADM

## 2020-04-19 RX ORDER — POTASSIUM CHLORIDE 750 MG/1
20-40 TABLET, EXTENDED RELEASE ORAL
Status: DISCONTINUED | OUTPATIENT
Start: 2020-04-19 | End: 2020-04-19

## 2020-04-19 RX ORDER — POTASSIUM CL/LIDO/0.9 % NACL 10MEQ/0.1L
10 INTRAVENOUS SOLUTION, PIGGYBACK (ML) INTRAVENOUS
Status: DISCONTINUED | OUTPATIENT
Start: 2020-04-19 | End: 2020-04-22 | Stop reason: HOSPADM

## 2020-04-19 RX ORDER — SODIUM CHLORIDE, SODIUM LACTATE, POTASSIUM CHLORIDE, CALCIUM CHLORIDE 600; 310; 30; 20 MG/100ML; MG/100ML; MG/100ML; MG/100ML
INJECTION, SOLUTION INTRAVENOUS CONTINUOUS
Status: DISCONTINUED | OUTPATIENT
Start: 2020-04-19 | End: 2020-04-19

## 2020-04-19 RX ORDER — OXYCODONE HYDROCHLORIDE 5 MG/1
5-10 TABLET ORAL EVERY 4 HOURS PRN
Status: DISCONTINUED | OUTPATIENT
Start: 2020-04-19 | End: 2020-04-22 | Stop reason: HOSPADM

## 2020-04-19 RX ORDER — POTASSIUM CHLORIDE 750 MG/1
20-40 TABLET, EXTENDED RELEASE ORAL
Status: DISCONTINUED | OUTPATIENT
Start: 2020-04-19 | End: 2020-04-22 | Stop reason: HOSPADM

## 2020-04-19 RX ORDER — POTASSIUM CHLORIDE 1.5 G/1.58G
40 POWDER, FOR SOLUTION ORAL DAILY
Status: DISCONTINUED | OUTPATIENT
Start: 2020-04-19 | End: 2020-04-19

## 2020-04-19 RX ORDER — POTASSIUM CHLORIDE 29.8 MG/ML
20 INJECTION INTRAVENOUS
Status: DISCONTINUED | OUTPATIENT
Start: 2020-04-19 | End: 2020-04-19

## 2020-04-19 RX ORDER — PIPERACILLIN SODIUM, TAZOBACTAM SODIUM 4; .5 G/20ML; G/20ML
4.5 INJECTION, POWDER, LYOPHILIZED, FOR SOLUTION INTRAVENOUS EVERY 6 HOURS
Status: DISCONTINUED | OUTPATIENT
Start: 2020-04-19 | End: 2020-04-22 | Stop reason: HOSPADM

## 2020-04-19 RX ORDER — AMOXICILLIN 250 MG
1 CAPSULE ORAL 2 TIMES DAILY
Status: DISCONTINUED | OUTPATIENT
Start: 2020-04-19 | End: 2020-04-19

## 2020-04-19 RX ORDER — SODIUM CHLORIDE 9 MG/ML
INJECTION, SOLUTION INTRAVENOUS CONTINUOUS
Status: DISCONTINUED | OUTPATIENT
Start: 2020-04-19 | End: 2020-04-19

## 2020-04-19 RX ORDER — PIPERACILLIN SODIUM, TAZOBACTAM SODIUM 3; .375 G/15ML; G/15ML
3.38 INJECTION, POWDER, LYOPHILIZED, FOR SOLUTION INTRAVENOUS EVERY 6 HOURS
Status: DISCONTINUED | OUTPATIENT
Start: 2020-04-19 | End: 2020-04-19

## 2020-04-19 RX ORDER — POTASSIUM CHLORIDE 1.5 G/1.58G
20-40 POWDER, FOR SOLUTION ORAL
Status: DISCONTINUED | OUTPATIENT
Start: 2020-04-19 | End: 2020-04-19

## 2020-04-19 RX ORDER — POTASSIUM CHLORIDE 7.45 MG/ML
10 INJECTION INTRAVENOUS
Status: DISCONTINUED | OUTPATIENT
Start: 2020-04-19 | End: 2020-04-19

## 2020-04-19 RX ORDER — POTASSIUM CHLORIDE 7.45 MG/ML
10 INJECTION INTRAVENOUS
Status: DISCONTINUED | OUTPATIENT
Start: 2020-04-19 | End: 2020-04-22 | Stop reason: HOSPADM

## 2020-04-19 RX ORDER — PROCHLORPERAZINE MALEATE 5 MG
5 TABLET ORAL EVERY 6 HOURS PRN
Status: DISCONTINUED | OUTPATIENT
Start: 2020-04-19 | End: 2020-04-22 | Stop reason: HOSPADM

## 2020-04-19 RX ORDER — ONDANSETRON 2 MG/ML
4 INJECTION INTRAMUSCULAR; INTRAVENOUS EVERY 6 HOURS PRN
Status: DISCONTINUED | OUTPATIENT
Start: 2020-04-19 | End: 2020-04-22 | Stop reason: HOSPADM

## 2020-04-19 RX ORDER — POTASSIUM CL/LIDO/0.9 % NACL 10MEQ/0.1L
10 INTRAVENOUS SOLUTION, PIGGYBACK (ML) INTRAVENOUS
Status: DISCONTINUED | OUTPATIENT
Start: 2020-04-19 | End: 2020-04-19

## 2020-04-19 RX ADMIN — ACYCLOVIR 400 MG: 200 CAPSULE ORAL at 20:22

## 2020-04-19 RX ADMIN — ACETAMINOPHEN 650 MG: 325 TABLET ORAL at 08:18

## 2020-04-19 RX ADMIN — POTASSIUM CHLORIDE 40 MEQ: 1.5 POWDER, FOR SOLUTION ORAL at 08:19

## 2020-04-19 RX ADMIN — SODIUM CHLORIDE: 9 INJECTION, SOLUTION INTRAVENOUS at 10:50

## 2020-04-19 RX ADMIN — PIPERACILLIN AND TAZOBACTAM 4.5 G: 4; .5 INJECTION, POWDER, FOR SOLUTION INTRAVENOUS at 15:29

## 2020-04-19 RX ADMIN — PIPERACILLIN AND TAZOBACTAM 4.5 G: 4; .5 INJECTION, POWDER, FOR SOLUTION INTRAVENOUS at 20:21

## 2020-04-19 RX ADMIN — ACETAMINOPHEN 650 MG: 325 TABLET ORAL at 20:21

## 2020-04-19 RX ADMIN — SODIUM CHLORIDE, POTASSIUM CHLORIDE, SODIUM LACTATE AND CALCIUM CHLORIDE: 600; 310; 30; 20 INJECTION, SOLUTION INTRAVENOUS at 09:40

## 2020-04-19 RX ADMIN — SENNOSIDES AND DOCUSATE SODIUM 1 TABLET: 8.6; 5 TABLET ORAL at 08:18

## 2020-04-19 RX ADMIN — LEVOTHYROXINE SODIUM 200 MCG: 0.07 TABLET ORAL at 08:18

## 2020-04-19 RX ADMIN — ACYCLOVIR 400 MG: 200 CAPSULE ORAL at 09:40

## 2020-04-19 RX ADMIN — FLUCONAZOLE 200 MG: 200 TABLET ORAL at 08:18

## 2020-04-19 RX ADMIN — PIPERACILLIN SODIUM AND TAZOBACTAM SODIUM 3.38 G: 3; .375 INJECTION, POWDER, LYOPHILIZED, FOR SOLUTION INTRAVENOUS at 08:17

## 2020-04-19 ASSESSMENT — PAIN DESCRIPTION - DESCRIPTORS
DESCRIPTORS: HEADACHE
DESCRIPTORS: HEADACHE

## 2020-04-19 ASSESSMENT — MIFFLIN-ST. JEOR: SCORE: 1791.05

## 2020-04-19 ASSESSMENT — ACTIVITIES OF DAILY LIVING (ADL)
ADLS_ACUITY_SCORE: 13

## 2020-04-19 NOTE — TELEPHONE ENCOUNTER
Cancer patient ([facility]) currently receiving chemo. 45 min ago at dinner pt drank a large glass of cold milk. After this he began feeling chilled so he went in the bedroom and put a heating pad on himself. A few min ago he took temp which was 99.5 orally. He rechecked temp and got 100.5 orally.No sore throat, congestion, cough, or other sx.  Pt states he thinks his temp is elevated only because of the heating pad but his wife wanted him to call. States his chills are gone. Advised pt leave the heating pad off and avoid cold drinks. Then  recheck temp in 30 min and call back if temp 100.0 or higher. Also call back if chills return or if any sx of infection (such as runny nose, congestion, cough, sore throat) Pt voiced understanding and agreement.     Additional Information    Negative: Shock suspected (e.g., cold/pale/clammy skin, too weak to stand, low BP, rapid pulse)    Negative: Difficult to awaken or acting confused (e.g., disoriented, slurred speech)    Negative: Bluish (or gray) lips or face now    Negative: New onset rash with many purple (or blood-colored) spots or dots    Negative: Sounds like a life-threatening emergency to the triager    Negative: Other symptom is present, see that guideline  (e.g., symptoms of cough, runny nose, sore throat, earache, abdominal pain, diarrhea, vomiting)    Negative: Fever > 104 F (40 C)    Negative: [1] Neutropenia known or suspected (e.g., recent cancer chemotherapy) AND [2] signs or symptoms of suspected infection are present    Negative: [1] Headache AND [2] stiff neck (can't touch chin to chest)    Negative: Difficulty breathing    Negative: [1] Drinking very little AND [2] dehydration suspected (e.g., no urine > 12 hours, very dry mouth, very lightheaded)    Negative: Patient sounds very sick or weak to the triager  (Exception: mild weakness and hasn't taken fever medicine)    Negative: [1] Fever > 101 F (38.3 C) AND [2] age > 60    Negative: [1] Fever > 101 F (38.3  C) AND [2] co-morbidity risk factor for serious infection (e.g., COPD, heart failure, liver failure, renal failure with dialysis )    Negative: [1] Fever > 100.0 F (37.8 C) AND [2] bedridden (e.g., nursing home patient, CVA, chronic illness, recovering from surgery)    Negative: [1] Fever > 100.0 F (37.8 C) AND [2] indwelling urinary catheter (e.g., Alex, Coude)    Negative: [1] Fever > 100.0 F (37.8 C) AND [2] surgery in the last month    Negative: Fever present > 3 days (72 hours)    Negative: [1] Fever > 100.0 F (37.8 C) AND [2] foreign travel to a developing country in the last month    Neutropenia (low white cell count), questions about    Commented on: [1] Neutropenia known or suspected (e.g., recent cancer chemotherapy) AND [2] fever > 100.4 F (38.0 C)     Pt thinks temp elevation is due to heating pad.    Commented on: [1] Fever > 100.0 F (37.8 C) AND [2] diabetes mellitus or weak immune system (e.g., HIV positive, cancer chemo, splenectomy, chronic steroids)     Temp uncertain    Commented on: [1] Fever > 100.0 F (37.8 C) AND [2] port (portacath), central line, or PICC line     Temp uncertain    Commented on: [1] Fever AND [2] no signs of serious infection or localizing symptoms     Temp uncertain    Protocols used: CANCER - FEVER-A-AH

## 2020-04-19 NOTE — TELEPHONE ENCOUNTER
Responded to patient call and spoke to Jonnathan and his wife Mckayla. They noted temperatures of 100.5 to 100.8 in the last 2-3 hours. He also endorses chills. The last temperature of 100.8 was after he took a Tylenol. He has been feeling ok otherwise. He endorses some dysuria which has been going on for the last week but his back pain has worsened today. He denies runny nose, cough, sob, nausea, vomiting, diarrhea, skin rashes. He does not have a port or PICC line. He received Neulasta on 4/15. He had aches and pains with first dose of Neulasta. His symptoms today feel different than the Neulasta pain experienced previously.     Patient is a 60 y.o male with Burkitt's lymphoma who completed C2 R-IVAC (D1=4/8/2020). He received Neulasta on 4/15. Per discharge summary, he is on prophy fluconazone, levaquin and acyclovir. Pentamidine 3/21. Labs on 4/15 show pancytopenia with ANC 1.3.    We discussed my concern for neutropenic fever which requires further evaluation with labs to check cell counts and tests and imaging to rule out infection. Patient was recommended ED evaluation. [facility] as they would like to avoid the [facility].     Joie Osman  Hematology, Oncology & Transplantation fellow  Pager: [phone]  4/18/2020

## 2020-04-19 NOTE — PLAN OF CARE
[id number]: AVSS on RA, afebrile. Mild headache managed with tylenol, heat/cold application. Otherwise denies pain. Denies nausea. 1 unit PRBCs transfused for Hgb 5.9 without signs/symptoms of reaction noted. 40 mEq potassium given for K of 3.3, recheck in AM. COVID-19 rule-out, new swab performed and returned negative; per Dr. Scruggs, will not be retesting, discontinued special precautions. Positive blood cultures from 04/18, gram+ cocci, chains; streptococcus species; MD aware (see previous note). Continues on IV zosyn, MIVF fluids discontinued due to improvement in BP and pt overall feeling full of fluid, although not edematous. Voiding spontaneously, adequate amounts per pt report. BM this afternoon, noted bright red blood in toilet and on toilet paper, but not present in stool.  Pt reported stool to be hard, so upon discussion with MD, will increase stool softener and continue to monitor. Continue with POC.

## 2020-04-19 NOTE — H&P
[facility]    History and Physical  Hematology / Oncology     Date of Admission:  4/19/2020  Date of Service (when I saw the patient): 04/19/20    Assessment & Plan   Kobe Pedroza is a 60 year old male with a past medical history of Burkitt's lymphoma that is admitted on 4/19/20 for neutropenic fever. His only lateralizing symptom is mild dysuria that has been present since before he was admitted for his last round of chemotherapy.     --HEME--  # Burkitt's Lymphoma  # Anemia secondary to chemotherapy  # Thrombocytopenia secondary to chemotherapy  Followed by Dr. Wright. Presented on 3/12/ 20 to [facility] with acute-on-chronic mid-thoracic back pain with some associated radicular symptoms. Per patient, no B-symptoms, though he did an intentional 35 lb wt loss. 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 Patient had no appreciable neurological deficits. He was started on dexamethasone and underwent T5-6 laminectomy with gross total resection of epidural tumor on 3/15. Flow cytometry of the specimen was notable for CD10 positive and kappa monotypic B cells (83%). Confirmed Burkitt lymphoma with surgical pathology. Flow cytometry of the specimen was notable for CD10 positive and kappa monotypic B cells (83%). Confirmed Burkitt lymphoma with surgical pathology. PET scan showed extensive visceral and bony disease. Bone marrow biopsy completed on 3/18 showed suspicious involvement with rare polytypic B cells (0.8%). No disease in CSF. Started R-CODOX-M / R-IVAC D1=3/18/2020 and tolerated w/o complications. Neulasta on 3/31. Also completed Cycle 2 R-IVAC D1=4/8/2020 w/o complications.   - No chemo for now  - Neutropenic precuations  - Transfusions for Hgb>7, Platelets>10    --ID--  # Neutropenic Fever  - Will follow blood cultures and urine culture drawn at OSH and draw repeats here as well  -  CXR was clear at OSH, deferring on repeat given pt has no respiratory symptoms  - Influenza negative at OSH  - COVID-19 pending at OSH, will follow  - Continuing zosyn    # Neutropenic prophylaxis  - Fluconazole 200 mg po qday  - Acyclovir 400 mg po bid    --NEURO--  # Pain related to malignancy and recent spine surgery  - Acetaminophen 650 mg q6h po prn for fever and pain  - Oxycodone 5-10 mg q4h prn    --CVS--  # HLD  # Hx of CAD  - No intervention at this time    --PULM--  No issues    --GI--  No issues    --RENAL--  No issues    --ENDO--  # Hypothyroidism s/p thyroidectomy  - pta levothyroxine    F: PO intake  E: Replete as needed  N: Regular diet with boost shakes    VTE: No pharmacologic prophylaxis 2/2 to thrombocytopenia  Dispo: Admit to malignant hematology service    Discussed w/ staff, Dr Valle    --  Kennedy Orellana MD PhD  Hematology, Oncology, and Bone Marrow Transplantation Service, [facility]  Pager: [phone]  Please see sticky note for cross cover information    Patient has been seen and evaluated by me. I have reviewed today's vital signs, medications, labs and imaging results. I have discussed the plan with the team and agree with the findings and plan in this note.    Separate note written.  Deng Valle MD        Code Status   Full Code    Primary Care Physician   Garett Arriaga    Chief Complaint   Neutropenic Fever    History is obtained from the patient    History of Present Illness   Kobe Pedroza is a 60 year old male with a past medical history of thyroid cancer s/p thyroidectomy and Burkitt's Lymphoma that is directly admitted from [facility] for neutropenic fever. He was recently discharged from the malignant hematology service at [facility] after receiving the R-IVAC portion of R-CODOX-M / R-IVAC chemotherapy for his recently diagnosed Burkitt's Lymphoma. Per patient, he measured his temperature to be  100.5 on several occassions yesterday and had the chills. He was not overly concerned because he had no associated symptoms and felt his thermometer was 20-years old but his wife made him go to the [facility] emergency department where his temperature was normal, ua was bland, and his cxr was clear. Influenza was negative and a COVID-19 test was sent. He was started on zosyn and transferred here for further workup of neutropenic fever.     At bedside he denies any nausea, vomiting, abdominal pain, cough, chest pain, shortness of breath, diarrhea, constipation. He endorses a slight headache that just started w/o changes in vision and dysuria for the last 2 weeks.    Past Medical History    I have reviewed this patient's medical history and updated it with pertinent information if needed.   Past Medical History:   Diagnosis Date     Burkitt's lymphoma (H)      Hypothyroidism      Thyroid cancer (H)        Past Surgical History   I have reviewed this patient's surgical history and updated it with pertinent information if needed.  Past Surgical History:   Procedure Laterality Date     IR PICC VASCULAR  4/8/2020     LAMINECTOMY, EXCISE TUMOR THORACIC, COMBINED N/A 3/15/2020    Procedure: LAMINECTOMY, SPINE, THORACIC, 2 levels, T-5, T-6;  Surgeon: Heather Cespedes MD;  Location: [facility] OR     THYROIDECTOMY          Prior to Admission Medications   Prior to Admission Medications   Prescriptions Last Dose Informant Patient Reported? Taking?   acetaminophen (TYLENOL) 325 MG tablet   Yes No   Sig: Take 2 tablets (650 mg) by mouth every 4 hours as needed for mild pain   acyclovir (ZOVIRAX) 200 MG capsule   No No   Sig: Take 2 capsules (400 mg) by mouth 2 times daily   dexamethasone (DECADRON) 4 MG tablet   No No   Sig: Take 2 tablets (8 mg) by mouth daily Please take 2 tablets on 4/14   fluconazole (DIFLUCAN) 200 MG tablet   Yes No   Sig: ONLY TAKE IF ANC <1000   levofloxacin (LEVAQUIN) 250 MG tablet   Yes No   Sig: ONLY  TAKE IF ANC <1000   levothyroxine (SYNTHROID/LEVOTHROID) 200 MCG tablet   Yes No   Sig: Take 200 mcg by mouth daily   magnesium citrate solution   No No   Sig: Take 296 mLs by mouth once as needed for constipation or other (For constipation) Take as needed for constipation   ondansetron (ZOFRAN) 4 MG tablet   No No   Sig: Take 1-2 tablets (4-8 mg) by mouth every 6 hours as needed for nausea   oxyCODONE (ROXICODONE) 5 MG tablet   No No   Sig: Take 1-2 tablets (5-10 mg) by mouth every 6 hours as needed for severe pain   potassium chloride (KLOR-CON) 20 MEQ packet   No No   Sig: Take 40 mEq by mouth daily   senna-docusate (SENOKOT-S/PERICOLACE) 8.6-50 MG tablet   No No   Sig: Take 1 tablet by mouth 2 times daily      Facility-Administered Medications: None     Allergies   No Known Allergies    Social History   I have reviewed this patient's social history and updated it with pertinent information if needed. Kobe Jimcanvega  reports that he has never smoked. He has never used smokeless tobacco. He reports previous alcohol use. He reports that he does not use drugs.    Family History   Denies FH of lymphoma or blood cancer    Review of Systems   The 10 point Review of Systems is negative other than noted in the HPI or here.     Physical Exam   Temp: 98.5  F (36.9  C) Temp src: Axillary BP: 106/62   Heart Rate: 95 Resp: 16        Vital Signs with Ranges  Temp:  [98.5  F (36.9  C)] 98.5  F (36.9  C)  Heart Rate:  [95] 95  Resp:  [16] 16  BP: (106)/(62) 106/62  0 lbs 0 oz    Constitutional: Well appearing in NAD, pale  Eyes: No scleral icterus, conjugate gaze  ENT: No mucositis  Respiratory: CTAB, strong respiratory effort  Cardiovascular: RRR no murmurs gallops rubs  GI: Soft, non-tender, non-distended. No r/g  Skin: There is a well-healing surgical incision site overlying the thoracic spine as well as an lp site with no erythema or induration  Musculoskeletal: No pretibial pitting edema  Neurologic: A/ox3, moving all 4  extremities, strength 5/5 throughout  Neuropsychiatric: Affect is appropriate    Data   No results found for this or any previous visit (from the past 24 hour(s)).

## 2020-04-19 NOTE — TELEPHONE ENCOUNTER
Wife is calling and states this is the third call to triage line. Caller states patient is complaining of a fever of 100.8 orally. Caller's last chemotherapy treatment was six days ago. No other symptoms noted. Tylenol given earlier. Triage guidelines recommend to go to ED. Caller states she wants the provider to call her back before they will go in. Triager called out to answering service, left message for on call provider Dr. Osman to call wife at [phone].  COVID 19 Nurse Triage Plan/Patient Instructions    Please be aware that novel coronavirus (COVID-19) may be circulating in the community. If you develop symptoms such as fever, cough, or SOB or if you have concerns about the presence of another infection including coronavirus (COVID-19), please contact your health care provider or visit [email or web address].     Disposition/Instructions    Patient to go to ED and follow protocol based instructions. Follow System Ambulatory Workflow for COVID 19.     Bring Your Own Device:  Please also bring your smart device(s) (smart phones, tablets, laptops) and their charging cables for your personal use and to communicate with your care team during your visit.    Thank you for limiting contact with others, wearing a simple mask to cover your cough, practice good hand hygiene habits and accessing our virtual services where possible to limit the spread of this virus.    For more information about COVID19 and options for caring for yourself at home, please visit the CDC website at https://www.cdc.gov/coronavirus/2019-ncov/about/steps-when-sick.html  For more options for care at [facility], please visit our website at [email or web address]    For more information, please use the [place] Department of Health ([facility]) COVID-19 Hotlines (Interpreters available):     Health questions: Phone Number: [phone] or [phone] and Hours: 7 a.m. to 7 p.m.    Schools and  questions: Phone  Number: [phone] or [phone] and Hours 7 a.m. to 7 p.m.                  Reason for Disposition    Fever > 104 F (40 C)    [1] Neutropenia known or suspected (e.g., recent cancer chemotherapy) AND [2] fever > 100.4 F (38.0 C)    Additional Information    Negative: Shock suspected (e.g., cold/pale/clammy skin, too weak to stand, low BP, rapid pulse)    Negative: Difficult to awaken or acting confused (e.g., disoriented, slurred speech)    Negative: Bluish (or gray) lips or face now    Negative: New onset rash with many purple (or blood-colored) spots or dots    Negative: Sounds like a life-threatening emergency to the triager    Negative: Other symptom is present, see that guideline  (e.g., symptoms of cough, runny nose, sore throat, earache, abdominal pain, diarrhea, vomiting)    Protocols used: CANCER - FEVER-A-AH

## 2020-04-19 NOTE — PHARMACY-ADMISSION MEDICATION HISTORY
"Admission medication history interview status for the 4/19/2020 admission is complete. See Epic admission navigator for allergy information, pharmacy, prior to admission medications and immunization status.     Medication history interview sources:  the patient, outside pharmacy fill records ([facility]), and Epic provider notes.     Changes made to PTA medication list (reason)  Added: None.     Deleted:     Dexamethasone 4mg; take 8mg by mouth daily - patient only used it for treatment on 04/14    Changed:     Added \"take 200mg by mouth daily\" to fluconazole sig    Added \"take 250mg by mouth daily\" to levofloxacin sig    Additional medication history information (including reliability of information, actions taken by pharmacist): The patient seemed to be a reliable historian who notes he took most of his medications this morning before coming to the hospital.     Antibiotics:   Levofloxacin 250mg daily if ANC <1000 - patient takes this medication chronically with the last dose being this morning. Indication: 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   Fluconazole 200mg daily if ANC <100-   patient takes this medication chronically with the last dose being yesterday morning. Indication: fungal infection prophylaxis, 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     Prior to Admission medications    Medication Sig Last Dose Taking? Auth Provider   acetaminophen (TYLENOL) 325 MG tablet Take 2 tablets (650 mg) by mouth every 4 hours as needed for mild pain 4/18/2020 at PM Yes Mckayla Alvarez PA-C   acyclovir (ZOVIRAX) 200 MG capsule Take 2 capsules (400 mg) by mouth 2 times daily 4/19/2020 at AM Yes Ever Wright MD   fluconazole (DIFLUCAN) 200 MG tablet Take 200 mg by mouth daily ONLY TAKE IF ANC <1000 4/18/2020 at AM Yes Lyubov Sin PA-C   levofloxacin (LEVAQUIN) 250 MG tablet Take 250 mg by mouth daily ONLY TAKE IF ANC <1000 4/19/2020 at AM Yes Lyubov Sin PA-C "   levothyroxine (SYNTHROID/LEVOTHROID) 200 MCG tablet Take 200 mcg by mouth daily 4/19/2020 at AM Yes Reported, Patient   magnesium citrate solution Take 296 mLs by mouth once as needed for constipation or other (For constipation) Take as needed for constipation Past Week at PM Yes Mckayla Alvarez PA-C   ondansetron (ZOFRAN) 4 MG tablet Take 1-2 tablets (4-8 mg) by mouth every 6 hours as needed for nausea 4/19/2020 at AM Yes Ever Wright MD   oxyCODONE (ROXICODONE) 5 MG tablet Take 1-2 tablets (5-10 mg) by mouth every 6 hours as needed for severe pain 4/18/2020 at AM Yes Ever Wright MD   potassium chloride (KLOR-CON) 20 MEQ packet Take 40 mEq by mouth daily 4/18/2020 at AM Yes Ever Wright MD   senna-docusate (SENOKOT-S/PERICOLACE) 8.6-50 MG tablet Take 1 tablet by mouth 2 times daily Past Week at PM Yes Adenike Ríos PA-C     Medication history completed by:   Sandhya Ackerman  Student Pharmacist  [facility]

## 2020-04-19 NOTE — PROVIDER NOTIFICATION
Received call from [facility] of positive blood cultures from 04/18, gram positive cocci and chains, streptococcus species.  Notified Dr Scruggs, will continue on zosyn for now.

## 2020-04-19 NOTE — PROGRESS NOTES
[facility]    Progress Note  Hematology / Oncology     Date of Admission:  4/19/2020  Date of Service (when I saw the patient): 04/19/20    Assessment & Plan   Kobe Pedroza is a 60 year old male with a past medical history of Burkitt's lymphoma that is admitted on 4/19/20 for neutropenic fever. His only lateralizing symptom is mild dysuria that has been present since before he was admitted for his last round of chemotherapy.     --HEME--  # Burkitt's Lymphoma  # Anemia secondary to chemotherapy  # Thrombocytopenia secondary to chemotherapy  Followed by Dr. Wright. Presented on 3/12/ 20 to [facility] with acute-on-chronic mid-thoracic back pain with some associated radicular symptoms. Per patient, no B-symptoms, though he did an intentional 35 lb wt loss. 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 Patient had no appreciable neurological deficits. He was started on dexamethasone and underwent T5-6 laminectomy with gross total resection of epidural tumor on 3/15. Flow cytometry of the specimen was notable for CD10 positive and kappa monotypic B cells (83%). Confirmed Burkitt lymphoma with surgical pathology. Flow cytometry of the specimen was notable for CD10 positive and kappa monotypic B cells (83%). Confirmed Burkitt lymphoma with surgical pathology. PET scan showed extensive visceral and bony disease. Bone marrow biopsy completed on 3/18 showed suspicious involvement with rare polytypic B cells (0.8%). No disease in CSF. Started R-CODOX-M / R-IVAC D1=3/18/2020 and tolerated w/o complications. [place] [facility] [place] Cycle 2 R-IVAC D1=4/8/2020 w/o complications.   - Neutropenic precuations  - Transfusions for Hgb>7, Platelets>10    --ID--  # Neutropenic Fever. WBC 0.1 on admission.  CXR clear at [facility].  UA at [facility] with 1 WBC.  COVID testing will take 2-3 days from  [place] ([facility] Will recheck at [facility] given <24 hour turnaround to save PPE.  [facility] lab will call 5C if positive results from blood cultures.    -[place]ul[place] given at [facility] on 4/15  -zosyn 4.5g Q6H  -NS 125mL/hr x2 L for softer BP on admission (LR incompatible with zosyn)   -Blood cultures at [facility] 4/18-NGTD x2   -Blood cultures [facility] 4/19-NGTD   -UA-1 WBC, neg LE, neg nitrite, neg blood    # Neutropenic prophylaxis  - Fluconazole 200 mg po qday  - Acyclovir 400 mg po bid    --NEURO--  # Pain related to malignancy and recent spine surgery  - Acetaminophen 650 mg q6h po prn for fever and pain  - Oxycodone 5-10 mg q4h prn    --CVS--  # HLD  # Hx of CAD  - Monitor      --PULM--  No issues    --GI--  No issues    --RENAL--  # hypokalemia.  On 40mEq of potassium daily.  Pt states K rises significantly with this.  Will trial K replacement protocol today to calculate needs and adjust prior to discharge if needed.    -Potassium replacement protocol.    -Magnesium with replacement protocol in the AM.     --ENDO--  # Hypothyroidism s/p thyroidectomy  - pta levothyroxine    F: PO intake  E: Replete as needed  N: Regular diet with boost shakes    VTE: No pharmacologic prophylaxis 2/2 to thrombocytopenia  Dispo: Admit to malignant hematology service    Patient seen and discussed with Dr. Valle.       Patient has been seen and evaluated by me. I have reviewed today's vital signs, medications, labs and imaging results. I have discussed the plan with the team and agree with the findings and plan in this admission note.    Readmitted for neutropenic fever s localizing signs.   Short chill last evening followed by temp.  No new gi gu or ENT sx. No resp sx  lungs clear; no oral petech. No rash or edema;   Abd soft s focal tenderness or rebound    Plan cultures and broad abx.   R/O covid but no exposure and risk low  Received pegfilgrastim several days ago.    Deng Valle MD          Code Status   Full  Code    Allergies   No Known Allergies    Interval History  No acute events overnight.  Febrile at [place] to 101F.  Transferred to [facility].  No cough, CP, SOB, abdominal pain, diarrhea.  No known COVID exposures and no recent travel.  Continues to have dysuria of unclear etiology.  Pain controlled today.  Back surgical site is not hot or tender per his report.  Frustrated for need of admission for neutropenic fever, hoping to get out and get home.     Review of Systems   The 10 point Review of Systems is negative other than noted in the HPI or here.     Physical Exam   Temp: 98.7  F (37.1  C) Temp src: Axillary BP: 105/67   Heart Rate: 96 Resp: 16 SpO2: 96 %      Vital Signs with Ranges  Temp:  [98.4  F (36.9  C)-98.7  F (37.1  C)] 98.7  F (37.1  C)  Heart Rate:  [94-96] 96  Resp:  [16] 16  BP: (105-115)/(62-67) 105/67  SpO2:  [96 %] 96 %  212 lbs 11.2 oz    Constitutional: Well appearing in NAD, pale  Eyes: No scleral icterus, PERRL  ENT: No mucositis, MMM, OP clear   Respiratory: CTAB, normal WOB on RA   Cardiovascular: RRR no murmurs gallops rubs  GI: Soft, non-tender, non-distended. No r/g  Skin: thoracic spine surgical site erythematous without exudate, purulence or fluctuance.  Well-healing.    Musculoskeletal: No LE edema.   Neurologic: A/ox4, CNII-XII grossly intact.  Normal speech and mentation.   Neuropsychiatric: Affect is appropriate, linear thought processes.     Data   Results for orders placed or performed during the hospital encounter of 04/19/20 (from the past 24 hour(s))   Blood culture    Specimen: Blood    Left Arm   Result Value Ref Range    Specimen Description Blood Left Arm     Culture Micro PENDING    CBC with platelets differential   Result Value Ref Range    WBC 0.1 (LL) 4.0 - 11.0 10e9/L    RBC Count 2.03 (L) 4.4 - 5.9 10e12/L    Hemoglobin 5.9 (LL) 13.3 - 17.7 g/dL    Hematocrit 18.2 (L) 40.0 - 53.0 %    MCV 90 78 - 100 fl    MCH 29.1 26.5 - 33.0 pg    MCHC 32.4 31.5 - 36.5 g/dL    RDW  14.0 10.0 - 15.0 %    Platelet Count 6 (LL) 150 - 450 10e9/L    Diff Method WBC <0.5, Diff not done    Comprehensive metabolic panel   Result Value Ref Range    Sodium 139 133 - 144 mmol/L    Potassium 3.3 (L) 3.4 - 5.3 mmol/L    Chloride 106 94 - 109 mmol/L    Carbon Dioxide 24 20 - 32 mmol/L    Anion Gap 9 3 - 14 mmol/L    Glucose 115 (H) 70 - 99 mg/dL    Urea Nitrogen 16 7 - 30 mg/dL    Creatinine 0.74 0.66 - 1.25 mg/dL    GFR Estimate >90 >60 mL/min/[1.73_m2]    GFR Estimate If Black >90 >60 mL/min/[1.73_m2]    Calcium 7.4 (L) 8.5 - 10.1 mg/dL    Bilirubin Total 0.7 0.2 - 1.3 mg/dL    Albumin 3.2 (L) 3.4 - 5.0 g/dL    Protein Total 6.4 (L) 6.8 - 8.8 g/dL    Alkaline Phosphatase 63 40 - 150 U/L    ALT 37 0 - 70 U/L    AST 12 0 - 45 U/L   Fibrinogen activity   Result Value Ref Range    Fibrinogen 546 (H) 200 - 420 mg/dL   INR   Result Value Ref Range    INR 1.08 0.86 - 1.14   Lactate Dehydrogenase   Result Value Ref Range    Lactate Dehydrogenase 170 85 - 227 U/L   Magnesium   Result Value Ref Range    Magnesium 1.7 1.6 - 2.3 mg/dL   Partial thromboplastin time   Result Value Ref Range    PTT 33 22 - 37 sec   Phosphorus   Result Value Ref Range    Phosphorus 3.0 2.5 - 4.5 mg/dL   Uric acid   Result Value Ref Range    Uric Acid 2.1 (L) 3.5 - 7.2 mg/dL   ABO/Rh type and screen   Result Value Ref Range    Units Ordered 1     ABO A     RH(D) Pos     Antibody Screen Neg     Test Valid Only At          [facility]    Specimen Expires 04/22/2020     Crossmatch Red Blood Cells    Blood component   Result Value Ref Range    Unit Number [id number]     Blood Component Type Red Blood Cells Leukocyte Reduced     Division Number 00     Status of Unit Ready for patient 04/19/2020 0616     Blood Product Code [id number]     Unit Status SHAWN    Blood culture    Specimen: Blood    Right Arm   Result Value Ref Range    Specimen Description Blood Right Arm     Culture Micro PENDING    Urine  Culture Aerobic Bacterial    Specimen: Urine clean catch; Midstream Urine   Result Value Ref Range    Specimen Description Midstream Urine     Special Requests Specimen received in preservative     Culture Micro PENDING    UA with Microscopic   Result Value Ref Range    Color Urine Yellow     Appearance Urine Clear     Glucose Urine Negative NEG^Negative mg/dL    Bilirubin Urine Negative NEG^Negative    Ketones Urine Negative NEG^Negative mg/dL    Specific Gravity Urine 1.022 1.003 - 1.035    Blood Urine Negative NEG^Negative    pH Urine 5.5 5.0 - 7.0 pH    Protein Albumin Urine 10 (A) NEG^Negative mg/dL    Urobilinogen mg/dL Normal 0.0 - 2.0 mg/dL    Nitrite Urine Negative NEG^Negative    Leukocyte Esterase Urine Negative NEG^Negative    Source Midstream Urine     WBC Urine 2 0 - 5 /HPF    RBC Urine 1 0 - 2 /HPF    Transitional Epi <1 0 - 1 /HPF   Platelets prepare order unit conditional   Result Value Ref Range    Blood Component Type PLT Pheresis     Units Ordered 1    Blood component   Result Value Ref Range    Unit Number [id number]     Blood Component Type PlateletPheresis,LeukoRed Irrad (Part 2)     Division Number 00     Status of Unit Released to care unit 04/19/2020 0602     Blood Product Code [id number]     Unit Status ISS

## 2020-04-19 NOTE — PLAN OF CARE
Pt admitted at 5am with fever and painful urination. UA sent. T max 98.9 since admission. OVSS. He denies nausea. He c/o pain in upper back. He states his upper back  incision was from recent surgery to remove mass from his spine. It is reddened and slightly swollen. Scabbed over, no drainage, open to air. LS clear, no cough. Covid and UA labs pending. 1U platelets running, he needs 1 unit of RBC. K is low but he has no replacements orders, will request this am.     Problem: Infection  Goal: Infection Symptom Resolution  Outcome: No Change     Problem: Pain Acute  Goal: Optimal Pain Control  Outcome: No Change

## 2020-04-20 ENCOUNTER — CARE COORDINATION (OUTPATIENT)
Dept: ONCOLOGY | Facility: CLINIC | Age: 60
End: 2020-04-20

## 2020-04-20 LAB
ABO + RH BLD: NORMAL
ABO + RH BLD: NORMAL
ANION GAP SERPL CALCULATED.3IONS-SCNC: 5 MMOL/L (ref 3–14)
BACTERIA SPEC CULT: NO GROWTH
BLD GP AB SCN SERPL QL: NORMAL
BLD PROD TYP BPU: NORMAL
BLD PROD TYP BPU: NORMAL
BLD UNIT ID BPU: 0
BLOOD BANK CMNT PATIENT-IMP: NORMAL
BLOOD PRODUCT CODE: NORMAL
BPU ID: NORMAL
BUN SERPL-MCNC: 12 MG/DL (ref 7–30)
CALCIUM SERPL-MCNC: 7.5 MG/DL (ref 8.5–10.1)
CHLORIDE SERPL-SCNC: 110 MMOL/L (ref 94–109)
CO2 SERPL-SCNC: 26 MMOL/L (ref 20–32)
CREAT SERPL-MCNC: 0.75 MG/DL (ref 0.66–1.25)
DIFFERENTIAL METHOD BLD: ABNORMAL
ERYTHROCYTE [DISTWIDTH] IN BLOOD BY AUTOMATED COUNT: 14 % (ref 10–15)
GFR SERPL CREATININE-BSD FRML MDRD: >90 ML/MIN/{1.73_M2}
GLUCOSE SERPL-MCNC: 102 MG/DL (ref 70–99)
HCT VFR BLD AUTO: 19.3 % (ref 40–53)
HGB BLD-MCNC: 6.5 G/DL (ref 13.3–17.7)
HGB BLD-MCNC: 8.9 G/DL (ref 13.3–17.7)
Lab: NORMAL
MAGNESIUM SERPL-MCNC: 1.8 MG/DL (ref 1.6–2.3)
MCH RBC QN AUTO: 29.8 PG (ref 26.5–33)
MCHC RBC AUTO-ENTMCNC: 33.7 G/DL (ref 31.5–36.5)
MCV RBC AUTO: 89 FL (ref 78–100)
NUM BPU REQUESTED: 2
PLATELET # BLD AUTO: 18 10E9/L (ref 150–450)
POTASSIUM SERPL-SCNC: 3.6 MMOL/L (ref 3.4–5.3)
RBC # BLD AUTO: 2.18 10E12/L (ref 4.4–5.9)
SODIUM SERPL-SCNC: 141 MMOL/L (ref 133–144)
SPECIMEN EXP DATE BLD: NORMAL
SPECIMEN SOURCE: NORMAL
TRANSFUSION STATUS PATIENT QL: NORMAL
TRANSFUSION STATUS PATIENT QL: NORMAL
WBC # BLD AUTO: 0.2 10E9/L (ref 4–11)

## 2020-04-20 PROCEDURE — 85018 HEMOGLOBIN: CPT | Performed by: PHYSICIAN ASSISTANT

## 2020-04-20 PROCEDURE — P9016 RBC LEUKOCYTES REDUCED: HCPCS | Performed by: INTERNAL MEDICINE

## 2020-04-20 PROCEDURE — 85027 COMPLETE CBC AUTOMATED: CPT

## 2020-04-20 PROCEDURE — 85025 COMPLETE CBC W/AUTO DIFF WBC: CPT | Performed by: INTERNAL MEDICINE

## 2020-04-20 PROCEDURE — 25000128 H RX IP 250 OP 636: Performed by: STUDENT IN AN ORGANIZED HEALTH CARE EDUCATION/TRAINING PROGRAM

## 2020-04-20 PROCEDURE — 25000132 ZZH RX MED GY IP 250 OP 250 PS 637: Performed by: INTERNAL MEDICINE

## 2020-04-20 PROCEDURE — 12000012 ZZH R&B MS OVERFLOW UMMC

## 2020-04-20 PROCEDURE — 25000132 ZZH RX MED GY IP 250 OP 250 PS 637: Performed by: STUDENT IN AN ORGANIZED HEALTH CARE EDUCATION/TRAINING PROGRAM

## 2020-04-20 PROCEDURE — 87040 BLOOD CULTURE FOR BACTERIA: CPT | Performed by: PHYSICIAN ASSISTANT

## 2020-04-20 PROCEDURE — 36415 COLL VENOUS BLD VENIPUNCTURE: CPT | Performed by: INTERNAL MEDICINE

## 2020-04-20 PROCEDURE — 80048 BASIC METABOLIC PNL TOTAL CA: CPT | Performed by: INTERNAL MEDICINE

## 2020-04-20 PROCEDURE — 83735 ASSAY OF MAGNESIUM: CPT | Performed by: INTERNAL MEDICINE

## 2020-04-20 PROCEDURE — 36415 COLL VENOUS BLD VENIPUNCTURE: CPT | Performed by: PHYSICIAN ASSISTANT

## 2020-04-20 RX ADMIN — SENNOSIDES AND DOCUSATE SODIUM 2 TABLET: 8.6; 5 TABLET ORAL at 08:20

## 2020-04-20 RX ADMIN — ACETAMINOPHEN 650 MG: 325 TABLET ORAL at 07:19

## 2020-04-20 RX ADMIN — PIPERACILLIN AND TAZOBACTAM 4.5 G: 4; .5 INJECTION, POWDER, FOR SOLUTION INTRAVENOUS at 01:46

## 2020-04-20 RX ADMIN — ACETAMINOPHEN 650 MG: 325 TABLET ORAL at 14:52

## 2020-04-20 RX ADMIN — SENNOSIDES AND DOCUSATE SODIUM 1 TABLET: 8.6; 5 TABLET ORAL at 21:00

## 2020-04-20 RX ADMIN — PIPERACILLIN AND TAZOBACTAM 4.5 G: 4; .5 INJECTION, POWDER, FOR SOLUTION INTRAVENOUS at 21:00

## 2020-04-20 RX ADMIN — ACYCLOVIR 400 MG: 200 CAPSULE ORAL at 21:00

## 2020-04-20 RX ADMIN — PIPERACILLIN AND TAZOBACTAM 4.5 G: 4; .5 INJECTION, POWDER, FOR SOLUTION INTRAVENOUS at 14:43

## 2020-04-20 RX ADMIN — LEVOTHYROXINE SODIUM 200 MCG: 0.07 TABLET ORAL at 08:23

## 2020-04-20 RX ADMIN — PIPERACILLIN AND TAZOBACTAM 4.5 G: 4; .5 INJECTION, POWDER, FOR SOLUTION INTRAVENOUS at 08:20

## 2020-04-20 RX ADMIN — ACYCLOVIR 400 MG: 200 CAPSULE ORAL at 08:24

## 2020-04-20 RX ADMIN — FLUCONAZOLE 200 MG: 200 TABLET ORAL at 08:24

## 2020-04-20 ASSESSMENT — PAIN DESCRIPTION - DESCRIPTORS
DESCRIPTORS: HEADACHE

## 2020-04-20 ASSESSMENT — ACTIVITIES OF DAILY LIVING (ADL)
ADLS_ACUITY_SCORE: 13

## 2020-04-20 NOTE — PROGRESS NOTES
Kobe Pedroza currently IP with Neutropenic fevers, his wife called with multiple questions regarding his IP status and weekend experiences. She states that they had difficultly getting someone this weekend on the 24  hour number. She wanted to know if their was something they had done wrong. He originally went to the [place] location then was transferred to the [place]. Was this a good choice. He is now on 5C. Writer answered her questions to the best of her ability. Mckayla, wife would like to be called by IP team with updates as they happen since she cannot be there.      Writer will continue to follow. Mckayla appreciated the call.

## 2020-04-20 NOTE — PROGRESS NOTES
[facility]    Progress Note  Hematology / Oncology     Date of Admission:  4/19/2020  Date of Service (when I saw the patient): 04/19/20    Assessment & Plan   Kobe Pedroza is a 60 year old male with a past medical history of Burkitt's lymphoma that is admitted on 4/19/20 for neutropenic fever. His only lateralizing symptom is mild dysuria that has been present since before he was admitted for his last round of chemotherapy.     --HEME--  # Burkitt's Lymphoma  # Anemia secondary to chemotherapy  # Thrombocytopenia secondary to chemotherapy  Followed by Dr. Wright. Presented on 3/12/ 20 to [facility] with acute-on-chronic mid-thoracic back pain with some associated radicular symptoms. Per patient, no B-symptoms, though he did an intentional 35 lb wt loss. 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 Patient had no appreciable neurological deficits. He was started on dexamethasone and underwent T5-6 laminectomy with gross total resection of epidural tumor on 3/15. Flow cytometry of the specimen was notable for CD10 positive and kappa monotypic B cells (83%). Confirmed Burkitt lymphoma with surgical pathology. Flow cytometry of the specimen was notable for CD10 positive and kappa monotypic B cells (83%). Confirmed Burkitt lymphoma with surgical pathology. PET scan showed extensive visceral and bony disease. Bone marrow biopsy completed on 3/18 showed suspicious involvement with rare polytypic B cells (0.8%). No disease in CSF. Started R-CODOX-M / R-IVAC D1=3/18/2020 and tolerated w/o complications. [place] [facility] [place] Also completed Cycle 2 R-IVAC D1=4/8/2020 w/o complications.   - Neutropenic precuations  - Transfusions for Hgb>7, Platelets>10    --ID--  # Neutropenic Fever. WBC 0.1 on admission.  CXR clear at [facility].  UA at [facility] with 1 WBC.  COVID testing will take 2-3 days from  "[place] ([place] [facility]). Will recheck at [facility] given <24 hour turnaround to save PPE.  [place] lab will call 5C if positive results from blood cultures.    -Neulasta given at [facility] on 4/15  -zosyn 4.5g Q6H  -NS 125mL/hr x2 L for softer BP on admission (LR incompatible with zosyn)  - Continue to monitor incision site from previous surgery on back    # Gram + Cocci in Chains Bacteremia (Likely Streptococci species)  - Blood cultures at [place] 4/18 - Positive for Gram + cocci in chains likely streptococci  - Blood cultures [facility] 4/19-NGTD  - Will repeat BCx x2 today  - UA: 1 WBC, neg LeukEst, neg nitrite, neg blood    - Continue Zosyn 4.5 q6h   - Will tailor antibiotic regimen when sensitivities and speciation return.    # Neutropenic prophylaxis  - Fluconazole 200 mg po qday  - Acyclovir 400 mg po bid  - Now on Zosyn 4.5g q6h    --NEURO--  # Pain related to malignancy and recent spine surgery  # Headache  - Headache persists, recommend utilizing PRN pain medications available  - Acetaminophen 650 mg q6h po prn for fever and pain  - Oxycodone 5-10 mg q4h prn    --CVS--  # HLD  # Hx of CAD  - Monitor      --PULM--  No issues    --GI--  # Murali blood per rectum  # History of Hemorrhoids  - Patient reports blood after bowel movement yesterday  - States it \"dripped into the toilet\" and then was on the toilet paper after he wiped.  - Did not have another episode but did state there were a couple drops of blood on the toilet paper today.   - Denies pain  - Hgb is 5.9 then 6.5 after a unit of blood  - Will follow Hgb BID  - Will monitor closely    --RENAL--  # hypokalemia.  On 40mEq of potassium daily.  Pt states K rises significantly with this.  Will trial K replacement protocol today to calculate needs and adjust prior to discharge if needed.    - dropped to 3.3 on 4/19, now 3.6  - Potassium replacement protocol.    - Magnesium with replacement protocol in the AM.     --ENDO--  # Hypothyroidism s/p thyroidectomy  - pta " "levothyroxine    Fluids/ Electrolytes/ Nutrition  F: PO intake has maintained, no need for excess IVF  E: Replete as needed  N: Regular diet with boost shakes    VTE: No pharmacologic prophylaxis 2/2 to thrombocytopenia    Dispo:    Patient was individually seen and discussed with Dr. Valle.     Mckayla Alvarez PA-C  Hematology/Oncology  Pager: #[phone]      Patient has been seen and evaluated by me. I have reviewed today's vital signs, medications, labs and imaging results. I have discussed the plan with the team and agree with the findings and plan in this admission note.    Gm pos cocci in blood from pre-admiossion.  no pos cultures since.   No new fcs or bleeding except hemorrhoidal.  Onoging HA.  no visual or ENT other sx.  Eating some. LUngs clear   Cor reg   Abd not distended but was shepherd earlier in the day. Stools ongoing s new issues.  Cont abx with added cultures to prove cultures become sterile.    Still severely leukopenic and transfusion dependent.  Deng Valle MD      Code Status   Full Code    Allergies   No Known Allergies    Interval History  No acute events overnight.   Reports that he spiked a fever of 100.5F at home. Went in to the ED in [place] and was transferred to [facility] yesterday.   States that overall he does not feel well but states that he has \"definitely felt worse\". Reports that he has a headache \"that doesn't seem to go away\". States that tylenol does not help it. Says it is located over his forehead. No photosensitivity or phonosensitivity. Did have one episode of sujey blood after a bowel movement. States it dripped into the toilet bowl and was on the toilet paper but denies that it was in the stool. Has a history of hemorrhoids. States this has happened before but states \"this had more blood\".  Reports that he has a \"weird\" sensation when he urinates. States he would not describe it as pain but that \"its definitely different than normal\". Denies oral sores, throat pain, " double vision, change in vision, cough, SOB, abdominal pain, diarrhea, nausea, vomiting, hematuria, difficulty walking.     Review of Systems   The 10 point Review of Systems is negative other than noted in the HPI or here.     Physical Exam   Temp: 98.6  F (37  C) Temp src: Oral BP: 123/73 Pulse: 85 Heart Rate: 85 Resp: 16 SpO2: 99 % O2 Device: None (Room air)    Vital Signs with Ranges  Temp:  [97.6  F (36.4  C)-99.2  F (37.3  C)] 98.6  F (37  C)  Pulse:  [85-91] 85  Heart Rate:  [84-93] 85  Resp:  [16-18] 16  BP: (113-131)/(64-75) 123/73  SpO2:  [98 %-99 %] 99 %  212 lbs 11.2 oz      Constitutional: Awake and conversational. Non- toxic appearing. No acute distress.   HEENT: Normocephalic, atraumatic. Sclerae anicteric. Moist mucus membranes   Respiratory: Breathing comfortable with no increased work on room air. Good air exchange. No signs of respiratory distress or accessory muscle use. Lungs clear to auscultation bilaterally, no crackles or wheezing noted.  Cardiovascular: Regular rate and rhythm. Normal S1 and S2. No murmurs, rubs, or gallops. No peripheral edema.    GI: Abdomen is soft, non-distended, non-tender and without distension. Bowel sounds present and normoactive.  Skin: 6 in straight incision wound from previous surgery in March on middle of upper back. Dark erythema around wound. Scabbing present. No discharge or bleeding, No warmth, induration or tenderness. Continue to monitor. Skin is otherwise clean, dry, intact. No jaundice appreciated.   Musculoskeletal/ Extremities: No redness, warmth, or swelling of the joints appreciated. Distal pulses palpable.   Neurologic: Alert and oriented. Speech normal. Grossly nonfocal. Neuropsychiatric: Calm, affect congruent to situation. Appropriate mood and affect.       Data   Results for orders placed or performed during the hospital encounter of 04/19/20 (from the past 24 hour(s))   Basic metabolic panel   Result Value Ref Range    Sodium 141 133 - 144  mmol/L    Potassium 3.6 3.4 - 5.3 mmol/L    Chloride 110 (H) 94 - 109 mmol/L    Carbon Dioxide 26 20 - 32 mmol/L    Anion Gap 5 3 - 14 mmol/L    Glucose 102 (H) 70 - 99 mg/dL    Urea Nitrogen 12 7 - 30 mg/dL    Creatinine 0.75 0.66 - 1.25 mg/dL    GFR Estimate >90 >60 mL/min/[1.73_m2]    GFR Estimate If Black >90 >60 mL/min/[1.73_m2]    Calcium 7.5 (L) 8.5 - 10.1 mg/dL   CBC with platelets differential   Result Value Ref Range    WBC 0.2 (LL) 4.0 - 11.0 10e9/L    RBC Count 2.18 (L) 4.4 - 5.9 10e12/L    Hemoglobin 6.5 (LL) 13.3 - 17.7 g/dL    Hematocrit 19.3 (L) 40.0 - 53.0 %    MCV 89 78 - 100 fl    MCH 29.8 26.5 - 33.0 pg    MCHC 33.7 31.5 - 36.5 g/dL    RDW 14.0 10.0 - 15.0 %    Platelet Count 18 (LL) 150 - 450 10e9/L    Diff Method WBC <0.5, Diff not done    Magnesium   Result Value Ref Range    Magnesium 1.8 1.6 - 2.3 mg/dL

## 2020-04-20 NOTE — PLAN OF CARE
[id number]. Tmax 99.2. VSS. C/o headache. PRN tylenol given x1 with relief in symptoms. Also c/o fatigue and malaise related to not getting a lot of sleep the last few nights. IVMF discontinued and abdominal discomfort improve significantly. Incision site on back open to air and free of s/s of infection. Hgb in am was 6.5. Received RBC transfusion.

## 2020-04-21 LAB
ANION GAP SERPL CALCULATED.3IONS-SCNC: 9 MMOL/L (ref 3–14)
ANISOCYTOSIS BLD QL SMEAR: SLIGHT
BASOPHILS # BLD AUTO: 0 10E9/L (ref 0–0.2)
BASOPHILS NFR BLD AUTO: 0.9 %
BUN SERPL-MCNC: 10 MG/DL (ref 7–30)
CALCIUM SERPL-MCNC: 7.7 MG/DL (ref 8.5–10.1)
CHLORIDE SERPL-SCNC: 108 MMOL/L (ref 94–109)
CO2 SERPL-SCNC: 24 MMOL/L (ref 20–32)
CREAT SERPL-MCNC: 0.81 MG/DL (ref 0.66–1.25)
DIFFERENTIAL METHOD BLD: ABNORMAL
EOSINOPHIL # BLD AUTO: 0 10E9/L (ref 0–0.7)
EOSINOPHIL NFR BLD AUTO: 0 %
ERYTHROCYTE [DISTWIDTH] IN BLOOD BY AUTOMATED COUNT: 14.3 % (ref 10–15)
GFR SERPL CREATININE-BSD FRML MDRD: >90 ML/MIN/{1.73_M2}
GLUCOSE SERPL-MCNC: 108 MG/DL (ref 70–99)
HCT VFR BLD AUTO: 22.6 % (ref 40–53)
HGB BLD-MCNC: 7.7 G/DL (ref 13.3–17.7)
HGB BLD-MCNC: 8.1 G/DL (ref 13.3–17.7)
LYMPHOCYTES # BLD AUTO: 0.4 10E9/L (ref 0.8–5.3)
LYMPHOCYTES NFR BLD AUTO: 8.7 %
MCH RBC QN AUTO: 29.8 PG (ref 26.5–33)
MCHC RBC AUTO-ENTMCNC: 34.1 G/DL (ref 31.5–36.5)
MCV RBC AUTO: 88 FL (ref 78–100)
METAMYELOCYTES # BLD: 0.1 10E9/L
METAMYELOCYTES NFR BLD MANUAL: 1.7 %
MICROCYTES BLD QL SMEAR: PRESENT
MONOCYTES # BLD AUTO: 0.5 10E9/L (ref 0–1.3)
MONOCYTES NFR BLD AUTO: 12.2 %
MYELOCYTES # BLD: 0.1 10E9/L
MYELOCYTES NFR BLD MANUAL: 2.6 %
NEUTROPHILS # BLD AUTO: 3 10E9/L (ref 1.6–8.3)
NEUTROPHILS NFR BLD AUTO: 73.9 %
PLATELET # BLD AUTO: 29 10E9/L (ref 150–450)
PLATELET # BLD EST: ABNORMAL 10*3/UL
POTASSIUM SERPL-SCNC: 3.2 MMOL/L (ref 3.4–5.3)
POTASSIUM SERPL-SCNC: 3.5 MMOL/L (ref 3.4–5.3)
RBC # BLD AUTO: 2.58 10E12/L (ref 4.4–5.9)
SODIUM SERPL-SCNC: 141 MMOL/L (ref 133–144)
WBC # BLD AUTO: 4.1 10E9/L (ref 4–11)

## 2020-04-21 PROCEDURE — 25000128 H RX IP 250 OP 636: Performed by: STUDENT IN AN ORGANIZED HEALTH CARE EDUCATION/TRAINING PROGRAM

## 2020-04-21 PROCEDURE — 80048 BASIC METABOLIC PNL TOTAL CA: CPT | Performed by: INTERNAL MEDICINE

## 2020-04-21 PROCEDURE — 84132 ASSAY OF SERUM POTASSIUM: CPT | Performed by: PHYSICIAN ASSISTANT

## 2020-04-21 PROCEDURE — 25000132 ZZH RX MED GY IP 250 OP 250 PS 637: Performed by: STUDENT IN AN ORGANIZED HEALTH CARE EDUCATION/TRAINING PROGRAM

## 2020-04-21 PROCEDURE — 84132 ASSAY OF SERUM POTASSIUM: CPT | Performed by: STUDENT IN AN ORGANIZED HEALTH CARE EDUCATION/TRAINING PROGRAM

## 2020-04-21 PROCEDURE — 36415 COLL VENOUS BLD VENIPUNCTURE: CPT | Performed by: PHYSICIAN ASSISTANT

## 2020-04-21 PROCEDURE — 85018 HEMOGLOBIN: CPT | Performed by: PHYSICIAN ASSISTANT

## 2020-04-21 PROCEDURE — 85025 COMPLETE CBC W/AUTO DIFF WBC: CPT | Performed by: INTERNAL MEDICINE

## 2020-04-21 PROCEDURE — 36415 COLL VENOUS BLD VENIPUNCTURE: CPT | Performed by: INTERNAL MEDICINE

## 2020-04-21 PROCEDURE — 12000012 ZZH R&B MS OVERFLOW UMMC

## 2020-04-21 RX ORDER — POTASSIUM CHLORIDE 1.5 G/1.58G
20 POWDER, FOR SOLUTION ORAL DAILY
Status: DISCONTINUED | OUTPATIENT
Start: 2020-04-21 | End: 2020-04-21

## 2020-04-21 RX ORDER — POTASSIUM CHLORIDE 1.5 G/1.58G
20 POWDER, FOR SOLUTION ORAL DAILY
Status: DISCONTINUED | OUTPATIENT
Start: 2020-04-22 | End: 2020-04-22 | Stop reason: HOSPADM

## 2020-04-21 RX ADMIN — ACETAMINOPHEN 650 MG: 325 TABLET ORAL at 12:20

## 2020-04-21 RX ADMIN — ACETAMINOPHEN 650 MG: 325 TABLET ORAL at 02:18

## 2020-04-21 RX ADMIN — LEVOTHYROXINE SODIUM 200 MCG: 0.07 TABLET ORAL at 08:07

## 2020-04-21 RX ADMIN — FLUCONAZOLE 200 MG: 200 TABLET ORAL at 07:59

## 2020-04-21 RX ADMIN — ACETAMINOPHEN 650 MG: 325 TABLET ORAL at 19:29

## 2020-04-21 RX ADMIN — ACYCLOVIR 400 MG: 200 CAPSULE ORAL at 19:29

## 2020-04-21 RX ADMIN — POTASSIUM CHLORIDE 40 MEQ: 750 TABLET, EXTENDED RELEASE ORAL at 08:00

## 2020-04-21 RX ADMIN — POTASSIUM CHLORIDE 20 MEQ: 750 TABLET, EXTENDED RELEASE ORAL at 12:20

## 2020-04-21 RX ADMIN — PIPERACILLIN AND TAZOBACTAM 4.5 G: 4; .5 INJECTION, POWDER, FOR SOLUTION INTRAVENOUS at 07:58

## 2020-04-21 RX ADMIN — PIPERACILLIN AND TAZOBACTAM 4.5 G: 4; .5 INJECTION, POWDER, FOR SOLUTION INTRAVENOUS at 20:05

## 2020-04-21 RX ADMIN — PIPERACILLIN AND TAZOBACTAM 4.5 G: 4; .5 INJECTION, POWDER, FOR SOLUTION INTRAVENOUS at 02:18

## 2020-04-21 RX ADMIN — PIPERACILLIN AND TAZOBACTAM 4.5 G: 4; .5 INJECTION, POWDER, FOR SOLUTION INTRAVENOUS at 14:06

## 2020-04-21 RX ADMIN — ACYCLOVIR 400 MG: 200 CAPSULE ORAL at 08:00

## 2020-04-21 ASSESSMENT — MIFFLIN-ST. JEOR: SCORE: 1777.9

## 2020-04-21 ASSESSMENT — ACTIVITIES OF DAILY LIVING (ADL)
ADLS_ACUITY_SCORE: 13

## 2020-04-21 ASSESSMENT — PAIN DESCRIPTION - DESCRIPTORS
DESCRIPTORS: SORE
DESCRIPTORS: HEADACHE

## 2020-04-21 NOTE — PLAN OF CARE
Patient with no temps or vital sign changes. He is eating both breakfast and lunch. Up in chair a couple of times this shift. Encourage out of bed activity to maintain his strength and conditioning.

## 2020-04-21 NOTE — PLAN OF CARE
Afebrile. AVSS. R fa piv X2. Back incision with dark redness around, mostly closed and scabbed. + blood cultures, sensitivities pending. PRBC given this am. Some blood with stool.

## 2020-04-21 NOTE — PROGRESS NOTES
[facility]    Progress Note  Hematology / Oncology     Date of Admission:  4/19/2020  Date of Service (when I saw the patient): 04/19/20    Assessment & Plan   Kobe Pedroza is a 60 year old male with a past medical history of Burkitt's lymphoma that is admitted on 4/19/20 for neutropenic fever.   Patient was found to have positive blood culture of likely alpha hemolytic streptococci (Not Group A, B or pneumococcus) in [place] ED.     --HEME--  # Burkitt's Lymphoma  # Anemia secondary to chemotherapy  # Thrombocytopenia secondary to chemotherapy  Followed by Dr. Wright. Presented on 3/12/ 20 to [facility] with acute-on-chronic mid-thoracic back pain with some associated radicular symptoms. Per patient, no B-symptoms, though he did an intentional 35 lb wt loss. 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 Patient had no appreciable neurological deficits. He was started on dexamethasone and underwent T5-6 laminectomy with gross total resection of epidural tumor on 3/15. Flow cytometry of the specimen was notable for CD10 positive and kappa monotypic B cells (83%). Confirmed Burkitt lymphoma with surgical pathology. Flow cytometry of the specimen was notable for CD10 positive and kappa monotypic B cells (83%). Confirmed Burkitt lymphoma with surgical pathology. PET scan showed extensive visceral and bony disease. Bone marrow biopsy completed on 3/18 showed suspicious involvement with rare polytypic B cells (0.8%). No disease in CSF. Started R-CODOX-M / R-IVAC D1=3/18/2020 and tolerated w/o complications. Neulasta on 4/15 at [facility]. Also completed Cycle 2 R-IVAC D1=4/8/2020 w/o complications.   - Patient's white count recovered to 4.1 from 0.2 yesterday.   - ANC is 3.0  - Transfusions for Hgb>7, Platelets>10    --ID--  # Neutropenic Fever. WBC 0.1 on admission.  CXR clear at [facility].  UA at [facility]  "with 1 WBC.  COVID testing will take 2-3 days from [place] ([facility]). Will recheck at [facility] given <24 hour turnaround to save PPE.  [facility] lab will call 5C if positive results from blood cultures.    -[place] [facility] [place]  - Zosyn 4.5g Q6H  -NS 125mL/hr x2 L for softer BP on admission (LR incompatible with zosyn)  - Continue to monitor incision site from previous surgery on back  - UA: 1 WBC, neg LeukEst, neg nitrite, neg blood    # Gram + Cocci in Chains Bacteremia (Likely Streptococci species)  - Blood cultures at [facility] 4/18 - Positive for Gram + cocci in chains likely streptococci   - Per [facility]: Patient was found to have positive blood culture of likely alpha hemolytic streptococci (Not Group A, B or pneumococcus) 1 out of 4 cultures in [place] ED.   - Blood cultures [facility] 4/19 - NGTD & 4/20 - NGTD  - Patient continues to have cleared cultures.   - Will plan on switching him to a po cephalosporin antibiotic tomorrow to provide coverage to this family of bacteria to finish a proper course of treatment for bacteremia    - Continue Zosyn 4.5 q6h today   - Will tailor antibiotic regimen for a po regimen to cover alpha hemolytic strep    # Neutropenic prophylaxis  - Fluconazole 200 mg po qday  - Acyclovir 400 mg po bid  - Now on Zosyn 4.5g q6h    --NEURO--  # Pain related to malignancy and recent spine surgery  # Headache  - Headache persists, recommend utilizing PRN pain medications available  - Acetaminophen 650 mg q6h po prn for fever and pain  - Oxycodone 5-10 mg q4h prn    --CVS--  # HLD  # Hx of CAD  - Monitor      --PULM--  No issues    --GI--  # Murali blood per rectum  # History of Hemorrhoids  - Patient reports blood after bowel movement on Sunday  - States it \"dripped into the toilet\" and then was on the toilet paper after he wiped.  - Did not have another episode but did state there were a couple drops of blood on the toilet paper today again.   - Denies pain  - Hgb 7.7  - Will follow " Hgb BID  - Will monitor closely    --RENAL--  # hypokalemia.  On 40mEq of potassium daily.  Pt states K rises significantly with this.  Will trial K replacement protocol today to calculate needs and adjust prior to discharge if needed.    - dropped to 3.2 from 3.6  - Potassium replacement protocol.    - Will start Potassium 20 meq daily  - Magnesium with replacement protocol in the AM.     --ENDO--  # Hypothyroidism s/p thyroidectomy  - pta levothyroxine    Fluids/ Electrolytes/ Nutrition  F: PO intake has maintained, no need for excess IVF  E: Replete as needed  N: Regular diet with boost shakes    VTE: No pharmacologic prophylaxis 2/2 to thrombocytopenia    Dispo:    Patient was individually seen and discussed with Dr. Valle.     Mckayla Alvarez PA-C  Hematology/Oncology  Pager: #[phone]      Patient has been seen and evaluated by me. I have reviewed today's vital signs, medications, labs and imaging results. I have discussed the plan with the team and agree with the findings and plan in this admission note.      Alpha strep bacteremia in initial culture. All later cultures neg  WBC up and overall feeling better.  Some loose stools but eating.  Lungs clear and cor reg. No abd tenderness;  No big spleen. No ongoing rectal bleeding with plts rising  No new rash or other sites /signs of bleeding    Plans as above. if counts stay up may finish abx course with oral agents.  Deng Valle MD    Code Status   Full Code    Allergies   No Known Allergies    Interval History  No acute events overnight.   Patient states that overall he is feeling better today. Appears to be in brighter spirits. States that yesterday evening prior to going to bed he was having generalized aches all over his body. States that some of the pain was similar to pain after getting neulasta.  Reports that he did not take anything for the pain but that it went away after he went to bed. Did not have any further pain when he woke up. Reports  "that he still has his headache, but only notices it when he lies down. States the headache resolves when he is sitting or walking. Denies any further episodes of sujey blood after a bowel movement. Reports he is still have \"drops on the toilet paper\" after wiping. Denies oral sores, throat pain, double vision, change in vision, cough, SOB, abdominal pain, diarrhea, nausea, vomiting, hematuria, difficulty walking.     Review of Systems   The 10 point Review of Systems is negative other than noted in the HPI or here.     Physical Exam   Temp: 98.3  F (36.8  C) Temp src: Oral BP: 121/70 Pulse: 82 Heart Rate: 89 Resp: 16 SpO2: 99 % O2 Device: None (Room air)    Vital Signs with Ranges  Temp:  [98.3  F (36.8  C)-99.3  F (37.4  C)] 98.3  F (36.8  C)  Pulse:  [82] 82  Heart Rate:  [82-97] 89  Resp:  [16-20] 16  BP: (108-128)/(65-75) 121/70  SpO2:  [97 %-99 %] 99 %  209 lbs 12.8 oz    Constitutional: Awake and conversational. Non- toxic appearing. No acute distress.   HEENT: Normocephalic, atraumatic. Sclerae anicteric. Moist mucus membranes   Respiratory: Breathing comfortable with no increased work on room air. Good air exchange. No signs of respiratory distress or accessory muscle use. Lungs clear to auscultation bilaterally, no crackles or wheezing noted.  Cardiovascular: Regular rate and rhythm. Normal S1 and S2. No murmurs, rubs, or gallops. No peripheral edema.    GI: Abdomen is soft, non-distended, non-tender and without distension. Bowel sounds present and normoactive.  Skin: 6 in straight incision wound from previous surgery in March on middle of upper back. Dark erythema around wound. No spreading erythema. Scabbing present. No discharge or bleeding, No warmth, induration or tenderness. Continue to monitor. Skin is otherwise clean, dry, intact. No jaundice appreciated.   Musculoskeletal/ Extremities: No redness, warmth, or swelling of the joints appreciated. Distal pulses palpable.   Neurologic: Alert and " oriented. Speech normal. Grossly nonfocal. Neuropsychiatric: Calm, affect congruent to situation. Appropriate mood and affect.       Data   Results for orders placed or performed during the hospital encounter of 04/19/20 (from the past 24 hour(s))   Hemoglobin   Result Value Ref Range    Hemoglobin 8.9 (L) 13.3 - 17.7 g/dL   Basic metabolic panel   Result Value Ref Range    Sodium 141 133 - 144 mmol/L    Potassium 3.2 (L) 3.4 - 5.3 mmol/L    Chloride 108 94 - 109 mmol/L    Carbon Dioxide 24 20 - 32 mmol/L    Anion Gap 9 3 - 14 mmol/L    Glucose 108 (H) 70 - 99 mg/dL    Urea Nitrogen 10 7 - 30 mg/dL    Creatinine 0.81 0.66 - 1.25 mg/dL    GFR Estimate >90 >60 mL/min/[1.73_m2]    GFR Estimate If Black >90 >60 mL/min/[1.73_m2]    Calcium 7.7 (L) 8.5 - 10.1 mg/dL   CBC with platelets differential   Result Value Ref Range    WBC 4.1 4.0 - 11.0 10e9/L    RBC Count 2.58 (L) 4.4 - 5.9 10e12/L    Hemoglobin 7.7 (L) 13.3 - 17.7 g/dL    Hematocrit 22.6 (L) 40.0 - 53.0 %    MCV 88 78 - 100 fl    MCH 29.8 26.5 - 33.0 pg    MCHC 34.1 31.5 - 36.5 g/dL    RDW 14.3 10.0 - 15.0 %    Platelet Count 29 (LL) 150 - 450 10e9/L    Diff Method Manual Differential     % Neutrophils 73.9 %    % Lymphocytes 8.7 %    % Monocytes 12.2 %    % Eosinophils 0.0 %    % Basophils 0.9 %    % Metamyelocytes 1.7 %    % Myelocytes 2.6 %    Absolute Neutrophil 3.0 1.6 - 8.3 10e9/L    Absolute Lymphocytes 0.4 (L) 0.8 - 5.3 10e9/L    Absolute Monocytes 0.5 0.0 - 1.3 10e9/L    Absolute Eosinophils 0.0 0.0 - 0.7 10e9/L    Absolute Basophils 0.0 0.0 - 0.2 10e9/L    Absolute Metamyelocytes 0.1 (H) 0 10e9/L    Absolute Myelocytes 0.1 (H) 0 10e9/L    Anisocytosis Slight     Microcytes Present     Platelet Estimate Confirming automated cell count

## 2020-04-21 NOTE — PLAN OF CARE
Tmax 99.3, OVSS. Pt reports generalized aches all over, declined intervention at first & then requested to take tylenol. Denies nausea. Stayed in bed during shift, encourage getting out of bed today. Needs K+ this morning, will take with breakfast. No other concerns, continue plan of care.     Problem: Infection  Goal: Infection Symptom Resolution  Outcome: No Change     Problem: Pain Acute  Goal: Optimal Pain Control  Outcome: No Change     Problem: Adult Inpatient Plan of Care  Goal: Plan of Care Review  Outcome: No Change     Problem: Adult Inpatient Plan of Care  Goal: Readiness for Transition of Care  Outcome: No Change

## 2020-04-22 VITALS
SYSTOLIC BLOOD PRESSURE: 121 MMHG | HEIGHT: 70 IN | BODY MASS INDEX: 30.03 KG/M2 | OXYGEN SATURATION: 94 % | WEIGHT: 209.8 LBS | TEMPERATURE: 98.4 F | RESPIRATION RATE: 18 BRPM | HEART RATE: 91 BPM | DIASTOLIC BLOOD PRESSURE: 70 MMHG

## 2020-04-22 LAB
ANION GAP SERPL CALCULATED.3IONS-SCNC: 6 MMOL/L (ref 3–14)
BASOPHILS # BLD AUTO: 0 10E9/L (ref 0–0.2)
BASOPHILS NFR BLD AUTO: 0 %
BUN SERPL-MCNC: 14 MG/DL (ref 7–30)
CALCIUM SERPL-MCNC: 7.8 MG/DL (ref 8.5–10.1)
CHLORIDE SERPL-SCNC: 110 MMOL/L (ref 94–109)
CO2 SERPL-SCNC: 25 MMOL/L (ref 20–32)
CREAT SERPL-MCNC: 0.74 MG/DL (ref 0.66–1.25)
DIFFERENTIAL METHOD BLD: ABNORMAL
EOSINOPHIL # BLD AUTO: 0 10E9/L (ref 0–0.7)
EOSINOPHIL NFR BLD AUTO: 0 %
ERYTHROCYTE [DISTWIDTH] IN BLOOD BY AUTOMATED COUNT: 14.6 % (ref 10–15)
GFR SERPL CREATININE-BSD FRML MDRD: >90 ML/MIN/{1.73_M2}
GLUCOSE SERPL-MCNC: 120 MG/DL (ref 70–99)
HCT VFR BLD AUTO: 22.4 % (ref 40–53)
HGB BLD-MCNC: 7.5 G/DL (ref 13.3–17.7)
LACTATE BLD-SCNC: 1.3 MMOL/L (ref 0.7–2)
LYMPHOCYTES # BLD AUTO: 1.5 10E9/L (ref 0.8–5.3)
LYMPHOCYTES NFR BLD AUTO: 8.7 %
MCH RBC QN AUTO: 29.8 PG (ref 26.5–33)
MCHC RBC AUTO-ENTMCNC: 33.5 G/DL (ref 31.5–36.5)
MCV RBC AUTO: 89 FL (ref 78–100)
METAMYELOCYTES # BLD: 0.5 10E9/L
METAMYELOCYTES NFR BLD MANUAL: 2.6 %
MONOCYTES # BLD AUTO: 2.1 10E9/L (ref 0–1.3)
MONOCYTES NFR BLD AUTO: 12.2 %
MYELOCYTES # BLD: 0.3 10E9/L
MYELOCYTES NFR BLD MANUAL: 1.7 %
NEUTROPHILS # BLD AUTO: 12.9 10E9/L (ref 1.6–8.3)
NEUTROPHILS NFR BLD AUTO: 73.9 %
PLATELET # BLD AUTO: 73 10E9/L (ref 150–450)
PLATELET # BLD EST: ABNORMAL 10*3/UL
POTASSIUM SERPL-SCNC: 3.3 MMOL/L (ref 3.4–5.3)
PROMYELOCYTES # BLD MANUAL: 0.2 10E9/L
PROMYELOCYTES NFR BLD MANUAL: 0.9 %
RBC # BLD AUTO: 2.52 10E12/L (ref 4.4–5.9)
RBC MORPH BLD: NORMAL
SODIUM SERPL-SCNC: 141 MMOL/L (ref 133–144)
WBC # BLD AUTO: 17.4 10E9/L (ref 4–11)

## 2020-04-22 PROCEDURE — 36415 COLL VENOUS BLD VENIPUNCTURE: CPT | Performed by: INTERNAL MEDICINE

## 2020-04-22 PROCEDURE — 25000132 ZZH RX MED GY IP 250 OP 250 PS 637: Performed by: STUDENT IN AN ORGANIZED HEALTH CARE EDUCATION/TRAINING PROGRAM

## 2020-04-22 PROCEDURE — 80048 BASIC METABOLIC PNL TOTAL CA: CPT | Performed by: INTERNAL MEDICINE

## 2020-04-22 PROCEDURE — 25000132 ZZH RX MED GY IP 250 OP 250 PS 637: Performed by: INTERNAL MEDICINE

## 2020-04-22 PROCEDURE — 25000128 H RX IP 250 OP 636: Performed by: STUDENT IN AN ORGANIZED HEALTH CARE EDUCATION/TRAINING PROGRAM

## 2020-04-22 PROCEDURE — 85025 COMPLETE CBC W/AUTO DIFF WBC: CPT | Performed by: INTERNAL MEDICINE

## 2020-04-22 PROCEDURE — 83605 ASSAY OF LACTIC ACID: CPT | Performed by: INTERNAL MEDICINE

## 2020-04-22 RX ORDER — LEVOTHYROXINE SODIUM 200 UG/1
200 TABLET ORAL DAILY
Status: DISCONTINUED | OUTPATIENT
Start: 2020-04-22 | End: 2020-04-22 | Stop reason: HOSPADM

## 2020-04-22 RX ORDER — CEPHALEXIN 500 MG/1
500 CAPSULE ORAL 2 TIMES DAILY
Qty: 21 CAPSULE | Refills: 0 | Status: ON HOLD | OUTPATIENT
Start: 2020-04-22 | End: 2020-05-01

## 2020-04-22 RX ADMIN — FLUCONAZOLE 200 MG: 200 TABLET ORAL at 08:19

## 2020-04-22 RX ADMIN — PIPERACILLIN AND TAZOBACTAM 4.5 G: 4; .5 INJECTION, POWDER, FOR SOLUTION INTRAVENOUS at 01:55

## 2020-04-22 RX ADMIN — LEVOTHYROXINE SODIUM 200 MCG: 200 TABLET ORAL at 08:19

## 2020-04-22 RX ADMIN — POTASSIUM CHLORIDE 40 MEQ: 750 TABLET, EXTENDED RELEASE ORAL at 08:19

## 2020-04-22 RX ADMIN — PIPERACILLIN AND TAZOBACTAM 4.5 G: 4; .5 INJECTION, POWDER, FOR SOLUTION INTRAVENOUS at 09:08

## 2020-04-22 RX ADMIN — ACETAMINOPHEN 650 MG: 325 TABLET ORAL at 01:55

## 2020-04-22 RX ADMIN — ACETAMINOPHEN 650 MG: 325 TABLET ORAL at 08:19

## 2020-04-22 RX ADMIN — POTASSIUM CHLORIDE 20 MEQ: 750 TABLET, EXTENDED RELEASE ORAL at 10:09

## 2020-04-22 RX ADMIN — ACYCLOVIR 400 MG: 200 CAPSULE ORAL at 08:19

## 2020-04-22 ASSESSMENT — ACTIVITIES OF DAILY LIVING (ADL)
ADLS_ACUITY_SCORE: 13

## 2020-04-22 NOTE — PROGRESS NOTES
Nursing Focus: Discharge    D: Patient discharged to home at 1430 hours. Patient was afebrile, vitally stable and denied pain or nausea prior to discharge.  All belongings sent with the patient.    I: Discharge prescriptions sent to discharge pharmacy to be filled. All discharge medications and instructions reviewed with Jonnathan. Patient instructed to call clinic triage nurse if he experiences a fever >100.4, uncontrolled nausea, vomiting, diarrhea, or pain; or experiences any signs or symptoms of bleeding. Other phone numbers to call with questions or concerns after discharge reviewed with Jonnathan. Follow-up outpatient appointment scheduled reviewed with Jonnathan.  PIV x 2 removed. Education completed.  Care Plan goals met and adequate for discharge.    A: Jonntahan verbalized understanding of discharge medications and instructions. Patient will  medications at discharge pharmacy. No other concerns or complaints prior to discharge.     P: Patient to follow-up in clinic with Dr. Wright on 4/29 in the [facility].

## 2020-04-22 NOTE — DISCHARGE SUMMARY
"[facility], [place]    Discharge Summary  Hematology / Oncology    Date of Admission:  4/19/2020  Date of Discharge:  4/22/2020  Discharging Provider: Mckayla Alvarez  Date of Service (when I saw the patient): 04/22/20    Discharge Diagnoses   Active Problems:    Neutropenic fever (H)  Alpha hemolytic streptococci (Not Group A, B or pneumococcus) bacteremia     History of Present Illness   Kobe Pedroza is a 60 year old male with a past medical history of Burkitt's lymphoma that is admitted on 4/19/20 for neutropenic fever.   Patient was found to have positive blood culture of likely alpha hemolytic streptococci (Not Group A, B or pneumococcus) in [place] ED.     Jonnathan reports that he is feeling well this morning. States that he is looking forward to getting home. Reports no new pains, fevers or chills. Does report two episodes of loose stools but has been regularly taking Senna. Reports he stopped taking the Senna to return to normal stools. Still has a \"slight\" headache when lying down but did not endorse a headache while he was sitting up and eating breakfast when he was seen this morning. Appetite intact. Last bowel movement this morning. Denies vision changes, oral sores, SOB, cough, abdominal pain, diarrhea, constipation, nausea, vomiting, dysuria, hematuria, blood in stool, difficulty ambulating or swelling.    Hospital Course   Kobe Pedroza was admitted on 4/19/2020 with a history of Burkitt Lymphoma for neutropenic fever at [place] [facility] and transferred to [facility]. Blood cultures were obtained at [place] ED, which later results were called to  and reported that patient had gram positive cocci on culture likely streptococci. On follow up call to the [place] lab, the bacteria was alpha hemolytic streptococci, not Group A, B or pneumococcus in 1/4 cultures. This was suspected to be contamination at that lab and further speciation and sensitivities were not completed. However, Jonnathan " was neutropenic at the time of the visit with a recent chemotherapy cycle completed. With this we are inclined to treat this as a true bacteremic infection. During his stay, blood cultures were drawn on 4/19 and 4/20 with NGTD.     The following problems were addressed during his hospitalization:    --HEME--  # Burkitt's Lymphoma  # Anemia secondary to chemotherapy  # Thrombocytopenia secondary to chemotherapy  Followed by Dr. Wright. Presented on 3/12/ 20 to [facility] with acute-on-chronic mid-thoracic back pain with some associated radicular symptoms. Per patient, no B-symptoms, though he did an intentional 35 lb wt loss. 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 Patient had no appreciable neurological deficits. He was started on dexamethasone and underwent T5-6 laminectomy with gross total resection of epidural tumor on 3/15. Flow cytometry of the specimen was notable for CD10 positive and kappa monotypic B cells (83%). Confirmed Burkitt lymphoma with surgical pathology. Flow cytometry of the specimen was notable for CD10 positive and kappa monotypic B cells (83%). Confirmed Burkitt lymphoma with surgical pathology. PET scan showed extensive visceral and bony disease. Bone marrow biopsy completed on 3/18 showed suspicious involvement with rare polytypic B cells (0.8%). No disease in CSF. Started R-CODOX-M / R-IVAC D1=3/18/2020 and tolerated w/o complications. Neulasta on 4/15 at [facility]. Also completed Cycle 2 R-IVAC D1=4/8/2020 w/o complications.   - Patient's white count recovered to 4.1 from 0.2 yesterday.   - ANC is 3.0  - Transfusions for Hgb>7, Platelets>10  - Will have repeat PET scan on 4/27  - Will follow up with Dr Wright on 4/29  - Plan for follow up labs on either 4/27 or 4/29, Patient will be informed when appointment is placed     # Leukocytosis  - WBC increased to 17.3 from 4.1   - Recently received neulasta on  4/15, this increase is likely due to this  - His platelets have also increased to 77K    --ID--  # Neutropenic Fever.   WBC 0.1 on admission.  CXR clear at [facility].  UA at [facility] with 1 WBC.  COVID testing will take 2-3 days from [facility] Will recheck at [facility] given <24 hour turnaround to save PPE.  [facility] lab will call 5C if positive results from blood cultures.    - [place] [facility] [place]  - Zosyn 4.5g Q6H  -NS 125mL/hr x2 L for softer BP on admission (LR incompatible with zosyn)  - Continue to monitor incision site from previous surgery on back  - UA: 1 WBC, neg LeukEst, neg nitrite, neg blood     # Gram + Cocci in Chains Bacteremia (Likely Streptococci species)  - Blood cultures at [facility] 4/18 - Positive for Gram + cocci in chains likely streptococci              - Per [facility]: Patient was found to have positive blood culture of likely alpha hemolytic streptococci (Not Group A, B or pneumococcus) 1 out of 4 cultures in [facility] ED.   - Blood cultures [facility] 4/19 - NGTD & 4/20 - NGTD  - Patient continues to have cleared cultures.   - Will plan on switching him to a po cephalosporin antibiotic tomorrow to provide coverage to this family of bacteria to finish a proper course of treatment for bacteremia     - Stop Zosyn 4.5 q6h today (4/19-4/22)  - Start Cephalexin 500mg BID by mouth   - Take this for a total course of 14 days, will take for 10 additional days (until 5/2)              - This antibiotic has good coverage for gram positive cocci     # Neutropenic prophylaxis  - Continue Acyclovir 400 mg po bid and Fluconazole 200 mg po daily at discharge until he is seen in follow up with Dr Wright due to recent neutropenic fever even though he has count recovery     --NEURO--  # Pain related to malignancy and recent spine surgery  # Headache  - Headache persists, recommend utilizing PRN pain medications available  - Acetaminophen 650 mg q6h po prn for fever and pain  - Oxycodone 5-10 mg q4h  "prn     --CVS--  # HLD  # Hx of CAD  - Monitor       --PULM--  No issues     --GI--  # Murali blood per rectum  # History of Hemorrhoids  - Patient reports blood after bowel movement on Sunday. Did not have repeat episodes while in the hospital  - States it \"dripped into the toilet\" and then was on the toilet paper after he wiped.  - Did not have another episode but did state there were a couple drops of blood on the toilet paper again with further bowel movements.   - Denies pain  - Monitored closely in the hospital. Discussed importance of watching this while outpatient    --RENAL--  # Hypokalemia    - Continue 40mEq of potassium daily.    - Patient continues to have low potassium while in the hospital, recommend he continues his supplement previously prescribed     --ENDO--  # Hypothyroidism s/p thyroidectomy  - pta levothyroxine    Patient will have follow up with Dr Wright on 4/29, He also has a PET scan on 4/27. He will have labs next week and will be informed once these are scheduled.  Patient has remained afebrile, no new symptoms or pain indicating infection, with WBC count recovery, and no positive blood cultures while in the hospital. He will complete treatment of this bacteremia with cephalexin.     Patient seen and discussed with Dr. Phelps.     Mckayla Alvarez PA-C  Hematology/Oncology    Significant Results and Procedures   Positive blood culture in [place] ED - 1/4 cultures positive for alpha hemolytic streptococci, not Group A, B or pneumococcus.    Pending Results   These results will be followed up by outpatient follow up appointment  Unresulted Labs Ordered in the Past 30 Days of this Admission     Date and Time Order Name Status Description    4/20/2020 0944 Blood culture Preliminary     4/20/2020 0944 Blood culture Preliminary     4/19/2020 0451 Blood culture Preliminary     4/19/2020 0451 Blood culture Preliminary           Code Status   Full Code    Primary Care Physician   Garett" Nordberg    Physical Exam   Temp: 98.4  F (36.9  C) Temp src: Oral BP: 121/70 Pulse: 91 Heart Rate: 87 Resp: 18 SpO2: 94 % O2 Device: None (Room air)    Vitals:    04/19/20 0626 04/21/20 0852   Weight: 96.5 kg (212 lb 11.2 oz) 95.2 kg (209 lb 12.8 oz)     Vital Signs with Ranges  Temp:  [98.1  F (36.7  C)-98.8  F (37.1  C)] 98.4  F (36.9  C)  Pulse:  [91] 91  Heart Rate:  [] 87  Resp:  [16-18] 18  BP: (108-126)/(60-87) 121/70  SpO2:  [94 %-97 %] 94 %  I/O last 3 completed shifts:  In: 2500 [P.O.:2500]  Out: -     Constitutional: Pleasant male sitting up at chair eating breakfast. Awake and conversational. Non- toxic appearing. No acute distress.   HEENT: Normocephalic, atraumatic. Sclerae anicteric. Moist mucus membranes   Respiratory: Breathing comfortable with no increased work on room air. Good air exchange. No signs of respiratory distress or accessory muscle use. Lungs clear to auscultation bilaterally, no crackles or wheezing noted.  Cardiovascular: Regular rate and rhythm. Normal S1 and S2. No murmurs, rubs, or gallops. No peripheral edema.    GI: Abdomen is soft, non-distended, non-tender and without distension. Bowel sounds present and normoactive.  Skin: 6 in straight incision wound from previous surgery in March on middle of upper back. Dark red-purple around wound. No spreading erythema. No tenderness or itchiness. Scabbing present. No discharge or bleeding, No warmth, induration or tenderness. Continue to monitor. Skin is otherwise clean, dry, intact. No jaundice appreciated.   Musculoskeletal/ Extremities: No redness, warmth, or swelling of the joints appreciated. Distal pulses palpable.   Neurologic: Alert and oriented. Speech normal. Grossly nonfocal. Neuropsychiatric: Calm, affect congruent to situation. Appropriate mood and affect.     Time Spent on this Encounter   Mckayla LEWIS PA-C, personally saw the patient today and spent greater than 30 minutes discharging this  patient.    Discharge Disposition   Discharged to home  Condition at discharge: Stable    Consultations This Hospital Stay   VASCULAR ACCESS CARE ADULT IP CONSULT  MEDICATION HISTORY IP PHARMACY CONSULT    Discharge Orders   No discharge procedures on file.  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)      fluconazole (DIFLUCAN) 200 MG tablet Take 200 mg by mouth daily ONLY TAKE IF ANC <1000  Qty: 30 tablet, Refills: 0    Associated Diagnoses: 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)      levofloxacin (LEVAQUIN) 250 MG tablet Take 250 mg by mouth daily ONLY TAKE IF ANC <1000  Qty: 30 tablet, Refills: 0    Associated Diagnoses: 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)      levothyroxine (SYNTHROID/LEVOTHROID) 200 MCG tablet Take 200 mcg by mouth daily      magnesium citrate solution Take 296 mLs by mouth once as needed for constipation or other (For constipation) Take as needed for constipation  Qty: 296 mL, Refills: 0    Associated Diagnoses: 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)      ondansetron (ZOFRAN) 4 MG tablet Take 1-2 tablets (4-8 mg) by mouth every 6 hours as needed for nausea  Qty: 90 tablet, Refills: 3    Associated Diagnoses: 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)      oxyCODONE (ROXICODONE) 5 MG tablet Take 1-2 tablets (5-10 mg) by mouth every 6 hours as needed for severe pain  Qty: 20 tablet, Refills: 0    Associated Diagnoses: 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); Cancer related pain      potassium chloride (KLOR-CON) 20 MEQ  packet Take 40 mEq by mouth daily  Qty: 60 packet, Refills: 0    Associated Diagnoses: 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)      senna-docusate (SENOKOT-S/PERICOLACE) 8.6-50 MG tablet Take 1 tablet by mouth 2 times daily  Qty: 60 tablet, Refills: 0    Associated Diagnoses: 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)           Allergies   No Known Allergies  Data   Most Recent 3 CBC's:  Recent Labs   Lab Test 04/22/20 0319 04/21/20  1624 04/21/20  0322 04/20/20  0304   WBC 17.4*  --  4.1  --  0.2*   HGB 7.5* 8.1* 7.7*   < > 6.5*   MCV 89  --  88  --  89   PLT 73*  --  29*  --  18*    < > = values in this interval not displayed.      Most Recent 3 BMP's:  Recent Labs   Lab Test 04/22/20 0319 04/21/20  1624 04/21/20 0322 04/20/20  0304     --  141 141   POTASSIUM 3.3* 3.5 3.2* 3.6   CHLORIDE 110*  --  108 110*   CO2 25  --  24 26   BUN 14  --  10 12   CR 0.74  --  0.81 0.75   ANIONGAP 6  --  9 5   RUTH 7.8*  --  7.7* 7.5*   *  --  108* 102*     Most Recent 2 LFT's:  Recent Labs   Lab Test 04/19/20  0523 04/15/20  1034   AST 12 33   ALT 37 71*   ALKPHOS 63 68   BILITOTAL 0.7 0.4     Most Recent INR's and Anticoagulation Dosing History:  Anticoagulation Dose History     Recent Dosing and Labs Latest Ref Rng & Units 4/8/2020 4/9/2020 4/10/2020 4/11/2020 4/12/2020 4/13/2020 4/19/2020    INR 0.86 - 1.14 1.07 1.09 1.08 1.07 1.07 1.03 1.08        Most Recent 3 Troponin's:No lab results found.  Most Recent Cholesterol Panel:No lab results found.  Most Recent 6 Bacteria Isolates From Any Culture (See EPIC Reports for Culture Details):  Recent Labs   Lab Test 04/20/20  1009 04/20/20  1004 04/19/20  0530 04/19/20  0525 04/19/20  0522 04/13/20  1330   CULT No growth after 2 days No growth after 2 days No growth No growth after 3 days No growth after 3 days No growth     Most Recent TSH, T4 and A1c Labs:  Recent Labs   Lab Test 03/14/20  0439 03/12/20   TSH  --  0.43   A1C 5.5  --       Results for orders placed or performed during the hospital encounter of 04/08/20   XR Lumbar Punc Intrathecal Chemo Admin    Narrative    Lumbar Puncture using Fluoroscopy 4/13/2020    Provided History:  Hx of Burkitts Lymphoma, Cycle 2 IVAC.    Diagnosis: Burkitt's lymphoma.    ICD-10:    Procedure note: Verbal and written consent for lumbar puncture was  obtained from the patient, and benefits and risk of the procedure were  explained, including but not limited to worsening headache,  hemorrhage, infection, lower extremity pain, or nerve root injury. The  patient was sterilely prepped and draped with the patient in the prone  position, over the lower back. Under fluoroscopic guidance, the  interlaminar spaces were noted. 1% lidocaine was administered for  local anesthetic over the L2-3 interlaminar space, and a 22 gauge 3.5  inch needle was advanced into the thecal sac under fluoroscopic  guidance.  There was initial show of clear CSF. Approximately 6 cc of  CSF were collected.    Intrathecal chemotherapy was administered by the hematology oncology  service.    The needle was removed with the stylet in place. There was no  immediate complication associated with the procedure. Samples were  sent for the requested laboratory testing.      Estimated blood loss: Less than 1 cc.    Fluoroscopic time: 11.5 seconds.      Impression    Impression: Successful lumbar puncture and intrathecal chemotherapy  administration without immediate complication.    KENAN SHEIKH MD

## 2020-04-22 NOTE — PROGRESS NOTES
Antimicrobial Stewardship Team Note    Antimicrobial Stewardship Program - A joint venture between [facility] and  Physicians to optimize antibiotic management.  NOT a formal consult - Restricted Antimicrobial Review     Patient: Kobe Pedroza  MRN: [id number]  Allergies: Patient has no known allergies.    Brief Summary:   Kobe Pedroza is a 60 year old male admitted on 4/19/20 for neutropenic fever. His PMH includes Burkitt's lymphoma (diagnosed 3/2020), chronic pain secondary to malignancy and spinal surgery, anemia & thrombocytopenia secondary to chemo, HLD, CAD, and hypothyroidism s/p thyroidectomy. His last round of chemo was 4/8-4/12 when he received R-IVAC regimen. His next round is R-CODOX-M scheduled for 4/30.    HPI:  Patient presented to [facility] in [place] with fever and mild dysuria which he had been experiencing since his last round of chemo and was transferred to [facility]. Upon admission, WBC 0.1, temp 98.5, HR 91 otherwise normotensive. Tmax during admission 99.7. WBC increased to 17.4 today. Of note, pegfilgrastim was administered 4/15.    Urinalysis negative for nitrites and leukocyte esterase. Blood culture (1/4) taken at [place] positive for Streptococcus alpha-hemolytic (not Strep pneumo) - results attached to note. Blood cultures x2 drawn at [facility] 4/19 have no growth to date. CSF gram stain 4/13 was negative. Of note, patient had PICC line during previous admission which was removed 4/13 and currently has peripheral access only. He also has a back wound following spinal surgery for thoracic spine mass on 3/15.    Patient started on empiric piperacillin/tazobactam for neutropenic fever. Treatment team note from 4/21 states plan is to transition to oral antibiotics to cover alpha-hemolytic Strep.         Active Anti-infective Medications   (From admission, onward)                Start     Stop    04/19/20 1400  piperacillin-tazobactam  4.5 g,   Intravenous,   EVERY  6 HOURS     Febrile Neutropenia        --    04/19/20 0800  acyclovir  400 mg,   Oral,   2 TIMES DAILY     neutropenic prophylaxis        --    04/19/20 0800  fluconazole  200 mg,   Oral,   DAILY     Fungal Infection Prophylaxis        --          Assessment:   Neutropenic fever with Streptococcus bacteremia. Patient had recent chemotherapy session during which he had a PICC though this was removed 4/13 which makes it less likely to be a source of infection. Urinalysis was negative for infection which rules out urinary source.  Patient also has open post-op spinal wound which could be an additional source of bacterial translocation though less likely given recent CSF gram stains showed no growth. There are notes of mucositis during chemotherapy treatment though notes state there is no mucositis present during current admission. Given alpha-hemolytic Streptococcus species (not Strep pneumo) growing in one blood culture at OSH, it is suspected this could be Viridans streptococcus which is a common colonizer of the oral and GI tract. It's possible this transferred into the bloodstream via mucous membranes.    Given patient's neutropenic fever, it is indicated to target the alpha-hemolytic Streptococcus bacteremia with antibiotics. Ideal oral agents to treat Streptococcus include penicillins and cephalosporins which do not achieve adequate blood concentrations to treat bacteremia, so IV antibiotics are necessary treatment. Piperacillin/tazobactam is effective in treating Streptococcus; however, it includes broad coverage of gram-negative organisms, including Pseudomonas, which is not indicated at this time. Ceftriaxone is narrowed therapy to target Streptococcus without Pseudomonas coverage.    Recommendations:  Medication Change:   Discontinue piperacillin/tazobactam. Start ceftriaxone 2g IV daily.  Recommend 7 total days of IV antibiotic treatment since patient's neutropenia has recovered quickly (through 4/25).      Pharmacy took the following actions: Mckayla Alvarez PA-C (Heme/Onc).    Discussed with ID Staff Kimmie Salvador MD and Mary Renae PharmD  Pager #[phone]          Vital Signs/Clinical Features:  Vitals         04/20 0700  -  04/21 0659 04/21 0700  -  04/22 0659 04/22 0700  -  04/22 1308   Most Recent    Temp ( F) 98.4 -  99.3    98.1 -  99.7    98.1 -  98.4     98.4 (36.9)    Pulse   82      91       91    Heart Rate 82 -  97    81 -  106    87 -  104     87    Resp 16 -  20      16      18     18    /65 -  128/75    108/60 -  130/77    121/70 -  126/83     121/70    SpO2 (%) 97 -  99    94 -  99    94 -  96     94            Labs  Estimated Creatinine Clearance: 126 mL/min (based on SCr of 0.74 mg/dL).  Recent Labs   Lab Test 04/13/20  0335 04/15/20  1034 04/19/20  0523 04/20/20  0304 04/21/20  0322 04/22/20  0319   CR 0.75 0.82 0.74 0.75 0.81 0.74       Recent Labs   Lab Test 04/11/20  0343 04/12/20  0340 04/13/20  0335 04/15/20  1034 04/19/20  0523 04/20/20  0304 04/20/20  1707 04/21/20  0322 04/21/20  1624 04/22/20  0319   WBC 10.4 7.1 4.8 1.4* 0.1* 0.2*  --  4.1  --  17.4*   ANEU 10.1* 7.1 4.7 1.3*  --   --   --  3.0  --  12.9*   ALYM 0.1* 0.0* 0.1* 0.1*  --   --   --  0.4*  --  1.5   HUMBLE 0.2 0.0 0.0 0.0  --   --   --  0.5  --  2.1*   AEOS 0.0 0.0 0.0 0.0  --   --   --  0.0  --  0.0   HGB 8.3* 8.1* 7.4* 8.4* 5.9* 6.5* 8.9* 7.7* 8.1* 7.5*   HCT 25.5* 25.1* 22.8* 24.6* 18.2* 19.3*  --  22.6*  --  22.4*   MCV 92 92 92 90 90 89  --  88  --  89    177 128* 95* 6* 18*  --  29*  --  73*       Recent Labs   Lab Test 04/10/20  0310 04/11/20  0343 04/12/20  0340 04/13/20  0335 04/15/20  1034 04/19/20  0523   BILITOTAL 0.2 0.2 0.3 0.3 0.4 0.7   ALKPHOS 78 72 67 66 68 63   ALBUMIN 2.9* 3.0* 2.9* 2.8* 3.6 3.2*   AST 15 18 23 22 33 12   ALT 36 36 47 48 71* 37                   Culture Results:  7-Day Micro Results       Procedure Component Value Units Date/Time    Blood culture  [[id number]] Collected:  04/20/20 1009    Order Status:  Completed Lab Status:  Preliminary result Updated:  04/22/20 0342    Specimen:  Blood      Specimen Description Blood Left Arm     Culture Micro No growth after 2 days    Blood culture [[id number]] Collected:  04/20/20 1004    Order Status:  Completed Lab Status:  Preliminary result Updated:  04/22/20 0342    Specimen:  Blood      Specimen Description Blood Right Arm     Culture Micro No growth after 2 days    Urine Culture Aerobic Bacterial [[id number]] Collected:  04/19/20 0530    Order Status:  Completed Lab Status:  Final result Updated:  04/20/20 0707    Specimen:  Midstream Urine from Urine clean catch      Specimen Description Midstream Urine     Special Requests Specimen received in preservative     Culture Micro No growth    Blood culture [[id number]] Collected:  04/19/20 0525    Order Status:  Completed Lab Status:  Preliminary result Updated:  04/22/20 0342    Specimen:  Blood      Specimen Description Blood Right Arm     Culture Micro No growth after 3 days    Blood culture [[id number]] Collected:  04/19/20 0522    Order Status:  Completed Lab Status:  Preliminary result Updated:  04/22/20 0342    Specimen:  Blood      Specimen Description Blood Left Arm     Culture Micro No growth after 3 days    Blood culture     Order Status:  Canceled Lab Status:  No result     Specimen:  Blood     Blood culture     Order Status:  Canceled Lab Status:  No result     Specimen:  Blood             Recent Labs   Lab Test 03/29/20  0346 03/29/20  1130 03/29/20  1940 03/30/20  0345 04/19/20  0530   URINEPH 7.5 8.0 8.5 8.0 5.5   NITRITE  --   --   --   --  Negative   LEUKEST  --   --   --   --  Negative   WBCU  --   --   --   --  2       Recent Labs   Lab Test 03/20/20  1340 03/23/20  1345 04/13/20  1330   CWBC 2 1 0   CRBC 11* 20* 2   CGLU 76* 64 74*   CTP 89* 69* 84*                   Imaging: No results found.

## 2020-04-22 NOTE — PLAN OF CARE
AVSS. Pt denied pain/nausea overnight. Requested tylenol prior to zosyn to prevent headaches. Sleeping well between cares. Up independently to bathroom, not saving urine. Plan to switch to oral abx today & possibly discharge. Will need 60 mEq K+ this morning. No other concerns, continue plan of care.     Problem: Infection  Goal: Infection Symptom Resolution  4/22/2020 0637 by Radha Cagle, RN  Outcome: No Change     Problem: Pain Acute  Goal: Optimal Pain Control  4/22/2020 0637 by Radha Cagle, RN  Outcome: No Change     Problem: Adult Inpatient Plan of Care  Goal: Plan of Care Review  4/22/2020 0637 by Radha Cagle, RN  Outcome: No Change     Problem: Adult Inpatient Plan of Care  Goal: Optimal Comfort and Wellbeing  4/22/2020 0637 by Radha Cagle, RN  Outcome: No Change     Problem: Adult Inpatient Plan of Care  Goal: Readiness for Transition of Care  4/22/2020 0637 by Radha Cagle, RN  Outcome: No Change

## 2020-04-22 NOTE — PLAN OF CARE
Afebrile, VSS. Tylenol given prior to zosyn as pt believes zosyn might be causing his headaches. Reports good urine output and no difficulty voiding, although not saving urine for measure. Reports to BMs today with some blood noted on toilet paper. Hgb recheck this evening 8.1. Potassium recheck 3.5, no further replacements needed. Planning to switch to PO cephalosporin tomorrow AM for abx coverage, potential discharge. Continue with POC.

## 2020-04-23 ENCOUNTER — PATIENT OUTREACH (OUTPATIENT)
Dept: ONCOLOGY | Facility: CLINIC | Age: 60
End: 2020-04-23

## 2020-04-23 ENCOUNTER — PATIENT OUTREACH (OUTPATIENT)
Dept: CARE COORDINATION | Facility: CLINIC | Age: 60
End: 2020-04-23

## 2020-04-23 NOTE — PROGRESS NOTES
The patient was contacted for a post-hospital call and has questions regarding his medications.     Patient was told by a nurse at discharge (hand written on his summary) that he should NOT be taking Acyclovir and only be taking the Keflex - should be taking both?    Levaquin and fluconazole are on his discharge medication list that he should continue but he is NOT taking these.    Please contact patient to discuss further.

## 2020-04-23 NOTE — PROGRESS NOTES
"[facility]: Post-Discharge Note  SITUATION                                                      Admission:    Admission Date: 04/19/20   Reason for Admission: Alpha hemolytic streptococci (Not Group A, B or pneumococcus) bacteremia  Discharge:   Discharge Date: 04/22/20  Discharge Diagnosis: Alpha hemolytic streptococci (Not Group A, B or pneumococcus) bacteremia  Discharge Service: Hem/Onc    BACKGROUND                                                      Kobe Pedroza is a 60 year old male with a past medical history of Burkitt's lymphoma that is admitted on 4/19/20 for neutropenic fever.   Patient was found to have positive blood culture of likely alpha hemolytic streptococci (Not Group A, B or pneumococcus) in [place] ED.      Jonnathan reports that he is feeling well this morning. States that he is looking forward to getting home. Reports no new pains, fevers or chills. Does report two episodes of loose stools but has been regularly taking Senna. Reports he stopped taking the Senna to return to normal stools. Still has a \"slight\" headache when lying down but did not endorse a headache while he was sitting up and eating breakfast when he was seen this morning. Appetite intact. Last bowel movement this morning. Denies vision changes, oral sores, SOB, cough, abdominal pain, diarrhea, constipation, nausea, vomiting, dysuria, hematuria, blood in stool, difficulty ambulating or swelling.    ASSESSMENT      Discharge Assessment  Patient reports symptoms are: Unchanged(No new or worsening symptoms.)  Does the patient have all of their medications?: Yes  Does patient know what their new medications are for?: Yes(Patient is confused about which antibiotics he should be taking.)  Does patient have a follow-up appointment scheduled?: Yes  Does patient have any other questions or concerns?: Yes(medication questions - will message the team)    Post-op  Did the patient have surgery or a procedure: No  Fever: " Spoke with patient, he will go in for lab work and understands it will take some time for results to come back.   No  Chills: No  Eating & Drinking: eating and drinking without complaints/concerns  PO Intake: regular diet  Bowel Function: normal  Urinary Status: voiding without complaint/concerns    PLAN                                                      Future Appointments   Date Time Provider Department Center   4/27/2020 10:30 AM [address]   4/29/2020  2:00 PM Ever Wright MD [facility]   4/30/2020  9:30 AM [facility]           Barbara Siegel [facility]

## 2020-04-23 NOTE — PROGRESS NOTES
"Writer returned Mckayla, wife call asking for med verification. Jonnathan is questioning what abx he should be on post discharge. He was discharged 4/22/20 to home after admit for neutropenic fevers. WBC/ANC now recovered, he did receive Neulasta on 4/15 prior to his admission. Some confusion as notes say he should say on his Fluconazole since he did have \"neutropenic fevers\", the actual discharge instructions say, in medications says off since ANC is above 1,000. Also questioning the ACV, writer told them he is to stay on ACV for the duration of his treatment. Writer clarified med's and will call Jonnathan and Mckayla back and answer any additional questions. Plan will be for PET scan and labs next Monday, video visit with Doctor Adriana on Wednesday.   "

## 2020-04-25 LAB
BACTERIA SPEC CULT: NO GROWTH
BACTERIA SPEC CULT: NO GROWTH
SPECIMEN SOURCE: NORMAL
SPECIMEN SOURCE: NORMAL

## 2020-04-27 ENCOUNTER — HOSPITAL ENCOUNTER (OUTPATIENT)
Dept: PET IMAGING | Facility: CLINIC | Age: 60
Setting detail: NUCLEAR MEDICINE
Discharge: HOME | End: 2020-04-27
Attending: PHYSICIAN ASSISTANT | Admitting: PHYSICIAN ASSISTANT
Payer: COMMERCIAL

## 2020-04-27 DIAGNOSIS — C83.78 BURKITT LYMPHOMA OF LYMPH NODES OF MULTIPLE REGIONS (H): ICD-10-CM

## 2020-04-27 DIAGNOSIS — D70.9 NEUTROPENIC FEVER (H): ICD-10-CM

## 2020-04-27 DIAGNOSIS — R50.81 NEUTROPENIC FEVER (H): ICD-10-CM

## 2020-04-27 LAB
ABO + RH BLD: NORMAL
ABO + RH BLD: NORMAL
ALBUMIN SERPL-MCNC: 3.6 G/DL (ref 3.4–5)
ALP SERPL-CCNC: 99 U/L (ref 40–150)
ALT SERPL W P-5'-P-CCNC: 33 U/L (ref 0–70)
ANION GAP SERPL CALCULATED.3IONS-SCNC: 5 MMOL/L (ref 3–14)
ANISOCYTOSIS BLD QL SMEAR: SLIGHT
AST SERPL W P-5'-P-CCNC: 18 U/L (ref 0–45)
BASOPHILS # BLD AUTO: 0 10E9/L (ref 0–0.2)
BASOPHILS NFR BLD AUTO: 0 %
BILIRUB SERPL-MCNC: 0.2 MG/DL (ref 0.2–1.3)
BLD GP AB SCN SERPL QL: NORMAL
BLOOD BANK CMNT PATIENT-IMP: NORMAL
BUN SERPL-MCNC: 15 MG/DL (ref 7–30)
CALCIUM SERPL-MCNC: 8.5 MG/DL (ref 8.5–10.1)
CHLORIDE SERPL-SCNC: 105 MMOL/L (ref 94–109)
CO2 SERPL-SCNC: 25 MMOL/L (ref 20–32)
CREAT SERPL-MCNC: 0.67 MG/DL (ref 0.66–1.25)
DIFFERENTIAL METHOD BLD: ABNORMAL
EOSINOPHIL # BLD AUTO: 0 10E9/L (ref 0–0.7)
EOSINOPHIL NFR BLD AUTO: 0 %
ERYTHROCYTE [DISTWIDTH] IN BLOOD BY AUTOMATED COUNT: 14.9 % (ref 10–15)
GFR SERPL CREATININE-BSD FRML MDRD: >90 ML/MIN/{1.73_M2}
GLUCOSE SERPL-MCNC: 95 MG/DL (ref 70–99)
HCT VFR BLD AUTO: 28.5 % (ref 40–53)
HGB BLD-MCNC: 9.2 G/DL (ref 13.3–17.7)
LYMPHOCYTES # BLD AUTO: 0.3 10E9/L (ref 0.8–5.3)
LYMPHOCYTES NFR BLD AUTO: 1.8 %
MCH RBC QN AUTO: 29.7 PG (ref 26.5–33)
MCHC RBC AUTO-ENTMCNC: 32.3 G/DL (ref 31.5–36.5)
MCV RBC AUTO: 92 FL (ref 78–100)
METAMYELOCYTES # BLD: 0.4 10E9/L
METAMYELOCYTES NFR BLD MANUAL: 2.6 %
MONOCYTES # BLD AUTO: 0.9 10E9/L (ref 0–1.3)
MONOCYTES NFR BLD AUTO: 5.3 %
MYELOCYTES # BLD: 0.4 10E9/L
MYELOCYTES NFR BLD MANUAL: 2.6 %
NEUTROPHILS # BLD AUTO: 15.1 10E9/L (ref 1.6–8.3)
NEUTROPHILS NFR BLD AUTO: 87.7 %
NRBC # BLD AUTO: 0.4 10*3/UL
NRBC BLD AUTO-RTO: 3 /100
PLATELET # BLD AUTO: 604 10E9/L (ref 150–450)
PLATELET # BLD EST: ABNORMAL 10*3/UL
POLYCHROMASIA BLD QL SMEAR: SLIGHT
POTASSIUM SERPL-SCNC: 3.6 MMOL/L (ref 3.4–5.3)
PROT SERPL-MCNC: 7.4 G/DL (ref 6.8–8.8)
RBC # BLD AUTO: 3.1 10E12/L (ref 4.4–5.9)
SODIUM SERPL-SCNC: 135 MMOL/L (ref 133–144)
SPECIMEN EXP DATE BLD: NORMAL
WBC # BLD AUTO: 17.2 10E9/L (ref 4–11)

## 2020-04-27 PROCEDURE — A9552 F18 FDG: HCPCS | Performed by: PHYSICIAN ASSISTANT

## 2020-04-27 PROCEDURE — 25000128 H RX IP 250 OP 636: Performed by: PHYSICIAN ASSISTANT

## 2020-04-27 PROCEDURE — 85025 COMPLETE CBC W/AUTO DIFF WBC: CPT | Performed by: PHYSICIAN ASSISTANT

## 2020-04-27 PROCEDURE — 80053 COMPREHEN METABOLIC PANEL: CPT | Performed by: PHYSICIAN ASSISTANT

## 2020-04-27 PROCEDURE — 74177 CT ABD & PELVIS W/CONTRAST: CPT

## 2020-04-27 PROCEDURE — 86901 BLOOD TYPING SEROLOGIC RH(D): CPT | Performed by: PHYSICIAN ASSISTANT

## 2020-04-27 PROCEDURE — 36415 COLL VENOUS BLD VENIPUNCTURE: CPT | Performed by: PHYSICIAN ASSISTANT

## 2020-04-27 PROCEDURE — 86900 BLOOD TYPING SEROLOGIC ABO: CPT | Performed by: PHYSICIAN ASSISTANT

## 2020-04-27 PROCEDURE — 34300033 ZZH RX 343: Performed by: PHYSICIAN ASSISTANT

## 2020-04-27 PROCEDURE — 86850 RBC ANTIBODY SCREEN: CPT | Performed by: PHYSICIAN ASSISTANT

## 2020-04-27 RX ORDER — IOPAMIDOL 755 MG/ML
60-135 INJECTION, SOLUTION INTRAVASCULAR ONCE
Status: COMPLETED | OUTPATIENT
Start: 2020-04-27 | End: 2020-04-27

## 2020-04-27 RX ADMIN — FLUDEOXYGLUCOSE F-18 12.52 MCI.: 500 INJECTION, SOLUTION INTRAVENOUS at 10:29

## 2020-04-27 RX ADMIN — IOPAMIDOL 128 ML: 755 INJECTION, SOLUTION INTRAVENOUS at 11:28

## 2020-04-28 RX ORDER — LORAZEPAM 0.5 MG/1
.5-1 TABLET ORAL EVERY 6 HOURS PRN
Status: CANCELLED
Start: 2020-04-30

## 2020-04-28 RX ORDER — MEPERIDINE HYDROCHLORIDE 25 MG/ML
25 INJECTION INTRAMUSCULAR; INTRAVENOUS; SUBCUTANEOUS EVERY 30 MIN PRN
Status: CANCELLED | OUTPATIENT
Start: 2020-04-30

## 2020-04-28 RX ORDER — NALOXONE HYDROCHLORIDE 0.4 MG/ML
.1-.4 INJECTION, SOLUTION INTRAMUSCULAR; INTRAVENOUS; SUBCUTANEOUS
Status: CANCELLED | OUTPATIENT
Start: 2020-04-30

## 2020-04-28 RX ORDER — NALOXONE HYDROCHLORIDE 0.4 MG/ML
.1-.4 INJECTION, SOLUTION INTRAMUSCULAR; INTRAVENOUS; SUBCUTANEOUS
Status: CANCELLED | OUTPATIENT
Start: 2020-05-08

## 2020-04-28 RX ORDER — DIPHENHYDRAMINE HYDROCHLORIDE 50 MG/ML
50 INJECTION INTRAMUSCULAR; INTRAVENOUS
Status: CANCELLED
Start: 2020-05-08

## 2020-04-28 RX ORDER — DIPHENHYDRAMINE HCL 25 MG
50 CAPSULE ORAL ONCE
Status: CANCELLED
Start: 2020-04-30

## 2020-04-28 RX ORDER — DEXAMETHASONE 4 MG/1
8 TABLET ORAL DAILY
Status: CANCELLED | OUTPATIENT
Start: 2020-05-02

## 2020-04-28 RX ORDER — PROCHLORPERAZINE MALEATE 10 MG
10 TABLET ORAL EVERY 6 HOURS PRN
Status: CANCELLED
Start: 2020-05-08

## 2020-04-28 RX ORDER — EPINEPHRINE 1 MG/ML
0.3 INJECTION, SOLUTION INTRAMUSCULAR; SUBCUTANEOUS EVERY 5 MIN PRN
Status: CANCELLED | OUTPATIENT
Start: 2020-04-30

## 2020-04-28 RX ORDER — DIPHENHYDRAMINE HYDROCHLORIDE 50 MG/ML
50 INJECTION INTRAMUSCULAR; INTRAVENOUS
Status: CANCELLED
Start: 2020-04-30

## 2020-04-28 RX ORDER — DEXAMETHASONE 4 MG/1
8 TABLET ORAL DAILY
Status: CANCELLED | OUTPATIENT
Start: 2020-05-09

## 2020-04-28 RX ORDER — METHYLPREDNISOLONE SODIUM SUCCINATE 125 MG/2ML
125 INJECTION, POWDER, LYOPHILIZED, FOR SOLUTION INTRAMUSCULAR; INTRAVENOUS
Status: CANCELLED
Start: 2020-05-08

## 2020-04-28 RX ORDER — PROCHLORPERAZINE MALEATE 10 MG
10 TABLET ORAL EVERY 6 HOURS PRN
Status: CANCELLED
Start: 2020-04-30

## 2020-04-28 RX ORDER — SODIUM CHLORIDE 9 MG/ML
1000 INJECTION, SOLUTION INTRAVENOUS CONTINUOUS PRN
Status: CANCELLED
Start: 2020-05-08

## 2020-04-28 RX ORDER — LORAZEPAM 2 MG/ML
.5-1 INJECTION INTRAMUSCULAR EVERY 6 HOURS PRN
Status: CANCELLED | OUTPATIENT
Start: 2020-05-08

## 2020-04-28 RX ORDER — ONDANSETRON 8 MG/1
16 TABLET, FILM COATED ORAL ONCE
Status: CANCELLED | OUTPATIENT
Start: 2020-05-08

## 2020-04-28 RX ORDER — DEXAMETHASONE 4 MG/1
12 TABLET ORAL ONCE
Status: CANCELLED | OUTPATIENT
Start: 2020-05-08

## 2020-04-28 RX ORDER — EPINEPHRINE 1 MG/ML
0.3 INJECTION, SOLUTION INTRAMUSCULAR; SUBCUTANEOUS EVERY 5 MIN PRN
Status: CANCELLED | OUTPATIENT
Start: 2020-05-08

## 2020-04-28 RX ORDER — MEPERIDINE HYDROCHLORIDE 25 MG/ML
25 INJECTION INTRAMUSCULAR; INTRAVENOUS; SUBCUTANEOUS EVERY 30 MIN PRN
Status: CANCELLED | OUTPATIENT
Start: 2020-05-08

## 2020-04-28 RX ORDER — METHYLPREDNISOLONE SODIUM SUCCINATE 125 MG/2ML
125 INJECTION, POWDER, LYOPHILIZED, FOR SOLUTION INTRAMUSCULAR; INTRAVENOUS
Status: CANCELLED
Start: 2020-04-30

## 2020-04-28 RX ORDER — ACETAMINOPHEN 325 MG/1
650 TABLET ORAL ONCE
Status: CANCELLED
Start: 2020-04-30

## 2020-04-28 RX ORDER — ALBUTEROL SULFATE 0.83 MG/ML
2.5 SOLUTION RESPIRATORY (INHALATION)
Status: CANCELLED | OUTPATIENT
Start: 2020-05-08

## 2020-04-28 RX ORDER — DEXAMETHASONE 4 MG/1
12 TABLET ORAL EVERY 24 HOURS
Status: CANCELLED | OUTPATIENT
Start: 2020-04-30

## 2020-04-28 RX ORDER — LORAZEPAM 0.5 MG/1
.5-1 TABLET ORAL EVERY 6 HOURS PRN
Status: CANCELLED
Start: 2020-05-08

## 2020-04-28 RX ORDER — SODIUM CHLORIDE 9 MG/ML
1000 INJECTION, SOLUTION INTRAVENOUS CONTINUOUS PRN
Status: CANCELLED
Start: 2020-04-30

## 2020-04-28 RX ORDER — SODIUM BICARBONATE 84 MG/ML
50 INJECTION, SOLUTION INTRAVENOUS
Status: CANCELLED | OUTPATIENT
Start: 2020-05-08

## 2020-04-28 RX ORDER — ALBUTEROL SULFATE 0.83 MG/ML
2.5 SOLUTION RESPIRATORY (INHALATION)
Status: CANCELLED | OUTPATIENT
Start: 2020-04-30

## 2020-04-28 RX ORDER — LORAZEPAM 2 MG/ML
.5-1 INJECTION INTRAMUSCULAR EVERY 6 HOURS PRN
Status: CANCELLED | OUTPATIENT
Start: 2020-04-30

## 2020-04-28 RX ORDER — DOXORUBICIN HYDROCHLORIDE 2 MG/ML
50 INJECTION, SOLUTION INTRAVENOUS ONCE
Status: CANCELLED | OUTPATIENT
Start: 2020-04-30

## 2020-04-28 RX ORDER — LEUCOVORIN CALCIUM 350 MG/17.5ML
200 INJECTION, POWDER, LYOPHILIZED, FOR SOLUTION INTRAMUSCULAR; INTRAVENOUS ONCE
Status: CANCELLED | OUTPATIENT
Start: 2020-05-09

## 2020-04-28 RX ORDER — LEUCOVORIN CALCIUM 350 MG/17.5ML
15 INJECTION, POWDER, LYOPHILIZED, FOR SOLUTION INTRAMUSCULAR; INTRAVENOUS EVERY 6 HOURS
Status: CANCELLED | OUTPATIENT
Start: 2020-05-09

## 2020-04-28 RX ORDER — ALBUTEROL SULFATE 90 UG/1
1-2 AEROSOL, METERED RESPIRATORY (INHALATION)
Status: CANCELLED
Start: 2020-05-08

## 2020-04-28 RX ORDER — ALBUTEROL SULFATE 90 UG/1
1-2 AEROSOL, METERED RESPIRATORY (INHALATION)
Status: CANCELLED
Start: 2020-04-30

## 2020-04-28 RX ORDER — ONDANSETRON 8 MG/1
16 TABLET, FILM COATED ORAL EVERY 24 HOURS
Status: CANCELLED | OUTPATIENT
Start: 2020-04-30

## 2020-04-29 ENCOUNTER — VIRTUAL VISIT (OUTPATIENT)
Dept: ONCOLOGY | Facility: CLINIC | Age: 60
End: 2020-04-29
Attending: INTERNAL MEDICINE
Payer: COMMERCIAL

## 2020-04-29 DIAGNOSIS — D70.1 CHEMOTHERAPY-INDUCED NEUTROPENIA (H): ICD-10-CM

## 2020-04-29 DIAGNOSIS — T45.1X5A CHEMOTHERAPY-INDUCED NEUTROPENIA (H): ICD-10-CM

## 2020-04-29 DIAGNOSIS — C83.78 BURKITT LYMPHOMA OF LYMPH NODES OF MULTIPLE REGIONS (H): Primary | ICD-10-CM

## 2020-04-29 PROBLEM — D70.9 NEUTROPENIC FEVER (H): Status: RESOLVED | Noted: 2020-04-19 | Resolved: 2020-04-29

## 2020-04-29 PROBLEM — D49.2 SPINAL AXIS TUMOR: Status: RESOLVED | Noted: 2020-03-13 | Resolved: 2020-04-29

## 2020-04-29 PROBLEM — R50.81 NEUTROPENIC FEVER (H): Status: RESOLVED | Noted: 2020-04-19 | Resolved: 2020-04-29

## 2020-04-29 PROCEDURE — 99215 OFFICE O/P EST HI 40 MIN: CPT | Mod: 95 | Performed by: INTERNAL MEDICINE

## 2020-04-29 PROCEDURE — 40000114 ZZH STATISTIC NO CHARGE CLINIC VISIT

## 2020-04-29 NOTE — LETTER
"4/29/2020       RE: Kobe Pedroza  [id number] [place] [facility] [address]     Dear Colleague,    Thank you for referring your patient, Kobe Pedroza, to the [facility]. Please see a copy of my visit note below.    Kobe Pedroza is a 60 year old male who is being evaluated via a billable video visit.      The patient has been notified of following:     \"This video visit will be conducted via a call between you and your physician/provider. We have found that certain health care needs can be provided without the need for an in-person physical exam.  This service lets us provide the care you need with a video conversation.  If a prescription is necessary we can send it directly to your pharmacy.  If lab work is needed we can place an order for that and you can then stop by our lab to have the test done at a later time.    Video visits are billed at different rates depending on your insurance coverage.  Please reach out to your insurance provider with any questions.    If during the course of the call the physician/provider feels a video visit is not appropriate, you will not be charged for this service.\"    *Pt has no new needs or concerns  Ever Wright MD on 4/29/2020 at 1:26 PM    Patient has given verbal consent for Video visit? Yes    How would you like to obtain your AVS? [facility]    Patient would like the video invitation sent by: Send to e-mail at: [email or web address]    Will anyone else be joining your video visit? No        Video-Visit Details    Type of service:  Video Visit    Originating Location (pt. Location): Home    Distant Location (provider location):  [facility]     Mode of Communication:  Video Conference via [employer] - patient unable to connect so changed to Doximity Video visit    REASON FOR VISIT:  Management of Burkitt lymphoma    HISTORY OF PRESENT ILLNESS:  Mr. Pedroza is a 60 year old man with Burkitt lymphoma.  To " summarize his course, he presented with acute on chronic back pain in 03/2020.  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  CSF was negative.  He was started on steroids followed by treatment with R-CODOX-M/IVAC and IT chemo prophylaxis on 3/18/2020.  His course was complicated by neutropenic sepsis after cycle 2 R-IVAC that resolved with no clear source but alpha hemolytic strep grew from 1/4 cultures at the outside lab (presumed contaminant).  He had a PET/CT to restage after cycle 2 (first R-IVAC).  Video visit for ongoing management.    He is with his wife, Mckayla.  Since recent discharge, energy level and appetite are improving.  Weight stable.  No fevers, chills, or night sweats.  No headache, vision changes, focal weakness or sensory changes.  No dyspnea, cough, or chest pain.  Normal bowel function.  No urinary complaints.  No bleeding, bruising, or skin rashes.  No pain.  No lumps or bumps.  Overall, managing quite well.    REVIEW OF SYSTEMS:  A complete review of systems was negative other than noted.    MEDICATIONS:  Current Outpatient Medications   Medication     acetaminophen (TYLENOL) 325 MG tablet     acyclovir (ZOVIRAX) 200 MG capsule     cephALEXin (KEFLEX) 500 MG capsule     fluconazole (DIFLUCAN) 200 MG tablet     levothyroxine (SYNTHROID/LEVOTHROID) 200 MCG tablet     ondansetron (ZOFRAN) 4 MG tablet     oxyCODONE (ROXICODONE) 5 MG tablet     potassium chloride (KLOR-CON) 20 MEQ packet     senna-docusate (SENOKOT-S/PERICOLACE) 8.6-50 MG tablet     levofloxacin (LEVAQUIN) 250 MG tablet     magnesium citrate solution     No current facility-administered medications for this visit.      PHYSICAL EXAMINATION:  There were no vitals taken for this visit.  Wt Readings from Last 5 Encounters:   04/21/20 95.2 kg (209 lb 12.8 oz)   04/15/20 95.7 kg (211 lb)   04/13/20 97.2 kg (214 lb 3.2 oz)   03/31/20  97 kg (213 lb 14.4 oz)   03/30/20 97.2 kg (214 lb 3.2 oz)     General: Well appearing.  HEENT: Sclerae anicteric. Alopecia.  Lungs: Breathing comfortably. No cough.  MSK: Grossly normal movement.  Neuro: Grossly non-focal.  Skin/access: Normal skin tone.  Psych: Alert and oriented. No distress.  Performance status: ECOG 0    The rest of a comprehensive physical examination is deferred due to PHE (public health emergency) video visit restrictions    LABORATORY DATA:  Recent Labs   Lab Test 04/27/20  1123 04/22/20  0319 04/21/20  1624 04/21/20  0322   WBC 17.2* 17.4*  --  4.1   HGB 9.2* 7.5* 8.1* 7.7*   * 73*  --  29*   ANEU 15.1* 12.9*  --  3.0   ALYM 0.3* 1.5  --  0.4*     Recent Labs   Lab Test 04/27/20  1123 04/22/20  0319 04/21/20  1624 04/21/20  0322 04/20/20  0304 04/19/20  0523  04/13/20  0335 04/12/20  0340  03/17/20  0556    141  --  141 141 139   < > 139 140   < > 139   POTASSIUM 3.6 3.3* 3.5 3.2* 3.6 3.3*   < > 3.8 4.3   < > 4.0   CHLORIDE 105 110*  --  108 110* 106   < > 110* 108   < > 107   CO2 25 25  --  24 26 24   < > 25 26   < > 26   BUN 15 14  --  10 12 16   < > 23 21   < > 25   CR 0.67 0.74  --  0.81 0.75 0.74   < > 0.75 0.71   < > 0.71   RUTH 8.5 7.8*  --  7.7* 7.5* 7.4*   < > 6.7* 7.2*   < > 7.2*   MAG  --   --   --   --  1.8 1.7  --  2.2 2.3   < > 2.1   PHOS  --   --   --   --   --  3.0  --  2.6 2.6   < > 4.1   URIC  --   --   --   --   --  2.1*  --  2.5* 3.5   < > 3.7   LDH  --   --   --   --   --  170  --  264* 274*   < >  --    [id number]  --   --   --   --   --   --   --   --   --   --  2.0    < > = values in this interval not displayed.     Recent Labs   Lab Test 04/27/20  1123 04/19/20  0523 04/18/20 04/15/20  1034 04/13/20  0335 04/12/20  0340   AST 18 12 13* 33 22 23   ALT 33 37 43 71* 48 47   ALKPHOS 99 63  --  68 66 67   BILITOTAL 0.2 0.7  --  0.4 0.3 0.3   INR  --  1.08  --   --  1.03 1.07     IMAGING DATA:  4/27/20 PET/CT reviewed by me shows no evidence of residual  lymphoma    IMPRESSION AND PLAN:  Mr. Pedroza is a 60 year old man with Burkitt lymphoma.    PET/CT after completing the first R-CODOX-M and R-IVAC shows a complete remission.  He did have an episode of neutropenic fever, but overall has tolerated treatment reasonably well.  We will continue with treatment and plan to complete another R-CODOX-M and R-IVAC cycle based on Mary et al. Leuk Lymph. 2004;45:761.  We will arrange G-CSF support per protocol either at home or with daily infusion visits (when not admitted) until ANC has recovered to > 1,000/mcL.  We will plan to obtain at post-treatment PET/CT about 4 weeks after he recovers from the final cycle of treatment.      He has no active infectious issues and is completing antibiotics for neutropenic sepsis.  He will continue acyclovir as well as fluconazole and levofloxacin during periods of neutropenia.  Please give pentamidine during his hospital stay - if this is not possible we will check G6PD and use dapsone for PJP prophylaxis (as long as G6PD is not deficient).  We will plan for pneumococcal and Shingrix vaccines after he has recovered from treatment.    He will continue to work with Dr. Arriaga for his general health issues.    He is scheduled to start his next treatment cycle tomorrow.  We will request lab checks and transfusions twice weekly while at home until he recovers.  I will also request a visit in about 3 weeks in anticipation of his final cycle of R-IVAC.  I reminded him to contact us immediately if he has fevers at home or if questions, concerns, or new issues arise between visits.    Video visit start time: 2:20 PM  Video visit end time: 3:00 PM  Video visit duration: 40 minutes    Ever Wright MD, PhD  Attending Physician, [facility]   of Medicine, [facility]  Division of Hematology, Oncology, and Transplantation  [email or web address] email  [phone] clinic  [phone] pager        Again,  thank you for allowing me to participate in the care of your patient.      Sincerely,    Ever Wright MD

## 2020-04-29 NOTE — PROGRESS NOTES
"Kobe Pedroza is a 60 year old male who is being evaluated via a billable video visit.      The patient has been notified of following:     \"This video visit will be conducted via a call between you and your physician/provider. We have found that certain health care needs can be provided without the need for an in-person physical exam.  This service lets us provide the care you need with a video conversation.  If a prescription is necessary we can send it directly to your pharmacy.  If lab work is needed we can place an order for that and you can then stop by our lab to have the test done at a later time.    Video visits are billed at different rates depending on your insurance coverage.  Please reach out to your insurance provider with any questions.    If during the course of the call the physician/provider feels a video visit is not appropriate, you will not be charged for this service.\"    *Pt has no new needs or concerns  Ever Wright MD on 4/29/2020 at 1:26 PM    Patient has given verbal consent for Video visit? Yes    How would you like to obtain your AVS? [facility]    Patient would like the video invitation sent by: Send to e-mail at: [email or web address]    Will anyone else be joining your video visit? No        Video-Visit Details    Type of service:  Video Visit    Originating Location (pt. Location): Home    Distant Location (provider location):  [facility]     Mode of Communication:  Video Conference via [employer] - patient unable to connect so changed to D[employer]ity Video visit    REASON FOR VISIT:  Management of Burkitt lymphoma    HISTORY OF PRESENT ILLNESS:  Mr. Pedroza is a 60 year old man with Burkitt lymphoma.  To summarize his course, he presented with acute on chronic back pain in 03/2020.  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  CSF was negative.  " He was started on steroids followed by treatment with R-CODOX-M/IVAC and IT chemo prophylaxis on 3/18/2020.  His course was complicated by neutropenic sepsis after cycle 2 R-IVAC that resolved with no clear source but alpha hemolytic strep grew from 1/4 cultures at the outside lab (presumed contaminant).  He had a PET/CT to restage after cycle 2 (first R-IVAC).  Video visit for ongoing management.    He is with his wife, Mckayla.  Since recent discharge, energy level and appetite are improving.  Weight stable.  No fevers, chills, or night sweats.  No headache, vision changes, focal weakness or sensory changes.  No dyspnea, cough, or chest pain.  Normal bowel function.  No urinary complaints.  No bleeding, bruising, or skin rashes.  No pain.  No lumps or bumps.  Overall, managing quite well.    REVIEW OF SYSTEMS:  A complete review of systems was negative other than noted.    MEDICATIONS:  Current Outpatient Medications   Medication     acetaminophen (TYLENOL) 325 MG tablet     acyclovir (ZOVIRAX) 200 MG capsule     cephALEXin (KEFLEX) 500 MG capsule     fluconazole (DIFLUCAN) 200 MG tablet     levothyroxine (SYNTHROID/LEVOTHROID) 200 MCG tablet     ondansetron (ZOFRAN) 4 MG tablet     oxyCODONE (ROXICODONE) 5 MG tablet     potassium chloride (KLOR-CON) 20 MEQ packet     senna-docusate (SENOKOT-S/PERICOLACE) 8.6-50 MG tablet     levofloxacin (LEVAQUIN) 250 MG tablet     magnesium citrate solution     No current facility-administered medications for this visit.      PHYSICAL EXAMINATION:  There were no vitals taken for this visit.  Wt Readings from Last 5 Encounters:   04/21/20 95.2 kg (209 lb 12.8 oz)   04/15/20 95.7 kg (211 lb)   04/13/20 97.2 kg (214 lb 3.2 oz)   03/31/20 97 kg (213 lb 14.4 oz)   03/30/20 97.2 kg (214 lb 3.2 oz)     General: Well appearing.  HEENT: Sclerae anicteric. Alopecia.  Lungs: Breathing comfortably. No cough.  MSK: Grossly normal movement.  Neuro: Grossly non-focal.  Skin/access: Normal skin  tone.  Psych: Alert and oriented. No distress.  Performance status: ECOG 0    The rest of a comprehensive physical examination is deferred due to PHE (public health emergency) video visit restrictions    LABORATORY DATA:  Recent Labs   Lab Test 04/27/20  1123 04/22/20 0319 04/21/20 1624 04/21/20  0322   WBC 17.2* 17.4*  --  4.1   HGB 9.2* 7.5* 8.1* 7.7*   * 73*  --  29*   ANEU 15.1* 12.9*  --  3.0   ALYM 0.3* 1.5  --  0.4*     Recent Labs   Lab Test 04/27/20  1123 04/22/20  0319 04/21/20  1624 04/21/20  0322 04/20/20  0304 04/19/20  0523  04/13/20  0335 04/12/20  0340  03/17/20  0556    141  --  141 141 139   < > 139 140   < > 139   POTASSIUM 3.6 3.3* 3.5 3.2* 3.6 3.3*   < > 3.8 4.3   < > 4.0   CHLORIDE 105 110*  --  108 110* 106   < > 110* 108   < > 107   CO2 25 25  --  24 26 24   < > 25 26   < > 26   BUN 15 14  --  10 12 16   < > 23 21   < > 25   CR 0.67 0.74  --  0.81 0.75 0.74   < > 0.75 0.71   < > 0.71   RUTH 8.5 7.8*  --  7.7* 7.5* 7.4*   < > 6.7* 7.2*   < > 7.2*   MAG  --   --   --   --  1.8 1.7  --  2.2 2.3   < > 2.1   PHOS  --   --   --   --   --  3.0  --  2.6 2.6   < > 4.1   URIC  --   --   --   --   --  2.1*  --  2.5* 3.5   < > 3.7   LDH  --   --   --   --   --  170  --  264* 274*   < >  --    [id number]  --   --   --   --   --   --   --   --   --   --  2.0    < > = values in this interval not displayed.     Recent Labs   Lab Test 04/27/20  1123 04/19/20  0523 04/18/20 04/15/20  1034 04/13/20  0335 04/12/20  0340   AST 18 12 13* 33 22 23   ALT 33 37 43 71* 48 47   ALKPHOS 99 63  --  68 66 67   BILITOTAL 0.2 0.7  --  0.4 0.3 0.3   INR  --  1.08  --   --  1.03 1.07     IMAGING DATA:  4/27/20 PET/CT reviewed by me shows no evidence of residual lymphoma    IMPRESSION AND PLAN:  Mr. Pedroza is a 60 year old man with Burkitt lymphoma.    PET/CT after completing the first R-CODOX-M and R-IVAC shows a complete remission.  He did have an episode of neutropenic fever, but overall has tolerated  treatment reasonably well.  We will continue with treatment and plan to complete another R-CODOX-M and R-IVAC cycle based on Jo Anne et al. Leuk Lymph. 2004;45:761.  We will arrange G-CSF support per protocol either at home or with daily infusion visits (when not admitted) until ANC has recovered to > 1,000/mcL.  We will plan to obtain at post-treatment PET/CT about 4 weeks after he recovers from the final cycle of treatment.      He has no active infectious issues and is completing antibiotics for neutropenic sepsis.  He will continue acyclovir as well as fluconazole and levofloxacin during periods of neutropenia.  Please give pentamidine during his hospital stay - if this is not possible we will check G6PD and use dapsone for PJP prophylaxis (as long as G6PD is not deficient).  We will plan for pneumococcal and Shingrix vaccines after he has recovered from treatment.    He will continue to work with Dr. Arriaga for his general health issues.    He is scheduled to start his next treatment cycle tomorrow.  We will request lab checks and transfusions twice weekly while at home until he recovers.  I will also request a visit in about 3 weeks in anticipation of his final cycle of R-IVAC.  I reminded him to contact us immediately if he has fevers at home or if questions, concerns, or new issues arise between visits.    Video visit start time: 2:20 PM  Video visit end time: 3:00 PM  Video visit duration: 40 minutes    Ever Wright MD, PhD  Attending Physician, [facility]   of Medicine, [facility]  Division of Hematology, Oncology, and Transplantation  [email or web address] email  [phone] [facility]  [phone] pager

## 2020-04-30 ENCOUNTER — HOSPITAL ENCOUNTER (INPATIENT)
Facility: CLINIC | Age: 60
LOS: 1 days | Discharge: HOME | DRG: 847 | End: 2020-05-01
Attending: INTERNAL MEDICINE | Admitting: INTERNAL MEDICINE
Payer: COMMERCIAL

## 2020-04-30 ENCOUNTER — APPOINTMENT (OUTPATIENT)
Dept: GENERAL RADIOLOGY | Facility: CLINIC | Age: 60
DRG: 847 | End: 2020-04-30
Attending: PHYSICIAN ASSISTANT
Payer: COMMERCIAL

## 2020-04-30 ENCOUNTER — HOSPITAL ENCOUNTER (OUTPATIENT)
Dept: VASCULAR ULTRASOUND | Facility: CLINIC | Age: 60
DRG: 847 | End: 2020-04-30
Attending: INTERNAL MEDICINE
Payer: COMMERCIAL

## 2020-04-30 ENCOUNTER — HOSPITAL ENCOUNTER (OUTPATIENT)
Dept: GENERAL RADIOLOGY | Facility: CLINIC | Age: 60
DRG: 847 | End: 2020-04-30
Attending: INTERNAL MEDICINE | Admitting: INTERNAL MEDICINE
Payer: COMMERCIAL

## 2020-04-30 ENCOUNTER — APPOINTMENT (OUTPATIENT)
Dept: ULTRASOUND IMAGING | Facility: CLINIC | Age: 60
DRG: 847 | End: 2020-04-30
Attending: PHYSICIAN ASSISTANT
Payer: COMMERCIAL

## 2020-04-30 DIAGNOSIS — D70.1 CHEMOTHERAPY-INDUCED NEUTROPENIA (H): Primary | ICD-10-CM

## 2020-04-30 DIAGNOSIS — R50.81 NEUTROPENIC FEVER (H): ICD-10-CM

## 2020-04-30 DIAGNOSIS — T45.1X5A CHEMOTHERAPY-INDUCED NEUTROPENIA (H): Primary | ICD-10-CM

## 2020-04-30 DIAGNOSIS — C83.78 BURKITT LYMPHOMA OF LYMPH NODES OF MULTIPLE REGIONS (H): ICD-10-CM

## 2020-04-30 DIAGNOSIS — I82.4Z1 ACUTE DEEP VEIN THROMBOSIS (DVT) OF DISTAL VEIN OF RIGHT LOWER EXTREMITY (H): ICD-10-CM

## 2020-04-30 DIAGNOSIS — R78.81 GRAM-POSITIVE BACTEREMIA: ICD-10-CM

## 2020-04-30 DIAGNOSIS — D70.9 NEUTROPENIC FEVER (H): ICD-10-CM

## 2020-04-30 LAB
ALBUMIN SERPL-MCNC: 3.6 G/DL (ref 3.4–5)
ALP SERPL-CCNC: 89 U/L (ref 40–150)
ALT SERPL W P-5'-P-CCNC: 29 U/L (ref 0–70)
ANION GAP SERPL CALCULATED.3IONS-SCNC: 6 MMOL/L (ref 3–14)
APPEARANCE CSF: CLEAR
APPEARANCE CSF: CLEAR
AST SERPL W P-5'-P-CCNC: 18 U/L (ref 0–45)
BASOPHILS # BLD AUTO: 0.1 10E9/L (ref 0–0.2)
BASOPHILS NFR BLD AUTO: 0.4 %
BILIRUB SERPL-MCNC: 0.3 MG/DL (ref 0.2–1.3)
BUN SERPL-MCNC: 12 MG/DL (ref 7–30)
CALCIUM SERPL-MCNC: 8.1 MG/DL (ref 8.5–10.1)
CHLORIDE SERPL-SCNC: 104 MMOL/L (ref 94–109)
CO2 SERPL-SCNC: 26 MMOL/L (ref 20–32)
COLOR CSF: COLORLESS
COLOR CSF: COLORLESS
COPATH REPORT: NORMAL
CREAT SERPL-MCNC: 0.7 MG/DL (ref 0.66–1.25)
DIFFERENTIAL METHOD BLD: ABNORMAL
EOSINOPHIL # BLD AUTO: 0 10E9/L (ref 0–0.7)
EOSINOPHIL NFR BLD AUTO: 0 %
ERYTHROCYTE [DISTWIDTH] IN BLOOD BY AUTOMATED COUNT: 18.6 % (ref 10–15)
GFR SERPL CREATININE-BSD FRML MDRD: >90 ML/MIN/{1.73_M2}
GLUCOSE CSF-MCNC: 56 MG/DL (ref 40–70)
GLUCOSE SERPL-MCNC: 94 MG/DL (ref 70–99)
GRAM STN SPEC: NORMAL
HCT VFR BLD AUTO: 28.2 % (ref 40–53)
HGB BLD-MCNC: 9 G/DL (ref 13.3–17.7)
IMM GRANULOCYTES # BLD: 0.2 10E9/L (ref 0–0.4)
IMM GRANULOCYTES NFR BLD: 1.8 %
INR PPP: 1.11 (ref 0.86–1.14)
LACTATE BLD-SCNC: 1.2 MMOL/L (ref 0.7–2)
LDH SERPL L TO P-CCNC: 294 U/L (ref 85–227)
LYMPHOCYTES # BLD AUTO: 0.3 10E9/L (ref 0.8–5.3)
LYMPHOCYTES NFR BLD AUTO: 2.4 %
MAGNESIUM SERPL-MCNC: 2.4 MG/DL (ref 1.6–2.3)
MCH RBC QN AUTO: 30.2 PG (ref 26.5–33)
MCHC RBC AUTO-ENTMCNC: 31.9 G/DL (ref 31.5–36.5)
MCV RBC AUTO: 95 FL (ref 78–100)
MONOCYTES # BLD AUTO: 1.9 10E9/L (ref 0–1.3)
MONOCYTES NFR BLD AUTO: 14.8 %
NEUTROPHILS # BLD AUTO: 10.4 10E9/L (ref 1.6–8.3)
NEUTROPHILS NFR BLD AUTO: 80.6 %
NRBC # BLD AUTO: 0.1 10*3/UL
NRBC BLD AUTO-RTO: 1 /100
PHOSPHATE SERPL-MCNC: 4.1 MG/DL (ref 2.5–4.5)
PLATELET # BLD AUTO: 591 10E9/L (ref 150–450)
POTASSIUM SERPL-SCNC: 3.7 MMOL/L (ref 3.4–5.3)
PROT CSF-MCNC: 94 MG/DL (ref 15–60)
PROT SERPL-MCNC: 7.1 G/DL (ref 6.8–8.8)
RBC # BLD AUTO: 2.98 10E12/L (ref 4.4–5.9)
RBC # CSF MANUAL: 0 /UL (ref 0–2)
RBC # CSF MANUAL: NORMAL /UL (ref 0–2)
SODIUM SERPL-SCNC: 137 MMOL/L (ref 133–144)
SPECIMEN SOURCE: NORMAL
TUBE # CSF: 1 #
TUBE # CSF: 4 #
URATE SERPL-MCNC: 5 MG/DL (ref 3.5–7.2)
WBC # BLD AUTO: 13 10E9/L (ref 4–11)
WBC # CSF MANUAL: 0 /UL (ref 0–5)
WBC # CSF MANUAL: NORMAL /UL (ref 0–5)

## 2020-04-30 PROCEDURE — 96450 CHEMOTHERAPY INTO CNS: CPT

## 2020-04-30 PROCEDURE — 3E0R305 INTRODUCTION OF OTHER ANTINEOPLASTIC INTO SPINAL CANAL, PERCUTANEOUS APPROACH: ICD-10-PCS | Performed by: PHYSICIAN ASSISTANT

## 2020-04-30 PROCEDURE — 89050 BODY FLUID CELL COUNT: CPT | Performed by: PHYSICIAN ASSISTANT

## 2020-04-30 PROCEDURE — 12000012 ZZH R&B MS OVERFLOW UMMC

## 2020-04-30 PROCEDURE — 83615 LACTATE (LD) (LDH) ENZYME: CPT | Performed by: PHYSICIAN ASSISTANT

## 2020-04-30 PROCEDURE — 40000986 XR CHEST 1 VW

## 2020-04-30 PROCEDURE — 25000128 H RX IP 250 OP 636: Performed by: INTERNAL MEDICINE

## 2020-04-30 PROCEDURE — 83735 ASSAY OF MAGNESIUM: CPT | Performed by: PHYSICIAN ASSISTANT

## 2020-04-30 PROCEDURE — 83605 ASSAY OF LACTIC ACID: CPT | Performed by: INTERNAL MEDICINE

## 2020-04-30 PROCEDURE — 87070 CULTURE OTHR SPECIMN AEROBIC: CPT | Performed by: PHYSICIAN ASSISTANT

## 2020-04-30 PROCEDURE — 85610 PROTHROMBIN TIME: CPT | Performed by: PHYSICIAN ASSISTANT

## 2020-04-30 PROCEDURE — 88185 FLOWCYTOMETRY/TC ADD-ON: CPT | Performed by: PHYSICIAN ASSISTANT

## 2020-04-30 PROCEDURE — 27211417 ZZ H KIT, VPS RHYTHM STYLET

## 2020-04-30 PROCEDURE — 25000125 ZZHC RX 250: Performed by: RADIOLOGY

## 2020-04-30 PROCEDURE — 25000125 ZZHC RX 250: Performed by: INTERNAL MEDICINE

## 2020-04-30 PROCEDURE — 25000128 H RX IP 250 OP 636: Performed by: PHYSICIAN ASSISTANT

## 2020-04-30 PROCEDURE — 25800030 ZZH RX IP 258 OP 636: Performed by: INTERNAL MEDICINE

## 2020-04-30 PROCEDURE — 80053 COMPREHEN METABOLIC PANEL: CPT | Performed by: PHYSICIAN ASSISTANT

## 2020-04-30 PROCEDURE — 84100 ASSAY OF PHOSPHORUS: CPT | Performed by: PHYSICIAN ASSISTANT

## 2020-04-30 PROCEDURE — 27211389 ZZ H KIT, 5 FR DL BIOFLO OPEN ENDED PICC

## 2020-04-30 PROCEDURE — 88184 FLOWCYTOMETRY/ TC 1 MARKER: CPT | Performed by: PHYSICIAN ASSISTANT

## 2020-04-30 PROCEDURE — 25000131 ZZH RX MED GY IP 250 OP 636 PS 637: Performed by: INTERNAL MEDICINE

## 2020-04-30 PROCEDURE — 82945 GLUCOSE OTHER FLUID: CPT | Performed by: PHYSICIAN ASSISTANT

## 2020-04-30 PROCEDURE — 84157 ASSAY OF PROTEIN OTHER: CPT | Performed by: PHYSICIAN ASSISTANT

## 2020-04-30 PROCEDURE — 36569 INSJ PICC 5 YR+ W/O IMAGING: CPT

## 2020-04-30 PROCEDURE — 40001004 ZZHCL STATISTIC FLOW INT 9-15 ABY TC 88188: Performed by: PHYSICIAN ASSISTANT

## 2020-04-30 PROCEDURE — 84550 ASSAY OF BLOOD/URIC ACID: CPT | Performed by: PHYSICIAN ASSISTANT

## 2020-04-30 PROCEDURE — 93971 EXTREMITY STUDY: CPT | Mod: RT

## 2020-04-30 PROCEDURE — 87015 SPECIMEN INFECT AGNT CONCNTJ: CPT | Performed by: PHYSICIAN ASSISTANT

## 2020-04-30 PROCEDURE — 82955 ASSAY OF G6PD ENZYME: CPT | Performed by: PHYSICIAN ASSISTANT

## 2020-04-30 PROCEDURE — 85025 COMPLETE CBC W/AUTO DIFF WBC: CPT | Performed by: PHYSICIAN ASSISTANT

## 2020-04-30 PROCEDURE — 25000132 ZZH RX MED GY IP 250 OP 250 PS 637: Performed by: PHYSICIAN ASSISTANT

## 2020-04-30 PROCEDURE — 25000132 ZZH RX MED GY IP 250 OP 250 PS 637: Performed by: INTERNAL MEDICINE

## 2020-04-30 PROCEDURE — 87205 SMEAR GRAM STAIN: CPT | Performed by: PHYSICIAN ASSISTANT

## 2020-04-30 RX ORDER — ALBUTEROL SULFATE 0.83 MG/ML
2.5 SOLUTION RESPIRATORY (INHALATION)
Status: DISCONTINUED | OUTPATIENT
Start: 2020-04-30 | End: 2020-05-01 | Stop reason: HOSPADM

## 2020-04-30 RX ORDER — DEXAMETHASONE 4 MG/1
8 TABLET ORAL DAILY
Status: DISCONTINUED | OUTPATIENT
Start: 2020-05-02 | End: 2020-05-01 | Stop reason: HOSPADM

## 2020-04-30 RX ORDER — DIPHENHYDRAMINE HCL 25 MG
50 CAPSULE ORAL ONCE
Status: COMPLETED | OUTPATIENT
Start: 2020-04-30 | End: 2020-04-30

## 2020-04-30 RX ORDER — PROCHLORPERAZINE MALEATE 5 MG
10 TABLET ORAL EVERY 6 HOURS PRN
Status: DISCONTINUED | OUTPATIENT
Start: 2020-04-30 | End: 2020-04-30

## 2020-04-30 RX ORDER — DEXTROSE MONOHYDRATE 25 G/50ML
25-50 INJECTION, SOLUTION INTRAVENOUS
Status: CANCELLED | OUTPATIENT
Start: 2020-04-30

## 2020-04-30 RX ORDER — ACETAMINOPHEN 325 MG/1
650 TABLET ORAL ONCE
Status: COMPLETED | OUTPATIENT
Start: 2020-04-30 | End: 2020-04-30

## 2020-04-30 RX ORDER — ALBUTEROL SULFATE 0.83 MG/ML
2.5 SOLUTION RESPIRATORY (INHALATION)
Status: DISCONTINUED | OUTPATIENT
Start: 2020-04-30 | End: 2020-04-30

## 2020-04-30 RX ORDER — PENTAMIDINE ISETHIONATE 300 MG/300MG
300 INHALANT RESPIRATORY (INHALATION)
Status: DISCONTINUED | OUTPATIENT
Start: 2020-04-30 | End: 2020-04-30

## 2020-04-30 RX ORDER — HEPARIN SODIUM,PORCINE 10 UNIT/ML
2-5 VIAL (ML) INTRAVENOUS
Status: COMPLETED | OUTPATIENT
Start: 2020-04-30 | End: 2020-04-30

## 2020-04-30 RX ORDER — ONDANSETRON 2 MG/ML
8 INJECTION INTRAMUSCULAR; INTRAVENOUS EVERY 8 HOURS PRN
Status: DISCONTINUED | OUTPATIENT
Start: 2020-04-30 | End: 2020-05-01 | Stop reason: HOSPADM

## 2020-04-30 RX ORDER — MEPERIDINE HYDROCHLORIDE 25 MG/ML
25 INJECTION INTRAMUSCULAR; INTRAVENOUS; SUBCUTANEOUS EVERY 30 MIN PRN
Status: DISCONTINUED | OUTPATIENT
Start: 2020-04-30 | End: 2020-05-01 | Stop reason: HOSPADM

## 2020-04-30 RX ORDER — NICOTINE POLACRILEX 4 MG
15-30 LOZENGE BUCCAL
Status: CANCELLED | OUTPATIENT
Start: 2020-04-30

## 2020-04-30 RX ORDER — DEXAMETHASONE 4 MG/1
12 TABLET ORAL EVERY 24 HOURS
Status: COMPLETED | OUTPATIENT
Start: 2020-04-30 | End: 2020-05-01

## 2020-04-30 RX ORDER — ONDANSETRON 8 MG/1
16 TABLET, FILM COATED ORAL EVERY 24 HOURS
Status: COMPLETED | OUTPATIENT
Start: 2020-04-30 | End: 2020-05-01

## 2020-04-30 RX ORDER — EPINEPHRINE 1 MG/ML
0.3 INJECTION, SOLUTION, CONCENTRATE INTRAVENOUS EVERY 5 MIN PRN
Status: DISCONTINUED | OUTPATIENT
Start: 2020-04-30 | End: 2020-05-01 | Stop reason: HOSPADM

## 2020-04-30 RX ORDER — SODIUM CHLORIDE 9 MG/ML
1000 INJECTION, SOLUTION INTRAVENOUS CONTINUOUS PRN
Status: DISCONTINUED | OUTPATIENT
Start: 2020-04-30 | End: 2020-05-01 | Stop reason: HOSPADM

## 2020-04-30 RX ORDER — ALBUTEROL SULFATE 90 UG/1
1-2 AEROSOL, METERED RESPIRATORY (INHALATION)
Status: DISCONTINUED | OUTPATIENT
Start: 2020-04-30 | End: 2020-05-01 | Stop reason: HOSPADM

## 2020-04-30 RX ORDER — OXYCODONE HYDROCHLORIDE 5 MG/1
5-10 TABLET ORAL EVERY 6 HOURS PRN
Status: DISCONTINUED | OUTPATIENT
Start: 2020-04-30 | End: 2020-05-01 | Stop reason: HOSPADM

## 2020-04-30 RX ORDER — ACYCLOVIR 200 MG/1
400 CAPSULE ORAL 2 TIMES DAILY
Status: DISCONTINUED | OUTPATIENT
Start: 2020-04-30 | End: 2020-05-01 | Stop reason: HOSPADM

## 2020-04-30 RX ORDER — LORAZEPAM 2 MG/ML
.5-1 INJECTION INTRAMUSCULAR EVERY 6 HOURS PRN
Status: DISCONTINUED | OUTPATIENT
Start: 2020-04-30 | End: 2020-05-01 | Stop reason: HOSPADM

## 2020-04-30 RX ORDER — ONDANSETRON 8 MG/1
8 TABLET, ORALLY DISINTEGRATING ORAL EVERY 8 HOURS PRN
Status: DISCONTINUED | OUTPATIENT
Start: 2020-04-30 | End: 2020-05-01 | Stop reason: HOSPADM

## 2020-04-30 RX ORDER — LIDOCAINE HYDROCHLORIDE 10 MG/ML
5 INJECTION, SOLUTION EPIDURAL; INFILTRATION; INTRACAUDAL; PERINEURAL ONCE
Status: COMPLETED | OUTPATIENT
Start: 2020-04-30 | End: 2020-04-30

## 2020-04-30 RX ORDER — DIPHENHYDRAMINE HYDROCHLORIDE 50 MG/ML
50 INJECTION INTRAMUSCULAR; INTRAVENOUS
Status: DISCONTINUED | OUTPATIENT
Start: 2020-04-30 | End: 2020-05-01 | Stop reason: HOSPADM

## 2020-04-30 RX ORDER — CEPHALEXIN 500 MG/1
500 CAPSULE ORAL EVERY 12 HOURS SCHEDULED
Status: DISCONTINUED | OUTPATIENT
Start: 2020-04-30 | End: 2020-05-01 | Stop reason: HOSPADM

## 2020-04-30 RX ORDER — METHYLPREDNISOLONE SODIUM SUCCINATE 125 MG/2ML
125 INJECTION, POWDER, LYOPHILIZED, FOR SOLUTION INTRAMUSCULAR; INTRAVENOUS
Status: DISCONTINUED | OUTPATIENT
Start: 2020-04-30 | End: 2020-05-01 | Stop reason: HOSPADM

## 2020-04-30 RX ORDER — NALOXONE HYDROCHLORIDE 0.4 MG/ML
.1-.4 INJECTION, SOLUTION INTRAMUSCULAR; INTRAVENOUS; SUBCUTANEOUS
Status: DISCONTINUED | OUTPATIENT
Start: 2020-04-30 | End: 2020-05-01 | Stop reason: HOSPADM

## 2020-04-30 RX ORDER — LIDOCAINE 40 MG/G
CREAM TOPICAL
Status: DISCONTINUED | OUTPATIENT
Start: 2020-04-30 | End: 2020-05-01 | Stop reason: HOSPADM

## 2020-04-30 RX ORDER — ONDANSETRON 8 MG/1
8 TABLET, FILM COATED ORAL EVERY 8 HOURS PRN
Status: DISCONTINUED | OUTPATIENT
Start: 2020-04-30 | End: 2020-05-01 | Stop reason: HOSPADM

## 2020-04-30 RX ORDER — AMOXICILLIN 250 MG
1 CAPSULE ORAL 2 TIMES DAILY
Status: DISCONTINUED | OUTPATIENT
Start: 2020-04-30 | End: 2020-05-01 | Stop reason: HOSPADM

## 2020-04-30 RX ORDER — PROCHLORPERAZINE MALEATE 5 MG
10 TABLET ORAL EVERY 6 HOURS PRN
Status: DISCONTINUED | OUTPATIENT
Start: 2020-04-30 | End: 2020-05-01 | Stop reason: HOSPADM

## 2020-04-30 RX ORDER — LORAZEPAM 0.5 MG/1
.5-1 TABLET ORAL EVERY 6 HOURS PRN
Status: DISCONTINUED | OUTPATIENT
Start: 2020-04-30 | End: 2020-05-01 | Stop reason: HOSPADM

## 2020-04-30 RX ORDER — HEPARIN SODIUM,PORCINE 10 UNIT/ML
2-5 VIAL (ML) INTRAVENOUS
Status: DISCONTINUED | OUTPATIENT
Start: 2020-04-30 | End: 2020-05-01 | Stop reason: HOSPADM

## 2020-04-30 RX ORDER — ACETAMINOPHEN 325 MG/1
650 TABLET ORAL EVERY 4 HOURS PRN
Status: DISCONTINUED | OUTPATIENT
Start: 2020-04-30 | End: 2020-05-01 | Stop reason: HOSPADM

## 2020-04-30 RX ADMIN — SENNOSIDES AND DOCUSATE SODIUM 1 TABLET: 8.6; 5 TABLET ORAL at 19:39

## 2020-04-30 RX ADMIN — ONDANSETRON HYDROCHLORIDE 16 MG: 8 TABLET, FILM COATED ORAL at 15:42

## 2020-04-30 RX ADMIN — VINCRISTINE SULFATE 2 MG: 1 INJECTION, SOLUTION INTRAVENOUS at 18:36

## 2020-04-30 RX ADMIN — CEPHALEXIN 500 MG: 500 CAPSULE ORAL at 19:39

## 2020-04-30 RX ADMIN — LIDOCAINE HYDROCHLORIDE 5 ML: 10 INJECTION, SOLUTION EPIDURAL; INFILTRATION; INTRACAUDAL; PERINEURAL at 13:24

## 2020-04-30 RX ADMIN — FOSAPREPITANT 150 MG: 150 INJECTION, POWDER, LYOPHILIZED, FOR SOLUTION INTRAVENOUS at 15:43

## 2020-04-30 RX ADMIN — RITUXIMAB 800 MG: 10 INJECTION, SOLUTION INTRAVENOUS at 15:31

## 2020-04-30 RX ADMIN — LIDOCAINE HYDROCHLORIDE 1 ML: 10 INJECTION, SOLUTION EPIDURAL; INFILTRATION; INTRACAUDAL; PERINEURAL at 09:27

## 2020-04-30 RX ADMIN — DIPHENHYDRAMINE HYDROCHLORIDE 50 MG: 25 CAPSULE ORAL at 14:28

## 2020-04-30 RX ADMIN — DEXAMETHASONE 12 MG: 4 TABLET ORAL at 15:31

## 2020-04-30 RX ADMIN — CYCLOPHOSPHAMIDE 1705 MG: 2 INJECTION, POWDER, FOR SOLUTION INTRAVENOUS; ORAL at 17:00

## 2020-04-30 RX ADMIN — Medication 5 ML: at 18:48

## 2020-04-30 RX ADMIN — ACYCLOVIR 400 MG: 200 CAPSULE ORAL at 19:39

## 2020-04-30 RX ADMIN — SODIUM CHLORIDE 110 MG: 9 INJECTION, SOLUTION INTRAVENOUS at 17:58

## 2020-04-30 RX ADMIN — ENOXAPARIN SODIUM 40 MG: 40 INJECTION SUBCUTANEOUS at 19:40

## 2020-04-30 RX ADMIN — ACETAMINOPHEN 650 MG: 325 TABLET, FILM COATED ORAL at 14:28

## 2020-04-30 RX ADMIN — METHOTREXATE: 25 INJECTION, SOLUTION INTRA-ARTERIAL; INTRAMUSCULAR; INTRATHECAL; INTRAVENOUS at 13:40

## 2020-04-30 ASSESSMENT — ACTIVITIES OF DAILY LIVING (ADL)
ADLS_ACUITY_SCORE: 13

## 2020-04-30 NOTE — PROGRESS NOTES
Nursing Focus: Admission    D: Patient admitted from home for Cycle 3 R-CODOX-M. Patient has a history of Burkitt's Lymphoma.     I: Upon arrival to the unit patient was oriented to room, unit, and call light. Patient s height, weight, and vital signs were obtained. Allergies reviewed and allergy band applied. MD notified of patient s arrival on the unit. Adult AVS completed. Head to toe assessment completed. Full skin assessment completed. Education assessment completed. Care plan initiated.    A: Vital signs stable upon admission. Patient rates pain at a minimum and declines interventions. Patient s skin was intact. Right lower extremity ultrasound ordered for calf pain and slight lower extremity swelling.    P: Continue to monitor patient and intervene as needed. Continue with plan of care. Notify MD with any concerns or changes in patient status.

## 2020-04-30 NOTE — PROCEDURES
[facility], [place]    Double Lumen PICC Placement    Date/Time: 4/30/2020 9:27 AM  Performed by: Nichole Matthew RN  Authorized by: Ever Wright MD   Indications: Chemotherapy.    UNIVERSAL PROTOCOL   Site Marked: Yes  Prior Images Obtained and Reviewed:  Yes  Required items: Required blood products, implants, devices and special equipment available    Patient identity confirmed:  Verbally with patient, arm band, provided demographic data and hospital-assigned identification number  NA - No sedation, light sedation, or local anesthesia  Confirmation Checklist:  Patient's identity using two indicators, relevant allergies, procedure was appropriate and matched the consent or emergent situation and correct equipment/implants were available  Time out: Immediately prior to the procedure a time out was called    Universal Protocol: the Joint Commission Universal Protocol was followed    Preparation: Patient was prepped and draped in usual sterile fashion           ANESTHESIA    Anesthesia: See MAR for details  Local Anesthetic:  Lidocaine 1% without epinephrine  Anesthetic Total (mL):  1      SEDATION    Patient Sedated: No        Preparation: skin prepped with ChloraPrep  Skin prep agent: skin prep agent completely dried prior to procedure  Sterile barriers: maximum sterile barriers were used: cap, mask, sterile gown, sterile gloves, and large sterile sheet  Hand hygiene: hand hygiene performed prior to central venous catheter insertion  Type of line used: Power PICC  Catheter type: double lumen  Lumen type: valved  Catheter size: 5 Fr  Brand: other (see comment) ([employer])  Lot number: [id number]  Placement method: venipuncture, MST, ultrasound and tip confirmation system  Number of attempts: 1  Successful placement: yes  Orientation: right  Location: basilic vein (vein diameter - 0.60 cm)  Arm circumference: adults 10 cm  Extremity circumference: 32.5  Visible catheter length:  1  Total catheter length: 39  Dressing and securement: chlorhexidine disc applied, securement device, site cleaned, statlock and sterile dressing applied  Post procedure assessment: blood return through all ports, free fluid flow and placement verified by x-ray  PROCEDURE   Patient Tolerance:  Patient tolerated the procedure well with no immediate complications

## 2020-04-30 NOTE — PHARMACY-ADMISSION MEDICATION HISTORY
Admission medication history interview status for the 4/30/2020 admission is complete. See Epic admission navigator for allergy information, pharmacy, prior to admission medications and immunization status.     Medication history interview sources:  Patient, Surescripts fill history    Changes made to PTA medication list (reason)  Added: None  Deleted: None  Changed: None    Additional medication history information (including reliability of information, actions taken by pharmacist):  -Patient is a good historian of his medications  -He reports that he has been taking cephalexin 500mg BID as directed after being discharged on 4/19  -He has not needed to take his fluconazole or levofloxacin since his ANC > 1.0     Prior to Admission medications    Medication Sig Last Dose Taking? Auth Provider   acetaminophen (TYLENOL) 325 MG tablet Take 2 tablets (650 mg) by mouth every 4 hours as needed for mild pain Past Month at Unknown time Yes Mckayla Alvarez PA-C   acyclovir (ZOVIRAX) 200 MG capsule Take 2 capsules (400 mg) by mouth 2 times daily 4/30/2020 at 0800 Yes Ever Wright MD   cephALEXin (KEFLEX) 500 MG capsule Take 1 capsule (500 mg) by mouth 2 times daily Starting this evening 4/30/2020 at 0800 Yes Mckayla Alvarez PA-C   levothyroxine (SYNTHROID/LEVOTHROID) 200 MCG tablet Take 200 mcg by mouth daily 4/30/2020 at 0730 Yes Reported, Patient   potassium chloride (KLOR-CON) 20 MEQ packet Take 40 mEq by mouth daily 4/29/2020 at 0600 Yes Ever Wright MD   senna-docusate (SENOKOT-S/PERICOLACE) 8.6-50 MG tablet Take 1 tablet by mouth 2 times daily Past Month at Unknown time Yes Adenike Ríos PA-C   fluconazole (DIFLUCAN) 200 MG tablet Take 200 mg by mouth daily ONLY TAKE IF ANC <1000   Lyubov Sin PA-C   levofloxacin (LEVAQUIN) 250 MG tablet Take 250 mg by mouth daily ONLY TAKE IF ANC <1000   Lyubov Sin PA-C   magnesium citrate solution Take 296 mLs by mouth once as  needed for constipation or other (For constipation) Take as needed for constipation   Mckayla Alvarez PA-C   ondansetron (ZOFRAN) 4 MG tablet Take 1-2 tablets (4-8 mg) by mouth every 6 hours as needed for nausea More than a month at Unknown time  Ever Wright MD   oxyCODONE (ROXICODONE) 5 MG tablet Take 1-2 tablets (5-10 mg) by mouth every 6 hours as needed for severe pain More than a month at Unknown time  Ever Wright MD     Medication history completed by:   Gris Gonsales, Pharm D  April 30, 2020

## 2020-04-30 NOTE — PROGRESS NOTES
Type of chemo infused:rituimab infusing when this rn accepted pt in care at 1530.bmtrn- infused at 200cc over 30min then remainder dose at 400cc for the remaining 40cc-completed approx 1700 bld return on picc present  Pt tolerated infusion:vss-wnl  Interventions:administered as ordered.   Response to interventions used:completed infusion.   Plan:advance to next chemo agent    Type of chemo infused:ctx over 1 hrs 1700  Pt tolerated infusion:well-vss wnl  Interventions:bld return present on pre infusion check.   Response to interventions used:admin chemo  Plan:advance to next chemo agent.    Type of chemo infused:doxorubicin over 30min 1805  Pt tolerated infusion:well-vss wnl  Interventions:bld check on picc port present  Response to interventions used:administration completed  Plan:advance to next chemo agent.    Type of chemo infused:vincristine at 1840 over approx 15min.  Pt tolerated infusion:well-vss wnl  Interventions:bld rtn check on picc bld present  Response to interventions used:administration complete.  Plan: monitor on going for n/v unsteadiness-    s-pt slept thru chemo administration with premeds given as ordered - vss. Lactic trigger and result 1.2.  Ate light and tolerated. Oral fluids 200cc. lovenox started this humberto. Right leg pain with area posterior below knew tender. Did not see streaking from angle that pt presentted to Rn. Right leg slightly more edematous that left. Right leg pedical pulse faint / present.  b-pt for therapy for lymphoma  A-pt received chemo this afternoon tolerating well.   r-post chemo pt to have right lower ext with US.     conferenced with on call MD r/t dvt in lower extremity below the knee. Lovenox given this humberto. MD stated to have this reviewed with his team in the am. No telemetry is warrented at this time or further intervention -ie anticoagulant-beyond the sub q lovenox given. F/u with MD team in am for further intervention. Bedside commode placed. Pt instructed to call  for assitance when up due to leg pain/-pt reports it feeling better after all the fluids given. Encouraged to drink .

## 2020-04-30 NOTE — H&P
[facility]    History & Physical  Hematology / Oncology     Date of Admission:  4/30/2020  Date of Service (when I saw the patient):  04/30/2020    Assessment & Plan   Kobe Pedroza is a 60 year old male with a history of thyroid cancer status post thyroidectomy (2011), CAD, hyperlipidemia and newly diagnosed Burkitt's Lymphoma status post Cycle 1 of R-CODOX-M / R-IVAC chemotherapy regimen with subsequent PET-CT demonstrating complete remission who is now admitted for scheduled Cycle 2 R-CODOX-M.     HEME  # Burkitt Lymphoma  Followed by Dr. Wright. Presented on 3/12/20 to [facility] with acute-on-chronic mid-thoracic back pain with some associated radicular symptoms. Per patient, no B-symptoms, though he did note an intentional 35-lb weight loss. 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 Patient had no appreciable neurological deficits. He was started on dexamethasone and underwent T5-6 laminectomy with gross total resection of epidural tumor on 3/15/2020. Flow cytometry of the specimen was notable for CD10 positive and kappa monotypic B cells (83%). Confirmed Burkitt lymphoma with surgical pathology. PET scan showed extensive visceral and bony disease. Bone marrow biopsy on 3/18 showed suspicious involvement with rare polytypic B cells (0.8%). No disease in CSF. Started R-CODOX-M (Cycle 1, Day 1=3/18/2020), with was well-tolerated. Received Cycle 1 R-IVAC (C1D1=4/8/2020), which was complicated by development of neutropenic fever requiring hospitalization. PET-CT on 4/27/2020 demonstrated a complete remission. Patient is admitted now for Cycle 2 of R-CODOX-M.      Treatment Plan: R-CODOX-M. Cycle 2, Day 1 = 4/30/2020.    - Cyclophosphamide 800 mg/m2 -- Days 1-2    - Vincristine 1.4 mg/m2 -- Day 1    - Doxorubicin 50 mg/m2 -- Day 1    - IT cytarabine 50 mg, methotrexate 12 mg -- Day 1    -  IT cytarabine 50 mg -- Day 3; planned outpatient & requested 5/4 or 5/5    - Neupogen -- Days 1-6; requested daily clinic appointments 5/2-5/5      # Anemia  Presumed related to underlying malignancy plus recent chemotherapy. No recent bleeding concerns. Hgb stable on admission.  - Trend daily CBC  - Transfuse to keep Hgb >7    # Leukocytosis  WBC 13 on admission. No fevers or recent infectious symptoms. Possibly due to prolonged Neulasta effect (administered 4/15).  - Trend daily CBC     ID  # Recent neutropenic fever  # History of Gram positive bacteremia  Patient admitted from [facility] ED on 4/19 with neutropenic fever found to be due to GPC bacteremia (1/4 bottles with alpha hemolytic strep). Treated with Zosyn (4/18-4/22), which was de-escalated to PO cephalexin on discharge for the remainder of a 14-day course (through 5/2).  - Continue cephalexin 500 mg BID x5 doses to complete antibiotic course     # ID Prophylaxis  - Continue  mg BID  - Hold fluconazole, levofloxacin; restart for ANC <1000     CV  # Coronary artery disease  # Hyperlipidemia  Followed by Dr. Zeng at [facility] ([facility]). Diagnosed with mild, non-obstructive CAD in 2018. 81 mg ASA and low-dose statin therapy recommended. No previous cardiac caths or stents.  - Hold PTA statin and ASA given concomitant chemo adminstration     ENDO  # History of thyroid cancer status-post thyroidectomy  Status post thyroidectomy in 2011. TSH normal on 3/12/20.  - Continue PTA levothyroxine      GI  # History of hemorrhoids  # History of lower GI bleeding  Reported BRBPR during recent admission (4/19-4/22) in the context of previously diagnosed hemorrhoids. No recent recurrence.  - Continue scheduled senna  - Monitor    MSK  # Right calf pain  Reportedly developed 3 days PTA (4/27). Localized to the posterior calf with some associated subjective swelling per patient. No trauma/injury. No redness or warmth. Worse with  ambulation/stretching; better at rest. Exam reassuring and without evidence of acute arterial occlusion, necrotizing fasciitis, compartment syndrome, or other limb-threatening pathology.  - Check RLE venous doppler to exclude DVT  - APAP PRN    FEN:  -IVF per chemotherapy protocol  -Lyte replacement per protocol  -Regular diet as tolerated    Prophy/Misc:  -Bowel: Continue PTA senna  -DVT: Enoxaparin 40 mg daily; hold prior to lumbar puncture(s)    Disposition: Admit to hospital for scheduled chemotherapy. Discharge pending completion of chemotherapy regimen, anticipated tomorrow afternoon, 5/1/2020.    Discussed with Dr. Cristino Alvarez PA-C  Hematology/Oncology  Pager: [phone]    Code Status : Full Code    Primary Care Physician   Garett Arriaga    History of Present Illness   History obtained from chart and discussed with the patient.    Kobe Pedroza is a 60 year old male with a history of newly diagnosed Burkitt Lymphoma who is admitted for Cycle 2 of R-CODOX-M.    Patient reports he has been doing reasonably well at home. He has had no further fevers, chills, or infectious symptoms. No nausea, vomiting, or abdominal pain. He has noted some right posterior calf pain for the past 3 days, which is worse with ambulation and better at rest. Right calf feels somewhat swollen as well. No numbness/tingling. No redness or warmth. No trauma or injury. No history of DVT/PE. No associated chest pain, pleuritic pain, or SOB. No recent abnormal bruising/bleeding. No skin rashes or redness. No other new complaints or concerns. He got good news at his follow-up appointment with Dr. Wright yesterday, which he was pleased about. We discussed the plan of care for chemotherapy and he voices understanding.    Past Medical History    Past Medical History:   Diagnosis Date     Burkitt's lymphoma (H)      Hypothyroidism      Thyroid cancer (H)        Past Surgical History   Past Surgical History:   Procedure  Laterality Date     IR PICC VASCULAR  4/8/2020     LAMINECTOMY, EXCISE TUMOR THORACIC, COMBINED N/A 3/15/2020    Procedure: LAMINECTOMY, SPINE, THORACIC, 2 levels, T-5, T-6;  Surgeon: Heather Cespedes MD;  Location: [facility] OR     THYROIDECTOMY          Prior to Admission Medications   Prior to Admission Medications   Prescriptions Last Dose Informant Patient Reported? Taking?   acetaminophen (TYLENOL) 325 MG tablet Past Month at Unknown time Self Yes Yes   Sig: Take 2 tablets (650 mg) by mouth every 4 hours as needed for mild pain   acyclovir (ZOVIRAX) 200 MG capsule 4/30/2020 at 0800 Self No Yes   Sig: Take 2 capsules (400 mg) by mouth 2 times daily   cephALEXin (KEFLEX) 500 MG capsule 4/30/2020 at 0800  No Yes   Sig: Take 1 capsule (500 mg) by mouth 2 times daily Starting this evening   fluconazole (DIFLUCAN) 200 MG tablet Unknown at Unknown time Self Yes No   Sig: Take 200 mg by mouth daily ONLY TAKE IF ANC <1000   levofloxacin (LEVAQUIN) 250 MG tablet  Self Yes No   Sig: Take 250 mg by mouth daily ONLY TAKE IF ANC <1000   levothyroxine (SYNTHROID/LEVOTHROID) 200 MCG tablet 4/30/2020 at 0730 Self Yes Yes   Sig: Take 200 mcg by mouth daily   magnesium citrate solution  Self No No   Sig: Take 296 mLs by mouth once as needed for constipation or other (For constipation) Take as needed for constipation   ondansetron (ZOFRAN) 4 MG tablet More than a month at Unknown time Self No No   Sig: Take 1-2 tablets (4-8 mg) by mouth every 6 hours as needed for nausea   oxyCODONE (ROXICODONE) 5 MG tablet More than a month at Unknown time Self No No   Sig: Take 1-2 tablets (5-10 mg) by mouth every 6 hours as needed for severe pain   potassium chloride (KLOR-CON) 20 MEQ packet 4/29/2020 at 0600 Self No Yes   Sig: Take 40 mEq by mouth daily   senna-docusate (SENOKOT-S/PERICOLACE) 8.6-50 MG tablet Past Month at Unknown time Self No Yes   Sig: Take 1 tablet by mouth 2 times daily      Facility-Administered Medications: None      Allergies   No Known Allergies    Social History   Social History     Socioeconomic History     Marital status:      Spouse name: Not on file     Number of children: Not on file     Years of education: Not on file     Highest education level: Not on file   Occupational History     Not on file   Social Needs     Financial resource strain: Not on file     Food insecurity     Worry: Not on file     Inability: Not on file     Transportation needs     Medical: Not on file     Non-medical: Not on file   Tobacco Use     Smoking status: Never Smoker     Smokeless tobacco: Never Used   Substance and Sexual Activity     Alcohol use: Not Currently     Drug use: Never     Sexual activity: Not on file   Lifestyle     Physical activity     Days per week: Not on file     Minutes per session: Not on file     Stress: Not on file   Relationships     Social connections     Talks on phone: Not on file     Gets together: Not on file     Attends Taoism service: Not on file     Active member of club or organization: Not on file     Attends meetings of clubs or organizations: Not on file     Relationship status: Not on file     Intimate partner violence     Fear of current or ex partner: Not on file     Emotionally abused: Not on file     Physically abused: Not on file     Forced sexual activity: Not on file   Other Topics Concern     Parent/sibling w/ CABG, MI or angioplasty before 65F 55M? Not Asked   Social History Narrative     Not on file       Family History   No family history on file.    Review of Systems   A 10 point ROS is negative unless otherwise noted above in the HPI.    Physical Exam   Vital Signs with Ranges  Temp:  [97.8  F (36.6  C)] 97.8  F (36.6  C)  Pulse:  [97] 97  Resp:  [16] 16  BP: (133)/(94) 133/94  SpO2:  [97 %] 97 %  211 lbs 12.8 oz    Constitutional: Pleasant and cooperative male. Awake, alert, NAD. Seated in a chair at the bedside.  HEENT: NC/AT, EOMI, sclera clear, conjunctiva normal, OP with  MMM, no mucositis.  Respiratory: No increased work of breathing, CTAB, no crackles or wheezing.  Cardiovascular: RRR, no murmur noted. No peripheral edema.  MSK: Tenderness to palpation of the right posterior calf. No palpable cords. No overlying erythema or warmth. Leg compartments are soft. Pedal pulse 2+. Light touch sensation intact to the distal RLE.   GI: Normal bowel sounds, soft, non-distended and non-tender.  Skin: Warm, dry, well-perfused. Healing surgical scar to the upper thoracic spine. No other bruising, bleeding, rashes, or lesions on limited exam.  Neurologic: A&O. Answers questions appropriately. Moves all extremities spontaneously.  Neuropsychiatric: Calm, appropriate affect  Vascular access:  PICC on RUE CDI, non-tender, no surrounding erythema, no hematoma.      Recent Labs  CBC   Recent Labs   Lab 04/27/20  1123   WBC 17.2*   RBC 3.10*   HGB 9.2*   HCT 28.5*   MCV 92   MCH 29.7   MCHC 32.3   RDW 14.9   *       CMP   Recent Labs   Lab 04/27/20  1123      POTASSIUM 3.6   CHLORIDE 105   CO2 25   ANIONGAP 5   GLC 95   BUN 15   CR 0.67   GFRESTIMATED >90   GFRESTBLACK >90   RUTH 8.5   PROTTOTAL 7.4   ALBUMIN 3.6   BILITOTAL 0.2   ALKPHOS 99   AST 18   ALT 33       LFTs:   Recent Labs   Lab 04/27/20  1123   PROTTOTAL 7.4   ALBUMIN 3.6   BILITOTAL 0.2   ALKPHOS 99   AST 18   ALT 33       Coagulation Studies: No lab results found in last 7 days.

## 2020-04-30 NOTE — PROCEDURES
Intrathecal Chemotherapy Administration Note      Patient: Kobe Pedroza  Regimen: R-CODOX-M  Date of administration: 04/30/2020  Drug: Cytarabine 50 mg, hydrocortisone sodium succinate 50 mg, methotrexate 12 mg in NaCl 0.9% 6 mL      Patient was brought down to fluoroscopy suite where IR staff performed lumbar puncture. CSF was collected by IR staff for cytomorphology and flow cytometry. I was then paged into the suite. Patient's identity was confirmed verbally and by ID wristband. Drug(s) and dose(s) were confirmed by this provider and the RN. Then the chemotherapy was slowly administered through the spinal needle with attached tubing and 3-way stopcock over 5 minutes while maintaining a sterile field. Patient tolerated the administration without immediate complications. Syringe and tubing were discarded in the appropriate biohazard container.     Lupis Alvarez PA-C  Hematology/Oncology  Pager: [phone]

## 2020-05-01 VITALS
SYSTOLIC BLOOD PRESSURE: 143 MMHG | TEMPERATURE: 98.5 F | HEART RATE: 94 BPM | OXYGEN SATURATION: 95 % | DIASTOLIC BLOOD PRESSURE: 86 MMHG | WEIGHT: 211.6 LBS | BODY MASS INDEX: 30.36 KG/M2 | RESPIRATION RATE: 17 BRPM

## 2020-05-01 DIAGNOSIS — C83.78 BURKITT LYMPHOMA OF LYMPH NODES OF MULTIPLE REGIONS (H): Primary | ICD-10-CM

## 2020-05-01 LAB
ABO + RH BLD: NORMAL
ABO + RH BLD: NORMAL
ALBUMIN SERPL-MCNC: 3.3 G/DL (ref 3.4–5)
ALP SERPL-CCNC: 84 U/L (ref 40–150)
ALT SERPL W P-5'-P-CCNC: 28 U/L (ref 0–70)
ANION GAP SERPL CALCULATED.3IONS-SCNC: 8 MMOL/L (ref 3–14)
AST SERPL W P-5'-P-CCNC: 14 U/L (ref 0–45)
BASOPHILS # BLD AUTO: 0 10E9/L (ref 0–0.2)
BASOPHILS NFR BLD AUTO: 0.1 %
BILIRUB SERPL-MCNC: 0.3 MG/DL (ref 0.2–1.3)
BLD GP AB SCN SERPL QL: NORMAL
BLOOD BANK CMNT PATIENT-IMP: NORMAL
BUN SERPL-MCNC: 16 MG/DL (ref 7–30)
CALCIUM SERPL-MCNC: 8.1 MG/DL (ref 8.5–10.1)
CHLORIDE SERPL-SCNC: 108 MMOL/L (ref 94–109)
CO2 SERPL-SCNC: 24 MMOL/L (ref 20–32)
CREAT SERPL-MCNC: 0.69 MG/DL (ref 0.66–1.25)
DIFFERENTIAL METHOD BLD: ABNORMAL
EOSINOPHIL # BLD AUTO: 0 10E9/L (ref 0–0.7)
EOSINOPHIL NFR BLD AUTO: 0 %
ERYTHROCYTE [DISTWIDTH] IN BLOOD BY AUTOMATED COUNT: 18.3 % (ref 10–15)
FIBRINOGEN PPP-MCNC: 607 MG/DL (ref 200–420)
GFR SERPL CREATININE-BSD FRML MDRD: >90 ML/MIN/{1.73_M2}
GLUCOSE SERPL-MCNC: 172 MG/DL (ref 70–99)
HCT VFR BLD AUTO: 27.7 % (ref 40–53)
HGB BLD-MCNC: 8.9 G/DL (ref 13.3–17.7)
IMM GRANULOCYTES # BLD: 0.2 10E9/L (ref 0–0.4)
IMM GRANULOCYTES NFR BLD: 1.1 %
INR PPP: 1.16 (ref 0.86–1.14)
LYMPHOCYTES # BLD AUTO: 0.1 10E9/L (ref 0.8–5.3)
LYMPHOCYTES NFR BLD AUTO: 0.8 %
MAGNESIUM SERPL-MCNC: 2.3 MG/DL (ref 1.6–2.3)
MCH RBC QN AUTO: 30 PG (ref 26.5–33)
MCHC RBC AUTO-ENTMCNC: 32.1 G/DL (ref 31.5–36.5)
MCV RBC AUTO: 93 FL (ref 78–100)
MONOCYTES # BLD AUTO: 0.2 10E9/L (ref 0–1.3)
MONOCYTES NFR BLD AUTO: 1.1 %
NEUTROPHILS # BLD AUTO: 15.3 10E9/L (ref 1.6–8.3)
NEUTROPHILS NFR BLD AUTO: 96.9 %
NRBC # BLD AUTO: 0 10*3/UL
NRBC BLD AUTO-RTO: 0 /100
PHOSPHATE SERPL-MCNC: 3.4 MG/DL (ref 2.5–4.5)
PLATELET # BLD AUTO: 587 10E9/L (ref 150–450)
POTASSIUM SERPL-SCNC: 4.1 MMOL/L (ref 3.4–5.3)
PROT SERPL-MCNC: 7.1 G/DL (ref 6.8–8.8)
RBC # BLD AUTO: 2.97 10E12/L (ref 4.4–5.9)
SODIUM SERPL-SCNC: 140 MMOL/L (ref 133–144)
SPECIMEN EXP DATE BLD: NORMAL
URATE SERPL-MCNC: 5 MG/DL (ref 3.5–7.2)
WBC # BLD AUTO: 15.7 10E9/L (ref 4–11)

## 2020-05-01 PROCEDURE — 25800030 ZZH RX IP 258 OP 636: Performed by: INTERNAL MEDICINE

## 2020-05-01 PROCEDURE — 86850 RBC ANTIBODY SCREEN: CPT | Performed by: INTERNAL MEDICINE

## 2020-05-01 PROCEDURE — 84100 ASSAY OF PHOSPHORUS: CPT | Performed by: PHYSICIAN ASSISTANT

## 2020-05-01 PROCEDURE — 25000128 H RX IP 250 OP 636: Performed by: INTERNAL MEDICINE

## 2020-05-01 PROCEDURE — 86901 BLOOD TYPING SEROLOGIC RH(D): CPT | Performed by: INTERNAL MEDICINE

## 2020-05-01 PROCEDURE — 83735 ASSAY OF MAGNESIUM: CPT | Performed by: PHYSICIAN ASSISTANT

## 2020-05-01 PROCEDURE — 86900 BLOOD TYPING SEROLOGIC ABO: CPT | Performed by: INTERNAL MEDICINE

## 2020-05-01 PROCEDURE — 85025 COMPLETE CBC W/AUTO DIFF WBC: CPT | Performed by: PHYSICIAN ASSISTANT

## 2020-05-01 PROCEDURE — 25000131 ZZH RX MED GY IP 250 OP 636 PS 637: Performed by: INTERNAL MEDICINE

## 2020-05-01 PROCEDURE — 80053 COMPREHEN METABOLIC PANEL: CPT | Performed by: PHYSICIAN ASSISTANT

## 2020-05-01 PROCEDURE — 25000132 ZZH RX MED GY IP 250 OP 250 PS 637: Performed by: PHYSICIAN ASSISTANT

## 2020-05-01 PROCEDURE — 25000128 H RX IP 250 OP 636: Performed by: PHYSICIAN ASSISTANT

## 2020-05-01 PROCEDURE — 84550 ASSAY OF BLOOD/URIC ACID: CPT | Performed by: PHYSICIAN ASSISTANT

## 2020-05-01 PROCEDURE — 40000893 ZZH STATISTIC PT IP EVAL DEFER

## 2020-05-01 PROCEDURE — 85384 FIBRINOGEN ACTIVITY: CPT | Performed by: PHYSICIAN ASSISTANT

## 2020-05-01 PROCEDURE — 85610 PROTHROMBIN TIME: CPT | Performed by: PHYSICIAN ASSISTANT

## 2020-05-01 RX ORDER — DEXAMETHASONE 4 MG/1
8 TABLET ORAL DAILY
Qty: 4 TABLET | Refills: 0 | Status: SHIPPED | OUTPATIENT
Start: 2020-05-02 | End: 2020-05-05

## 2020-05-01 RX ORDER — HEPARIN SODIUM,PORCINE 10 UNIT/ML
5-10 VIAL (ML) INTRAVENOUS EVERY 24 HOURS
Status: DISCONTINUED | OUTPATIENT
Start: 2020-05-01 | End: 2020-05-01 | Stop reason: HOSPADM

## 2020-05-01 RX ORDER — HEPARIN SODIUM,PORCINE 10 UNIT/ML
5-10 VIAL (ML) INTRAVENOUS
Status: DISCONTINUED | OUTPATIENT
Start: 2020-05-01 | End: 2020-05-01 | Stop reason: HOSPADM

## 2020-05-01 RX ORDER — CEPHALEXIN 500 MG/1
500 CAPSULE ORAL 2 TIMES DAILY
Qty: 21 CAPSULE | Refills: 0 | COMMUNITY
Start: 2020-05-01 | End: 2020-05-05

## 2020-05-01 RX ADMIN — DEXAMETHASONE 12 MG: 4 TABLET ORAL at 14:16

## 2020-05-01 RX ADMIN — ACYCLOVIR 400 MG: 200 CAPSULE ORAL at 08:22

## 2020-05-01 RX ADMIN — ACETAMINOPHEN 650 MG: 325 TABLET, FILM COATED ORAL at 14:16

## 2020-05-01 RX ADMIN — ONDANSETRON HYDROCHLORIDE 16 MG: 8 TABLET, FILM COATED ORAL at 17:02

## 2020-05-01 RX ADMIN — ENOXAPARIN SODIUM 100 MG: 100 INJECTION SUBCUTANEOUS at 10:42

## 2020-05-01 RX ADMIN — LEVOTHYROXINE SODIUM 200 MCG: 0.15 TABLET ORAL at 08:22

## 2020-05-01 RX ADMIN — CYCLOPHOSPHAMIDE 1705 MG: 2 INJECTION, POWDER, FOR SOLUTION INTRAVENOUS; ORAL at 16:47

## 2020-05-01 RX ADMIN — CEPHALEXIN 500 MG: 500 CAPSULE ORAL at 08:24

## 2020-05-01 RX ADMIN — SENNOSIDES AND DOCUSATE SODIUM 1 TABLET: 8.6; 5 TABLET ORAL at 10:41

## 2020-05-01 RX ADMIN — Medication 10 ML: at 18:32

## 2020-05-01 ASSESSMENT — ACTIVITIES OF DAILY LIVING (ADL)
ADLS_ACUITY_SCORE: 13

## 2020-05-01 ASSESSMENT — PAIN DESCRIPTION - DESCRIPTORS: DESCRIPTORS: HEADACHE

## 2020-05-01 NOTE — PROGRESS NOTES
Labs and Transfusion Orders:  Date: May 1, 2020    Patient: Kobe Pedroza  : 1960    LABS:  [  ] Check CBC with differential and CMP twice a week (fax labs to [facility] at [phone]) through 2020.  [  ] Type and cross PRN    TRANSFUSION PARAMETERS:   [  ] Please transfuse 2 (two) units PRBCs if Hgb is less than or equal to 7.  [  ] Please transfuse 1 (one) unit platelets if plt count less than or equal to 10k.   [  ] If patient experiences transfusion reaction during transfusion:   [  ] 25-50 mg benadryl IV x once PRN   [  ] Tylenol 1000 mg PO x once PRN   [  ] Hydrocortisone 100 mg IV x once PRN   [  ] Zantac 150 mg IV x once PRN   [  ] Notify provider covering infusion clinic per protocol      Please call the [facility] at [phone] for any questions, and ask to speak with the care coordinator for Dr. Wright (patient's primary oncologist).    Thank you,         Lupis Alvarez PA-C    [facility]  Department of Hematology/Oncology  [address] [place] [address]  Pager: [phone]

## 2020-05-01 NOTE — DISCHARGE SUMMARY
Discharge Summary  Hematology / Oncology    Date of Admission:  4/30/2020  Date of Discharge:  05/01/2020  Discharging Provider: Lupis Alvarez  Date of Service (when I saw the patient): 05/01/2020    Discharge Diagnoses   Active Problems:    Burkitt lymphoma (H)      History of Present Illness   Kobe Pedroza is a 60 year old male with a history of thyroid cancer status post thyroidectomy (2011), CAD, hyperlipidemia and newly diagnosed Burkitt's Lymphoma status post Cycle 1 of R-CODOX-M / R-IVAC chemotherapy regimen with subsequent PET-CT demonstrating complete remission who was admitted for scheduled Cycle 2 R-CODOX-M. Patient tolerated both intrathecal and systemic chemotherapy very well, with no complications. He was noted on admission to be complaining of right calf pain and had an ultrasound performed with demonstrated an isolated DVT in the right peroneal vein. His renal function was appropriate, and he was started on enoxaparin 1 mg/kg BID. In light of his need to initiate anticoagulation, will defer Day 3 IT chemo for now. Patient will receive daily Neupogen injections in the clinic in addition to the twice-weekly labs going forward; request faxed to [facility] on 5/1 per patient preference. He understands strict neutropenic and ED return precautions and has the [facility] clinic triage number for any questions or concerns that may arise. On the day of discharge, patient was hemodynamically stable and generally non-toxic appearing and felt safe and comfortable with the proposed plan for discharge and follow-up.    Next follow-up: Neupogen daily, 5/2-5/5 (5/6 and 5/7 requested as well; cancel as indicated if ANC recovers); Labs + BOB visit on 5/5  New discharge medications: Enoxaparin 100 mg BID    Hospital Course   Kobe Pedroza was admitted on 4/30/2020.  The following problems were addressed during his hospitalization:    HEME  # Burkitt Lymphoma  Followed by Dr. Wright. Presented on 3/12/20 to [place]  [facility] with acute-on-chronic mid-thoracic back pain with some associated radicular symptoms. Per patient, no B-symptoms, though he did note an intentional 35-lb weight loss. 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 Patient had no appreciable neurological deficits. He was started on dexamethasone and underwent T5-6 laminectomy with gross total resection of epidural tumor on 3/15/2020. Flow cytometry of the specimen was notable for CD10 positive and kappa monotypic B cells (83%). Confirmed Burkitt lymphoma with surgical pathology. PET scan showed extensive visceral and bony disease. Bone marrow biopsy on 3/18 showed suspicious involvement with rare polytypic B cells (0.8%). No disease in CSF. Started R-CODOX-M (Cycle 1, Day 1=3/18/2020), with was well-tolerated. Received Cycle 1 R-IVAC (C1D1=4/8/2020), which was complicated by development of neutropenic fever requiring hospitalization. PET-CT on 4/27/2020 demonstrated a complete remission. Patient is admitted now for Cycle 2 of R-CODOX-M.                             Treatment Plan: R-CODOX-M. Cycle 2, Day 1 = 4/30/2020.                          - Cyclophosphamide 800 mg/m2 -- Days 1-2                          - Vincristine 1.4 mg/m2 -- Day 1                          - Doxorubicin 50 mg/m2 -- Day 1                          - IT cytarabine 50 mg, methotrexate 12 mg -- Day 1                          - IT cytarabine 50 mg -- Day 3; deferred. See discussion below.                          - Neupogen -- Days 1-6; requested daily clinic appointments 5/2-5/5      - Discussed with Dr. Wright; given need to initiate anticoagulation, will defer Day 3 IT cytarabine for now. May consider adding it to the end of Cycle 2.                          # Anemia  Presumed related to underlying malignancy plus recent chemotherapy. No recent bleeding concerns. Hgb stable on admission.  - Transfuse to keep  Hgb >7     # Leukocytosis  WBC 13 on admission. No fevers or recent infectious symptoms. Possibly due to prolonged Neulasta effect (administered 4/15). Increasing WBC during admission attributable to dexamethasone administration.     ID  # Recent neutropenic fever  # History of Gram positive bacteremia  Patient admitted from [place] ED on 4/19 with neutropenic fever found to be due to GPC bacteremia (1/4 bottles with alpha hemolytic strep). Treated with Zosyn (4/18-4/22), which was de-escalated to PO cephalexin on discharge for the remainder of a 14-day course (through 5/2).  - Continue cephalexin 500 mg BID x5 doses (last dose 5/2 PM) to complete antibiotic course     # ID Prophylaxis  - Continue  mg BID  - Hold fluconazole, levofloxacin; restart for ANC <1000     CV  # Right peroneal DVT  US on 4/30 consistent with isolated right peroneal DVT without proximal extension. No chest pain, dyspnea, tachycardia, hypoxia, or other clinical features concerning for concomitant PE.  - Start enoxaparin 1 mg/kg BID (x5/1)  - Twice weekly laboratory monitoring requested in [place] per patient preference    # Coronary artery disease  # Hyperlipidemia  Followed by Dr. Zeng at [facility] ([place]). Diagnosed with mild, non-obstructive CAD in 2018. 81 mg ASA and low-dose statin therapy recommended. No previous cardiac caths or stents.  - Held PTA statin and ASA given concomitant chemo administration. Resume statin on discharge; will hold ASA given need for treatment dose anticoagulation.     ENDO  # History of thyroid cancer status-post thyroidectomy  Status post thyroidectomy in 2011. TSH normal on 3/12/20.  - Continue PTA levothyroxine      GI  # History of hemorrhoids  # History of lower GI bleeding  Reported BRBPR during recent admission (4/19-4/22) in the context of previously diagnosed hemorrhoids. No recent recurrence.  - Continue scheduled senna  - Monitor    Discussed with   Cristino.    Lupis Alvarez PA-C  Hematology/Oncology  Pager: #[phone]    Code Status   Full Code    Primary Care Physician   Garett Arriaga    Physical Exam   Vital Signs with Ranges  Temp:  [96.7  F (35.9  C)-97.8  F (36.6  C)] 96.7  F (35.9  C)  Pulse:  [76-91] 87  Heart Rate:  [82-86] 86  Resp:  [16-22] 16  BP: (107-140)/(64-85) 115/83  SpO2:  [91 %-98 %] 92 %  211 lbs 9.6 oz    Constitutional: Pleasant and cooperative male. Awake, alert, NAD. Seated on the edge of the bed.  HEENT: NC/AT, EOMI, sclera clear, conjunctiva normal, OP with MMM, no mucositis.  Respiratory: No increased work of breathing, CTAB, no crackles or wheezing.  Cardiovascular: RRR, no murmur noted. No peripheral edema.  MSK: Tenderness to palpation of the right posterior calf. No palpable cords. No overlying erythema or warmth. Leg compartments are soft. Pedal pulse 2+. Light touch sensation intact to the distal RLE.   GI: Normal bowel sounds, soft, non-distended and non-tender.  Skin: Warm, dry, well-perfused. Healing surgical scar to the upper thoracic spine. No other bruising, bleeding, rashes, or lesions on limited exam.  Neurologic: A&O. Answers questions appropriately. Moves all extremities spontaneously.  Neuropsychiatric: Calm, appropriate affect  Vascular access:  PICC on RUE CDI, non-tender, no surrounding erythema, no hematoma.    Discharge Disposition   Discharged to home  Condition at discharge: Stable    Consultations This Hospital Stay   PHYSICAL THERAPY ADULT IP CONSULT  MEDICATION HISTORY IP PHARMACY CONSULT  VASCULAR ACCESS ADULT IP CONSULT    Discharge Orders      SPINAL PUNCTURE, LUMBAR DIAGNOSTIC     Cell count with differential CSF: Tube 4     CSF Culture Aerobic Bacterial     Gram stain     Glucose CSF: Tube 3    Lab to order CCSF once per sample     Protein total CSF: Tube 3    Lab to order CCSF once per sample     Discharge Medications   Current Discharge Medication List      CONTINUE these medications which have  NOT CHANGED    Details   acetaminophen (TYLENOL) 325 MG tablet Take 2 tablets (650 mg) by mouth every 4 hours as needed for mild pain  Qty:        acyclovir (ZOVIRAX) 200 MG capsule Take 2 capsules (400 mg) by mouth 2 times daily  Qty: 60 capsule, Refills: 3    Associated Diagnoses: 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)      cephALEXin (KEFLEX) 500 MG capsule Take 1 capsule (500 mg) by mouth 2 times daily Starting this evening  Qty: 21 capsule, Refills: 0    Associated Diagnoses: Gram-positive bacteremia; Neutropenic fever (H)      levothyroxine (SYNTHROID/LEVOTHROID) 200 MCG tablet Take 200 mcg by mouth daily      potassium chloride (KLOR-CON) 20 MEQ packet Take 40 mEq by mouth daily  Qty: 60 packet, Refills: 0    Associated Diagnoses: 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)      senna-docusate (SENOKOT-S/PERICOLACE) 8.6-50 MG tablet Take 1 tablet by mouth 2 times daily  Qty: 60 tablet, Refills: 0    Associated Diagnoses: 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)      fluconazole (DIFLUCAN) 200 MG tablet Take 200 mg by mouth daily ONLY TAKE IF ANC <1000  Qty: 30 tablet, Refills: 0    Associated Diagnoses: 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)      levofloxacin (LEVAQUIN) 250 MG tablet Take 250 mg by mouth daily ONLY TAKE IF ANC <1000  Qty: 30 tablet, Refills: 0    Associated Diagnoses: 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)      magnesium citrate solution Take 296 mLs by mouth once as needed for constipation or other (For constipation) Take as needed for constipation  Qty: 296 mL, Refills: 0    Associated Diagnoses: 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)      ondansetron (ZOFRAN) 4 MG tablet Take 1-2 tablets (4-8 mg) by mouth every 6 hours as needed for nausea  Qty: 90 tablet, Refills: 3    Associated Diagnoses: 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)      oxyCODONE (ROXICODONE) 5 MG tablet Take 1-2  tablets (5-10 mg) by mouth every 6 hours as needed for severe pain  Qty: 20 tablet, Refills: 0    Associated Diagnoses: 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); Cancer related pain           Allergies   No Known Allergies    Data   Most Recent 3 CBC's:  Recent Labs   Lab Test 05/01/20  0349 04/30/20  1209 04/27/20  1123   WBC 15.7* 13.0* 17.2*   HGB 8.9* 9.0* 9.2*   MCV 93 95 92   * 591* 604*      Most Recent 3 BMP's:  Recent Labs   Lab Test 05/01/20  0349 04/30/20  1209 04/27/20  1123    137 135   POTASSIUM 4.1 3.7 3.6   CHLORIDE 108 104 105   CO2 24 26 25   BUN 16 12 15   CR 0.69 0.70 0.67   ANIONGAP 8 6 5   RUTH 8.1* 8.1* 8.5   * 94 95     Most Recent 2 LFT's:  Recent Labs   Lab Test 05/01/20  0349 04/30/20  1209   AST 14 18   ALT 28 29   ALKPHOS 84 89   BILITOTAL 0.3 0.3     Most Recent INR's and Anticoagulation Dosing History:  Anticoagulation Dose History     Recent Dosing and Labs Latest Ref Rng & Units 4/10/2020 4/11/2020 4/12/2020 4/13/2020 4/19/2020 4/30/2020 5/1/2020    INR 0.86 - 1.14 1.08 1.07 1.07 1.03 1.08 1.11 1.16(H)        Most Recent 3 Troponin's:No lab results found.  Most Recent Cholesterol Panel:No lab results found.  Most Recent 6 Bacteria Isolates From Any Culture (See EPIC Reports for Culture Details):  Recent Labs   Lab Test 04/30/20  1340 04/20/20  1009 04/20/20  1004 04/19/20  0530 04/19/20  0525 04/19/20  0522   CULT Culture negative monitoring continues No growth No growth No growth No growth No growth     Most Recent TSH, T4 and A1c Labs:  Recent Labs   Lab Test 03/14/20  0439 03/12/20   TSH  --  0.43   A1C 5.5  --      Results for orders placed or performed during the hospital encounter of 04/30/20   US Lower Extremity Venous Duplex Right    Narrative    EXAMINATION: US LOWER EXTREMITY VENOUS DUPLEX RIGHT  4/30/2020 8:36 PM       CLINICAL HISTORY: History of Burkitt lymphoma, right calf pain and  swelling    COMPARISON: None        PROCEDURE  COMMENTS: Ultrasound was performed of the deep venous system  of the right lower extremity using grayscale, color, and spectral  Doppler.    FINDINGS:  The right common femoral, greater saphenous origin, femoral, and  popliteal veins are visualized and are patent. Venous waveforms are  normal. There is normal response to compression.    The posterior tibial vein is fully compressible. The peroneal vein is  not compressible at the mid calf. The peroneal vein at the ankle and  proximal calf is fully compressible.    The left common femoral vein is visualized and is patent with normal  venous waveforms and compressibility.      Impression    IMPRESSION:.  Occlusive deep vein thrombus in the right peroneal vein at the mid  calf. The deep veins of the right lower extremity are otherwise  patent.    Above findings were discussed with Dr. Lee by Dr. Segundo at 9:44 PM  on 4/30/2020.    I have personally reviewed the examination and initial interpretation  and I agree with the findings.    MO GOLDSTEIN MD   XR Lumbar Punc Intrathecal Chemo Admin    Narrative    Lumbar Puncture using Fluoroscopy    History:  60M w/ Burkitt lymphoma. Intrathecal chemotherapy  administration.  ICD-10:    Procedure note: Verbal and written consent for lumbar puncture was  obtained from the patient, and benefits and risk of the procedure were  explained, including but not limited to worsening headache,  hemorrhage, infection, lower extremity pain, or nerve root injury. The  patient was sterilely prepped and draped with the patient in the prone  position, over the lower back. Under fluoroscopic guidance, the  interlaminar spaces were noted. 1% lidocaine was administered for  local anesthetic over the L3-4 interlaminar space, and a 22 gauge 3.5  inch needle was advanced into the thecal sac under fluoroscopic  guidance.  There was initial show of clear CSF.  Approximately 8 cc of  CSF were collected. Chemotherapy agent was administered by  the  oncology service.    The needle was removed with the stylet in place. There was no  immediate complication associated with the procedure. Samples were  sent for the requested laboratory testing.      Estimated blood loss: Less than 1 cc.    Fluoroscopic time: 17 seconds      Impression    Impression: Successful lumbar puncture and administration of  intrathecal chemotherapy without immediate complication.    WILLIAM MOSQUEDA MD

## 2020-05-01 NOTE — PLAN OF CARE
[id number]:  Neuro: A&Ox4  Cardiac: BPs 110s-120s/70s. HR 70s. Afebrile overnight. Blood clot to RLE, on Lovenox.   Resp: RA  GI/: Voiding adequate amounts. LBM 4/29  Diet/Appetite: Tolerating regular diet, fair appetite. Denies nausea.   Skin: no deficits.  Access: R) DL PICC HL  Drains: none  Activity: Up SBA  Pain: Denies  Patient to receive chemo today and possibly discharge home.    Will continue with plan of care and notify MD of any changes.

## 2020-05-01 NOTE — PROVIDER NOTIFICATION
Jing paged at [phone] regarding pt R lower extremity US (+) for blood clots. Pt received lovenox this evening. Tele order? Will continue with POC.

## 2020-05-01 NOTE — PLAN OF CARE
PT 5C Deferral   Discharge Planner PT   Patient plan for discharge: home with wife  Current status: Pt lives in single story home with 1 RAFAEL.  Previously IND with all ADLs and mobility, no AD use.  Currently pt ambulating IND in room with stiffness and pain in R calf 2/2 new DVT.  Avoided long amb today 2/2 DVT protocol.   Barriers to return to prior living situation: none  Recommendations for discharge: home with A as needed from wife. Instructed to continue amb in home and outside in order to maintain strength and IND mobility, pt agreeable.   Rationale for recommendations: pt IND mobility.         Entered by: Hiro Myers 05/01/2020 10:50 AM

## 2020-05-01 NOTE — PROGRESS NOTES
Care Coordinator - Discharge Planning    Admission Date/Time:  4/30/2020  Attending MD:  Carleen Gregg MD     Data  Date of initial CC assessment:  5/1/2020  Chart reviewed, discussed with interdisciplinary team.   Patient was admitted for:   1. Chemotherapy-induced neutropenia (H)    2. 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)         Assessment   Concerns with insurance coverage for discharge needs: None.  Current Living Situation: Patient lives with spouse.  Support System: Supportive and Involved  Services Involved: None  Transportation at Discharge: Car and Family or friend will provide  Transportation to Medical Appointments:    - Name of caregiver: SpouseMckayla  Barriers to Discharge: Medical Stability      Coordination of Care and Referrals:     Patient admitted for scheduled Cycle 2 R-CODOX-M. Per BOB, patient will discharge this evening following chemotherapy. Patient will need twice weekly labs (CBC w/ diff and CMP) at his local hospital, [facility] ([address] [place] [address]).    Writer received order for labs and faxed this to [facility] ([phone]).     RNCC will follow as needed.     Plan  Anticipated Discharge Date:  5/1/2020  Anticipated Discharge Plan:  Home with clinic follow-up as recommended.     CTS Handoff completed:  YES    Dyan Jimenez, RN, BSN, PHN  Care Coordinator   P: [phone], [facility]

## 2020-05-02 ENCOUNTER — ALLIED HEALTH/NURSE VISIT (OUTPATIENT)
Dept: ONCOLOGY | Facility: CLINIC | Age: 60
DRG: 847 | End: 2020-05-02
Attending: INTERNAL MEDICINE
Payer: COMMERCIAL

## 2020-05-02 VITALS
DIASTOLIC BLOOD PRESSURE: 80 MMHG | RESPIRATION RATE: 18 BRPM | TEMPERATURE: 98.1 F | HEART RATE: 91 BPM | OXYGEN SATURATION: 100 % | SYSTOLIC BLOOD PRESSURE: 143 MMHG

## 2020-05-02 DIAGNOSIS — C83.78 BURKITT LYMPHOMA OF LYMPH NODES OF MULTIPLE REGIONS (H): ICD-10-CM

## 2020-05-02 LAB
BASOPHILS # BLD AUTO: 0 10E9/L (ref 0–0.2)
BASOPHILS NFR BLD AUTO: 0.1 %
DIFFERENTIAL METHOD BLD: ABNORMAL
EOSINOPHIL # BLD AUTO: 0 10E9/L (ref 0–0.7)
EOSINOPHIL NFR BLD AUTO: 0 %
ERYTHROCYTE [DISTWIDTH] IN BLOOD BY AUTOMATED COUNT: 18.7 % (ref 10–15)
G6PD RBC-CCNC: 17.3 U/G HB (ref 9.9–16.6)
HCT VFR BLD AUTO: 27.1 % (ref 40–53)
HGB BLD-MCNC: 9 G/DL (ref 13.3–17.7)
IMM GRANULOCYTES # BLD: 0.1 10E9/L (ref 0–0.4)
IMM GRANULOCYTES NFR BLD: 0.4 %
LYMPHOCYTES # BLD AUTO: 0.1 10E9/L (ref 0.8–5.3)
LYMPHOCYTES NFR BLD AUTO: 0.9 %
MCH RBC QN AUTO: 30.7 PG (ref 26.5–33)
MCHC RBC AUTO-ENTMCNC: 33.2 G/DL (ref 31.5–36.5)
MCV RBC AUTO: 93 FL (ref 78–100)
MONOCYTES # BLD AUTO: 1.6 10E9/L (ref 0–1.3)
MONOCYTES NFR BLD AUTO: 11.9 %
NEUTROPHILS # BLD AUTO: 11.9 10E9/L (ref 1.6–8.3)
NEUTROPHILS NFR BLD AUTO: 86.7 %
NRBC # BLD AUTO: 0 10*3/UL
NRBC BLD AUTO-RTO: 0 /100
PLATELET # BLD AUTO: 592 10E9/L (ref 150–450)
RBC # BLD AUTO: 2.93 10E12/L (ref 4.4–5.9)
WBC # BLD AUTO: 13.7 10E9/L (ref 4–11)

## 2020-05-02 PROCEDURE — 85025 COMPLETE CBC W/AUTO DIFF WBC: CPT | Performed by: PHYSICIAN ASSISTANT

## 2020-05-02 PROCEDURE — 25000128 H RX IP 250 OP 636: Mod: ZF | Performed by: INTERNAL MEDICINE

## 2020-05-02 RX ORDER — HEPARIN SODIUM,PORCINE 10 UNIT/ML
5 VIAL (ML) INTRAVENOUS ONCE
Status: COMPLETED | OUTPATIENT
Start: 2020-05-02 | End: 2020-05-02

## 2020-05-02 RX ORDER — HEPARIN SODIUM,PORCINE 10 UNIT/ML
5 VIAL (ML) INTRAVENOUS
Status: DISCONTINUED | OUTPATIENT
Start: 2020-05-02 | End: 2020-05-02 | Stop reason: HOSPADM

## 2020-05-02 RX ADMIN — Medication 5 ML: at 10:41

## 2020-05-02 ASSESSMENT — PAIN SCALES - GENERAL: PAINLEVEL: NO PAIN (0)

## 2020-05-02 NOTE — PROGRESS NOTES
Infusion Nursing Note:  Kobe Pedroza presents today for Neupogen Injection.        Note: Jonnathan was discharged from the hospital yesterday after two days of chemotherapy.  Order in therapy plan stated Neupogen 480mcg prn when ANC is <1000.  Pt's ANC was 11.9 and neupogen was held today.  Jonnathan was discharged home with a plan to come back tomorrow for labs and possible neupogen.  However after further review Jonnathan is scheduled Saturday-Thursday for neupogen injections.  With an ANC of 11.9 it is unlikely that his ANC will drop below 1000 this week.  This RN reviewed inpatient chemotherapy orders and saw this order:          filgrastim (NEUPOGEN) injection 480 mcg (Discontinued - Patient Discharge)        480 mcg (rounded from 470 mcg = 5 mcg/kg × 94 kg Treatment plan recorded weight), Subcutaneous, DAILY, First dose on Sat 5/2/20 at 0800, For 6 doses  Days 3 through 8.     After looking for clarification in yesterday's discharge note and Dr Wright's note from 4/29/2020. It was unclear which of these orders to follow.  This RN reached out to Dr Wright for further clarification.    Per Dr Wright the order that was written in the therapy plan was not to start until after Jonnathan's NEXT cycle of chemotherapy which was to start on 5/8/2020.  Jonnathan should be getting neupogen daily.    The following plan was made.  Neupogen therapy plan updated with a start date.  Treatment plan was updated to given neupogen daily 5/3, 5/4, 5/5, 5/6 and 5/7.  Per Dr Wright pt should have labs redrawn on 5/4/2020    Intravenous Access:  PICC.    Treatment Conditions:  Lab Results   Component Value Date    HGB 9.0 05/02/2020     Lab Results   Component Value Date    WBC 13.7 05/02/2020      Lab Results   Component Value Date    ANEU 11.9 05/02/2020     Lab Results   Component Value Date     05/02/2020            Discharge Plan:   Copy of AVS reviewed with patient and/or family.  Patient will return tomorrow for neupogen Face to  Face time: 0.    Nancy Thomas RN

## 2020-05-02 NOTE — PROGRESS NOTES
Nursing Focus: Discharge    D: Patient discharged to home at 1920. Patient ready and eager to discharge .  All belongings sent with patient.    I: Discharge prescriptions sent to discharge pharmacy to be filled. All discharge medications and instructions reviewed with patient and wife over video chat. Patient instructed to call clinic triage nurse if he experiences a fever >100.4, uncontrolled nausea, vomiting, diarrhea, or pain; or experiences any signs or symptoms of bleeding. Other phone numbers to call with questions or concerns after discharge reviewed with patient and wife. Follow-up outpatient appointment scheduled reviewed with patient and wife.  Picc kept in place after heparin instilled. Education completed. Demonstrated lovenox injection with thorough description to patient and patient's wife via video chat. Literature about how to inject sent with patient. Wife does subcutaneous injections on self for migraines and feels comfortable giving to pt. Care Plan goals met and adequate for discharge.    A: Patient verbalized understanding of discharge medications and instructions. Brought medications to patient from outpatient pharmacy.     P: Patient to follow-up in clinic with for the next six days for neupogen

## 2020-05-03 ENCOUNTER — ALLIED HEALTH/NURSE VISIT (OUTPATIENT)
Dept: ONCOLOGY | Facility: CLINIC | Age: 60
DRG: 847 | End: 2020-05-03
Attending: PHYSICIAN ASSISTANT
Payer: COMMERCIAL

## 2020-05-03 ENCOUNTER — PATIENT OUTREACH (OUTPATIENT)
Dept: CARE COORDINATION | Facility: CLINIC | Age: 60
End: 2020-05-03

## 2020-05-03 VITALS
TEMPERATURE: 97.9 F | SYSTOLIC BLOOD PRESSURE: 129 MMHG | DIASTOLIC BLOOD PRESSURE: 86 MMHG | HEART RATE: 72 BPM | OXYGEN SATURATION: 97 %

## 2020-05-03 DIAGNOSIS — D70.1 CHEMOTHERAPY-INDUCED NEUTROPENIA (H): Primary | ICD-10-CM

## 2020-05-03 DIAGNOSIS — T45.1X5A CHEMOTHERAPY-INDUCED NEUTROPENIA (H): Primary | ICD-10-CM

## 2020-05-03 DIAGNOSIS — C83.78 BURKITT LYMPHOMA OF LYMPH NODES OF MULTIPLE REGIONS (H): ICD-10-CM

## 2020-05-03 PROCEDURE — 25000128 H RX IP 250 OP 636: Mod: ZF | Performed by: INTERNAL MEDICINE

## 2020-05-03 RX ORDER — HEPARIN SODIUM,PORCINE 10 UNIT/ML
5 VIAL (ML) INTRAVENOUS ONCE
Status: COMPLETED | OUTPATIENT
Start: 2020-05-03 | End: 2020-05-03

## 2020-05-03 RX ADMIN — Medication 5 ML: at 10:27

## 2020-05-03 RX ADMIN — FILGRASTIM 480 MCG: 480 INJECTION, SOLUTION INTRAVENOUS; SUBCUTANEOUS at 10:21

## 2020-05-03 ASSESSMENT — PAIN SCALES - GENERAL: PAINLEVEL: NO PAIN (0)

## 2020-05-03 NOTE — PROGRESS NOTES
Infusion Nursing Note:  Kobe Pedroza presents today for Day 5 Neupogen.        Note: Neupogen injected to LLQ abdomen without incident.    Jonnathan c/o a bit more fatigue today than yesterday.  No additional concerns.  Denies nausea.  Denies cough, chest pain, SOB.  Denies diarrhea.  Says he was up urinating a lot during the night but denies any pain with urination.      Pt noted a rash on the back of pt's head this am.  This RN note a small circular area that was slightly red at the base of his scar.  It was not warm and did not appear to indicate an infections. Pt encouraged to have his wife continue to monitor.      Intravenous Access:  PICC.        Discharge Plan:   Copy of AVS reviewed with patient and/or family.  Patient will return tomorrow for day 6 of neupogen and labs.   Face to Face time: 0.    Nancy Thomas RN

## 2020-05-04 ENCOUNTER — ALLIED HEALTH/NURSE VISIT (OUTPATIENT)
Dept: ONCOLOGY | Facility: CLINIC | Age: 60
DRG: 847 | End: 2020-05-04
Attending: INTERNAL MEDICINE
Payer: COMMERCIAL

## 2020-05-04 ENCOUNTER — APPOINTMENT (OUTPATIENT)
Dept: LAB | Facility: CLINIC | Age: 60
DRG: 847 | End: 2020-05-04
Attending: INTERNAL MEDICINE
Payer: COMMERCIAL

## 2020-05-04 VITALS
TEMPERATURE: 99.3 F | WEIGHT: 216 LBS | BODY MASS INDEX: 30.99 KG/M2 | OXYGEN SATURATION: 98 % | RESPIRATION RATE: 16 BRPM | SYSTOLIC BLOOD PRESSURE: 130 MMHG | HEART RATE: 88 BPM | DIASTOLIC BLOOD PRESSURE: 79 MMHG

## 2020-05-04 DIAGNOSIS — D70.1 CHEMOTHERAPY-INDUCED NEUTROPENIA (H): ICD-10-CM

## 2020-05-04 DIAGNOSIS — C83.78 BURKITT LYMPHOMA OF LYMPH NODES OF MULTIPLE REGIONS (H): Primary | ICD-10-CM

## 2020-05-04 DIAGNOSIS — T45.1X5A CHEMOTHERAPY-INDUCED NEUTROPENIA (H): ICD-10-CM

## 2020-05-04 LAB
ABO + RH BLD: NORMAL
ABO + RH BLD: NORMAL
ALBUMIN SERPL-MCNC: 3.4 G/DL (ref 3.4–5)
ALP SERPL-CCNC: 70 U/L (ref 40–150)
ALT SERPL W P-5'-P-CCNC: 25 U/L (ref 0–70)
ANION GAP SERPL CALCULATED.3IONS-SCNC: 4 MMOL/L (ref 3–14)
AST SERPL W P-5'-P-CCNC: 12 U/L (ref 0–45)
BASOPHILS # BLD AUTO: 0.1 10E9/L (ref 0–0.2)
BASOPHILS NFR BLD AUTO: 0.1 %
BILIRUB SERPL-MCNC: 0.3 MG/DL (ref 0.2–1.3)
BLD GP AB SCN SERPL QL: NORMAL
BLOOD BANK CMNT PATIENT-IMP: NORMAL
BUN SERPL-MCNC: 23 MG/DL (ref 7–30)
CALCIUM SERPL-MCNC: 7.5 MG/DL (ref 8.5–10.1)
CHLORIDE SERPL-SCNC: 106 MMOL/L (ref 94–109)
CO2 SERPL-SCNC: 28 MMOL/L (ref 20–32)
CREAT SERPL-MCNC: 0.59 MG/DL (ref 0.66–1.25)
DIFFERENTIAL METHOD BLD: ABNORMAL
EOSINOPHIL # BLD AUTO: 0 10E9/L (ref 0–0.7)
EOSINOPHIL NFR BLD AUTO: 0 %
ERYTHROCYTE [DISTWIDTH] IN BLOOD BY AUTOMATED COUNT: 18.2 % (ref 10–15)
GFR SERPL CREATININE-BSD FRML MDRD: >90 ML/MIN/{1.73_M2}
GLUCOSE SERPL-MCNC: 138 MG/DL (ref 70–99)
HCT VFR BLD AUTO: 25.9 % (ref 40–53)
HGB BLD-MCNC: 8.5 G/DL (ref 13.3–17.7)
IMM GRANULOCYTES # BLD: 1.1 10E9/L (ref 0–0.4)
IMM GRANULOCYTES NFR BLD: 2.6 %
LYMPHOCYTES # BLD AUTO: 0.2 10E9/L (ref 0.8–5.3)
LYMPHOCYTES NFR BLD AUTO: 0.4 %
MCH RBC QN AUTO: 30.6 PG (ref 26.5–33)
MCHC RBC AUTO-ENTMCNC: 32.8 G/DL (ref 31.5–36.5)
MCV RBC AUTO: 93 FL (ref 78–100)
MONOCYTES # BLD AUTO: 0.2 10E9/L (ref 0–1.3)
MONOCYTES NFR BLD AUTO: 0.6 %
NEUTROPHILS # BLD AUTO: 41.3 10E9/L (ref 1.6–8.3)
NEUTROPHILS NFR BLD AUTO: 96.3 %
NRBC # BLD AUTO: 0 10*3/UL
NRBC BLD AUTO-RTO: 0 /100
PLATELET # BLD AUTO: 478 10E9/L (ref 150–450)
POTASSIUM SERPL-SCNC: 3.2 MMOL/L (ref 3.4–5.3)
PROT SERPL-MCNC: 6.8 G/DL (ref 6.8–8.8)
RBC # BLD AUTO: 2.78 10E12/L (ref 4.4–5.9)
SODIUM SERPL-SCNC: 139 MMOL/L (ref 133–144)
SPECIMEN EXP DATE BLD: NORMAL
WBC # BLD AUTO: 42.9 10E9/L (ref 4–11)

## 2020-05-04 PROCEDURE — 86850 RBC ANTIBODY SCREEN: CPT | Performed by: INTERNAL MEDICINE

## 2020-05-04 PROCEDURE — 25000128 H RX IP 250 OP 636: Mod: ZF | Performed by: INTERNAL MEDICINE

## 2020-05-04 PROCEDURE — 85025 COMPLETE CBC W/AUTO DIFF WBC: CPT | Performed by: INTERNAL MEDICINE

## 2020-05-04 PROCEDURE — 80053 COMPREHEN METABOLIC PANEL: CPT | Performed by: INTERNAL MEDICINE

## 2020-05-04 PROCEDURE — 86901 BLOOD TYPING SEROLOGIC RH(D): CPT | Performed by: INTERNAL MEDICINE

## 2020-05-04 PROCEDURE — 86900 BLOOD TYPING SEROLOGIC ABO: CPT | Performed by: INTERNAL MEDICINE

## 2020-05-04 RX ORDER — HEPARIN SODIUM,PORCINE 10 UNIT/ML
5 VIAL (ML) INTRAVENOUS
Status: DISCONTINUED | OUTPATIENT
Start: 2020-05-04 | End: 2020-05-04 | Stop reason: HOSPADM

## 2020-05-04 RX ADMIN — Medication 5 ML: at 09:17

## 2020-05-04 RX ADMIN — Medication 5 ML: at 09:18

## 2020-05-04 RX ADMIN — FILGRASTIM 480 MCG: 480 INJECTION, SOLUTION INTRAVENOUS; SUBCUTANEOUS at 09:45

## 2020-05-04 ASSESSMENT — PAIN SCALES - GENERAL: PAINLEVEL: NO PAIN (0)

## 2020-05-04 NOTE — PROGRESS NOTES
Date: 5/4/2020 Status: [facility]   Time: 9:00 AM Length: 30   Visit Type: UMP INJECTION [[id number]] NIKHIL: [id number]   Provider: NurseMaritza Oncology Injection Department:  ONCOLOGY ADULT     Patient has clinic visit within 24-48 hours of Discharge so no post DC follow up call is needed

## 2020-05-04 NOTE — NURSING NOTE
Chief Complaint   Patient presents with     Blood Draw     Labs drawn via PICC by RN in lab. Lines flushed and hep locked. VS taken.     Elena Mao RN

## 2020-05-04 NOTE — PROGRESS NOTES
Patient presents to the [facility] for day 3 of 8 of filgrastim (NEUPOGEN) injection 480 mcg. Order written by Dr. Wright was completed today. Name and  were verified by patient. See MAR for medication details. Patient was asked if they have any new symptoms or questions/concerns. Medication was given in the following site: left lower abdomen. Patient tolerated injection well and was discharged to home.    -Yue CARTAGENA, CMA

## 2020-05-05 ENCOUNTER — VIRTUAL VISIT (OUTPATIENT)
Dept: ONCOLOGY | Facility: CLINIC | Age: 60
DRG: 847 | End: 2020-05-05
Attending: INTERNAL MEDICINE
Payer: COMMERCIAL

## 2020-05-05 VITALS
OXYGEN SATURATION: 97 % | HEART RATE: 99 BPM | TEMPERATURE: 98.1 F | DIASTOLIC BLOOD PRESSURE: 75 MMHG | SYSTOLIC BLOOD PRESSURE: 144 MMHG

## 2020-05-05 DIAGNOSIS — C83.78 BURKITT LYMPHOMA OF LYMPH NODES OF MULTIPLE REGIONS (H): Primary | ICD-10-CM

## 2020-05-05 DIAGNOSIS — T45.1X5A CHEMOTHERAPY-INDUCED NEUTROPENIA (H): ICD-10-CM

## 2020-05-05 DIAGNOSIS — D70.1 CHEMOTHERAPY-INDUCED NEUTROPENIA (H): ICD-10-CM

## 2020-05-05 LAB
BACTERIA SPEC CULT: NO GROWTH
SPECIMEN SOURCE: NORMAL

## 2020-05-05 PROCEDURE — 99214 OFFICE O/P EST MOD 30 MIN: CPT | Mod: GT | Performed by: PHYSICIAN ASSISTANT

## 2020-05-05 PROCEDURE — 25000128 H RX IP 250 OP 636: Mod: ZF | Performed by: INTERNAL MEDICINE

## 2020-05-05 RX ORDER — HEPARIN SODIUM,PORCINE 10 UNIT/ML
5 VIAL (ML) INTRAVENOUS
Status: CANCELLED | OUTPATIENT
Start: 2020-05-05

## 2020-05-05 RX ORDER — HEPARIN SODIUM,PORCINE 10 UNIT/ML
5 VIAL (ML) INTRAVENOUS
Status: DISCONTINUED | OUTPATIENT
Start: 2020-05-05 | End: 2020-05-05 | Stop reason: HOSPADM

## 2020-05-05 RX ORDER — HEPARIN SODIUM (PORCINE) LOCK FLUSH IV SOLN 100 UNIT/ML 100 UNIT/ML
5 SOLUTION INTRAVENOUS
Status: CANCELLED | OUTPATIENT
Start: 2020-05-05

## 2020-05-05 RX ADMIN — FILGRASTIM 480 MCG: 480 INJECTION, SOLUTION INTRAVENOUS; SUBCUTANEOUS at 09:00

## 2020-05-05 RX ADMIN — Medication 5 ML: at 09:07

## 2020-05-05 ASSESSMENT — PAIN SCALES - GENERAL: PAINLEVEL: NO PAIN (0)

## 2020-05-05 NOTE — PROGRESS NOTES
"Kobe Pedroza is a 60 year old male who is being evaluated via a billable video visit.      The patient has been notified of following:     \"This video visit will be conducted via a call between you and your physician/provider. We have found that certain health care needs can be provided without the need for an in-person physical exam.  This service lets us provide the care you need with a video conversation.  If a prescription is necessary we can send it directly to your pharmacy.  If lab work is needed we can place an order for that and you can then stop by our lab to have the test done at a later time.    Video visits are billed at different rates depending on your insurance coverage.  Please reach out to your insurance provider with any questions.    If during the course of the call the physician/provider feels a video visit is not appropriate, you will not be charged for this service.\"    Patient has given verbal consent for Video visit? Yes    How would you like to obtain your AVS? [email or web address]    Patient would like the video invitation sent by: Text to cell phone: [phone]    Will anyone else be joining your video visit? No       I have reviewed and updated the patient's allergies and medication list.    Concerns: No new concerns  Refills: None needed     Basilia Teresa CMA    Additional Provider Notes:     REASON FOR VISIT:  Management of Burkitt lymphoma; hospital follow-up      HISTORY OF PRESENT ILLNESS:  Mr. Pedroza is a 60 year old man with Burkitt lymphoma.  To summarize his course, he presented with acute on chronic back pain in 03/2020.  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  CSF was negative.  He was started on steroids followed by treatment with R-CODOX-M/IVAC and IT chemo prophylaxis on 3/18/2020.  His course was complicated by neutropenic sepsis after cycle 2 R-IVAC that resolved " "with no clear source but alpha hemolytic strep grew from 1/4 cultures at the outside lab (presumed contaminant).  He had a PET/CT to restage after cycle 2 (first R-IVAC) showing CR. Received cycle 3 (R-CODOX-M) 4/30-5/1. Diagnosed with DVT during admission and was started on therapeutic Lovenox. Video visit for hospital follow-up and ongoing management.     INTERVAL HISTORY: Jonnathan is feeling okay today. He felt great over the weekend (on dex) and has now felt more tired the past few days. He has not had any nausea from chemo. Bowel movements are regular with taking daily Senna. He has been eating fairly well and drinking about 40 oz of fluids daily. He denies any fevers/chills, cough, SOB, no abdominal pain. His back pain has improved as well. No rash. No significant bone pain from Neupogen. He has some bruising at Lovenox sites but no other bruising and no bleeding symptoms. His R leg pain is feeling better. He has no edema. He has some questions about the plan moving forward and his labs from yesterday.     OBJECTIVE:     Video physical exam  General: Patient appears well in no acute distress. Obese body habitus. +Alopecia   Skin: No visualized rash or lesions on visualized skin  Eyes: EOMI, no erythema, sclera icterus or discharge noted  Resp: Appears to be breathing comfortably without accessory muscle usage, speaking in full sentences, no cough  MSK: Appears to have normal range of motion based on visualized movements  Neurologic: No apparent tremors, facial movements symmetric  Psych: affect and mood congruent, alert and oriented  The rest of a comprehensive physical examination is deferred due to PHE (public health emergency) video restrictions\"    Labs--None today but reviewed from 5/4 and hospitalization     ASSESSMENT AND PLAN:     Burkitt's lymphoma: Currently on therapy with R-CODOX-M/R-IVAC. He had PET/CT after one full cycle showing complete remission. He tolerated second cycle of R-CODOX-M well with " fatigue today typical of coming off steroids. Day 3 IT cytarabine was deferred secondary to acute DVT. He has received Neupogen 5/1, 5/3, 5/4, and today. Will give 5/6 and 5/7. Will recheck labs on 5/7 in anticipation for admission for HD methotrexate on 5/8. Scheduling will let him know time to arrive. He has a PICC line in place already.     ID: No active concerns. Completed cephalexin 5/2 for bacteremia. Continue ACV. Hold Levaquin and fluconazole until ANC <1000. Cannot tell if pentamidine was given inpatient. Please give during this next admission. Last given 3/21.     HEME: Marked leukocytosis and neutrophilia yesterday noted and expected from [facility]. No fevers or concerning infectious symptoms. Hgb and platelets trended slightly. Will need twice weekly lab checks and tentative transfusions.     R peroneal DVT: Started Lovenox 100 mg BID on 5/1. Monitor platelets.     Hypokalemia: K mildly low yesterday at 3.2. He forgot to take packets. Resume 40 mEq daily.       Video-Visit Details    Type of service:  Video Visit    Video Start Time: 12:15 pm   Video End Time: 12:44 pm    Originating Location (pt. Location): Home    Distant Location (provider location):  [facility]     Platform used for Video Visit: Mary Arzate PA-C

## 2020-05-05 NOTE — LETTER
"5/5/2020       RE: Kobe Pedroza  [id number] [facility] [address]     Dear Colleague,    Thank you for referring your patient, Kobe Pedroza, to the [facility]. Please see a copy of my visit note below.    Kobe Pedroza is a 60 year old male who is being evaluated via a billable video visit.      The patient has been notified of following:     \"This video visit will be conducted via a call between you and your physician/provider. We have found that certain health care needs can be provided without the need for an in-person physical exam.  This service lets us provide the care you need with a video conversation.  If a prescription is necessary we can send it directly to your pharmacy.  If lab work is needed we can place an order for that and you can then stop by our lab to have the test done at a later time.    Video visits are billed at different rates depending on your insurance coverage.  Please reach out to your insurance provider with any questions.    If during the course of the call the physician/provider feels a video visit is not appropriate, you will not be charged for this service.\"    Patient has given verbal consent for Video visit? Yes    How would you like to obtain your AVS? MyChart    Patient would like the video invitation sent by: Text to cell phone: [phone]    Will anyone else be joining your video visit? No       I have reviewed and updated the patient's allergies and medication list.    Concerns: No new concerns  Refills: None needed     Basilia Teresa CMA    Additional Provider Notes:     REASON FOR VISIT:  Management of Burkitt lymphoma; hospital follow-up      HISTORY OF PRESENT ILLNESS:  Mr. Pedroza is a 60 year old man with Burkitt lymphoma.  To summarize his course, he presented with acute on chronic back pain in 03/2020.  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  CSF was negative.  He was started on steroids followed by treatment with R-CODOX-M/IVAC and IT chemo prophylaxis on 3/18/2020.  His course was complicated by neutropenic sepsis after cycle 2 R-IVAC that resolved with no clear source but alpha hemolytic strep grew from 1/4 cultures at the outside lab (presumed contaminant).  He had a PET/CT to restage after cycle 2 (first R-IVAC) showing CR. Received cycle 3 (R-CODOX-M) 4/30-5/1. Diagnosed with DVT during admission and was started on therapeutic Lovenox. Video visit for hospital follow-up and ongoing management.     INTERVAL HISTORY: Jonnathan is feeling okay today. He felt great over the weekend (on dex) and has now felt more tired the past few days. He has not had any nausea from chemo. Bowel movements are regular with taking daily Senna. He has been eating fairly well and drinking about 40 oz of fluids daily. He denies any fevers/chills, cough, SOB, no abdominal pain. His back pain has improved as well. No rash. No significant bone pain from Neupogen. He has some bruising at Lovenox sites but no other bruising and no bleeding symptoms. His R leg pain is feeling better. He has no edema. He has some questions about the plan moving forward and his labs from yesterday.     OBJECTIVE:     Video physical exam  General: Patient appears well in no acute distress. Obese body habitus. +Alopecia   Skin: No visualized rash or lesions on visualized skin  Eyes: EOMI, no erythema, sclera icterus or discharge noted  Resp: Appears to be breathing comfortably without accessory muscle usage, speaking in full sentences, no cough  MSK: Appears to have normal range of motion based on visualized movements  Neurologic: No apparent tremors, facial movements symmetric  Psych: affect and mood congruent, alert and oriented  The rest of a comprehensive physical examination is deferred due to PHE (public health emergency) video restrictions\"    Labs--None today but " reviewed from 5/4 and hospitalization     ASSESSMENT AND PLAN:     Burkitt's lymphoma: Currently on therapy with R-CODOX-M/R-IVAC. He had PET/CT after one full cycle showing complete remission. He tolerated second cycle of R-CODOX-M well with fatigue today typical of coming off steroids. Day 3 IT cytarabine was deferred secondary to acute DVT. He has received Neupogen 5/1, 5/3, 5/4, and today. Will give 5/6 and 5/7. Will recheck labs on 5/7 in anticipation for admission for HD methotrexate on 5/8. Scheduling will let him know time to arrive. He has a PICC line in place already.     ID: No active concerns. Completed cephalexin 5/2 for bacteremia. Continue ACV. Hold Levaquin and fluconazole until ANC <1000. Cannot tell if pentamidine was given inpatient. Please give during this next admission. Last given 3/21.     HEME: Marked leukocytosis and neutrophilia yesterday noted and expected from [facility]. No fevers or concerning infectious symptoms. Hgb and platelets trended slightly. Will need twice weekly lab checks and tentative transfusions.     R peroneal DVT: Started Lovenox 100 mg BID on 5/1. Monitor platelets.     Hypokalemia: K mildly low yesterday at 3.2. He forgot to take packets. Resume 40 mEq daily.       Video-Visit Details    Type of service:  Video Visit    Video Start Time: 12:15 pm   Video End Time: 12:44 pm    Originating Location (pt. Location): Home    Distant Location (provider location):  [facility]     Platform used for Video Visit: Mary Arzate PA-C          Again, thank you for allowing me to participate in the care of your patient.      Sincerely,    Cassia Arzate PA-C

## 2020-05-06 ENCOUNTER — ALLIED HEALTH/NURSE VISIT (OUTPATIENT)
Dept: ONCOLOGY | Facility: CLINIC | Age: 60
End: 2020-05-06
Attending: INTERNAL MEDICINE
Payer: COMMERCIAL

## 2020-05-06 VITALS
SYSTOLIC BLOOD PRESSURE: 126 MMHG | HEART RATE: 94 BPM | TEMPERATURE: 98.5 F | OXYGEN SATURATION: 98 % | DIASTOLIC BLOOD PRESSURE: 74 MMHG

## 2020-05-06 DIAGNOSIS — T45.1X5A CHEMOTHERAPY-INDUCED NEUTROPENIA (H): Primary | ICD-10-CM

## 2020-05-06 DIAGNOSIS — D70.1 CHEMOTHERAPY-INDUCED NEUTROPENIA (H): Primary | ICD-10-CM

## 2020-05-06 DIAGNOSIS — C83.78 BURKITT LYMPHOMA OF LYMPH NODES OF MULTIPLE REGIONS (H): ICD-10-CM

## 2020-05-06 PROCEDURE — 25000128 H RX IP 250 OP 636: Mod: ZF | Performed by: INTERNAL MEDICINE

## 2020-05-06 PROCEDURE — 96372 THER/PROPH/DIAG INJ SC/IM: CPT

## 2020-05-06 RX ORDER — HEPARIN SODIUM,PORCINE 10 UNIT/ML
5 VIAL (ML) INTRAVENOUS
Status: DISCONTINUED | OUTPATIENT
Start: 2020-05-06 | End: 2020-05-06 | Stop reason: HOSPADM

## 2020-05-06 RX ADMIN — Medication 5 ML: at 12:21

## 2020-05-06 RX ADMIN — FILGRASTIM 480 MCG: 480 INJECTION, SOLUTION INTRAVENOUS; SUBCUTANEOUS at 11:51

## 2020-05-06 RX ADMIN — Medication 5 ML: at 12:18

## 2020-05-06 ASSESSMENT — PAIN SCALES - GENERAL: PAINLEVEL: NO PAIN (0)

## 2020-05-06 NOTE — PROGRESS NOTES
Patient presents to the [facility] for filgrastim (NEUPOGEN) injection 480 mcg, PICC dressing change and flush. Order written by Dr. Wright was completed today. Name and  were verified by patient. See MAR for medication details. Patient was asked if they have any new symptoms or questions/concerns. Medication was given in the following site: left lower abdomen. Kristi AUGUSTINE RN changed PICC line caps and flushed both lines. Patient tolerated injection well and was discharged to home.    -Yue CARTAGENA CMA

## 2020-05-07 ENCOUNTER — ALLIED HEALTH/NURSE VISIT (OUTPATIENT)
Dept: ONCOLOGY | Facility: CLINIC | Age: 60
End: 2020-05-07
Attending: INTERNAL MEDICINE
Payer: COMMERCIAL

## 2020-05-07 VITALS
DIASTOLIC BLOOD PRESSURE: 74 MMHG | OXYGEN SATURATION: 99 % | SYSTOLIC BLOOD PRESSURE: 138 MMHG | HEART RATE: 99 BPM | TEMPERATURE: 98.8 F

## 2020-05-07 DIAGNOSIS — D70.1 CHEMOTHERAPY-INDUCED NEUTROPENIA (H): Primary | ICD-10-CM

## 2020-05-07 DIAGNOSIS — T45.1X5A CHEMOTHERAPY-INDUCED NEUTROPENIA (H): Primary | ICD-10-CM

## 2020-05-07 DIAGNOSIS — C83.78 BURKITT LYMPHOMA OF LYMPH NODES OF MULTIPLE REGIONS (H): ICD-10-CM

## 2020-05-07 LAB
ALBUMIN SERPL-MCNC: 3.4 G/DL (ref 3.4–5)
ALP SERPL-CCNC: 75 U/L (ref 40–150)
ALT SERPL W P-5'-P-CCNC: 26 U/L (ref 0–70)
ANION GAP SERPL CALCULATED.3IONS-SCNC: 6 MMOL/L (ref 3–14)
ANISOCYTOSIS BLD QL SMEAR: SLIGHT
AST SERPL W P-5'-P-CCNC: 9 U/L (ref 0–45)
BASOPHILS # BLD AUTO: 0 10E9/L (ref 0–0.2)
BASOPHILS NFR BLD AUTO: 0.9 %
BILIRUB SERPL-MCNC: 0.2 MG/DL (ref 0.2–1.3)
BUN SERPL-MCNC: 19 MG/DL (ref 7–30)
CALCIUM SERPL-MCNC: 8.2 MG/DL (ref 8.5–10.1)
CHLORIDE SERPL-SCNC: 108 MMOL/L (ref 94–109)
CO2 SERPL-SCNC: 27 MMOL/L (ref 20–32)
CREAT SERPL-MCNC: 0.52 MG/DL (ref 0.66–1.25)
DIFFERENTIAL METHOD BLD: ABNORMAL
EOSINOPHIL # BLD AUTO: 0 10E9/L (ref 0–0.7)
EOSINOPHIL NFR BLD AUTO: 0.9 %
ERYTHROCYTE [DISTWIDTH] IN BLOOD BY AUTOMATED COUNT: 17.5 % (ref 10–15)
GFR SERPL CREATININE-BSD FRML MDRD: >90 ML/MIN/{1.73_M2}
GLUCOSE SERPL-MCNC: 187 MG/DL (ref 70–99)
HCT VFR BLD AUTO: 24.6 % (ref 40–53)
HGB BLD-MCNC: 8.2 G/DL (ref 13.3–17.7)
LYMPHOCYTES # BLD AUTO: 0.1 10E9/L (ref 0.8–5.3)
LYMPHOCYTES NFR BLD AUTO: 1.7 %
MCH RBC QN AUTO: 31.2 PG (ref 26.5–33)
MCHC RBC AUTO-ENTMCNC: 33.3 G/DL (ref 31.5–36.5)
MCV RBC AUTO: 94 FL (ref 78–100)
MONOCYTES # BLD AUTO: 0.1 10E9/L (ref 0–1.3)
MONOCYTES NFR BLD AUTO: 1.7 %
NEUTROPHILS # BLD AUTO: 2.9 10E9/L (ref 1.6–8.3)
NEUTROPHILS NFR BLD AUTO: 94.8 %
PLATELET # BLD AUTO: 246 10E9/L (ref 150–450)
PLATELET # BLD EST: ABNORMAL 10*3/UL
POTASSIUM SERPL-SCNC: 3.6 MMOL/L (ref 3.4–5.3)
PROT SERPL-MCNC: 6.7 G/DL (ref 6.8–8.8)
RBC # BLD AUTO: 2.63 10E12/L (ref 4.4–5.9)
SODIUM SERPL-SCNC: 140 MMOL/L (ref 133–144)
WBC # BLD AUTO: 3.1 10E9/L (ref 4–11)

## 2020-05-07 PROCEDURE — 25000128 H RX IP 250 OP 636: Mod: ZF | Performed by: INTERNAL MEDICINE

## 2020-05-07 PROCEDURE — 86901 BLOOD TYPING SEROLOGIC RH(D): CPT | Performed by: PHYSICIAN ASSISTANT

## 2020-05-07 PROCEDURE — 86900 BLOOD TYPING SEROLOGIC ABO: CPT | Performed by: PHYSICIAN ASSISTANT

## 2020-05-07 PROCEDURE — 80053 COMPREHEN METABOLIC PANEL: CPT | Performed by: PHYSICIAN ASSISTANT

## 2020-05-07 PROCEDURE — 96372 THER/PROPH/DIAG INJ SC/IM: CPT

## 2020-05-07 PROCEDURE — 25000128 H RX IP 250 OP 636: Performed by: PHYSICIAN ASSISTANT

## 2020-05-07 PROCEDURE — 86850 RBC ANTIBODY SCREEN: CPT | Performed by: PHYSICIAN ASSISTANT

## 2020-05-07 PROCEDURE — 86923 COMPATIBILITY TEST ELECTRIC: CPT | Performed by: PHYSICIAN ASSISTANT

## 2020-05-07 PROCEDURE — 85025 COMPLETE CBC W/AUTO DIFF WBC: CPT | Performed by: PHYSICIAN ASSISTANT

## 2020-05-07 RX ORDER — HEPARIN SODIUM,PORCINE 10 UNIT/ML
5 VIAL (ML) INTRAVENOUS
Status: DISCONTINUED | OUTPATIENT
Start: 2020-05-07 | End: 2020-05-15 | Stop reason: HOSPADM

## 2020-05-07 RX ADMIN — Medication 5 ML: at 10:20

## 2020-05-07 RX ADMIN — FILGRASTIM 480 MCG: 480 INJECTION, SOLUTION INTRAVENOUS; SUBCUTANEOUS at 09:54

## 2020-05-07 RX ADMIN — Medication 5 ML: at 10:19

## 2020-05-07 ASSESSMENT — PAIN SCALES - GENERAL: PAINLEVEL: NO PAIN (0)

## 2020-05-07 NOTE — NURSING NOTE
Chief Complaint   Patient presents with     Labs Only     Labs drawn by RN in Lab from Right Arm PICC. Line flushed with Heparin.      Cathleen Orona RN

## 2020-05-07 NOTE — NURSING NOTE
Called Jonnathan to go over questions he had post discharge. He saw Cassia today so got most tf them answered.     He will double up on his potassium dose. Unsure if he was taking since discharge, dose was 40 daily. K 5/4/20 was 3.2. He will start taking and incease to 40 Meq twice a day. Repeat labs later this week.     He is planning admit this  Friday, no need for Neupogen that day.     [facility] close but he feels more secure at [facility].

## 2020-05-07 NOTE — H&P
[facility], [place]    History & Physical  Hematology / Oncology     Date of Admission:  (Not on file)  Date of Service (when I saw the patient):  05/08/2020    Assessment & Plan   Kobe Pedroza is a 60 year old male with a history of thyroid cancer status post thyroidectomy (2011), CAD, hyperlipidemia, distal RLE DVT on enoxaparin, and newly diagnosed Burkitt's Lymphoma status post Cycle 1 of R-CODOX-M / R-IVAC chemotherapy regimen with subsequent PET-CT demonstrating complete remission who is now admitted for scheduled Cycle 2 R-CODOX-M (high-dose methotrexate portion).      HEME  # Burkitt Lymphoma  Followed by Dr. Wright. Presented on 3/12/20 to [facility] with acute-on-chronic mid-thoracic back pain with some associated radicular symptoms. Per patient, no B-symptoms, though he did note an intentional 35-lb weight loss. 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 Patient had no appreciable neurological deficits. He was started on dexamethasone and underwent T5-6 laminectomy with gross total resection of epidural tumor on 3/15/2020. Flow cytometry of the specimen was notable for CD10 positive and kappa monotypic B cells (83%). Confirmed Burkitt lymphoma with surgical pathology. PET scan showed extensive visceral and bony disease. Bone marrow biopsy on 3/18 showed suspicious involvement with rare polytypic B cells (0.8%). No disease in CSF. Started R-CODOX-M (Cycle 1, Day 1=3/18/2020), with was well-tolerated. Received Cycle 1 R-IVAC (C1D1=4/8/2020), which was complicated by development of neutropenic fever requiring hospitalization. PET-CT on 4/27/2020 demonstrated a complete remission. Patient is admitted now for the high-dose methotrexate portion of Cycle 2 of R-CODOX-M (Day 10-11 per Maryana et al.).  - PICC still in place from recent admission (4/30); will plan to remove on discharge.                              Treatment Plan: R-CODOX-M. Cycle 2, Day 1 = 4/30/2020. Today is Day 9.                          - Vincristine 2 mg IV x1 dose -- Day 9    - Methotrexate 3 g/m2 -- Day 9    - Leucovorin 200 mg/m2 x1 dose 24H after MTX start -- Day 10    - Leucovorin 15 mg/m2 Q6H -- Day 10;  beginning 6H after first leucovorin dose until MTX level <0.1    - Dexamethasone 12 mg PO x1 dose -- Day 1    - Dexamethasone 8 mg PO daily x2 doses -- Day 2-3    - Neupogen -- Day 11-12                            # Normocytic anemia  # Leukopenia  # Neutropenia  Presumed related to underlying malignancy plus recent chemotherapy. No recent bleeding concerns. Hgb stable on admission. Patient received G-CSF daily on 5/3-5/7.  on day of admission. Dr. Wright okayed starting chemo despite neutropenia.  - Trend daily CBC  - Transfuse to keep Hgb >7     ID  # Prophylaxis  - Continue  mg BID, fluconazole 200 mg daily, levofloxacin 250 mg daily  - G6PD resulted since last admission; no evidence of deficiency. Will start dapsone 100 mg daily for PJP ppx. Currently unable to give pentamidine due to COVID-19 restrictions.     CV  # Coronary artery disease  # Hyperlipidemia  Followed by Dr. Zeng at [facility] Diagnosed with mild, non-obstructive CAD in 2018. 81 mg ASA and low-dose statin therapy recommended. No previous cardiac caths or stents.  - Hold PTA statin and ASA given concomitant chemo adminstration     ENDO  # History of thyroid cancer status-post thyroidectomy  Status post thyroidectomy in 2011. TSH normal on 3/12/20.  - Continue PTA levothyroxine      GI  # History of hemorrhoids  # History of lower GI bleeding  Reported BRBPR during recent admission (4/19-4/22) in the context of previously diagnosed hemorrhoids. No recent recurrence.  - Continue scheduled senna  - Monitor     MSK  # Right distal peroneal DVT  Diagnosed on US on 4/30 with an occlusive deep vein thrombus in the right  peroneal vein at the mid calf.  - Continue enoxaparin 100 mg BID. Hold for platelets <50,000.     FEN:  -IVF per chemotherapy protocol  -Lyte replacement per protocol  -Regular diet as tolerated     Prophy/Misc:  -Bowel: Continue PTA senna  -DVT: Treatment-dose enoxaparin as above; hold enoxaparin for platelets <50K    Disposition: Admit to heme malignancy service for scheduled chemotherapy. Discharge pending completion of chemotherapy regimen/clearance of methotrexate.    Discussed with Dr. Juan David Jaimes.    Lupis [facility] PA-C  Hematology/Oncology  Pager: [phone]    Code Status : Full Code    Staff Addendum:  The patient was discussed on morning rounds with the nurses, mid level provider, and house staff and seen and examined by me. All labs and imaging were reviewed. I reviewed events over the last 24 hours including vitals and flow sheets. I agree with the above note and have been responsible for the care plan and interpretation of progress.     Subjective:  Reports feeling overall well, no abd pain, no F/C/NS, no N/V/C/D, no GI or  complaints, no URI or other respiratory symptoms, no HA/LH/Dizziness. No sick contacts.     Vitals reviewed, labs reviewed  PE:  NAD, Alert, oriented x3  HEENT: moist mmm, no oral ulcers  Heart: RRR  Lungs: CTAB  Abdomen: +BS, soft non tender, non distended  LE: no edema  Skin: no rashes     Assessment and Pan:  60 year old male with Burkitt lymphoma, admitted on 4/30/2020 for completion of cycle 2 CODOX-M. Recent DVT, continue anticoagulation until platelets less than 50,000.  Supportive care and infection prophylaxis per routine.  Monitor for any adverse events or side effects of chemotherapy.  Methotrexate protocol per pharmacy.  PT.  Plan was discussed with the patient and he is no questions or concerns.    Chris Jaimes MD          Primary Care Physician   Garett Arriaga    History of Present Illness   History obtained from chart and discussed with the patient.    Kobe  Kasia is a 60 year old male who is admitted for Cycle 2 of R-CODOX-M. He is overall doing well. He reports he has been eating well at home and enjoyed his time out of the hospital. He admits he hasn't been as active as he should, but has been getting out of the house to take walks on nice days. No fevers/chills/sweats. No sore throat, sinus congestion, cough, chest pain, or trouble breathing. No pleuritic pain. RLE pain/cramping has improved significantly. No other new complaints or concerns.    Past Medical History    Past Medical History:   Diagnosis Date     Burkitt's lymphoma (H)      Hypothyroidism      Thyroid cancer (H)        Past Surgical History   Past Surgical History:   Procedure Laterality Date     IR PICC VASCULAR  4/8/2020     LAMINECTOMY, EXCISE TUMOR THORACIC, COMBINED N/A 3/15/2020    Procedure: LAMINECTOMY, SPINE, THORACIC, 2 levels, T-5, T-6;  Surgeon: Heather Cespedes MD;  Location: [facility] OR     PICC DOUBLE LUMEN PLACEMENT Right 04/30/2020    5FR DL PICC inserted at right basilic vein , length- 39cm (1cm out), tip at low SVC.     THYROIDECTOMY          Prior to Admission Medications   Prior to Admission Medications   Prescriptions Last Dose Informant Patient Reported? Taking?   acetaminophen (TYLENOL) 325 MG tablet Past Week at Unknown time Self Yes Yes   Sig: Take 2 tablets (650 mg) by mouth every 4 hours as needed for mild pain   acyclovir (ZOVIRAX) 200 MG capsule 5/8/2020 at Unknown time Self No Yes   Sig: Take 2 capsules (400 mg) by mouth 2 times daily   enoxaparin ANTICOAGULANT (LOVENOX) 100 MG/ML syringe 5/8/2020 at Unknown time  No Yes   Sig: Inject 1 mL (100 mg) Subcutaneous every 12 hours   fluconazole (DIFLUCAN) 200 MG tablet Unknown at Unknown time Self Yes No   Sig: Take 200 mg by mouth daily ONLY TAKE IF ANC <1000   levofloxacin (LEVAQUIN) 250 MG tablet Unknown at Unknown time Self Yes No   Sig: Take 250 mg by mouth daily ONLY TAKE IF ANC <1000   levothyroxine  (SYNTHROID/LEVOTHROID) 200 MCG tablet 5/8/2020 at Unknown time Self Yes Yes   Sig: Take 200 mcg by mouth daily   magnesium citrate solution  Self No No   Sig: Take 296 mLs by mouth once as needed for constipation or other (For constipation) Take as needed for constipation   ondansetron (ZOFRAN) 4 MG tablet More than a month at Unknown time Self No No   Sig: Take 1-2 tablets (4-8 mg) by mouth every 6 hours as needed for nausea   oxyCODONE (ROXICODONE) 5 MG tablet More than a month at Unknown time Self No No   Sig: Take 1-2 tablets (5-10 mg) by mouth every 6 hours as needed for severe pain   Patient not taking: Reported on 5/2/2020   potassium chloride (KLOR-CON) 20 MEQ packet 5/7/2020 at Unknown time Self No Yes   Sig: Take 40 mEq by mouth daily   senna-docusate (SENOKOT-S/PERICOLACE) 8.6-50 MG tablet 5/7/2020 at Unknown time Self No Yes   Sig: Take 1 tablet by mouth 2 times daily      Facility-Administered Medications: None     Allergies   No Known Allergies    Social History   Social History     Socioeconomic History     Marital status:      Spouse name: Not on file     Number of children: Not on file     Years of education: Not on file     Highest education level: Not on file   Occupational History     Not on file   Social Needs     Financial resource strain: Not on file     Food insecurity     Worry: Not on file     Inability: Not on file     Transportation needs     Medical: Not on file     Non-medical: Not on file   Tobacco Use     Smoking status: Never Smoker     Smokeless tobacco: Never Used   Substance and Sexual Activity     Alcohol use: Not Currently     Drug use: Never     Sexual activity: Not on file   Lifestyle     Physical activity     Days per week: Not on file     Minutes per session: Not on file     Stress: Not on file   Relationships     Social connections     Talks on phone: Not on file     Gets together: Not on file     Attends Orthodox service: Not on file     Active member of club or  organization: Not on file     Attends meetings of clubs or organizations: Not on file     Relationship status: Not on file     Intimate partner violence     Fear of current or ex partner: Not on file     Emotionally abused: Not on file     Physically abused: Not on file     Forced sexual activity: Not on file   Other Topics Concern     Parent/sibling w/ CABG, MI or angioplasty before 65F 55M? Not Asked   Social History Narrative     Not on file       Family History   No family history on file.    Review of Systems   A 10 point ROS is negative unless otherwise noted above in the HPI.    Physical Exam   Vital Signs with Ranges  Temp:  [98.5  F (36.9  C)] 98.5  F (36.9  C)  Pulse:  [89] 89  Resp:  [18] 18  BP: (130)/(76) 130/76  SpO2:  [98 %] 98 %  212 lbs 8 oz     Constitutional: Pleasant and cooperative male. Awake, alert, NAD. Seated on the edge of the bed.  HEENT: NC/AT, EOMI, sclera clear, conjunctiva normal, OP with MMM, no mucositis.  Respiratory: No increased work of breathing, CTAB, no crackles or wheezing.  Cardiovascular: RRR, no murmur noted. No peripheral edema.  MSK: Mild edema of the right calf, no calf tenderness or palpable cords. Compartments soft.  GI: Normal bowel sounds, soft, non-distended and non-tender.  Skin: Warm, dry, well-perfused. Healing surgical scar to the upper thoracic spine. No other bruising, bleeding, rashes, or lesions on limited exam.  Neurologic: A&O. Answers questions appropriately. Moves all extremities spontaneously.  Neuropsychiatric: Calm, appropriate affect  Vascular access:  PICC on RUE CDI, non-tender, no surrounding erythema, no hematoma.    Recent Labs  CBC   Recent Labs   Lab 05/08/20  1119 05/07/20  1027 05/04/20  0921 05/02/20  1023   WBC 0.6* 3.1* 42.9* 13.7*   RBC 2.59* 2.63* 2.78* 2.93*   HGB 7.7* 8.2* 8.5* 9.0*   HCT 24.2* 24.6* 25.9* 27.1*   MCV 93 94 93 93   MCH 29.7 31.2 30.6 30.7   MCHC 31.8 33.3 32.8 33.2   RDW 17.2* 17.5* 18.2* 18.7*    246 478* 592*        CMP   Recent Labs   Lab 05/08/20  1119 05/07/20  1027 05/04/20  0921    140 139   POTASSIUM 3.5 3.6 3.2*   CHLORIDE 107 108 106   CO2 28 27 28   ANIONGAP 4 6 4   * 187* 138*   BUN 16 19 23   CR 0.54* 0.52* 0.59*   GFRESTIMATED >90 >90 >90   GFRESTBLACK >90 >90 >90   RUTH 8.2* 8.2* 7.5*   MAG 1.9  --   --    PHOS 2.5  --   --    PROTTOTAL 6.6* 6.7* 6.8   ALBUMIN 3.4 3.4 3.4   BILITOTAL 0.3 0.2 0.3   ALKPHOS 73 75 70   AST 12 9 12   ALT 25 26 25       LFTs:   Recent Labs   Lab 05/08/20  1119   PROTTOTAL 6.6*   ALBUMIN 3.4   BILITOTAL 0.3   ALKPHOS 73   AST 12   ALT 25       Coagulation Studies:   Recent Labs   Lab 05/08/20  1119   INR 0.99   PTT 35

## 2020-05-07 NOTE — PROGRESS NOTES
[facility] filgr[facility] (NEUPOGEN) injection 480 mcg. Order written by Dr. Wright was completed today. Name and  were verified by patient. See MAR for medication details. Patient was asked if they have any new symptoms or questions/concerns. Medication was given in the following site: left lower abdomen. Patient tolerated injection well and was discharged to home.    -Yue CARTAGENA, CMA

## 2020-05-08 ENCOUNTER — HOSPITAL ENCOUNTER (INPATIENT)
Facility: CLINIC | Age: 60
LOS: 3 days | Discharge: HOME | DRG: 846 | End: 2020-05-11
Attending: INTERNAL MEDICINE | Admitting: INTERNAL MEDICINE
Payer: COMMERCIAL

## 2020-05-08 DIAGNOSIS — T45.1X5A CHEMOTHERAPY-INDUCED NEUTROPENIA (H): Primary | ICD-10-CM

## 2020-05-08 DIAGNOSIS — C83.78 BURKITT LYMPHOMA OF LYMPH NODES OF MULTIPLE REGIONS (H): ICD-10-CM

## 2020-05-08 DIAGNOSIS — D70.1 CHEMOTHERAPY-INDUCED NEUTROPENIA (H): Primary | ICD-10-CM

## 2020-05-08 LAB
ALBUMIN SERPL-MCNC: 3.4 G/DL (ref 3.4–5)
ALP SERPL-CCNC: 73 U/L (ref 40–150)
ALT SERPL W P-5'-P-CCNC: 25 U/L (ref 0–70)
ANION GAP SERPL CALCULATED.3IONS-SCNC: 4 MMOL/L (ref 3–14)
ANISOCYTOSIS BLD QL SMEAR: SLIGHT
APTT PPP: 35 SEC (ref 22–37)
AST SERPL W P-5'-P-CCNC: 12 U/L (ref 0–45)
BASOPHILS # BLD AUTO: 0 10E9/L (ref 0–0.2)
BASOPHILS NFR BLD AUTO: 4 %
BILIRUB SERPL-MCNC: 0.3 MG/DL (ref 0.2–1.3)
BUN SERPL-MCNC: 16 MG/DL (ref 7–30)
CALCIUM SERPL-MCNC: 8.2 MG/DL (ref 8.5–10.1)
CHLORIDE SERPL-SCNC: 107 MMOL/L (ref 94–109)
CO2 SERPL-SCNC: 28 MMOL/L (ref 20–32)
CREAT SERPL-MCNC: 0.54 MG/DL (ref 0.66–1.25)
DIFFERENTIAL METHOD BLD: ABNORMAL
EOSINOPHIL # BLD AUTO: 0 10E9/L (ref 0–0.7)
EOSINOPHIL NFR BLD AUTO: 2 %
ERYTHROCYTE [DISTWIDTH] IN BLOOD BY AUTOMATED COUNT: 17.2 % (ref 10–15)
FIBRINOGEN PPP-MCNC: 440 MG/DL (ref 200–420)
GFR SERPL CREATININE-BSD FRML MDRD: >90 ML/MIN/{1.73_M2}
GLUCOSE SERPL-MCNC: 149 MG/DL (ref 70–99)
HCT VFR BLD AUTO: 24.2 % (ref 40–53)
HGB BLD-MCNC: 7.7 G/DL (ref 13.3–17.7)
INR PPP: 0.99 (ref 0.86–1.14)
LDH SERPL L TO P-CCNC: 175 U/L (ref 85–227)
LYMPHOCYTES # BLD AUTO: 0.1 10E9/L (ref 0.8–5.3)
LYMPHOCYTES NFR BLD AUTO: 20 %
MAGNESIUM SERPL-MCNC: 1.9 MG/DL (ref 1.6–2.3)
MCH RBC QN AUTO: 29.7 PG (ref 26.5–33)
MCHC RBC AUTO-ENTMCNC: 31.8 G/DL (ref 31.5–36.5)
MCV RBC AUTO: 93 FL (ref 78–100)
MONOCYTES # BLD AUTO: 0.1 10E9/L (ref 0–1.3)
MONOCYTES NFR BLD AUTO: 15 %
NEUTROPHILS # BLD AUTO: 0.4 10E9/L (ref 1.6–8.3)
NEUTROPHILS NFR BLD AUTO: 59 %
PH UR STRIP: 8 PH (ref 5–7)
PHOSPHATE SERPL-MCNC: 2.5 MG/DL (ref 2.5–4.5)
PLATELET # BLD AUTO: 205 10E9/L (ref 150–450)
POTASSIUM SERPL-SCNC: 3.5 MMOL/L (ref 3.4–5.3)
PROT SERPL-MCNC: 6.6 G/DL (ref 6.8–8.8)
RBC # BLD AUTO: 2.59 10E12/L (ref 4.4–5.9)
SODIUM SERPL-SCNC: 138 MMOL/L (ref 133–144)
URATE SERPL-MCNC: 2.9 MG/DL (ref 3.5–7.2)
WBC # BLD AUTO: 0.6 10E9/L (ref 4–11)

## 2020-05-08 PROCEDURE — 25000128 H RX IP 250 OP 636: Performed by: PHYSICIAN ASSISTANT

## 2020-05-08 PROCEDURE — 84100 ASSAY OF PHOSPHORUS: CPT | Performed by: PHYSICIAN ASSISTANT

## 2020-05-08 PROCEDURE — 83615 LACTATE (LD) (LDH) ENZYME: CPT | Performed by: PHYSICIAN ASSISTANT

## 2020-05-08 PROCEDURE — 85025 COMPLETE CBC W/AUTO DIFF WBC: CPT | Performed by: PHYSICIAN ASSISTANT

## 2020-05-08 PROCEDURE — 85384 FIBRINOGEN ACTIVITY: CPT | Performed by: PHYSICIAN ASSISTANT

## 2020-05-08 PROCEDURE — 86900 BLOOD TYPING SEROLOGIC ABO: CPT | Performed by: PHYSICIAN ASSISTANT

## 2020-05-08 PROCEDURE — 80053 COMPREHEN METABOLIC PANEL: CPT | Performed by: PHYSICIAN ASSISTANT

## 2020-05-08 PROCEDURE — 25000131 ZZH RX MED GY IP 250 OP 636 PS 637: Performed by: INTERNAL MEDICINE

## 2020-05-08 PROCEDURE — 20600000 ZZH R&B BMT

## 2020-05-08 PROCEDURE — 84550 ASSAY OF BLOOD/URIC ACID: CPT | Performed by: PHYSICIAN ASSISTANT

## 2020-05-08 PROCEDURE — 25000132 ZZH RX MED GY IP 250 OP 250 PS 637: Performed by: PHYSICIAN ASSISTANT

## 2020-05-08 PROCEDURE — 25800030 ZZH RX IP 258 OP 636: Performed by: INTERNAL MEDICINE

## 2020-05-08 PROCEDURE — 85610 PROTHROMBIN TIME: CPT | Performed by: PHYSICIAN ASSISTANT

## 2020-05-08 PROCEDURE — 3E04305 INTRODUCTION OF OTHER ANTINEOPLASTIC INTO CENTRAL VEIN, PERCUTANEOUS APPROACH: ICD-10-PCS | Performed by: INTERNAL MEDICINE

## 2020-05-08 PROCEDURE — 25000125 ZZHC RX 250: Performed by: INTERNAL MEDICINE

## 2020-05-08 PROCEDURE — 81003 URINALYSIS AUTO W/O SCOPE: CPT | Performed by: INTERNAL MEDICINE

## 2020-05-08 PROCEDURE — 83735 ASSAY OF MAGNESIUM: CPT | Performed by: PHYSICIAN ASSISTANT

## 2020-05-08 PROCEDURE — 25000128 H RX IP 250 OP 636: Performed by: INTERNAL MEDICINE

## 2020-05-08 PROCEDURE — 85730 THROMBOPLASTIN TIME PARTIAL: CPT | Performed by: PHYSICIAN ASSISTANT

## 2020-05-08 RX ORDER — DIPHENHYDRAMINE HYDROCHLORIDE 50 MG/ML
50 INJECTION INTRAMUSCULAR; INTRAVENOUS
Status: ACTIVE | OUTPATIENT
Start: 2020-05-08 | End: 2020-05-09

## 2020-05-08 RX ORDER — MEPERIDINE HYDROCHLORIDE 25 MG/ML
25 INJECTION INTRAMUSCULAR; INTRAVENOUS; SUBCUTANEOUS EVERY 30 MIN PRN
Status: ACTIVE | OUTPATIENT
Start: 2020-05-08 | End: 2020-05-09

## 2020-05-08 RX ORDER — ONDANSETRON 8 MG/1
8 TABLET, FILM COATED ORAL EVERY 8 HOURS PRN
Status: DISCONTINUED | OUTPATIENT
Start: 2020-05-08 | End: 2020-05-11 | Stop reason: HOSPADM

## 2020-05-08 RX ORDER — ONDANSETRON 8 MG/1
16 TABLET, FILM COATED ORAL ONCE
Status: COMPLETED | OUTPATIENT
Start: 2020-05-08 | End: 2020-05-08

## 2020-05-08 RX ORDER — AMOXICILLIN 250 MG
1 CAPSULE ORAL 2 TIMES DAILY
Status: DISCONTINUED | OUTPATIENT
Start: 2020-05-08 | End: 2020-05-11 | Stop reason: HOSPADM

## 2020-05-08 RX ORDER — DAPSONE 100 MG/1
100 TABLET ORAL DAILY
Status: DISCONTINUED | OUTPATIENT
Start: 2020-05-08 | End: 2020-05-11 | Stop reason: HOSPADM

## 2020-05-08 RX ORDER — HEPARIN SODIUM,PORCINE 10 UNIT/ML
2-5 VIAL (ML) INTRAVENOUS
Status: DISCONTINUED | OUTPATIENT
Start: 2020-05-08 | End: 2020-05-11 | Stop reason: HOSPADM

## 2020-05-08 RX ORDER — ALBUTEROL SULFATE 90 UG/1
1-2 AEROSOL, METERED RESPIRATORY (INHALATION)
Status: DISCONTINUED | OUTPATIENT
Start: 2020-05-08 | End: 2020-05-11 | Stop reason: HOSPADM

## 2020-05-08 RX ORDER — DEXAMETHASONE 4 MG/1
8 TABLET ORAL DAILY
Status: COMPLETED | OUTPATIENT
Start: 2020-05-09 | End: 2020-05-10

## 2020-05-08 RX ORDER — EPINEPHRINE 1 MG/ML
0.3 INJECTION, SOLUTION, CONCENTRATE INTRAVENOUS EVERY 5 MIN PRN
Status: DISCONTINUED | OUTPATIENT
Start: 2020-05-08 | End: 2020-05-11 | Stop reason: HOSPADM

## 2020-05-08 RX ORDER — PROCHLORPERAZINE MALEATE 5 MG
10 TABLET ORAL EVERY 6 HOURS PRN
Status: DISCONTINUED | OUTPATIENT
Start: 2020-05-08 | End: 2020-05-08

## 2020-05-08 RX ORDER — LEVOFLOXACIN 250 MG/1
250 TABLET, FILM COATED ORAL DAILY
Status: DISCONTINUED | OUTPATIENT
Start: 2020-05-08 | End: 2020-05-11 | Stop reason: HOSPADM

## 2020-05-08 RX ORDER — POTASSIUM CHLORIDE 7.45 MG/ML
10 INJECTION INTRAVENOUS
Status: DISCONTINUED | OUTPATIENT
Start: 2020-05-08 | End: 2020-05-11 | Stop reason: HOSPADM

## 2020-05-08 RX ORDER — POTASSIUM CHLORIDE 1.5 G/1.58G
20-40 POWDER, FOR SOLUTION ORAL
Status: DISCONTINUED | OUTPATIENT
Start: 2020-05-08 | End: 2020-05-11 | Stop reason: HOSPADM

## 2020-05-08 RX ORDER — METHYLPREDNISOLONE SODIUM SUCCINATE 125 MG/2ML
125 INJECTION, POWDER, LYOPHILIZED, FOR SOLUTION INTRAMUSCULAR; INTRAVENOUS
Status: ACTIVE | OUTPATIENT
Start: 2020-05-08 | End: 2020-05-10

## 2020-05-08 RX ORDER — POTASSIUM CHLORIDE 750 MG/1
20-40 TABLET, EXTENDED RELEASE ORAL
Status: DISCONTINUED | OUTPATIENT
Start: 2020-05-08 | End: 2020-05-11 | Stop reason: HOSPADM

## 2020-05-08 RX ORDER — DEXAMETHASONE 4 MG/1
12 TABLET ORAL ONCE
Status: COMPLETED | OUTPATIENT
Start: 2020-05-08 | End: 2020-05-08

## 2020-05-08 RX ORDER — ACYCLOVIR 200 MG/1
400 CAPSULE ORAL 2 TIMES DAILY
Status: DISCONTINUED | OUTPATIENT
Start: 2020-05-08 | End: 2020-05-11 | Stop reason: HOSPADM

## 2020-05-08 RX ORDER — ONDANSETRON 8 MG/1
8 TABLET, ORALLY DISINTEGRATING ORAL EVERY 8 HOURS PRN
Status: DISCONTINUED | OUTPATIENT
Start: 2020-05-08 | End: 2020-05-11 | Stop reason: HOSPADM

## 2020-05-08 RX ORDER — NALOXONE HYDROCHLORIDE 0.4 MG/ML
.1-.4 INJECTION, SOLUTION INTRAMUSCULAR; INTRAVENOUS; SUBCUTANEOUS
Status: DISCONTINUED | OUTPATIENT
Start: 2020-05-08 | End: 2020-05-11 | Stop reason: HOSPADM

## 2020-05-08 RX ORDER — ALBUTEROL SULFATE 0.83 MG/ML
2.5 SOLUTION RESPIRATORY (INHALATION)
Status: DISCONTINUED | OUTPATIENT
Start: 2020-05-08 | End: 2020-05-11 | Stop reason: HOSPADM

## 2020-05-08 RX ORDER — LEUCOVORIN CALCIUM 350 MG/17.5ML
15 INJECTION, POWDER, LYOPHILIZED, FOR SOLUTION INTRAMUSCULAR; INTRAVENOUS EVERY 6 HOURS
Status: DISCONTINUED | OUTPATIENT
Start: 2020-05-10 | End: 2020-05-11 | Stop reason: HOSPADM

## 2020-05-08 RX ORDER — LORAZEPAM 2 MG/ML
.5-1 INJECTION INTRAMUSCULAR EVERY 6 HOURS PRN
Status: DISCONTINUED | OUTPATIENT
Start: 2020-05-08 | End: 2020-05-11 | Stop reason: HOSPADM

## 2020-05-08 RX ORDER — SODIUM CHLORIDE 9 MG/ML
1000 INJECTION, SOLUTION INTRAVENOUS CONTINUOUS PRN
Status: DISCONTINUED | OUTPATIENT
Start: 2020-05-08 | End: 2020-05-11 | Stop reason: HOSPADM

## 2020-05-08 RX ORDER — POTASSIUM CHLORIDE 29.8 MG/ML
20 INJECTION INTRAVENOUS
Status: DISCONTINUED | OUTPATIENT
Start: 2020-05-08 | End: 2020-05-11 | Stop reason: HOSPADM

## 2020-05-08 RX ORDER — MAGNESIUM SULFATE HEPTAHYDRATE 40 MG/ML
4 INJECTION, SOLUTION INTRAVENOUS EVERY 4 HOURS PRN
Status: DISCONTINUED | OUTPATIENT
Start: 2020-05-08 | End: 2020-05-11 | Stop reason: HOSPADM

## 2020-05-08 RX ORDER — SODIUM BICARBONATE 84 MG/ML
50 INJECTION, SOLUTION INTRAVENOUS
Status: DISCONTINUED | OUTPATIENT
Start: 2020-05-08 | End: 2020-05-11 | Stop reason: HOSPADM

## 2020-05-08 RX ORDER — ACETAMINOPHEN 325 MG/1
650 TABLET ORAL EVERY 4 HOURS PRN
Status: DISCONTINUED | OUTPATIENT
Start: 2020-05-08 | End: 2020-05-11 | Stop reason: HOSPADM

## 2020-05-08 RX ORDER — PROCHLORPERAZINE MALEATE 5 MG
10 TABLET ORAL EVERY 6 HOURS PRN
Status: DISCONTINUED | OUTPATIENT
Start: 2020-05-08 | End: 2020-05-11 | Stop reason: HOSPADM

## 2020-05-08 RX ORDER — LEUCOVORIN CALCIUM 350 MG/17.5ML
200 INJECTION, POWDER, LYOPHILIZED, FOR SOLUTION INTRAMUSCULAR; INTRAVENOUS ONCE
Status: COMPLETED | OUTPATIENT
Start: 2020-05-09 | End: 2020-05-09

## 2020-05-08 RX ORDER — ONDANSETRON 2 MG/ML
8 INJECTION INTRAMUSCULAR; INTRAVENOUS EVERY 8 HOURS PRN
Status: DISCONTINUED | OUTPATIENT
Start: 2020-05-08 | End: 2020-05-11 | Stop reason: HOSPADM

## 2020-05-08 RX ORDER — LORAZEPAM 0.5 MG/1
.5-1 TABLET ORAL EVERY 6 HOURS PRN
Status: DISCONTINUED | OUTPATIENT
Start: 2020-05-08 | End: 2020-05-11 | Stop reason: HOSPADM

## 2020-05-08 RX ORDER — FLUCONAZOLE 200 MG/1
200 TABLET ORAL DAILY
Status: DISCONTINUED | OUTPATIENT
Start: 2020-05-08 | End: 2020-05-11 | Stop reason: HOSPADM

## 2020-05-08 RX ORDER — POTASSIUM CL/LIDO/0.9 % NACL 10MEQ/0.1L
10 INTRAVENOUS SOLUTION, PIGGYBACK (ML) INTRAVENOUS
Status: DISCONTINUED | OUTPATIENT
Start: 2020-05-08 | End: 2020-05-11 | Stop reason: HOSPADM

## 2020-05-08 RX ORDER — LIDOCAINE 40 MG/G
CREAM TOPICAL
Status: DISCONTINUED | OUTPATIENT
Start: 2020-05-08 | End: 2020-05-11 | Stop reason: HOSPADM

## 2020-05-08 RX ADMIN — SODIUM BICARBONATE: 84 INJECTION, SOLUTION INTRAVENOUS at 19:44

## 2020-05-08 RX ADMIN — FLUCONAZOLE 200 MG: 200 TABLET ORAL at 17:44

## 2020-05-08 RX ADMIN — SODIUM BICARBONATE: 84 INJECTION, SOLUTION INTRAVENOUS at 15:41

## 2020-05-08 RX ADMIN — ONDANSETRON HYDROCHLORIDE 16 MG: 8 TABLET, FILM COATED ORAL at 17:43

## 2020-05-08 RX ADMIN — SENNOSIDES AND DOCUSATE SODIUM 1 TABLET: 8.6; 5 TABLET ORAL at 21:34

## 2020-05-08 RX ADMIN — ACYCLOVIR 400 MG: 200 CAPSULE ORAL at 21:34

## 2020-05-08 RX ADMIN — ENOXAPARIN SODIUM 100 MG: 100 INJECTION SUBCUTANEOUS at 21:34

## 2020-05-08 RX ADMIN — DAPSONE 100 MG: 100 TABLET ORAL at 13:59

## 2020-05-08 RX ADMIN — LEVOFLOXACIN 250 MG: 250 TABLET, FILM COATED ORAL at 17:43

## 2020-05-08 RX ADMIN — VINCRISTINE SULFATE 2 MG: 1 INJECTION, SOLUTION INTRAVENOUS at 18:33

## 2020-05-08 RX ADMIN — DEXAMETHASONE 12 MG: 4 TABLET ORAL at 17:43

## 2020-05-08 RX ADMIN — METHOTREXATE 6.39 G: 25 INJECTION, SOLUTION INTRA-ARTERIAL; INTRAMUSCULAR; INTRATHECAL; INTRAVENOUS at 20:12

## 2020-05-08 RX ADMIN — ACETAMINOPHEN 650 MG: 325 TABLET, FILM COATED ORAL at 17:45

## 2020-05-08 ASSESSMENT — ACTIVITIES OF DAILY LIVING (ADL)
ADLS_ACUITY_SCORE: 13

## 2020-05-08 ASSESSMENT — MIFFLIN-ST. JEOR: SCORE: 1766.39

## 2020-05-08 ASSESSMENT — PAIN DESCRIPTION - DESCRIPTORS: DESCRIPTORS: HEADACHE

## 2020-05-08 NOTE — PLAN OF CARE
"BP (!) 140/74 (BP Location: Left arm)   Pulse 91   Temp 98.6  F (37  C) (Oral)   Resp 18   Ht 1.74 m (5' 8.5\")   Wt 96.4 kg (212 lb 8 oz)   SpO2 97%   BMI 31.84 kg/m      Patient admitted to: 5422  Admitted from: Home  Arrived by: Personal vehicle  Reason for admission: Chemotherapy   Patient accompanied by: Patient transport  Belongings: Kept with patient  Teaching: Oriented to patient room and monitoring.  Skin double check completed by: CHACORTA Villegas  Skin findings: Healing skin biopsy site to center of his upper back, otherwise CDI.     0700 - 1930: Afebrile, VSS. No c/o nausea, vomiting or diarrhea. Tylenol X1 for headache. Sodium bicarb infusing at 250ml/hr. Chemo to start at 1830 tonight. Up independently in room. Continue to monitor, following plan of care.     Problem: Adult Inpatient Plan of Care  Goal: Plan of Care Review  Outcome: No Change     "

## 2020-05-08 NOTE — LETTER
Transition Communication Hand-off for Care Transitions to Next Level of Care Provider    Name: Kobe Pedroza  : 1960  MRN #: [id number]  Primary Care Provider: Garett Arriaga     Primary Clinic: [facility] [address] [place] [address] DR  [place] [address]     Reason for Hospitalization:  Lymphoma   Burkitt lymphoma (H)  Admit Date/Time: 2020 10:31 AM  Discharge Date: 20  Payor Source: Payor: PREFERREDONE / Plan: PREFERREDONE HMO / Product Type: PPO /     Readmission Assessment Measure (MARLIN) Risk Score/category: Very High        Kobe Pedroza is a 60 year old male with a history of thyroid cancer status post thyroidectomy (), CAD, hyperlipidemia, distal RLE DVT on enoxaparin, and newly diagnosed Burkitt's Lymphoma status post Cycle 1 of R-CODOX-M / R-IVAC chemotherapy regimen with subsequent PET-CT demonstrating complete remission who is now admitted for scheduled Cycle 2 R-CODOX-M (high-dose methotrexate portion).          Reason for Communication Hand-off Referral: Fragility    Discharge Plan: Home with clinic follow-up as recommended.     -Requested labs (CBC w/diff and CMP) on , 5/15, , , and  at  [facility] per patient preference.        Concern for non-adherence with plan of care: N      Follow-up specialty is recommended: Yes    Follow-up plan:    Future Appointments   Date Time Provider Department Center   2020 12:30 PM  [facility]   2020  1:00 PM Nurse,  Oncology Injection [facility]   5/15/2020  3:50 PM Lyubov Sin PA-C [facility]       Dyan Jimenez, RN, BSN, PHN  Care Coordinator   P: [phone], [facility]       AVS/Discharge Summary is the source of truth; this is a helpful guide for improved communication of patient story

## 2020-05-08 NOTE — PHARMACY-ADMISSION MEDICATION HISTORY
Admission medication history for the May 8, 2020 admission is complete.     Interview Sources:  Patient, Discharge note from 5/1/2020    Reliability of Source: Good    Medication Adherence: Good    Current Outpatient [facility]: [facility]    Changes made to PTA medication list (reason)  Added: None  Deleted: Magnesium citrate solutions; take 296 mLs by mouth once as needed for constipation (no longer using per patient)  Changed: None    Additional medication history information:   Patient reports no significant medication changes since his discharge on 5/1/2020.  Patient has not started his taking his oxycodone.    Prior to Admission medications    Medication Sig Last Dose Taking? Auth Provider   acetaminophen (TYLENOL) 325 MG tablet Take 2 tablets (650 mg) by mouth every 4 hours as needed for mild pain Past Week Yes Mckayla [facility] PA-C   acyclovir (ZOVIRAX) 200 MG capsule Take 2 capsules (400 mg) by mouth 2 times daily 5/8/2020 at AM Yes Ever Wright MD   enoxaparin ANTICOAGULANT (LOVENOX) 100 MG/ML syringe Inject 1 mL (100 mg) Subcutaneous every 12 hours 5/8/2020 at AM Yes Lupis [facility] PA-C   fluconazole (DIFLUCAN) 200 MG tablet Take 200 mg by mouth daily ONLY TAKE IF ANC <1000 Unknown Yes Lyubov Sin PA-C   levofloxacin (LEVAQUIN) 250 MG tablet Take 250 mg by mouth daily ONLY TAKE IF ANC <1000 Unknown Yes Lyubov Sin PA-C   levothyroxine (SYNTHROID/LEVOTHROID) 200 MCG tablet Take 200 mcg by mouth daily 5/8/2020 Yes Reported, Patient   potassium chloride (KLOR-CON) 20 MEQ packet Take 40 mEq by mouth daily 5/7/2020 Yes Ever Wright MD   senna-docusate (SENOKOT-S/PERICOLACE) 8.6-50 MG tablet Take 1 tablet by mouth 2 times daily 5/7/2020 Yes Adenike Ríos PA-C   ondansetron (ZOFRAN) 4 MG tablet Take 1-2 tablets (4-8 mg) by mouth every 6 hours as needed for nausea More than a month  Ever Wright MD   oxyCODONE (ROXICODONE) 5 MG tablet  Take 1-2 tablets (5-10 mg) by mouth every 6 hours as needed for severe pain Has not started  Ever Wright MD         Time spent: 15 minutes    Medication history completed by:   Collin Dave, 4th Year Pharmacy Student, May 8, 2020

## 2020-05-09 LAB
ALBUMIN SERPL-MCNC: 3.2 G/DL (ref 3.4–5)
ALP SERPL-CCNC: 65 U/L (ref 40–150)
ALT SERPL W P-5'-P-CCNC: 28 U/L (ref 0–70)
ANION GAP SERPL CALCULATED.3IONS-SCNC: 6 MMOL/L (ref 3–14)
AST SERPL W P-5'-P-CCNC: 14 U/L (ref 0–45)
BILIRUB SERPL-MCNC: 0.6 MG/DL (ref 0.2–1.3)
BUN SERPL-MCNC: 12 MG/DL (ref 7–30)
CALCIUM SERPL-MCNC: 7.8 MG/DL (ref 8.5–10.1)
CHLORIDE SERPL-SCNC: 103 MMOL/L (ref 94–109)
CO2 SERPL-SCNC: 28 MMOL/L (ref 20–32)
CREAT SERPL-MCNC: 0.55 MG/DL (ref 0.66–1.25)
DIFFERENTIAL METHOD BLD: ABNORMAL
ERYTHROCYTE [DISTWIDTH] IN BLOOD BY AUTOMATED COUNT: 16.1 % (ref 10–15)
GFR SERPL CREATININE-BSD FRML MDRD: >90 ML/MIN/{1.73_M2}
GLUCOSE SERPL-MCNC: 273 MG/DL (ref 70–99)
HCT VFR BLD AUTO: 21.6 % (ref 40–53)
HGB BLD-MCNC: 7.3 G/DL (ref 13.3–17.7)
MAGNESIUM SERPL-MCNC: 2 MG/DL (ref 1.6–2.3)
MCH RBC QN AUTO: 30.2 PG (ref 26.5–33)
MCHC RBC AUTO-ENTMCNC: 33.8 G/DL (ref 31.5–36.5)
MCV RBC AUTO: 89 FL (ref 78–100)
MTX SERPL-SCNC: 30.35 UMOL/L
PH UR STRIP: 8.5 PH (ref 5–7)
PHOSPHATE SERPL-MCNC: 1.8 MG/DL (ref 2.5–4.5)
PHOSPHATE SERPL-MCNC: 2.8 MG/DL (ref 2.5–4.5)
PLATELET # BLD AUTO: 165 10E9/L (ref 150–450)
POTASSIUM SERPL-SCNC: 3.6 MMOL/L (ref 3.4–5.3)
PROT SERPL-MCNC: 6.2 G/DL (ref 6.8–8.8)
RBC # BLD AUTO: 2.42 10E12/L (ref 4.4–5.9)
SODIUM SERPL-SCNC: 137 MMOL/L (ref 133–144)
URATE SERPL-MCNC: 2.8 MG/DL (ref 3.5–7.2)
WBC # BLD AUTO: 0.3 10E9/L (ref 4–11)

## 2020-05-09 PROCEDURE — 25000128 H RX IP 250 OP 636: Performed by: INTERNAL MEDICINE

## 2020-05-09 PROCEDURE — 25000131 ZZH RX MED GY IP 250 OP 636 PS 637: Performed by: INTERNAL MEDICINE

## 2020-05-09 PROCEDURE — 80299 QUANTITATIVE ASSAY DRUG: CPT | Performed by: PHYSICIAN ASSISTANT

## 2020-05-09 PROCEDURE — 25000125 ZZHC RX 250: Performed by: INTERNAL MEDICINE

## 2020-05-09 PROCEDURE — 81003 URINALYSIS AUTO W/O SCOPE: CPT | Performed by: INTERNAL MEDICINE

## 2020-05-09 PROCEDURE — 25000132 ZZH RX MED GY IP 250 OP 250 PS 637: Performed by: PHYSICIAN ASSISTANT

## 2020-05-09 PROCEDURE — 85027 COMPLETE CBC AUTOMATED: CPT

## 2020-05-09 PROCEDURE — 25000125 ZZHC RX 250: Performed by: PHYSICIAN ASSISTANT

## 2020-05-09 PROCEDURE — 80053 COMPREHEN METABOLIC PANEL: CPT | Performed by: PHYSICIAN ASSISTANT

## 2020-05-09 PROCEDURE — 20600000 ZZH R&B BMT

## 2020-05-09 PROCEDURE — 84550 ASSAY OF BLOOD/URIC ACID: CPT | Performed by: PHYSICIAN ASSISTANT

## 2020-05-09 PROCEDURE — 25000128 H RX IP 250 OP 636: Performed by: PHYSICIAN ASSISTANT

## 2020-05-09 PROCEDURE — 84100 ASSAY OF PHOSPHORUS: CPT | Performed by: PHYSICIAN ASSISTANT

## 2020-05-09 PROCEDURE — 83735 ASSAY OF MAGNESIUM: CPT | Performed by: PHYSICIAN ASSISTANT

## 2020-05-09 PROCEDURE — 80299 QUANTITATIVE ASSAY DRUG: CPT | Performed by: INTERNAL MEDICINE

## 2020-05-09 PROCEDURE — 25800030 ZZH RX IP 258 OP 636: Performed by: PHYSICIAN ASSISTANT

## 2020-05-09 PROCEDURE — 25800030 ZZH RX IP 258 OP 636: Performed by: INTERNAL MEDICINE

## 2020-05-09 RX ADMIN — SODIUM BICARBONATE: 84 INJECTION, SOLUTION INTRAVENOUS at 21:13

## 2020-05-09 RX ADMIN — LEVOTHYROXINE SODIUM 200 MCG: 0.07 TABLET ORAL at 08:27

## 2020-05-09 RX ADMIN — FLUCONAZOLE 200 MG: 200 TABLET ORAL at 08:27

## 2020-05-09 RX ADMIN — ENOXAPARIN SODIUM 100 MG: 100 INJECTION SUBCUTANEOUS at 20:42

## 2020-05-09 RX ADMIN — ACYCLOVIR 400 MG: 200 CAPSULE ORAL at 20:42

## 2020-05-09 RX ADMIN — SODIUM BICARBONATE: 84 INJECTION, SOLUTION INTRAVENOUS at 16:06

## 2020-05-09 RX ADMIN — ACYCLOVIR 400 MG: 200 CAPSULE ORAL at 08:27

## 2020-05-09 RX ADMIN — LEUCOVORIN CALCIUM 426 MG: 350 INJECTION, POWDER, LYOPHILIZED, FOR SOLUTION INTRAMUSCULAR; INTRAVENOUS at 20:31

## 2020-05-09 RX ADMIN — SODIUM BICARBONATE: 84 INJECTION, SOLUTION INTRAVENOUS at 10:17

## 2020-05-09 RX ADMIN — SODIUM BICARBONATE: 84 INJECTION, SOLUTION INTRAVENOUS at 00:22

## 2020-05-09 RX ADMIN — SODIUM BICARBONATE: 84 INJECTION, SOLUTION INTRAVENOUS at 05:18

## 2020-05-09 RX ADMIN — DAPSONE 100 MG: 100 TABLET ORAL at 08:28

## 2020-05-09 RX ADMIN — DEXAMETHASONE 8 MG: 4 TABLET ORAL at 08:28

## 2020-05-09 RX ADMIN — LEVOFLOXACIN 250 MG: 250 TABLET, FILM COATED ORAL at 08:27

## 2020-05-09 RX ADMIN — POTASSIUM PHOSPHATE, MONOBASIC AND POTASSIUM PHOSPHATE, DIBASIC 20 MMOL: 224; 236 INJECTION, SOLUTION INTRAVENOUS at 08:34

## 2020-05-09 RX ADMIN — ENOXAPARIN SODIUM 100 MG: 100 INJECTION SUBCUTANEOUS at 08:28

## 2020-05-09 ASSESSMENT — ACTIVITIES OF DAILY LIVING (ADL)
ADLS_ACUITY_SCORE: 13

## 2020-05-09 ASSESSMENT — MIFFLIN-ST. JEOR: SCORE: 1773.2

## 2020-05-09 NOTE — PLAN OF CARE
"/71 (BP Location: Left arm)   Pulse 81   Temp 97.5  F (36.4  C) (Axillary)   Resp 16   Ht 1.74 m (5' 8.5\")   Wt 97.1 kg (214 lb)   SpO2 94%   BMI 32.06 kg/m      0700 - 1930: Afebrile, VSS. No c/o pain, nausea, vomiting or diarrhea. MIVF infusing at 225ml/hr. Urine pH Q8H - levels good. Next check due at 2300. Phosphorus replaced, recheck pending. Up independently in room. Good appetite. Plan for discharge once methotrexate levels lower. Continue to monitor, following plan of care.     Problem: Adult Inpatient Plan of Care  Goal: Plan of Care Review  5/9/2020 1830 by Kyle Bustamante, RN  Outcome: No Change     "

## 2020-05-09 NOTE — PROGRESS NOTES
Type of chemo infused: vincristine and methotrexate  Pt tolerated infusion: yes  Interventions: na  Response to interventions used: na  Plan: Continues plan of care, monitor urine pH

## 2020-05-09 NOTE — PLAN OF CARE
VSS. Chemo infused without incidence. Pt reports headache present but declines medication, reports its better when laying flat. Denies nausea. Up independently. Urine pH stable, next check at 8 am. Will need phosphorus replacement this am, waiting for dose to arrive to floor. Call light in reach, hourly rounding done. Will continue to monitor.     Problem: Adult Inpatient Plan of Care  Goal: Plan of Care Review  5/9/2020 0620 by Micaela Lopez RN  Outcome: No Change     Problem: Adult Inpatient Plan of Care  Goal: Patient-Specific Goal (Individualization)  5/9/2020 0620 by Micaela Lopez RN  Outcome: No Change     Problem: Adult Inpatient Plan of Care  Goal: Absence of Hospital-Acquired Illness or Injury  5/9/2020 0620 by Micaela Lopez RN  Outcome: No Change     Problem: Adult Inpatient Plan of Care  Goal: Optimal Comfort and Wellbeing  5/9/2020 0620 by Micaela Lopez RN  Outcome: No Change     Problem: Adult Inpatient Plan of Care  Goal: Readiness for Transition of Care  5/9/2020 0620 by Micaela Lopez RN  Outcome: No Change     Problem: Anemia (Chemotherapy Effects)  Goal: Anemia Symptom Improvement  5/9/2020 0620 by Micaela Lopez RN  Outcome: No Change     Problem: Urinary Bleeding Risk or Actual (Chemotherapy Effects)  Goal: Absence of Hematuria  5/9/2020 0620 by Micaela Lopez RN  Outcome: No Change     Problem: Nausea and Vomiting (Chemotherapy Effects)  Goal: Fluid and Electrolyte Balance  5/9/2020 0620 by Micaela Lopez RN  Outcome: No Change     Problem: Neurotoxicity (Chemotherapy Effects)  Goal: Neurotoxicity Symptom Control  5/9/2020 0620 by Micaela Lopez RN  Outcome: No Change     Problem: Neutropenia (Chemotherapy Effects)  Goal: Absence of Infection  5/9/2020 0620 by Micaela Lopez RN  Outcome: No Change     Problem: Oral Mucositis (Chemotherapy Effects)  Goal: Improved Oral Mucous Membrane Integrity  5/9/2020 0620 by Micaela Lopez RN  Outcome: No Change     Problem: Thrombocytopenia Bleeding Risk (Chemotherapy Effects)  Goal:  Absence of Bleeding  5/9/2020 0620 by Micaela [facility], RN  Outcome: No Change

## 2020-05-09 NOTE — PROGRESS NOTES
[facility], [place]    Progress Note  Hematology / Oncology    Date of Admission:  5/8/20  Date of Service (when I saw the patient):  05/09/2020    Assessment & Plan   Kobe Pedroza is a 60 year old male with a history of thyroid cancer status post thyroidectomy (2011), CAD, hyperlipidemia, distal RLE DVT on enoxaparin, and newly diagnosed Burkitt's Lymphoma status post Cycle 1 of R-CODOX-M / R-IVAC chemotherapy regimen with subsequent PET-CT demonstrating complete remission who is now admitted for scheduled Cycle 2 R-CODOX-M (high-dose methotrexate portion).      HEME  # Burkitt Lymphoma  Followed by Dr. Wright. Presented on 3/12/20 to [facility] with acute-on-chronic mid-thoracic back pain with some associated radicular symptoms. Per patient, no B-symptoms, though he did note an intentional 35-lb weight loss. 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 Patient had no appreciable neurological deficits. He was started on dexamethasone and underwent T5-6 laminectomy with gross total resection of epidural tumor on 3/15/2020. Flow cytometry of the specimen was notable for CD10 positive and kappa monotypic B cells (83%). Confirmed Burkitt lymphoma with surgical pathology. PET scan showed extensive visceral and bony disease. Bone marrow biopsy on 3/18 showed suspicious involvement with rare polytypic B cells (0.8%). No disease in CSF. Started R-CODOX-M (Cycle 1, Day 1=3/18/2020), with was well-tolerated. Received Cycle 1 R-IVAC (C1D1=4/8/2020), which was complicated by development of neutropenic fever requiring hospitalization. PET-CT on 4/27/2020 demonstrated a complete remission. Patient is admitted now for the high-dose methotrexate portion of Cycle 2 of R-CODOX-M (Day 10-11 per Maryana et al.).  - PICC still in place from recent admission (4/30); will plan to remove on discharge.                              Treatment Plan: R-CODOX-M. Cycle 2, Day 1 = 4/30/2020. Today is Day 9.                          - Vincristine 2 mg IV x1 dose -- Day 9    - Methotrexate 3 g/m2 -- Day 9    - Leucovorin 200 mg/m2 x1 dose 24H after MTX start -- Day 10    - Leucovorin 15 mg/m2 Q6H -- Day 10;  beginning 6H after first leucovorin dose until MTX level <0.1    - Dexamethasone 12 mg PO x1 dose -- Day 1    - Dexamethasone 8 mg PO daily x2 doses -- Day 2-3    - Neupogen -- Day 11-12                            # Normocytic anemia  # Leukopenia  # Neutropenia  Presumed related to underlying malignancy plus recent chemotherapy. No recent bleeding concerns. Hgb stable on admission. Patient received G-CSF daily on 5/3-5/7.  on day of admission. Dr. Wright okayed starting chemo despite neutropenia.  - Trend daily CBC  - Transfuse to keep Hgb >7     ID  # Prophylaxis  - Continue  mg BID, fluconazole 200 mg daily, levofloxacin 250 mg daily  - G6PD resulted since last admission; no evidence of deficiency. Will start dapsone 100 mg daily for PJP ppx. Currently unable to give pentamidine due to COVID-19 restrictions.     CV  # Coronary artery disease  # Hyperlipidemia  Followed by Dr. Zeng at [facility] ([place]). Diagnosed with mild, non-obstructive CAD in 2018. 81 mg ASA and low-dose statin therapy recommended. No previous cardiac caths or stents.  - Hold PTA statin and ASA given concomitant chemo adminstration     ENDO  # History of thyroid cancer status-post thyroidectomy  Status post thyroidectomy in 2011. TSH normal on 3/12/20.  - Continue PTA levothyroxine      GI  # History of hemorrhoids  # History of lower GI bleeding  Reported BRBPR during recent admission (4/19-4/22) in the context of previously diagnosed hemorrhoids. No recent recurrence.  - Continue scheduled senna  - Monitor     MSK  # Right distal peroneal DVT  Diagnosed on US on 4/30 with an occlusive deep vein thrombus in the right peroneal vein  at the mid calf.  - Continue enoxaparin 100 mg BID. Hold for platelets <50,000.     FEN:  -IVF per chemotherapy protocol  -Lyte replacement per protocol  -Regular diet as tolerated     Prophy/Misc:  -Bowel: Continue PTA senna  -DVT: Treatment-dose enoxaparin as above; hold enoxaparin for platelets <50K    Disposition: Admit to heme malignancy service for scheduled chemotherapy. Discharge pending completion of chemotherapy regimen/clearance of methotrexate.    Code Status: Full code     Discussed with Dr. Juan David Jaimes.    Cindi Marquez MD  Medicine/Dermatology PGY-5  p [phone]    Staff Addendum:  The patient was discussed on morning rounds with the nurses, mid level provider, and house staff and seen and examined by me. All labs and imaging were reviewed. I reviewed events over the last 24 hours including vitals and flow sheets. I agree with the above note and have been responsible for the care plan and interpretation of progress.     Subjective:  Reports feeling overall well, no abd pain, no F/C/NS, no N/V/C/D, no GI or  complaints, no URI or other respiratory symptoms, no HA/LH/Dizziness..     Vitals reviewed, labs reviewed  PE:  NAD, Alert, oriented x3  HEENT: moist mmm, no oral ulcers  Heart: RRR  Lungs: CTAB  Abdomen: +BS, soft non tender, non distended  LE: no edema  Skin: no rashes     Assessment and Pan:  60 year old male with Burkitt lymphoma, admitted on 4/30/2020 for completion of cycle 2 CODOX-M. Recent DVT, continue anticoagulation until platelets less than 50,000.  Supportive care and infection prophylaxis per routine.  Monitor for any adverse events or side effects of chemotherapy.  Methotrexate protocol per pharmacy.  No complications to date.  PT.  Plan was discussed with the patient and his wife, they no questions or concerns.    Chris Jaimes MD          Interval Events  No acute events overnight. Nursing notes reviewed. Patient feels well and is tolerating the chemotherapy well. He  has no concerns at this time. No fevers, chills, night sweats, nausea, vomiting, constipation, diarrhea, dizziness, petechiae, ecchymosis, or bleeding.        Review of Systems   A 10 point ROS is negative unless otherwise noted above in the HPI.    Physical Exam   Vital Signs with Ranges  Temp:  [97.7  F (36.5  C)-99  F (37.2  C)] 97.7  F (36.5  C)  Pulse:  [77-91] 82  Resp:  [16-18] 16  BP: (119-140)/(74-77) 125/77  SpO2:  [94 %-98 %] 95 %  212 lbs 8 oz     Constitutional: Pleasant and cooperative male. Awake, alert, NAD. Seated on the edge of the bed.  HEENT: NC/AT, EOMI, sclera clear, conjunctiva normal, OP with MMM, no mucositis.  Respiratory: No increased work of breathing, CTAB, no crackles or wheezing.  Cardiovascular: RRR, no murmur noted. No peripheral edema.  MSK: Mild edema of the right calf, no calf tenderness or palpable cords. Compartments soft.  GI: Normal bowel sounds, soft, non-distended and non-tender.  Skin: Warm, dry, well-perfused. Healing surgical scar to the upper thoracic spine. No other bruising, bleeding, rashes, or lesions on limited exam.  Neurologic: A&O. Answers questions appropriately. Moves all extremities spontaneously.  Neuropsychiatric: Calm, appropriate affect  Vascular access:  PICC on RUE CDI, non-tender, no surrounding erythema, no hematoma.    Recent Labs  CBC   Recent Labs   Lab 05/09/20  0415 05/08/20  1119 05/07/20  1027 05/04/20  0921   WBC 0.3* 0.6* 3.1* 42.9*   RBC 2.42* 2.59* 2.63* 2.78*   HGB 7.3* 7.7* 8.2* 8.5*   HCT 21.6* 24.2* 24.6* 25.9*   MCV 89 93 94 93   MCH 30.2 29.7 31.2 30.6   MCHC 33.8 31.8 33.3 32.8   RDW 16.1* 17.2* 17.5* 18.2*    205 246 478*       CMP   Recent Labs   Lab 05/09/20  0415 05/08/20  1119 05/07/20  1027 05/04/20  0921    138 140 139   POTASSIUM 3.6 3.5 3.6 3.2*   CHLORIDE 103 107 108 106   CO2 28 28 27 28   ANIONGAP 6 4 6 4   * 149* 187* 138*   BUN 12 16 19 23   CR 0.55* 0.54* 0.52* 0.59*   GFRESTIMATED >90 >90 >90 >90    GFRESTBLACK >90 >90 >90 >90   RUTH 7.8* 8.2* 8.2* 7.5*   MAG 2.0 1.9  --   --    PHOS 1.8* 2.5  --   --    PROTTOTAL 6.2* 6.6* 6.7* 6.8   ALBUMIN 3.2* 3.4 3.4 3.4   BILITOTAL 0.6 0.3 0.2 0.3   ALKPHOS 65 73 75 70   AST 14 12 9 12   ALT 28 25 26 25       LFTs:   Recent Labs   Lab 05/09/20  0415   PROTTOTAL 6.2*   ALBUMIN 3.2*   BILITOTAL 0.6   ALKPHOS 65   AST 14   ALT 28       Coagulation Studies:   Recent Labs   Lab 05/08/20  1119   INR 0.99   PTT 35

## 2020-05-10 LAB
ABO + RH BLD: NORMAL
ABO + RH BLD: NORMAL
ALBUMIN SERPL-MCNC: 3.1 G/DL (ref 3.4–5)
ALP SERPL-CCNC: 56 U/L (ref 40–150)
ALT SERPL W P-5'-P-CCNC: 45 U/L (ref 0–70)
ANION GAP SERPL CALCULATED.3IONS-SCNC: 5 MMOL/L (ref 3–14)
AST SERPL W P-5'-P-CCNC: 19 U/L (ref 0–45)
BILIRUB SERPL-MCNC: 0.2 MG/DL (ref 0.2–1.3)
BLD GP AB SCN SERPL QL: NORMAL
BLD PROD TYP BPU: NORMAL
BLD PROD TYP BPU: NORMAL
BLD UNIT ID BPU: 0
BLOOD BANK CMNT PATIENT-IMP: NORMAL
BLOOD PRODUCT CODE: NORMAL
BPU ID: NORMAL
BUN SERPL-MCNC: 13 MG/DL (ref 7–30)
CALCIUM SERPL-MCNC: 7.5 MG/DL (ref 8.5–10.1)
CHLORIDE SERPL-SCNC: 106 MMOL/L (ref 94–109)
CO2 SERPL-SCNC: 30 MMOL/L (ref 20–32)
CREAT SERPL-MCNC: 0.53 MG/DL (ref 0.66–1.25)
DIFFERENTIAL METHOD BLD: ABNORMAL
ERYTHROCYTE [DISTWIDTH] IN BLOOD BY AUTOMATED COUNT: 16.6 % (ref 10–15)
GFR SERPL CREATININE-BSD FRML MDRD: >90 ML/MIN/{1.73_M2}
GLUCOSE SERPL-MCNC: 202 MG/DL (ref 70–99)
HCT VFR BLD AUTO: 19.9 % (ref 40–53)
HGB BLD-MCNC: 6.6 G/DL (ref 13.3–17.7)
MAGNESIUM SERPL-MCNC: 2.2 MG/DL (ref 1.6–2.3)
MCH RBC QN AUTO: 30.3 PG (ref 26.5–33)
MCHC RBC AUTO-ENTMCNC: 33.2 G/DL (ref 31.5–36.5)
MCV RBC AUTO: 91 FL (ref 78–100)
MTX SERPL-SCNC: 0.47 UMOL/L
NUM BPU REQUESTED: 1
PH UR STRIP: 8 PH (ref 5–7)
PH UR STRIP: 8 PH (ref 5–7)
PH UR STRIP: 8.5 PH (ref 5–7)
PH UR STRIP: 8.5 PH (ref 5–7)
PHOSPHATE SERPL-MCNC: 3 MG/DL (ref 2.5–4.5)
PLATELET # BLD AUTO: 121 10E9/L (ref 150–450)
POTASSIUM SERPL-SCNC: 3.4 MMOL/L (ref 3.4–5.3)
PROT SERPL-MCNC: 5.8 G/DL (ref 6.8–8.8)
RBC # BLD AUTO: 2.18 10E12/L (ref 4.4–5.9)
SODIUM SERPL-SCNC: 140 MMOL/L (ref 133–144)
SPECIMEN EXP DATE BLD: NORMAL
TRANSFUSION STATUS PATIENT QL: NORMAL
TRANSFUSION STATUS PATIENT QL: NORMAL
URATE SERPL-MCNC: 3.1 MG/DL (ref 3.5–7.2)
WBC # BLD AUTO: 0.3 10E9/L (ref 4–11)

## 2020-05-10 PROCEDURE — 80053 COMPREHEN METABOLIC PANEL: CPT | Performed by: PHYSICIAN ASSISTANT

## 2020-05-10 PROCEDURE — 25000125 ZZHC RX 250: Performed by: INTERNAL MEDICINE

## 2020-05-10 PROCEDURE — 12000012 ZZH R&B MS OVERFLOW UMMC

## 2020-05-10 PROCEDURE — 25000131 ZZH RX MED GY IP 250 OP 636 PS 637: Performed by: INTERNAL MEDICINE

## 2020-05-10 PROCEDURE — 83735 ASSAY OF MAGNESIUM: CPT | Performed by: PHYSICIAN ASSISTANT

## 2020-05-10 PROCEDURE — P9016 RBC LEUKOCYTES REDUCED: HCPCS | Performed by: PHYSICIAN ASSISTANT

## 2020-05-10 PROCEDURE — 25000132 ZZH RX MED GY IP 250 OP 250 PS 637: Performed by: PHYSICIAN ASSISTANT

## 2020-05-10 PROCEDURE — 85027 COMPLETE CBC AUTOMATED: CPT

## 2020-05-10 PROCEDURE — 84550 ASSAY OF BLOOD/URIC ACID: CPT | Performed by: PHYSICIAN ASSISTANT

## 2020-05-10 PROCEDURE — 84100 ASSAY OF PHOSPHORUS: CPT | Performed by: PHYSICIAN ASSISTANT

## 2020-05-10 PROCEDURE — 80299 QUANTITATIVE ASSAY DRUG: CPT | Performed by: PHYSICIAN ASSISTANT

## 2020-05-10 PROCEDURE — 25800030 ZZH RX IP 258 OP 636: Performed by: INTERNAL MEDICINE

## 2020-05-10 PROCEDURE — 25000128 H RX IP 250 OP 636: Performed by: INTERNAL MEDICINE

## 2020-05-10 PROCEDURE — 81003 URINALYSIS AUTO W/O SCOPE: CPT | Performed by: INTERNAL MEDICINE

## 2020-05-10 PROCEDURE — 25000128 H RX IP 250 OP 636: Performed by: PHYSICIAN ASSISTANT

## 2020-05-10 RX ADMIN — LEUCOVORIN CALCIUM 32 MG: 350 INJECTION, POWDER, LYOPHILIZED, FOR SOLUTION INTRAMUSCULAR; INTRAVENOUS at 09:03

## 2020-05-10 RX ADMIN — ENOXAPARIN SODIUM 100 MG: 100 INJECTION SUBCUTANEOUS at 20:30

## 2020-05-10 RX ADMIN — ACYCLOVIR 400 MG: 200 CAPSULE ORAL at 08:27

## 2020-05-10 RX ADMIN — SODIUM BICARBONATE: 84 INJECTION, SOLUTION INTRAVENOUS at 12:19

## 2020-05-10 RX ADMIN — SODIUM BICARBONATE: 84 INJECTION, SOLUTION INTRAVENOUS at 19:07

## 2020-05-10 RX ADMIN — LEUCOVORIN CALCIUM 32 MG: 350 INJECTION, POWDER, LYOPHILIZED, FOR SOLUTION INTRAMUSCULAR; INTRAVENOUS at 14:43

## 2020-05-10 RX ADMIN — FLUCONAZOLE 200 MG: 200 TABLET ORAL at 08:28

## 2020-05-10 RX ADMIN — SODIUM BICARBONATE: 84 INJECTION, SOLUTION INTRAVENOUS at 01:52

## 2020-05-10 RX ADMIN — DAPSONE 100 MG: 100 TABLET ORAL at 08:27

## 2020-05-10 RX ADMIN — SODIUM BICARBONATE: 84 INJECTION, SOLUTION INTRAVENOUS at 06:59

## 2020-05-10 RX ADMIN — LEVOFLOXACIN 250 MG: 250 TABLET, FILM COATED ORAL at 08:28

## 2020-05-10 RX ADMIN — ENOXAPARIN SODIUM 100 MG: 100 INJECTION SUBCUTANEOUS at 08:28

## 2020-05-10 RX ADMIN — DEXAMETHASONE 8 MG: 4 TABLET ORAL at 08:27

## 2020-05-10 RX ADMIN — LEVOTHYROXINE SODIUM 200 MCG: 0.07 TABLET ORAL at 08:27

## 2020-05-10 RX ADMIN — SENNOSIDES AND DOCUSATE SODIUM 1 TABLET: 8.6; 5 TABLET ORAL at 20:30

## 2020-05-10 RX ADMIN — ACYCLOVIR 400 MG: 200 CAPSULE ORAL at 20:30

## 2020-05-10 RX ADMIN — LEUCOVORIN CALCIUM 32 MG: 350 INJECTION, POWDER, LYOPHILIZED, FOR SOLUTION INTRAMUSCULAR; INTRAVENOUS at 20:30

## 2020-05-10 RX ADMIN — LEUCOVORIN CALCIUM 32 MG: 350 INJECTION, POWDER, LYOPHILIZED, FOR SOLUTION INTRAMUSCULAR; INTRAVENOUS at 01:52

## 2020-05-10 ASSESSMENT — ACTIVITIES OF DAILY LIVING (ADL)
ADLS_ACUITY_SCORE: 13

## 2020-05-10 ASSESSMENT — MIFFLIN-ST. JEOR: SCORE: 1783.63

## 2020-05-10 NOTE — PROGRESS NOTES
Staff summary (please see full note signed same day):  The patient was discussed on morning rounds with the nurses, mid level provider, and house staff and seen and examined by me. All labs and imaging were reviewed. I reviewed events over the last 24 hours including vitals and flow sheets. I agree with the above note and have been responsible for the care plan and interpretation of progress.     Subjective:  Reports feeling overall well, no concerns, no abd pain, no F/C/NS, no N/V/C/D, no GI or  complaints, no URI or other respiratory symptoms, no HA/LH/Dizziness.      Vitals reviewed, labs reviewed  PE:  NAD, Alert, oriented x3  HEENT: moist mmm, no oral ulcers  Heart: RRR  Lungs: CTAB  Abdomen: +BS, soft non tender, non distended  LE: no edema  Skin: no rashes     Assessment and Pan:  60 year old male with Burkitt lymphoma, admitted on 4/30/2020 for completion of cycle 2 CODOX-M. Recent DVT, continue anticoagulation until platelets less than 50,000.  Supportive care and infection prophylaxis per routine.  Monitor for any adverse events or side effects of chemotherapy.  Methotrexate protocol per pharmacy.  PT.  Plan was discussed with the patient and he is no questions or concerns.     Chris Jaimes MD

## 2020-05-10 NOTE — PROGRESS NOTES
[facility], [place]    Progress Note  Hematology / Oncology    Date of Admission:  5/8/20  Date of Service (when I saw the patient):  05/10/2020    Assessment & Plan   Kobe Pedroza is a 60 year old male with a history of thyroid cancer status post thyroidectomy (2011), CAD, hyperlipidemia, distal RLE DVT on enoxaparin, and newly diagnosed Burkitt's Lymphoma status post Cycle 1 of R-CODOX-M / R-IVAC chemotherapy regimen with subsequent PET-CT demonstrating complete remission who is now admitted for scheduled Cycle 2 R-CODOX-M (high-dose methotrexate portion).      TODAY:  - Methotrexate level too high for discharge (0.47)  - R-CODOX-M C2D11, getting neupogen. Awaiting count recovery  - PRBCs x1 for hgb 7.3->6.6  - Doing well, possible discharge tomorrow or day after    HEME  # Burkitt Lymphoma  Followed by Dr. Wright. Presented on 3/12/20 to [facility] with acute-on-chronic mid-thoracic back pain with some associated radicular symptoms. Per patient, no B-symptoms, though he did note an intentional 35-lb weight loss. 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 Patient had no appreciable neurological deficits. He was started on dexamethasone and underwent T5-6 laminectomy with gross total resection of epidural tumor on 3/15/2020. Flow cytometry of the specimen was notable for CD10 positive and kappa monotypic B cells (83%). Confirmed Burkitt lymphoma with surgical pathology. PET scan showed extensive visceral and bony disease. Bone marrow biopsy on 3/18 showed suspicious involvement with rare polytypic B cells (0.8%). No disease in CSF. Started R-CODOX-M (Cycle 1, Day 1=3/18/2020), with was well-tolerated. Received Cycle 1 R-IVAC (C1D1=4/8/2020), which was complicated by development of neutropenic fever requiring hospitalization. PET-CT on 4/27/2020 demonstrated a complete remission. Patient is  admitted now for the high-dose methotrexate portion of Cycle 2 of R-CODOX-M (Day 10-11 per Maryana et al.).  - PICC still in place from recent admission (4/30); will plan to remove on discharge.                             Treatment Plan: R-CODOX-M. Cycle 2, Day 1 = 4/30/2020. Today is Day 11.                          - Vincristine 2 mg IV x1 dose -- Day 9    - Methotrexate 3 g/m2 -- Day 9    - Leucovorin 200 mg/m2 x1 dose 24H after MTX start -- Day 10    - Leucovorin 15 mg/m2 Q6H -- Day 10;  beginning 6H after first leucovorin dose until MTX level <0.1    - Dexamethasone 12 mg PO x1 dose -- Day 1    - Dexamethasone 8 mg PO daily x2 doses -- Day 2-3    - Neupogen -- Day 11-12                            # Normocytic anemia  # Leukopenia  # Neutropenia  Presumed related to underlying malignancy plus recent chemotherapy. No recent bleeding concerns. Hgb stable on admission. Patient received G-CSF daily on 5/3-5/7.  on day of admission. Dr. Adriana munoz starting chemo despite neutropenia.  - Trend daily CBC  - Transfuse to keep Hgb >7     ID  # Prophylaxis  - Continue  mg BID, fluconazole 200 mg daily, levofloxacin 250 mg daily  - G6PD resulted since last admission; no evidence of deficiency. Will start dapsone 100 mg daily for PJP ppx. Currently unable to give pentamidine due to COVID-19 restrictions.     CV  # Coronary artery disease  # Hyperlipidemia  Followed by Dr. Zeng at [facility] ([place]). Diagnosed with mild, non-obstructive CAD in 2018. 81 mg ASA and low-dose statin therapy recommended. No previous cardiac caths or stents.  - Hold PTA statin and ASA given concomitant chemo adminstration     ENDO  # History of thyroid cancer status-post thyroidectomy  Status post thyroidectomy in 2011. TSH normal on 3/12/20.  - Continue PTA levothyroxine      GI  # History of hemorrhoids  # History of lower GI bleeding  Reported BRBPR during recent admission (4/19-4/22) in the context of  previously diagnosed hemorrhoids. No recent recurrence.  - Continue scheduled senna  - Monitor     MSK  # Right distal peroneal DVT  Diagnosed on US on 4/30 with an occlusive deep vein thrombus in the right peroneal vein at the mid calf.  - Continue enoxaparin 100 mg BID. Hold for platelets <50,000.     FEN:  -IVF per chemotherapy protocol  -Lyte replacement per protocol  -Regular diet as tolerated     Prophy/Misc:  -Bowel: Continue PTA senna  -DVT: Treatment-dose enoxaparin as above; hold enoxaparin for platelets <50K    Disposition: Admitted to [facility] malignancy service for scheduled chemotherapy. Discharge pending completion of chemotherapy regimen/clearance of methotrexate.    Code Status: Full code     Discussed with Dr. Juan David Jaimes.    --  Kennedy Orellana MD PhD  Hematology, Oncology, and Bone Marrow Transplantation Service, [facility], [place]  Pager: [phone]  Please see sticky note for cross cover information    Staff Addendum:  The patient was discussed on morning rounds with the nurses, mid level provider, and house staff and seen and examined by me. All labs and imaging were reviewed. I reviewed events over the last 24 hours including vitals and flow sheets. I agree with the above note and have been responsible for the care plan and interpretation of progress.     Subjective:  Reports feeling overall well, no concerns, no abd pain, no F/C/NS, no N/V/C/D, no GI or  complaints, no URI or other respiratory symptoms, no HA/LH/Dizziness.      Vitals reviewed, labs reviewed  PE:  NAD, Alert, oriented x3  HEENT: moist mmm, no oral ulcers  Heart: RRR  Lungs: CTAB  Abdomen: +BS, soft non tender, non distended  LE: no edema  Skin: no rashes     Assessment and Pan:  60 year old male with Burkitt lymphoma, admitted on 4/30/2020 for completion of cycle 2 CODOX-M. Recent DVT, continue anticoagulation until platelets less than 50,000.  Supportive care and infection  prophylaxis per routine.  Monitor for any adverse events or side effects of chemotherapy.  Methotrexate protocol per pharmacy.  PT.  Plan was discussed with the patient and he is no questions or concerns.     Chris Jaimes MD     Interval Events  No acute events overnight. Pt is eating well, ambulating around his room, and having regular bowel movements. He denies any cp, sob, abdominal pain, confusion or mental fogginess.     Review of Systems   A 10 point ROS is negative unless otherwise noted above in the HPI.    Physical Exam   Vital Signs with Ranges  Temp:  [97  F (36.1  C)-97.6  F (36.4  C)] 97  F (36.1  C)  Pulse:  [81-97] 97  Resp:  [16-18] 18  BP: (114-136)/(69-80) 128/69  SpO2:  [94 %-99 %] 99 %  216 lbs 4.8 oz     Constitutional: Pleasant and cooperative male. Awake, alert, NAD. Seated on the edge of the bed.  HEENT: NC/AT, EOMI, sclera clear, conjunctiva normal, OP with MMM, no mucositis.  Respiratory: No increased work of breathing, CTAB, no crackles or wheezing.  Cardiovascular: RRR, no murmur noted. No peripheral edema.  MSK: No pretibial edema, no gross deformities  GI: Normal bowel sounds, soft, non-distended and non-tender.  Skin: Warm, dry, well-perfused. Healing surgical scar to the upper thoracic spine. No other bruising, bleeding, rashes, or lesions on limited exam.  Neurologic: A&O. Answers questions appropriately. Moves all extremities spontaneously.  Neuropsychiatric: Calm, appropriate affect  Vascular access:  PICC on RUE CDI, non-tender, no surrounding erythema, no hematoma.    Recent Labs  CBC   Recent Labs   Lab 05/10/20  0400 05/09/20  0415 05/08/20  1119 05/07/20  1027   WBC 0.3* 0.3* 0.6* 3.1*   RBC 2.18* 2.42* 2.59* 2.63*   HGB 6.6* 7.3* 7.7* 8.2*   HCT 19.9* 21.6* 24.2* 24.6*   MCV 91 89 93 94   MCH 30.3 30.2 29.7 31.2   MCHC 33.2 33.8 31.8 33.3   RDW 16.6* 16.1* 17.2* 17.5*   * 165 205 246       CMP   Recent Labs   Lab 05/10/20  0400 05/09/20  1835 05/09/20  0418  05/08/20  1119 05/07/20  1027     --  137 138 140   POTASSIUM 3.4  --  3.6 3.5 3.6   CHLORIDE 106  --  103 107 108   CO2 30  --  28 28 27   ANIONGAP 5  --  6 4 6   *  --  273* 149* 187*   BUN 13  --  12 16 19   CR 0.53*  --  0.55* 0.54* 0.52*   GFRESTIMATED >90  --  >90 >90 >90   GFRESTBLACK >90  --  >90 >90 >90   RUTH 7.5*  --  7.8* 8.2* 8.2*   MAG 2.2  --  2.0 1.9  --    PHOS 3.0 2.8 1.8* 2.5  --    PROTTOTAL 5.8*  --  6.2* 6.6* 6.7*   ALBUMIN 3.1*  --  3.2* 3.4 3.4   BILITOTAL 0.2  --  0.6 0.3 0.2   ALKPHOS 56  --  65 73 75   AST 19  --  14 12 9   ALT 45  --  28 25 26       LFTs:   Recent Labs   Lab 05/10/20  0400   PROTTOTAL 5.8*   ALBUMIN 3.1*   BILITOTAL 0.2   ALKPHOS 56   AST 19   ALT 45       Coagulation Studies:   Recent Labs   Lab 05/08/20  1119   INR 0.99   PTT 35

## 2020-05-10 NOTE — PLAN OF CARE
VSS. Pt reports a mild headache at times, declines medication. Denies nausea. Up independently. Fluids running as ordered. Leucovorin rescue started last night. PH remain stable, next check at 8 am. Pt will need RBC this am, consent not signed at this time. No changes overnight, will continue to monitor.     Problem: Adult Inpatient Plan of Care  Goal: Plan of Care Review  5/10/2020 0532 by Micaela Lopez RN  Outcome: No Change     Problem: Adult Inpatient Plan of Care  Goal: Patient-Specific Goal (Individualization)  5/10/2020 0532 by Micaela Lopez RN  Outcome: No Change     Problem: Adult Inpatient Plan of Care  Goal: Absence of Hospital-Acquired Illness or Injury  5/10/2020 0532 by Micaela Lopez RN  Outcome: No Change     Problem: Adult Inpatient Plan of Care  Goal: Optimal Comfort and Wellbeing  5/10/2020 0532 by Micaela Lopez RN  Outcome: No Change     Problem: Adult Inpatient Plan of Care  Goal: Readiness for Transition of Care  5/10/2020 0532 by Micaela Lopez RN  Outcome: No Change     Problem: Adult Inpatient Plan of Care  Goal: Rounds/Family Conference  5/10/2020 0532 by Micaela Lopez RN  Outcome: No Change     Problem: Anemia (Chemotherapy Effects)  Goal: Anemia Symptom Improvement  5/10/2020 0532 by Micaela Lopez RN  Outcome: No Change     Problem: Urinary Bleeding Risk or Actual (Chemotherapy Effects)  Goal: Absence of Hematuria  5/10/2020 0532 by Micaela Lopez RN  Outcome: No Change     Problem: Nausea and Vomiting (Chemotherapy Effects)  Goal: Fluid and Electrolyte Balance  5/10/2020 0532 by Micaela Lopez RN  Outcome: No Change     Problem: Neurotoxicity (Chemotherapy Effects)  Goal: Neurotoxicity Symptom Control  5/10/2020 0532 by Micaela Lopez RN  Outcome: No Change     Problem: Neutropenia (Chemotherapy Effects)  Goal: Absence of Infection  5/10/2020 0532 by Micaela Lopez RN  Outcome: No Change     Problem: Oral Mucositis (Chemotherapy Effects)  Goal: Improved Oral Mucous Membrane Integrity  5/10/2020 0532 by Micaela Lopez  RN  Outcome: No Change     Problem: Thrombocytopenia Bleeding Risk (Chemotherapy Effects)  Goal: Absence of Bleeding  5/10/2020 0532 by Micaela Lopez, RN  Outcome: No Change

## 2020-05-10 NOTE — PLAN OF CARE
"/78 (BP Location: Left arm)   Pulse 78   Temp 97.2  F (36.2  C) (Axillary)   Resp 16   Ht 1.74 m (5' 8.5\")   Wt 98.1 kg (216 lb 4.8 oz)   SpO2 96%   BMI 32.41 kg/m      0700 - 1930: Afebrile, VSS. No c/o pain, nausea, vomiting or diarrhea. Urine pH levels WNL - next due at 2300. 1 unit PRBCs infused - tolerated well. Up independently, good appetite. Showered today. MIVF infusing at 225ml/hr. Plan to discharge once methotrexate levels WNL. Continue to monitor, following plan of care.     Problem: Adult Inpatient Plan of Care  Goal: Plan of Care Review  5/10/2020 [id number] by Kyle Bustamante, RN  Outcome: No Change     "

## 2020-05-11 VITALS
HEART RATE: 67 BPM | RESPIRATION RATE: 18 BRPM | BODY MASS INDEX: 32.3 KG/M2 | SYSTOLIC BLOOD PRESSURE: 120 MMHG | WEIGHT: 218.1 LBS | DIASTOLIC BLOOD PRESSURE: 68 MMHG | TEMPERATURE: 97.4 F | OXYGEN SATURATION: 97 % | HEIGHT: 69 IN

## 2020-05-11 LAB
ABO + RH BLD: NORMAL
ABO + RH BLD: NORMAL
ALBUMIN SERPL-MCNC: 2.9 G/DL (ref 3.4–5)
ALP SERPL-CCNC: 49 U/L (ref 40–150)
ALT SERPL W P-5'-P-CCNC: 84 U/L (ref 0–70)
ANION GAP SERPL CALCULATED.3IONS-SCNC: 6 MMOL/L (ref 3–14)
AST SERPL W P-5'-P-CCNC: 32 U/L (ref 0–45)
BILIRUB SERPL-MCNC: 0.4 MG/DL (ref 0.2–1.3)
BLD GP AB SCN SERPL QL: NORMAL
BLD PROD TYP BPU: NORMAL
BLD PROD TYP BPU: NORMAL
BLD UNIT ID BPU: 0
BLOOD BANK CMNT PATIENT-IMP: NORMAL
BLOOD PRODUCT CODE: NORMAL
BPU ID: NORMAL
BUN SERPL-MCNC: 13 MG/DL (ref 7–30)
CALCIUM SERPL-MCNC: 7.4 MG/DL (ref 8.5–10.1)
CHLORIDE SERPL-SCNC: 106 MMOL/L (ref 94–109)
CO2 SERPL-SCNC: 30 MMOL/L (ref 20–32)
CREAT SERPL-MCNC: 0.59 MG/DL (ref 0.66–1.25)
DIFFERENTIAL METHOD BLD: ABNORMAL
ERYTHROCYTE [DISTWIDTH] IN BLOOD BY AUTOMATED COUNT: 16.5 % (ref 10–15)
GFR SERPL CREATININE-BSD FRML MDRD: >90 ML/MIN/{1.73_M2}
GLUCOSE SERPL-MCNC: 173 MG/DL (ref 70–99)
HCT VFR BLD AUTO: 20.8 % (ref 40–53)
HGB BLD-MCNC: 7.1 G/DL (ref 13.3–17.7)
MAGNESIUM SERPL-MCNC: 2.1 MG/DL (ref 1.6–2.3)
MCH RBC QN AUTO: 30.5 PG (ref 26.5–33)
MCHC RBC AUTO-ENTMCNC: 34.1 G/DL (ref 31.5–36.5)
MCV RBC AUTO: 89 FL (ref 78–100)
MTX SERPL-SCNC: 0.11 UMOL/L
NUM BPU REQUESTED: 1
PH UR STRIP: 8 PH (ref 5–7)
PHOSPHATE SERPL-MCNC: 2.9 MG/DL (ref 2.5–4.5)
PLATELET # BLD AUTO: 83 10E9/L (ref 150–450)
POTASSIUM SERPL-SCNC: 3 MMOL/L (ref 3.4–5.3)
POTASSIUM SERPL-SCNC: 3.1 MMOL/L (ref 3.4–5.3)
PROT SERPL-MCNC: 5.5 G/DL (ref 6.8–8.8)
RBC # BLD AUTO: 2.33 10E12/L (ref 4.4–5.9)
SODIUM SERPL-SCNC: 142 MMOL/L (ref 133–144)
SPECIMEN EXP DATE BLD: NORMAL
TRANSFUSION STATUS PATIENT QL: NORMAL
TRANSFUSION STATUS PATIENT QL: NORMAL
URATE SERPL-MCNC: 2.9 MG/DL (ref 3.5–7.2)
WBC # BLD AUTO: 0.3 10E9/L (ref 4–11)

## 2020-05-11 PROCEDURE — 25800030 ZZH RX IP 258 OP 636: Performed by: INTERNAL MEDICINE

## 2020-05-11 PROCEDURE — 25000128 H RX IP 250 OP 636: Performed by: INTERNAL MEDICINE

## 2020-05-11 PROCEDURE — 81003 URINALYSIS AUTO W/O SCOPE: CPT | Performed by: INTERNAL MEDICINE

## 2020-05-11 PROCEDURE — 86923 COMPATIBILITY TEST ELECTRIC: CPT | Performed by: INTERNAL MEDICINE

## 2020-05-11 PROCEDURE — 80299 QUANTITATIVE ASSAY DRUG: CPT | Performed by: PHYSICIAN ASSISTANT

## 2020-05-11 PROCEDURE — P9016 RBC LEUKOCYTES REDUCED: HCPCS | Performed by: INTERNAL MEDICINE

## 2020-05-11 PROCEDURE — 86901 BLOOD TYPING SEROLOGIC RH(D): CPT | Performed by: INTERNAL MEDICINE

## 2020-05-11 PROCEDURE — 84550 ASSAY OF BLOOD/URIC ACID: CPT | Performed by: PHYSICIAN ASSISTANT

## 2020-05-11 PROCEDURE — 85027 COMPLETE CBC AUTOMATED: CPT

## 2020-05-11 PROCEDURE — 86900 BLOOD TYPING SEROLOGIC ABO: CPT | Performed by: INTERNAL MEDICINE

## 2020-05-11 PROCEDURE — 83735 ASSAY OF MAGNESIUM: CPT | Performed by: PHYSICIAN ASSISTANT

## 2020-05-11 PROCEDURE — 25000128 H RX IP 250 OP 636: Performed by: PHYSICIAN ASSISTANT

## 2020-05-11 PROCEDURE — 25000125 ZZHC RX 250: Performed by: INTERNAL MEDICINE

## 2020-05-11 PROCEDURE — 84132 ASSAY OF SERUM POTASSIUM: CPT | Performed by: PHYSICIAN ASSISTANT

## 2020-05-11 PROCEDURE — 84100 ASSAY OF PHOSPHORUS: CPT | Performed by: PHYSICIAN ASSISTANT

## 2020-05-11 PROCEDURE — 80053 COMPREHEN METABOLIC PANEL: CPT | Performed by: PHYSICIAN ASSISTANT

## 2020-05-11 PROCEDURE — 86850 RBC ANTIBODY SCREEN: CPT | Performed by: INTERNAL MEDICINE

## 2020-05-11 PROCEDURE — 25000132 ZZH RX MED GY IP 250 OP 250 PS 637: Performed by: PHYSICIAN ASSISTANT

## 2020-05-11 RX ORDER — ACYCLOVIR 200 MG/1
400 CAPSULE ORAL 2 TIMES DAILY
Qty: 60 CAPSULE | Refills: 3 | Status: ON HOLD | OUTPATIENT
Start: 2020-05-11 | End: 2020-05-26

## 2020-05-11 RX ORDER — DAPSONE 100 MG/1
100 TABLET ORAL DAILY
Qty: 30 TABLET | Refills: 3 | Status: ON HOLD | OUTPATIENT
Start: 2020-05-12 | End: 2020-05-26

## 2020-05-11 RX ORDER — LEUCOVORIN CALCIUM 25 MG/1
25 TABLET ORAL EVERY 6 HOURS
Qty: 10 TABLET | Refills: 0 | Status: SHIPPED | OUTPATIENT
Start: 2020-05-11 | End: 2020-05-15

## 2020-05-11 RX ADMIN — FILGRASTIM 480 MCG: 480 INJECTION, SOLUTION INTRAVENOUS; SUBCUTANEOUS at 14:19

## 2020-05-11 RX ADMIN — POTASSIUM CHLORIDE 40 MEQ: 750 TABLET, EXTENDED RELEASE ORAL at 14:13

## 2020-05-11 RX ADMIN — ACYCLOVIR 400 MG: 200 CAPSULE ORAL at 08:48

## 2020-05-11 RX ADMIN — SODIUM BICARBONATE: 84 INJECTION, SOLUTION INTRAVENOUS at 10:36

## 2020-05-11 RX ADMIN — LEUCOVORIN CALCIUM 32 MG: 350 INJECTION, POWDER, LYOPHILIZED, FOR SOLUTION INTRAMUSCULAR; INTRAVENOUS at 08:51

## 2020-05-11 RX ADMIN — POTASSIUM CHLORIDE 20 MEQ: 750 TABLET, EXTENDED RELEASE ORAL at 08:47

## 2020-05-11 RX ADMIN — DAPSONE 100 MG: 100 TABLET ORAL at 08:47

## 2020-05-11 RX ADMIN — LEUCOVORIN CALCIUM 32 MG: 350 INJECTION, POWDER, LYOPHILIZED, FOR SOLUTION INTRAMUSCULAR; INTRAVENOUS at 02:09

## 2020-05-11 RX ADMIN — LEVOFLOXACIN 250 MG: 250 TABLET, FILM COATED ORAL at 08:47

## 2020-05-11 RX ADMIN — SENNOSIDES AND DOCUSATE SODIUM 1 TABLET: 8.6; 5 TABLET ORAL at 08:47

## 2020-05-11 RX ADMIN — FLUCONAZOLE 200 MG: 200 TABLET ORAL at 08:48

## 2020-05-11 RX ADMIN — LEVOTHYROXINE SODIUM 200 MCG: 0.07 TABLET ORAL at 08:47

## 2020-05-11 RX ADMIN — LEUCOVORIN CALCIUM 32 MG: 350 INJECTION, POWDER, LYOPHILIZED, FOR SOLUTION INTRAMUSCULAR; INTRAVENOUS at 14:19

## 2020-05-11 RX ADMIN — SODIUM BICARBONATE: 84 INJECTION, SOLUTION INTRAVENOUS at 05:01

## 2020-05-11 RX ADMIN — ENOXAPARIN SODIUM 100 MG: 100 INJECTION SUBCUTANEOUS at 08:48

## 2020-05-11 RX ADMIN — POTASSIUM CHLORIDE 40 MEQ: 750 TABLET, EXTENDED RELEASE ORAL at 06:59

## 2020-05-11 RX ADMIN — SODIUM BICARBONATE: 84 INJECTION, SOLUTION INTRAVENOUS at 00:00

## 2020-05-11 ASSESSMENT — ACTIVITIES OF DAILY LIVING (ADL)
ADLS_ACUITY_SCORE: 13

## 2020-05-11 ASSESSMENT — MIFFLIN-ST. JEOR: SCORE: 1791.8

## 2020-05-11 NOTE — PLAN OF CARE
Afebrile, VSS on room air. No complaints. Eating well. Good PO intake. Subcutaneous Neupogen given. PICC removed. 1 unit PRBCs given. 20mEq oral potassium given, potassium recheck was 3.1, given 40mEq potassium. Pt plans to take 20mEq potassium tomorrow morning then the potassium level will be checked 5/12 at 12:30 appointment.     Pt discharged to: home  Via: own vehicle   Accompanied by: spouse, kvng  Belongings: sent with pt  Teaching: discharged medication, neutropenic precautions, N95 use  Cl[place] [facility] [facility]            Problem: Adult Inpatient Plan of Care  Goal: Plan of Care Review  5/11/2020 1553 by Shawna Cooley RN  Outcome: Adequate for Discharge  5/11/2020 0725 by Natan Dick  Outcome: No Change  Goal: Patient-Specific Goal (Individualization)  5/11/2020 1553 by Shawna Cooley RN  Outcome: Adequate for Discharge  5/11/2020 0725 by Natan Dick  Outcome: Improving  Goal: Absence of Hospital-Acquired Illness or Injury  5/11/2020 1553 by Shawna Cooley RN  Outcome: Adequate for Discharge  5/11/2020 0725 by Natan Dick  Outcome: No Change  Goal: Optimal Comfort and Wellbeing  5/11/2020 1553 by Shawna Cooley RN  Outcome: Adequate for Discharge  5/11/2020 0725 by Natan Dick  Outcome: Improving  Goal: Readiness for Transition of Care  5/11/2020 1553 by Shawna Cooley RN  Outcome: Adequate for Discharge  5/11/2020 0725 by Natan Dick  Outcome: Improving  Goal: Rounds/Family Conference  5/11/2020 1553 by Shawna Cooley RN  Outcome: Adequate for Discharge  5/11/2020 0725 by Natan Dick  Outcome: Improving     Problem: Anemia (Chemotherapy Effects)  Goal: Anemia Symptom Improvement  5/11/2020 1553 by Shawna Cooley RN  Outcome: Adequate for Discharge  5/11/2020 0725 by Natan Dick  Outcome: Improving     Problem: Urinary Bleeding Risk or Actual (Chemotherapy Effects)  Goal: Absence of Hematuria  5/11/2020 1553 by Shawna Cooley RN  Outcome: Adequate for  Discharge  5/11/2020 0725 by Natan Dick  Outcome: Improving     Problem: Nausea and Vomiting (Chemotherapy Effects)  Goal: Fluid and Electrolyte Balance  5/11/2020 1553 by Shawna Cooley RN  Outcome: Adequate for Discharge  5/11/2020 0725 by Natan Dick  Outcome: Improving     Problem: Neurotoxicity (Chemotherapy Effects)  Goal: Neurotoxicity Symptom Control  5/11/2020 1553 by Shawna Cooley RN  Outcome: Adequate for Discharge  5/11/2020 0725 by Natan Dick  Outcome: Improving     Problem: Neutropenia (Chemotherapy Effects)  Goal: Absence of Infection  5/11/2020 1553 by Shawna Cooley RN  Outcome: Adequate for Discharge  5/11/2020 0725 by Natan Dick  Outcome: Improving     Problem: Oral Mucositis (Chemotherapy Effects)  Goal: Improved Oral Mucous Membrane Integrity  5/11/2020 1553 by Shawna Cooley RN  Outcome: Adequate for Discharge  5/11/2020 0725 by Natan Dick  Outcome: Improving     Problem: Thrombocytopenia Bleeding Risk (Chemotherapy Effects)  Goal: Absence of Bleeding  5/11/2020 1553 by Shawna Cooley RN  Outcome: Adequate for Discharge  5/11/2020 0725 by Natan Dick  Outcome: Improving     Outcome: Adequate for Discharge

## 2020-05-11 NOTE — PLAN OF CARE
"/73 (BP Location: Left arm)   Pulse 77   Temp 96.7  F (35.9  C) (Axillary)   Resp 16   Ht 1.74 m (5' 8.5\")   Wt 98.1 kg (216 lb 4.8 oz)   SpO2 93%   BMI 32.41 kg/m      VSS on room air. K was 3.2, replaced 40mEq. Will need another 20meQ @ 0900 and recheck after. Bicarb infusing @ 225mL/hr. Urine pH 8.0 and 8.5. Plan on discharge today.    Problem: Adult Inpatient Plan of Care  Goal: Patient-Specific Goal (Individualization)  5/11/2020 0725 by Natan Dick  Outcome: Improving     Problem: Adult Inpatient Plan of Care  Goal: Optimal Comfort and Wellbeing  5/11/2020 0725 by Natan Dick  Outcome: Improving     "

## 2020-05-11 NOTE — PROGRESS NOTES
Labs and Transfusion Orders:  Date: May 11, 2020    Patient: Kobe Pedroza  : 1960    LABS:  [  ] Check CBC with differential and CMP three times a week (fax labs to [facility] at [phone])  [  ] Type and cross PRN    TRANSFUSION PARAMETERS:   [  ] Please transfuse 2 (two) units PRBCs if Hgb is less than or equal to 8.  [  ] Please transfuse 1 (one) unit platelets if plt count less than or equal to 10,000.   [  ] If patient experiences transfusion reaction during transfusion:   [  ] 25-50 mg benadryl IV x once PRN   [  ] Tylenol 1000 mg PO x once PRN   [  ] Hydrocortisone 100 mg IV x once PRN   [  ] Zantac 150 mg IV x once PRN   [  ] Notify provider covering infusion clinic per protocol      Please call the [facility] at [phone] for any questions, and ask to speak with the care coordinator for Dr. Wright (patient's primary oncologist).    Thank you,         Lupis Alvarez PA-C    [facility]  Department of Hematology/Oncology  [address] [place] [address]  Pager: [phone]

## 2020-05-11 NOTE — PLAN OF CARE
"/75 (BP Location: Left arm)   Pulse 83   Temp 97.8  F (36.6  C) (Axillary)   Resp 16   Ht 1.74 m (5' 8.5\")   Wt 98.1 kg (216 lb 4.8 oz)   SpO2 95%   BMI 32.41 kg/m     [id number]  Pt's AVSS, no c/o pain or nausea. Up independently and voiding good amount. MIVF infusing at 225 and last urine pH was send at 2300. Next pH level will be at 0700. Keep monitoring pt as ordered and notify MD with any new changes.   Problem: Adult Inpatient Plan of Care  Goal: Plan of Care Review  5/10/2020 1847 by Kyle Bustamante RN  Outcome: No Change  Goal: Patient-Specific Goal (Individualization)  5/10/2020 1847 by Kyle Bustamante RN  Outcome: No Change  Goal: Absence of Hospital-Acquired Illness or Injury  5/10/2020 1847 by Kyle Bustamante RN  Outcome: No Change  Goal: Optimal Comfort and Wellbeing  5/10/2020 1847 by Kyle Bustamante RN  Outcome: No Change  Goal: Readiness for Transition of Care  5/10/2020 1847 by Kyle Bustamante RN  Outcome: No Change  Goal: Rounds/Family Conference  5/10/2020 1847 by Kyle Bustamante RN  Outcome: No Change     Problem: Anemia (Chemotherapy Effects)  Goal: Anemia Symptom Improvement  5/10/2020 1847 by Kyle Bustamante RN  Outcome: No Change     Problem: Urinary Bleeding Risk or Actual (Chemotherapy Effects)  Goal: Absence of Hematuria  5/10/2020 1847 by Kyle Bustamante, RN  Outcome: No Change     Problem: Nausea and Vomiting (Chemotherapy Effects)  Goal: Fluid and Electrolyte Balance  5/10/2020 1847 by Kyle Bustamante, RN  Outcome: No Change     Problem: Neurotoxicity (Chemotherapy Effects)  Goal: Neurotoxicity Symptom Control  5/10/2020 1847 by Kyle Bustamante RN  Outcome: No Change     Problem: Neutropenia (Chemotherapy Effects)  Goal: Absence of Infection  5/10/2020 1847 by Kyle Bustamante, RN  Outcome: No Change     Problem: Oral Mucositis (Chemotherapy Effects)  Goal: Improved Oral Mucous Membrane Integrity  5/10/2020 1847 by Kyle Bustamante, RN  Outcome: No Change     Problem: " Thrombocytopenia Bleeding Risk (Chemotherapy Effects)  Goal: Absence of Bleeding  5/10/2020 1847 by Kyle Bustamante, RN  Outcome: No Change      Launch [employer] and print the 'Prescriptions from this Visit' Report

## 2020-05-11 NOTE — PROGRESS NOTES
Care Coordinator Progress Note    Admission Date/Time:  5/8/2020  Attending MD:  Chris Suazo MD    Data  Chart reviewed, discussed with interdisciplinary team.   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    Concerns with insurance coverage for discharge needs: None.  Current Living Situation: Patient lives with spouse.  Support System: Supportive and Involved  Services Involved: None  Transportation at Discharge: Car and Family or friend will provide  Transportation to Medical Appointments:   - Name of caregiver: Mckayla spouse  Barriers to Discharge: Medical Stability    Coordination of Care and Referrals:    Patient admitted for scheduled Cycle 2 R-CODOX-M (high-dose methotrexate portion). Per team, patient will need twice weekly labs (CBC w/ diff and CMP) this week (Wednesday/Friday) and then 3x/weekly labs next week (Monday/Wednesday/Friday) at his local hospital, [facility] ([address] [place] [address]), per patient request.     Writer called [facility] ([phone]) and spoke with lab department. Writer stated that patient can come anytime Monday through Friday, from 8am to 5pm, however they would need new orders faxed to ([phone]). Orders faxed and requested lab appointments on 5/13, 5/15, 5/18, 5/20, and 5/22.    AVS updated. RNCC will follow as needed.      Plan  Anticipated Discharge Date:  5/11/20  Anticipated Discharge Plan:  Home with clinic follow-up as recommended.     Dyan Jimenez RN, BSN, PHN  Care Coordinator   P: [phone], [facility]

## 2020-05-11 NOTE — DISCHARGE SUMMARY
Discharge Summary  Hematology / Oncology    Date of Admission:  5/8/2020  Date of Discharge:  05/11/2020  Discharging Provider: Lupis Alvarez  Date of Service (when I saw the patient): 05/11/2020    Discharge Diagnoses   Active Problems:    Burkitt lymphoma (H)      History of Present Illness   Kobe Pedroza is a 60 year old male with a history of thyroid cancer status post thyroidectomy (2011), CAD, hyperlipidemia, distal RLE DVT on enoxaparin, and newly diagnosed Burkitt's Lymphoma status post Cycle 1 of R-CODOX-M / R-IVAC chemotherapy regimen with subsequent PET-CT demonstrating complete remission who is now admitted for scheduled Cycle 2 R-CODOX-M (high-dose methotrexate portion). Patient tolerated chemotherapy very well, with no significant adverse effects. He was noted to have progressive pancytopenia due to chemotherapy and was started on prophylactic antimicrobials. His methotrexate level quite nearly cleared (0.11 on day of discharge; cut-off is 0.1). Patient has scheduled labs tomorrow, 5/12 and was anxious to be discharged, and as such, he will be continued on PO leucovorin and have a repeat level checked tomorrow. He was also noted to have some mild hypokalemia on the day of discharge, which was repleted per protocol, and will have electrolytes rechecked tomorrow as well. He has three-times weekly labs arranged in [place], [place] per his preference. We reviewed neutropenic precautions as well as reasons to call clinic triage or return to the ED and he voiced understanding. On the day of discharge, patient was quite well-appearing and hemodynamically stable and felt safe and comfortable with the plan for discharge and follow-up.    New discharge medications: Dapsone, leucovorin  Next follow-up: 5/12, labs + Neulasta (CSC). 5/13, labs in [place]. 5/15, labs in [place] + BOB.    Hospital Course   Kobe Pedroza was admitted on 5/8/2020.  The following problems were addressed during his  hospitalization:    HEME  # Burkitt Lymphoma  Followed by Dr. Wright. Presented on 3/12/20 to [facility] with acute-on-chronic mid-thoracic back pain with some associated radicular symptoms. Per patient, no B-symptoms, though he did note an intentional 35-lb weight loss. 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 Patient had no appreciable neurological deficits. He was started on dexamethasone and underwent T5-6 laminectomy with gross total resection of epidural tumor on 3/15/2020. Flow cytometry of the specimen was notable for CD10 positive and kappa monotypic B cells (83%). Confirmed Burkitt lymphoma with surgical pathology. PET scan showed extensive visceral and bony disease. Bone marrow biopsy on 3/18 showed suspicious involvement with rare polytypic B cells (0.8%). No disease in CSF. Started R-CODOX-M (Cycle 1, Day 1=3/18/2020), with was well-tolerated. Received Cycle 1 R-IVAC (C1D1=4/8/2020), which was complicated by development of neutropenic fever requiring hospitalization. PET-CT on 4/27/2020 demonstrated a complete remission. Patient is admitted now for the high-dose methotrexate portion of Cycle 2 of R-CODOX-M (Day 10-11 per Maryana et al.).  - PICC removed on discharge.  - Verified discharge plans with Dr. Wright; will give Neulasta tomorrow in place of Neupogen.  - Patient scheduled to be admitted for C2 R-IVAC on 5/20/2020.  - RNCC for Dr. Wright will follow-up methotrexate level and call patient to notify him whether or not to continue PO leucovorin.                             Treatment Plan: R-CODOX-M. Cycle 2, Day 1 = 4/30/2020. Today is Day 12.                          - Vincristine 2 mg IV x1 dose -- Day 9                            - Methotrexate 3 g/m2 -- Day 9                            - Leucovorin 200 mg/m2 x1 dose 24H after MTX start -- Day 10                            - Leucovorin 15 mg/m2 Q6H -- Day  10;  beginning 6H after first leucovorin dose until MTX level <0.1                            - Dexamethasone 12 mg PO x1 dose -- Day 1                            - Dexamethasone 8 mg PO daily x2 doses -- Day 2-3                            - Neulasta -- scheduled tomorrow, 5/12, at [facility]                            # Pancytopenia  # Neutropenia  Presumed related to underlying malignancy plus recent chemotherapy. No recent bleeding concerns. Hgb stable on admission. Patient received G-CSF daily on 5/3-5/7; will plan for Neulasta following this cycle (scheduled for tomorrow, 5/12).  - Transfuse to keep Hgb >7, platelets >10K    FEN/GI  # Hypokalemia  Noted on day of discharge. K+ 3.0 ? 3.1 after replacement. Additional 40 mEq PO given prior to discharge.  - Continue 40 mEq KCl daily  - Follow-up potassium level in outpatient setting; consider increasing PO replacement PRN    ID  # Prophylaxis  - Continue  mg BID, fluconazole 200 mg daily, levofloxacin 250 mg daily  - G6PD resulted since last admission; no evidence of deficiency. Continue dapsone 100 mg daily for PJP ppx. Currently unable to give pentamidine due to COVID-19 restrictions.     CV  # Coronary artery disease  # Hyperlipidemia  Followed by Dr. Zeng at [facility] ([place] Diagnosed with mild, non-obstructive CAD in 2018. 81 mg ASA and low-dose statin therapy recommended. No previous cardiac caths or stents.  - Okay to resume statin on discharge; continue holding ASA given thrombocytopenia and need for treatment-dose anticoagulation.     ENDO  # History of thyroid cancer status-post thyroidectomy  Status post thyroidectomy in 2011. TSH normal on 3/12/20.  - Continue PTA levothyroxine      GI  # History of hemorrhoids  # History of lower GI bleeding  Reported BRBPR during recent admission (4/19-4/22) in the context of previously diagnosed hemorrhoids. No recent recurrence.  - Continue scheduled senna  - Monitor     MSK  # Right distal  peroneal DVT  Diagnosed on US on 4/30 with an occlusive deep vein thrombus in the right peroneal vein at the mid calf.  - Continue enoxaparin 100 mg BID. Hold for platelets <50,000.     Discussed with Dr. Juan David Jaimes.     Lupis Alvarez PA-C  Hematology/Oncology  Pager: #[phone]    Staff summary   The patient was discussed on morning rounds with the nurses, mid level provider, and house staff and seen and examined by me. All labs and imaging were reviewed. I reviewed events over the last 24 hours including vitals and flow sheets. I agree with the above note and have been responsible for the care plan and interpretation of progress.     Subjective:  Reports feeling overall well, no concerns, no abd pain, no F/C/NS, no N/V/C/D, no GI or  complaints, no URI or other respiratory symptoms, no HA/LH/Dizziness.      Vitals reviewed, labs reviewed  PE:  NAD, Alert, oriented x3  HEENT: moist mmm, no oral ulcers  Heart: RRR  Lungs: CTAB  Abdomen: +BS, soft non tender, non distended  LE: no edema  Skin: no rashes     Assessment and Pan:  60 year old male with Burkitt lymphoma, admitted on 4/30/2020 for completion of cycle 2 CODOX-M. Recent DVT, continue anticoagulation until platelets less than 50,000.  Supportive care and infection prophylaxis per routine.  Monitor for any adverse events or side effects of chemotherapy.  Methotrexate protocol per pharmacy, pharmacy okay with discharge per protocol with level of 0.11.  Will be discharged on leucovorin 25 every 6 hours with a level to be followed up tomorrow to decide on any additional measures.  PT. discharge planning and follow-up closely as above.  Plan was discussed with the patient and he is no questions or concerns. > 30 minutes spent in coordination of care for discharge    Chris Jaimes MD             Code Status   Full Code    Primary Care Physician   Garett Arriaga    Physical Exam   Vital Signs with Ranges  Temp:  [96.7  F (35.9  C)-98  F (36.7  C)] 97.5  F  (36.4  C)  Pulse:  [67-97] 67  Heart Rate:  [66-68] 68  Resp:  [16-18] 18  BP: (112-145)/(69-79) 112/77  SpO2:  [93 %-99 %] 96 %  218 lbs 1.6 oz    Constitutional: Pleasant and cooperative male. Awake, alert, NAD. Seated on the edge of the bed.  HEENT: NC/AT, EOMI, sclera clear, conjunctiva normal, OP with MMM, no mucositis.  Respiratory: No increased work of breathing, CTAB, no crackles or wheezing.  Cardiovascular: RRR, no murmur noted. No peripheral edema.  MSK: No pretibial edema, no gross deformities  GI: Normal bowel sounds, soft, non-distended and non-tender.  Skin: Warm, dry, well-perfused. Healing surgical scar to the upper thoracic spine. No other bruising, bleeding, rashes, or lesions on limited exam.  Neurologic: A&O. Answers questions appropriately. Moves all extremities spontaneously.  Neuropsychiatric: Calm, appropriate affect  Vascular access:  PICC on RUE CDI, non-tender, no surrounding erythema, no hematoma.    Discharge Disposition   Discharged to home  Condition at discharge: Stable    Consultations This Hospital Stay   VASCULAR ACCESS ADULT IP CONSULT  MEDICATION HISTORY IP PHARMACY CONSULT    Discharge Orders   No discharge procedures on file.  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)      enoxaparin ANTICOAGULANT (LOVENOX) 100 MG/ML syringe Inject 1 mL (100 mg) Subcutaneous every 12 hours  Qty: 60 Syringe, Refills: 4    Associated Diagnoses: Acute deep vein thrombosis (DVT) of distal vein of right lower extremity (H)      fluconazole (DIFLUCAN) 200 MG tablet Take 200 mg by mouth daily ONLY TAKE IF ANC  <1000  Qty: 30 tablet, Refills: 0    Associated Diagnoses: 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)      levofloxacin (LEVAQUIN) 250 MG tablet Take 250 mg by mouth daily ONLY TAKE IF ANC <1000  Qty: 30 tablet, Refills: 0    Associated Diagnoses: 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)      levothyroxine (SYNTHROID/LEVOTHROID) 200 MCG tablet Take 200 mcg by mouth daily      potassium chloride (KLOR-CON) 20 MEQ packet Take 40 mEq by mouth daily  Qty: 60 packet, Refills: 0    Associated Diagnoses: 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)      senna-docusate (SENOKOT-S/PERICOLACE) 8.6-50 MG tablet Take 1 tablet by mouth 2 times daily  Qty: 60 tablet, Refills: 0    Associated Diagnoses: 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)      ondansetron (ZOFRAN) 4 MG tablet Take 1-2 tablets (4-8 mg) by mouth every 6 hours as needed for nausea  Qty: 90 tablet, Refills: 3    Associated Diagnoses: 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)      oxyCODONE (ROXICODONE) 5 MG tablet Take 1-2 tablets (5-10 mg) by mouth every 6 hours as needed for severe pain  Qty: 20 tablet, Refills: 0    Associated Diagnoses: 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); Cancer related pain           Allergies   No Known Allergies    Data   Most Recent 3 CBC's:  Recent Labs   Lab Test 05/11/20  0445 05/10/20  0400 05/09/20  0415   WBC 0.3* 0.3* 0.3*   HGB 7.1* 6.6* 7.3*   MCV 89 91 89   PLT 83* 121* 165      Most Recent 3 BMP's:  Recent Labs   Lab Test 05/11/20  1309 05/11/20  0445 05/10/20  0400 05/09/20  0415   NA  --  142 140 137   POTASSIUM 3.1* 3.0* 3.4 3.6   CHLORIDE  --  106 106 103   CO2  --  30 30 28   BUN  --  13 13 12   CR  --  0.59* 0.53* 0.55*   ANIONGAP  --  6 5 6   RUTH  --  7.4* 7.5* 7.8*   GLC  --  173* 202* 273*     Most Recent 2 LFT's:  Recent Labs   Lab Test 05/11/20  0445 05/10/20  0400   AST 32 19   ALT 84* 45   ALKPHOS 49 56   BILITOTAL 0.4 0.2      Most Recent INR's and Anticoagulation Dosing History:  Anticoagulation Dose History     Recent Dosing and Labs Latest Ref Rng & Units 4/11/2020 4/12/2020 4/13/2020 4/19/2020 4/30/2020 5/1/2020 5/8/2020    INR 0.86 - 1.14 1.07 1.07 1.03 1.08 1.11 1.16(H) 0.99        Most Recent 3 Troponin's:No lab results found.  Most Recent Cholesterol Panel:No lab results found.  Most Recent 6 Bacteria Isolates From Any Culture (See EPIC Reports for Culture Details):  Recent Labs   Lab Test 04/30/20  1340 04/20/20  1009 04/20/20  1004 04/19/20  0530 04/19/20  0525 04/19/20  0522   CULT No growth No growth No growth No growth No growth No growth     Most Recent TSH, T4 and A1c Labs:  Recent Labs   Lab Test 03/14/20  0439 03/12/20   TSH  --  0.43   A1C 5.5  --      Results for orders placed or performed during the hospital encounter of 04/30/20   US Lower Extremity Venous Duplex Right    Narrative    EXAMINATION: US LOWER EXTREMITY VENOUS DUPLEX RIGHT  4/30/2020 8:36 PM       CLINICAL HISTORY: History of Burkitt lymphoma, right calf pain and  swelling    COMPARISON: None        PROCEDURE COMMENTS: Ultrasound was performed of the deep venous system  of the right lower extremity using grayscale, color, and spectral  Doppler.    FINDINGS:  The right common femoral, greater saphenous origin, femoral, and  popliteal veins are visualized and are patent. Venous waveforms are  normal. There is normal response to compression.    The posterior tibial vein is fully compressible. The peroneal vein is  not compressible at the mid calf. The peroneal vein at the ankle and  proximal calf is fully compressible.    The left common femoral vein is visualized and is patent with normal  venous waveforms and compressibility.      Impression    IMPRESSION:.  Occlusive deep vein thrombus in the right peroneal vein at the mid  calf. The deep veins of the right lower extremity are otherwise  patent.    Above findings were discussed with Dr. Lee by   Sanya at 9:44 PM  on 4/30/2020.    I have personally reviewed the examination and initial interpretation  and I agree with the findings.    MO GOLDSTEIN MD   XR Lumbar [facility] Intrathecal Chemo Admin    Narrative    Lumbar Puncture using Fluoroscopy    History:  60M w/ Burkitt lymphoma. Intrathecal chemotherapy  administration.  ICD-10:    Procedure note: Verbal and written consent for lumbar puncture was  obtained from the patient, and benefits and risk of the procedure were  explained, including but not limited to worsening headache,  hemorrhage, infection, lower extremity pain, or nerve root injury. The  patient was sterilely prepped and draped with the patient in the prone  position, over the lower back. Under fluoroscopic guidance, the  interlaminar spaces were noted. 1% lidocaine was administered for  local anesthetic over the L3-4 interlaminar space, and a 22 gauge 3.5  inch needle was advanced into the thecal sac under fluoroscopic  guidance.  There was initial show of clear CSF.  Approximately 8 cc of  CSF were collected. Chemotherapy agent was administered by the  oncology service.    The needle was removed with the stylet in place. There was no  immediate complication associated with the procedure. Samples were  sent for the requested laboratory testing.      Estimated blood loss: Less than 1 cc.    Fluoroscopic time: 17 seconds      Impression    Impression: Successful lumbar puncture and administration of  intrathecal chemotherapy without immediate complication.    WILLIAM MOSQUEDA MD

## 2020-05-12 ENCOUNTER — PATIENT OUTREACH (OUTPATIENT)
Dept: CARE COORDINATION | Facility: CLINIC | Age: 60
End: 2020-05-12

## 2020-05-12 ENCOUNTER — PATIENT OUTREACH (OUTPATIENT)
Dept: ONCOLOGY | Facility: CLINIC | Age: 60
End: 2020-05-12

## 2020-05-12 ENCOUNTER — APPOINTMENT (OUTPATIENT)
Dept: LAB | Facility: CLINIC | Age: 60
End: 2020-05-12
Attending: INTERNAL MEDICINE
Payer: COMMERCIAL

## 2020-05-12 ENCOUNTER — ALLIED HEALTH/NURSE VISIT (OUTPATIENT)
Dept: ONCOLOGY | Facility: CLINIC | Age: 60
End: 2020-05-12
Attending: INTERNAL MEDICINE
Payer: COMMERCIAL

## 2020-05-12 VITALS
TEMPERATURE: 98.2 F | WEIGHT: 219.2 LBS | OXYGEN SATURATION: 97 % | HEART RATE: 88 BPM | BODY MASS INDEX: 32.84 KG/M2 | DIASTOLIC BLOOD PRESSURE: 85 MMHG | SYSTOLIC BLOOD PRESSURE: 128 MMHG

## 2020-05-12 DIAGNOSIS — T45.1X5A CHEMOTHERAPY-INDUCED NEUTROPENIA (H): ICD-10-CM

## 2020-05-12 DIAGNOSIS — D70.1 CHEMOTHERAPY-INDUCED NEUTROPENIA (H): ICD-10-CM

## 2020-05-12 DIAGNOSIS — C83.78 BURKITT LYMPHOMA OF LYMPH NODES OF MULTIPLE REGIONS (H): Primary | ICD-10-CM

## 2020-05-12 LAB
ABO + RH BLD: NORMAL
ABO + RH BLD: NORMAL
ALBUMIN SERPL-MCNC: 3.7 G/DL (ref 3.4–5)
ALP SERPL-CCNC: 68 U/L (ref 40–150)
ALT SERPL W P-5'-P-CCNC: 113 U/L (ref 0–70)
ANION GAP SERPL CALCULATED.3IONS-SCNC: 8 MMOL/L (ref 3–14)
ANISOCYTOSIS BLD QL SMEAR: SLIGHT
AST SERPL W P-5'-P-CCNC: 32 U/L (ref 0–45)
BASOPHILS # BLD AUTO: 0 10E9/L (ref 0–0.2)
BASOPHILS NFR BLD AUTO: 0.9 %
BILIRUB SERPL-MCNC: 0.4 MG/DL (ref 0.2–1.3)
BLD GP AB SCN SERPL QL: NORMAL
BLOOD BANK CMNT PATIENT-IMP: NORMAL
BUN SERPL-MCNC: 16 MG/DL (ref 7–30)
CALCIUM SERPL-MCNC: 8.6 MG/DL (ref 8.5–10.1)
CHLORIDE SERPL-SCNC: 104 MMOL/L (ref 94–109)
CO2 SERPL-SCNC: 26 MMOL/L (ref 20–32)
CREAT SERPL-MCNC: 0.73 MG/DL (ref 0.66–1.25)
DIFFERENTIAL METHOD BLD: ABNORMAL
EOSINOPHIL # BLD AUTO: 0 10E9/L (ref 0–0.7)
EOSINOPHIL NFR BLD AUTO: 0 %
ERYTHROCYTE [DISTWIDTH] IN BLOOD BY AUTOMATED COUNT: 17.1 % (ref 10–15)
GFR SERPL CREATININE-BSD FRML MDRD: >90 ML/MIN/{1.73_M2}
GLUCOSE SERPL-MCNC: 87 MG/DL (ref 70–99)
HCT VFR BLD AUTO: 29.9 % (ref 40–53)
HGB BLD-MCNC: 10 G/DL (ref 13.3–17.7)
LYMPHOCYTES # BLD AUTO: 0.3 10E9/L (ref 0.8–5.3)
LYMPHOCYTES NFR BLD AUTO: 10.8 %
MCH RBC QN AUTO: 29.9 PG (ref 26.5–33)
MCHC RBC AUTO-ENTMCNC: 33.4 G/DL (ref 31.5–36.5)
MCV RBC AUTO: 90 FL (ref 78–100)
MONOCYTES # BLD AUTO: 0.1 10E9/L (ref 0–1.3)
MONOCYTES NFR BLD AUTO: 5.4 %
MTX SERPL-SCNC: <0.04 UMOL/L
NEUTROPHILS # BLD AUTO: 2.1 10E9/L (ref 1.6–8.3)
NEUTROPHILS NFR BLD AUTO: 82.9 %
PLATELET # BLD AUTO: 53 10E9/L (ref 150–450)
PLATELET # BLD EST: ABNORMAL 10*3/UL
POTASSIUM SERPL-SCNC: 4 MMOL/L (ref 3.4–5.3)
PROT SERPL-MCNC: 7.2 G/DL (ref 6.8–8.8)
RBC # BLD AUTO: 3.34 10E12/L (ref 4.4–5.9)
SODIUM SERPL-SCNC: 137 MMOL/L (ref 133–144)
SPECIMEN EXP DATE BLD: NORMAL
WBC # BLD AUTO: 2.5 10E9/L (ref 4–11)

## 2020-05-12 PROCEDURE — 86901 BLOOD TYPING SEROLOGIC RH(D): CPT | Performed by: PHYSICIAN ASSISTANT

## 2020-05-12 PROCEDURE — 80053 COMPREHEN METABOLIC PANEL: CPT | Performed by: PHYSICIAN ASSISTANT

## 2020-05-12 PROCEDURE — 80299 QUANTITATIVE ASSAY DRUG: CPT | Performed by: PHYSICIAN ASSISTANT

## 2020-05-12 PROCEDURE — 86850 RBC ANTIBODY SCREEN: CPT | Performed by: PHYSICIAN ASSISTANT

## 2020-05-12 PROCEDURE — 36592 COLLECT BLOOD FROM PICC: CPT

## 2020-05-12 PROCEDURE — 86900 BLOOD TYPING SEROLOGIC ABO: CPT | Performed by: PHYSICIAN ASSISTANT

## 2020-05-12 PROCEDURE — 25000128 H RX IP 250 OP 636: Mod: ZF | Performed by: PHYSICIAN ASSISTANT

## 2020-05-12 PROCEDURE — 85025 COMPLETE CBC W/AUTO DIFF WBC: CPT | Performed by: PHYSICIAN ASSISTANT

## 2020-05-12 PROCEDURE — 96372 THER/PROPH/DIAG INJ SC/IM: CPT

## 2020-05-12 RX ADMIN — PEGFILGRASTIM 6 MG: 6 INJECTION SUBCUTANEOUS at 13:19

## 2020-05-12 ASSESSMENT — PAIN SCALES - GENERAL: PAINLEVEL: NO PAIN (0)

## 2020-05-12 NOTE — PROGRESS NOTES
Jonnathan has no further appointments after today except for a phone visit with PA on 5/15.  Per Dr. Wright -   He should have 2x weekly labs with possible transfusions until he recovers blood counts. This week and next should be good.   Hb <7 and platelets <10 and he needs to hold anticoagulation if platelets are <50 until recovery      Natacha Agarwal RNCC is aware of this patient and will be working on setting him up hopefully closer to home.    Today -   HGB 10   PLT 53    Leda Ramires RN

## 2020-05-12 NOTE — PROGRESS NOTES
Patient presents to the [facility] for pegfilgrastim (NEULASTA) injection 6 mg. Order written by Lyubov Sin PA-C, was completed today. Name and  were verified by patient. See MAR for medication details. Patient was asked if they have any new symptoms or questions/concerns. Medication was given in the following site: left lower abdomen. Patient tolerated injection well and was discharged to home.    -Yue CARTAGENA, CMA

## 2020-05-12 NOTE — PROGRESS NOTES
Date: 5/15/2020 Status: [facility]   Time: 3:50 PM Length: 50   Visit Type: VIDEO VISIT RETURN [1346] NIKHIL: [id number]   Provider: Lyubov Sin PA-C Department:  ONCOLOGY ADULT

## 2020-05-12 NOTE — NURSING NOTE
Chief Complaint   Patient presents with     Lab Only     labs drawn via picc, saline and heparin locked, vitals completed

## 2020-05-12 NOTE — PROGRESS NOTES
Writer called Jonnathan to review today's labs. Instructed him to stop his Leucovorin and enoxaparin. Continue on his Levaquin until we see a drop and recover of his WBC from the Neulasta he received today. He will remain off his enoxaparin until his Plts drop and recover and are over 30,000 again.     He will have labs tomorrow, labs and possible transfusions on Friday at [facility]. He will go directly to the 2nd floor infusion center. They have orders there.     Jonnathan and Mckayla expressed good understanding of the above.      5/15/2020-Labs done at [facility], no transfusions needed, Plts at 33, he will continue to hold Enoxaparin until drop and recover seen.     As writer was talking to Jonnathan he has a slight nose bleed. He had scratch his nose. Instructed him to hold pressure and call back if it did not stop. Confirmed he was not taking enoxaparin. He is not.     Next labs on Monday of next week.

## 2020-05-13 ENCOUNTER — TRANSFERRED RECORDS (OUTPATIENT)
Dept: HEALTH INFORMATION MANAGEMENT | Facility: CLINIC | Age: 60
End: 2020-05-13

## 2020-05-13 LAB
BASOPHILS # BLD AUTO: 0.07 10*3/UL (ref 0–0.2)
BASOPHILS NFR BLD AUTO: 0.7 % (ref 0–1)
DIFFERENTIAL: ABNORMAL
EOSINOPHIL # BLD AUTO: 0.01 10*3/UL (ref 0.04–0.54)
EOSINOPHIL NFR BLD AUTO: 0.1 % (ref 0–5)
ERYTHROCYTE [DISTWIDTH] IN BLOOD BY AUTOMATED COUNT: 53.7 % (ref 35.1–43.9)
HCT VFR BLD AUTO: 29.6 % (ref 40–52)
HEMOGLOBIN: 10.1 10^12/L (ref 13–18)
IMMATURE GRANS (ABS): 0.18 X10E3/UL (ref 0–0.1)
IMMATURE GRANULOCYTES: 1.9 % (ref 0–1)
LYMPHOCYTES # BLD AUTO: 0.16 10*3/UL (ref 0.76–4)
LYMPHOCYTES NFR BLD AUTO: 1.7 % (ref 18–40)
MCH RBC QN AUTO: 31 PG (ref 27–34)
MCHC RBC AUTO-ENTMCNC: 34 G/DL (ref 32–36)
MCV RBC AUTO: 90 FL (ref 80–96)
MONOCYTES # BLD AUTO: 0.3 10*3/UL (ref 0–0.9)
MONOCYTES NFR BLD AUTO: 3.2 % (ref 3–13)
NEUTROPHILS # BLD AUTO: 8.77 10*3/UL (ref 2.1–7.5)
NEUTROPHILS NFR BLD AUTO: 92.4 % (ref 45–75)
NRBC: 0 % (ref 0–0.1)
NUCLEATED RBCS: 0 (ref 0–0)
PLATELET # BLD AUTO: 37 10^9/L (ref 150–420)
RBC # BLD AUTO: 3.29 10^12/L (ref 4.4–6)
WBC # BLD AUTO: 9.5 10^9/L (ref 4–11)

## 2020-05-15 ENCOUNTER — TRANSFERRED RECORDS (OUTPATIENT)
Dept: HEALTH INFORMATION MANAGEMENT | Facility: CLINIC | Age: 60
End: 2020-05-15

## 2020-05-15 ENCOUNTER — VIRTUAL VISIT (OUTPATIENT)
Dept: ONCOLOGY | Facility: CLINIC | Age: 60
End: 2020-05-15
Attending: INTERNAL MEDICINE
Payer: COMMERCIAL

## 2020-05-15 DIAGNOSIS — C83.78 BURKITT LYMPHOMA OF LYMPH NODES OF MULTIPLE REGIONS (H): Primary | ICD-10-CM

## 2020-05-15 LAB
% IMMATURE GRANULOCYTES: 17.1 (ref 0–1)
ABS IMMATURE GRANULOCYTES: 3.23 (ref 0–0.1)
ALBUMIN SERPL-MCNC: 3.9 G/DL (ref 3.4–5)
ALP SERPL-CCNC: 124 U/L (ref 46–116)
ALT SERPL-CCNC: 68 U/L (ref 14–63)
ANION GAP SERPL CALCULATED.3IONS-SCNC: 8.8 MMOL/L (ref 8.5–10.1)
AST SERPL-CCNC: 25 U/L (ref 14–63)
BASOPHILS NFR BLD AUTO: 0.04 % (ref 0–0.2)
BILIRUB SERPL-MCNC: 0.4 MG/DL (ref 0.2–1)
BUN SERPL-MCNC: 19 MG/DL (ref 7–18)
CALCIUM SERPL-MCNC: 8.8 MG/DL (ref 8.5–10.1)
CHLORIDE SERPLBLD-SCNC: 101 MMOL/L (ref 98–107)
CO2 SERPL-SCNC: 25 MMOL/L (ref 21–32)
CREAT SERPL-MCNC: 0.88 MG/DL (ref 0.67–1.17)
EOSINOPHIL NFR BLD AUTO: 0.02 % (ref 0.04–0.54)
ERYTHROCYTE [DISTWIDTH] IN BLOOD BY AUTOMATED COUNT: 17.1 % (ref 11.6–14.4)
GFR SERPL CREATININE-BSD FRML MDRD: >60 ML/MIN/1.73M2 (ref 60–150)
GLOBULIN: 3.7 G/DL (ref 1.4–4.8)
GLUCOSE SERPL-MCNC: 113 MG/DL (ref 74–100)
HCT VFR BLD AUTO: 29.7 % (ref 40–52)
HEMOGLOBIN: 10 G/DL (ref 13–18)
LYMPHOCYTES NFR BLD AUTO: 0.47 % (ref 0.76–4)
MCH RBC QN AUTO: 30 PG (ref 27–34)
MCHC RBC AUTO-ENTMCNC: 34 G/DL (ref 32–36)
MCV RBC AUTO: 90 FL (ref 80–96)
MONOCYTES NFR BLD AUTO: 3.9 % (ref 0–0.9)
NEUTROPHILS NFR BLD AUTO: 11.9 % (ref 2.1–7.5)
NUCLEATED RBC/100 WBC: 1.3 (ref 0–0)
NUCLEATED RBCS: 0.24 (ref 0–0)
PLATELET # BLD AUTO: 33 10^9/L (ref 150–420)
POTASSIUM SERPL-SCNC: 4 MMOL/L (ref 3.5–5.1)
PROT SERPL-MCNC: 7.6 G/DL (ref 6.4–8.2)
RBC # BLD AUTO: 3.31 10^12/L (ref 4.4–6)
SODIUM SERPL-SCNC: 138 MMOL/L (ref 136–145)
WBC # BLD AUTO: 18.9 10^9/L (ref 4–11)

## 2020-05-15 PROCEDURE — 99214 OFFICE O/P EST MOD 30 MIN: CPT | Mod: GT | Performed by: PHYSICIAN ASSISTANT

## 2020-05-15 PROCEDURE — 40000114 ZZH STATISTIC NO CHARGE CLINIC VISIT

## 2020-05-15 NOTE — LETTER
"5/15/2020      RE: Kobe Pedroza  1899  [facility] [address]       Kobe Pedroza is a 60 year old male who is being evaluated via a billable video visit.      The patient has been notified of following:     \"This video visit will be conducted via a call between you and your physician/provider. We have found that certain health care needs can be provided without the need for an in-person physical exam.  This service lets us provide the care you need with a video conversation.  If a prescription is necessary we can send it directly to your pharmacy.  If lab work is needed we can place an order for that and you can then stop by our lab to have the test done at a later time.    Video visits are billed at different rates depending on your insurance coverage.  Please reach out to your insurance provider with any questions.    If during the course of the call the physician/provider feels a video visit is not appropriate, you will not be charged for this service.\"    Patient has given verbal consent for Video visit? Yes    How would you like to obtain your AVS? MyChart    Patient would like the video invitation sent by: Text to cell phone: [phone]     Will anyone else be joining your video visit? No       I have reviewed and updated the patient's allergies and medication list.    Concerns: No new concerns   Refills: No refills needed.      Secondary Video Option (Doximity), send text message to: [phone]     Basilia Teresa CMA    REASON FOR VISIT:  Management of Burkitt lymphoma; hospital follow-up      HISTORY OF PRESENT ILLNESS:  Mr. Pedroza is a 60 year old man with Burkitt lymphoma.  To summarize his course, he presented with acute on chronic back pain in 03/2020.  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  CSF was negative.  He was started on steroids followed by treatment with " R-CODOX-M/IVAC and IT chemo prophylaxis on 3/18/2020.  His course was complicated by neutropenic sepsis after cycle 2 R-IVAC that resolved with no clear source but alpha hemolytic strep grew from 1/4 cultures at the outside lab (presumed contaminant).  He had a PET/CT to restage after cycle 2 (first R-IVAC) showing CR. Received cycle 3 (R-CODOX-M) 4/30-5/1. Diagnosed with DVT during admission and was started on therapeutic Lovenox. Video visit for hospital follow-up and ongoing management.      INTERVAL HISTORY: Jonnathan is feeling okay today. He has quite moderate bone pains from his Neulasta shot.  (spine/hips) started last night.  He doesn't take anything but claritin, no tylenol or analgesics. He has not had any nausea from chemo. Bowel movements are regular now, not taking softeners. He has been eating fairly well and drinking about 40 oz of fluids daily. He denies any fevers/chills, cough, SOB, no abdominal pain.  No rash.  He has no edema. No bleeding, has been holding his lovenox      OBJECTIVE:      Video physical exam  General: Patient appears well in no acute distress. Obese body habitus. +Alopecia   Skin: No visualized rash or lesions on visualized skin  Eyes: EOMI, no erythema, sclera icterus or discharge noted  Resp: Appears to be breathing comfortably without accessory muscle usage, speaking in full sentences, no cough  MSK: Appears to have normal range of motion based on visualized movements  Neurologic: No apparent tremors, facial movements symmetric  Psych: affect and mood congruent, alert and oriented  The rest of a comprehensive physical examination is deferred due to PHE (public health emergency) video restrictions     Labs--     5/12/2020 12:57 5/13/2020 00:00 5/15/2020 00:00   WBC 2.5 (L) 9.5 18.9 (A)   Hemoglobin 10.0 (L) 10.1 (A) 10 (A)   Hematocrit 29.9 (L) 29.6 (A) 29.7 (A)   Platelet Count 53 (L) 37 (A) 33 (A)   RBC Count 3.34 (L) 3.29 (A) 3.31 (A)   MCV 90 90 90        5/15/2020 00:00    Sodium 138   Potassium 4   Chloride 101   Carbon Dioxide 25   Urea Nitrogen 19 (A)   Creatinine 0.88   GFR Estimate >60   Calcium 8.8   Anion Gap 8.8   Albumin 3.9   Protein Total 7.6   Bilirubin Total 0.4   Alkaline Phosphatase 124 (A)   ALT 68 (A)   AST 25   Globulin 3.7   Glucose 113 (A)        ASSESSMENT AND PLAN:      Burkitt's lymphoma: Currently on therapy with R-CODOX-M/R-IVAC. He had PET/CT after one full cycle showing complete remission. He tolerated second cycle of R-CODOX-M well with fatigue today typical of coming off steroids. Day 3 IT cytarabine was deferred secondary to acute DVT. 4/30 was admitted for C2 R-CODOX-M and 5/8 with HD Methotrexate.  He tolerated chemo well.     Given Neulasta on 5/12- will likely be ready for last admission 5/26 or 5/27.  Then will need PET/CT a month after final cycle and to see Dr Wright     ID: No active concerns.  -Continue ACV  -Hold Levaquin and fluconazole given adequate ANC  -dapsone for pjp ppx      HEME: Marked leukocytosis and neutrophilia, expected from GSCF. No fevers or concerning infectious symptoms.   -Given thrombocytopenia, hold lovenox  -Will need twice weekly lab checks and tentative transfusions.      R peroneal DVT: Started Lovenox 100 mg BID on 5/1. HOLD this week due to low platelets, per Dr Wright, once >30k (recovering, not declining) will resume     Hypokalemia: K stable, eating well.  Continue 20 mEq daily.             Video-Visit Details    Type of service:  Video Visit    Video Start Time: 4:02  Video End Time: 4:25    Originating Location (pt. Location): Home    Distant Location (provider location):  [facility]     Platform used for Video Visit: NEDA Cao PA-C

## 2020-05-15 NOTE — PROGRESS NOTES
"Kobe Pedroza is a 60 year old male who is being evaluated via a billable video visit.      The patient has been notified of following:     \"This video visit will be conducted via a call between you and your physician/provider. We have found that certain health care needs can be provided without the need for an in-person physical exam.  This service lets us provide the care you need with a video conversation.  If a prescription is necessary we can send it directly to your pharmacy.  If lab work is needed we can place an order for that and you can then stop by our lab to have the test done at a later time.    Video visits are billed at different rates depending on your insurance coverage.  Please reach out to your insurance provider with any questions.    If during the course of the call the physician/provider feels a video visit is not appropriate, you will not be charged for this service.\"    Patient has given verbal consent for Video visit? Yes    How would you like to obtain your AVS? MyChart    Patient would like the video invitation sent by: Text to cell phone: [phone]     Will anyone else be joining your video visit? No       I have reviewed and updated the patient's allergies and medication list.    Concerns: No new concerns   Refills: No refills needed.      Secondary Video Option (Doximity), send text message to: [phone]     Basilia Teresa CMA    REASON FOR VISIT:  Management of Burkitt lymphoma; hospital follow-up      HISTORY OF PRESENT ILLNESS:  Mr. Pedroza is a 60 year old man with Burkitt lymphoma.  To summarize his course, he presented with acute on chronic back pain in 03/2020.  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  CSF was negative.  He was started on steroids followed by treatment with R-CODOX-M/IVAC and IT chemo prophylaxis on 3/18/2020.  His course was complicated by " neutropenic sepsis after cycle 2 R-IVAC that resolved with no clear source but alpha hemolytic strep grew from 1/4 cultures at the outside lab (presumed contaminant).  He had a PET/CT to restage after cycle 2 (first R-IVAC) showing CR. Received cycle 3 (R-CODOX-M) 4/30-5/1. Diagnosed with DVT during admission and was started on therapeutic Lovenox. Video visit for hospital follow-up and ongoing management.      INTERVAL HISTORY: Jonnathan is feeling okay today. He has quite moderate bone pains from his Neulasta shot.  (spine/hips) started last night.  He doesn't take anything but claritin, no tylenol or analgesics. He has not had any nausea from chemo. Bowel movements are regular now, not taking softeners. He has been eating fairly well and drinking about 40 oz of fluids daily. He denies any fevers/chills, cough, SOB, no abdominal pain.  No rash.  He has no edema. No bleeding, has been holding his lovenox      OBJECTIVE:      Video physical exam  General: Patient appears well in no acute distress. Obese body habitus. +Alopecia   Skin: No visualized rash or lesions on visualized skin  Eyes: EOMI, no erythema, sclera icterus or discharge noted  Resp: Appears to be breathing comfortably without accessory muscle usage, speaking in full sentences, no cough  MSK: Appears to have normal range of motion based on visualized movements  Neurologic: No apparent tremors, facial movements symmetric  Psych: affect and mood congruent, alert and oriented  The rest of a comprehensive physical examination is deferred due to PHE (public health emergency) video restrictions     Labs--     5/12/2020 12:57 5/13/2020 00:00 5/15/2020 00:00   WBC 2.5 (L) 9.5 18.9 (A)   Hemoglobin 10.0 (L) 10.1 (A) 10 (A)   Hematocrit 29.9 (L) 29.6 (A) 29.7 (A)   Platelet Count 53 (L) 37 (A) 33 (A)   RBC Count 3.34 (L) 3.29 (A) 3.31 (A)   MCV 90 90 90        5/15/2020 00:00   Sodium 138   Potassium 4   Chloride 101   Carbon Dioxide 25   Urea Nitrogen 19 (A)    Creatinine 0.88   GFR Estimate >60   Calcium 8.8   Anion Gap 8.8   Albumin 3.9   Protein Total 7.6   Bilirubin Total 0.4   Alkaline Phosphatase 124 (A)   ALT 68 (A)   AST 25   Globulin 3.7   Glucose 113 (A)        ASSESSMENT AND PLAN:      Burkitt's lymphoma: Currently on therapy with R-CODOX-M/R-IVAC. He had PET/CT after one full cycle showing complete remission. He tolerated second cycle of R-CODOX-M well with fatigue today typical of coming off steroids. Day 3 IT cytarabine was deferred secondary to acute DVT. 4/30 was admitted for C2 R-CODOX-M and 5/8 with HD Methotrexate.  He tolerated chemo well.     Given Neulasta on 5/12- will likely be ready for last admission 5/26 or 5/27.  Then will need PET/CT a month after final cycle and to see Dr Wright     ID: No active concerns.  -Continue ACV  -Hold Levaquin and fluconazole given adequate ANC  -dapsone for pjp ppx      HEME: Marked leukocytosis and neutrophilia, expected from GSCF. No fevers or concerning infectious symptoms.   -Given thrombocytopenia, hold lovenox  -Will need twice weekly lab checks and tentative transfusions.      R peroneal DVT: Started Lovenox 100 mg BID on 5/1. HOLD this week due to low platelets, per Dr Wright, once >30k (recovering, not declining) will resume     Hypokalemia: K stable, eating well.  Continue 20 mEq daily.             Video-Visit Details    Type of service:  Video Visit    Video Start Time: 4:02  Video End Time: 4:25    Originating Location (pt. Location): Home    Distant Location (provider location):  [facility]     Platform used for Video Visit: Mary Sin PA-C

## 2020-05-18 ENCOUNTER — PATIENT OUTREACH (OUTPATIENT)
Dept: ONCOLOGY | Facility: CLINIC | Age: 60
End: 2020-05-18

## 2020-05-18 ENCOUNTER — TRANSFERRED RECORDS (OUTPATIENT)
Dept: HEALTH INFORMATION MANAGEMENT | Facility: CLINIC | Age: 60
End: 2020-05-18

## 2020-05-18 ENCOUNTER — TELEPHONE (OUTPATIENT)
Dept: ONCOLOGY | Facility: CLINIC | Age: 60
End: 2020-05-18

## 2020-05-18 LAB
% IMMATURE GRANULOCYTES: 15.7 (ref 0–1)
ABS BASOPHILS: 0.09 CELLS/MM3 (ref 0–0.2)
ABS EOSINOPHILS: 0.01 CELLS/MM3 (ref 0.04–0.54)
ABS IMMATURE GRANULOCYTES: 4.93 (ref 0–0.1)
ABS LYMPHOCYTES: 0.98 CELLS/MM3 (ref 0.76–4)
ABS MONOCYTE: 4.06 CELLS/MM3 (ref 0.4–0.54)
ABS NEUTROPHILS: 22.31 CELLS/MM3 (ref 2.1–7.5)
ALBUMIN SERPL-MCNC: 3.8 G/DL (ref 3.4–5)
ALP SERPL-CCNC: 133 U/L (ref 46–116)
ALT SERPL-CCNC: 82 U/L (ref 14–63)
AST SERPL-CCNC: 36 U/L (ref 15–37)
BASOPHILS NFR BLD AUTO: 0.3 % (ref 0–1)
BILIRUB SERPL-MCNC: 0.3 MG/DL (ref 0.2–1)
BUN SERPL-MCNC: 14 MG/DL (ref 7–18)
CALCIUM SERPL-MCNC: 8.2 MG/DL (ref 8.5–10.1)
CHLORIDE SERPLBLD-SCNC: 102 MMOL/L (ref 98–107)
CO2 SERPL-SCNC: 26 MMOL/L (ref 21–32)
CREAT SERPL-MCNC: 0.83 MG/DL (ref 0.67–1.17)
EOSINOPHIL NFR BLD AUTO: 0 % (ref 0–5)
ERYTHROCYTE [DISTWIDTH] IN BLOOD BY AUTOMATED COUNT: 19.4 % (ref 11.6–14.4)
GFR SERPL CREATININE-BSD FRML MDRD: >60 ML/MIN/1.73M2 (ref 60–150)
GLOBULIN: 3.4 G/DL (ref 1.4–4.8)
GLUCOSE SERPL-MCNC: 194 MG/DL (ref 74–100)
HCT VFR BLD AUTO: 29.2 % (ref 40–52)
HEMOGLOBIN: 9.7 G/DL (ref 13–18)
LYMPHOCYTES NFR BLD AUTO: 3.1 % (ref 18–40)
MCH RBC QN AUTO: 32 PG (ref 27–34)
MCHC RBC AUTO-ENTMCNC: 33 G/DL (ref 32–36)
MCV RBC AUTO: 94 FL (ref 80–96)
MONOCYTES NFR BLD AUTO: 12.9 % (ref 3–13)
NEUTROPHILS NFR BLD AUTO: 68 % (ref 45–75)
NUCLEATED RBC/100 WBC: 1.2 (ref 0–0)
NUCLEATED RBCS: 0.38 (ref 0–0)
PLATELET # BLD AUTO: 100 10^9/L (ref 150–420)
POTASSIUM SERPL-SCNC: 4 MMOL/L (ref 3.5–5.1)
PROT SERPL-MCNC: 7.2 G/DL (ref 6.4–8.2)
RBC # BLD AUTO: 3.12 10^12/L (ref 4.4–6)
SODIUM SERPL-SCNC: 138 MMOL/L (ref 136–145)
WBC # BLD AUTO: 31.4 10^9/L (ref 4–11)

## 2020-05-18 NOTE — TELEPHONE ENCOUNTER
DATE:  5/18/2020   TIME OF RECEIPT FROM LAB:  10:39 AM  LAB TEST:  wbc  LAB VALUE:  31.4  RESULTS GIVEN WITH READ-BACK TO (PROVIDER):  MARISABEL NG  TIME LAB VALUE REPORTED TO PROVIDER:   Paged 10:40 AM    (Pt received Neulasta on 5/12)

## 2020-05-19 NOTE — PROGRESS NOTES
"Kobe Pedroza is a 60 year old male who is being evaluated via a billable video visit.      The patient has been notified of following:     \"This video visit will be conducted via a call between you and your physician/provider. We have found that certain health care needs can be provided without the need for an in-person physical exam.  This service lets us provide the care you need with a video conversation.  If a prescription is necessary we can send it directly to your pharmacy.  If lab work is needed we can place an order for that and you can then stop by our lab to have the test done at a later time.    Video visits are billed at different rates depending on your insurance coverage.  Please reach out to your insurance provider with any questions.    If during the course of the call the physician/provider feels a video visit is not appropriate, you will not be charged for this service.\"    Patient has given verbal consent for Video visit? Yes    How would you like to obtain your AVS? MyChart    Patient would like the video invitation sent by: Text to cell phone: [phone]     Will anyone else be joining your video visit? No       I have reviewed and updated the patient's allergies and medication list.    Concerns: No concerns  Refills: None needed.      Secondary Video Option (Doximity), send text message to: [phone]     Basilia Teresa CMA    Video-Visit Details    Type of service:  Video Visit    Originating Location (pt. Location): Home    Distant Location (provider location):  [facility]     Platform used for Video Visit: Kairos    REASON FOR VISIT:  Management of Burkitt lymphoma    HISTORY OF PRESENT ILLNESS:  Mr. Pedroza is a 60 year old man with Burkitt lymphoma.  To summarize his course, he presented with acute on chronic back pain in 03/2020.  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  CSF was negative.  He was started on steroids followed by treatment with R-CODOX-M/IVAC and IT chemo prophylaxis on 3/18/2020.  His course was complicated by neutropenic sepsis that resolved and right lower extremity below the knee DVT identified on 4/30/2020, so he has been on therapeutic anticoagulation.  He had a PET/CT to restage after cycle 2 (1st R-IVAC) that was consistent with PET-negative complete response.  Visit for ongoing management.    He is with his wife, Mckayla.  Since recent discharge, energy level and appetite are improving.  Weight stable.  No fevers, chills, or night sweats.  No headache, vision changes, focal weakness or sensory changes.  No dyspnea, cough, or chest pain.  Normal bowel function.  No urinary complaints.  No bleeding, bruising, or skin rashes.  No pain.  No lumps or bumps.  Overall, managing quite well.    REVIEW OF SYSTEMS:  A complete review of systems was negative other than noted.    MEDICATIONS:  Current Outpatient Medications   Medication     acetaminophen (TYLENOL) 325 MG tablet     acyclovir (ZOVIRAX) 200 MG capsule     dapsone (ACZONE) 100 MG tablet     enoxaparin ANTICOAGULANT (LOVENOX) 100 MG/ML syringe     fluconazole (DIFLUCAN) 200 MG tablet     levofloxacin (LEVAQUIN) 250 MG tablet     levothyroxine (SYNTHROID/LEVOTHROID) 200 MCG tablet     ondansetron (ZOFRAN) 4 MG tablet     senna-docusate (SENOKOT-S/PERICOLACE) 8.6-50 MG tablet     No current facility-administered medications for this visit.      PHYSICAL EXAMINATION:  Ht 1.778 m (5' 10\")   Wt 95.3 kg (210 lb)   BMI 30.13 kg/m    Wt Readings from Last 5 Encounters:   05/20/20 95.3 kg (210 lb)   05/12/20 99.4 kg (219 lb 3.2 oz)   05/11/20 98.9 kg (218 lb 1.6 oz)   05/04/20 98 kg (216 lb)   05/01/20 96 kg (211 lb 9.6 oz)     General: Well appearing.  HEENT: Sclerae anicteric. Alopecia.  Lungs: Breathing comfortably. No cough.  MSK: Grossly normal movement.  Neuro: Grossly " non-focal.  Skin/access: Normal skin tone.  Psych: Alert and oriented. No distress.  Performance status: ECOG 0    The rest of a comprehensive physical examination is deferred due to PHE (public health emergency) video visit restrictions    LABORATORY DATA:  Recent Labs   Lab Test 05/18/20 05/15/20 05/13/20 05/12/20  1257  05/08/20  1119   WBC 31.4* 18.9* 9.5 2.5*   < > 0.6*   HGB 9.7* 10* 10.1* 10.0*   < > 7.7*   * 33* 37* 53*   < > 205   ANEU  --   --  8.77* 2.1  --  0.4*   ALYM  --   --  0.16* 0.3*  --  0.1*    < > = values in this interval not displayed.     Recent Labs   Lab Test 05/18/20 05/15/20 05/12/20  1257  05/11/20  0445 05/10/20  0400 05/09/20  1835 05/09/20  0415 05/08/20  1119  04/30/20  1209  04/19/20  0523  03/17/20  0556    138 137  --  142 140  --  137 138   < > 137   < > 139   < > 139   POTASSIUM 4 4 4.0   < > 3.0* 3.4  --  3.6 3.5   < > 3.7   < > 3.3*   < > 4.0   CHLORIDE 102* 101 104  --  106 106  --  103 107   < > 104   < > 106   < > 107   CO2 26 25 26  --  30 30  --  28 28   < > 26   < > 24   < > 26   BUN 14 19* 16  --  13 13  --  12 16   < > 12   < > 16   < > 25   CR 0.83 0.88 0.73  --  0.59* 0.53*  --  0.55* 0.54*   < > 0.70   < > 0.74   < > 0.71   RUTH 8.2* 8.8 8.6  --  7.4* 7.5*  --  7.8* 8.2*   < > 8.1*   < > 7.4*   < > 7.2*   MAG  --   --   --   --  2.1 2.2  --  2.0 1.9   < > 2.4*   < > 1.7   < > 2.1   PHOS  --   --   --   --  2.9 3.0 2.8 1.8* 2.5   < > 4.1  --  3.0   < > 4.1   URIC  --   --   --   --  2.9* 3.1*  --  2.8* 2.9*   < > 5.0  --  2.1*   < > 3.7   LDH  --   --   --   --   --   --   --   --  175  --  294*  --  170   < >  --    [id number]  --   --   --   --   --   --   --   --   --   --   --   --   --   --  2.0    < > = values in this interval not displayed.     Recent Labs   Lab Test 05/18/20 05/15/20 05/12/20  1257  05/08/20  1119  05/01/20  0349 04/30/20  1209   AST 36 25 32   < > 12   < > 14 18   ALT 82* 68* 113*   < > 25   < > 28 29   ALKPHOS 133* 124* 68   < >  73   < > 84 89   BILITOTAL 0.3 0.4 0.4   < > 0.3   < > 0.3 0.3   INR  --   --   --   --  0.99  --  1.16* 1.11    < > = values in this interval not displayed.     IMPRESSION AND PLAN:  Mr. Pedroza is a 60 year old man with Burkitt lymphoma.    Interval PET/CT after completing the first R-CODOX-M and R-IVAC confirmed a PET-negative complete remission.  Blood counts have recovered, and he will be admitted tomorrow to complete the final cycle with R-IVAC with IT chemo based on Jo Anne et al. Leuk Lymph. 2004;45:761.  We will plan to obtain at post-treatment PET/CT about 4 weeks after he recovers from the final cycle of treatment.  We will plan to makeup his missed dose of IT chemo (from 2nd cycle of R-CODOX-M) when his blood counts have recovered in a couple of weeks.    He has no active infectious issues.  He will continue acyclovir as well as fluconazole and levofloxacin during periods of neutropenia.  He will continue dapsone for PJP prophylaxis.  We will plan for pneumococcal and Shingrix vaccines after he has recovered from treatment.    He will continue enoxaparin for at least 3 months for lymphoma-related DVT and hold for IT chemo or when platelets are <50k/mcL.  We could also look into apixaban as an alternative if he prefers.  He will continue to work with Dr. Arriaga for his general health issues.    He is scheduled to start his next treatment cycle tomorrow.  We will request lab checks and transfusions twice weekly while at home until he recovers and a PET/CT in about 6-8 weeks with a return visit to review the results.  I reminded him to contact us immediately if he has fevers at home or if questions, concerns, or new issues arise between visits.    Video visit start time: 8:34 AM  Video visit end time: 9:06 AM  Video visit duration: 32 minutes    Ever Wright MD, PhD  Attending Physician, [facility]   of Medicine, [facility]  Division of Hematology, Oncology,  and Transplantation  [email or web address] email  [phone] clinic  [phone] pager

## 2020-05-20 ENCOUNTER — VIRTUAL VISIT (OUTPATIENT)
Dept: ONCOLOGY | Facility: CLINIC | Age: 60
End: 2020-05-20
Attending: INTERNAL MEDICINE
Payer: COMMERCIAL

## 2020-05-20 VITALS — HEIGHT: 70 IN | BODY MASS INDEX: 30.06 KG/M2 | WEIGHT: 210 LBS

## 2020-05-20 DIAGNOSIS — C83.78 BURKITT LYMPHOMA OF LYMPH NODES OF MULTIPLE REGIONS (H): Primary | ICD-10-CM

## 2020-05-20 DIAGNOSIS — I82.401 DEEP VEIN THROMBOSIS (DVT) OF RIGHT LOWER EXTREMITY, UNSPECIFIED CHRONICITY, UNSPECIFIED VEIN (H): ICD-10-CM

## 2020-05-20 PROCEDURE — 99215 OFFICE O/P EST HI 40 MIN: CPT | Mod: 95 | Performed by: INTERNAL MEDICINE

## 2020-05-20 PROCEDURE — 40000114 ZZH STATISTIC NO CHARGE CLINIC VISIT

## 2020-05-20 ASSESSMENT — MIFFLIN-ST. JEOR: SCORE: 1778.8

## 2020-05-20 NOTE — LETTER
"5/20/2020       RE: Kobe Pedroza  [id number]  [employer]  [facility] [address]     Dear Colleague,    Thank you for referring your patient, Kobe Pedroza, to the [facility]. Please see a copy of my visit note below.    Kobe Pedroza is a 60 year old male who is being evaluated via a billable video visit.      The patient has been notified of following:     \"This video visit will be conducted via a call between you and your physician/provider. We have found that certain health care needs can be provided without the need for an in-person physical exam.  This service lets us provide the care you need with a video conversation.  If a prescription is necessary we can send it directly to your pharmacy.  If lab work is needed we can place an order for that and you can then stop by our lab to have the test done at a later time.    Video visits are billed at different rates depending on your insurance coverage.  Please reach out to your insurance provider with any questions.    If during the course of the call the physician/provider feels a video visit is not appropriate, you will not be charged for this service.\"    Patient has given verbal consent for Video visit? Yes    How would you like to obtain your AVS? MyChart    Patient would like the video invitation sent by: Text to cell phone: [phone]     Will anyone else be joining your video visit? No       I have reviewed and updated the patient's allergies and medication list.    Concerns: No concerns  Refills: None needed.      Secondary Video Option (Doximity), send text message to: [phone]     Basilia Teresa CMA    Video-Visit Details    Type of service:  Video Visit    Originating Location (pt. Location): Home    Distant Location (provider location):  [facility]     Platform used for Video Visit: YouFastUnlock    REASON FOR VISIT:  Management of Burkitt lymphoma    HISTORY OF PRESENT ILLNESS:  Mr. Pedroza is a 60 " "year old man with Burkitt lymphoma.  To summarize his course, he presented with acute on chronic back pain in 03/2020.  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  CSF was negative.  He was started on steroids followed by treatment with R-CODOX-M/IVAC and IT chemo prophylaxis on 3/18/2020.  His course was complicated by neutropenic sepsis that resolved and right lower extremity below the knee DVT identified on 4/30/2020, so he has been on therapeutic anticoagulation.  He had a PET/CT to restage after cycle 2 (1st R-IVAC) that was consistent with PET-negative complete response.  Visit for ongoing management.    He is with his wife, Mckayla.  Since recent discharge, energy level and appetite are improving.  Weight stable.  No fevers, chills, or night sweats.  No headache, vision changes, focal weakness or sensory changes.  No dyspnea, cough, or chest pain.  Normal bowel function.  No urinary complaints.  No bleeding, bruising, or skin rashes.  No pain.  No lumps or bumps.  Overall, managing quite well.    REVIEW OF SYSTEMS:  A complete review of systems was negative other than noted.    MEDICATIONS:  Current Outpatient Medications   Medication     acetaminophen (TYLENOL) 325 MG tablet     acyclovir (ZOVIRAX) 200 MG capsule     dapsone (ACZONE) 100 MG tablet     enoxaparin ANTICOAGULANT (LOVENOX) 100 MG/ML syringe     fluconazole (DIFLUCAN) 200 MG tablet     levofloxacin (LEVAQUIN) 250 MG tablet     levothyroxine (SYNTHROID/LEVOTHROID) 200 MCG tablet     ondansetron (ZOFRAN) 4 MG tablet     senna-docusate (SENOKOT-S/PERICOLACE) 8.6-50 MG tablet     No current facility-administered medications for this visit.      PHYSICAL EXAMINATION:  Ht 1.778 m (5' 10\")   Wt 95.3 kg (210 lb)   BMI 30.13 kg/m    Wt Readings from Last 5 Encounters:   05/20/20 95.3 kg (210 lb)   05/12/20 99.4 kg (219 lb 3.2 oz)   05/11/20 98.9 kg " (218 lb 1.6 oz)   05/04/20 98 kg (216 lb)   05/01/20 96 kg (211 lb 9.6 oz)     General: Well appearing.  HEENT: Sclerae anicteric. Alopecia.  Lungs: Breathing comfortably. No cough.  MSK: Grossly normal movement.  Neuro: Grossly non-focal.  Skin/access: Normal skin tone.  Psych: Alert and oriented. No distress.  Performance status: ECOG 0    The rest of a comprehensive physical examination is deferred due to PHE (public health emergency) video visit restrictions    LABORATORY DATA:  Recent Labs   Lab Test 05/18/20 05/15/20 05/13/20 05/12/20  1257  05/08/20  1119   WBC 31.4* 18.9* 9.5 2.5*   < > 0.6*   HGB 9.7* 10* 10.1* 10.0*   < > 7.7*   * 33* 37* 53*   < > 205   ANEU  --   --  8.77* 2.1  --  0.4*   ALYM  --   --  0.16* 0.3*  --  0.1*    < > = values in this interval not displayed.     Recent Labs   Lab Test 05/18/20 05/15/20 05/12/20  1257  05/11/20  0445 05/10/20  0400 05/09/20  1835 05/09/20  0415 05/08/20  1119  04/30/20  1209  04/19/20  0523  03/17/20  0556    138 137  --  142 140  --  137 138   < > 137   < > 139   < > 139   POTASSIUM 4 4 4.0   < > 3.0* 3.4  --  3.6 3.5   < > 3.7   < > 3.3*   < > 4.0   CHLORIDE 102* 101 104  --  106 106  --  103 107   < > 104   < > 106   < > 107   CO2 26 25 26  --  30 30  --  28 28   < > 26   < > 24   < > 26   BUN 14 19* 16  --  13 13  --  12 16   < > 12   < > 16   < > 25   CR 0.83 0.88 0.73  --  0.59* 0.53*  --  0.55* 0.54*   < > 0.70   < > 0.74   < > 0.71   RUTH 8.2* 8.8 8.6  --  7.4* 7.5*  --  7.8* 8.2*   < > 8.1*   < > 7.4*   < > 7.2*   MAG  --   --   --   --  2.1 2.2  --  2.0 1.9   < > 2.4*   < > 1.7   < > 2.1   PHOS  --   --   --   --  2.9 3.0 2.8 1.8* 2.5   < > 4.1  --  3.0   < > 4.1   URIC  --   --   --   --  2.9* 3.1*  --  2.8* 2.9*   < > 5.0  --  2.1*   < > 3.7   LDH  --   --   --   --   --   --   --   --  175  --  294*  --  170   < >  --    [id number]  --   --   --   --   --   --   --   --   --   --   --   --   --   --  2.0    < > = values in this  interval not displayed.     Recent Labs   Lab Test 05/18/20 05/15/20 05/12/20  1257  05/08/20  1119  05/01/20  0349 04/30/20  1209   AST 36 25 32   < > 12   < > 14 18   ALT 82* 68* 113*   < > 25   < > 28 29   ALKPHOS 133* 124* 68   < > 73   < > 84 89   BILITOTAL 0.3 0.4 0.4   < > 0.3   < > 0.3 0.3   INR  --   --   --   --  0.99  --  1.16* 1.11    < > = values in this interval not displayed.     IMPRESSION AND PLAN:  Mr. Pedroza is a 60 year old man with Burkitt lymphoma.    Interval PET/CT after completing the first R-CODOX-M and R-IVAC confirmed a PET-negative complete remission.  Blood counts have recovered, and he will be admitted tomorrow to complete the final cycle with R-IVAC with IT chemo based on Jo Anne et al. Leuk Lymph. 2004;45:761.  We will plan to obtain at post-treatment PET/CT about 4 weeks after he recovers from the final cycle of treatment.  We will plan to makeup his missed dose of IT chemo (from 2nd cycle of R-CODOX-M) when his blood counts have recovered in a couple of weeks.    He has no active infectious issues.  He will continue acyclovir as well as fluconazole and levofloxacin during periods of neutropenia.  He will continue dapsone for PJP prophylaxis.  We will plan for pneumococcal and Shingrix vaccines after he has recovered from treatment.    He will continue enoxaparin for at least 3 months for lymphoma-related DVT and hold for IT chemo or when platelets are <50k/mcL.  We could also look into apixaban as an alternative if he prefers.  He will continue to work with Dr. Arriaga for his general health issues.    He is scheduled to start his next treatment cycle tomorrow.  We will request lab checks and transfusions twice weekly while at home until he recovers and a PET/CT in about 6-8 weeks with a return visit to review the results.  I reminded him to contact us immediately if he has fevers at home or if questions, concerns, or new issues arise between visits.    Video visit start time:  8:34 AM  Video visit end time: 9:06 AM  Video visit duration: 32 minutes    Ever Wright MD, PhD  Attending Physician, [facility]   of Medicine, [facility]  Division of Hematology, Oncology, and Transplantation  [email or web address]  [phone] clinic  [phone] pager          Again, thank you for allowing me to participate in the care of your patient.      Sincerely,    Ever Wright MD

## 2020-05-21 ENCOUNTER — HOSPITAL ENCOUNTER (INPATIENT)
Facility: CLINIC | Age: 60
LOS: 5 days | Discharge: HOME | DRG: 847 | End: 2020-05-26
Attending: INTERNAL MEDICINE | Admitting: INTERNAL MEDICINE
Payer: COMMERCIAL

## 2020-05-21 ENCOUNTER — APPOINTMENT (OUTPATIENT)
Dept: GENERAL RADIOLOGY | Facility: CLINIC | Age: 60
DRG: 847 | End: 2020-05-21
Attending: PHYSICIAN ASSISTANT
Payer: COMMERCIAL

## 2020-05-21 ENCOUNTER — PATIENT OUTREACH (OUTPATIENT)
Dept: ONCOLOGY | Facility: CLINIC | Age: 60
End: 2020-05-21

## 2020-05-21 DIAGNOSIS — D70.1 CHEMOTHERAPY-INDUCED NEUTROPENIA (H): Primary | ICD-10-CM

## 2020-05-21 DIAGNOSIS — C83.78 BURKITT LYMPHOMA OF LYMPH NODES OF MULTIPLE REGIONS (H): ICD-10-CM

## 2020-05-21 DIAGNOSIS — T45.1X5A CHEMOTHERAPY-INDUCED NEUTROPENIA (H): Primary | ICD-10-CM

## 2020-05-21 DIAGNOSIS — I82.4Z1 ACUTE DEEP VEIN THROMBOSIS (DVT) OF DISTAL VEIN OF RIGHT LOWER EXTREMITY (H): ICD-10-CM

## 2020-05-21 PROBLEM — C85.90 LYMPHOMA (H): Status: ACTIVE | Noted: 2020-05-21

## 2020-05-21 LAB
ALBUMIN SERPL-MCNC: 3.5 G/DL (ref 3.4–5)
ALP SERPL-CCNC: 121 U/L (ref 40–150)
ALT SERPL W P-5'-P-CCNC: 63 U/L (ref 0–70)
ANION GAP SERPL CALCULATED.3IONS-SCNC: 7 MMOL/L (ref 3–14)
ANISOCYTOSIS BLD QL SMEAR: ABNORMAL
AST SERPL W P-5'-P-CCNC: 20 U/L (ref 0–45)
BASOPHILS # BLD AUTO: 0 10E9/L (ref 0–0.2)
BASOPHILS NFR BLD AUTO: 0 %
BILIRUB SERPL-MCNC: 0.3 MG/DL (ref 0.2–1.3)
BUN SERPL-MCNC: 10 MG/DL (ref 7–30)
CALCIUM SERPL-MCNC: 8.2 MG/DL (ref 8.5–10.1)
CHLORIDE SERPL-SCNC: 108 MMOL/L (ref 94–109)
CO2 SERPL-SCNC: 27 MMOL/L (ref 20–32)
CREAT SERPL-MCNC: 0.76 MG/DL (ref 0.66–1.25)
DIFFERENTIAL METHOD BLD: ABNORMAL
EOSINOPHIL # BLD AUTO: 0 10E9/L (ref 0–0.7)
EOSINOPHIL NFR BLD AUTO: 0 %
ERYTHROCYTE [DISTWIDTH] IN BLOOD BY AUTOMATED COUNT: 20.9 % (ref 10–15)
GFR SERPL CREATININE-BSD FRML MDRD: >90 ML/MIN/{1.73_M2}
GLUCOSE SERPL-MCNC: 93 MG/DL (ref 70–99)
HCT VFR BLD AUTO: 29.3 % (ref 40–53)
HGB BLD-MCNC: 9.2 G/DL (ref 13.3–17.7)
LYMPHOCYTES # BLD AUTO: 1.3 10E9/L (ref 0.8–5.3)
LYMPHOCYTES NFR BLD AUTO: 6.1 %
MCH RBC QN AUTO: 30.5 PG (ref 26.5–33)
MCHC RBC AUTO-ENTMCNC: 31.4 G/DL (ref 31.5–36.5)
MCV RBC AUTO: 97 FL (ref 78–100)
MICROCYTES BLD QL SMEAR: PRESENT
MONOCYTES # BLD AUTO: 0.9 10E9/L (ref 0–1.3)
MONOCYTES NFR BLD AUTO: 4.4 %
MYELOCYTES # BLD: 0.2 10E9/L
MYELOCYTES NFR BLD MANUAL: 0.9 %
NEUTROPHILS # BLD AUTO: 18.2 10E9/L (ref 1.6–8.3)
NEUTROPHILS NFR BLD AUTO: 88.6 %
PLATELET # BLD AUTO: 132 10E9/L (ref 150–450)
PLATELET # BLD EST: ABNORMAL 10*3/UL
POLYCHROMASIA BLD QL SMEAR: SLIGHT
POTASSIUM SERPL-SCNC: 3.6 MMOL/L (ref 3.4–5.3)
PROT SERPL-MCNC: 6.7 G/DL (ref 6.8–8.8)
RBC # BLD AUTO: 3.02 10E12/L (ref 4.4–5.9)
SODIUM SERPL-SCNC: 141 MMOL/L (ref 133–144)
TOXIC GRANULES BLD QL SMEAR: PRESENT
URATE SERPL-MCNC: 5.7 MG/DL (ref 3.5–7.2)
WBC # BLD AUTO: 20.5 10E9/L (ref 4–11)

## 2020-05-21 PROCEDURE — 99223 1ST HOSP IP/OBS HIGH 75: CPT | Mod: AI | Performed by: INTERNAL MEDICINE

## 2020-05-21 PROCEDURE — 25000131 ZZH RX MED GY IP 250 OP 636 PS 637: Performed by: INTERNAL MEDICINE

## 2020-05-21 PROCEDURE — 27211414 ZZ H ADHESIVE SKIN CLOSURE, DERMABOND

## 2020-05-21 PROCEDURE — 25800030 ZZH RX IP 258 OP 636: Performed by: INTERNAL MEDICINE

## 2020-05-21 PROCEDURE — 25000128 H RX IP 250 OP 636: Performed by: INTERNAL MEDICINE

## 2020-05-21 PROCEDURE — 25000125 ZZHC RX 250: Performed by: PHYSICIAN ASSISTANT

## 2020-05-21 PROCEDURE — 84550 ASSAY OF BLOOD/URIC ACID: CPT | Performed by: PHYSICIAN ASSISTANT

## 2020-05-21 PROCEDURE — 36569 INSJ PICC 5 YR+ W/O IMAGING: CPT

## 2020-05-21 PROCEDURE — 27211389 ZZ H KIT, 5 FR DL BIOFLO OPEN ENDED PICC

## 2020-05-21 PROCEDURE — 25000125 ZZHC RX 250: Performed by: INTERNAL MEDICINE

## 2020-05-21 PROCEDURE — 80053 COMPREHEN METABOLIC PANEL: CPT | Performed by: PHYSICIAN ASSISTANT

## 2020-05-21 PROCEDURE — 40000986 XR CHEST PORT 1 VW

## 2020-05-21 PROCEDURE — 85025 COMPLETE CBC W/AUTO DIFF WBC: CPT | Performed by: PHYSICIAN ASSISTANT

## 2020-05-21 PROCEDURE — 25000132 ZZH RX MED GY IP 250 OP 250 PS 637: Performed by: PHYSICIAN ASSISTANT

## 2020-05-21 PROCEDURE — 25000128 H RX IP 250 OP 636: Performed by: PHYSICIAN ASSISTANT

## 2020-05-21 PROCEDURE — 12000012 ZZH R&B MS OVERFLOW UMMC

## 2020-05-21 PROCEDURE — 3E0430M INTRODUCTION OF MONOCLONAL ANTIBODY INTO CENTRAL VEIN, PERCUTANEOUS APPROACH: ICD-10-PCS | Performed by: INTERNAL MEDICINE

## 2020-05-21 PROCEDURE — 25000132 ZZH RX MED GY IP 250 OP 250 PS 637: Performed by: INTERNAL MEDICINE

## 2020-05-21 PROCEDURE — 36415 COLL VENOUS BLD VENIPUNCTURE: CPT | Performed by: PHYSICIAN ASSISTANT

## 2020-05-21 RX ORDER — DEXAMETHASONE 4 MG/1
8 TABLET ORAL DAILY
Status: DISCONTINUED | OUTPATIENT
Start: 2020-05-26 | End: 2020-05-26 | Stop reason: HOSPADM

## 2020-05-21 RX ORDER — PROCHLORPERAZINE MALEATE 5 MG
10 TABLET ORAL EVERY 6 HOURS PRN
Status: DISCONTINUED | OUTPATIENT
Start: 2020-05-21 | End: 2020-05-26 | Stop reason: HOSPADM

## 2020-05-21 RX ORDER — POTASSIUM CL/LIDO/0.9 % NACL 10MEQ/0.1L
10 INTRAVENOUS SOLUTION, PIGGYBACK (ML) INTRAVENOUS
Status: DISCONTINUED | OUTPATIENT
Start: 2020-05-21 | End: 2020-05-26 | Stop reason: HOSPADM

## 2020-05-21 RX ORDER — DIPHENHYDRAMINE HCL 50 MG
50 CAPSULE ORAL ONCE
Status: COMPLETED | OUTPATIENT
Start: 2020-05-21 | End: 2020-05-21

## 2020-05-21 RX ORDER — HEPARIN SODIUM,PORCINE 10 UNIT/ML
5-10 VIAL (ML) INTRAVENOUS EVERY 24 HOURS
Status: DISCONTINUED | OUTPATIENT
Start: 2020-05-21 | End: 2020-05-26 | Stop reason: HOSPADM

## 2020-05-21 RX ORDER — MEPERIDINE HYDROCHLORIDE 25 MG/ML
25 INJECTION INTRAMUSCULAR; INTRAVENOUS; SUBCUTANEOUS EVERY 30 MIN PRN
Status: DISCONTINUED | OUTPATIENT
Start: 2020-05-21 | End: 2020-05-26

## 2020-05-21 RX ORDER — PREDNISOLONE ACETATE 10 MG/ML
2 SUSPENSION/ DROPS OPHTHALMIC 4 TIMES DAILY
Status: DISPENSED | OUTPATIENT
Start: 2020-05-21 | End: 2020-05-25

## 2020-05-21 RX ORDER — POTASSIUM CHLORIDE 7.45 MG/ML
10 INJECTION INTRAVENOUS
Status: DISCONTINUED | OUTPATIENT
Start: 2020-05-21 | End: 2020-05-26 | Stop reason: HOSPADM

## 2020-05-21 RX ORDER — LORAZEPAM 2 MG/ML
.5-1 INJECTION INTRAMUSCULAR EVERY 6 HOURS PRN
Status: DISCONTINUED | OUTPATIENT
Start: 2020-05-21 | End: 2020-05-21

## 2020-05-21 RX ORDER — AMOXICILLIN 250 MG
1 CAPSULE ORAL
Status: DISCONTINUED | OUTPATIENT
Start: 2020-05-21 | End: 2020-05-26 | Stop reason: HOSPADM

## 2020-05-21 RX ORDER — FAMOTIDINE 10 MG
10 TABLET ORAL 2 TIMES DAILY
Status: DISCONTINUED | OUTPATIENT
Start: 2020-05-21 | End: 2020-05-26 | Stop reason: HOSPADM

## 2020-05-21 RX ORDER — LIDOCAINE 40 MG/G
CREAM TOPICAL
Status: DISCONTINUED | OUTPATIENT
Start: 2020-05-21 | End: 2020-05-26 | Stop reason: HOSPADM

## 2020-05-21 RX ORDER — POTASSIUM CHLORIDE 29.8 MG/ML
20 INJECTION INTRAVENOUS
Status: DISCONTINUED | OUTPATIENT
Start: 2020-05-21 | End: 2020-05-26 | Stop reason: HOSPADM

## 2020-05-21 RX ORDER — LORAZEPAM 2 MG/ML
.5-1 INJECTION INTRAMUSCULAR EVERY 6 HOURS PRN
Status: DISCONTINUED | OUTPATIENT
Start: 2020-05-21 | End: 2020-05-26 | Stop reason: HOSPADM

## 2020-05-21 RX ORDER — LORAZEPAM 0.5 MG/1
.5-1 TABLET ORAL EVERY 6 HOURS PRN
Status: DISCONTINUED | OUTPATIENT
Start: 2020-05-21 | End: 2020-05-21

## 2020-05-21 RX ORDER — ONDANSETRON 8 MG/1
8 TABLET, FILM COATED ORAL EVERY 8 HOURS PRN
Status: DISCONTINUED | OUTPATIENT
Start: 2020-05-21 | End: 2020-05-26 | Stop reason: HOSPADM

## 2020-05-21 RX ORDER — PANTOPRAZOLE SODIUM 40 MG/1
40 TABLET, DELAYED RELEASE ORAL
Status: DISCONTINUED | OUTPATIENT
Start: 2020-05-22 | End: 2020-05-21

## 2020-05-21 RX ORDER — ACYCLOVIR 200 MG/1
400 CAPSULE ORAL 2 TIMES DAILY
Status: DISCONTINUED | OUTPATIENT
Start: 2020-05-21 | End: 2020-05-26 | Stop reason: HOSPADM

## 2020-05-21 RX ORDER — POTASSIUM CHLORIDE 750 MG/1
20-40 TABLET, EXTENDED RELEASE ORAL
Status: DISCONTINUED | OUTPATIENT
Start: 2020-05-21 | End: 2020-05-26 | Stop reason: HOSPADM

## 2020-05-21 RX ORDER — EPINEPHRINE 1 MG/ML
0.3 INJECTION, SOLUTION, CONCENTRATE INTRAVENOUS EVERY 5 MIN PRN
Status: DISCONTINUED | OUTPATIENT
Start: 2020-05-21 | End: 2020-05-26

## 2020-05-21 RX ORDER — LORAZEPAM 0.5 MG/1
.5-1 TABLET ORAL EVERY 6 HOURS PRN
Status: DISCONTINUED | OUTPATIENT
Start: 2020-05-21 | End: 2020-05-26 | Stop reason: HOSPADM

## 2020-05-21 RX ORDER — MAGNESIUM SULFATE HEPTAHYDRATE 40 MG/ML
4 INJECTION, SOLUTION INTRAVENOUS EVERY 4 HOURS PRN
Status: DISCONTINUED | OUTPATIENT
Start: 2020-05-21 | End: 2020-05-26 | Stop reason: HOSPADM

## 2020-05-21 RX ORDER — HEPARIN SODIUM,PORCINE 10 UNIT/ML
2-5 VIAL (ML) INTRAVENOUS
Status: COMPLETED | OUTPATIENT
Start: 2020-05-21 | End: 2020-05-25

## 2020-05-21 RX ORDER — DEXAMETHASONE 4 MG/1
12 TABLET ORAL EVERY 24 HOURS
Status: COMPLETED | OUTPATIENT
Start: 2020-05-21 | End: 2020-05-25

## 2020-05-21 RX ORDER — POTASSIUM CHLORIDE 1.5 G/1.58G
20-40 POWDER, FOR SOLUTION ORAL
Status: DISCONTINUED | OUTPATIENT
Start: 2020-05-21 | End: 2020-05-26 | Stop reason: HOSPADM

## 2020-05-21 RX ORDER — ACETAMINOPHEN 325 MG/1
650 TABLET ORAL EVERY 4 HOURS PRN
Status: DISCONTINUED | OUTPATIENT
Start: 2020-05-21 | End: 2020-05-26 | Stop reason: HOSPADM

## 2020-05-21 RX ORDER — NALOXONE HYDROCHLORIDE 0.4 MG/ML
.1-.4 INJECTION, SOLUTION INTRAMUSCULAR; INTRAVENOUS; SUBCUTANEOUS
Status: DISCONTINUED | OUTPATIENT
Start: 2020-05-21 | End: 2020-05-26 | Stop reason: HOSPADM

## 2020-05-21 RX ORDER — ONDANSETRON 8 MG/1
8 TABLET, ORALLY DISINTEGRATING ORAL EVERY 8 HOURS PRN
Status: DISCONTINUED | OUTPATIENT
Start: 2020-05-21 | End: 2020-05-26 | Stop reason: HOSPADM

## 2020-05-21 RX ORDER — DAPSONE 100 MG/1
100 TABLET ORAL DAILY
Status: DISCONTINUED | OUTPATIENT
Start: 2020-05-22 | End: 2020-05-26 | Stop reason: HOSPADM

## 2020-05-21 RX ORDER — SODIUM CHLORIDE 9 MG/ML
1000 INJECTION, SOLUTION INTRAVENOUS CONTINUOUS PRN
Status: DISCONTINUED | OUTPATIENT
Start: 2020-05-21 | End: 2020-05-26

## 2020-05-21 RX ORDER — ALBUTEROL SULFATE 0.83 MG/ML
2.5 SOLUTION RESPIRATORY (INHALATION)
Status: DISCONTINUED | OUTPATIENT
Start: 2020-05-21 | End: 2020-05-26

## 2020-05-21 RX ORDER — ACETAMINOPHEN 325 MG/1
650 TABLET ORAL ONCE
Status: COMPLETED | OUTPATIENT
Start: 2020-05-21 | End: 2020-05-21

## 2020-05-21 RX ORDER — ONDANSETRON 8 MG/1
8 TABLET, FILM COATED ORAL EVERY 12 HOURS
Status: COMPLETED | OUTPATIENT
Start: 2020-05-21 | End: 2020-05-26

## 2020-05-21 RX ORDER — DIPHENHYDRAMINE HYDROCHLORIDE 50 MG/ML
50 INJECTION INTRAMUSCULAR; INTRAVENOUS
Status: DISCONTINUED | OUTPATIENT
Start: 2020-05-21 | End: 2020-05-26

## 2020-05-21 RX ORDER — HEPARIN SODIUM,PORCINE 10 UNIT/ML
5-10 VIAL (ML) INTRAVENOUS
Status: DISCONTINUED | OUTPATIENT
Start: 2020-05-21 | End: 2020-05-26 | Stop reason: HOSPADM

## 2020-05-21 RX ORDER — POLYETHYLENE GLYCOL 3350 17 G/17G
17 POWDER, FOR SOLUTION ORAL DAILY PRN
Status: DISCONTINUED | OUTPATIENT
Start: 2020-05-21 | End: 2020-05-26 | Stop reason: HOSPADM

## 2020-05-21 RX ORDER — ONDANSETRON 2 MG/ML
8 INJECTION INTRAMUSCULAR; INTRAVENOUS EVERY 8 HOURS PRN
Status: DISCONTINUED | OUTPATIENT
Start: 2020-05-21 | End: 2020-05-26 | Stop reason: HOSPADM

## 2020-05-21 RX ORDER — METHYLPREDNISOLONE SODIUM SUCCINATE 125 MG/2ML
125 INJECTION, POWDER, LYOPHILIZED, FOR SOLUTION INTRAMUSCULAR; INTRAVENOUS
Status: DISCONTINUED | OUTPATIENT
Start: 2020-05-21 | End: 2020-05-26

## 2020-05-21 RX ORDER — ALBUTEROL SULFATE 90 UG/1
1-2 AEROSOL, METERED RESPIRATORY (INHALATION)
Status: DISCONTINUED | OUTPATIENT
Start: 2020-05-21 | End: 2020-05-21

## 2020-05-21 RX ADMIN — PROCHLORPERAZINE EDISYLATE 10 MG: 5 INJECTION INTRAMUSCULAR; INTRAVENOUS at 21:45

## 2020-05-21 RX ADMIN — LIDOCAINE HYDROCHLORIDE 1 ML: 10 INJECTION, SOLUTION EPIDURAL; INFILTRATION; INTRACAUDAL; PERINEURAL at 15:48

## 2020-05-21 RX ADMIN — FAMOTIDINE 10 MG: 10 TABLET, FILM COATED ORAL at 20:09

## 2020-05-21 RX ADMIN — DIPHENHYDRAMINE HYDROCHLORIDE 50 MG: 50 CAPSULE ORAL at 16:48

## 2020-05-21 RX ADMIN — ACYCLOVIR 400 MG: 200 CAPSULE ORAL at 20:09

## 2020-05-21 RX ADMIN — PREDNISOLONE ACETATE 2 DROP: 10 SUSPENSION/ DROPS OPHTHALMIC at 18:37

## 2020-05-21 RX ADMIN — DEXAMETHASONE 12 MG: 4 TABLET ORAL at 18:36

## 2020-05-21 RX ADMIN — PREDNISOLONE ACETATE 2 DROP: 10 SUSPENSION/ DROPS OPHTHALMIC at 23:48

## 2020-05-21 RX ADMIN — ONDANSETRON HYDROCHLORIDE 8 MG: 8 TABLET, FILM COATED ORAL at 18:36

## 2020-05-21 RX ADMIN — ETOPOSIDE 130 MG: 20 INJECTION, SOLUTION, CONCENTRATE INTRAVENOUS at 22:14

## 2020-05-21 RX ADMIN — ACETAMINOPHEN 650 MG: 325 TABLET ORAL at 16:49

## 2020-05-21 RX ADMIN — ENOXAPARIN SODIUM 100 MG: 100 INJECTION SUBCUTANEOUS at 23:48

## 2020-05-21 RX ADMIN — CYTARABINE 4260 MG: 100 INJECTION, SOLUTION INTRATHECAL; INTRAVENOUS; SUBCUTANEOUS at 18:43

## 2020-05-21 RX ADMIN — Medication 5 ML: at 23:49

## 2020-05-21 RX ADMIN — RITUXIMAB 800 MG: 10 INJECTION, SOLUTION INTRAVENOUS at 17:00

## 2020-05-21 RX ADMIN — LORAZEPAM 0.5 MG: 2 INJECTION INTRAMUSCULAR; INTRAVENOUS at 23:45

## 2020-05-21 RX ADMIN — MESNA 1070 MG: 100 INJECTION, SOLUTION INTRAVENOUS at 23:47

## 2020-05-21 ASSESSMENT — ACTIVITIES OF DAILY LIVING (ADL)
SWALLOWING: 0-->SWALLOWS FOODS/LIQUIDS WITHOUT DIFFICULTY
ADLS_ACUITY_SCORE: 13
ADLS_ACUITY_SCORE: 13
TOILETING: 0-->INDEPENDENT
DRESS: 0-->INDEPENDENT
COGNITION: 0 - NO COGNITION ISSUES REPORTED
ADLS_ACUITY_SCORE: 13
RETIRED_EATING: 0-->INDEPENDENT
FALL_HISTORY_WITHIN_LAST_SIX_MONTHS: NO
AMBULATION: 0-->INDEPENDENT
BATHING: 0-->INDEPENDENT
TRANSFERRING: 0-->INDEPENDENT
RETIRED_COMMUNICATION: 0-->UNDERSTANDS/COMMUNICATES WITHOUT DIFFICULTY

## 2020-05-21 ASSESSMENT — MIFFLIN-ST. JEOR: SCORE: 1789.07

## 2020-05-21 NOTE — PROGRESS NOTES
Patient admitted to:[facility] [address]  Admitted from:Home  Arrived by:himself  Reason for admission:chemo thrapy  Patient accompanied by:none  Belongings:with him  Teaching:admission teaching done  Skin double check completed by:Mariia Zuniga

## 2020-05-21 NOTE — PROCEDURES
[facility], [place]    Double Lumen PICC Placement    Date/Time: 5/21/2020 3:37 PM  Performed by: Mary Jane Rebollar RN  Authorized by: Emilie Colorado PA-C   Indications: Chemotherapy.    UNIVERSAL PROTOCOL   Site Marked: Yes  Prior Images Obtained and Reviewed:  Yes  Required items: Required blood products, implants, devices and special equipment available    Patient identity confirmed:  Verbally with patient and arm band  NA - No sedation, light sedation, or local anesthesia  Confirmation Checklist:  Patient's identity using two indicators, relevant allergies, procedure was appropriate and matched the consent or emergent situation and correct equipment/implants were available  Time out: Immediately prior to the procedure a time out was called    Universal Protocol: the Joint Commission Universal Protocol was followed    Preparation: Patient was prepped and draped in usual sterile fashion           ANESTHESIA    Local Anesthetic: Lidocaine 1% without epinephrine  Anesthetic Total (mL):  2      SEDATION    Patient Sedated: No        Preparation: skin prepped with ChloraPrep  Skin prep agent: skin prep agent completely dried prior to procedure  Sterile barriers: maximum sterile barriers were used: cap, mask, sterile gown, sterile gloves, and large sterile sheet  Hand hygiene: hand hygiene performed prior to central venous catheter insertion  Type of line used: Power PICC  Catheter type: double lumen  Lumen type: non-valved  Catheter size: 5 Fr  : BioFlo.  Placement method: venipuncture, MST, ultrasound and tip confirmation system  Successful placement: yes  Orientation: right  Location: basilic vein (Vein Diameter 0.47)  Arm circumference: adults 10 cm  Extremity circumference: 31  Visible catheter length: 2  Internal length: 43 cm  Dressing and securement: blood removed, blood cleaned with CHG, chlorhexidine disc applied, site cleaned, statlock, glue, dressing applied, occlusive  dressing applied and sterile dressing applied  Post procedure assessment: placement verified by x-ray and blood return through all ports  PROCEDURE   Patient Tolerance:  Patient tolerated the procedure well with no immediate complications

## 2020-05-21 NOTE — PROGRESS NOTES
After admission, PICC line was placed R upper arm, after placement was check, started chemo therapy, patient expressed understanding the chemo therapy

## 2020-05-21 NOTE — PROGRESS NOTES
Writer received message from Vicky Farias, Clinic Coordinator, who has scheduled Jonnathan for transfusion appointments at [facility] in [place]. Conditional orders needed to be faxed. Writer faxed to [phone].

## 2020-05-21 NOTE — PROGRESS NOTES
Writer received VM from patient who stated that his 10:30 am appointment was not sent to him and no-one was able to check him in. Writer placed call back to patient who clarified that he was in the process of admission, as set up in chart, but was just letting us know that he never received an email as he had in the past. Writer confirmed that he is in fact at the [facility] being checked in and he is without further needs from the clinic.

## 2020-05-21 NOTE — H&P
[facility], [place]    Hematology / Oncology  Admission History & Physical     Date of Admission:  05/21/20  Date of Service: 05/21/20  Primary Hematologist/Oncologist: Dr. Wright    Assessment & Plan   Kobe Pedroza is a 60 year old male with history of Burkitt lymphoma in CR after Cycle 2 R-CODOX-M/IVAC who is now admitted for his last cycle of R-IVAC. Past medical history also significant for RLE DVT (4/30/20) on therapeutic enoxaparin, thyroid cancer status post thyroidectomy (2011), CAD, and HLD. He has tolerated previous cycles well besides neutropenic fever after his first cycle of R-IVAC secondary to GPC bacteremia.    # Burkitt Lymphoma  Followed by Dr. Wright. Presented on 3/12/20 to [facility] with acute-on-chronic mid-thoracic back pain with some associated radicular symptoms. Per patient, no B-symptoms, though he did note an intentional 35-lb weight loss. 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 Patient had no appreciable neurological deficits. He was started on dexamethasone and underwent T5-6 laminectomy with gross total resection of epidural tumor on 3/15/2020. Flow cytometry of the specimen was notable for CD10 positive and kappa monotypic B cells (83%). Confirmed Burkitt lymphoma with surgical pathology. PET scan showed extensive visceral and bony disease. Bone marrow biopsy on 3/18 showed suspicious involvement with rare polytypic B cells (0.8%). No disease in CSF. Started R-CODOX-M (Cycle 1, Day 1=3/18/2020), which was well-tolerated. Received Cycle 1 R-IVAC (C1D1=4/8/2020), which was complicated by development of neutropenic fever and DVT in the right peroneal vein on 4/30/20, on enoxaparin. PET-CT on 4/27/2020 demonstrated a complete remission. He completed Cycle 2 of R-CODOX-M,  was admitted for the high-dose methotrexate portion on 5/8/20 (Day 10-11 per Maryana et al.).  This was well tolerated. Now presenting for Cycle 2 R-IVAC with IT chemotherapy, which is his last scheduled cycle of chemotherapy.   - Ordered PICC on admission (lovenox held, last dose @ 0800 on 5/20, resumed after PICC placement), will remove at discharge     Treatment Plan: R-IVAC. Cycle 2 Day 1= 5/21/20    - Rituximab 375 mg/m2 (800 mg) Day 1      - Cytarabine 2g/m2 (4,260 mg) BID D1, D2               - Etoposide 60 mg/m2 (130 mg) daily Days 1-5                - Ifosfamide 1500 mg/m2 (3,195 mg) Days 1-5               - Mesna 500 mg/m2 Days 1-5                - Methotrexate 12 mg IT (no need for hydrocortisone, confirmed with Dr. Wright) scheduled with XR 5/26 @1000. Hold Lovenox 24hours prior               - Pre Meds: Tylenol 650 mg, Benadryl 50 mg, Zofran 8 mg BID, Dex 12 mg BID Days 1-5, 8 mg daily Day 6-7     - Supportive care: Prednisolone acetate eye drops QID Days 1-4    - He will complete IV chemotherapy on 5/25. Requested Neulasta at [facility] on 5/27 or 5/28    - He is low risk for TLS/DIC given PET with CR. Will follow labs and start allopurinol if needed  - Will need twice weekly labs in [place], not yet scheduled  - The plan is to have his missed IT chemo dose (from 2nd cycle of R-CODOX-M) when his counts recover in a couple of weeks per Dr. Llanes. He will then have a a PET/CT in 6-8 weeks and review the results in clinic.   - Requested BOB visit in 2 weeks, at that time they can order IT Chemo if his counts are recovered    # Anemia, thrombocytopenia  Presumed related to underlying malignancy plus recent chemotherapy. No recent bleeding concerns. Hgb and platelets stable on admission  - Transfuse to keep Hgb >7, platelets >10K    # ID ppx  - Continue Dapsone for PJP ppx (pentamidine last given 3/21/20). G6PD WNL  - ACV 400mg BID (past exposure of VZV)  - Restart fluconazole 200mg daily and levaquin 250mg daily on discharge or sooner if ANC <1000    CV  # Coronary artery disease  #  Hyperlipidemia  Followed by Dr. Zeng at [facility] Diagnosed with mild, non-obstructive CAD in 2018. 81 mg ASA and low-dose statin therapy recommended. No previous cardiac caths or stents.  - Okay to resume statin on discharge; continue holding ASA given thrombocytopenia and need for treatment-dose anticoagulation.    ENDO  # History of thyroid cancer status-post thyroidectomy  Status post thyroidectomy in 2011. TSH normal on 3/12/20.  - Continue PTA levothyroxine      GI  # History of hemorrhoids  # History of lower GI bleeding  Reported BRBPR during recent admission (4/19-4/22) in the context of previously diagnosed hemorrhoids. No recent recurrence.  - PRN senna and miralax  - Monitor     MSK  # Right distal peroneal DVT  Diagnosed on US on 4/30 with an occlusive deep vein thrombus in the right peroneal vein at the mid calf.  - Continue enoxaparin 100 mg BID. Hold for platelets <50,000 .    Ppx  - GI: Pepcid while on steroids  - DVT: Continue therapeutic lovenox 100mg BID, hold for 24 hours prior to procedures or if platelets <50,000. Held on admission for PICC placement, resumed after PICC is placed    FEN: regular diet, IVF per chemotherapy protocol, replete lytes PRN  Code: Full Code  Disposition: Admitted to [facility] service for scheduled chemotherapy     Patient was seen and plan of care was discussed with attending physician Dr. Caro.    Emilie Colorado PA-C  Hematology/Oncology  Pager # [phone]  Phone # [phone]     Chief Complaint   Burkitt Lymphoma  - History obtained from the patient and through chart review.    History of Present Illness   Kobe Pedroza is a 60 year old male who is admitted for Cycle 2 of R-IVAC. He is overall doing well. No fevers/chills/sweats. No sore throat, sinus congestion, cough, chest pain, or trouble breathing. No pleuritic pain. RLE pain/cramping has improved significantly. No other new complaints or concerns.    Past Medical History    I  have reviewed this patient's medical history and updated it with pertinent information if needed.   Past Medical History:   Diagnosis Date     Burkitt's lymphoma (H)      Hypothyroidism      Thyroid cancer (H)        Past Surgical History   I have reviewed this patient's surgical history and updated it with pertinent information if needed.  Past Surgical History:   Procedure Laterality Date     IR PICC VASCULAR  4/8/2020     LAMINECTOMY, EXCISE TUMOR THORACIC, COMBINED N/A 3/15/2020    Procedure: LAMINECTOMY, SPINE, THORACIC, 2 levels, T-5, T-6;  Surgeon: Heather Cespedes MD;  Location: [facility] OR     PICC DOUBLE LUMEN PLACEMENT Right 04/30/2020    5FR DL PICC inserted at right basilic vein , length- 39cm (1cm out), tip at low SVC.     THYROIDECTOMY          Prior to Admission Medications   Prior to Admission Medications   Prescriptions Last Dose Informant Patient Reported? Taking?   acetaminophen (TYLENOL) 325 MG tablet  Self Yes No   Sig: Take 2 tablets (650 mg) by mouth every 4 hours as needed for mild pain   acyclovir (ZOVIRAX) 200 MG capsule 5/21/2020  No No   Sig: Take 2 capsules (400 mg) by mouth 2 times daily   dapsone (ACZONE) 100 MG tablet 5/21/2020  No No   Sig: Take 1 tablet (100 mg) by mouth daily   enoxaparin ANTICOAGULANT (LOVENOX) 100 MG/ML syringe 5/20/2020 at Unknown time  No Yes   Sig: Inject 1 mL (100 mg) Subcutaneous every 12 hours   fluconazole (DIFLUCAN) 200 MG tablet 5/21/2020 Self Yes No   Sig: Take 200 mg by mouth daily ONLY TAKE IF ANC <1000   levofloxacin (LEVAQUIN) 250 MG tablet 5/21/2020 Self Yes No   Sig: Take 250 mg by mouth daily ONLY TAKE IF ANC <1000   levothyroxine (SYNTHROID/LEVOTHROID) 200 MCG tablet 5/21/2020 Self Yes No   Sig: Take 200 mcg by mouth daily   ondansetron (ZOFRAN) 4 MG tablet  Self No No   Sig: Take 1-2 tablets (4-8 mg) by mouth every 6 hours as needed for nausea   potassium chloride (KLOR-CON) 20 MEQ packet  Self No No   Sig: Take 40 mEq by mouth daily    senna-docusate (SENOKOT-S/PERICOLACE) 8.6-50 MG tablet  Self No No   Sig: Take 1 tablet by mouth 2 times daily      Facility-Administered Medications: None     Allergies   No Known Allergies    Social History   Social History     Socioeconomic History     Marital status:      Spouse name: Not on file     Number of children: Not on file     Years of education: Not on file     Highest education level: Not on file   Occupational History     Not on file   Social Needs     Financial resource strain: Not on file     Food insecurity     Worry: Not on file     Inability: Not on file     Transportation needs     Medical: Not on file     Non-medical: Not on file   Tobacco Use     Smoking status: Never Smoker     Smokeless tobacco: Never Used   Substance and Sexual Activity     Alcohol use: Not Currently     Drug use: Never     Sexual activity: Not on file   Lifestyle     Physical activity     Days per week: Not on file     Minutes per session: Not on file     Stress: Not on file   Relationships     Social connections     Talks on phone: Not on file     Gets together: Not on file     Attends Yazidism service: Not on file     Active member of club or organization: Not on file     Attends meetings of clubs or organizations: Not on file     Relationship status: Not on file     Intimate partner violence     Fear of current or ex partner: Not on file     Emotionally abused: Not on file     Physically abused: Not on file     Forced sexual activity: Not on file   Other Topics Concern     Parent/sibling w/ CABG, MI or angioplasty before 65F 55M? Not Asked   Social History Narrative     Not on file       Family History   I have reviewed this patient's family history and updated it with pertinent information if needed.   No family history on file.    Review of Systems   A comprehensive ROS was performed with the patient and was found to be negative or non-contributory with the exception of that noted in the HPI  above.    Physical Exam   Temp:  [96.5  F (35.8  C)] 96.5  F (35.8  C)  Pulse:  [78] 78  Resp:  [17] 17  BP: (129)/(89) 129/89  SpO2:  [95 %] 95 %  CONSTITUTIONAL: Alert and interactive. Lying in bed in no acute distress.  HEENT: NC/AT, PERRLA, EOMI, anicteric sclera, oropharynx is pink and moist without erythema/exudates/lesions/thrush.  NECK: Supple, full ROM.  LYMPHATIC: No palpable mandibular/cervical/supra/infraclavicular lymph nodes.  CARDIOVASCULAR: RRR. Normal S1/S2. No murmurs. 2+ equal and bilateral radial and pedal pulses.   RESPIRATORY: CTAB. No wheezes appreciated. Normal respiratory effort on ambient air.  GASTROINTESTINAL: Soft, non-tender, non-distended, normoactive bowel sounds.  MUSCULOSKELETAL: No joint swelling or tenderness.  EXTREMITIES: No lower extremity edema.   SKIN: Warm and intact. No concerning lesions or rashes on exposed skin surfaces. No jaundice.  NEUROLOGIC: Alert and fully oriented, appropriately responsive during interview.     Data   I have personally reviewed the following labs/imaging:  Results for orders placed or performed during the hospital encounter of 05/21/20 (from the past 24 hour(s))   CBC with platelets differential   Result Value Ref Range    WBC 20.5 (H) 4.0 - 11.0 10e9/L    RBC Count 3.02 (L) 4.4 - 5.9 10e12/L    Hemoglobin 9.2 (L) 13.3 - 17.7 g/dL    Hematocrit 29.3 (L) 40.0 - 53.0 %    MCV 97 78 - 100 fl    MCH 30.5 26.5 - 33.0 pg    MCHC 31.4 (L) 31.5 - 36.5 g/dL    RDW 20.9 (H) 10.0 - 15.0 %    Platelet Count 132 (L) 150 - 450 10e9/L    Diff Method Manual Differential     % Neutrophils 88.6 %    % Lymphocytes 6.1 %    % Monocytes 4.4 %    % Eosinophils 0.0 %    % Basophils 0.0 %    % Myelocytes 0.9 %    Absolute Neutrophil 18.2 (H) 1.6 - 8.3 10e9/L    Absolute Lymphocytes 1.3 0.8 - 5.3 10e9/L    Absolute Monocytes 0.9 0.0 - 1.3 10e9/L    Absolute Eosinophils 0.0 0.0 - 0.7 10e9/L    Absolute Basophils 0.0 0.0 - 0.2 10e9/L    Absolute Myelocytes 0.2 (H) 0 10e9/L     Anisocytosis Moderate     Polychromasia Slight     Microcytes Present     Toxic Granulation Present     Platelet Estimate Confirming automated cell count    Comprehensive metabolic panel   Result Value Ref Range    Sodium 141 133 - 144 mmol/L    Potassium 3.6 3.4 - 5.3 mmol/L    Chloride 108 94 - 109 mmol/L    Carbon Dioxide 27 20 - 32 mmol/L    Anion Gap 7 3 - 14 mmol/L    Glucose 93 70 - 99 mg/dL    Urea Nitrogen 10 7 - 30 mg/dL    Creatinine 0.76 0.66 - 1.25 mg/dL    GFR Estimate >90 >60 mL/min/[1.73_m2]    GFR Estimate If Black >90 >60 mL/min/[1.73_m2]    Calcium 8.2 (L) 8.5 - 10.1 mg/dL    Bilirubin Total 0.3 0.2 - 1.3 mg/dL    Albumin 3.5 3.4 - 5.0 g/dL    Protein Total 6.7 (L) 6.8 - 8.8 g/dL    Alkaline Phosphatase 121 40 - 150 U/L    ALT 63 0 - 70 U/L    AST 20 0 - 45 U/L   Uric acid   Result Value Ref Range    Uric Acid 5.7 3.5 - 7.2 mg/dL

## 2020-05-22 LAB
ALBUMIN SERPL-MCNC: 3.3 G/DL (ref 3.4–5)
ALP SERPL-CCNC: 118 U/L (ref 40–150)
ALT SERPL W P-5'-P-CCNC: 60 U/L (ref 0–70)
ANION GAP SERPL CALCULATED.3IONS-SCNC: 8 MMOL/L (ref 3–14)
APTT PPP: 31 SEC (ref 22–37)
AST SERPL W P-5'-P-CCNC: 20 U/L (ref 0–45)
BASOPHILS # BLD AUTO: 0 10E9/L (ref 0–0.2)
BASOPHILS NFR BLD AUTO: 0.1 %
BILIRUB SERPL-MCNC: 0.4 MG/DL (ref 0.2–1.3)
BUN SERPL-MCNC: 12 MG/DL (ref 7–30)
CALCIUM SERPL-MCNC: 7.8 MG/DL (ref 8.5–10.1)
CHLORIDE SERPL-SCNC: 108 MMOL/L (ref 94–109)
CO2 SERPL-SCNC: 24 MMOL/L (ref 20–32)
CREAT SERPL-MCNC: 0.75 MG/DL (ref 0.66–1.25)
DIFFERENTIAL METHOD BLD: ABNORMAL
EOSINOPHIL # BLD AUTO: 0 10E9/L (ref 0–0.7)
EOSINOPHIL NFR BLD AUTO: 0 %
ERYTHROCYTE [DISTWIDTH] IN BLOOD BY AUTOMATED COUNT: 20.7 % (ref 10–15)
FIBRINOGEN PPP-MCNC: 440 MG/DL (ref 200–420)
GFR SERPL CREATININE-BSD FRML MDRD: >90 ML/MIN/{1.73_M2}
GLUCOSE SERPL-MCNC: 152 MG/DL (ref 70–99)
HCT VFR BLD AUTO: 28.4 % (ref 40–53)
HGB BLD-MCNC: 9.1 G/DL (ref 13.3–17.7)
IMM GRANULOCYTES # BLD: 1.2 10E9/L (ref 0–0.4)
IMM GRANULOCYTES NFR BLD: 4.9 %
INR PPP: 1.09 (ref 0.86–1.14)
LYMPHOCYTES # BLD AUTO: 0.5 10E9/L (ref 0.8–5.3)
LYMPHOCYTES NFR BLD AUTO: 2.2 %
MAGNESIUM SERPL-MCNC: 2 MG/DL (ref 1.6–2.3)
MCH RBC QN AUTO: 30.8 PG (ref 26.5–33)
MCHC RBC AUTO-ENTMCNC: 32 G/DL (ref 31.5–36.5)
MCV RBC AUTO: 96 FL (ref 78–100)
MONOCYTES # BLD AUTO: 0.3 10E9/L (ref 0–1.3)
MONOCYTES NFR BLD AUTO: 1.4 %
NEUTROPHILS # BLD AUTO: 21.3 10E9/L (ref 1.6–8.3)
NEUTROPHILS NFR BLD AUTO: 91.4 %
NRBC # BLD AUTO: 0 10*3/UL
NRBC BLD AUTO-RTO: 0 /100
PHOSPHATE SERPL-MCNC: 2.5 MG/DL (ref 2.5–4.5)
PLATELET # BLD AUTO: 143 10E9/L (ref 150–450)
POTASSIUM SERPL-SCNC: 3.8 MMOL/L (ref 3.4–5.3)
PROT SERPL-MCNC: 6.4 G/DL (ref 6.8–8.8)
RBC # BLD AUTO: 2.95 10E12/L (ref 4.4–5.9)
SODIUM SERPL-SCNC: 140 MMOL/L (ref 133–144)
URATE SERPL-MCNC: 6.5 MG/DL (ref 3.5–7.2)
WBC # BLD AUTO: 23.3 10E9/L (ref 4–11)

## 2020-05-22 PROCEDURE — 85610 PROTHROMBIN TIME: CPT | Performed by: INTERNAL MEDICINE

## 2020-05-22 PROCEDURE — 25000128 H RX IP 250 OP 636: Performed by: INTERNAL MEDICINE

## 2020-05-22 PROCEDURE — 25800030 ZZH RX IP 258 OP 636: Performed by: INTERNAL MEDICINE

## 2020-05-22 PROCEDURE — 85384 FIBRINOGEN ACTIVITY: CPT | Performed by: INTERNAL MEDICINE

## 2020-05-22 PROCEDURE — 84100 ASSAY OF PHOSPHORUS: CPT | Performed by: INTERNAL MEDICINE

## 2020-05-22 PROCEDURE — 84550 ASSAY OF BLOOD/URIC ACID: CPT | Performed by: INTERNAL MEDICINE

## 2020-05-22 PROCEDURE — 83735 ASSAY OF MAGNESIUM: CPT | Performed by: INTERNAL MEDICINE

## 2020-05-22 PROCEDURE — 85730 THROMBOPLASTIN TIME PARTIAL: CPT | Performed by: INTERNAL MEDICINE

## 2020-05-22 PROCEDURE — 25000131 ZZH RX MED GY IP 250 OP 636 PS 637: Performed by: INTERNAL MEDICINE

## 2020-05-22 PROCEDURE — 99233 SBSQ HOSP IP/OBS HIGH 50: CPT | Performed by: INTERNAL MEDICINE

## 2020-05-22 PROCEDURE — 12000012 ZZH R&B MS OVERFLOW UMMC

## 2020-05-22 PROCEDURE — 25000128 H RX IP 250 OP 636: Performed by: PHYSICIAN ASSISTANT

## 2020-05-22 PROCEDURE — 80053 COMPREHEN METABOLIC PANEL: CPT | Performed by: INTERNAL MEDICINE

## 2020-05-22 PROCEDURE — 25000132 ZZH RX MED GY IP 250 OP 250 PS 637: Performed by: PHYSICIAN ASSISTANT

## 2020-05-22 PROCEDURE — 25000132 ZZH RX MED GY IP 250 OP 250 PS 637: Performed by: INTERNAL MEDICINE

## 2020-05-22 PROCEDURE — 85025 COMPLETE CBC W/AUTO DIFF WBC: CPT | Performed by: INTERNAL MEDICINE

## 2020-05-22 RX ORDER — ALLOPURINOL 300 MG/1
300 TABLET ORAL DAILY
Status: DISCONTINUED | OUTPATIENT
Start: 2020-05-22 | End: 2020-05-23

## 2020-05-22 RX ADMIN — DEXAMETHASONE 12 MG: 4 TABLET ORAL at 16:12

## 2020-05-22 RX ADMIN — ALLOPURINOL 300 MG: 300 TABLET ORAL at 07:51

## 2020-05-22 RX ADMIN — PREDNISOLONE ACETATE 2 DROP: 10 SUSPENSION/ DROPS OPHTHALMIC at 11:34

## 2020-05-22 RX ADMIN — MESNA 1070 MG: 100 INJECTION, SOLUTION INTRAVENOUS at 09:11

## 2020-05-22 RX ADMIN — PREDNISOLONE ACETATE 2 DROP: 10 SUSPENSION/ DROPS OPHTHALMIC at 16:13

## 2020-05-22 RX ADMIN — ONDANSETRON HYDROCHLORIDE 8 MG: 8 TABLET, FILM COATED ORAL at 16:13

## 2020-05-22 RX ADMIN — Medication 5 ML: at 04:09

## 2020-05-22 RX ADMIN — MESNA 1070 MG: 100 INJECTION, SOLUTION INTRAVENOUS at 04:10

## 2020-05-22 RX ADMIN — ONDANSETRON HYDROCHLORIDE 8 MG: 8 TABLET, FILM COATED ORAL at 05:36

## 2020-05-22 RX ADMIN — ENOXAPARIN SODIUM 100 MG: 100 INJECTION SUBCUTANEOUS at 22:05

## 2020-05-22 RX ADMIN — PREDNISOLONE ACETATE 2 DROP: 10 SUSPENSION/ DROPS OPHTHALMIC at 07:53

## 2020-05-22 RX ADMIN — ENOXAPARIN SODIUM 100 MG: 100 INJECTION SUBCUTANEOUS at 11:30

## 2020-05-22 RX ADMIN — LEVOTHYROXINE SODIUM 200 MCG: 0.07 TABLET ORAL at 07:52

## 2020-05-22 RX ADMIN — ACYCLOVIR 400 MG: 200 CAPSULE ORAL at 19:45

## 2020-05-22 RX ADMIN — ETOPOSIDE 130 MG: 20 INJECTION, SOLUTION, CONCENTRATE INTRAVENOUS at 22:51

## 2020-05-22 RX ADMIN — FAMOTIDINE 10 MG: 10 TABLET, FILM COATED ORAL at 19:45

## 2020-05-22 RX ADMIN — CYTARABINE 4260 MG: 100 INJECTION, SOLUTION INTRATHECAL; INTRAVENOUS; SUBCUTANEOUS at 18:35

## 2020-05-22 RX ADMIN — IFOSFAMIDE 3195 MG: 1 INJECTION, SOLUTION INTRAVENOUS at 00:09

## 2020-05-22 RX ADMIN — PREDNISOLONE ACETATE 2 DROP: 10 SUSPENSION/ DROPS OPHTHALMIC at 19:45

## 2020-05-22 RX ADMIN — DAPSONE 100 MG: 100 TABLET ORAL at 07:51

## 2020-05-22 RX ADMIN — PROCHLORPERAZINE MALEATE 10 MG: 5 TABLET ORAL at 22:05

## 2020-05-22 RX ADMIN — CYTARABINE 4260 MG: 100 INJECTION, SOLUTION INTRATHECAL; INTRAVENOUS; SUBCUTANEOUS at 06:32

## 2020-05-22 RX ADMIN — FAMOTIDINE 10 MG: 10 TABLET, FILM COATED ORAL at 07:51

## 2020-05-22 RX ADMIN — ACYCLOVIR 400 MG: 200 CAPSULE ORAL at 07:51

## 2020-05-22 ASSESSMENT — ACTIVITIES OF DAILY LIVING (ADL)
ADLS_ACUITY_SCORE: 13

## 2020-05-22 ASSESSMENT — MIFFLIN-ST. JEOR: SCORE: 1790.89

## 2020-05-22 NOTE — PROGRESS NOTES
Type of chemo infused: etoposide  Pt tolerated infusion: yes  Interventions: zofran/dexamethasone  Response to interventions used: n/a- 1x emesis after infusion finished, ativan x1.   Plan: Continue to monitor. Continue chemo regimen

## 2020-05-22 NOTE — PROGRESS NOTES
Hematology / Oncology  Daily Progress Note   Date of Service: 05/22/2020  Patient: Kobe Pedroza  MRN: [id number]  Admission Date: 5/21/2020  Hospital Day # 1    Assessment & Plan:   Kobe Pedroza is a 60 year old male with history of Burkitt lymphoma in CR after Cycle 2 R-CODOX-M/IVAC who is now admitted for his last cycle of R-IVAC. Past medical history also significant for RLE DVT (4/30/20) on therapeutic enoxaparin, thyroid cancer status post thyroidectomy (2011), CAD, and HLD. He has tolerated previous cycles well besides neutropenic fever after his first cycle of R-IVAC secondary to GPC bacteremia.    Plan for today  - Today is Day 2 R-IVAC  - He had 1 episode of emesis last night. Continue anti-emetics per chemo plan and PRN  - IT Methotrexate 5/26 @ 1000, please hold lovenox 24 hours prior  - He is scheduled for outpatient labs @ [facility] 5/28, 6/2, and 6/4 with possible transfusions if needed. BOB video visit 6/5/20. IT Chemo scheduled 6/8/20 (hold if counts are not recovered)      # Burkitt Lymphoma  Followed by Dr. Wright. Presented on 3/12/20 to [facility] with acute-on-chronic mid-thoracic back pain with some associated radicular symptoms. Per patient, no B-symptoms, though he did note an intentional 35-lb weight loss. 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 Patient had no appreciable neurological deficits. He was started on dexamethasone and underwent T5-6 laminectomy with gross total resection of epidural tumor on 3/15/2020. Flow cytometry of the specimen was notable for CD10 positive and kappa monotypic B cells (83%). Confirmed Burkitt lymphoma with surgical pathology. PET scan showed extensive visceral and bony disease. Bone marrow biopsy on 3/18 showed suspicious involvement with rare polytypic B cells (0.8%). No disease in CSF. Started R-CODOX-M (Cycle 1, Day 1=3/18/2020), which was  well-tolerated. Received Cycle 1 R-IVAC (C1D1=4/8/2020), which was complicated by development of neutropenic fever and DVT in the right peroneal vein on 4/30/20, on enoxaparin. PET-CT on 4/27/2020 demonstrated a complete remission. He completed Cycle 2 of R-CODOX-M,  was admitted for the high-dose methotrexate portion on 5/8/20 (Day 10-11 per Maryana et al.). This was well tolerated. Now presenting for Cycle 2 R-IVAC with IT chemotherapy, which is his last scheduled cycle of chemotherapy.   - Ordered PICC on admission (lovenox held, last dose @ 0800 on 5/20, resumed after PICC placement), will remove at discharge                  Treatment Plan: R-IVAC. Cycle 2 Day 1= 5/21/20                - Rituximab 375 mg/m2 (800 mg) Day 1                  - Cytarabine 2g/m2 (4,260 mg) BID D1, D2               - Etoposide 60 mg/m2 (130 mg) daily Days 1-5                - Ifosfamide 1500 mg/m2 (3,195 mg) Days 1-5               - Mesna 500 mg/m2 Days 1-5                - Methotrexate 12 mg IT (no need for hydrocortisone, confirmed with Dr. Wright) scheduled with XR 5/26 @1000, delayed due to [facility] Day. Hold Lovenox 24hours prior               - Pre Meds: Tylenol 650 mg, Benadryl 50 mg, Zofran 8 mg BID, Dex 12 mg BID Days 1-5, 8 mg daily Day 6-7                 - Supportive care: Prednisolone acetate eye drops QID Days 1-4                - He will complete IV chemotherapy on 5/25. Scheduled for Neulasta on 4/27/20 at [facility].      - He is low risk for TLS/DIC given PET with CR. Will follow labs and start allopurinol if needed  - He is scheduled for outpatient labs @ [facility] on 5/28, 6/2, and 6/4 with possible transfusions if needed.   - The plan is to have his missed IT chemo dose (from 2nd cycle of R-CODOX-M) when his counts recover in a couple of weeks per Dr. Llanes. He will then have a a PET/CT in 6-8 weeks and review the results in clinic.               - He has an BOB video visit 6/5/20, at that time  they can order IT Chemo if his counts are recovered    - I did schedule an outpatient LP w/ IT chemo on 6/8/20 at 1300 in case his counts are recovered at that time. We did not order chemotherapy yet as his counts may not be recovered by then. The outpatient BOB can call XR to cancel the LP if needed. I message the outpatient BOB, Daya, to notify her of the above. Of note, he will need to be reminded to hold his lovenox 24hrs before hte LP      # Anemia, thrombocytopenia  Presumed related to underlying malignancy plus recent chemotherapy. No recent bleeding concerns. Hgb and platelets stable on admission  - Transfuse to keep Hgb >7, platelets >10K     # ID ppx  - Continue Dapsone for PJP ppx (pentamidine last given 3/21/20). G6PD WNL  - ACV 400mg BID (past exposure of VZV)  - Restart fluconazole 200mg daily and levaquin 250mg daily on discharge or sooner if ANC <1000. His counts did drop with C1 R-IVAC     CV  # Coronary artery disease  # Hyperlipidemia  Followed by Dr. Zeng at [facility] Diagnosed with mild, non-obstructive CAD in 2018. 81 mg ASA and low-dose statin therapy recommended. No previous cardiac caths or stents.  - Okay to resume statin on discharge; continue holding ASA given thrombocytopenia and need for treatment-dose anticoagulation.     ENDO  # History of thyroid cancer status-post thyroidectomy  Status post thyroidectomy in 2011. TSH normal on 3/12/20.  - Continue PTA levothyroxine      GI  # History of hemorrhoids  # History of lower GI bleeding  Reported BRBPR during recent admission (4/19-4/22) in the context of previously diagnosed hemorrhoids. No recent recurrence.  - PRN senna and miralax  - Monitor    # Emesis  He had 1 episode of emesis during infusion on Day 1. Resolved.   - Continue anti-emetics     MSK  # Right distal peroneal DVT  Diagnosed on US on 4/30 with an occlusive deep vein thrombus in the right peroneal vein at the mid calf.  - Continue enoxaparin  "100 mg BID. Hold for platelets <50,000 .     Ppx  - GI: Pepcid while on steroids  - DVT: Continue therapeutic lovenox 100mg BID, hold for 24 hours prior to procedures or if platelets <50,000. Held on admission for PICC placement, resumed after PICC is placed     FEN: regular diet, IVF per chemotherapy protocol, replete lytes PRN  Code: Full Code  Disposition: Admitted to [address] [place] [address] for scheduled chemotherapy. Anticipate discharge after IT chemo on 5/26 to home.      Patient was seen and plan of care was discussed with attending physician Dr. Caro.     Emilie Colorado PA-C  Hematology/Oncology  Pager # [phone]  Phone # [phone]      ___________________________________________________________________    Subjective & Interval History:    He was eating a cheeseburger while receiving the chemo last night then vomited twice. He has not felt nauseous since then. He ate eggs this morning, no emesis after, denies nausea. He is otherwise doing well- no headache, vision changes, confusion, sore throat, cough, chest pain, abdominal pain, N/V, constipation, dysuria, or LE swelling.     Physical Exam:    Blood pressure 124/76, pulse 90, temperature 97.9  F (36.6  C), temperature source Oral, resp. rate 18, height 1.74 m (5' 8.5\"), weight 98.8 kg (217 lb 14.4 oz), SpO2 91 %.    General: alert and cooperative, lying in bed, no acute distress  HEENT: sclera anicteric, EOMI, MMM  Neck: supple, normal ROM  CV: RRR, normal S1/S2, no m/r/g  Resp: CTAB, no wheezing/crackles, normal respiratory effort on ambient air  GI: soft, non-tender, non-distended, bowel sounds present and normoactive  MSK: warm and well-perfused, no edema  Skin: no rashes on limited exam, no jaundice  Neuro: AOx3, moves all extremities equally, no focal deficits  PICC C/D/I    Labs & Studies: I personally reviewed the following studies:  ROUTINE LABS (Last four results):  CMP  Recent Labs   Lab 05/22/20  0421 05/21/20  1109 05/18/20    141 138 "   POTASSIUM 3.8 3.6 4   CHLORIDE 108 108 102*   CO2 24 27 26   ANIONGAP 8 7  --    * 93 194*   BUN 12 10 14   CR 0.75 0.76 0.83   GFRESTIMATED >90 >90 >60   GFRESTBLACK >90 >90  --    RUTH 7.8* 8.2* 8.2*   MAG 2.0  --   --    PHOS 2.5  --   --    PROTTOTAL 6.4* 6.7* 7.2   ALBUMIN 3.3* 3.5 3.8   BILITOTAL 0.4 0.3 0.3   ALKPHOS 118 121 133*   AST 20 20 36   ALT 60 63 82*     CBC  Recent Labs   Lab 05/22/20  0421 05/21/20  1109 05/18/20   WBC 23.3* 20.5* 31.4*   RBC 2.95* 3.02* 3.12*   HGB 9.1* 9.2* 9.7*   HCT 28.4* 29.3* 29.2*   MCV 96 97 94   MCH 30.8 30.5 32   MCHC 32.0 31.4* 33   RDW 20.7* 20.9* 19.4*   * 132* 100*     INR  Recent Labs   Lab 05/22/20  0421   INR 1.09       Microbiology  No results for input(s): LACT in the last 168 hours.    Medications list for reference:  Current Facility-Administered Medications   Medication     acetaminophen (TYLENOL) tablet 650 mg     acyclovir (ZOVIRAX) capsule 400 mg     albuterol (PROVENTIL) neb solution 2.5 mg     allopurinol (ZYLOPRIM) tablet 300 mg     cytarabine (PF) (CYTOSAR) 4,260 mg in D5W 318 mL infusion     dapsone (ACZONE) tablet 100 mg     dexamethasone (DECADRON) tablet 12 mg     [START ON 5/26/2020] dexamethasone (DECADRON) tablet 8 mg     diphenhydrAMINE (BENADRYL) injection 50 mg     enoxaparin ANTICOAGULANT (LOVENOX) injection 100 mg     EPINEPHrine PF (ADRENALIN) injection 0.3 mg     etoposide (TOPOSAR) 130 mg in sodium chloride 0.9 % 557 mL infusion     famotidine (PEPCID) tablet 10 mg     heparin lock flush 10 UNIT/ML injection 2-5 mL     heparin lock flush 10 UNIT/ML injection 5-10 mL     heparin lock flush 10 UNIT/ML injection 5-10 mL     Ifosfamide (IFEX) 3,195 mg in sodium chloride 0.9 % 174 mL infusion     levothyroxine (SYNTHROID/LEVOTHROID) tablet 200 mcg     lidocaine (LMX4) cream     lidocaine 1 % 0.1-1 mL     LORazepam (ATIVAN) injection 0.5-1 mg     LORazepam (ATIVAN) tablet 0.5-1 mg     magnesium sulfate 4 g in 100 mL sterile  water (premade)     Medication Instruction     MEDICATION INSTRUCTION     meperidine (DEMEROL) injection 25 mg     mesna (MESNEX) 1,070 mg in sodium chloride 0.9 % 66 mL infusion     mesna (MESNEX) 1,070 mg in sodium chloride 0.9 % 66 mL infusion     mesna (MESNEX) 1,070 mg in sodium chloride 0.9 % 66 mL infusion     [START ON 5/26/2020] methotrexate (PF) 12 mg in sodium chloride (PF) 0.9% PF 6 mL intrathecal inj (PF)     methylPREDNISolone sodium succinate (solu-MEDROL) injection 125 mg     naloxone (NARCAN) injection 0.1-0.4 mg     ondansetron (ZOFRAN) injection 8 mg    Or     ondansetron (ZOFRAN-ODT) ODT tab 8 mg    Or     ondansetron (ZOFRAN) tablet 8 mg     ondansetron (ZOFRAN) tablet 8 mg     polyethylene glycol (MIRALAX) Packet 17 g     potassium chloride (KLOR-CON) Packet 20-40 mEq     potassium chloride 10 mEq in 100 mL intermittent infusion with 10 mg lidocaine     potassium chloride 10 mEq in 100 mL sterile water intermittent infusion (premix)     potassium chloride 20 mEq in 50 mL intermittent infusion     potassium chloride ER (KLOR-CON M) CR tablet 20-40 mEq     potassium phosphate 15 mmol in D5W 250 mL intermittent infusion     potassium phosphate 20 mmol in D5W 250 mL intermittent infusion     potassium phosphate 20 mmol in D5W 500 mL intermittent infusion     potassium phosphate 25 mmol in D5W 500 mL intermittent infusion     prednisoLONE acetate (PRED FORTE) 1 % ophthalmic susp 2 drop     prochlorperazine (COMPAZINE) injection 10 mg     prochlorperazine (COMPAZINE) tablet 10 mg     senna-docusate (SENOKOT-S/PERICOLACE) 8.6-50 MG per tablet 1 tablet     sodium chloride (PF) 0.9% PF flush 10-20 mL     sodium chloride (PF) 0.9% PF flush 5-50 mL     sodium chloride 0.9% infusion

## 2020-05-22 NOTE — PROGRESS NOTES
Type of chemo infused: ifosfamide   Pt tolerated infusion: yes  Interventions: zofran/dexamethasone  Response to interventions used: n/a  Plan: Continue to monitor. Continue chemo regimen

## 2020-05-22 NOTE — PROGRESS NOTES
Type of chemo infused: Cytarabine  Pt tolerated infusion: at the moment, tolerating well  Interventions: Neuro checks   Response to interventions used: NA  Plan: Continue POC

## 2020-05-22 NOTE — PROGRESS NOTES
Type of chemo infused: cytarabine  Pt tolerated infusion: yes  Interventions: pre-medicated with zofran and dexamethasone   Response to interventions used: n/a  Plan: Continue to monitor. Continue chemo regimen

## 2020-05-22 NOTE — PLAN OF CARE
Afebrile. OVSS. Neuro check intact. Denies pain. Nauseated/emesis x2. Compazine x1 and ativan x1 with relief. Patient reports feeling better this morning, and thinks it was related to the food he ate for dinner. Tolerated chemotherapy. Cytarabine infusing now over 3 hrs. Blood return noted before/after each chemo. Voiding adequately. No stools. Up independently. Continue plan of care.     *Chemo given late due to PICC line being placed later yesterday afternoon. Chemo schedule will need to be adjusted by pharmacy today.     Problem: Adult Inpatient Plan of Care  Goal: Plan of Care Review  Outcome: No Change  Flowsheets (Taken 5/22/2020 0523)  Plan of Care Reviewed With: patient  Progress: no change     Problem: Adult Inpatient Plan of Care  Goal: Absence of Hospital-Acquired Illness or Injury  Outcome: No Change     Problem: Adult Inpatient Plan of Care  Goal: Optimal Comfort and Wellbeing  Outcome: No Change     Problem: Adult Inpatient Plan of Care  Goal: Readiness for Transition of Care  Outcome: No Change     Problem: Adult Inpatient Plan of Care  Goal: Rounds/Family Conference  Outcome: No Change     Problem: Anemia (Chemotherapy Effects)  Goal: Anemia Symptom Improvement  Outcome: No Change     Problem: Urinary Bleeding Risk or Actual (Chemotherapy Effects)  Goal: Absence of Hematuria  Outcome: No Change     Problem: Nausea and Vomiting (Chemotherapy Effects)  Goal: Fluid and Electrolyte Balance  Outcome: No Change     Problem: Neurotoxicity (Chemotherapy Effects)  Goal: Neurotoxicity Symptom Control  Outcome: No Change     Problem: Neutropenia (Chemotherapy Effects)  Goal: Absence of Infection  Outcome: No Change     Problem: Oral Mucositis (Chemotherapy Effects)  Goal: Improved Oral Mucous Membrane Integrity  Outcome: No Change     Problem: Thrombocytopenia Bleeding Risk (Chemotherapy Effects)  Goal: Absence of Bleeding  Outcome: No Change

## 2020-05-22 NOTE — PLAN OF CARE
"/86 (BP Location: Left arm)   Pulse 94   Temp 97.5  F (36.4  C) (Oral)   Resp 16   Ht 1.74 m (5' 8.5\")   Wt 98.8 kg (217 lb 14.4 oz)   SpO2 93%   BMI 32.65 kg/m    VSS, AOx4, denies n/v/d and pain.  Tolerating Cytarabine, neuro checks completed with signature.  Continue POC.  Problem: Adult Inpatient Plan of Care  Goal: Plan of Care Review  5/22/2020 1856 by Agustín Polk RN  Outcome: No Change  Flowsheets (Taken 5/22/2020 1856)  Plan of Care Reviewed With: patient     Problem: Adult Inpatient Plan of Care  Goal: Optimal Comfort and Wellbeing  5/22/2020 1856 by Agustín Polk, RN  Outcome: No Change     Problem: Neurotoxicity (Chemotherapy Effects)  Goal: Neurotoxicity Symptom Control  5/22/2020 1856 by Agustín Polk, RN  Outcome: No Change     Problem: Thrombocytopenia Bleeding Risk (Chemotherapy Effects)  Goal: Absence of Bleeding  5/22/2020 1856 by Agustín Polk, RN  Outcome: No Change     Problem: Nausea and Vomiting (Chemotherapy Effects)  Goal: Fluid and Electrolyte Balance  5/22/2020 1856 by Agustín Polk, RN  Outcome: Improving     "

## 2020-05-23 LAB
ANION GAP SERPL CALCULATED.3IONS-SCNC: 7 MMOL/L (ref 3–14)
APTT PPP: 35 SEC (ref 22–37)
BASOPHILS # BLD AUTO: 0 10E9/L (ref 0–0.2)
BASOPHILS NFR BLD AUTO: 0.1 %
BUN SERPL-MCNC: 17 MG/DL (ref 7–30)
CALCIUM SERPL-MCNC: 7.5 MG/DL (ref 8.5–10.1)
CHLORIDE SERPL-SCNC: 110 MMOL/L (ref 94–109)
CO2 SERPL-SCNC: 24 MMOL/L (ref 20–32)
CREAT SERPL-MCNC: 0.71 MG/DL (ref 0.66–1.25)
DIFFERENTIAL METHOD BLD: ABNORMAL
EOSINOPHIL # BLD AUTO: 0 10E9/L (ref 0–0.7)
EOSINOPHIL NFR BLD AUTO: 0 %
ERYTHROCYTE [DISTWIDTH] IN BLOOD BY AUTOMATED COUNT: 20.7 % (ref 10–15)
FIBRINOGEN PPP-MCNC: 401 MG/DL (ref 200–420)
GFR SERPL CREATININE-BSD FRML MDRD: >90 ML/MIN/{1.73_M2}
GLUCOSE SERPL-MCNC: 150 MG/DL (ref 70–99)
HCT VFR BLD AUTO: 24.7 % (ref 40–53)
HGB BLD-MCNC: 7.9 G/DL (ref 13.3–17.7)
IMM GRANULOCYTES # BLD: 0.1 10E9/L (ref 0–0.4)
IMM GRANULOCYTES NFR BLD: 0.9 %
INR PPP: 1.19 (ref 0.86–1.14)
LDH SERPL L TO P-CCNC: 275 U/L (ref 85–227)
LYMPHOCYTES # BLD AUTO: 0.1 10E9/L (ref 0.8–5.3)
LYMPHOCYTES NFR BLD AUTO: 0.6 %
MCH RBC QN AUTO: 31 PG (ref 26.5–33)
MCHC RBC AUTO-ENTMCNC: 32 G/DL (ref 31.5–36.5)
MCV RBC AUTO: 97 FL (ref 78–100)
MONOCYTES # BLD AUTO: 0.5 10E9/L (ref 0–1.3)
MONOCYTES NFR BLD AUTO: 3.7 %
NEUTROPHILS # BLD AUTO: 12.6 10E9/L (ref 1.6–8.3)
NEUTROPHILS NFR BLD AUTO: 94.7 %
NRBC # BLD AUTO: 0 10*3/UL
NRBC BLD AUTO-RTO: 0 /100
PHOSPHATE SERPL-MCNC: 3.8 MG/DL (ref 2.5–4.5)
PLATELET # BLD AUTO: 128 10E9/L (ref 150–450)
POTASSIUM SERPL-SCNC: 4 MMOL/L (ref 3.4–5.3)
RBC # BLD AUTO: 2.55 10E12/L (ref 4.4–5.9)
SODIUM SERPL-SCNC: 141 MMOL/L (ref 133–144)
URATE SERPL-MCNC: 5.2 MG/DL (ref 3.5–7.2)
WBC # BLD AUTO: 13.3 10E9/L (ref 4–11)

## 2020-05-23 PROCEDURE — 25000128 H RX IP 250 OP 636: Performed by: PHYSICIAN ASSISTANT

## 2020-05-23 PROCEDURE — 25000131 ZZH RX MED GY IP 250 OP 636 PS 637: Performed by: INTERNAL MEDICINE

## 2020-05-23 PROCEDURE — 25000128 H RX IP 250 OP 636: Performed by: INTERNAL MEDICINE

## 2020-05-23 PROCEDURE — 12000012 ZZH R&B MS OVERFLOW UMMC

## 2020-05-23 PROCEDURE — 25800030 ZZH RX IP 258 OP 636: Performed by: INTERNAL MEDICINE

## 2020-05-23 PROCEDURE — 25000132 ZZH RX MED GY IP 250 OP 250 PS 637: Performed by: PHYSICIAN ASSISTANT

## 2020-05-23 PROCEDURE — 99233 SBSQ HOSP IP/OBS HIGH 50: CPT | Performed by: INTERNAL MEDICINE

## 2020-05-23 PROCEDURE — 85730 THROMBOPLASTIN TIME PARTIAL: CPT | Performed by: INTERNAL MEDICINE

## 2020-05-23 PROCEDURE — 85610 PROTHROMBIN TIME: CPT | Performed by: INTERNAL MEDICINE

## 2020-05-23 PROCEDURE — 83615 LACTATE (LD) (LDH) ENZYME: CPT | Performed by: INTERNAL MEDICINE

## 2020-05-23 PROCEDURE — 84100 ASSAY OF PHOSPHORUS: CPT | Performed by: INTERNAL MEDICINE

## 2020-05-23 PROCEDURE — 85384 FIBRINOGEN ACTIVITY: CPT | Performed by: INTERNAL MEDICINE

## 2020-05-23 PROCEDURE — 80048 BASIC METABOLIC PNL TOTAL CA: CPT | Performed by: INTERNAL MEDICINE

## 2020-05-23 PROCEDURE — 85025 COMPLETE CBC W/AUTO DIFF WBC: CPT | Performed by: INTERNAL MEDICINE

## 2020-05-23 PROCEDURE — 84550 ASSAY OF BLOOD/URIC ACID: CPT | Performed by: INTERNAL MEDICINE

## 2020-05-23 RX ADMIN — ACYCLOVIR 400 MG: 200 CAPSULE ORAL at 19:32

## 2020-05-23 RX ADMIN — LEVOTHYROXINE SODIUM 200 MCG: 0.07 TABLET ORAL at 08:00

## 2020-05-23 RX ADMIN — FAMOTIDINE 10 MG: 10 TABLET, FILM COATED ORAL at 08:00

## 2020-05-23 RX ADMIN — ETOPOSIDE 130 MG: 20 INJECTION, SOLUTION, CONCENTRATE INTRAVENOUS at 21:05

## 2020-05-23 RX ADMIN — ONDANSETRON HYDROCHLORIDE 8 MG: 8 TABLET, FILM COATED ORAL at 16:34

## 2020-05-23 RX ADMIN — MESNA 1070 MG: 100 INJECTION, SOLUTION INTRAVENOUS at 00:11

## 2020-05-23 RX ADMIN — ENOXAPARIN SODIUM 100 MG: 100 INJECTION SUBCUTANEOUS at 22:10

## 2020-05-23 RX ADMIN — PREDNISOLONE ACETATE 2 DROP: 10 SUSPENSION/ DROPS OPHTHALMIC at 08:02

## 2020-05-23 RX ADMIN — ONDANSETRON HYDROCHLORIDE 8 MG: 8 TABLET, FILM COATED ORAL at 03:39

## 2020-05-23 RX ADMIN — PREDNISOLONE ACETATE 2 DROP: 10 SUSPENSION/ DROPS OPHTHALMIC at 16:34

## 2020-05-23 RX ADMIN — PREDNISOLONE ACETATE 2 DROP: 10 SUSPENSION/ DROPS OPHTHALMIC at 19:32

## 2020-05-23 RX ADMIN — Medication 5 ML: at 22:32

## 2020-05-23 RX ADMIN — DAPSONE 100 MG: 100 TABLET ORAL at 08:00

## 2020-05-23 RX ADMIN — ENOXAPARIN SODIUM 100 MG: 100 INJECTION SUBCUTANEOUS at 11:27

## 2020-05-23 RX ADMIN — IFOSFAMIDE 3195 MG: 1 INJECTION, SOLUTION INTRAVENOUS at 22:31

## 2020-05-23 RX ADMIN — PREDNISOLONE ACETATE 2 DROP: 10 SUSPENSION/ DROPS OPHTHALMIC at 11:27

## 2020-05-23 RX ADMIN — ACYCLOVIR 400 MG: 200 CAPSULE ORAL at 08:00

## 2020-05-23 RX ADMIN — MESNA 1070 MG: 100 INJECTION, SOLUTION INTRAVENOUS at 03:40

## 2020-05-23 RX ADMIN — FAMOTIDINE 10 MG: 10 TABLET, FILM COATED ORAL at 19:32

## 2020-05-23 RX ADMIN — CYTARABINE 4260 MG: 100 INJECTION, SOLUTION INTRATHECAL; INTRAVENOUS; SUBCUTANEOUS at 05:59

## 2020-05-23 RX ADMIN — IFOSFAMIDE 3195 MG: 1 INJECTION, SOLUTION INTRAVENOUS at 00:40

## 2020-05-23 RX ADMIN — MESNA 1070 MG: 100 INJECTION, SOLUTION INTRAVENOUS at 22:10

## 2020-05-23 RX ADMIN — DEXAMETHASONE 12 MG: 4 TABLET ORAL at 16:33

## 2020-05-23 RX ADMIN — MESNA 1070 MG: 100 INJECTION, SOLUTION INTRAVENOUS at 09:36

## 2020-05-23 ASSESSMENT — ACTIVITIES OF DAILY LIVING (ADL)
ADLS_ACUITY_SCORE: 13

## 2020-05-23 ASSESSMENT — MIFFLIN-ST. JEOR: SCORE: 1780

## 2020-05-23 NOTE — PLAN OF CARE
"/64 (BP Location: Left arm)   Pulse 94   Temp 97.6  F (36.4  C) (Oral)   Resp 18   Ht 1.74 m (5' 8.5\")   Wt 98.8 kg (217 lb 14.4 oz)   SpO2 94%   BMI 32.65 kg/m        AVSS. Intermittent nausea overnight. Gave compazine once. Tolerated etoposide, cytarabine and ifosfamide well. No complaints of pain, vomiting, diarrhea overnight. No replacements needed. Will continue to monitor.          Problem: Adult Inpatient Plan of Care  Goal: Plan of Care Review  5/23/2020 0532 by Long Emerson RN  Outcome: No Change  5/22/2020 1856 by Agustín Polk RN  Outcome: No Change  Flowsheets (Taken 5/22/2020 1856)  Plan of Care Reviewed With: patient  Goal: Patient-Specific Goal (Individualization)  Outcome: No Change  Goal: Absence of Hospital-Acquired Illness or Injury  Outcome: No Change  Goal: Optimal Comfort and Wellbeing  5/23/2020 0532 by Long Emerson RN  Outcome: No Change  5/22/2020 1856 by Agustín Polk RN  Outcome: No Change  Goal: Readiness for Transition of Care  Outcome: No Change  Goal: Rounds/Family Conference  Outcome: No Change     Problem: Anemia (Chemotherapy Effects)  Goal: Anemia Symptom Improvement  Outcome: No Change     Problem: Urinary Bleeding Risk or Actual (Chemotherapy Effects)  Goal: Absence of Hematuria  Outcome: No Change     Problem: Nausea and Vomiting (Chemotherapy Effects)  Goal: Fluid and Electrolyte Balance  5/23/2020 0532 by Long Emerson RN  Outcome: No Change  5/22/2020 1856 by Agustín Polk RN  Outcome: Improving     Problem: Neurotoxicity (Chemotherapy Effects)  Goal: Neurotoxicity Symptom Control  5/23/2020 0532 by Long Emerson RN  Outcome: No Change  5/22/2020 1856 by Agustín Polk RN  Outcome: No Change     Problem: Neutropenia (Chemotherapy Effects)  Goal: Absence of Infection  Outcome: No Change     Problem: Oral Mucositis (Chemotherapy Effects)  Goal: Improved Oral Mucous Membrane Integrity  Outcome: No Change     Problem: Thrombocytopenia " Bleeding Risk (Chemotherapy Effects)  Goal: Absence of Bleeding  5/23/2020 0532 by Long Emerson, RN  Outcome: No Change  5/22/2020 6536 by Agustín Polk, RN  Outcome: No Change

## 2020-05-23 NOTE — PROGRESS NOTES
Type of chemo infused: etoposide and Ifosfamide  Pt tolerated infusion: good  Interventions: none  Response to interventions used: n/a  Plan: continue monitoring and POC

## 2020-05-23 NOTE — PLAN OF CARE
"/58 (BP Location: Left arm)   Pulse 94   Temp 98.2  F (36.8  C) (Oral)   Resp 20   Ht 1.74 m (5' 8.5\")   Wt 97.8 kg (215 lb 8 oz)   SpO2 93%   BMI 32.29 kg/m    VSS, AOx4, denies n/v/d and pain.  Continue POC.  Problem: Adult Inpatient Plan of Care  Goal: Plan of Care Review  5/23/2020 1720 by Agustín Polk RN  Outcome: No Change  Flowsheets (Taken 5/23/2020 1720)  Plan of Care Reviewed With: patient     Problem: Adult Inpatient Plan of Care  Goal: Optimal Comfort and Wellbeing  5/23/2020 1720 by Agustín Polk RN  Outcome: No Change     Problem: Urinary Bleeding Risk or Actual (Chemotherapy Effects)  Goal: Absence of Hematuria  5/23/2020 1720 by Agustín Polk RN  Outcome: No Change     Problem: Nausea and Vomiting (Chemotherapy Effects)  Goal: Fluid and Electrolyte Balance  5/23/2020 1720 by Agustín Polk RN  Outcome: No Change     Problem: Neurotoxicity (Chemotherapy Effects)  Goal: Neurotoxicity Symptom Control  5/23/2020 1720 by Agustín Polk RN  Outcome: No Change     Problem: Oral Mucositis (Chemotherapy Effects)  Goal: Improved Oral Mucous Membrane Integrity  5/23/2020 1720 by Agustín Polk RN  Outcome: No Change     Problem: Thrombocytopenia Bleeding Risk (Chemotherapy Effects)  Goal: Absence of Bleeding  5/23/2020 1720 by Agustín Polk RN  Outcome: No Change     Problem: Neutropenia (Chemotherapy Effects)  Goal: Absence of Infection  5/23/2020 1720 by Agustín Polk RN  Outcome: Declining     "

## 2020-05-23 NOTE — PROGRESS NOTES
Hematology / Oncology  Daily Progress Note   Date of Service: 05/23/2020  Patient: Kobe Pedroza  MRN: [id number]  Admission Date: 5/21/2020  Hospital Day # 2    Assessment & Plan:   Kobe Pedroza is a 60 year old male with history of Burkitt lymphoma in CR after Cycle 2 R-CODOX-M/IVAC who is now admitted for his last cycle of R-IVAC. Past medical history also significant for RLE DVT (4/30/20) on therapeutic enoxaparin, thyroid cancer status post thyroidectomy (2011), CAD, and HLD. He has tolerated previous cycles well besides neutropenic fever after his first cycle of R-IVAC secondary to GPC bacteremia.    Plan for today  - Today is Day 3 R-IVAC, doing well besides some intermittent nausea  - IT Methotrexate 5/26 @ 1000, please hold lovenox 24 hours prior  - He is scheduled for outpatient labs @ [facility] 5/28, 6/2, and 6/4 with possible transfusions if needed. BOB video visit 6/5/20. IT Chemo scheduled 6/8/20 (hold if counts are not recovered)      # Burkitt Lymphoma  Followed by Dr. Wright. Presented on 3/12/20 to [facility] with acute-on-chronic mid-thoracic back pain with some associated radicular symptoms. Per patient, no B-symptoms, though he did note an intentional 35-lb weight loss. 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 Patient had no appreciable neurological deficits. He was started on dexamethasone and underwent T5-6 laminectomy with gross total resection of epidural tumor on 3/15/2020. Flow cytometry of the specimen was notable for CD10 positive and kappa monotypic B cells (83%). Confirmed Burkitt lymphoma with surgical pathology. PET scan showed extensive visceral and bony disease. Bone marrow biopsy on 3/18 showed suspicious involvement with rare polytypic B cells (0.8%). No disease in CSF. Started R-CODOX-M (Cycle 1, Day 1=3/18/2020), which was well-tolerated. Received Cycle 1 R-IVAC  (C1D1=4/8/2020), which was complicated by development of neutropenic fever and DVT in the right peroneal vein on 4/30/20, on enoxaparin. PET-CT on 4/27/2020 demonstrated a complete remission. He completed Cycle 2 of R-CODOX-M,  was admitted for the high-dose methotrexate portion on 5/8/20 (Day 10-11 per Maryana et al.). This was well tolerated. Now presenting for Cycle 2 R-IVAC with IT chemotherapy, which is his last scheduled cycle of chemotherapy.   - Ordered PICC on admission (lovenox held, last dose @ 0800 on 5/20, resumed after PICC placement), will remove at discharge                  Treatment Plan: R-IVAC. Cycle 2 Day 1= 5/21/20                - Rituximab 375 mg/m2 (800 mg) Day 1                  - Cytarabine 2g/m2 (4,260 mg) BID D1, D2               - Etoposide 60 mg/m2 (130 mg) daily Days 1-5                - Ifosfamide 1500 mg/m2 (3,195 mg) Days 1-5               - Mesna 500 mg/m2 Days 1-5                - Methotrexate 12 mg IT (no need for hydrocortisone, confirmed with Dr. Wright) scheduled with XR 5/26 @1000, delayed due to [facility] Day. Hold Lovenox 24hours prior               - Pre Meds: Tylenol 650 mg, Benadryl 50 mg, Zofran 8 mg BID, Dex 12 mg BID Days 1-5, 8 mg daily Day 6-7                 - Supportive care: Prednisolone acetate eye drops QID Days 1-4                - He will complete IV chemotherapy on 5/25. Scheduled for Neulasta on 4/27/20 at the [facility].      - He is low risk for TLS/DIC given PET with CR. Will follow labs and start allopurinol if needed  - He is scheduled for outpatient labs @ [facility] on 5/28, 6/2, and 6/4 with possible transfusions if needed.   - The plan is to have his missed IT chemo dose (from 2nd cycle of R-CODOX-M) when his counts recover in a couple of weeks per Dr. Llanes. He will then have a a PET/CT in 6-8 weeks and review the results in clinic.               - He has an BOB video visit 6/5/20, at that time they can order IT Chemo if his counts  are recovered    - I did schedule an outpatient LP w/ IT chemo on 6/8/20 at 1300 in case his counts are recovered at that time. We did not order chemotherapy yet as his counts may not be recovered by then. The outpatient BOB can call XR to cancel the LP if needed. I message the outpatient BOB, Daya, to notify her of the above. Of note, he will need to be reminded to hold his lovenox 24hrs before hte LP      # Anemia, thrombocytopenia  Presumed related to underlying malignancy plus recent chemotherapy. No recent bleeding concerns. Hgb and platelets stable on admission  - Transfuse to keep Hgb >7, platelets >10K     # ID ppx  - Continue Dapsone for PJP ppx (pentamidine last given 3/21/20). G6PD WNL  - ACV 400mg BID (past exposure of VZV)  - Restart fluconazole 200mg daily and levaquin 250mg daily on discharge or sooner if ANC <1000. His counts did drop with C1 R-IVAC     CV  # Coronary artery disease  # Hyperlipidemia  Followed by Dr. Zeng at [facility] ([place]). Diagnosed with mild, non-obstructive CAD in 2018. 81 mg ASA and low-dose statin therapy recommended. No previous cardiac caths or stents.  - Okay to resume statin on discharge; continue holding ASA given thrombocytopenia and need for treatment-dose anticoagulation.     ENDO  # History of thyroid cancer status-post thyroidectomy  Status post thyroidectomy in 2011. TSH normal on 3/12/20.  - Continue PTA levothyroxine      GI  # History of hemorrhoids  # History of lower GI bleeding  Reported BRBPR during recent admission (4/19-4/22) in the context of previously diagnosed hemorrhoids. No recent recurrence.  - PRN senna and miralax  - Monitor    # Emesis  He had 1 episode of emesis during infusion on Day 1. Resolved.   - Continue anti-emetics     MSK  # Right distal peroneal DVT  Diagnosed on US on 4/30 with an occlusive deep vein thrombus in the right peroneal vein at the mid calf.  - Continue enoxaparin 100 mg BID. Hold for platelets <50,000  ".     Ppx  - GI: Pepcid while on steroids  - DVT: Continue therapeutic lovenox 100mg BID, hold for 24 hours prior to procedures or if platelets <50,000. Held on admission for PICC placement, resumed after PICC is placed     FEN: regular diet, IVF per chemotherapy protocol, replete lytes PRN  Code: Full Code  Disposition: Admitted to [address] [place] [address] for scheduled chemotherapy. Anticipate discharge after IT chemo on 5/26 to home.      Patient was seen and plan of care was discussed with attending physician Dr. Caro.     Emilie Colorado PA-C  Hematology/Oncology  Pager # [phone]  Phone # [phone]      ___________________________________________________________________    Subjective & Interval History:    He has a slight headache because the pillow and bed are uncomfortable. No chest pain or vision changes. He felt minimally nauseous yesterday, improved with compazine. No emesis. He ate ok this morning. He is otherwise doing well-confusion, sore throat, cough, abdominal pain, N/V, constipation, dysuria, or LE swelling. He was complaining of multiple bruises on his stomach from lovenox    Physical Exam:    Blood pressure 109/74, pulse 94, temperature 97.2  F (36.2  C), temperature source Axillary, resp. rate 18, height 1.74 m (5' 8.5\"), weight 97.8 kg (215 lb 8 oz), SpO2 94 %.    General: alert and cooperative, lying in bed, no acute distress  HEENT: sclera anicteric, EOMI, MMM  Neck: supple, normal ROM  CV: RRR, normal S1/S2, no m/r/g  Resp: CTAB, no wheezing/crackles, normal respiratory effort on ambient air  GI: soft, non-tender, non-distended, bowel sounds present and normoactive  MSK: warm and well-perfused, no edema  Skin: no rashes on limited exam, no jaundice. Scattered hematomas on abdomen from lovenox  Neuro: AOx3, moves all extremities equally, no focal deficits  PICC C/D/I    Labs & Studies: I personally reviewed the following studies:  ROUTINE LABS (Last four results):  CMP  Recent Labs   Lab " 05/23/20 0440 05/22/20 0421 05/21/20  1109 05/18/20    140 141 138   POTASSIUM 4.0 3.8 3.6 4   CHLORIDE 110* 108 108 102*   CO2 24 24 27 26   ANIONGAP 7 8 7  --    * 152* 93 194*   BUN 17 12 10 14   CR 0.71 0.75 0.76 0.83   GFRESTIMATED >90 >90 >90 >60   GFRESTBLACK >90 >90 >90  --    RUTH 7.5* 7.8* 8.2* 8.2*   MAG  --  2.0  --   --    PHOS 3.8 2.5  --   --    PROTTOTAL  --  6.4* 6.7* 7.2   ALBUMIN  --  3.3* 3.5 3.8   BILITOTAL  --  0.4 0.3 0.3   ALKPHOS  --  118 121 133*   AST  --  20 20 36   ALT  --  60 63 82*     CBC  Recent Labs   Lab 05/23/20 0440 05/22/20 0421 05/21/20  1109 05/18/20   WBC 13.3* 23.3* 20.5* 31.4*   RBC 2.55* 2.95* 3.02* 3.12*   HGB 7.9* 9.1* 9.2* 9.7*   HCT 24.7* 28.4* 29.3* 29.2*   MCV 97 96 97 94   MCH 31.0 30.8 30.5 32   MCHC 32.0 32.0 31.4* 33   RDW 20.7* 20.7* 20.9* 19.4*   * 143* 132* 100*     INR  Recent Labs   Lab 05/23/20 0440 05/22/20 0421   INR 1.19* 1.09       Microbiology  No results for input(s): LACT in the last 168 hours.    Medications list for reference:  Current Facility-Administered Medications   Medication     acetaminophen (TYLENOL) tablet 650 mg     acyclovir (ZOVIRAX) capsule 400 mg     albuterol (PROVENTIL) neb solution 2.5 mg     dapsone (ACZONE) tablet 100 mg     dexamethasone (DECADRON) tablet 12 mg     [START ON 5/26/2020] dexamethasone (DECADRON) tablet 8 mg     diphenhydrAMINE (BENADRYL) injection 50 mg     enoxaparin ANTICOAGULANT (LOVENOX) injection 100 mg     EPINEPHrine PF (ADRENALIN) injection 0.3 mg     etoposide (TOPOSAR) 130 mg in sodium chloride 0.9 % 557 mL infusion     famotidine (PEPCID) tablet 10 mg     heparin lock flush 10 UNIT/ML injection 2-5 mL     heparin lock flush 10 UNIT/ML injection 5-10 mL     heparin lock flush 10 UNIT/ML injection 5-10 mL     Ifosfamide (IFEX) 3,195 mg in sodium chloride 0.9 % 174 mL infusion     levothyroxine (SYNTHROID/LEVOTHROID) tablet 200 mcg     lidocaine (LMX4) cream     lidocaine 1 %  0.1-1 mL     LORazepam (ATIVAN) injection 0.5-1 mg     LORazepam (ATIVAN) tablet 0.5-1 mg     magnesium sulfate 4 g in 100 mL sterile water (premade)     Medication Instruction     MEDICATION INSTRUCTION     meperidine (DEMEROL) injection 25 mg     mesna (MESNEX) 1,070 mg in sodium chloride 0.9 % 66 mL infusion     mesna (MESNEX) 1,070 mg in sodium chloride 0.9 % 66 mL infusion     mesna (MESNEX) 1,070 mg in sodium chloride 0.9 % 66 mL infusion     [START ON 5/26/2020] methotrexate (PF) 12 mg in sodium chloride (PF) 0.9% PF 6 mL intrathecal inj (PF)     methylPREDNISolone sodium succinate (solu-MEDROL) injection 125 mg     naloxone (NARCAN) injection 0.1-0.4 mg     ondansetron (ZOFRAN) injection 8 mg    Or     ondansetron (ZOFRAN-ODT) ODT tab 8 mg    Or     ondansetron (ZOFRAN) tablet 8 mg     ondansetron (ZOFRAN) tablet 8 mg     polyethylene glycol (MIRALAX) Packet 17 g     potassium chloride (KLOR-CON) Packet 20-40 mEq     potassium chloride 10 mEq in 100 mL intermittent infusion with 10 mg lidocaine     potassium chloride 10 mEq in 100 mL sterile water intermittent infusion (premix)     potassium chloride 20 mEq in 50 mL intermittent infusion     potassium chloride ER (KLOR-CON M) CR tablet 20-40 mEq     potassium phosphate 15 mmol in D5W 250 mL intermittent infusion     potassium phosphate 20 mmol in D5W 250 mL intermittent infusion     potassium phosphate 20 mmol in D5W 500 mL intermittent infusion     potassium phosphate 25 mmol in D5W 500 mL intermittent infusion     prednisoLONE acetate (PRED FORTE) 1 % ophthalmic susp 2 drop     prochlorperazine (COMPAZINE) injection 10 mg     prochlorperazine (COMPAZINE) tablet 10 mg     senna-docusate (SENOKOT-S/PERICOLACE) 8.6-50 MG per tablet 1 tablet     sodium chloride (PF) 0.9% PF flush 10-20 mL     sodium chloride (PF) 0.9% PF flush 5-50 mL     sodium chloride 0.9% infusion

## 2020-05-24 LAB
ANION GAP SERPL CALCULATED.3IONS-SCNC: 4 MMOL/L (ref 3–14)
APTT PPP: 36 SEC (ref 22–37)
BASOPHILS # BLD AUTO: 0 10E9/L (ref 0–0.2)
BASOPHILS NFR BLD AUTO: 0 %
BUN SERPL-MCNC: 16 MG/DL (ref 7–30)
CALCIUM SERPL-MCNC: 7.4 MG/DL (ref 8.5–10.1)
CHLORIDE SERPL-SCNC: 111 MMOL/L (ref 94–109)
CO2 SERPL-SCNC: 26 MMOL/L (ref 20–32)
CREAT SERPL-MCNC: 0.74 MG/DL (ref 0.66–1.25)
DIFFERENTIAL METHOD BLD: ABNORMAL
EOSINOPHIL # BLD AUTO: 0 10E9/L (ref 0–0.7)
EOSINOPHIL NFR BLD AUTO: 0 %
ERYTHROCYTE [DISTWIDTH] IN BLOOD BY AUTOMATED COUNT: 20.8 % (ref 10–15)
FIBRINOGEN PPP-MCNC: 372 MG/DL (ref 200–420)
GFR SERPL CREATININE-BSD FRML MDRD: >90 ML/MIN/{1.73_M2}
GLUCOSE SERPL-MCNC: 115 MG/DL (ref 70–99)
HCT VFR BLD AUTO: 23.7 % (ref 40–53)
HGB BLD-MCNC: 7.5 G/DL (ref 13.3–17.7)
IMM GRANULOCYTES # BLD: 0.1 10E9/L (ref 0–0.4)
IMM GRANULOCYTES NFR BLD: 0.7 %
INR PPP: 1.13 (ref 0.86–1.14)
LDH SERPL L TO P-CCNC: 250 U/L (ref 85–227)
LYMPHOCYTES # BLD AUTO: 0 10E9/L (ref 0.8–5.3)
LYMPHOCYTES NFR BLD AUTO: 0.2 %
MCH RBC QN AUTO: 30.9 PG (ref 26.5–33)
MCHC RBC AUTO-ENTMCNC: 31.6 G/DL (ref 31.5–36.5)
MCV RBC AUTO: 98 FL (ref 78–100)
MONOCYTES # BLD AUTO: 0.2 10E9/L (ref 0–1.3)
MONOCYTES NFR BLD AUTO: 1.8 %
NEUTROPHILS # BLD AUTO: 8.3 10E9/L (ref 1.6–8.3)
NEUTROPHILS NFR BLD AUTO: 97.3 %
NRBC # BLD AUTO: 0 10*3/UL
NRBC BLD AUTO-RTO: 0 /100
PHOSPHATE SERPL-MCNC: 2.8 MG/DL (ref 2.5–4.5)
PLATELET # BLD AUTO: 120 10E9/L (ref 150–450)
POTASSIUM SERPL-SCNC: 3.9 MMOL/L (ref 3.4–5.3)
RBC # BLD AUTO: 2.43 10E12/L (ref 4.4–5.9)
SODIUM SERPL-SCNC: 140 MMOL/L (ref 133–144)
URATE SERPL-MCNC: 4 MG/DL (ref 3.5–7.2)
WBC # BLD AUTO: 8.5 10E9/L (ref 4–11)

## 2020-05-24 PROCEDURE — 25800030 ZZH RX IP 258 OP 636: Performed by: INTERNAL MEDICINE

## 2020-05-24 PROCEDURE — 85025 COMPLETE CBC W/AUTO DIFF WBC: CPT | Performed by: INTERNAL MEDICINE

## 2020-05-24 PROCEDURE — 85384 FIBRINOGEN ACTIVITY: CPT | Performed by: INTERNAL MEDICINE

## 2020-05-24 PROCEDURE — 84100 ASSAY OF PHOSPHORUS: CPT | Performed by: INTERNAL MEDICINE

## 2020-05-24 PROCEDURE — 83615 LACTATE (LD) (LDH) ENZYME: CPT | Performed by: INTERNAL MEDICINE

## 2020-05-24 PROCEDURE — 25000128 H RX IP 250 OP 636: Performed by: INTERNAL MEDICINE

## 2020-05-24 PROCEDURE — 25000132 ZZH RX MED GY IP 250 OP 250 PS 637: Performed by: PHYSICIAN ASSISTANT

## 2020-05-24 PROCEDURE — 12000012 ZZH R&B MS OVERFLOW UMMC

## 2020-05-24 PROCEDURE — 80048 BASIC METABOLIC PNL TOTAL CA: CPT | Performed by: INTERNAL MEDICINE

## 2020-05-24 PROCEDURE — 85610 PROTHROMBIN TIME: CPT | Performed by: INTERNAL MEDICINE

## 2020-05-24 PROCEDURE — 25000128 H RX IP 250 OP 636: Performed by: PHYSICIAN ASSISTANT

## 2020-05-24 PROCEDURE — 84550 ASSAY OF BLOOD/URIC ACID: CPT | Performed by: INTERNAL MEDICINE

## 2020-05-24 PROCEDURE — 99233 SBSQ HOSP IP/OBS HIGH 50: CPT | Performed by: INTERNAL MEDICINE

## 2020-05-24 PROCEDURE — 85730 THROMBOPLASTIN TIME PARTIAL: CPT | Performed by: INTERNAL MEDICINE

## 2020-05-24 PROCEDURE — 25000131 ZZH RX MED GY IP 250 OP 636 PS 637: Performed by: INTERNAL MEDICINE

## 2020-05-24 RX ADMIN — ONDANSETRON HYDROCHLORIDE 8 MG: 8 TABLET, FILM COATED ORAL at 03:41

## 2020-05-24 RX ADMIN — FAMOTIDINE 10 MG: 10 TABLET, FILM COATED ORAL at 20:03

## 2020-05-24 RX ADMIN — FAMOTIDINE 10 MG: 10 TABLET, FILM COATED ORAL at 07:57

## 2020-05-24 RX ADMIN — ACYCLOVIR 400 MG: 200 CAPSULE ORAL at 07:57

## 2020-05-24 RX ADMIN — LEVOTHYROXINE SODIUM 200 MCG: 0.07 TABLET ORAL at 07:57

## 2020-05-24 RX ADMIN — MESNA 1070 MG: 100 INJECTION, SOLUTION INTRAVENOUS at 03:41

## 2020-05-24 RX ADMIN — ETOPOSIDE 130 MG: 20 INJECTION, SOLUTION, CONCENTRATE INTRAVENOUS at 21:20

## 2020-05-24 RX ADMIN — ONDANSETRON HYDROCHLORIDE 8 MG: 8 TABLET, FILM COATED ORAL at 16:41

## 2020-05-24 RX ADMIN — MESNA 1070 MG: 100 INJECTION, SOLUTION INTRAVENOUS at 21:48

## 2020-05-24 RX ADMIN — ACYCLOVIR 400 MG: 200 CAPSULE ORAL at 20:03

## 2020-05-24 RX ADMIN — MESNA 1070 MG: 100 INJECTION, SOLUTION INTRAVENOUS at 07:57

## 2020-05-24 RX ADMIN — DAPSONE 100 MG: 100 TABLET ORAL at 07:57

## 2020-05-24 RX ADMIN — PREDNISOLONE ACETATE 2 DROP: 10 SUSPENSION/ DROPS OPHTHALMIC at 11:45

## 2020-05-24 RX ADMIN — PREDNISOLONE ACETATE 2 DROP: 10 SUSPENSION/ DROPS OPHTHALMIC at 16:42

## 2020-05-24 RX ADMIN — Medication 5 ML: at 03:41

## 2020-05-24 RX ADMIN — PREDNISOLONE ACETATE 2 DROP: 10 SUSPENSION/ DROPS OPHTHALMIC at 20:04

## 2020-05-24 RX ADMIN — DEXAMETHASONE 12 MG: 4 TABLET ORAL at 16:41

## 2020-05-24 RX ADMIN — ENOXAPARIN SODIUM 100 MG: 100 INJECTION SUBCUTANEOUS at 11:45

## 2020-05-24 RX ADMIN — IFOSFAMIDE 3195 MG: 1 INJECTION, SOLUTION INTRAVENOUS at 22:32

## 2020-05-24 RX ADMIN — ENOXAPARIN SODIUM 100 MG: 100 INJECTION SUBCUTANEOUS at 21:48

## 2020-05-24 RX ADMIN — PREDNISOLONE ACETATE 2 DROP: 10 SUSPENSION/ DROPS OPHTHALMIC at 07:57

## 2020-05-24 ASSESSMENT — ACTIVITIES OF DAILY LIVING (ADL)
ADLS_ACUITY_SCORE: 13

## 2020-05-24 ASSESSMENT — MIFFLIN-ST. JEOR: SCORE: 1775.47

## 2020-05-24 NOTE — PLAN OF CARE
"/63 (BP Location: Left arm)   Pulse 84   Temp 98.1  F (36.7  C) (Axillary)   Resp 16   Ht 1.74 m (5' 8.5\")   Wt 97.8 kg (215 lb 8 oz)   SpO2 93%   BMI 32.29 kg/m        AVSS. No c/o of pain n/v/d overnight. Alert and oriented. Etoposide and ifosfamide tolerated well. Urine output good. No replacements needed this morning.           Problem: Adult Inpatient Plan of Care  Goal: Plan of Care Review  5/24/2020 0458 by Long Emerson RN  Outcome: No Change  5/23/2020 1720 by Agustín Polk RN  Outcome: No Change  Flowsheets (Taken 5/23/2020 1720)  Plan of Care Reviewed With: patient  Goal: Patient-Specific Goal (Individualization)  Outcome: No Change  Goal: Absence of Hospital-Acquired Illness or Injury  Outcome: No Change  Goal: Optimal Comfort and Wellbeing  5/24/2020 0458 by Long Emerson RN  Outcome: No Change  5/23/2020 1720 by Agustín Polk RN  Outcome: No Change  Goal: Readiness for Transition of Care  Outcome: No Change  Goal: Rounds/Family Conference  Outcome: No Change     Problem: Anemia (Chemotherapy Effects)  Goal: Anemia Symptom Improvement  Outcome: No Change     Problem: Urinary Bleeding Risk or Actual (Chemotherapy Effects)  Goal: Absence of Hematuria  5/24/2020 0458 by Long Emerson RN  Outcome: No Change  5/23/2020 1720 by Agustín Polk RN  Outcome: No Change     Problem: Nausea and Vomiting (Chemotherapy Effects)  Goal: Fluid and Electrolyte Balance  5/24/2020 0458 by Long Emerson RN  Outcome: No Change  5/23/2020 1720 by Agustín Polk RN  Outcome: No Change     Problem: Neurotoxicity (Chemotherapy Effects)  Goal: Neurotoxicity Symptom Control  5/24/2020 0458 by Long Emerson RN  Outcome: No Change  5/23/2020 1720 by Agustín Polk RN  Outcome: No Change     Problem: Neutropenia (Chemotherapy Effects)  Goal: Absence of Infection  5/24/2020 0458 by Long Emerson RN  Outcome: No Change  5/23/2020 1720 by Agustín Polk RN  Outcome: Declining   "   Problem: Oral Mucositis (Chemotherapy Effects)  Goal: Improved Oral Mucous Membrane Integrity  5/24/2020 0458 by Long Emerson, RN  Outcome: No Change  5/23/2020 1720 by Agustín Polk RN  Outcome: No Change     Problem: Thrombocytopenia Bleeding Risk (Chemotherapy Effects)  Goal: Absence of Bleeding  5/24/2020 0458 by Long Emerson, RN  Outcome: No Change  5/23/2020 1720 by Agustín Polk RN  Outcome: No Change

## 2020-05-24 NOTE — PROGRESS NOTES
Type of chemo infused: etoposide and ifosfomide  Pt tolerated infusion: tolerated well  Interventions: none  Response to interventions used: none  Plan:continue POC and monitor pt for changes.

## 2020-05-24 NOTE — PROGRESS NOTES
Hematology / Oncology  Daily Progress Note   Date of Service: 05/24/2020  Patient: Kobe Pedroza  MRN: [id number]  Admission Date: 5/21/2020  Hospital Day # 3    Assessment & Plan:   Kobe Pedroza is a 60 year old male with history of Burkitt lymphoma in CR after Cycle 2 R-CODOX-M/IVAC who is now admitted for his last cycle of R-IVAC. Past medical history also significant for RLE DVT (4/30/20) on therapeutic enoxaparin, thyroid cancer status post thyroidectomy (2011), CAD, and HLD. He has tolerated previous cycles well besides neutropenic fever after his first cycle of R-IVAC secondary to GPC bacteremia.    Plan for today  - Today is Day 4 R-IVAC, doing well besides some intermittent nausea  - IT Methotrexate 5/26 @ 1000, please hold lovenox 24 hours prior  - He is scheduled for Neulasta 5/27 as well as outpatient labs @ [place] 5/28, 6/2, and 6/4 with possible transfusions if needed. BOB video visit 6/5/20. IT Chemo scheduled 6/8/20 (hold if counts are not recovered)      # Burkitt Lymphoma  Followed by Dr. Wright. Presented on 3/12/20 to [facility] with acute-on-chronic mid-thoracic back pain with some associated radicular symptoms. Per patient, no B-symptoms, though he did note an intentional 35-lb weight loss. 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 Patient had no appreciable neurological deficits. He was started on dexamethasone and underwent T5-6 laminectomy with gross total resection of epidural tumor on 3/15/2020. Flow cytometry of the specimen was notable for CD10 positive and kappa monotypic B cells (83%). Confirmed Burkitt lymphoma with surgical pathology. PET scan showed extensive visceral and bony disease. Bone marrow biopsy on 3/18 showed suspicious involvement with rare polytypic B cells (0.8%). No disease in CSF. Started R-CODOX-M (Cycle 1, Day 1=3/18/2020), which was  well-tolerated. Received Cycle 1 R-IVAC (C1D1=4/8/2020), which was complicated by development of neutropenic fever and DVT in the right peroneal vein on 4/30/20, on enoxaparin. PET-CT on 4/27/2020 demonstrated a complete remission. He completed Cycle 2 of R-CODOX-M,  was admitted for the high-dose methotrexate portion on 5/8/20 (Day 10-11 per Maryana et al.). This was well tolerated. Now presenting for Cycle 2 R-IVAC with IT chemotherapy, which is his last scheduled cycle of chemotherapy.   - Ordered PICC on admission (lovenox held, last dose @ 0800 on 5/20, resumed after PICC placement), will remove at discharge                  Treatment Plan: R-IVAC. Cycle 2 Day 1= 5/21/20               - Rituximab 375 mg/m2 (800 mg) Day 1                 - Cytarabine 2g/m2 (4,260 mg) BID D1, D2               - Etoposide 60 mg/m2 (130 mg) daily Days 1-5                - Ifosfamide 1500 mg/m2 (3,195 mg) Days 1-5               - Mesna 500 mg/m2 Days 1-5                - Methotrexate 12 mg IT (no need for hydrocortisone, confirmed with Dr. Wright) scheduled with XR 5/26 @1000, delayed due to [facility] Day. Hold Lovenox 24hours prior               - Pre Meds: Tylenol 650 mg, Benadryl 50 mg, Zofran 8 mg BID, Dex 12 mg BID Days 1-5, 8 mg daily Day 6-7                - Supportive care: Prednisolone acetate eye drops QID Days 1-4               - He will complete IV chemotherapy on 5/25. Scheduled for Neulasta on 4/27/20 at the [facility] [place] @ 1115.      - He is low risk for TLS/DIC given PET with CR. Will follow labs and start allopurinol if needed  - He is scheduled for outpatient labs @ [place] [facility] on 5/28, 6/2, and 6/4 with possible transfusions if needed.   - The plan is to have his missed IT chemo dose (from 2nd cycle of R-CODOX-M) when his counts recover in a couple of weeks per Dr. Llanes. He will then have a a PET/CT in 6-8 weeks and review the results in clinic.               - He has an BOB video visit 6/5/20, at that  time they can order IT Chemo if his counts are recovered    - I did schedule an outpatient LP w/ IT chemo on 6/8/20 at 1300 in case his counts are recovered at that time. We did not order chemotherapy yet as his counts may not be recovered by then. The outpatient BOB can call XR to cancel the LP if needed. I messaged the outpatient BOB, Daya, to notify her of the above. Of note, he will need to be reminded to hold his lovenox 24hrs before the LP      # Anemia, thrombocytopenia  Presumed related to underlying malignancy plus recent chemotherapy. No recent bleeding concerns. Hgb and platelets stable on admission  - Transfuse to keep Hgb >7, platelets >10K     # ID ppx  - Continue Dapsone for PJP ppx (pentamidine last given 3/21/20). G6PD WNL  - ACV 400mg BID (past exposure of VZV)  - Restart fluconazole 200mg daily and levaquin 250mg daily on discharge or sooner if ANC <1000. His counts did drop with C1 R-IVAC     CV  # Coronary artery disease  # Hyperlipidemia  Followed by Dr. Zeng at [facility] ([place]). Diagnosed with mild, non-obstructive CAD in 2018. 81 mg ASA and low-dose statin therapy recommended. No previous cardiac caths or stents.  - Okay to resume statin on discharge; continue holding ASA given thrombocytopenia and need for treatment-dose anticoagulation.    # Right distal peroneal DVT  Diagnosed on US on 4/30 with an occlusive deep vein thrombus in the right peroneal vein at the mid calf.  - Continue enoxaparin 100 mg BID. Hold for platelets <50,000 .  - Plan is to most likely transition to an oral DOAC as outpatient after completion of all lumbar punctures    ENDO  # History of thyroid cancer status-post thyroidectomy  Status post thyroidectomy in 2011. TSH normal on 3/12/20.  - Continue PTA levothyroxine      GI  # History of hemorrhoids  # History of lower GI bleeding  Reported BRBPR during recent admission (4/19-4/22) in the context of previously diagnosed hemorrhoids. No recent  "recurrence.  - PRN senna and miralax  - Monitor    # Emesis  He had 1 episode of emesis during infusion on Day 1. Resolved.   - Continue anti-emetics        Ppx  - GI: Pepcid while on steroids  - DVT: Continue therapeutic lovenox 100mg BID, hold for 24 hours prior to procedures or if platelets <50,000.    FEN: regular diet, IVF per chemotherapy protocol, replete lytes PRN  Code: Full Code  Disposition: Admitted to [facility] service for scheduled chemotherapy. Anticipate discharge after IT chemo on 5/26 to home.      Patient was seen and plan of care was discussed with attending physician Dr. Caro.     Emilie Colorado PA-C  Hematology/Oncology  Pager # [phone]  Phone # [phone]      ___________________________________________________________________    Subjective & Interval History:    Feeling well today, no complaints. He typically has a BM every 2-3 days, he had one this morning and might start stool softeners if he doesn't continue to have a BM daily. He is eating well. No chest pain or vision changes. He felt minimally nauseous yesterday, improved with compazine. No emesis. He ate ok this morning. He is otherwise doing well-confusion, sore throat, cough, abdominal pain, N/V, constipation, dysuria, or LE swelling. He was complaining of multiple bruises on his stomach from lovenox    Physical Exam:    Blood pressure 113/73, pulse 84, temperature 98.1  F (36.7  C), temperature source Axillary, resp. rate 16, height 1.74 m (5' 8.5\"), weight 97.3 kg (214 lb 8 oz), SpO2 94 %.    General: alert and cooperative, lying in bed, no acute distress  HEENT: sclera anicteric, EOMI, MMM  Neck: supple, normal ROM  CV: RRR, normal S1/S2, no m/r/g  Resp: CTAB, no wheezing/crackles, normal respiratory effort on ambient air  GI: soft, non-tender, non-distended, bowel sounds present and normoactive  MSK: warm and well-perfused, no edema  Skin: no rashes on limited exam, no jaundice. Scattered hematomas on abdomen from " lovenox  Neuro: AOx3, moves all extremities equally, no focal deficits  PICC C/D/I    Labs & Studies: I personally reviewed the following studies:  ROUTINE LABS (Last four results):  CMP  Recent Labs   Lab 05/24/20 0340 05/23/20 0440 05/22/20 0421 05/21/20  1109 05/18/20    141 140 141 138   POTASSIUM 3.9 4.0 3.8 3.6 4   CHLORIDE 111* 110* 108 108 102*   CO2 26 24 24 27 26   ANIONGAP 4 7 8 7  --    * 150* 152* 93 194*   BUN 16 17 12 10 14   CR 0.74 0.71 0.75 0.76 0.83   GFRESTIMATED >90 >90 >90 >90 >60   GFRESTBLACK >90 >90 >90 >90  --    RUTH 7.4* 7.5* 7.8* 8.2* 8.2*   MAG  --   --  2.0  --   --    PHOS 2.8 3.8 2.5  --   --    PROTTOTAL  --   --  6.4* 6.7* 7.2   ALBUMIN  --   --  3.3* 3.5 3.8   BILITOTAL  --   --  0.4 0.3 0.3   ALKPHOS  --   --  118 121 133*   AST  --   --  20 20 36   ALT  --   --  60 63 82*     CBC  Recent Labs   Lab 05/24/20 0340 05/23/20 0440 05/22/20 0421 05/21/20  1109   WBC 8.5 13.3* 23.3* 20.5*   RBC 2.43* 2.55* 2.95* 3.02*   HGB 7.5* 7.9* 9.1* 9.2*   HCT 23.7* 24.7* 28.4* 29.3*   MCV 98 97 96 97   MCH 30.9 31.0 30.8 30.5   MCHC 31.6 32.0 32.0 31.4*   RDW 20.8* 20.7* 20.7* 20.9*   * 128* 143* 132*     INR  Recent Labs   Lab 05/24/20 0340 05/23/20 0440 05/22/20  0421   INR 1.13 1.19* 1.09       Microbiology  No results for input(s): LACT in the last 168 hours.    Medications list for reference:  Current Facility-Administered Medications   Medication     acetaminophen (TYLENOL) tablet 650 mg     acyclovir (ZOVIRAX) capsule 400 mg     albuterol (PROVENTIL) neb solution 2.5 mg     dapsone (ACZONE) tablet 100 mg     dexamethasone (DECADRON) tablet 12 mg     [START ON 5/26/2020] dexamethasone (DECADRON) tablet 8 mg     diphenhydrAMINE (BENADRYL) injection 50 mg     enoxaparin ANTICOAGULANT (LOVENOX) injection 100 mg     EPINEPHrine PF (ADRENALIN) injection 0.3 mg     etoposide (TOPOSAR) 130 mg in sodium chloride 0.9 % 557 mL infusion     famotidine (PEPCID) tablet 10 mg      heparin lock flush 10 UNIT/ML injection 2-5 mL     heparin lock flush 10 UNIT/ML injection 5-10 mL     heparin lock flush 10 UNIT/ML injection 5-10 mL     Ifosfamide (IFEX) 3,195 mg in sodium chloride 0.9 % 174 mL infusion     levothyroxine (SYNTHROID/LEVOTHROID) tablet 200 mcg     lidocaine (LMX4) cream     lidocaine 1 % 0.1-1 mL     LORazepam (ATIVAN) injection 0.5-1 mg     LORazepam (ATIVAN) tablet 0.5-1 mg     magnesium sulfate 4 g in 100 mL sterile water (premade)     Medication Instruction     MEDICATION INSTRUCTION     meperidine (DEMEROL) injection 25 mg     mesna (MESNEX) 1,070 mg in sodium chloride 0.9 % 66 mL infusion     mesna (MESNEX) 1,070 mg in sodium chloride 0.9 % 66 mL infusion     mesna (MESNEX) 1,070 mg in sodium chloride 0.9 % 66 mL infusion     [START ON 5/26/2020] methotrexate (PF) 12 mg in sodium chloride (PF) 0.9% PF 6 mL intrathecal inj (PF)     methylPREDNISolone sodium succinate (solu-MEDROL) injection 125 mg     naloxone (NARCAN) injection 0.1-0.4 mg     ondansetron (ZOFRAN) injection 8 mg    Or     ondansetron (ZOFRAN-ODT) ODT tab 8 mg    Or     ondansetron (ZOFRAN) tablet 8 mg     ondansetron (ZOFRAN) tablet 8 mg     polyethylene glycol (MIRALAX) Packet 17 g     potassium chloride (KLOR-CON) Packet 20-40 mEq     potassium chloride 10 mEq in 100 mL intermittent infusion with 10 mg lidocaine     potassium chloride 10 mEq in 100 mL sterile water intermittent infusion (premix)     potassium chloride 20 mEq in 50 mL intermittent infusion     potassium chloride ER (KLOR-CON M) CR tablet 20-40 mEq     potassium phosphate 15 mmol in D5W 250 mL intermittent infusion     potassium phosphate 20 mmol in D5W 250 mL intermittent infusion     potassium phosphate 20 mmol in D5W 500 mL intermittent infusion     potassium phosphate 25 mmol in D5W 500 mL intermittent infusion     prednisoLONE acetate (PRED FORTE) 1 % ophthalmic susp 2 drop     prochlorperazine (COMPAZINE) injection 10 mg      prochlorperazine (COMPAZINE) tablet 10 mg     senna-docusate (SENOKOT-S/PERICOLACE) 8.6-50 MG per tablet 1 tablet     sodium chloride (PF) 0.9% PF flush 10-20 mL     sodium chloride (PF) 0.9% PF flush 5-50 mL     sodium chloride 0.9% infusion

## 2020-05-24 NOTE — PLAN OF CARE
"/71 (BP Location: Right arm)   Pulse 84   Temp 98.2  F (36.8  C) (Oral)   Resp 20   Ht 1.74 m (5' 8.5\")   Wt 97.3 kg (214 lb 8 oz)   SpO2 95%   BMI 32.14 kg/m    VSS, AOx4, denies n/v/d and pain. No complaints offered for the shift, continue POC.  Problem: Adult Inpatient Plan of Care  Goal: Plan of Care Review  5/24/2020 1853 by Agustín Polk RN  Outcome: No Change  Flowsheets (Taken 5/24/2020 1853)  Plan of Care Reviewed With: patient     Problem: Adult Inpatient Plan of Care  Goal: Optimal Comfort and Wellbeing  5/24/2020 1853 by Agustín Polk RN  Outcome: No Change     Problem: Nausea and Vomiting (Chemotherapy Effects)  Goal: Fluid and Electrolyte Balance  5/24/2020 1853 by Agustín Polk, RN  Outcome: No Change     Problem: Neutropenia (Chemotherapy Effects)  Goal: Absence of Infection  5/24/2020 1853 by Agustín Polk, RN  Outcome: No Change     Problem: Thrombocytopenia Bleeding Risk (Chemotherapy Effects)  Goal: Absence of Bleeding  5/24/2020 1853 by Agustín Polk, RN  Outcome: No Change     "

## 2020-05-25 LAB
ABO + RH BLD: NORMAL
ABO + RH BLD: NORMAL
ALBUMIN SERPL-MCNC: 3.2 G/DL (ref 3.4–5)
ALP SERPL-CCNC: 78 U/L (ref 40–150)
ALT SERPL W P-5'-P-CCNC: 51 U/L (ref 0–70)
ANION GAP SERPL CALCULATED.3IONS-SCNC: 4 MMOL/L (ref 3–14)
APTT PPP: 37 SEC (ref 22–37)
AST SERPL W P-5'-P-CCNC: 18 U/L (ref 0–45)
BASOPHILS # BLD AUTO: 0 10E9/L (ref 0–0.2)
BASOPHILS NFR BLD AUTO: 0 %
BILIRUB DIRECT SERPL-MCNC: <0.1 MG/DL (ref 0–0.2)
BILIRUB SERPL-MCNC: 0.3 MG/DL (ref 0.2–1.3)
BLD GP AB SCN SERPL QL: NORMAL
BLD PROD TYP BPU: NORMAL
BLD PROD TYP BPU: NORMAL
BLD UNIT ID BPU: 0
BLOOD BANK CMNT PATIENT-IMP: NORMAL
BLOOD PRODUCT CODE: NORMAL
BPU ID: NORMAL
BUN SERPL-MCNC: 15 MG/DL (ref 7–30)
CALCIUM SERPL-MCNC: 7.4 MG/DL (ref 8.5–10.1)
CHLORIDE SERPL-SCNC: 112 MMOL/L (ref 94–109)
CO2 SERPL-SCNC: 24 MMOL/L (ref 20–32)
CREAT SERPL-MCNC: 0.7 MG/DL (ref 0.66–1.25)
DIFFERENTIAL METHOD BLD: ABNORMAL
EOSINOPHIL # BLD AUTO: 0 10E9/L (ref 0–0.7)
EOSINOPHIL NFR BLD AUTO: 0 %
ERYTHROCYTE [DISTWIDTH] IN BLOOD BY AUTOMATED COUNT: 20.6 % (ref 10–15)
FIBRINOGEN PPP-MCNC: 418 MG/DL (ref 200–420)
GFR SERPL CREATININE-BSD FRML MDRD: >90 ML/MIN/{1.73_M2}
GLUCOSE SERPL-MCNC: 184 MG/DL (ref 70–99)
HCT VFR BLD AUTO: 22.9 % (ref 40–53)
HGB BLD-MCNC: 7.2 G/DL (ref 13.3–17.7)
IMM GRANULOCYTES # BLD: 0 10E9/L (ref 0–0.4)
IMM GRANULOCYTES NFR BLD: 0.6 %
INR PPP: 1.11 (ref 0.86–1.14)
LDH SERPL L TO P-CCNC: 220 U/L (ref 85–227)
LYMPHOCYTES # BLD AUTO: 0 10E9/L (ref 0.8–5.3)
LYMPHOCYTES NFR BLD AUTO: 0.4 %
MAGNESIUM SERPL-MCNC: 2.3 MG/DL (ref 1.6–2.3)
MCH RBC QN AUTO: 30.9 PG (ref 26.5–33)
MCHC RBC AUTO-ENTMCNC: 31.4 G/DL (ref 31.5–36.5)
MCV RBC AUTO: 98 FL (ref 78–100)
MONOCYTES # BLD AUTO: 0 10E9/L (ref 0–1.3)
MONOCYTES NFR BLD AUTO: 0.4 %
NEUTROPHILS # BLD AUTO: 5.4 10E9/L (ref 1.6–8.3)
NEUTROPHILS NFR BLD AUTO: 98.6 %
NRBC # BLD AUTO: 0 10*3/UL
NRBC BLD AUTO-RTO: 0 /100
NUM BPU REQUESTED: 1
PHOSPHATE SERPL-MCNC: 2.1 MG/DL (ref 2.5–4.5)
PLATELET # BLD AUTO: 101 10E9/L (ref 150–450)
POTASSIUM SERPL-SCNC: 3.7 MMOL/L (ref 3.4–5.3)
PROT SERPL-MCNC: 6.1 G/DL (ref 6.8–8.8)
RBC # BLD AUTO: 2.33 10E12/L (ref 4.4–5.9)
SODIUM SERPL-SCNC: 140 MMOL/L (ref 133–144)
SPECIMEN EXP DATE BLD: NORMAL
TRANSFUSION STATUS PATIENT QL: NORMAL
TRANSFUSION STATUS PATIENT QL: NORMAL
URATE SERPL-MCNC: 3.1 MG/DL (ref 3.5–7.2)
WBC # BLD AUTO: 5.4 10E9/L (ref 4–11)

## 2020-05-25 PROCEDURE — 83615 LACTATE (LD) (LDH) ENZYME: CPT | Performed by: INTERNAL MEDICINE

## 2020-05-25 PROCEDURE — 86900 BLOOD TYPING SEROLOGIC ABO: CPT | Performed by: INTERNAL MEDICINE

## 2020-05-25 PROCEDURE — 99233 SBSQ HOSP IP/OBS HIGH 50: CPT | Performed by: INTERNAL MEDICINE

## 2020-05-25 PROCEDURE — 85025 COMPLETE CBC W/AUTO DIFF WBC: CPT | Performed by: INTERNAL MEDICINE

## 2020-05-25 PROCEDURE — 85610 PROTHROMBIN TIME: CPT | Performed by: INTERNAL MEDICINE

## 2020-05-25 PROCEDURE — 86901 BLOOD TYPING SEROLOGIC RH(D): CPT | Performed by: INTERNAL MEDICINE

## 2020-05-25 PROCEDURE — 25000131 ZZH RX MED GY IP 250 OP 636 PS 637: Performed by: INTERNAL MEDICINE

## 2020-05-25 PROCEDURE — 25000132 ZZH RX MED GY IP 250 OP 250 PS 637: Performed by: PHYSICIAN ASSISTANT

## 2020-05-25 PROCEDURE — 25000128 H RX IP 250 OP 636: Performed by: PHYSICIAN ASSISTANT

## 2020-05-25 PROCEDURE — 25800030 ZZH RX IP 258 OP 636: Performed by: INTERNAL MEDICINE

## 2020-05-25 PROCEDURE — 12000012 ZZH R&B MS OVERFLOW UMMC

## 2020-05-25 PROCEDURE — 83735 ASSAY OF MAGNESIUM: CPT | Performed by: INTERNAL MEDICINE

## 2020-05-25 PROCEDURE — 25000125 ZZHC RX 250: Performed by: PHYSICIAN ASSISTANT

## 2020-05-25 PROCEDURE — 86923 COMPATIBILITY TEST ELECTRIC: CPT | Performed by: INTERNAL MEDICINE

## 2020-05-25 PROCEDURE — 25800030 ZZH RX IP 258 OP 636: Performed by: PHYSICIAN ASSISTANT

## 2020-05-25 PROCEDURE — 84100 ASSAY OF PHOSPHORUS: CPT | Performed by: INTERNAL MEDICINE

## 2020-05-25 PROCEDURE — 25000128 H RX IP 250 OP 636: Performed by: INTERNAL MEDICINE

## 2020-05-25 PROCEDURE — 85730 THROMBOPLASTIN TIME PARTIAL: CPT | Performed by: INTERNAL MEDICINE

## 2020-05-25 PROCEDURE — 80048 BASIC METABOLIC PNL TOTAL CA: CPT | Performed by: INTERNAL MEDICINE

## 2020-05-25 PROCEDURE — P9016 RBC LEUKOCYTES REDUCED: HCPCS | Performed by: INTERNAL MEDICINE

## 2020-05-25 PROCEDURE — 86850 RBC ANTIBODY SCREEN: CPT | Performed by: INTERNAL MEDICINE

## 2020-05-25 PROCEDURE — 84550 ASSAY OF BLOOD/URIC ACID: CPT | Performed by: INTERNAL MEDICINE

## 2020-05-25 PROCEDURE — 80076 HEPATIC FUNCTION PANEL: CPT | Performed by: INTERNAL MEDICINE

## 2020-05-25 PROCEDURE — 85384 FIBRINOGEN ACTIVITY: CPT | Performed by: INTERNAL MEDICINE

## 2020-05-25 RX ADMIN — ACYCLOVIR 400 MG: 200 CAPSULE ORAL at 20:04

## 2020-05-25 RX ADMIN — ONDANSETRON 8 MG: 8 TABLET, ORALLY DISINTEGRATING ORAL at 14:36

## 2020-05-25 RX ADMIN — ACETAMINOPHEN 650 MG: 325 TABLET ORAL at 08:26

## 2020-05-25 RX ADMIN — MESNA 1070 MG: 100 INJECTION, SOLUTION INTRAVENOUS at 23:20

## 2020-05-25 RX ADMIN — FAMOTIDINE 10 MG: 10 TABLET, FILM COATED ORAL at 20:04

## 2020-05-25 RX ADMIN — ONDANSETRON HYDROCHLORIDE 8 MG: 8 TABLET, FILM COATED ORAL at 03:58

## 2020-05-25 RX ADMIN — Medication 5 ML: at 03:58

## 2020-05-25 RX ADMIN — ONDANSETRON HYDROCHLORIDE 8 MG: 8 TABLET, FILM COATED ORAL at 16:10

## 2020-05-25 RX ADMIN — FAMOTIDINE 10 MG: 10 TABLET, FILM COATED ORAL at 08:00

## 2020-05-25 RX ADMIN — DEXAMETHASONE 12 MG: 4 TABLET ORAL at 16:10

## 2020-05-25 RX ADMIN — MESNA 1070 MG: 100 INJECTION, SOLUTION INTRAVENOUS at 03:58

## 2020-05-25 RX ADMIN — MESNA 1070 MG: 100 INJECTION, SOLUTION INTRAVENOUS at 08:01

## 2020-05-25 RX ADMIN — POTASSIUM PHOSPHATE, MONOBASIC AND POTASSIUM PHOSPHATE, DIBASIC 15 MMOL: 224; 236 INJECTION, SOLUTION INTRAVENOUS at 08:29

## 2020-05-25 RX ADMIN — PREDNISOLONE ACETATE 2 DROP: 10 SUSPENSION/ DROPS OPHTHALMIC at 08:03

## 2020-05-25 RX ADMIN — ACYCLOVIR 400 MG: 200 CAPSULE ORAL at 08:00

## 2020-05-25 RX ADMIN — Medication 5 ML: at 14:35

## 2020-05-25 RX ADMIN — IFOSFAMIDE 3195 MG: 1 INJECTION, SOLUTION INTRAVENOUS at 23:36

## 2020-05-25 RX ADMIN — ETOPOSIDE 130 MG: 20 INJECTION, SOLUTION, CONCENTRATE INTRAVENOUS at 22:09

## 2020-05-25 RX ADMIN — DAPSONE 100 MG: 100 TABLET ORAL at 08:00

## 2020-05-25 RX ADMIN — LEVOTHYROXINE SODIUM 200 MCG: 0.07 TABLET ORAL at 08:00

## 2020-05-25 ASSESSMENT — MIFFLIN-ST. JEOR: SCORE: 1773.65

## 2020-05-25 ASSESSMENT — ACTIVITIES OF DAILY LIVING (ADL)
ADLS_ACUITY_SCORE: 13

## 2020-05-25 NOTE — PLAN OF CARE
"/73 (BP Location: Right arm)   Pulse 79   Temp 97.9  F (36.6  C) (Oral)   Resp 16   Ht 1.74 m (5' 8.5\")   Wt 97.3 kg (214 lb 8 oz)   SpO2 94%   BMI 32.14 kg/m      T-max 99.8. OVSS. Chemo infused overnight and was tolerated well. No c/o of pain, N/V/D. Phos replacement needed this morning. Recheck ordered.     Type of chemo infused: etoposide and ifosfamide  Pt tolerated infusion: well  Interventions: none  Response to interventions used: n/a  Plan: pt tolerating well with no complaints, will continue POC        Problem: Anemia (Chemotherapy Effects)  Goal: Anemia Symptom Improvement  Outcome: Declining     Problem: Adult Inpatient Plan of Care  Goal: Plan of Care Review  5/25/2020 0551 by Long Emerson RN  Outcome: No Change  5/24/2020 1853 by Agustín Polk RN  Outcome: No Change  Flowsheets (Taken 5/24/2020 1853)  Plan of Care Reviewed With: patient  Goal: Patient-Specific Goal (Individualization)  Outcome: No Change  Goal: Absence of Hospital-Acquired Illness or Injury  Outcome: No Change  Goal: Optimal Comfort and Wellbeing  5/25/2020 0551 by Long Emerson RN  Outcome: No Change  5/24/2020 1853 by Agustín Pokl RN  Outcome: No Change  Goal: Readiness for Transition of Care  Outcome: No Change  Goal: Rounds/Family Conference  Outcome: No Change     Problem: Urinary Bleeding Risk or Actual (Chemotherapy Effects)  Goal: Absence of Hematuria  Outcome: No Change     Problem: Nausea and Vomiting (Chemotherapy Effects)  Goal: Fluid and Electrolyte Balance  5/25/2020 0551 by Long Emerson, RN  Outcome: No Change  5/24/2020 1853 by Agustín Polk RN  Outcome: No Change     Problem: Neurotoxicity (Chemotherapy Effects)  Goal: Neurotoxicity Symptom Control  Outcome: No Change     Problem: Neutropenia (Chemotherapy Effects)  Goal: Absence of Infection  5/25/2020 0551 by Long Emerson, RN  Outcome: No Change  5/24/2020 1853 by Agustín Polk RN  Outcome: No Change     Problem: Oral " Mucositis (Chemotherapy Effects)  Goal: Improved Oral Mucous Membrane Integrity  Outcome: No Change     Problem: Thrombocytopenia Bleeding Risk (Chemotherapy Effects)  Goal: Absence of Bleeding  5/25/2020 0551 by Long Emerson, RN  Outcome: No Change  5/24/2020 2943 by Agustín Polk, RN  Outcome: No Change

## 2020-05-25 NOTE — PROGRESS NOTES
Hematology / Oncology  Daily Progress Note   Date of Service: 05/25/2020  Patient: Kobe Pedroza  MRN: [id number]  Admission Date: 5/21/2020  Hospital Day # 4    Assessment & Plan:   Kobe Pedroza is a 60 year old male with history of Burkitt lymphoma in CR after Cycle 2 R-CODOX-M/IVAC who is now admitted for his last cycle of R-IVAC. Past medical history also significant for RLE DVT (4/30/20) on therapeutic enoxaparin, thyroid cancer status post thyroidectomy (2011), CAD, and HLD. He has tolerated previous cycles well besides neutropenic fever after his first cycle of R-IVAC secondary to GPC bacteremia.    Today  - Today is Day 5 R-IVAC  - Doing well, appetite decreased with some intermittent nausea  - IT Methotrexate 5/26 @ 1000, will hold lovenox 24 hours prior  - Of note, He is scheduled for Neulasta 5/27 as well as outpatient labs @ [place] 5/28, 6/2, and 6/4 with possible transfusions if needed. BOB video visit 6/5/20. IT Chemo scheduled 6/8/20 (hold if counts are not recovered)      # Burkitt Lymphoma  Followed by Dr. Wright. Presented on 3/12/20 to [facility] with acute-on-chronic mid-thoracic back pain with some associated radicular symptoms. Per patient, no B-symptoms, though he did note an intentional 35-lb weight loss. 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 Patient had no appreciable neurological deficits. He was started on dexamethasone and underwent T5-6 laminectomy with gross total resection of epidural tumor on 3/15/2020. Flow cytometry of the specimen was notable for CD10 positive and kappa monotypic B cells (83%). Confirmed Burkitt lymphoma with surgical pathology. PET scan showed extensive visceral and bony disease. Bone marrow biopsy on 3/18 showed suspicious involvement with rare polytypic B cells (0.8%). No disease in CSF. Started R-CODOX-M (Cycle 1, Day 1=3/18/2020), which was  well-tolerated. Received Cycle 1 R-IVAC (C1D1=4/8/2020), which was complicated by development of neutropenic fever and DVT in the right peroneal vein on 4/30/20, on enoxaparin. PET-CT on 4/27/2020 demonstrated a complete remission. He completed Cycle 2 of R-CODOX-M,  was admitted for the high-dose methotrexate portion on 5/8/20 (Day 10-11 per Maryana et al.). This was well tolerated. Now presenting for Cycle 2 R-IVAC with IT chemotherapy, which is his last scheduled cycle of chemotherapy.   - Ordered PICC on admission (lovenox held, last dose @ 0800 on 5/20, resumed after PICC placement), will remove at discharge                  Treatment Plan: R-IVAC. Cycle 2 Day 1= 5/21/20: Today is D5               - Rituximab 375 mg/m2 (800 mg) Day 1                 - Cytarabine 2g/m2 (4,260 mg) BID D1, D2               - Etoposide 60 mg/m2 (130 mg) daily Days 1-5                - Ifosfamide 1500 mg/m2 (3,195 mg) Days 1-5               - Mesna 500 mg/m2 Days 1-5                - Methotrexate 12 mg IT (no need for hydrocortisone, confirmed with Dr. Wright) scheduled with XR 5/26 @1000, delayed due to [facility]. Hold Lovenox 24hours prior               - Pre Meds: Tylenol 650 mg, Benadryl 50 mg, Zofran 8 mg BID, Dex 12 mg BID Days 1-5, 8 mg daily Day 6-7                - Supportive care: Prednisolone acetate eye drops QID Days 1-4               - He will complete IV chemotherapy on 5/25. Scheduled for Neulasta on 5/27/20 at the [facility] @ 1115.      - Low risk for TLS/DIC given PET with CR.   - Follow TLS/DIC labs daily  - Scheduled for outpatient labs @ [place] [facility] on 5/28, 6/2, and 6/4 with possible transfusions if needed.   - The plan is to have his missed IT chemo dose (from 2nd cycle of R-CODOX-M) when his counts recover in a couple of weeks per Dr. Llanes. He will then have a PET/CT in 6-8 weeks and review the results in clinic.               - BOB video visit 6/5/20, at that time they can order IT Chemo if  his counts are recovered    - I did schedule an outpatient LP w/ IT chemo on 6/8/20 at 1300 in case his counts are recovered at that time. We did not order chemotherapy yet as his counts may not be recovered by then. The outpatient BOB can call XR to cancel the LP if needed. I messaged the outpatient BOB, Daya, to notify her of the above. Of note, he will need to be reminded to hold his lovenox 24hrs before the LP      # Anemia, thrombocytopenia  - Presumed related to underlying malignancy plus recent chemotherapy.   - No recent bleeding concerns. Hgb and platelets stable on admission  - Transfuse to keep Hgb >7, platelets >10K  - At 7.2 today, will give a unit this evening as we are planning to discharge tomorrow     # ID ppx  - Continue Dapsone for PJP ppx (pentamidine last given 3/21/20). G6PD WNL  - ACV 400mg BID (past exposure of VZV)  - Will restart fluconazole 200mg daily and levaquin 250mg daily on discharge or sooner if ANC <1000.   - His counts did drop with C1 R-IVAC     CV  # Coronary artery disease  # Hyperlipidemia  Followed by Dr. Zeng at [facility] Diagnosed with mild, non-obstructive CAD in 2018. 81 mg ASA and low-dose statin therapy recommended. No previous cardiac caths or stents.  - Okay to resume statin on discharge; continue holding ASA given thrombocytopenia and need for treatment-dose anticoagulation.    # Right distal peroneal DVT  Diagnosed on US on 4/30 with an occlusive deep vein thrombus in the right peroneal vein at the mid calf.  - Continue enoxaparin 100 mg BID. Hold for platelets <50,000 .  - Will hold for LP tomorrow  - Plan is to most likely transition to an oral DOAC as outpatient after completion of all lumbar punctures    ENDO  # History of thyroid cancer status-post thyroidectomy  Status post thyroidectomy in 2011. TSH normal on 3/12/20.  - Continue PTA levothyroxine      GI  # History of hemorrhoids  # History of lower GI bleeding  Reported BRBPR  "during recent admission (4/19-4/22) in the context of previously diagnosed hemorrhoids. No recent recurrence.  - PRN senna and miralax  - Monitor    # Emesis  He had 1 episode of emesis during infusion on Day 1. Resolved.   - Continue anti-emetics      Ppx  - GI: Pepcid while on steroids  - DVT: Continue therapeutic lovenox 100mg BID, hold for 24 hours prior to procedures or if platelets <50,000.    FEN: regular diet, IVF per chemotherapy protocol, replete lytes PRN  Code: Full Code  Disposition: Admitted to [facility] service for scheduled chemotherapy. Anticipate discharge after IT chemo on 5/26 to home.      Patient was seen and plan of care was discussed with attending physician Dr. Caro.     Mckayla Alvarez PA-C  Hematology/Oncology  Pager # [phone]     ___________________________________________________________________    Subjective & Interval History:    No acute events overnight.  Patient is doing well today. No new concerns or complaints. States that his appetite is low but feels this is more due to his food \"being cold\". Reports that his BMs have been regular and had a BM yesterday. Knows that he can start stool softeners if needed. States he has not felt nauseous for a couple days. Denies fever, chills, headache, SOB, cough, abdominal pain, nausea, vomiting, diarrhea, constipation, dysuria, hematuria, difficulty ambulating. Notes that he has significant bruising from lovenox injections.    Physical Exam:    Blood pressure 125/81, pulse 76, temperature 97.4  F (36.3  C), temperature source Oral, resp. rate 16, height 1.74 m (5' 8.5\"), weight 97.1 kg (214 lb 1.6 oz), SpO2 95 %.    Constitutional: Pleasant male lying comfortably in bed. Awake and conversational. Non- toxic appearing. No acute distress.   HEENT: Normocephalic, atraumatic. Sclerae anicteric. EOM grossly intact. Moist mucus membranes  Lymph: Neck supple.  Respiratory: Breathing comfortable with no increased work on room air. Good air exchange. " No signs of respiratory distress or accessory muscle use. Lungs clear to auscultation bilaterally, no crackles or wheezing noted.  Cardiovascular: Regular rate and rhythm. Normal S1 and S2. No murmurs, rubs, or gallops. No peripheral edema.    GI: Abdomen is soft, non-distended, non-tender and without distension. Bowel sounds present and normoactive.  Skin: Skin is clean, dry, intact. No jaundice appreciated. Roughly 6 in hyperpigmented scar along upper back along spine. No open wounds, dry skin over top with some scabbing. No bleeding or discharge or surrounding erythema.  Musculoskeletal/ Extremities: No redness, warmth, or swelling of the joints appreciated. Distal pulses palpable.   Neurologic: Alert and oriented. Speech normal. Grossly nonfocal. Memory and thought process preserved.   Neuropsychiatric: Calm, affect congruent to situation. Appropriate mood and affect. Good judgment and insight.   PICC C/D/I    Labs & Studies: I personally reviewed the following studies:  ROUTINE LABS (Last four results):  CMP  Recent Labs   Lab 05/25/20 0400 05/24/20 0340 05/23/20  0440 05/22/20  0421 05/21/20  1109    140 141 140 141   POTASSIUM 3.7 3.9 4.0 3.8 3.6   CHLORIDE 112* 111* 110* 108 108   CO2 24 26 24 24 27   ANIONGAP 4 4 7 8 7   * 115* 150* 152* 93   BUN 15 16 17 12 10   CR 0.70 0.74 0.71 0.75 0.76   GFRESTIMATED >90 >90 >90 >90 >90   GFRESTBLACK >90 >90 >90 >90 >90   RUTH 7.4* 7.4* 7.5* 7.8* 8.2*   MAG 2.3  --   --  2.0  --    PHOS 2.1* 2.8 3.8 2.5  --    PROTTOTAL 6.1*  --   --  6.4* 6.7*   ALBUMIN 3.2*  --   --  3.3* 3.5   BILITOTAL 0.3  --   --  0.4 0.3   ALKPHOS 78  --   --  118 121   AST 18  --   --  20 20   ALT 51  --   --  60 63     CBC  Recent Labs   Lab 05/25/20 0400 05/24/20 0340 05/23/20  0440 05/22/20  0421   WBC 5.4 8.5 13.3* 23.3*   RBC 2.33* 2.43* 2.55* 2.95*   HGB 7.2* 7.5* 7.9* 9.1*   HCT 22.9* 23.7* 24.7* 28.4*   MCV 98 98 97 96   MCH 30.9 30.9 31.0 30.8   MCHC 31.4* 31.6 32.0  32.0   RDW 20.6* 20.8* 20.7* 20.7*   * 120* 128* 143*     INR  Recent Labs   Lab 05/25/20  0400 05/24/20  0340 05/23/20  0440 05/22/20  0421   INR 1.11 1.13 1.19* 1.09       Microbiology  No results for input(s): LACT in the last 168 hours.    Medications list for reference:  Current Facility-Administered Medications   Medication     acetaminophen (TYLENOL) tablet 650 mg     acyclovir (ZOVIRAX) capsule 400 mg     albuterol (PROVENTIL) neb solution 2.5 mg     dapsone (ACZONE) tablet 100 mg     dexamethasone (DECADRON) tablet 12 mg     [START ON 5/26/2020] dexamethasone (DECADRON) tablet 8 mg     diphenhydrAMINE (BENADRYL) injection 50 mg     enoxaparin ANTICOAGULANT (LOVENOX) injection 100 mg     EPINEPHrine PF (ADRENALIN) injection 0.3 mg     etoposide (TOPOSAR) 130 mg in sodium chloride 0.9 % 557 mL infusion     famotidine (PEPCID) tablet 10 mg     heparin lock flush 10 UNIT/ML injection 2-5 mL     heparin lock flush 10 UNIT/ML injection 5-10 mL     heparin lock flush 10 UNIT/ML injection 5-10 mL     Ifosfamide (IFEX) 3,195 mg in sodium chloride 0.9 % 174 mL infusion     levothyroxine (SYNTHROID/LEVOTHROID) tablet 200 mcg     lidocaine (LMX4) cream     lidocaine 1 % 0.1-1 mL     LORazepam (ATIVAN) injection 0.5-1 mg     LORazepam (ATIVAN) tablet 0.5-1 mg     magnesium sulfate 4 g in 100 mL sterile water (premade)     Medication Instruction     MEDICATION INSTRUCTION     meperidine (DEMEROL) injection 25 mg     mesna (MESNEX) 1,070 mg in sodium chloride 0.9 % 66 mL infusion     mesna (MESNEX) 1,070 mg in sodium chloride 0.9 % 66 mL infusion     mesna (MESNEX) 1,070 mg in sodium chloride 0.9 % 66 mL infusion     [START ON 5/26/2020] methotrexate (PF) 12 mg in sodium chloride (PF) 0.9% PF 6 mL intrathecal inj (PF)     methylPREDNISolone sodium succinate (solu-MEDROL) injection 125 mg     naloxone (NARCAN) injection 0.1-0.4 mg     ondansetron (ZOFRAN) injection 8 mg    Or     ondansetron (ZOFRAN-ODT) ODT tab 8  mg    Or     ondansetron (ZOFRAN) tablet 8 mg     ondansetron (ZOFRAN) tablet 8 mg     polyethylene glycol (MIRALAX) Packet 17 g     potassium chloride (KLOR-CON) Packet 20-40 mEq     potassium chloride 10 mEq in 100 mL intermittent infusion with 10 mg lidocaine     potassium chloride 10 mEq in 100 mL sterile water intermittent infusion (premix)     potassium chloride 20 mEq in 50 mL intermittent infusion     potassium chloride ER (KLOR-CON M) CR tablet 20-40 mEq     potassium phosphate 15 mmol in D5W 250 mL intermittent infusion     potassium phosphate 20 mmol in D5W 250 mL intermittent infusion     potassium phosphate 20 mmol in D5W 500 mL intermittent infusion     potassium phosphate 25 mmol in D5W 500 mL intermittent infusion     prednisoLONE acetate (PRED FORTE) 1 % ophthalmic susp 2 drop     prochlorperazine (COMPAZINE) injection 10 mg     prochlorperazine (COMPAZINE) tablet 10 mg     senna-docusate (SENOKOT-S/PERICOLACE) 8.6-50 MG per tablet 1 tablet     sodium chloride (PF) 0.9% PF flush 10-20 mL     sodium chloride (PF) 0.9% PF flush 5-50 mL     sodium chloride 0.9% infusion

## 2020-05-25 NOTE — PLAN OF CARE
Patient vital signs stable. He had a little headache this morning, Tylenol given, he felt a little bloated/nauseous this afternoon, zofran given. He was replaced with phosphorous he will need a recheck this morning, He was given one unit of blood. IT chemo tomorrow, lovenox held, discharge tomorrow. Continue to monitor.

## 2020-05-26 ENCOUNTER — APPOINTMENT (OUTPATIENT)
Dept: GENERAL RADIOLOGY | Facility: CLINIC | Age: 60
DRG: 847 | End: 2020-05-26
Attending: PHYSICIAN ASSISTANT
Payer: COMMERCIAL

## 2020-05-26 VITALS
WEIGHT: 214.1 LBS | BODY MASS INDEX: 31.71 KG/M2 | OXYGEN SATURATION: 97 % | HEIGHT: 69 IN | HEART RATE: 81 BPM | SYSTOLIC BLOOD PRESSURE: 130 MMHG | RESPIRATION RATE: 16 BRPM | DIASTOLIC BLOOD PRESSURE: 82 MMHG | TEMPERATURE: 97.4 F

## 2020-05-26 LAB
ANION GAP SERPL CALCULATED.3IONS-SCNC: 4 MMOL/L (ref 3–14)
ANISOCYTOSIS BLD QL SMEAR: ABNORMAL
APTT PPP: 26 SEC (ref 22–37)
BASOPHILS # BLD AUTO: 0 10E9/L (ref 0–0.2)
BASOPHILS NFR BLD AUTO: 0 %
BUN SERPL-MCNC: 16 MG/DL (ref 7–30)
CALCIUM SERPL-MCNC: 7.3 MG/DL (ref 8.5–10.1)
CHLORIDE SERPL-SCNC: 112 MMOL/L (ref 94–109)
CO2 SERPL-SCNC: 23 MMOL/L (ref 20–32)
COPATH REPORT: NORMAL
CREAT SERPL-MCNC: 0.72 MG/DL (ref 0.66–1.25)
DIFFERENTIAL METHOD BLD: ABNORMAL
EOSINOPHIL # BLD AUTO: 0 10E9/L (ref 0–0.7)
EOSINOPHIL NFR BLD AUTO: 0 %
ERYTHROCYTE [DISTWIDTH] IN BLOOD BY AUTOMATED COUNT: 19.3 % (ref 10–15)
FIBRINOGEN PPP-MCNC: 392 MG/DL (ref 200–420)
GFR SERPL CREATININE-BSD FRML MDRD: >90 ML/MIN/{1.73_M2}
GLUCOSE CSF-MCNC: 76 MG/DL (ref 40–70)
GLUCOSE SERPL-MCNC: 126 MG/DL (ref 70–99)
GRAM STN SPEC: NORMAL
HCT VFR BLD AUTO: 24.2 % (ref 40–53)
HGB BLD-MCNC: 7.9 G/DL (ref 13.3–17.7)
INR PPP: 1.03 (ref 0.86–1.14)
LDH SERPL L TO P-CCNC: 226 U/L (ref 85–227)
LYMPHOCYTES # BLD AUTO: 0 10E9/L (ref 0.8–5.3)
LYMPHOCYTES NFR BLD AUTO: 0 %
MCH RBC QN AUTO: 31.1 PG (ref 26.5–33)
MCHC RBC AUTO-ENTMCNC: 32.6 G/DL (ref 31.5–36.5)
MCV RBC AUTO: 95 FL (ref 78–100)
MONOCYTES # BLD AUTO: 0 10E9/L (ref 0–1.3)
MONOCYTES NFR BLD AUTO: 0 %
NEUTROPHILS # BLD AUTO: 4.7 10E9/L (ref 1.6–8.3)
NEUTROPHILS NFR BLD AUTO: 100 %
PHOSPHATE SERPL-MCNC: 2.3 MG/DL (ref 2.5–4.5)
PLATELET # BLD AUTO: 75 10E9/L (ref 150–450)
PLATELET # BLD EST: ABNORMAL 10*3/UL
POTASSIUM SERPL-SCNC: 4 MMOL/L (ref 3.4–5.3)
PROT CSF-MCNC: 96 MG/DL (ref 15–60)
RBC # BLD AUTO: 2.54 10E12/L (ref 4.4–5.9)
SODIUM SERPL-SCNC: 138 MMOL/L (ref 133–144)
SPECIMEN SOURCE: NORMAL
URATE SERPL-MCNC: 3.2 MG/DL (ref 3.5–7.2)
WBC # BLD AUTO: 4.7 10E9/L (ref 4–11)

## 2020-05-26 PROCEDURE — 80048 BASIC METABOLIC PNL TOTAL CA: CPT | Performed by: INTERNAL MEDICINE

## 2020-05-26 PROCEDURE — 82945 GLUCOSE OTHER FLUID: CPT | Performed by: PHYSICIAN ASSISTANT

## 2020-05-26 PROCEDURE — 87070 CULTURE OTHR SPECIMN AEROBIC: CPT | Performed by: PHYSICIAN ASSISTANT

## 2020-05-26 PROCEDURE — 85384 FIBRINOGEN ACTIVITY: CPT | Performed by: INTERNAL MEDICINE

## 2020-05-26 PROCEDURE — 25000132 ZZH RX MED GY IP 250 OP 250 PS 637: Performed by: PHYSICIAN ASSISTANT

## 2020-05-26 PROCEDURE — 87205 SMEAR GRAM STAIN: CPT | Performed by: PHYSICIAN ASSISTANT

## 2020-05-26 PROCEDURE — 85610 PROTHROMBIN TIME: CPT | Performed by: INTERNAL MEDICINE

## 2020-05-26 PROCEDURE — 88184 FLOWCYTOMETRY/ TC 1 MARKER: CPT | Performed by: PHYSICIAN ASSISTANT

## 2020-05-26 PROCEDURE — 96450 CHEMOTHERAPY INTO CNS: CPT

## 2020-05-26 PROCEDURE — 40001004 ZZHCL STATISTIC FLOW INT 9-15 ABY TC 88188: Performed by: PHYSICIAN ASSISTANT

## 2020-05-26 PROCEDURE — 89050 BODY FLUID CELL COUNT: CPT | Performed by: PHYSICIAN ASSISTANT

## 2020-05-26 PROCEDURE — 25800030 ZZH RX IP 258 OP 636: Performed by: INTERNAL MEDICINE

## 2020-05-26 PROCEDURE — 87015 SPECIMEN INFECT AGNT CONCNTJ: CPT | Performed by: PHYSICIAN ASSISTANT

## 2020-05-26 PROCEDURE — 84157 ASSAY OF PROTEIN OTHER: CPT | Performed by: PHYSICIAN ASSISTANT

## 2020-05-26 PROCEDURE — 85025 COMPLETE CBC W/AUTO DIFF WBC: CPT | Performed by: INTERNAL MEDICINE

## 2020-05-26 PROCEDURE — 25800030 ZZH RX IP 258 OP 636: Performed by: PHYSICIAN ASSISTANT

## 2020-05-26 PROCEDURE — 25000125 ZZHC RX 250: Performed by: PHYSICIAN ASSISTANT

## 2020-05-26 PROCEDURE — 85730 THROMBOPLASTIN TIME PARTIAL: CPT | Performed by: INTERNAL MEDICINE

## 2020-05-26 PROCEDURE — 3E0S30M INTRODUCTION OF MONOCLONAL ANTIBODY INTO EPIDURAL SPACE, PERCUTANEOUS APPROACH: ICD-10-PCS | Performed by: INTERNAL MEDICINE

## 2020-05-26 PROCEDURE — 88185 FLOWCYTOMETRY/TC ADD-ON: CPT | Performed by: PHYSICIAN ASSISTANT

## 2020-05-26 PROCEDURE — 84100 ASSAY OF PHOSPHORUS: CPT | Performed by: INTERNAL MEDICINE

## 2020-05-26 PROCEDURE — 25000131 ZZH RX MED GY IP 250 OP 636 PS 637: Performed by: INTERNAL MEDICINE

## 2020-05-26 PROCEDURE — 84550 ASSAY OF BLOOD/URIC ACID: CPT | Performed by: INTERNAL MEDICINE

## 2020-05-26 PROCEDURE — 25000128 H RX IP 250 OP 636: Performed by: INTERNAL MEDICINE

## 2020-05-26 PROCEDURE — 99239 HOSP IP/OBS DSCHRG MGMT >30: CPT | Performed by: INTERNAL MEDICINE

## 2020-05-26 PROCEDURE — 83615 LACTATE (LD) (LDH) ENZYME: CPT | Performed by: INTERNAL MEDICINE

## 2020-05-26 RX ORDER — DAPSONE 100 MG/1
100 TABLET ORAL DAILY
Qty: 15 TABLET | Refills: 0 | Status: SHIPPED | OUTPATIENT
Start: 2020-05-26 | End: 2020-06-17

## 2020-05-26 RX ORDER — DEXTROSE MONOHYDRATE 25 G/50ML
25-50 INJECTION, SOLUTION INTRAVENOUS
Status: CANCELLED | OUTPATIENT
Start: 2020-05-26

## 2020-05-26 RX ORDER — LEVOFLOXACIN 250 MG/1
250 TABLET, FILM COATED ORAL DAILY
Qty: 13 TABLET | Refills: 0 | Status: SHIPPED | OUTPATIENT
Start: 2020-05-26 | End: 2020-06-17

## 2020-05-26 RX ORDER — ACYCLOVIR 200 MG/1
400 CAPSULE ORAL 2 TIMES DAILY
Qty: 90 CAPSULE | Refills: 0 | Status: SHIPPED | OUTPATIENT
Start: 2020-05-26 | End: 2020-06-17

## 2020-05-26 RX ORDER — DEXAMETHASONE 4 MG/1
8 TABLET ORAL DAILY
Qty: 2 TABLET | Refills: 0 | Status: SHIPPED | OUTPATIENT
Start: 2020-05-27 | End: 2020-06-05

## 2020-05-26 RX ORDER — POTASSIUM CHLORIDE 1.5 G/1.58G
40 POWDER, FOR SOLUTION ORAL DAILY
Qty: 60 PACKET | Refills: 0 | Status: SHIPPED | OUTPATIENT
Start: 2020-05-26 | End: 2020-12-10

## 2020-05-26 RX ORDER — DEXAMETHASONE 4 MG/1
8 TABLET ORAL DAILY
Qty: 1 TABLET | Refills: 0 | Status: SHIPPED | OUTPATIENT
Start: 2020-05-27 | End: 2020-05-26

## 2020-05-26 RX ORDER — FLUCONAZOLE 200 MG/1
200 TABLET ORAL DAILY
Qty: 21 TABLET | Refills: 0 | Status: SHIPPED | OUTPATIENT
Start: 2020-05-26 | End: 2020-06-17

## 2020-05-26 RX ORDER — POLYETHYLENE GLYCOL 3350 17 G/17G
17 POWDER, FOR SOLUTION ORAL DAILY PRN
COMMUNITY
Start: 2020-05-26 | End: 2020-07-01

## 2020-05-26 RX ORDER — NICOTINE POLACRILEX 4 MG
15-30 LOZENGE BUCCAL
Status: CANCELLED | OUTPATIENT
Start: 2020-05-26

## 2020-05-26 RX ADMIN — MESNA 1070 MG: 100 INJECTION, SOLUTION INTRAVENOUS at 03:34

## 2020-05-26 RX ADMIN — DEXAMETHASONE 8 MG: 4 TABLET ORAL at 07:58

## 2020-05-26 RX ADMIN — METHOTREXATE: 25 INJECTION, SOLUTION INTRA-ARTERIAL; INTRAMUSCULAR; INTRATHECAL; INTRAVENOUS at 10:30

## 2020-05-26 RX ADMIN — ACYCLOVIR 400 MG: 200 CAPSULE ORAL at 07:58

## 2020-05-26 RX ADMIN — POTASSIUM PHOSPHATE, MONOBASIC AND POTASSIUM PHOSPHATE, DIBASIC 15 MMOL: 224; 236 INJECTION, SOLUTION INTRAVENOUS at 05:29

## 2020-05-26 RX ADMIN — ONDANSETRON HYDROCHLORIDE 8 MG: 8 TABLET, FILM COATED ORAL at 03:34

## 2020-05-26 RX ADMIN — FAMOTIDINE 10 MG: 10 TABLET, FILM COATED ORAL at 07:57

## 2020-05-26 RX ADMIN — DAPSONE 100 MG: 100 TABLET ORAL at 07:58

## 2020-05-26 RX ADMIN — MESNA 1070 MG: 100 INJECTION, SOLUTION INTRAVENOUS at 07:58

## 2020-05-26 RX ADMIN — LIDOCAINE HYDROCHLORIDE 5 ML: 10 INJECTION, SOLUTION EPIDURAL; INFILTRATION; INTRACAUDAL; PERINEURAL at 10:23

## 2020-05-26 RX ADMIN — LEVOTHYROXINE SODIUM 200 MCG: 0.07 TABLET ORAL at 07:58

## 2020-05-26 ASSESSMENT — ACTIVITIES OF DAILY LIVING (ADL)
ADLS_ACUITY_SCORE: 13

## 2020-05-26 NOTE — DISCHARGE SUMMARY
[facility], [place]    Discharge Summary  Hematology / Oncology    Date of Admission:  5/21/2020  Date of Discharge:  5/26/2020  Discharging Provider: Mckayla Alvarez  Date of Service (when I saw the patient): 05/26/20    Discharge Diagnoses   Principal Problem:    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)      History of Present Illness   Kobe Pedroza is a 60 year old male with history of Burkitt lymphoma in CR after Cycle 2 R-CODOX-M/IVAC who is now admitted for his last cycle of R-IVAC. Past medical history also significant for RLE DVT (4/30/20) on therapeutic enoxaparin, thyroid cancer status post thyroidectomy (2011), CAD, and HLD. He has tolerated previous cycles well besides neutropenic fever after his first cycle of R-IVAC secondary to GPC bacteremia    Doing well today. Feels well. Looking forward to discharge. Denies fever, chills, SOB, cough, abdominal pain, nausea, vomiting, diarrhea, constipation, dysuria, hematuria, difficulty ambulating.    Hospital Course   Kobe Pedroza was admitted on 5/21/2020 with PMH of Burkitt Lymphoma in CR here for Cycle 2 of R-IVAC. His treatment/chemotherapy course has been complicated by neutropenic fever 2/2 to GPC bacteremia after 1st cycle of R-IVAC. Additionally complicated by RLE DVT diagnosed on 4/30/20.    However, patient tolerated this course of chemotherapy with R IVAC well without any complications.    Follow up set up for this patient. Neulasta on 5/27/20. Twice weekly labs in [place] lab. Follow up BOB video visit 6/5/20. IT Chemo scheduled 6/8/20 (hold if counts are not recovered)    The following problems were addressed during his hospitalization:    # Burkitt Lymphoma  Followed by Dr. Wright.   Presented on 3/12/20 to [facility] with acute-on-chronic mid-thoracic back pain with some associated radicular symptoms. Per patient, no B-symptoms, though he did note an intentional 35-lb weight loss.  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 Patient had no appreciable neurological deficits. He was started on dexamethasone and underwent T5-6 laminectomy with gross total resection of epidural tumor on 3/15/2020. Flow cytometry of the specimen was notable for CD10 positive and kappa monotypic B cells (83%). Confirmed Burkitt lymphoma with surgical pathology. PET scan showed extensive visceral and bony disease. Bone marrow biopsy on 3/18 showed suspicious involvement with rare polytypic B cells (0.8%). No disease in CSF. Started R-CODOX-M (Cycle 1, Day 1=3/18/2020), which was well-tolerated. Received Cycle 1 R-IVAC (C1D1=4/8/2020), which was complicated by development of neutropenic fever and DVT in the right peroneal vein on 4/30/20, on enoxaparin. PET-CT on 4/27/2020 demonstrated a complete remission. He completed Cycle 2 of R-CODOX-M,  was admitted for the high-dose methotrexate portion on 5/8/20 (Day 10-11 per Maryana et al.). This was well tolerated. Now presenting for Cycle 2 R-IVAC with IT chemotherapy, which is his last scheduled cycle of chemotherapy.   - Ordered PICC on admission (lovenox held, last dose @ 0800 on 5/20, resumed after PICC placement), will remove at discharge                  Treatment Plan: R-IVAC. Cycle 2 Day 1= 5/21/20               - Rituximab 375 mg/m2 (800 mg) Day 1                 - Cytarabine 2g/m2 (4,260 mg) BID D1, D2               - Etoposide 60 mg/m2 (130 mg) daily Days 1-5                - Ifosfamide 1500 mg/m2 (3,195 mg) Days 1-5               - Mesna 500 mg/m2 Days 1-5                - Methotrexate 12 mg IT (no need for hydrocortisone, confirmed with Dr. Wright) completed 5/26/20               - Pre Meds: Tylenol 650 mg, Benadryl 50 mg, Zofran 8 mg BID, Dex 12 mg BID Days 1-5, 8 mg daily Day 6-7                - Supportive care: Prednisolone acetate eye drops QID Days 1-4               - He will complete  IV chemotherapy on 5/25. Scheduled for Neulasta on 5/27/20 at the [facility] @ 1115.      - Low risk for TLS/DIC given PET with CR.   - Followed TLS/DIC labs daily  - Scheduled for outpatient labs @ [facility] on 5/28, 6/2, and 6/4 with possible transfusions if needed.   - The plan is to have his missed IT chemo dose (from 2nd cycle of R-CODOX-M) when his counts recover in a couple of weeks per Dr. Llanes. He will then have a PET/CT in 6-8 weeks and review the results in clinic.               - BOB video visit 6/5/20, at that time they can order IT Chemo if his counts are recovered                - I did schedule an outpatient LP w/ IT chemo on 6/8/20 at 1300 in case his counts are recovered at that time. We did not order chemotherapy yet as his counts may not be recovered by then. The outpatient BOB can call XR to cancel the LP if needed. I messaged the outpatient BOB, Daya, to notify her of the above. Of note, he will need to be reminded to hold his lovenox 24hrs before the LP      # Anemia, thrombocytopenia  - Presumed related to underlying malignancy plus recent chemotherapy.   - No recent bleeding concerns. Hgb and platelets stable on admission  - Transfuse to keep Hgb >7, platelets >10K  - At 7.2 today, will give a unit this evening as we are planning to discharge tomorrow     # ID ppx  - Continue Dapsone for PJP ppx (pentamidine last given 3/21/20). G6PD WNL  - Continue ACV 400mg BID (past exposure of VZV)  - Will restart fluconazole 200mg daily and levaquin 250mg daily on discharge or sooner if ANC <1000.   - His counts did drop with C1 R-IVAC     CV  # Coronary artery disease  # Hyperlipidemia  Followed by Dr. Zeng at [facility] Diagnosed with mild, non-obstructive CAD in 2018. 81 mg ASA and low-dose statin therapy recommended. No previous cardiac caths or stents.  - Okay to resume statin on discharge; continue holding ASA given thrombocytopenia and need for  treatment-dose anticoagulation.     # Right distal peroneal DVT  Diagnosed on US on 4/30 with an occlusive deep vein thrombus in the right peroneal vein at the mid calf.  - Continue enoxaparin 100 mg BID. Hold for platelets <50,000 .  - Will hold for LP tomorrow  - Plan is to most likely transition to an oral DOAC as outpatient after completion of all lumbar punctures     ENDO  # History of thyroid cancer status-post thyroidectomy  Status post thyroidectomy in 2011. TSH normal on 3/12/20.  - Continue PTA levothyroxine      GI  # History of hemorrhoids  # History of lower GI bleeding  Reported BRBPR during recent admission (4/19-4/22) in the context of previously diagnosed hemorrhoids. No recent recurrence.  - PRN senna and miralax     # Emesis  He had 1 episode of emesis during infusion on Day 1. Resolved.   - Continue anti-emetics as needed    Mckayla Alvarez PA-C  Hematology/Oncology    Significant Results and Procedures   LP with IT chemo on 5/26/20    Pending Results   These results will be followed up by outpatient team  Unresulted Labs Ordered in the Past 30 Days of this Admission     No orders found from 4/21/2020 to 5/22/2020.          Code Status   Full Code    Primary Care Physician   Garett Arriaga    Constitutional: Pleasant male lying comfortably in bed. Awake and conversational. Non- toxic appearing. No acute distress.   HEENT: Normocephalic, atraumatic. Sclerae anicteric. EOM grossly intact. Moist mucus membranes  Lymph: Neck supple.  Respiratory: Breathing comfortable with no increased work on room air. Good air exchange. No signs of respiratory distress or accessory muscle use. Lungs clear to auscultation bilaterally, no crackles or wheezing noted.  Cardiovascular: Regular rate and rhythm. Normal S1 and S2. No murmurs, rubs, or gallops. No peripheral edema.    GI: Abdomen is soft, non-distended, non-tender and without distension. Bowel sounds present and normoactive.  Skin: Skin is clean, dry,  intact. No jaundice appreciated. Roughly 6 in hyperpigmented scar along upper back along spine. No open wounds, dry skin over top with some scabbing. No bleeding or discharge or surrounding erythema.  Musculoskeletal/ Extremities: No redness, warmth, or swelling of the joints appreciated. Distal pulses palpable.   Neurologic: Alert and oriented. Speech normal. Grossly nonfocal. Memory and thought process preserved.   Neuropsychiatric: Calm, affect congruent to situation. Appropriate mood and affect. Good judgment and insight.     Time Spent on this Encounter   Mckayla LEWIS PA-C, personally saw the patient today and spent less than or equal to 30 minutes discharging this patient.    Discharge Disposition   Discharged to home  Condition at discharge: Stable    Consultations This Hospital Stay   VASCULAR ACCESS ADULT IP CONSULT    Discharge Orders   No discharge procedures on file.  Discharge Medications   Current Discharge Medication List      CONTINUE these medications which have NOT CHANGED    Details   enoxaparin ANTICOAGULANT (LOVENOX) 100 MG/ML syringe Inject 1 mL (100 mg) Subcutaneous every 12 hours  Qty: 60 Syringe, Refills: 4    Associated Diagnoses: Acute deep vein thrombosis (DVT) of distal vein of right lower extremity (H)      acetaminophen (TYLENOL) 325 MG tablet Take 2 tablets (650 mg) by mouth every 4 hours as needed for mild pain  Qty:        acyclovir (ZOVIRAX) 200 MG capsule Take 2 capsules (400 mg) by mouth 2 times daily  Qty: 60 capsule, Refills: 3    Associated Diagnoses: 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)      dapsone (ACZONE) 100 MG tablet Take 1 tablet (100 mg) by mouth daily  Qty: 30 tablet, Refills: 3    Associated Diagnoses: 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)      fluconazole (DIFLUCAN) 200 MG tablet Take 200 mg by mouth daily ONLY TAKE IF ANC <1000  Qty: 30 tablet, Refills: 0    Associated Diagnoses: Burkitt lymphoma of lymph nodes of multiple  regions (H)      levofloxacin (LEVAQUIN) 250 MG tablet Take 250 mg by mouth daily ONLY TAKE IF ANC <1000  Qty: 30 tablet, Refills: 0    Associated Diagnoses: 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)      levothyroxine (SYNTHROID/LEVOTHROID) 200 MCG tablet Take 200 mcg by mouth daily      ondansetron (ZOFRAN) 4 MG tablet Take 1-2 tablets (4-8 mg) by mouth every 6 hours as needed for nausea  Qty: 90 tablet, Refills: 3    Associated Diagnoses: 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)      senna-docusate (SENOKOT-S/PERICOLACE) 8.6-50 MG tablet Take 1 tablet by mouth 2 times daily  Qty: 60 tablet, Refills: 0    Associated Diagnoses: 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)         STOP taking these medications       potassium chloride (KLOR-CON) 20 MEQ packet Comments:   Reason for Stopping:             Allergies   No Known Allergies  Data   Most Recent 3 CBC's:  Recent Labs   Lab Test 05/26/20  0355 05/25/20  0400 05/24/20  0340   WBC 4.7 5.4 8.5   HGB 7.9* 7.2* 7.5*   MCV 95 98 98   PLT 75* 101* 120*      Most Recent 3 BMP's:  Recent Labs   Lab Test 05/26/20  0355 05/25/20  0400 05/24/20  0340    140 140   POTASSIUM 4.0 3.7 3.9   CHLORIDE 112* 112* 111*   CO2 23 24 26   BUN 16 15 16   CR 0.72 0.70 0.74   ANIONGAP 4 4 4   RUTH 7.3* 7.4* 7.4*   * 184* 115*     Most Recent 2 LFT's:  Recent Labs   Lab Test 05/25/20  0400 05/22/20  0421   AST 18 20   ALT 51 60   ALKPHOS 78 118   BILITOTAL 0.3 0.4     Most Recent INR's and Anticoagulation Dosing History:  Anticoagulation Dose History     Recent Dosing and Labs Latest Ref Rng & Units 5/1/2020 5/8/2020 5/22/2020 5/23/2020 5/24/2020 5/25/2020 5/26/2020    INR 0.86 - 1.14 1.16(H) 0.99 1.09 1.19(H) 1.13 1.11 1.03        Most Recent 3 Troponin's:No lab results found.  Most Recent Cholesterol Panel:No lab results found.  Most Recent 6 Bacteria Isolates From Any Culture (See EPIC Reports for Culture Details):  Recent Labs    Lab Test 04/30/20  1340 04/20/20  1009 04/20/20  1004 04/19/20  0530 04/19/20  0525 04/19/20  0522   CULT No growth No growth No growth No growth No growth No growth     Most Recent TSH, T4 and A1c Labs:  Recent Labs   Lab Test 03/14/20  0439 03/12/20   TSH  --  0.43   A1C 5.5  --      Results for orders placed or performed during the hospital encounter of 05/21/20   XR Lumbar Punc Intrathecal Chemo Admin    Narrative    Lumbar Puncture and Intrathecal Chemotherapy Administration using  Fluoroscopy    History:  Burkitt's lymphoma . Intrathecal chemotherapy is requested.    Procedure note: Verbal and written consent for lumbar puncture was  obtained from the patient, and benefits and risk of the procedure were  explained, including but not limited to worsening headache,  hemorrhage, infection, lower extremity pain, or nerve root injury. The  patient was sterilely prepped and draped with the patient in the prone  position, over the lower back. Under fluoroscopic guidance, the  interlaminar spaces were noted. 1% lidocaine was administered for  local anesthetic over the L2-3 interlaminar space, and a 22 gauge 3.5  inch needle was advanced into the thecal sac under fluoroscopic  guidance.  There was initial show of clear CSF. Approximately 6 cc of  CSF were collected. Intrathecal chemotherapy is administered by  Hematology team.    The needle was removed with the stylet in place. There was no  immediate complication associated with the procedure. Samples were  sent for the requested laboratory testing.      Estimated blood loss: Less than 1 cc.    Fluoroscopic time: 18.1 seconds.    Dose: 5.06 mGy      Impression    Impression: Successful fluoroscopically guided lumbar puncture and  subsequent chemotherapy administration without immediate complication.    DANIELLE LEWIS MD, attest that I was present for all critical  portions of the procedure and was immediately available to provide  guidance and assistance during  the remainder of the procedure.    I have personally reviewed the examination and initial interpretation  and I agree with the findings.    DANIELLE HARMON MD   XR Chest Port 1 View    Narrative    Portable chest    INDICATION: PICC placement    COMPARISON: 4/30/2020    FINDINGS: Heart size and shape appear normal. Lungs and pulmonary  vascularity appear normal. A new right upper extremity PICC line is  present with tip at the expected cavoatrial junction.      Impression    IMPRESSION: New right PICC with tip at cavoatrial junction.    RADHA GARCIA MD

## 2020-05-26 NOTE — PLAN OF CARE
Afebrile. OVSS on RA. Pt denies N/V/D and pain. Tolerated etoposide and ifosfamide. IT chemo @ 1000, plan to discharge afterwards. 15 mmol phos currently being replaced. No other replacements needed. Continue with POC       Problem: Adult Inpatient Plan of Care  Goal: Plan of Care Review  5/26/2020 0613 by Trina Castano RN  Outcome: No Change     Problem: Adult Inpatient Plan of Care  Goal: Absence of Hospital-Acquired Illness or Injury  Outcome: No Change     Problem: Adult Inpatient Plan of Care  Goal: Optimal Comfort and Wellbeing  Outcome: No Change

## 2020-05-26 NOTE — PROGRESS NOTES
Type of chemo infused: Ifosfamide  Pt tolerated infusion: Yes  Interventions: Mesna given prior to infusion; Bld return noted prior to infusion  Response to interventions used: N/A  Plan: Continue with POC.

## 2020-05-26 NOTE — PROCEDURES
[facility] Procedure Note          Lumbar Puncture and Intrathecal chemotherapy administration:      Time: 10:42 AM  Performed by: Estelle Cuello MD  Authorized by: Thomas Eden MD    Indications: Intrathecal chemotherapy.    Consent given by: Patient who states understanding of the procedure being performed after discussing the risks, benefits and alternatives.    Prior to the start of the procedure and with procedural staff participation, I verbally confirmed the patient s identity using two indicators, relevant allergies, that the procedure was appropriate and matched the consent or emergent situation, and that the correct equipment/implants were available. Immediately prior to starting the procedure I conducted the Time Out with the procedural staff and re-confirmed the patient s name, procedure, and site/side. (The Joint Commission universal protocol was followed.) Yes    Under sterile conditions the patient was positioned prone. Betadine solution and sterile drapes were utilized.  Local anesthetic at the site: 2 ml of lidocaine 1% without epinephrine from the LP tray  A 22 G spinal needle was inserted at the L 2-3 interspace.  Opening Pressurewas not checked.  A total of 6mL of clear and colorless spinal fluid was obtained and sent to the laboratory.   After the needle was removed, a bandaid and pressure were applied and the patient was instructed to stay horizontal until the results were back.    Complications: None    Patient tolerance: Patient tolerated the procedure well with no immediate complications.

## 2020-05-26 NOTE — PROGRESS NOTES
Type of chemo infused: Etoposide  Pt tolerated infusion: Yes  Interventions: Bld return noted before transfusion  Response to interventions used: N/A  Plan: Continue with POC

## 2020-05-26 NOTE — PLAN OF CARE
Problem: Adult Inpatient Plan of Care  Goal: Plan of Care Review  5/26/2020 1219 by Ever Moreno RN  Outcome: Adequate for Discharge  Flowsheets (Taken 5/26/2020 0900)  Plan of Care Reviewed With: patient  5/26/2020 0613 by Trina Castano RN  Outcome: No Change  Goal: Patient-Specific Goal (Individualization)  Outcome: Adequate for Discharge  Goal: Absence of Hospital-Acquired Illness or Injury  5/26/2020 1219 by Ever Moreno RN  Outcome: Adequate for Discharge  5/26/2020 0613 by Trina Castano RN  Outcome: No Change  Goal: Optimal Comfort and Wellbeing  5/26/2020 1219 by Ever Moreno RN  Outcome: Adequate for Discharge  5/26/2020 0613 by Trina Castano RN  Outcome: No Change  Goal: Readiness for Transition of Care  Outcome: Adequate for Discharge  Goal: Rounds/Family Conference  Outcome: Adequate for Discharge     Problem: Anemia (Chemotherapy Effects)  Goal: Anemia Symptom Improvement  Outcome: Adequate for Discharge     Problem: Urinary Bleeding Risk or Actual (Chemotherapy Effects)  Goal: Absence of Hematuria  Outcome: Adequate for Discharge     Problem: Nausea and Vomiting (Chemotherapy Effects)  Goal: Fluid and Electrolyte Balance  Outcome: Adequate for Discharge   Patient eager to return home he offers no complaints. Intrathecal methotrexate went well in IR. Patient flat on back 1 hour post procedure. He stated a understanding of his discharge medications and instructions. Personal belongings packed by patient. To return to clinic tomorrow.  Problem: Neurotoxicity (Chemotherapy Effects)  Goal: Neurotoxicity Symptom Control  Outcome: Adequate for Discharge     Problem: Neutropenia (Chemotherapy Effects)  Goal: Absence of Infection  Outcome: Adequate for Discharge     Problem: Oral Mucositis (Chemotherapy Effects)  Goal: Improved Oral Mucous Membrane Integrity  Outcome: Adequate for Discharge     Problem: Thrombocytopenia Bleeding Risk (Chemotherapy Effects)  Goal: Absence of Bleeding  Outcome:  Adequate for Discharge

## 2020-05-27 ENCOUNTER — ONCOLOGY VISIT (OUTPATIENT)
Dept: ONCOLOGY | Facility: CLINIC | Age: 60
End: 2020-05-27
Attending: INTERNAL MEDICINE
Payer: COMMERCIAL

## 2020-05-27 ENCOUNTER — PATIENT OUTREACH (OUTPATIENT)
Dept: ONCOLOGY | Facility: CLINIC | Age: 60
End: 2020-05-27

## 2020-05-27 VITALS
WEIGHT: 216 LBS | RESPIRATION RATE: 18 BRPM | SYSTOLIC BLOOD PRESSURE: 133 MMHG | BODY MASS INDEX: 32.36 KG/M2 | OXYGEN SATURATION: 99 % | DIASTOLIC BLOOD PRESSURE: 85 MMHG | TEMPERATURE: 97.7 F | HEART RATE: 84 BPM

## 2020-05-27 DIAGNOSIS — D70.1 CHEMOTHERAPY-INDUCED NEUTROPENIA (H): Primary | ICD-10-CM

## 2020-05-27 DIAGNOSIS — T45.1X5A CHEMOTHERAPY-INDUCED NEUTROPENIA (H): Primary | ICD-10-CM

## 2020-05-27 DIAGNOSIS — C83.78 BURKITT LYMPHOMA OF LYMPH NODES OF MULTIPLE REGIONS (H): ICD-10-CM

## 2020-05-27 LAB
APPEARANCE CSF: CLEAR
COLOR CSF: COLORLESS
RBC # CSF MANUAL: 263 /UL (ref 0–2)
TUBE # CSF: 4 #
WBC # CSF MANUAL: 0 /UL (ref 0–5)

## 2020-05-27 PROCEDURE — 96372 THER/PROPH/DIAG INJ SC/IM: CPT

## 2020-05-27 PROCEDURE — 25000128 H RX IP 250 OP 636: Mod: ZF | Performed by: INTERNAL MEDICINE

## 2020-05-27 RX ADMIN — PEGFILGRASTIM 6 MG: 6 INJECTION SUBCUTANEOUS at 11:23

## 2020-05-27 ASSESSMENT — PAIN SCALES - GENERAL: PAINLEVEL: NO PAIN (0)

## 2020-05-27 NOTE — PROGRESS NOTES
Patient discharged 05/26/20.  Per protocol libby follow up with patient. Any questions please contact [place].     Thanks for your time,    Whitley Sauceda, THEODORE  [facility]  Oncology

## 2020-05-27 NOTE — LETTER
5/27/2020       RE: Kobe Pedroza  [id number]  [place] [id number]        Dear Colleague,    Thank you for referring your patient, Kobe Pedroza, to the [facility]. Please see a copy of my visit note below.    Chief Complaint   Patient presents with     Imm/Inj     Patient with chemotherapy induced neutropenia - here for vitals and a Neulasta injection     Patient arrived to clinic for a Neulasta injection today and has no specific complaints and has been feeling well.  Patient declined to speak with an RN today.   No results needed for treatment parameters.  Neulasta injection given to RLQ without incident and patient tolerated procedure well.  Patient is aware of provider visit next week.          Again, thank you for allowing me to participate in the care of your patient.        Sincerely,        [facility] [place]

## 2020-05-27 NOTE — PROGRESS NOTES
Chief Complaint   Patient presents with     Imm/Inj     Patient with chemotherapy induced neutropenia - here for vitals and a Neulasta injection     Patient arrived to clinic for a Neulasta injection today and has no specific complaints and has been feeling well.  Patient declined to speak with an RN today.   No results needed for treatment parameters.  Neulasta injection given to RLQ without incident and patient tolerated procedure well.  Patient is aware of provider visit next week.

## 2020-05-27 NOTE — PROCEDURES
Lumbar Puncture Procedure Note   Patient: Jonnathan Pedroza    : 1960   Date of Procedure: 20               DIAGNOSIS: Burkitt Lymphoma  PROCEDURE: Lumbar puncture with intrathecal administration   SITE: Lumbar    INDICATION:  Intrathecal administration     Myself and the proceduralist (Estelle Cuello MD) discussed the procedure, benefits, risks and alternatives to the patient, who voiced understanding of the information and agreed to proceed with the lumbar puncture. Risks include bleeding, infection, and post-procedure headache. Verbal and written consent were obtained.     Description: See note from Estelle Cuello MD regarding procedure details. Specimen was sent for cell count and differential, protein and glucose, gram stain, culture, and flow cytometry.    Mckayla Alvarez PA-C then administered methotrexate (PF) 12 mg in sodium chloride (PF) 0.9% PF 6 mL intrathecal inj (PF) over 5 minutes without apparent complication. Chemotherapy previously double checked by two RNs and also verified by this provider Mckayla Alvarez PA-C. Needle stylet was replaced and then needle removed and site cleaned and dressed with a bandage.    Complications: None. Patient tolerated procedure very well. Atraumatic tap with minimal discomfort during procedure. No significant bleeding or other immediate complication observed.     Disposition: Patient was instructed that patient should rest flat on back x 1 hour. He should notify RN if HA or pain develop.    Procedure performed by: NEDA Figueroa PA-C   Hematology/Oncology   Pager: [phone]

## 2020-05-28 ENCOUNTER — TRANSFERRED RECORDS (OUTPATIENT)
Dept: HEALTH INFORMATION MANAGEMENT | Facility: CLINIC | Age: 60
End: 2020-05-28

## 2020-05-28 ENCOUNTER — PATIENT OUTREACH (OUTPATIENT)
Dept: ONCOLOGY | Facility: CLINIC | Age: 60
End: 2020-05-28

## 2020-05-28 LAB
% IMMATURE GRANULOCYTES: 5.2 (ref 0–1)
ABS BASOPHILS: 0 CELLS/MM3 (ref 0.04–0.54)
ABS IMMATURE GRANULOCYTES: 0.23 (ref 0–0.1)
ABS LYMPHOCYTES: 0.06 CELLS/MM3 (ref 0.76–4)
ABS MONOCYTE: 0.01 CELLS/MM3 (ref 0–0.9)
ABS NEUTROPHILS: 4.08 CELLS/MM3 (ref 2.1–7.5)
BASOPHILS NFR BLD AUTO: 0.2 % (ref 0–1)
EOSINOPHIL NFR BLD AUTO: 0 % (ref 0–5)
ERYTHROCYTE [DISTWIDTH] IN BLOOD BY AUTOMATED COUNT: 18.5 % (ref 11.6–14.4)
HCT VFR BLD AUTO: 23.9 % (ref 40–52)
HEMOGLOBIN: 8.1 G/DL (ref 13–18)
LYMPHOCYTES NFR BLD AUTO: 1.4 % (ref 18–40)
MCH RBC QN AUTO: 32 PG (ref 27–34)
MCHC RBC AUTO-ENTMCNC: 34 G/DL (ref 32–36)
MCV RBC AUTO: 96 FL (ref 80–96)
MONOCYTES NFR BLD AUTO: 0.2 % (ref 3–13)
NEUTROPHILS NFR BLD AUTO: 93 % (ref 45–75)
NRBC: 0 (ref 0–0)
NUCLEATED RBC/100 WBC: 0.01 (ref 0–0.2)
PLATELET # BLD AUTO: 36 10^9/L (ref 150–420)
RBC # BLD AUTO: 2.5 10^12/L (ref 4.4–6)
WBC # BLD AUTO: 4.4 10^9/L (ref 4–11)

## 2020-05-28 NOTE — PROGRESS NOTES
Writer returned Jonnathan's call from late yesterday afternoon to go over plan. He is on his way to get labs this am in [facility]. Writer will wait for the results and then call him back and answer further questions. Jonnathan says the current plan for labs and possible transfusions at [facility] is going well now.     12:30-Labs from [facility] today  plts 36  Hgb 8.1  ANC 4.08-had Neulasta 5/27/20      Will hold Lovenox as platelets are trending down. Will hold until they are trending up and above 50. Currently on ppx Levaquin and  Fluconazole, will continue until Monday, 6/1 labs. Has had a dip before the peak from neulasta in the past. States that things are in general good. Has the change in taste sensation, more noticeable this cycle than prior. States it does not get in the way of his eating though. Feels more anxious, off steroids now. A lot going on in the world, pandemic/riots. Has repeat labs on Monday in [facility].     They need packed cell parameters, writer will fax them again.     Jonnathan expressed good understanding of the above.      Has resource numbers if needed.

## 2020-05-29 DIAGNOSIS — Z11.59 ENCOUNTER FOR SCREENING FOR OTHER VIRAL DISEASES: Primary | ICD-10-CM

## 2020-05-31 LAB
BACTERIA SPEC CULT: NO GROWTH
SPECIMEN SOURCE: NORMAL

## 2020-06-01 ENCOUNTER — TELEPHONE (OUTPATIENT)
Dept: ONCOLOGY | Facility: CLINIC | Age: 60
End: 2020-06-01

## 2020-06-01 ENCOUNTER — TRANSFERRED RECORDS (OUTPATIENT)
Dept: HEALTH INFORMATION MANAGEMENT | Facility: CLINIC | Age: 60
End: 2020-06-01

## 2020-06-01 LAB
ALBUMIN SERPL-MCNC: 3.6 G/DL (ref 3.4–5)
ALP SERPL-CCNC: 65 U/L (ref 46–116)
ALT SERPL-CCNC: 47 U/L (ref 14–63)
AST SERPL-CCNC: 13 U/L (ref 15–37)
BASOPHILS # BLD AUTO: 0 10*3/UL (ref 0–0.2)
BASOPHILS NFR BLD AUTO: 0 % (ref 0–1)
BILIRUB SERPL-MCNC: 0.6 MG/DL (ref 0.2–1)
BUN SERPL-MCNC: 17 MG/DL (ref 7–18)
CALCIUM SERPL-MCNC: 8.5 MG/DL (ref 8.5–10.1)
CHLORIDE SERPLBLD-SCNC: 102 MMOL/L (ref 98–107)
CO2 SERPL-SCNC: 25 MMOL/L (ref 21–32)
CREAT SERPL-MCNC: 0.65 MG/DL (ref 0.67–1.17)
EOSINOPHIL # BLD AUTO: 0 10*3/UL (ref 0.04–0.54)
EOSINOPHIL NFR BLD AUTO: 0 % (ref 0–5)
ERYTHROCYTE [DISTWIDTH] IN BLOOD BY AUTOMATED COUNT: 16.5 % (ref 11.6–14.4)
GFR SERPL CREATININE-BSD FRML MDRD: >60 ML/MIN/1.73M2 (ref 60–150)
GLOBULIN: 3.4 G/DL (ref 1.4–4.8)
GLUCOSE SERPL-MCNC: 155 MG/DL (ref 74–100)
HCT VFR BLD AUTO: 18.9 % (ref 40–52)
HEMOGLOBIN: 6.4 G/DL (ref 13.3–17.7)
IMMATURE GRANS (ABS): 0 X10E3/UL (ref 0–0.1)
IMMATURE GRANULOCYTES: 0 % (ref 0–1)
LYMPHOCYTES # BLD AUTO: 0.01 10*3/UL (ref 0.76–4)
LYMPHOCYTES NFR BLD AUTO: 50 % (ref 18–40)
MCH RBC QN AUTO: 32 PG (ref 27–34)
MCHC RBC AUTO-ENTMCNC: 34 G/DL (ref 32–36)
MCV RBC AUTO: 94 FL (ref 80–96)
MONOCYTES # BLD AUTO: 0 10*3/UL (ref 0–0.9)
MONOCYTES NFR BLD AUTO: 0 % (ref 3–13)
NEUTROPHILS # BLD AUTO: 0.01 10*3/UL (ref 2.1–7.5)
NEUTROPHILS NFR BLD AUTO: 50 % (ref 45–75)
NUCLEATED RBCS: 0 (ref 0–0)
PLATELET # BLD AUTO: 1 10^9/L (ref 150–420)
POTASSIUM SERPL-SCNC: 3.6 MMOL/L (ref 3.5–5.1)
PROT SERPL-MCNC: 7 G/DL (ref 6.4–8.2)
RBC # BLD AUTO: 2.02 10^12/L (ref 4.4–6)
SODIUM SERPL-SCNC: 137 MMOL/L (ref 136–145)
WBC # BLD AUTO: 0 10^9/L (ref 4–11)

## 2020-06-01 NOTE — TELEPHONE ENCOUNTER
[facility], [phone], calling with critical results.  DATE:  6/1/20  TIME OF RECEIPT FROM LAB:  1032  LAB TEST:  WBC=0, Hgb=6.4, Plts=1  Also needs clarification on Platelet order.  RESULTS PAGED TO (PROVIDER):  Dr Wright  TIME LAB VALUE REPORTED TO PROVIDER: 1035    Acknowledged by Dr Wright, he will call infusion center.

## 2020-06-02 ENCOUNTER — NURSE TRIAGE (OUTPATIENT)
Dept: NURSING | Facility: CLINIC | Age: 60
End: 2020-06-02

## 2020-06-02 NOTE — TELEPHONE ENCOUNTER
Wife calls and says that her  has had fevers today. Earlier, pt's fever= 100.4-oral, 100.1-oral, 100.0-oral, and 99.8-oral-@ 1916. Wife says that pt. Had 3 transfusions yesterday. COVID 19 Nurse Triage Plan/Patient Instructions    Please be aware that novel coronavirus (COVID-19) may be circulating in the community. If you develop symptoms such as fever, cough, or SOB or if you have concerns about the presence of another infection including coronavirus (COVID-19), please contact your health care provider or visit [email or web address].     Disposition/Instructions    Patient to stay at home and follow home care protocol based instructions.     Thank you for limiting contact with others, wearing a simple mask to cover your cough, practice good hand hygiene habits and accessing our virtual services where possible to limit the spread of this virus.    For more information about COVID19 and options for caring for yourself at home, please visit the CDC website at https://www.cdc.gov/coronavirus/2019-ncov/about/steps-when-sick.html  For more options for care at [facility], please visit our website at [email or web address]    For more information, please use the [place] Department of Health COVID-19 Website: https://www.health.[facility].[place].us/diseases/coronavirus/index.html  [place] Department of Health ([facility]) COVID-19 Hotlines (Interpreters available):      Health questions: Phone Number: [phone] or [phone] and Hours: 7 a.m. to 7 p.m.    Schools and  questions: Phone Number: [phone] or [phone] and Hours 7 a.m. to 7 p.m.                    Additional Information    Negative: Shock suspected (e.g., cold/pale/clammy skin, too weak to stand, low BP, rapid pulse)    Negative: Difficult to awaken or acting confused (e.g., disoriented, slurred speech)    Negative: Bluish (or gray) lips or face now    Negative: New onset rash with many purple (or blood-colored)  spots or dots    Negative: Sounds like a life-threatening emergency to the triager    Negative: Other symptom is present, see that guideline  (e.g., symptoms of cough, runny nose, sore throat, earache, abdominal pain, diarrhea, vomiting)    Negative: Fever > 104 F (40 C)    Negative: [1] Neutropenia known or suspected (e.g., recent cancer chemotherapy) AND [2] fever > 100.4 F (38.0 C)    Negative: [1] Neutropenia known or suspected (e.g., recent cancer chemotherapy) AND [2] signs or symptoms of suspected infection are present    Negative: [1] Headache AND [2] stiff neck (can't touch chin to chest)    Negative: Difficulty breathing    Negative: [1] Drinking very little AND [2] dehydration suspected (e.g., no urine > 12 hours, very dry mouth, very lightheaded)    Negative: Patient sounds very sick or weak to the triager  (Exception: mild weakness and hasn't taken fever medicine)    Negative: [1] Fever > 101 F (38.3 C) AND [2] age > 60    Negative: [1] Fever > 101 F (38.3 C) AND [2] co-morbidity risk factor for serious infection (e.g., COPD, heart failure, liver failure, renal failure with dialysis)    Negative: [1] Fever > 100.0 F (37.8 C) AND [2] bedridden (e.g., nursing home patient, CVA, chronic illness, recovering from surgery)    Negative: [1] Fever > 100.0 F (37.8 C) AND [2] diabetes mellitus or weak immune system (e.g., HIV positive, cancer chemo, splenectomy, organ transplant, chronic steroids)    Negative: [1] Fever > 100.0 F (37.8 C) AND [2] indwelling urinary catheter (e.g., Alex, Coude)    Negative: [1] Fever > 100.0 F (37.8 C) AND [2] port (portacath), central line, or PICC line    Negative: [1] Fever > 100.0 F (37.8 C) AND [2] surgery in the last month    Negative: Fever present > 3 days (72 hours)    Negative: [1] Fever > 100.0 F (37.8 C) AND [2] foreign travel to a developing country in the last month    [1] Fever AND [2] no signs of serious infection or localizing symptoms    Protocols used: CANCER -  FEVER-A-AH

## 2020-06-03 ENCOUNTER — TRANSFERRED RECORDS (OUTPATIENT)
Dept: HEALTH INFORMATION MANAGEMENT | Facility: CLINIC | Age: 60
End: 2020-06-03

## 2020-06-03 LAB
ABSOLUTE NUCLEATED RBC: 0 (ref 0–0)
ALBUMIN SERPL-MCNC: 3.7 G/DL (ref 3.4–5)
ALP SERPL-CCNC: 69 U/L (ref 46–116)
ALT SERPL-CCNC: 38 U/L (ref 14–63)
AST SERPL-CCNC: 10 U/L (ref 15–37)
BASOPHILS # BLD AUTO: 0 10*3/UL (ref 0–0.2)
BASOPHILS NFR BLD AUTO: 0 % (ref 0–1)
BILIRUB SERPL-MCNC: 0.5 MG/DL (ref 0.2–1)
BUN SERPL-MCNC: 18 MG/DL (ref 7–18)
CALCIUM SERPL-MCNC: 8.8 MG/DL (ref 8.5–10.1)
CHLORIDE SERPLBLD-SCNC: 104 MMOL/L (ref 98–107)
CO2 SERPL-SCNC: 26 MMOL/L (ref 21–32)
CREAT SERPL-MCNC: 0.74 MG/DL (ref 0.67–1.17)
EOSINOPHIL # BLD AUTO: 0 10*3/UL (ref 0.04–0.54)
EOSINOPHIL NFR BLD AUTO: 0 % (ref 0–5)
ERYTHROCYTE [DISTWIDTH] IN BLOOD BY AUTOMATED COUNT: 15.6 % (ref 11.6–14.4)
ERYTHROCYTE [DISTWIDTH] IN BLOOD BY AUTOMATED COUNT: 51.7 % (ref 35.1–43.9)
GFR SERPL CREATININE-BSD FRML MDRD: >60 ML/MIN/1.73M2 (ref 60–150)
GLOBULIN: 4.1 G/DL (ref 1.4–4.8)
GLUCOSE SERPL-MCNC: 129 MG/DL (ref 74–99)
HCT VFR BLD AUTO: 25.3 % (ref 40–52)
HEMOGLOBIN: 8.8 G/DL (ref 13–18)
IMMATURE GRANS (ABS): 0.02 X10E3/UL (ref 0–0.1)
IMMATURE GRANULOCYTES: 5.3 % (ref 0–1)
LYMPHOCYTES # BLD AUTO: 0.04 10*3/UL (ref 0.76–4)
LYMPHOCYTES NFR BLD AUTO: 10.5 % (ref 18–40)
MCH RBC QN AUTO: 31 PG (ref 27–34)
MCHC RBC AUTO-ENTMCNC: 35 G/DL (ref 32–36)
MCV RBC AUTO: 90 FL (ref 80–96)
MONOCYTES # BLD AUTO: 0.07 10*3/UL (ref 0–0.9)
MONOCYTES NFR BLD AUTO: 18.4 % (ref 3–13)
NEUTROPHILS # BLD AUTO: 0.25 10*3/UL (ref 2.1–7.5)
NEUTROPHILS NFR BLD AUTO: 65.8 % (ref 45–75)
NRBC: 0 % (ref 0–0)
PLATELET # BLD AUTO: 13 10^9/L (ref 150–420)
POTASSIUM SERPL-SCNC: 4.3 MMOL/L (ref 3.5–5.1)
PROT SERPL-MCNC: 7.8 G/DL (ref 6.4–8.2)
RBC # BLD AUTO: 2.81 10^12/L (ref 4.4–6)
SODIUM SERPL-SCNC: 138 MMOL/L (ref 136–145)
WBC # BLD AUTO: 0.4 10^9/L (ref 4–11)

## 2020-06-04 ENCOUNTER — PATIENT OUTREACH (OUTPATIENT)
Dept: ONCOLOGY | Facility: CLINIC | Age: 60
End: 2020-06-04

## 2020-06-04 NOTE — PROGRESS NOTES
Writer called Kobe Pedroza,then also called wife as patient was not at home. He had questions regarding labs and what medications he should still be on and what he should continue to hold.     Medication list reviewed with both Jonnathan and his wife, Mckayla. Labs and further  appointments reviewed with good understanding voiced.    Writer will continue to follow.

## 2020-06-05 ENCOUNTER — TRANSFERRED RECORDS (OUTPATIENT)
Dept: HEALTH INFORMATION MANAGEMENT | Facility: CLINIC | Age: 60
End: 2020-06-05

## 2020-06-05 ENCOUNTER — VIRTUAL VISIT (OUTPATIENT)
Dept: ONCOLOGY | Facility: CLINIC | Age: 60
End: 2020-06-05
Attending: PHYSICIAN ASSISTANT
Payer: COMMERCIAL

## 2020-06-05 DIAGNOSIS — C83.70 BURKITT LYMPHOMA, UNSPECIFIED BODY REGION (H): Primary | ICD-10-CM

## 2020-06-05 DIAGNOSIS — T45.1X5A CHEMOTHERAPY-INDUCED NEUTROPENIA (H): Primary | ICD-10-CM

## 2020-06-05 DIAGNOSIS — D70.1 CHEMOTHERAPY-INDUCED NEUTROPENIA (H): Primary | ICD-10-CM

## 2020-06-05 DIAGNOSIS — Z11.59 ENCOUNTER FOR SCREENING FOR OTHER VIRAL DISEASES: Primary | ICD-10-CM

## 2020-06-05 DIAGNOSIS — C83.78 BURKITT LYMPHOMA OF LYMPH NODES OF MULTIPLE REGIONS (H): ICD-10-CM

## 2020-06-05 DIAGNOSIS — Z11.59 ENCOUNTER FOR SCREENING FOR OTHER VIRAL DISEASES: ICD-10-CM

## 2020-06-05 LAB
ALBUMIN SERPL-MCNC: 3.7 G/DL (ref 3.4–5)
ALP SERPL-CCNC: 70 U/L (ref 46–116)
ALT SERPL-CCNC: 38 U/L (ref 14–63)
AST SERPL-CCNC: 15 U/L (ref 15–37)
BASOPHILS # BLD AUTO: 0.02 X10E3/UL (ref 0–0.2)
BASOPHILS NFR BLD AUTO: 0.7 % (ref 0–1)
BILIRUB SERPL-MCNC: 0.4 MG/DL (ref 0.2–1)
BUN SERPL-MCNC: 18 MG/DL (ref 7–18)
CALCIUM SERPL-MCNC: 8.6 MG/DL (ref 8.5–10.1)
CHLORIDE SERPLBLD-SCNC: 104 MMOL/L (ref 98–107)
CREAT SERPL-MCNC: 0.76 MG/DL (ref 0.67–1.17)
DIFFERENTIAL: ABNORMAL
EOSINOPHIL # BLD AUTO: 0 X10E3/UL (ref 0.04–0.54)
EOSINOPHIL NFR BLD AUTO: 0 % (ref 0–5)
ERYTHROCYTE [DISTWIDTH] IN BLOOD BY AUTOMATED COUNT: 15.4 % (ref 11.6–14.4)
ERYTHROCYTE [DISTWIDTH] IN BLOOD BY AUTOMATED COUNT: 51.6 FL (ref 35.1–43.9)
GFR SERPL CREATININE-BSD FRML MDRD: >60 ML/MIN/1.73M2 (ref 60–150)
GLOBULIN: 3.4 G/DL (ref 1.4–4.8)
GLUCOSE SERPL-MCNC: 137 MG/DL (ref 74–100)
HCO3 SERPL-SCNC: 26 MMOL/L (ref 21–32)
HCT VFR BLD AUTO: 24 % (ref 40–52)
HEMOGLOBIN: 8.3 G/DL (ref 13–18)
IMMATURE GRANS (ABS): 0.05 X10E3/UL (ref 0–0.1)
IMMATURE GRANULOCYTES: 1.6 % (ref 0–1)
LYMPHOCYTES # BLD AUTO: 0.08 X10E3/UL (ref 0.76–4)
LYMPHOCYTES NFR BLD AUTO: 2.6 % (ref 18–40)
MCH RBC QN AUTO: 32 PG (ref 27–34)
MCHC RBC AUTO-ENTMCNC: 35 G/DL (ref 32–36)
MCV RBC AUTO: 92 FL (ref 80–96)
MONOCYTES # BLD AUTO: 0.47 X10E3/UL (ref 0–0.9)
MONOCYTES NFR BLD AUTO: 16.3 % (ref 3–13)
NEUTROPHILS # BLD AUTO: 2.45 X10E3/UL (ref 2.1–7.5)
NEUTROPHILS NFR BLD AUTO: 79.8 % (ref 45–75)
NUCLEATED RBC/100 WBC: 0 (ref 0–0)
NUCLEATED RBCS: 0 X10E3/UL (ref 0–0)
PLATELET # BLD AUTO: 9 10^9/L (ref 150–420)
POTASSIUM SERPL-SCNC: 4 MMOL/L (ref 3.5–5.1)
PROT SERPL-MCNC: 7.1 G/DL (ref 6.4–8.2)
RBC # BLD AUTO: 2.61 10^12/L (ref 4.4–6)
SODIUM SERPL-SCNC: 138 MMOL/L (ref 136–145)
WBC # BLD AUTO: 3.1 10^9/L (ref 4–11)

## 2020-06-05 PROCEDURE — 99214 OFFICE O/P EST MOD 30 MIN: CPT | Mod: GT | Performed by: PHYSICIAN ASSISTANT

## 2020-06-05 PROCEDURE — 40001009 ZZH VIDEO/TELEPHONE VISIT; NO CHARGE

## 2020-06-05 NOTE — PROGRESS NOTES
"Kobe Pedroza is a 60 year old male who is being evaluated via a billable telephone visit.      The patient has been notified of following:     \"This telephone visit will be conducted via a call between you and your physician/provider. We have found that certain health care needs can be provided without the need for a physical exam.  This service lets us provide the care you need with a short phone conversation.  If a prescription is necessary we can send it directly to your pharmacy.  If lab work is needed we can place an order for that and you can then stop by our lab to have the test done at a later time.    Telephone visits are billed at different rates depending on your insurance coverage. During this emergency period, for some insurers they may be billed the same as an in-person visit.  Please reach out to your insurance provider with any questions.    If during the course of the call the physician/provider feels a telephone visit is not appropriate, you will not be charged for this service.\"    Patient has given verbal consent for Telephone visit?  Yes    What phone number would you like to be contacted at? [phone]    How would you like to obtain your AVS? [facility]    Jun 5, 2020      REASON FOR VISIT:  Management of Burkitt lymphoma; hospital follow-up      HISTORY OF PRESENT ILLNESS:  Mr. Pedroza is a 60 year old man with Burkitt lymphoma.  To summarize his course, he presented with acute on chronic back pain in 03/2020.  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  CSF was negative.  He was started on steroids followed by treatment with R-CODOX-M/IVAC and IT chemo prophylaxis on 3/18/2020.  His course was complicated by neutropenic sepsis after cycle 2 R-IVAC that resolved with no clear source but alpha hemolytic strep grew from 1/4 cultures at the outside lab (presumed contaminant).  He had a " PET/CT to restage after cycle 2 (first R-IVAC) showing CR. Received cycle 3 (R-CODOX-M) 4/30-5/1. Diagnosed with DVT during admission and was started on therapeutic Lovenox. Was admitted for C4 chemo (second R-IVAC) on 5/21-5/26, Neulasta on 5/27.      INTERVAL HISTORY: Jonnathan was seen on video visit while at [place] infusion room waiting for his labs.  Over last weekend he was very lethargic and exhausted.  Was winded with minimal activity, on Monday his Hgb was 6.8.  Every day this week he has been feeling better and better.  Still tired, but getting more done and anxious to get to the cabin this weekend. Appetite is good, daily BM, no bleeding anywhere. Has been holding his lovenox shots. Had a brief fever of 100.4 on 6/2 that lasted like 15 min and didn't occur again.     He denies  cough, SOB, no abdominal pain.  No rash.  He has no edema. No bleeding, has been holding his lovenox      OBJECTIVE:     Video physical exam  General: Patient appears well in no acute distress. Obese body habitus. +Alopecia   Skin: No visualized rash or lesions on visualized skin  Eyes: EOMI, no erythema, sclera icterus or discharge noted  Resp: Appears to be breathing comfortably without accessory muscle usage, speaking in full sentences, no cough  MSK: Appears to have normal range of motion based on visualized movements  Neurologic: No apparent tremors, facial movements symmetric  Psych: affect and mood congruent, alert and oriented  The rest of a comprehensive physical examination is deferred due to PHE (public health emergency) video restrictions     Labs-- (verbal report from Jonnathan at [place] infusion room)   WBC 3.1 ANC 2.4   Hgb 8.3  Platelets 9k (will get transfusion)    ASSESSMENT AND PLAN:      Burkitt's lymphoma: Currently on therapy with R-CODOX-M/R-IVAC. He had PET/CT after one full cycle showing complete remission. He tolerated second cycle of R-CODOX-M well with fatigue today typical of coming off steroids. Day 3 IT  cytarabine was deferred secondary to acute DVT. 4/30 was admitted for C2 R-CODOX-M and 5/8 with HD Methotrexate.  5/21 final dose of R-IVAC.  He is due for the IT chemo he previously missed on 6/8, but likely his platelets will be too low.  I pushed this back to 6/15.       Later this month, he'll get a PET/CT and to see Dr Wright     ID: No active concerns.  -Continue ACV  -He can stop his Levaquin and fluconazole given adequate ANC  -dapsone for pjp ppx      HEME: no bleeding concerns, fatigue improved from PRBC on 6/1, hgb stable today  -Given thrombocytopenia, hold lovenox, will get 1 pack of platelets, recheck on 6/8  -Will need twice weekly lab checks and tentative transfusions.      R peroneal DVT: Started Lovenox 100 mg BID on 5/1. HOLD this week due to low platelets, per Dr Wright, once >30k will resume     Hypokalemia: K stable, eating well.  Continue 20 mEq daily.            8:05 start  8:20 finish   [facility] [place] [facility]    There was an additional 10 min spent talking to his wife and an addition 5 min with Jonnathan over the phone as well.     Phone call duration: 25 minutes    NEDA Stinson CMA on 6/5/2020 at 7:22 AM

## 2020-06-08 ENCOUNTER — TRANSFERRED RECORDS (OUTPATIENT)
Dept: HEALTH INFORMATION MANAGEMENT | Facility: CLINIC | Age: 60
End: 2020-06-08

## 2020-06-08 ENCOUNTER — PATIENT OUTREACH (OUTPATIENT)
Dept: ONCOLOGY | Facility: CLINIC | Age: 60
End: 2020-06-08

## 2020-06-08 LAB
ABSOLUTE NUCLEATED RBC: 0.03 (ref 0–0)
ALBUMIN SERPL-MCNC: 3.6 G/DL (ref 3.4–5)
ALP SERPL-CCNC: 82 U/L (ref 46–116)
ALT SERPL-CCNC: 36 U/L (ref 14–63)
AST SERPL-CCNC: 16 U/L (ref 15–37)
BASOPHILS # BLD AUTO: 0.02 10*3/UL (ref 0–0.2)
BASOPHILS NFR BLD AUTO: 0.4 % (ref 0–1)
BILIRUB SERPL-MCNC: 0.3 MG/DL (ref 0.2–1)
BUN SERPL-MCNC: 13 MG/DL (ref 7–18)
CALCIUM SERPL-MCNC: 8.2 MG/DL (ref 8.5–10.1)
CHLORIDE SERPLBLD-SCNC: 105 MMOL/L (ref 98–107)
CO2 SERPL-SCNC: 25 MMOL/L (ref 21–32)
CREAT SERPL-MCNC: 0.75 MG/DL (ref 0.67–1.17)
EOSINOPHIL # BLD AUTO: 0 10*3/UL (ref 0.04–0.54)
EOSINOPHIL NFR BLD AUTO: 0 % (ref 0–5)
ERYTHROCYTE [DISTWIDTH] IN BLOOD BY AUTOMATED COUNT: 15.2 % (ref 11.6–14.4)
ERYTHROCYTE [DISTWIDTH] IN BLOOD BY AUTOMATED COUNT: 15.2 FL (ref 35.1–43.9)
GFR SERPL CREATININE-BSD FRML MDRD: >60 ML/MIN/1.73M2 (ref 60–150)
GLOBULIN: 3.4 G/DL (ref 1.4–4.8)
GLUCOSE SERPL-MCNC: 193 MG/DL (ref 74–100)
HCT VFR BLD AUTO: 24 % (ref 40–52)
HEMOGLOBIN: 8 G/DL (ref 13–18)
IMMATURE GRANS (ABS): 0.38 X10E3/UL (ref 0–0.1)
IMMATURE GRANULOCYTES: 7.5 % (ref 0–1)
LYMPHOCYTES # BLD AUTO: 0.16 10*3/UL (ref 0.76–4)
LYMPHOCYTES NFR BLD AUTO: 3.2 % (ref 18–40)
MCH RBC QN AUTO: 31 PG (ref 27–34)
MCHC RBC AUTO-ENTMCNC: 33 G/DL (ref 32–36)
MCV RBC AUTO: 93 FL (ref 80–96)
MONOCYTES # BLD AUTO: 0.86 10*3/UL (ref 0–0.9)
MONOCYTES NFR BLD AUTO: 17.1 % (ref 3–13)
NEUTROPHILS # BLD AUTO: 3.62 10*3/UL (ref 2.1–7.5)
NEUTROPHILS NFR BLD AUTO: 71.8 % (ref 45–75)
NUCLEATED RBCS: 0.6 (ref 0–0)
PLATELET # BLD AUTO: 62 10^9/L (ref 150–420)
POTASSIUM SERPL-SCNC: 4 MMOL/L (ref 3.5–5.1)
PROT SERPL-MCNC: 7 G/DL (ref 6.4–8.2)
RBC # BLD AUTO: 2.57 10^12/L (ref ?–6)
SODIUM SERPL-SCNC: 141 MMOL/L (ref 136–145)
WBC # BLD AUTO: 5 10^9/L (ref 4–11)

## 2020-06-09 NOTE — PROGRESS NOTES
Writer called Mckayla, reviewed labs. plts at 61 and rising. OK to restart Lovenox this evening. Mckayla states he has a headache, light and had some leg cramping. He will increase his fluid intake and take a tylenol for the above.     6/9/2020-called Jonnathan, denies headache now, says he gets them, has always had headaches. No leg cramps at present.   He c/o a band of muscle cramping around his middle, just above his waist band. No loss of sensation or other symptoms. Rates the discomfort at a 1-2. Has been trying to be more active. Will let writer know if it gets worse. Reviewed labs from 6/8/20. Has labs again tomorrow.     Also reminded him that he needed to hold his Lovenox prior to his LP next Monday. He will hold starting Saturday night.

## 2020-06-10 ENCOUNTER — PATIENT OUTREACH (OUTPATIENT)
Dept: ONCOLOGY | Facility: CLINIC | Age: 60
End: 2020-06-10

## 2020-06-10 ENCOUNTER — TELEPHONE (OUTPATIENT)
Dept: ONCOLOGY | Facility: CLINIC | Age: 60
End: 2020-06-10

## 2020-06-10 NOTE — PROGRESS NOTES
"Writer called Kobe Pedroza to ask about band of pain he has around his abdomen. He told writer about it 6/9/20 but stated it was not bad. Rated it at a 1-2. Today he rates it the same. Says it depends on what position he is in. Feels like it is right beneath the skin, \"weird\". Also c/o a headache but again states this is not new for him. Tried a heating pad that was suggested earlier by triage, which helped, made him fall asleep. He has not eaten anything spicy, that would cause heart burn, no alcohol.     Per Doctor Adriana, the band of pain could be a delayed effect from the Neulasta, he has had this in the past. Or enteritis/colitis from the chemo. He will maybe, could not get him to commit to taking a tylenol for both the headache, and the pain in his abdomen. If needed he has Oxycodone.     If becomes worse, we could check additional lab, lipase.      Today's lbs not sent to clinic yet but per triage and wife paperwork better. Not transfusions needed. Hbg stable, plt and WBC rising.    Will continue to follow.      "

## 2020-06-10 NOTE — TELEPHONE ENCOUNTER
Spouse called in to triage reporting pt with increased pain, state pt will refuse to call in himself, state he has headaches, back pain and abd pain, especially bad this morning. State she is not with pt at this time. RNCC spoke with pt yesterday who denied HA at the time, had increased abd pain above waist band. Informed spouse will need to speak with spouse for further info, she verbalized understanding.    Called pt to discuss, he state pain is 2 inches above his belly button and wraps around to his back, rated 2/10 for the past 3 days. Has not used any pain meds, ice or heat. Worse with moving around, better relaxing, sitting or laying down. He had labs this morning and numbers are better. WBC 6.5, Hgb 8.1, Plt 111. Advised pt try a heating pad, will discuss with care team and RNCC.

## 2020-06-12 ENCOUNTER — PATIENT OUTREACH (OUTPATIENT)
Dept: ONCOLOGY | Facility: CLINIC | Age: 60
End: 2020-06-12

## 2020-06-12 DIAGNOSIS — Z11.59 ENCOUNTER FOR SCREENING FOR OTHER VIRAL DISEASES: ICD-10-CM

## 2020-06-12 LAB
SARS-COV-2 RNA SPEC QL NAA+PROBE: NOT DETECTED
SPECIMEN SOURCE: NORMAL

## 2020-06-12 PROCEDURE — 99207 ZZC NO BILLABLE SERVICE THIS VISIT: CPT

## 2020-06-12 PROCEDURE — U0003 INFECTIOUS AGENT DETECTION BY NUCLEIC ACID (DNA OR RNA); SEVERE ACUTE RESPIRATORY SYNDROME CORONAVIRUS 2 (SARS-COV-2) (CORONAVIRUS DISEASE [COVID-19]), AMPLIFIED PROBE TECHNIQUE, MAKING USE OF HIGH THROUGHPUT TECHNOLOGIES AS DESCRIBED BY CMS-2020-01-R: HCPCS | Mod: 90 | Performed by: PHYSICIAN ASSISTANT

## 2020-06-12 PROCEDURE — 99000 SPECIMEN HANDLING OFFICE-LAB: CPT | Performed by: PHYSICIAN ASSISTANT

## 2020-06-12 NOTE — PROGRESS NOTES
"Called Kobe Kasia in response to his \"my chart\" message sent today. He continues to have the \"banding' type pain around his middle. Now moving around some. Not severe to any extent. He also c/o leg cramping. He denies any fevers, is eating and drinking. He is a coffee drinker and admits he does not drinking enough and his urine is concentrate. Bowels are moving well. No rashes or skin issues except is getting Lovenox in the abdomen, rotating site but says it is tender from the injections. He tried on Oxycodone and it dd not help.     Writer talked with Doctor Adriana and both feel this is related to the chemotherapy/neulasta/enoxaparin. He has had problems with Neulasta in the past. As he recovers this should get better. He can continue with the prn tylenol or Oxycodone. If gets more severe or fevers he should go to the ER.     He asked about a quiet weekend at the cabin. He would be doing the same as at home. [facility] away. Writer said that maybe this would help take his mind off the above. Count are good, recovering.    Again reminded him to start holding his enoxaparin this weekend for Mondays LP. He will take one dose on Saturday then hold until after the procedure.   "

## 2020-06-15 ENCOUNTER — HOSPITAL ENCOUNTER (OUTPATIENT)
Facility: CLINIC | Age: 60
Discharge: HOME | End: 2020-06-15
Attending: PHYSICIAN ASSISTANT | Admitting: PHYSICIAN ASSISTANT
Payer: COMMERCIAL

## 2020-06-15 ENCOUNTER — OFFICE VISIT (OUTPATIENT)
Dept: ONCOLOGY | Facility: CLINIC | Age: 60
End: 2020-06-15
Attending: NURSE PRACTITIONER
Payer: COMMERCIAL

## 2020-06-15 ENCOUNTER — HOSPITAL ENCOUNTER (OUTPATIENT)
Dept: INTERVENTIONAL RADIOLOGY/VASCULAR | Facility: CLINIC | Age: 60
Discharge: HOME | End: 2020-06-15
Attending: PHYSICIAN ASSISTANT | Admitting: PHYSICIAN ASSISTANT
Payer: COMMERCIAL

## 2020-06-15 ENCOUNTER — APPOINTMENT (OUTPATIENT)
Dept: MEDSURG UNIT | Facility: CLINIC | Age: 60
End: 2020-06-15
Attending: NURSE PRACTITIONER
Payer: COMMERCIAL

## 2020-06-15 VITALS
HEIGHT: 70 IN | OXYGEN SATURATION: 96 % | BODY MASS INDEX: 30.78 KG/M2 | RESPIRATION RATE: 18 BRPM | TEMPERATURE: 98.3 F | SYSTOLIC BLOOD PRESSURE: 132 MMHG | DIASTOLIC BLOOD PRESSURE: 78 MMHG | WEIGHT: 215 LBS

## 2020-06-15 DIAGNOSIS — C83.70 BURKITT LYMPHOMA, UNSPECIFIED BODY REGION (H): Primary | ICD-10-CM

## 2020-06-15 DIAGNOSIS — C83.78 BURKITT LYMPHOMA OF LYMPH NODES OF MULTIPLE REGIONS (H): ICD-10-CM

## 2020-06-15 DIAGNOSIS — D70.1 CHEMOTHERAPY-INDUCED NEUTROPENIA (H): ICD-10-CM

## 2020-06-15 DIAGNOSIS — T45.1X5A CHEMOTHERAPY-INDUCED NEUTROPENIA (H): ICD-10-CM

## 2020-06-15 LAB
ALBUMIN SERPL-MCNC: 3.4 G/DL (ref 3.4–5)
ALP SERPL-CCNC: 73 U/L (ref 40–150)
ALT SERPL W P-5'-P-CCNC: 85 U/L (ref 0–70)
ANION GAP SERPL CALCULATED.3IONS-SCNC: 3 MMOL/L (ref 3–14)
APTT PPP: 28 SEC (ref 22–37)
AST SERPL W P-5'-P-CCNC: 51 U/L (ref 0–45)
BASOPHILS # BLD AUTO: 0 10E9/L (ref 0–0.2)
BASOPHILS NFR BLD AUTO: 0.3 %
BILIRUB SERPL-MCNC: 0.4 MG/DL (ref 0.2–1.3)
BUN SERPL-MCNC: 13 MG/DL (ref 7–30)
CALCIUM SERPL-MCNC: 8.1 MG/DL (ref 8.5–10.1)
CHLORIDE SERPL-SCNC: 105 MMOL/L (ref 94–109)
CO2 SERPL-SCNC: 30 MMOL/L (ref 20–32)
CREAT SERPL-MCNC: 0.88 MG/DL (ref 0.66–1.25)
DIFFERENTIAL METHOD BLD: ABNORMAL
EOSINOPHIL # BLD AUTO: 0 10E9/L (ref 0–0.7)
EOSINOPHIL NFR BLD AUTO: 0 %
ERYTHROCYTE [DISTWIDTH] IN BLOOD BY AUTOMATED COUNT: 21.1 % (ref 10–15)
GFR SERPL CREATININE-BSD FRML MDRD: >90 ML/MIN/{1.73_M2}
GLUCOSE CSF-MCNC: 65 MG/DL (ref 40–70)
GLUCOSE SERPL-MCNC: 96 MG/DL (ref 70–99)
GRAM STN SPEC: NORMAL
HCT VFR BLD AUTO: 24 % (ref 40–53)
HGB BLD-MCNC: 7.5 G/DL (ref 13.3–17.7)
IMM GRANULOCYTES # BLD: 0.1 10E9/L (ref 0–0.4)
IMM GRANULOCYTES NFR BLD: 1.5 %
INR PPP: 1.04 (ref 0.86–1.14)
LDH SERPL L TO P-CCNC: 304 U/L (ref 85–227)
LYMPHOCYTES # BLD AUTO: 0.3 10E9/L (ref 0.8–5.3)
LYMPHOCYTES NFR BLD AUTO: 4.6 %
MAGNESIUM SERPL-MCNC: 2.3 MG/DL (ref 1.6–2.3)
MCH RBC QN AUTO: 32.1 PG (ref 26.5–33)
MCHC RBC AUTO-ENTMCNC: 31.3 G/DL (ref 31.5–36.5)
MCV RBC AUTO: 103 FL (ref 78–100)
MONOCYTES # BLD AUTO: 1.8 10E9/L (ref 0–1.3)
MONOCYTES NFR BLD AUTO: 28.9 %
NEUTROPHILS # BLD AUTO: 4 10E9/L (ref 1.6–8.3)
NEUTROPHILS NFR BLD AUTO: 64.7 %
NRBC # BLD AUTO: 0.1 10*3/UL
NRBC BLD AUTO-RTO: 1 /100
PHOSPHATE SERPL-MCNC: 3.6 MG/DL (ref 2.5–4.5)
PLATELET # BLD AUTO: 186 10E9/L (ref 150–450)
PLATELET # BLD EST: ABNORMAL 10*3/UL
POTASSIUM SERPL-SCNC: 3.5 MMOL/L (ref 3.4–5.3)
PROT CSF-MCNC: 94 MG/DL (ref 15–60)
PROT SERPL-MCNC: 6.5 G/DL (ref 6.8–8.8)
RBC # BLD AUTO: 2.34 10E12/L (ref 4.4–5.9)
SODIUM SERPL-SCNC: 139 MMOL/L (ref 133–144)
SPECIMEN SOURCE: NORMAL
URATE SERPL-MCNC: 4.8 MG/DL (ref 3.5–7.2)
WBC # BLD AUTO: 6.1 10E9/L (ref 4–11)

## 2020-06-15 PROCEDURE — 80053 COMPREHEN METABOLIC PANEL: CPT | Performed by: PHYSICIAN ASSISTANT

## 2020-06-15 PROCEDURE — 87070 CULTURE OTHR SPECIMN AEROBIC: CPT | Performed by: PHYSICIAN ASSISTANT

## 2020-06-15 PROCEDURE — 40001004 ZZHCL STATISTIC FLOW INT 9-15 ABY TC 88188: Performed by: PHYSICIAN ASSISTANT

## 2020-06-15 PROCEDURE — 88184 FLOWCYTOMETRY/ TC 1 MARKER: CPT | Performed by: PHYSICIAN ASSISTANT

## 2020-06-15 PROCEDURE — 25000125 ZZHC RX 250: Performed by: PHYSICIAN ASSISTANT

## 2020-06-15 PROCEDURE — 85610 PROTHROMBIN TIME: CPT | Performed by: PHYSICIAN ASSISTANT

## 2020-06-15 PROCEDURE — 88185 FLOWCYTOMETRY/TC ADD-ON: CPT | Performed by: PHYSICIAN ASSISTANT

## 2020-06-15 PROCEDURE — 85025 COMPLETE CBC W/AUTO DIFF WBC: CPT | Performed by: PHYSICIAN ASSISTANT

## 2020-06-15 PROCEDURE — 77003 FLUOROGUIDE FOR SPINE INJECT: CPT

## 2020-06-15 PROCEDURE — 84100 ASSAY OF PHOSPHORUS: CPT | Performed by: PHYSICIAN ASSISTANT

## 2020-06-15 PROCEDURE — 82945 GLUCOSE OTHER FLUID: CPT | Performed by: PHYSICIAN ASSISTANT

## 2020-06-15 PROCEDURE — 83690 ASSAY OF LIPASE: CPT | Performed by: PHYSICIAN ASSISTANT

## 2020-06-15 PROCEDURE — 25000128 H RX IP 250 OP 636: Performed by: NURSE PRACTITIONER

## 2020-06-15 PROCEDURE — 84157 ASSAY OF PROTEIN OTHER: CPT | Performed by: PHYSICIAN ASSISTANT

## 2020-06-15 PROCEDURE — 87015 SPECIMEN INFECT AGNT CONCNTJ: CPT | Performed by: PHYSICIAN ASSISTANT

## 2020-06-15 PROCEDURE — 83735 ASSAY OF MAGNESIUM: CPT | Performed by: PHYSICIAN ASSISTANT

## 2020-06-15 PROCEDURE — 89050 BODY FLUID CELL COUNT: CPT | Performed by: PHYSICIAN ASSISTANT

## 2020-06-15 PROCEDURE — 84550 ASSAY OF BLOOD/URIC ACID: CPT | Performed by: PHYSICIAN ASSISTANT

## 2020-06-15 PROCEDURE — 87205 SMEAR GRAM STAIN: CPT | Performed by: PHYSICIAN ASSISTANT

## 2020-06-15 PROCEDURE — 83615 LACTATE (LD) (LDH) ENZYME: CPT | Performed by: PHYSICIAN ASSISTANT

## 2020-06-15 PROCEDURE — 85730 THROMBOPLASTIN TIME PARTIAL: CPT | Performed by: PHYSICIAN ASSISTANT

## 2020-06-15 PROCEDURE — 40000167 ZZH STATISTIC PP CARE STAGE 2

## 2020-06-15 RX ORDER — NICOTINE POLACRILEX 4 MG
15-30 LOZENGE BUCCAL
Status: DISCONTINUED | OUTPATIENT
Start: 2020-06-15 | End: 2020-06-17 | Stop reason: HOSPADM

## 2020-06-15 RX ORDER — DEXTROSE MONOHYDRATE 25 G/50ML
25-50 INJECTION, SOLUTION INTRAVENOUS
Status: DISCONTINUED | OUTPATIENT
Start: 2020-06-15 | End: 2020-06-17 | Stop reason: HOSPADM

## 2020-06-15 RX ORDER — DEXTROSE MONOHYDRATE 25 G/50ML
25-50 INJECTION, SOLUTION INTRAVENOUS
Status: DISCONTINUED | OUTPATIENT
Start: 2020-06-15 | End: 2020-06-16 | Stop reason: HOSPADM

## 2020-06-15 RX ORDER — NICOTINE POLACRILEX 4 MG
15-30 LOZENGE BUCCAL
Status: DISCONTINUED | OUTPATIENT
Start: 2020-06-15 | End: 2020-06-16 | Stop reason: HOSPADM

## 2020-06-15 RX ADMIN — LIDOCAINE HYDROCHLORIDE 30 ML: 10 INJECTION, SOLUTION EPIDURAL; INFILTRATION; INTRACAUDAL; PERINEURAL at 15:46

## 2020-06-15 RX ADMIN — CYTARABINE: 100 INJECTION INTRATHECAL; INTRAVENOUS; SUBCUTANEOUS at 15:27

## 2020-06-15 ASSESSMENT — MIFFLIN-ST. JEOR
SCORE: 1795.13
SCORE: 1785.61

## 2020-06-15 NOTE — DISCHARGE INSTRUCTIONS
[facility]                                      Radiology Discharge instructions Post Lumbar Puncture         AFTER YOU GO HOME      Relax and take it easy for 24 hours    We suggest bedrest until the next morning, except to go to the bathroom.    You may resume normal activity tomorrow    You may remove the bandage on your back in the evening or next morning    You may resume bathing the next day    Drink at least 4 glasses of extra fluid today if not on a fluid restriction    DO NOT drive or operate machinery at home or at work for at least 24 hours                   CALL YOUR PRIMARY PHYSICIAN IF:    If you start to leak a large amount of fluid from the puncture site, lie down flat on your back. Call your primary physician, they will tell you if you need or return to the hospital    You develop a severe headache    You develop nausea or vomiting     You develop a temperature of 101 degrees or greater                 ADDITIONAL INSTRUCTIONS:         [facility] RADIOLOGY DEPARTMENT             Procedure Physician:Dr Cuello   Date of Procedure:Alice 15, 2020               [facility]...........[phone].......Ask for the Radiologist on call. Someone is on call 24 hour/day               [facility] Toll Free Number.........[phone].....Monday-Friday 8:00 am to 4:30 pm    .

## 2020-06-15 NOTE — PROGRESS NOTES
Procedure note:   Administration of intrathecal chemotherapy:    Radiology performed the lumbar puncture and collected the CSF. Chemotherapy was double checked per institutional guidelines. Using sterile technique, cytarabine 50 mg, hydrocrotisone sodium succinate 50 mg in PF 0.9% sodium chloride was administered intrathecally over 3 minutes.

## 2020-06-15 NOTE — PROGRESS NOTES
1400  Prep is complete for procedure.  Jonnathan is here for LP with chemo.  Denies any pain now.  Labs have been sent.  Wife, Mckayla, will  when ready for discharge.

## 2020-06-15 NOTE — PROGRESS NOTES
Pt returned from xray at 1535 via liter.Lower mid back dressing C/D/I no hematoma denies pain.Pt complains of a HA so heat pack was applied and stated that feels better.VSS.

## 2020-06-15 NOTE — IP AVS SNAPSHOT
[facility], [place], Interventional Radiology  [address] [address]  Phone:  [phone]                                    After Visit Summary   6/15/2020    Kobe Pedroza    MRN: [id number]           After Visit Summary Signature Page    I have received my discharge instructions, and my questions have been answered. I have discussed any challenges I see with this plan with the nurse or doctor.    ..........................................................................................................................................  Patient/Patient Representative Signature      ..........................................................................................................................................  Patient Representative Print Name and Relationship to Patient    ..................................................               ................................................  Date                                   Time    ..........................................................................................................................................  Reviewed by Signature/Title    ...................................................              ..............................................  Date                                               Time          22EPIC Rev 08/18

## 2020-06-15 NOTE — PROCEDURES
[facility] Procedure Note          Lumbar Puncture:      Time: 3:30 PM  Performed by: Estelle Cuello MD  Authorized by: Thomas Eden MD    Indications: Intrathecal chemotherapy.    Consent given by: Patient who states understanding of the procedure being performed after discussing the risks, benefits and alternatives.    Prior to the start of the procedure and with procedural staff participation, I verbally confirmed the patient s identity using two indicators, relevant allergies, that the procedure was appropriate and matched the consent or emergent situation, and that the correct equipment/implants were available. Immediately prior to starting the procedure I conducted the Time Out with the procedural staff and re-confirmed the patient s name, procedure, and site/side. (The Joint Commission universal protocol was followed.) Yes    Under sterile conditions the patient was positioned prone. Betadine solution and sterile drapes were utilized.  Local anesthetic at the site: 2 ml of lidocaine 1% without epinephrine from the LP tray  A 22 G spinal needle was inserted at the L 2-3 interspace.  Opening Pressurewas not checked.  A total of 8mL of clear and colorless spinal fluid was obtained and sent to the laboratory.   After the needle was removed, a bandaid and pressure were applied and the patient was instructed to stay horizontal until the results were back.    Complications:  None    Patient tolerance: Patient tolerated the procedure well with no immediate complications.

## 2020-06-15 NOTE — LETTER
6/15/2020         RE: Kobe Pedroza  [id number] [facility] [address]        Dear Colleague,    Thank you for referring your patient, Kobe Pedroza, to [facility]. Please see a copy of my visit note below.    Procedure note:   Administration of intrathecal chemotherapy:    Radiology performed the lumbar puncture and collected the CSF. Chemotherapy was double checked per institutional guidelines. Using sterile technique, cytarabine 50 mg, hydrocrotisone sodium succinate 50 mg in PF 0.9% sodium chloride was administered intrathecally over 3 minutes.     Again, thank you for allowing me to participate in the care of your patient.        Sincerely,        MICHAEL Vogt CNP

## 2020-06-15 NOTE — IP AVS SNAPSHOT
MRN:[id number]                      After Visit Summary   6/15/2020    Kobe Pedroza    MRN: [id number]           Visit Information        Provider Department      6/15/2020  2:00 PM UUXR4; [facility] RAD; [facility]U IR RN [facility], Interventional Radiology           Review of your medicines      Notice    This visit is during an admission. Changes to the med list made in this visit will be reflected in the After Visit Summary of the admission.           Protect others around you: Learn how to safely use, store and throw away your medicines at www.disposemymeds.org.       Follow-ups after your visit       Your next 10 appointments already scheduled    Jun 16, 2020  3:00 PM CDT  TELEMEDICINE with Fiorella Rivas [facility] ([facility]) [address]  [address]  [facility] [address] [phone]   [facility] [place] [facility] [place]  Note: this is not an onsite visit; there is no need to come to the facility.  Please have a list of all current medications available for appointment.      Jun 29, 2020 12:00 PM CDT  [facility] with  [facility]  [facility] ([facility]) [address] [place] [id number] [phone]   Located in the [facility] at [address] [place] [address]. We're taking every precaution to prevent the spread of COVID-19. Our top priority is to protect and care for our patients, community members, and our staff. For that reason, we are suspending  services until further notice. Please self-park your vehicle before entering our facility. For [facility] please use the [address] at [address] [place] [address].        Jun 29, 2020 12:45 PM CDT  PET ONCOLOGY WHOLE BODY with [facility]  [facility] [address] [facility]  [address]  [phone]   How do I prepare for my exam? (Food and drink instructions)  Patients should eat a low carb, high protein meal 7 hours (or the last meal you eat) before the scan. Low carb, high protein meals consist of lean meats, seafood, beans, soy, low-fat dairy, eggs, nuts & seeds.    6 hours before your scan: Stop all food and liquids (except water). For inpatients, this includes both oral and IV (or intravenous) fluids containing dextrose.   Do not chew gum or suck on mints. If you need to take medicine with food, you may take it with a few crackers.     How do I prepare for my exam? (Other instructions)  If you have diabetes: Have your exam early in the morning. Your blood glucose will go up as the day goes by. Your glucose level must be 180 or less at the start of the exam. Please take any oral diabetic medication you need to ensure this blood glucose level is below 180, but no insulin 4 hours prior to the exam.     * If you are taking insulin in the morning take with breakfast by 6 am and schedule procedure between 12 and 2:15 pm.   * If you are taking insulin at night take nightly dose, fast overnight, schedule procedure before 10 am.   * If you take insulin both morning and night take morning dose by 6am and schedule procedure between 12 and 2:15 pm.    24 hours before your scan: Don t do any heavy exercise. (No jogging, aerobics or other workouts.) Exercise will make your pictures less accurate.     What should I wear? Please wear comfortable clothes.    How long does the exam take?  Most scans will take between 2-3 hours.    What should I bring: Please bring a list of your medicines (including vitamins, minerals and over-the-counter drugs). Leave your valuables at home.    Do I need a :  No  is needed.    What do I need to tell my doctor?  Tell your doctor if you are breastfeeding or if there is any chance you may be pregnant.   If you have problems lying in small spaces  (claustrophobia). If you do, your doctor may give you medicine to help you relax.    What should I do after the exam: No restrictions, you may resume normal activities.    What is this test: Your doctor has ordered a PET scan (positron emission tomography). This will take pictures of the cells and organs in your body. Your doctor may have also ordered a CT scan (computed tomography). This is another way to take pictures of your cells and organs. It is done at the same time as the PET scan. The pictures will help us understand your problem so we can make a treatment plan that fits your needs.    Who should I call with questions: Please call your Imaging Department at your exam site with any questions. Directions, parking instructions, and other information are available on our website, [email or web address].     Jul 01, 2020  9:30 AM CDT  (Arrive by 9:15 AM)  Video Visit with Ever Wright MD  [facility] ([facility]) [address] [place] [address] [phone]   [facility]  Note: this is not an onsite visit; there is no need to come to the facility.  Please have a list of all current medications available for appointment.         Care Instructions       Further instructions from your care team                                                              [facility]                                      Radiology Discharge instructions Post Lumbar Puncture         AFTER YOU GO HOME      Relax and take it easy for 24 hours    We suggest bedrest until the next morning, except to go to the bathroom.    You may resume normal activity tomorrow    You may remove the bandage on your back in the evening or next morning    You may resume bathing the next day    Drink at least 4 glasses of extra fluid today if not on a fluid restriction    DO NOT drive or operate machinery at home or at work for at least 24 hours            "        CALL YOUR PRIMARY PHYSICIAN IF:    If you start to leak a large amount of fluid from the puncture site, lie down flat on your back. Call your primary physician, they will tell you if you need or return to the hospital    You develop a severe headache    You develop nausea or vomiting     You develop a temperature of 101 degrees or greater                 ADDITIONAL INSTRUCTIONS:         [facility] RADIOLOGY DEPARTMENT             Procedure Physician:Dr Cuello   Date of Procedure:Alice 15, 2020               [facility]...........[phone].......Ask for the Radiologist on call. Someone is on call 24 hour/day               [facility] Toll Free Number.........[phone].....Monday-Friday 8:00 am to 4:30 pm    .                                          Additional Information About Your Visit       Command Informationhart Information    [employer] gives you secure access to your electronic health record. If you see a primary care provider, you can also send messages to your care team and make appointments. If you have questions, please call your primary care clinic.  If you do not have a primary care provider, please call [phone] and they will assist you.       Care EveryWhere ID    This is your Care EveryWhere ID. This could be used by other organizations to access your [facility] medical records  [id number]       Your Vitals Were  Most recent update: 6/15/2020  4:02 PM    Blood Pressure   138/87 (BP Location: Right arm)          Temperature   98.3  F (36.8  C) (Oral)          Respirations   18          Height   1.768 m (5' 9.6\")          Weight   97.5 kg (215 lb)             Pulse Oximetry   98%    BMI (Body Mass Index)   31.20 kg/m           Primary Care Provider Office Phone # Fax #    Garett Arriaga [phone]      Equal Access to Services    TONG LIMA AH: Jhonatan Day, wavlad calvo, qaybta kaalcarl gore, rosalio Barrientos [place] [phone].    ATENCIÓN: Si " bhumkia doshi, tiene a sherman disposición servicios gratuitos de asistencia lingüística. Pop grimes [phone].    We comply with applicable federal and state civil rights laws, including the [place] Human Rights Act. We do not discriminate on the basis of race, color, creed, Scientologist, national origin, marital status, age, disability, sex, sexual orientation, or gender identity.       Thank you!    Thank you for choosing [facility] for your care. Our goal is always to provide you with excellent care. Hearing back from our patients is one way we can continue to improve our services. Please take a few minutes to complete the written survey that you may receive in the mail after you visit with us. Thank you!         Medication List     Notice    This visit is during an admission. Changes to the med list made in this visit will be reflected in the After Visit Summary of the admission.

## 2020-06-16 ENCOUNTER — PATIENT OUTREACH (OUTPATIENT)
Dept: ONCOLOGY | Facility: CLINIC | Age: 60
End: 2020-06-16

## 2020-06-16 DIAGNOSIS — R10.13 ABDOMINAL PAIN, EPIGASTRIC: ICD-10-CM

## 2020-06-16 DIAGNOSIS — C83.70 BURKITT LYMPHOMA, UNSPECIFIED BODY REGION (H): Primary | ICD-10-CM

## 2020-06-16 DIAGNOSIS — G89.3 CANCER RELATED PAIN: ICD-10-CM

## 2020-06-16 LAB — COPATH REPORT: NORMAL

## 2020-06-16 NOTE — PROGRESS NOTES
"Kobe Pedroza is a 60 year old male who is being evaluated via a billable video visit.      The patient has been notified of following:     \"This video visit will be conducted via a call between you and your physician/provider. We have found that certain health care needs can be provided without the need for an in-person physical exam.  This service lets us provide the care you need with a video conversation.  If a prescription is necessary we can send it directly to your pharmacy.  If lab work is needed we can place an order for that and you can then stop by our lab to have the test done at a later time.    Video visits are billed at different rates depending on your insurance coverage.  Please reach out to your insurance provider with any questions.    If during the course of the call the physician/provider feels a video visit is not appropriate, you will not be charged for this service.\"    Patient has given verbal consent for Video visit? Yes    Will anyone else be joining your video visit? No         I have reviewed and updated the patient's allergies and medication list.    Concerns: Shortness of breath.   Refills: Acyclovir.      Whitley Huffman CMA    Video-Visit Details    Type of service:  Video Visit    Originating Location (pt. Location): Home    Distant Location (provider location):  [facility]     Platform used for Video Visit: [employer]    REASON FOR VISIT:  Management of Burkitt lymphoma    HISTORY OF PRESENT ILLNESS:  Mr. Pedroza is a 60 year old man with Burkitt lymphoma.  To summarize his course, he presented with acute on chronic back pain in 03/2020.  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  CSF was negative.  He was started on steroids followed by treatment with R-CODOX-M/IVAC and IT chemo prophylaxis on 3/18/2020.  His course was complicated by neutropenic sepsis " that resolved and right lower extremity below the knee DVT identified on 4/30/2020, so he has been on therapeutic anticoagulation.  He had a PET/CT to restage after cycle 2 (1st R-IVAC) that was consistent with PET-negative complete response.  He has completed all planned treatment and is recovering from the final cycle.  Visit for ongoing management.    He is with his wife, Mckayla.  He has had intermittent abdominal pain above the umbilicus that radiates to back or under the ribs on occasion for the past few days.  Not too bothersome.  Ate 2 brats and was uncomfortable.  Not sure if indigestion.  No pain with breathing.  No dyspnea at rest.  Has not tried antacids or acetaminophen.  Feels like chemo effects should have warn off by now.  Energy level and appetite are low but slowly improving.  Weight stable.  No fevers, chills, or night sweats.  No headache, vision changes, focal weakness or sensory changes.  Dyspnea with exertion.  No cough or chest pain other than noted above.  Normal bowel function.  No urinary complaints.  No bleeding, bruising, or skin rashes.  No pain.  No lumps or bumps.  Overall, managing quite well.    REVIEW OF SYSTEMS:  A complete review of systems was negative other than noted.    MEDICATIONS:  Current Outpatient Medications   Medication     acetaminophen (TYLENOL) 325 MG tablet     acyclovir (ZOVIRAX) 200 MG capsule     dapsone (ACZONE) 100 MG tablet     enoxaparin ANTICOAGULANT (LOVENOX) 100 MG/ML syringe     levothyroxine (SYNTHROID/LEVOTHROID) 200 MCG tablet     ondansetron (ZOFRAN) 4 MG tablet     potassium chloride (KLOR-CON) 20 MEQ packet     senna-docusate (SENOKOT-S/PERICOLACE) 8.6-50 MG tablet     fluconazole (DIFLUCAN) 200 MG tablet     levofloxacin (LEVAQUIN) 250 MG tablet     polyethylene glycol (MIRALAX) 17 g packet     No current facility-administered medications for this visit.      PHYSICAL EXAMINATION:  There were no vitals taken for this visit.  Wt Readings from Last 5  Encounters:   06/15/20 97.5 kg (215 lb)   05/27/20 98 kg (216 lb)   05/25/20 97.1 kg (214 lb 1.6 oz)   05/20/20 95.3 kg (210 lb)   05/12/20 99.4 kg (219 lb 3.2 oz)     General: Well appearing.  HEENT: Sclerae anicteric. Alopecia.  Lungs: Breathing comfortably. No cough.  MSK: Grossly normal movement.  Neuro: Grossly non-focal.  Skin/access: Normal skin tone.  Psych: Alert and oriented. No distress.  Performance status: ECOG 0    The rest of a comprehensive physical examination is deferred due to PHE (public health emergency) video visit restrictions    LABORATORY DATA:  Recent Labs   Lab Test 06/15/20  1335 06/08/20 06/05/20   WBC 6.1 5.0 3.1*   HGB 7.5* 8.0* 8.3*    62* 9*   ANEU 4.0 3.62 2.45   ALYM 0.3* 0.16* 0.08*     Recent Labs   Lab Test 06/15/20  1335 06/08/20 06/05/20 06/03/20 05/26/20  0355 05/25/20  0400  05/22/20  0421  03/17/20  0556    141 138 138   < > 138 140   < > 140   < > 139   POTASSIUM 3.5 4.0 4.0 4.3   < > 4.0 3.7   < > 3.8   < > 4.0   CHLORIDE 105 105 104 104   < > 112* 112*   < > 108   < > 107   CO2 30 25  --  26   < > 23 24   < > 24   < > 26   BUN 13 13 18 18   < > 16 15   < > 12   < > 25   CR 0.88 0.75 0.76 0.74   < > 0.72 0.70   < > 0.75   < > 0.71   RUTH 8.1* 8.2* 8.6 8.8   < > 7.3* 7.4*   < > 7.8*   < > 7.2*   MAG 2.3  --   --   --   --   --  2.3  --  2.0   < > 2.1   PHOS 3.6  --   --   --   --  2.3* 2.1*   < > 2.5   < > 4.1   URIC 4.8  --   --   --   --  3.2* 3.1*   < > 6.5   < > 3.7   *  --   --   --   --  226 220   < >  --    < >  --    [id number]  --   --   --   --   --   --   --   --   --   --  2.0    < > = values in this interval not displayed.     Recent Labs   Lab Test 06/15/20  1335 06/08/20 06/05/20 05/26/20  0355 05/25/20  0400   AST 51* 16 15   < >  --  18   ALT 85* 36 38   < >  --  51   ALKPHOS 73 82 70   < >  --  78   BILITOTAL 0.4 0.3 0.4   < >  --  0.3   INR 1.04  --   --   --  1.03 1.11    < > = values in this interval not displayed.     IMPRESSION  AND PLAN:  Mr. Pedroza is a 60 year old man with Burkitt lymphoma.    He has completed planned treatment and all IT chemo for his lymphoma.  Blood counts have recovered and he is scheduled for a post treatment PET/CT in about 2 weeks.  We will meet to discuss the results.    He has no active infectious issues.  He will continue acyclovir and dapsone until his CD4 T cell count has recovered to > 200/mcL.  He stopped fluconazole and levofloxacin now that ANC has recovered.  We will plan for pneumococcal and Shingrix vaccines after he has further recovered from treatment.    His symptoms are nonspecific but nothing sounds particularly threatening.  It sounds like he may have some musculoskeletal issues possibly related to surgery to remove a spinal mass that led to his lymphoma diagnosis and maybe some gastritis/acid reflux/indigestion from chemo.  He will try TUMS, acetaminophen, and would be OK for limited ibuprofen use if helpful.  He will let us know immediately if he has worsening pain, chest pain, dyspnea, or new issues immediately.  We will also see how anemic he is and whether PRBC transfusion might be indicated to help with fatigue.  I reassured him that side effects of chemo can persist and I expect that he will continue to feel better in the coming weeks but that is can take months to get back to normal.  I encouraged him to be patient and to listen to his body as he resumes activities, Mckayla thinks he is pushing too hard and not realistic with what he can/should be doing.      He will continue enoxaparin for at least 3 months (until early 08/2020) for lymphoma-related DVT.  We can look into an oral option if he wants.  He will continue to work with Dr. Arriaga for his general health issues.    We will request lab checks and transfusions twice weekly while at home until he recovers and a PET/CT in about 6-8 weeks with a return visit to review the results.  I reminded him to contact us immediately if he has  fevers at home or if questions, concerns, or new issues arise between visits.    Video visit start time: 9:50 AM  Video visit end time: 10:22 AM  Video visit duration: 32 minutes    Ever Wright MD, PhD  Attending Physician, [facility]   of Medicine, [facility]  Division of Hematology, Oncology, and Transplantation  [email or web address]  [phone] clinic  [phone] pager

## 2020-06-16 NOTE — PROGRESS NOTES
Writer returned Jonnathan and Mckayla's call to go over questions they had regarding what next. He had a LP in the hospital, chart still in IP mode so writer had hard time navigating in it. Questioned med's, transfusions and still has the pain, now mostly on the left side in his chest/abdomen. Denies any associated symptoms/SOB, nausea, eating and drinking good. Asks about a lab test we talked about last week, which was lipase. Told him he really does not have the symptoms for that.      Needs refills  On med's, refills available,he will call. He still needs the Dapsone and ACV, he can stop the Levaquin and Fluconazole as his WBC/ANC has recovered. He can restart his Lovenox, his shot as he refers to it as his LP is done and his platelets have recovered. Last Hgb 7.5. Jonnathan says he is getting push back at [facility] about his appointments. Writer believes it is they do not want to hold a full appointment if chances are he will not need any transfusions. He will call for an appointment for this Thursday, if he has any problems he will contact writer. Hgb 7.5, he might need a transfusion(transfuse below 7).     Doctor Wright has openings in his virtual clinic and Jonnathan has the capability to do virtual visit so will add him on to be seen tomorrow. Doctor Wright notified.     Jonnathan aware of the plan and is in agreement with it.

## 2020-06-16 NOTE — PROGRESS NOTES
1700  Discharged to care of wife per WC accompanied by staff.  States he feels fairly well, very minor frontal headache.  CTRN

## 2020-06-17 ENCOUNTER — VIRTUAL VISIT (OUTPATIENT)
Dept: ONCOLOGY | Facility: CLINIC | Age: 60
End: 2020-06-17
Attending: INTERNAL MEDICINE
Payer: COMMERCIAL

## 2020-06-17 DIAGNOSIS — C83.78 BURKITT LYMPHOMA OF LYMPH NODES OF MULTIPLE REGIONS (H): Primary | ICD-10-CM

## 2020-06-17 DIAGNOSIS — T45.1X5A ANTINEOPLASTIC CHEMOTHERAPY INDUCED ANEMIA: ICD-10-CM

## 2020-06-17 DIAGNOSIS — T45.1X5A CHEMOTHERAPY-INDUCED NEUTROPENIA (H): ICD-10-CM

## 2020-06-17 DIAGNOSIS — D64.81 ANTINEOPLASTIC CHEMOTHERAPY INDUCED ANEMIA: ICD-10-CM

## 2020-06-17 DIAGNOSIS — D70.1 CHEMOTHERAPY-INDUCED NEUTROPENIA (H): ICD-10-CM

## 2020-06-17 DIAGNOSIS — R06.09 DYSPNEA ON EXERTION: ICD-10-CM

## 2020-06-17 DIAGNOSIS — R10.10 PAIN OF UPPER ABDOMEN: ICD-10-CM

## 2020-06-17 LAB
APPEARANCE CSF: CLEAR
COLOR CSF: COLORLESS
LIPASE SERPL-CCNC: 143 U/L (ref 73–393)
RBC # CSF MANUAL: 5 /UL (ref 0–2)
TUBE # CSF: 3 #
WBC # CSF MANUAL: 1 /UL (ref 0–5)

## 2020-06-17 PROCEDURE — 99215 OFFICE O/P EST HI 40 MIN: CPT | Mod: GT | Performed by: INTERNAL MEDICINE

## 2020-06-17 RX ORDER — ACYCLOVIR 200 MG/1
400 CAPSULE ORAL 2 TIMES DAILY
Qty: 60 CAPSULE | Refills: 3 | Status: SHIPPED | OUTPATIENT
Start: 2020-06-17 | End: 2020-08-25

## 2020-06-17 RX ORDER — DAPSONE 100 MG/1
100 TABLET ORAL DAILY
Qty: 30 TABLET | Refills: 3 | Status: SHIPPED | OUTPATIENT
Start: 2020-06-17 | End: 2020-11-29

## 2020-06-17 ASSESSMENT — PAIN SCALES - GENERAL: PAINLEVEL: NO PAIN (0)

## 2020-06-17 NOTE — LETTER
"    6/17/2020         RE: Kobe Pedroza  [address]  [facility] [address]        Dear Colleague,    Thank you for referring your patient, Kobe Pedroza, to the [facility]. Please see a copy of my visit note below.    Kobe Pedroza is a 60 year old male who is being evaluated via a billable video visit.      The patient has been notified of following:     \"This video visit will be conducted via a call between you and your physician/provider. We have found that certain health care needs can be provided without the need for an in-person physical exam.  This service lets us provide the care you need with a video conversation.  If a prescription is necessary we can send it directly to your pharmacy.  If lab work is needed we can place an order for that and you can then stop by our lab to have the test done at a later time.    Video visits are billed at different rates depending on your insurance coverage.  Please reach out to your insurance provider with any questions.    If during the course of the call the physician/provider feels a video visit is not appropriate, you will not be charged for this service.\"    Patient has given verbal consent for Video visit? Yes    Will anyone else be joining your video visit? No         I have reviewed and updated the patient's allergies and medication list.    Concerns: Shortness of breath.   Refills: Acyclovir.      Whitley Huffman CMA    Video-Visit Details    Type of service:  Video Visit    Originating Location (pt. Location): Home    Distant Location (provider location):  [facility]     Platform used for Video Visit: StorSimple    REASON FOR VISIT:  Management of Burkitt lymphoma    HISTORY OF PRESENT ILLNESS:  Mr. Pedroza is a 60 year old man with Burkitt lymphoma.  To summarize his course, he presented with acute on chronic back pain in 03/2020.  Workup revealed stage IV Burkitt lymphoma with extensive visceral and bony " disease and a T5-6 mass with cord compression without neurologic deficits.  CSF was negative.  He was started on steroids followed by treatment with R-CODOX-M/IVAC and IT chemo prophylaxis on 3/18/2020.  His course was complicated by neutropenic sepsis that resolved and right lower extremity below the knee DVT identified on 4/30/2020, so he has been on therapeutic anticoagulation.  He had a PET/CT to restage after cycle 2 (1st R-IVAC) that was consistent with PET-negative complete response.  He has completed all planned treatment and is recovering from the final cycle.  Visit for ongoing management.    He is with his wife, Mckayla.  He has had intermittent abdominal pain above the umbilicus that radiates to back or under the ribs on occasion for the past few days.  Not too bothersome.  Ate 2 brats and was uncomfortable.  Not sure if indigestion.  No pain with breathing.  No dyspnea at rest.  Has not tried antacids or acetaminophen.  Feels like chemo effects should have warn off by now.  Energy level and appetite are low but slowly improving.  Weight stable.  No fevers, chills, or night sweats.  No headache, vision changes, focal weakness or sensory changes.  Dyspnea with exertion.  No cough or chest pain other than noted above.  Normal bowel function.  No urinary complaints.  No bleeding, bruising, or skin rashes.  No pain.  No lumps or bumps.  Overall, managing quite well.    REVIEW OF SYSTEMS:  A complete review of systems was negative other than noted.    MEDICATIONS:  Current Outpatient Medications   Medication     acetaminophen (TYLENOL) 325 MG tablet     acyclovir (ZOVIRAX) 200 MG capsule     dapsone (ACZONE) 100 MG tablet     enoxaparin ANTICOAGULANT (LOVENOX) 100 MG/ML syringe     levothyroxine (SYNTHROID/LEVOTHROID) 200 MCG tablet     ondansetron (ZOFRAN) 4 MG tablet     potassium chloride (KLOR-CON) 20 MEQ packet     senna-docusate (SENOKOT-S/PERICOLACE) 8.6-50 MG tablet     fluconazole (DIFLUCAN) 200 MG  tablet     levofloxacin (LEVAQUIN) 250 MG tablet     polyethylene glycol (MIRALAX) 17 g packet     No current facility-administered medications for this visit.      PHYSICAL EXAMINATION:  There were no vitals taken for this visit.  Wt Readings from Last 5 Encounters:   06/15/20 97.5 kg (215 lb)   05/27/20 98 kg (216 lb)   05/25/20 97.1 kg (214 lb 1.6 oz)   05/20/20 95.3 kg (210 lb)   05/12/20 99.4 kg (219 lb 3.2 oz)     General: Well appearing.  HEENT: Sclerae anicteric. Alopecia.  Lungs: Breathing comfortably. No cough.  MSK: Grossly normal movement.  Neuro: Grossly non-focal.  Skin/access: Normal skin tone.  Psych: Alert and oriented. No distress.  Performance status: ECOG 0    The rest of a comprehensive physical examination is deferred due to PHE (public health emergency) video visit restrictions    LABORATORY DATA:  Recent Labs   Lab Test 06/15/20  1335 06/08/20 06/05/20   WBC 6.1 5.0 3.1*   HGB 7.5* 8.0* 8.3*    62* 9*   ANEU 4.0 3.62 2.45   ALYM 0.3* 0.16* 0.08*     Recent Labs   Lab Test 06/15/20  1335 06/08/20 06/05/20 06/03/20 05/26/20  0355 05/25/20  0400  05/22/20  0421  03/17/20  0556    141 138 138   < > 138 140   < > 140   < > 139   POTASSIUM 3.5 4.0 4.0 4.3   < > 4.0 3.7   < > 3.8   < > 4.0   CHLORIDE 105 105 104 104   < > 112* 112*   < > 108   < > 107   CO2 30 25  --  26   < > 23 24   < > 24   < > 26   BUN 13 13 18 18   < > 16 15   < > 12   < > 25   CR 0.88 0.75 0.76 0.74   < > 0.72 0.70   < > 0.75   < > 0.71   RUTH 8.1* 8.2* 8.6 8.8   < > 7.3* 7.4*   < > 7.8*   < > 7.2*   MAG 2.3  --   --   --   --   --  2.3  --  2.0   < > 2.1   PHOS 3.6  --   --   --   --  2.3* 2.1*   < > 2.5   < > 4.1   URIC 4.8  --   --   --   --  3.2* 3.1*   < > 6.5   < > 3.7   *  --   --   --   --  226 220   < >  --    < >  --    [id number]  --   --   --   --   --   --   --   --   --   --  2.0    < > = values in this interval not displayed.     Recent Labs   Lab Test 06/15/20  1335 06/08/20 06/05/20   05/26/20  0355 05/25/20  0400   AST 51* 16 15   < >  --  18   ALT 85* 36 38   < >  --  51   ALKPHOS 73 82 70   < >  --  78   BILITOTAL 0.4 0.3 0.4   < >  --  0.3   INR 1.04  --   --   --  1.03 1.11    < > = values in this interval not displayed.     IMPRESSION AND PLAN:  Mr. Pedroza is a 60 year old man with Burkitt lymphoma.    He has completed planned treatment and all IT chemo for his lymphoma.  Blood counts have recovered and he is scheduled for a post treatment PET/CT in about 2 weeks.  We will meet to discuss the results.    He has no active infectious issues.  He will continue acyclovir and dapsone until his CD4 T cell count has recovered to > 200/mcL.  He stopped fluconazole and levofloxacin now that ANC has recovered.  We will plan for pneumococcal and Shingrix vaccines after he has further recovered from treatment.    His symptoms are nonspecific but nothing sounds particularly threatening.  It sounds like he may have some musculoskeletal issues possibly related to surgery to remove a spinal mass that led to his lymphoma diagnosis and maybe some gastritis/acid reflux/indigestion from chemo.  He will try TUMS, acetaminophen, and would be OK for limited ibuprofen use if helpful.  He will let us know immediately if he has worsening pain, chest pain, dyspnea, or new issues immediately.  We will also see how anemic he is and whether PRBC transfusion might be indicated to help with fatigue.  I reassured him that side effects of chemo can persist and I expect that he will continue to feel better in the coming weeks but that is can take months to get back to normal.  I encouraged him to be patient and to listen to his body as he resumes activities, Mckayla thinks he is pushing too hard and not realistic with what he can/should be doing.      He will continue enoxaparin for at least 3 months (until early 08/2020) for lymphoma-related DVT.  We can look into an oral option if he wants.  He will continue to work with  Dr. Arriaga for his general health issues.    We will request lab checks and transfusions twice weekly while at home until he recovers and a PET/CT in about 6-8 weeks with a return visit to review the results.  I reminded him to contact us immediately if he has fevers at home or if questions, concerns, or new issues arise between visits.    Video visit start time: 9:50 AM  Video visit end time: 10:22 AM  Video visit duration: 32 minutes    Ever Wright MD, PhD  Attending Physician, [facility]   of Medicine, [facility]  Division of Hematology, Oncology, and Transplantation  [email or web address] email  [phone] clinic  [phone] pager

## 2020-06-18 ENCOUNTER — TRANSFERRED RECORDS (OUTPATIENT)
Dept: HEALTH INFORMATION MANAGEMENT | Facility: CLINIC | Age: 60
End: 2020-06-18

## 2020-06-18 LAB
ABSOLUTE NUCLEATED RBC: 0.11 (ref 0–0)
ALBUMIN SERPL-MCNC: 4 G/DL (ref 3.4–5)
ALP SERPL-CCNC: 73 U/L (ref 46–116)
ALT SERPL-CCNC: 164 U/L (ref 14–63)
AST SERPL-CCNC: 67 U/L (ref 15–37)
BASOPHILS # BLD AUTO: 0.06 10*3/UL (ref 0–0.2)
BASOPHILS NFR BLD AUTO: 1 % (ref 0–1)
BILIRUB SERPL-MCNC: 0.6 MG/DL (ref 0.2–1)
BUN SERPL-MCNC: 20 MG/DL (ref 7–18)
CALCIUM SERPL-MCNC: 8.8 MG/DL (ref 8.5–10.1)
CHLORIDE SERPLBLD-SCNC: 103 MMOL/L (ref 98–107)
CO2 SERPL-SCNC: 25 MMOL/L (ref 21–32)
CREAT SERPL-MCNC: 0.86 MG/DL (ref 0.67–1.17)
EOSINOPHIL # BLD AUTO: 0 10*3/UL (ref 0.04–0.54)
EOSINOPHIL NFR BLD AUTO: 0 % (ref 0–5)
ERYTHROCYTE [DISTWIDTH] IN BLOOD BY AUTOMATED COUNT: 23 % (ref 11.6–14.4)
ERYTHROCYTE [DISTWIDTH] IN BLOOD BY AUTOMATED COUNT: 80.3 % (ref 35.1–43.9)
GFR SERPL CREATININE-BSD FRML MDRD: >60 ML/MIN/1.73M2 (ref 60–150)
GLOBULIN: 3.4 G/DL (ref 1.4–4.8)
GLUCOSE SERPL-MCNC: 116 MG/DL (ref 74–100)
HCT VFR BLD AUTO: 26.6 % (ref 40–52)
HEMOGLOBIN: 8.7 G/DL (ref 13–18)
IMMATURE GRANS (ABS): 0.09 X10E3/UL (ref 0–0.1)
IMMATURE GRANULOCYTES: 1.5 % (ref 0–1)
LYMPHOCYTES # BLD AUTO: 0.36 10*3/UL (ref 0.76–4)
LYMPHOCYTES NFR BLD AUTO: 5.9 % (ref 18–40)
MCH RBC QN AUTO: 34 PG (ref 27–34)
MCHC RBC AUTO-ENTMCNC: 33 G/DL (ref 32–36)
MCV RBC AUTO: 103 FL (ref 80–96)
MONOCYTES # BLD AUTO: 1.36 10*3/UL (ref 0–0.9)
MONOCYTES NFR BLD AUTO: 22.3 % (ref 3–13)
NEUTROPHILS # BLD AUTO: 4.22 10*3/UL (ref 2.1–7.5)
NEUTROPHILS NFR BLD AUTO: 69.3 % (ref 45–75)
NRBC: 1.8 % (ref 0–0)
PLATELET # BLD AUTO: 250 10^9/L (ref 150–420)
POTASSIUM SERPL-SCNC: 4.5 MMOL/L (ref 3.5–5.1)
PROT SERPL-MCNC: 7.4 G/DL (ref 6.4–8.2)
RBC # BLD AUTO: 2.59 10^12/L (ref 4.4–6)
SODIUM SERPL-SCNC: 137 MMOL/L (ref 136–145)
WBC # BLD AUTO: 6.1 10^9/L (ref 4–11)

## 2020-06-20 LAB
BACTERIA SPEC CULT: NO GROWTH
SPECIMEN SOURCE: NORMAL

## 2020-06-22 ENCOUNTER — PATIENT OUTREACH (OUTPATIENT)
Dept: ONCOLOGY | Facility: CLINIC | Age: 60
End: 2020-06-22

## 2020-06-22 DIAGNOSIS — I82.401 DEEP VEIN THROMBOSIS (DVT) OF RIGHT LOWER EXTREMITY, UNSPECIFIED CHRONICITY, UNSPECIFIED VEIN (H): ICD-10-CM

## 2020-06-22 DIAGNOSIS — C83.78 BURKITT LYMPHOMA OF LYMPH NODES OF MULTIPLE REGIONS (H): Primary | ICD-10-CM

## 2020-06-23 NOTE — PROGRESS NOTES
Writer called Kobe  6/22/20 to go over plan. His labs are good, Last Hgb was 8.7, other labs stable. No further labs needed until PET scan done next week.     He.they will call sooner if any questions or problems.     Follow up on 7/1 with Doctor Wright.

## 2020-06-29 ENCOUNTER — HOSPITAL ENCOUNTER (OUTPATIENT)
Dept: PET IMAGING | Facility: CLINIC | Age: 60
Setting detail: NUCLEAR MEDICINE
Discharge: HOME | End: 2020-06-29
Attending: INTERNAL MEDICINE | Admitting: INTERNAL MEDICINE
Payer: COMMERCIAL

## 2020-06-29 DIAGNOSIS — C83.78 BURKITT LYMPHOMA OF LYMPH NODES OF MULTIPLE REGIONS (H): ICD-10-CM

## 2020-06-29 LAB
ABO + RH BLD: NORMAL
ABO + RH BLD: NORMAL
ALBUMIN SERPL-MCNC: 4 G/DL (ref 3.4–5)
ALP SERPL-CCNC: 65 U/L (ref 40–150)
ALT SERPL W P-5'-P-CCNC: 85 U/L (ref 0–70)
ANION GAP SERPL CALCULATED.3IONS-SCNC: 4 MMOL/L (ref 3–14)
AST SERPL W P-5'-P-CCNC: 31 U/L (ref 0–45)
BASOPHILS # BLD AUTO: 0.1 10E9/L (ref 0–0.2)
BASOPHILS NFR BLD AUTO: 1.1 %
BILIRUB SERPL-MCNC: 0.5 MG/DL (ref 0.2–1.3)
BLD GP AB SCN SERPL QL: NORMAL
BLOOD BANK CMNT PATIENT-IMP: NORMAL
BUN SERPL-MCNC: 15 MG/DL (ref 7–30)
CALCIUM SERPL-MCNC: 8.8 MG/DL (ref 8.5–10.1)
CHLORIDE SERPL-SCNC: 108 MMOL/L (ref 94–109)
CO2 SERPL-SCNC: 28 MMOL/L (ref 20–32)
CREAT SERPL-MCNC: 0.88 MG/DL (ref 0.66–1.25)
DIFFERENTIAL METHOD BLD: ABNORMAL
EOSINOPHIL # BLD AUTO: 0.1 10E9/L (ref 0–0.7)
EOSINOPHIL NFR BLD AUTO: 1.1 %
ERYTHROCYTE [DISTWIDTH] IN BLOOD BY AUTOMATED COUNT: 19.4 % (ref 10–15)
GFR SERPL CREATININE-BSD FRML MDRD: >90 ML/MIN/{1.73_M2}
GLUCOSE SERPL-MCNC: 93 MG/DL (ref 70–99)
HCT VFR BLD AUTO: 32.5 % (ref 40–53)
HGB BLD-MCNC: 10.4 G/DL (ref 13.3–17.7)
IMM GRANULOCYTES # BLD: 0 10E9/L (ref 0–0.4)
IMM GRANULOCYTES NFR BLD: 0.5 %
LDH SERPL L TO P-CCNC: 244 U/L (ref 85–227)
LYMPHOCYTES # BLD AUTO: 0.5 10E9/L (ref 0.8–5.3)
LYMPHOCYTES NFR BLD AUTO: 7.8 %
MCH RBC QN AUTO: 35.7 PG (ref 26.5–33)
MCHC RBC AUTO-ENTMCNC: 32 G/DL (ref 31.5–36.5)
MCV RBC AUTO: 112 FL (ref 78–100)
MONOCYTES # BLD AUTO: 1.1 10E9/L (ref 0–1.3)
MONOCYTES NFR BLD AUTO: 17.2 %
NEUTROPHILS # BLD AUTO: 4.4 10E9/L (ref 1.6–8.3)
NEUTROPHILS NFR BLD AUTO: 72.3 %
NRBC # BLD AUTO: 0 10*3/UL
NRBC BLD AUTO-RTO: 0 /100
PLATELET # BLD AUTO: 260 10E9/L (ref 150–450)
POTASSIUM SERPL-SCNC: 4.2 MMOL/L (ref 3.4–5.3)
PROT SERPL-MCNC: 7 G/DL (ref 6.8–8.8)
RBC # BLD AUTO: 2.91 10E12/L (ref 4.4–5.9)
SODIUM SERPL-SCNC: 140 MMOL/L (ref 133–144)
SPECIMEN EXP DATE BLD: NORMAL
WBC # BLD AUTO: 6.1 10E9/L (ref 4–11)

## 2020-06-29 PROCEDURE — 34300033 ZZH RX 343: Performed by: INTERNAL MEDICINE

## 2020-06-29 PROCEDURE — 80053 COMPREHEN METABOLIC PANEL: CPT | Performed by: INTERNAL MEDICINE

## 2020-06-29 PROCEDURE — 36415 COLL VENOUS BLD VENIPUNCTURE: CPT | Performed by: INTERNAL MEDICINE

## 2020-06-29 PROCEDURE — 71260 CT THORAX DX C+: CPT

## 2020-06-29 PROCEDURE — 86850 RBC ANTIBODY SCREEN: CPT | Performed by: PHYSICIAN ASSISTANT

## 2020-06-29 PROCEDURE — 85025 COMPLETE CBC W/AUTO DIFF WBC: CPT | Performed by: INTERNAL MEDICINE

## 2020-06-29 PROCEDURE — 86901 BLOOD TYPING SEROLOGIC RH(D): CPT | Performed by: PHYSICIAN ASSISTANT

## 2020-06-29 PROCEDURE — 25000128 H RX IP 250 OP 636: Performed by: INTERNAL MEDICINE

## 2020-06-29 PROCEDURE — 83615 LACTATE (LD) (LDH) ENZYME: CPT | Performed by: INTERNAL MEDICINE

## 2020-06-29 PROCEDURE — 86900 BLOOD TYPING SEROLOGIC ABO: CPT | Performed by: PHYSICIAN ASSISTANT

## 2020-06-29 PROCEDURE — A9552 F18 FDG: HCPCS | Performed by: INTERNAL MEDICINE

## 2020-06-29 RX ORDER — IOPAMIDOL 755 MG/ML
5-140 INJECTION, SOLUTION INTRAVASCULAR ONCE
Status: COMPLETED | OUTPATIENT
Start: 2020-06-29 | End: 2020-06-29

## 2020-06-29 RX ADMIN — IOPAMIDOL 132 ML: 755 INJECTION, SOLUTION INTRAVENOUS at 12:56

## 2020-06-29 RX ADMIN — FLUDEOXYGLUCOSE F-18 12.99 MCI.: 500 INJECTION, SOLUTION INTRAVENOUS at 12:10

## 2020-06-30 PROBLEM — I82.409 DEEP VEIN THROMBOSIS (DVT) OF LOWER EXTREMITY (H): Status: ACTIVE | Noted: 2020-06-30

## 2020-06-30 PROBLEM — I82.4Z1 DEEP VEIN THROMBOSIS (DVT) OF DISTAL VEIN OF RIGHT LOWER EXTREMITY (H): Status: ACTIVE | Noted: 2020-06-30

## 2020-07-01 ENCOUNTER — VIRTUAL VISIT (OUTPATIENT)
Dept: ONCOLOGY | Facility: CLINIC | Age: 60
End: 2020-07-01
Attending: INTERNAL MEDICINE
Payer: COMMERCIAL

## 2020-07-01 DIAGNOSIS — I82.4Z1 DEEP VEIN THROMBOSIS (DVT) OF DISTAL VEIN OF RIGHT LOWER EXTREMITY, UNSPECIFIED CHRONICITY (H): ICD-10-CM

## 2020-07-01 DIAGNOSIS — C83.78 BURKITT LYMPHOMA OF LYMPH NODES OF MULTIPLE REGIONS (H): Primary | ICD-10-CM

## 2020-07-01 PROCEDURE — 99214 OFFICE O/P EST MOD 30 MIN: CPT | Mod: 95 | Performed by: INTERNAL MEDICINE

## 2020-07-01 PROCEDURE — 40001009 ZZH VIDEO/TELEPHONE VISIT; NO CHARGE

## 2020-07-01 ASSESSMENT — PAIN SCALES - GENERAL: PAINLEVEL: NO PAIN (0)

## 2020-07-01 NOTE — PROGRESS NOTES
"Kobe Pedroza is a 60 year old male who is being evaluated via a billable video visit.      The patient has been notified of following:     \"This video visit will be conducted via a call between you and your physician/provider. We have found that certain health care needs can be provided without the need for an in-person physical exam.  This service lets us provide the care you need with a video conversation.  If a prescription is necessary we can send it directly to your pharmacy.  If lab work is needed we can place an order for that and you can then stop by our lab to have the test done at a later time.    Video visits are billed at different rates depending on your insurance coverage.  Please reach out to your insurance provider with any questions.    If during the course of the call the physician/provider feels a video visit is not appropriate, you will not be charged for this service.\"    Patient has given verbal consent for Video visit? Yes  How would you like to obtain your AVS? MyChart        Vitals - Patient Reported  Weight (Patient Reported): 99.8 kg (220 lb)    Starla Poole CMA July 1, 2020  9:12 AM     Video-Visit Details    Type of service:  Video Visit    Originating Location (pt. Location): Home    Distant Location (provider location):  [facility]     Platform used for Video Visit: DianaGravie    REASON FOR VISIT:  Management of Burkitt lymphoma    HISTORY OF PRESENT ILLNESS:  Mr. Pedroza is a 60 year old man with a history of Burkitt lymphoma.  To summarize his course, he presented with acute on chronic back pain in 03/2020.  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  CSF was negative.  He was started on steroids followed by treatment with R-CODOX-M/IVAC and IT chemo prophylaxis on 3/18/2020.  His course was complicated by neutropenic sepsis that resolved and right " lower extremity below the knee DVT identified on 4/30/2020, so he has been on therapeutic anticoagulation.  PET/CT after cycle 2 (1st R-IVAC) and again after completing treatment confirmed PET-negative complete response.  Visit for ongoing management.    He is with his wife, Mckayla.  Abdominal pain/discomfort is essentially resolved.  Energy level and appetite are improving.  Weight stable.  No fevers, chills, or night sweats.  No headache, vision changes, focal weakness or sensory changes.  Dyspnea with exertion.  No cough or chest pain other than noted above.  Normal bowel function.  No urinary complaints.  No bleeding, bruising, or skin rashes aside from some dry skin.  No pain.  No lumps or bumps.  Overall, managing quite well.    REVIEW OF SYSTEMS:  A complete review of systems was negative other than noted.    MEDICATIONS:  Current Outpatient Medications   Medication     acetaminophen (TYLENOL) 325 MG tablet     acyclovir (ZOVIRAX) 200 MG capsule     apixaban ANTICOAGULANT (ELIQUIS) 5 MG tablet     dapsone (ACZONE) 100 MG tablet     enoxaparin ANTICOAGULANT (LOVENOX) 100 MG/ML syringe     levothyroxine (SYNTHROID/LEVOTHROID) 200 MCG tablet     ondansetron (ZOFRAN) 4 MG tablet     polyethylene glycol (MIRALAX) 17 g packet     potassium chloride (KLOR-CON) 20 MEQ packet     senna-docusate (SENOKOT-S/PERICOLACE) 8.6-50 MG tablet     No current facility-administered medications for this visit.      PHYSICAL EXAMINATION:  There were no vitals taken for this visit.  Wt Readings from Last 5 Encounters:   06/15/20 97.5 kg (215 lb)   05/27/20 98 kg (216 lb)   05/25/20 97.1 kg (214 lb 1.6 oz)   05/20/20 95.3 kg (210 lb)   05/12/20 99.4 kg (219 lb 3.2 oz)     General: Well appearing.  HEENT: Sclerae anicteric. Alopecia.  Lungs: Breathing comfortably. No cough.  MSK: Grossly normal movement.  Neuro: Grossly non-focal.  Skin/access: Normal skin tone.  Psych: Alert and oriented. No distress.  Performance status: ECOG  0    The rest of a comprehensive physical examination is deferred due to PHE (public health emergency) video visit restrictions    LABORATORY DATA:  Recent Labs   Lab Test 06/29/20  1212 06/18/20 06/15/20  1335   WBC 6.1 6.1 6.1   HGB 10.4* 8.7* 7.5*    250 186   ANEU 4.4 4.22 4.0   ALYM 0.5* 0.36* 0.3*     Recent Labs   Lab Test 06/29/20  1212 06/18/20 06/15/20  1335  05/26/20  0355 05/25/20  0400  05/22/20  0421  03/17/20  0556    137 139   < > 138 140   < > 140   < > 139   POTASSIUM 4.2 4.5 3.5   < > 4.0 3.7   < > 3.8   < > 4.0   CHLORIDE 108 103 105   < > 112* 112*   < > 108   < > 107   CO2 28 25 30   < > 23 24   < > 24   < > 26   BUN 15 20* 13   < > 16 15   < > 12   < > 25   CR 0.88 0.86 0.88   < > 0.72 0.70   < > 0.75   < > 0.71   RUTH 8.8 8.8 8.1*   < > 7.3* 7.4*   < > 7.8*   < > 7.2*   MAG  --   --  2.3  --   --  2.3  --  2.0   < > 2.1   PHOS  --   --  3.6  --  2.3* 2.1*   < > 2.5   < > 4.1   URIC  --   --  4.8  --  3.2* 3.1*   < > 6.5   < > 3.7   *  --  304*  --  226 220   < >  --    < >  --    [id number]  --   --   --   --   --   --   --   --   --  2.0    < > = values in this interval not displayed.     Recent Labs   Lab Test 06/29/20  1212 06/18/20 06/15/20  1335  05/26/20  0355 05/25/20  0400   AST 31 67* 51*   < >  --  18   ALT 85* 164* 85*   < >  --  51   ALKPHOS 65 73 73   < >  --  78   BILITOTAL 0.5 0.6 0.4   < >  --  0.3   INR  --   --  1.04  --  1.03 1.11    < > = values in this interval not displayed.     IMAGING DATA:  6/29/2020 PET/CT scan reviewed by me shows no evidence of lymphoma consistent with complete response and otherwise unremarkable.    IMPRESSION AND PLAN:  Mr. Pedroza is a 60 year old man with a history of Burkitt lymphoma.    PET/CT after cycle 2 and again after the completion confirmed PET-negative complete response.  We reviewed the scans.  Blood counts recovering nicely.  We discussed plans for ongoing monitoring every 3 months for the first 2 years and then  every 6 months to 5 years post treatment if things continue to go smoothly.  We also reviewed that the available data do not support routine scans and we will not repeat imaging unless clinically indicated.  They agree with the plan.    He has no active infectious issues.  He will continue acyclovir and dapsone until his CD4 T cell count has recovered to > 200/mcL but can stop other antimicrobials now that his ANC has recovered.  We will discuss pneumococcal and Shingrix vaccines at his next visit.      He will continue apixaban to complete 3 months of anticoagulation (until 08/2020) for lymphoma-related DVT.  He will continue to work with Dr. Arriaga for his general health issues.    We will arrange another visit in about 3 months with labs.  I reminded him to contact if questions, concerns, or new issues arise between visits.    Video visit start time: 9:38 AM  Video visit end time: 10:06 AM  Video visit duration: 28 minutes    Ever Wright MD, PhD  Attending Physician, [facility]   of Medicine, [facility]  Division of Hematology, Oncology, and Transplantation  [email or web address] email  [phone] [facility]  [phone] pager

## 2020-07-01 NOTE — LETTER
7/1/2020     RE: Kobe Pedroza  [address]  [facility] [address]    Dear Colleague,    Thank you for referring your patient, Kobe Pedroza, to the [facility]. Please see a copy of my visit note below.    Kobe Pedroza is a 60 year old male who is being evaluated via a billable video visit.      Vitals - Patient Reported  Weight (Patient Reported): 99.8 kg (220 lb)  Starla Poole CMA July 1, 2020  9:12 AM     Video-Visit Details  Type of service:  Video Visit  Originating Location (pt. Location): Home  Distant Location (provider location):  [facility]   Platform used for Video Visit: PlayDo    REASON FOR VISIT:  Management of Burkitt lymphoma    HISTORY OF PRESENT ILLNESS:  Mr. Pedroza is a 60 year old man with a history of Burkitt lymphoma.  To summarize his course, he presented with acute on chronic back pain in 03/2020.  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  CSF was negative.  He was started on steroids followed by treatment with R-CODOX-M/IVAC and IT chemo prophylaxis on 3/18/2020.  His course was complicated by neutropenic sepsis that resolved and right lower extremity below the knee DVT identified on 4/30/2020, so he has been on therapeutic anticoagulation.  PET/CT after cycle 2 (1st R-IVAC) and again after completing treatment confirmed PET-negative complete response.  Visit for ongoing management.    He is with his wife, Mckayla.  Abdominal pain/discomfort is essentially resolved.  Energy level and appetite are improving.  Weight stable.  No fevers, chills, or night sweats.  No headache, vision changes, focal weakness or sensory changes.  Dyspnea with exertion.  No cough or chest pain other than noted above.  Normal bowel function.  No urinary complaints.  No bleeding, bruising, or skin rashes aside from some dry skin.  No pain.  No  lumps or bumps.  Overall, managing quite well.    REVIEW OF SYSTEMS:  A complete review of systems was negative other than noted.    MEDICATIONS:  Current Outpatient Medications   Medication     acetaminophen (TYLENOL) 325 MG tablet     acyclovir (ZOVIRAX) 200 MG capsule     apixaban ANTICOAGULANT (ELIQUIS) 5 MG tablet     dapsone (ACZONE) 100 MG tablet     enoxaparin ANTICOAGULANT (LOVENOX) 100 MG/ML syringe     levothyroxine (SYNTHROID/LEVOTHROID) 200 MCG tablet     ondansetron (ZOFRAN) 4 MG tablet     polyethylene glycol (MIRALAX) 17 g packet     potassium chloride (KLOR-CON) 20 MEQ packet     senna-docusate (SENOKOT-S/PERICOLACE) 8.6-50 MG tablet     No current facility-administered medications for this visit.      PHYSICAL EXAMINATION:  There were no vitals taken for this visit.  Wt Readings from Last 5 Encounters:   06/15/20 97.5 kg (215 lb)   05/27/20 98 kg (216 lb)   05/25/20 97.1 kg (214 lb 1.6 oz)   05/20/20 95.3 kg (210 lb)   05/12/20 99.4 kg (219 lb 3.2 oz)     General: Well appearing.  HEENT: Sclerae anicteric. Alopecia.  Lungs: Breathing comfortably. No cough.  MSK: Grossly normal movement.  Neuro: Grossly non-focal.  Skin/access: Normal skin tone.  Psych: Alert and oriented. No distress.  Performance status: ECOG 0    The rest of a comprehensive physical examination is deferred due to PHE (public health emergency) video visit restrictions    LABORATORY DATA:  Recent Labs   Lab Test 06/29/20  1212 06/18/20 06/15/20  1335   WBC 6.1 6.1 6.1   HGB 10.4* 8.7* 7.5*    250 186   ANEU 4.4 4.22 4.0   ALYM 0.5* 0.36* 0.3*     Recent Labs   Lab Test 06/29/20  1212 06/18/20 06/15/20  1335  05/26/20  0355 05/25/20  0400  05/22/20  0421  03/17/20  0556    137 139   < > 138 140   < > 140   < > 139   POTASSIUM 4.2 4.5 3.5   < > 4.0 3.7   < > 3.8   < > 4.0   CHLORIDE 108 103 105   < > 112* 112*   < > 108   < > 107   CO2 28 25 30   < > 23 24   < > 24   < > 26   BUN 15 20* 13   < > 16 15   < > 12   < > 25    CR 0.88 0.86 0.88   < > 0.72 0.70   < > 0.75   < > 0.71   RUTH 8.8 8.8 8.1*   < > 7.3* 7.4*   < > 7.8*   < > 7.2*   MAG  --   --  2.3  --   --  2.3  --  2.0   < > 2.1   PHOS  --   --  3.6  --  2.3* 2.1*   < > 2.5   < > 4.1   URIC  --   --  4.8  --  3.2* 3.1*   < > 6.5   < > 3.7   *  --  304*  --  226 220   < >  --    < >  --    [id number]  --   --   --   --   --   --   --   --   --  2.0    < > = values in this interval not displayed.     Recent Labs   Lab Test 06/29/20  1212 06/18/20 06/15/20  1335  05/26/20  0355 05/25/20  0400   AST 31 67* 51*   < >  --  18   ALT 85* 164* 85*   < >  --  51   ALKPHOS 65 73 73   < >  --  78   BILITOTAL 0.5 0.6 0.4   < >  --  0.3   INR  --   --  1.04  --  1.03 1.11    < > = values in this interval not displayed.     IMAGING DATA:  6/29/2020 PET/CT scan reviewed by me shows no evidence of lymphoma consistent with complete response and otherwise unremarkable.    IMPRESSION AND PLAN:  Mr. Pedroza is a 60 year old man with a history of Burkitt lymphoma.    PET/CT after cycle 2 and again after the completion confirmed PET-negative complete response.  We reviewed the scans.  Blood counts recovering nicely.  We discussed plans for ongoing monitoring every 3 months for the first 2 years and then every 6 months to 5 years post treatment if things continue to go smoothly.  We also reviewed that the available data do not support routine scans and we will not repeat imaging unless clinically indicated.  They agree with the plan.    He has no active infectious issues.  He will continue acyclovir and dapsone until his CD4 T cell count has recovered to > 200/mcL but can stop other antimicrobials now that his ANC has recovered.  We will discuss pneumococcal and Shingrix vaccines at his next visit.      He will continue apixaban to complete 3 months of anticoagulation (until 08/2020) for lymphoma-related DVT.  He will continue to work with Dr. Arriaga for his general health issues.    We  will arrange another visit in about 3 months with labs.  I reminded him to contact if questions, concerns, or new issues arise between visits.    Video visit start time: 9:38 AM  Video visit end time: 10:06 AM  Video visit duration: 28 minutes    Ever Wright MD, PhD  Attending Physician, [facility]   of Medicine, [facility]  Division of Hematology, Oncology, and Transplantation  [email or web address] email  [phone] clinic  [phone] pager

## 2020-07-15 ENCOUNTER — NURSE TRIAGE (OUTPATIENT)
Dept: NURSING | Facility: CLINIC | Age: 60
End: 2020-07-15

## 2020-07-28 DIAGNOSIS — C83.78 BURKITT LYMPHOMA OF LYMPH NODES OF MULTIPLE REGIONS (H): ICD-10-CM

## 2020-07-28 DIAGNOSIS — I82.401 DEEP VEIN THROMBOSIS (DVT) OF RIGHT LOWER EXTREMITY, UNSPECIFIED CHRONICITY, UNSPECIFIED VEIN (H): ICD-10-CM

## 2020-07-28 NOTE — TELEPHONE ENCOUNTER
"Pt called in to triage to clarify how long he was supposed to take his Eliquis, per last 7/1 OV note \"He will continue apixaban to complete 3 months of anticoagulation (until 08/2020) for lymphoma-related DVT\". Pt stated bottle had date 6/22 on it and no refills. Rx sent to [facility] with note on it to end after fill on  8/22/2020.  "

## 2020-07-28 NOTE — TELEPHONE ENCOUNTER
Per Dr. Wright, pt may stop apixaban now. Pt informed and pharmacy called to cancel refill sent this morning.

## 2020-08-25 ENCOUNTER — PATIENT OUTREACH (OUTPATIENT)
Dept: ONCOLOGY | Facility: CLINIC | Age: 60
End: 2020-08-25

## 2020-08-25 DIAGNOSIS — C83.78 BURKITT LYMPHOMA OF LYMPH NODES OF MULTIPLE REGIONS (H): ICD-10-CM

## 2020-08-25 RX ORDER — ACYCLOVIR 200 MG/1
400 CAPSULE ORAL 2 TIMES DAILY
Qty: 60 CAPSULE | Refills: 1 | Status: SHIPPED | OUTPATIENT
Start: 2020-08-25 | End: 2020-09-09

## 2020-08-26 ENCOUNTER — TRANSFERRED RECORDS (OUTPATIENT)
Dept: HEALTH INFORMATION MANAGEMENT | Facility: CLINIC | Age: 60
End: 2020-08-26

## 2020-08-26 NOTE — PROGRESS NOTES
Lab orders faxed, per request below.    Gayle Springer CMA ([facility])            Natacha Agarwal, RN  P [facility]    Cc: Natacha Agarwal, RN               Please fax the CBC order that I put in on 8/25/2020 to [address] lab at [phone]     Thanks,     Natacha Agarwal  BSN OCN   Care Coordinator [facility]   [phone]

## 2020-08-26 NOTE — TELEPHONE ENCOUNTER
----- Message from Natacha Agarwal, RN sent at 8/25/2020  5:47 PM CDT -----  Regarding: fax needed  Please fax the CBC order that I out in on 8/25/2020 to [address] lab at [phone]    Thanks,    Natacha Agarwal  BSN OCN  Care Coordinator [facility]  [phone]

## 2020-09-04 ENCOUNTER — PATIENT OUTREACH (OUTPATIENT)
Dept: ONCOLOGY | Facility: CLINIC | Age: 60
End: 2020-09-04

## 2020-09-04 LAB
BASOPHILS # BLD AUTO: 0.02 10*3/UL (ref 0–0.2)
BASOPHILS NFR BLD AUTO: 0.9 % (ref 0–1)
EOSINOPHIL # BLD AUTO: 0.04 10*3/UL (ref 0.04–0.54)
EOSINOPHIL NFR BLD AUTO: 1.9 % (ref 0–5)
ERYTHROCYTE [DISTWIDTH] IN BLOOD BY AUTOMATED COUNT: 14.8 % (ref 11.6–14.4)
HCT VFR BLD AUTO: 37.6 % (ref 40–52)
HEMOGLOBIN: 12.7 G/DL (ref 13–18)
IMMATURE GRANS (ABS): 0 X10E3/UL (ref 0–1)
IMMATURE GRANULOCYTES: 0 % (ref 0–0.1)
LYMPHOCYTES # BLD AUTO: 0.31 10*3/UL (ref 0.76–4)
LYMPHOCYTES NFR BLD AUTO: 14.4 % (ref 18–40)
MCH RBC QN AUTO: 34 PG (ref 27–34)
MCHC RBC AUTO-ENTMCNC: 34 G/DL (ref 32–36)
MCV RBC AUTO: 96 FL (ref 80–96)
MONOCYTES # BLD AUTO: 0.45 10*3/UL (ref 0–0.9)
MONOCYTES NFR BLD AUTO: 20.8 % (ref 3–13)
NEUTROPHILS # BLD AUTO: 1.34 10*3/UL (ref 2.1–7.5)
NEUTROPHILS NFR BLD AUTO: 62 % (ref 45–75)
PLATELET # BLD AUTO: 208 10^9/L (ref 150–420)
RBC # BLD AUTO: 3.92 10^12/L (ref 4.4–6)
T3, TOTAL - QUEST: 123 NG/ML (ref 60–181)
T4 FREE SERPL-MCNC: 1.87 NG/DL (ref 0.76–1.46)
TSH SERPL-ACNC: 0.01 MCU/ML (ref 0.35–3.74)
WBC # BLD AUTO: 2.2 10^9/L (ref 4–11)

## 2020-09-04 NOTE — PROGRESS NOTES
"Labs done at [facility] 8/26 reviewed with Jonnathan. Questions answered. WBC/ANC down, per  Doctor Adriana,\"suspect WBC just settled out after all the chemo/G-CSF\"  He will have repeat labs in one month prior to his visit with Doctor Wright in October. Will call sooner if any needed.  "

## 2020-09-09 DIAGNOSIS — C83.78 BURKITT LYMPHOMA OF LYMPH NODES OF MULTIPLE REGIONS (H): ICD-10-CM

## 2020-09-09 RX ORDER — ACYCLOVIR 200 MG/1
400 CAPSULE ORAL 2 TIMES DAILY
Qty: 120 CAPSULE | Refills: 3 | Status: SHIPPED | OUTPATIENT
Start: 2020-09-09 | End: 2021-01-13

## 2020-09-18 ENCOUNTER — PATIENT OUTREACH (OUTPATIENT)
Dept: ONCOLOGY | Facility: CLINIC | Age: 60
End: 2020-09-18

## 2020-09-18 NOTE — PROGRESS NOTES
Jonnathan called in to say he cut his hand on glass, needed stitches. Went in to get a tetanus shot. Called to see if there was anything else he needed to do for it. Writer told him to eye on the site and report any redness, drainage or fevers. He asked about the flu shot.     Get flu shot when able, once recovered the tetanus shot.     Has appointment with Doctor Adriana early next month with repeat labs prior.

## 2020-10-05 ENCOUNTER — TRANSFERRED RECORDS (OUTPATIENT)
Dept: HEALTH INFORMATION MANAGEMENT | Facility: CLINIC | Age: 60
End: 2020-10-05

## 2020-10-05 ENCOUNTER — TELEPHONE (OUTPATIENT)
Dept: ONCOLOGY | Facility: CLINIC | Age: 60
End: 2020-10-05

## 2020-10-05 NOTE — TELEPHONE ENCOUNTER
Received call from patient and spouse, he states [facility] needs ordered faxed to [phone]. Future orders from Dr. Wright (CMP, CBC with diff, LDH level) faxed per patient request.

## 2020-10-07 ENCOUNTER — VIRTUAL VISIT (OUTPATIENT)
Dept: ONCOLOGY | Facility: CLINIC | Age: 60
End: 2020-10-07
Attending: INTERNAL MEDICINE
Payer: COMMERCIAL

## 2020-10-07 ENCOUNTER — DOCUMENTATION ONLY (OUTPATIENT)
Dept: ONCOLOGY | Facility: CLINIC | Age: 60
End: 2020-10-07

## 2020-10-07 DIAGNOSIS — R53.83 FATIGUE, UNSPECIFIED TYPE: ICD-10-CM

## 2020-10-07 DIAGNOSIS — Z86.718 HISTORY OF DVT (DEEP VEIN THROMBOSIS): ICD-10-CM

## 2020-10-07 DIAGNOSIS — C83.78 BURKITT LYMPHOMA OF LYMPH NODES OF MULTIPLE REGIONS (H): Primary | ICD-10-CM

## 2020-10-07 PROCEDURE — 99214 OFFICE O/P EST MOD 30 MIN: CPT | Mod: 95 | Performed by: INTERNAL MEDICINE

## 2020-10-07 PROCEDURE — 999N001193 HC VIDEO/TELEPHONE VISIT; NO CHARGE

## 2020-10-07 NOTE — LETTER
"    10/7/2020         RE: Kobe Pedroza  [address]  [place] [facility] [address]        Dear Colleague,    Thank you for referring your patient, Kobe Pedroza, to the [facility]. Please see a copy of my visit note below.    Kobe Pedroza is a 60 year old male who is being evaluated via a billable video visit.      The patient has been notified of following:     \"This video visit will be conducted via a call between you and your physician/provider. We have found that certain health care needs can be provided without the need for an in-person physical exam.  This service lets us provide the care you need with a video conversation.  If a prescription is necessary we can send it directly to your pharmacy.  If lab work is needed we can place an order for that and you can then stop by our lab to have the test done at a later time.    Video visits are billed at different rates depending on your insurance coverage.  Please reach out to your insurance provider with any questions.    If during the course of the call the physician/provider feels a video visit is not appropriate, you will not be charged for this service.\"    Patient has given verbal consent for Video visit? Yes  How would you like to obtain your AVS? MyChart  If you are dropped from the video visit, the video invite should be resent to: Text to cell phone: [phone]  Will anyone else be joining your video visit? Yes: Wife, Mckayla . How would they like to receive their invitation? Text to cell phone: [phone]        I have reviewed and updated the patient's allergies and medication list. Patient was asked if they had any patient reported vital signs to present, if yes, please see documented vitals.  Patient was also asked for their current weight and height, if presented, documented in vitals.    Concerns: Patient states there are no new concerns to discuss with provider.  Dr Wright was not notified.    Refills: "     THEODORE Lomax visit, patient at home, providers at [facility]. Times at bottom.      REASON FOR VISIT:  Management of Burkitt lymphoma    HISTORY OF PRESENT ILLNESS:  Mr. Pedroza is a 60 year old man with a history of Burkitt lymphoma.  To summarize his course, he presented with acute on chronic back pain in 03/2020.  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  CSF was negative.  He was started on steroids followed by treatment with R-CODOX-M/IVAC and IT chemo prophylaxis on 3/18/2020.  His course was complicated by neutropenic sepsis that resolved and right lower extremity below the knee DVT identified on 4/30/2020, so he has been on therapeutic anticoagulation.  PET/CT after cycle 2 (1st R-IVAC) and again after completing treatment confirmed PET-negative complete response.  Visit for ongoing management.    He is with his wife, Mckayla.  In terms of energy, he continues to feel a little worn down. He seemed to be feeling better at first, but now it seems to be a little worse. He is still able to do most activities, but can only do it for a few hours. He can feel it a little in the back and chest area, may be related to surgery. No shortness of breath with exertion. He does feel some pressure in his chest when he inhaled. Doesn't limit him from doing certain things. Abdominal pain/discomfort is essentially resolved. Appetite are improving.  Weight stable.  No fevers, chills, or night sweats.  No headache, vision changes, focal weakness or sensory changes.  Dyspnea with exertion.  No cough or chest pain other than noted above.  Normal bowel function.  No urinary complaints.  No bleeding, bruising, or skin rashes aside from some dry skin.  No pain.  No lumps or bumps.  Overall, managing quite well.    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     REVIEW OF SYSTEMS:  A complete review of  systems was negative other than noted.    MEDICATIONS:  Current Outpatient Medications   Medication     acyclovir (ZOVIRAX) 200 MG capsule     dapsone (ACZONE) 100 MG tablet     levothyroxine (SYNTHROID/LEVOTHROID) 175 MCG tablet     acetaminophen (TYLENOL) 325 MG tablet     enoxaparin ANTICOAGULANT (LOVENOX) 100 MG/ML syringe     ondansetron (ZOFRAN) 4 MG tablet     potassium chloride (KLOR-CON) 20 MEQ packet     senna-docusate (SENOKOT-S/PERICOLACE) 8.6-50 MG tablet     No current facility-administered medications for this visit.      PHYSICAL EXAMINATION:  There were no vitals taken for this visit.  Wt Readings from Last 5 Encounters:   06/15/20 97.5 kg (215 lb)   05/27/20 98 kg (216 lb)   05/25/20 97.1 kg (214 lb 1.6 oz)   05/20/20 95.3 kg (210 lb)   05/12/20 99.4 kg (219 lb 3.2 oz)     General: Well appearing.  HEENT: Sclerae anicteric.  Lungs: Breathing comfortably. No cough.  MSK: Grossly normal movement.  Neuro: Grossly non-focal.  Skin/access: Normal skin tone.  Psych: Alert and oriented. No distress.  Performance status: ECOG 0    The rest of a comprehensive physical examination is deferred due to PHE (public health emergency) video visit restrictions    LAB DATA:  Recent Labs   Lab Test 08/26/20 06/29/20  1212 06/18/20   WBC 2.2* 6.1 6.1   HGB 12.7* 10.4* 8.7*    260 250   ANEU 1.34* 4.4 4.22   ALYM 0.31* 0.5* 0.36*     Recent Labs   Lab Test 06/29/20  1212 06/18/20 06/15/20  1335 05/26/20  0355 05/26/20  0355 05/25/20  0400 05/22/20  0421 05/22/20  0421 03/17/20  0556 03/17/20  0556    137 139   < > 138 140   < > 140   < > 139   POTASSIUM 4.2 4.5 3.5   < > 4.0 3.7   < > 3.8   < > 4.0   CHLORIDE 108 103 105   < > 112* 112*   < > 108   < > 107   CO2 28 25 30   < > 23 24   < > 24   < > 26   BUN 15 20* 13   < > 16 15   < > 12   < > 25   CR 0.88 0.86 0.88   < > 0.72 0.70   < > 0.75   < > 0.71   RUTH 8.8 8.8 8.1*   < > 7.3* 7.4*   < > 7.8*   < > 7.2*   MAG  --   --  2.3  --   --  2.3  --  2.0   < >  2.1   PHOS  --   --  3.6  --  2.3* 2.1*   < > 2.5   < > 4.1   URIC  --   --  4.8  --  3.2* 3.1*   < > 6.5   < > 3.7   *  --  304*  --  226 220   < >  --    < >  --    [id number]  --   --   --   --   --   --   --   --   --  2.0    < > = values in this interval not displayed.     Recent Labs   Lab Test 06/29/20  1212 06/18/20 06/15/20  1335 05/26/20  0355 05/26/20  0355 05/25/20  0400   AST 31 67* 51*   < >  --  18   ALT 85* 164* 85*   < >  --  51   ALKPHOS 65 73 73   < >  --  78   BILITOTAL 0.5 0.6 0.4   < >  --  0.3   INR  --   --  1.04  --  1.03 1.11    < > = values in this interval not displayed.       Reviewed outside labs from [facility] 10/5/20 - WBC 3.3 ANC 2.0 Hb 12.4 plts 191. CMP is stable. LDH is 270.    IMAGING DATA:  6/29/2020 PET/CT scan reviewed by me shows no evidence of lymphoma consistent with complete response and otherwise unremarkable.    IMPRESSION AND PLAN:  Mr. Pedroza is a 60 year old man with a history of Burkitt lymphoma.    Blood counts recovering and there is nothing to suggest recurrent lymphoma.  We again reviewed plans for ongoing monitoring every 3 months for the first 2 years and then every 6 months to 5 years post treatment if things continue to go smoothly.  We also reviewed that the available data do not support routine scans and we will not repeat imaging unless clinically indicated.    For his fatigue, we discussed cancer rehabiltation, including physical therapy, and he prefers something close to home.  We discussed that we would recommend avoiding situations where he could be exposed to COVID or other viral infections and can provide any supportive documentation to his work as needed.    He has no active infections.  He will continue acyclovir and dapsone until his CD4 T cell count has recovered to > 200/mcL.  We will discuss pneumococcal and Shingrix vaccines at his next visit pending his CD4 count. He got his tetanus shot 9/16 with a hand injury.  Will get his flu  shot in the next couple of weeks.      He completed apixaban for 3 months of anticoagulation (until 08/2020) for lymphoma-related DVT.  We will encourage him to work with his primary care doctor Dr. Arriaga for his general health issues.  He will continue to see his endocrinologist Dr. Dunaway for his thyroid replacement and had his thyroid medication lowered a little bit.    We will arrange another visit in about 3 months with labs.  I reminded him to contact if questions, concerns, or new issues arise between visits.    Fercho Scruggs MD  Hematology/Oncology Fellow    Video visit start time: 8:37 AM  Video visit end time: 9:06 AM  Video visit duration: 29 minutes    ATTENDING ADDENDUM:  I was present for the entire virtual visit.  I have reviewed the vital signs, medications, labs, and imaging results independently, and have discussed the patient and plan with the fellow, and agree with the findings and plan outlined in this note.  The impression and plan were jointly made.    Ever Wright MD, PhD  Attending Physician, [facility]   of Medicine, [facility]  Division of Hematology, Oncology, and Transplantation  [phone] [facility]

## 2020-10-07 NOTE — PROGRESS NOTES
"Kobe Pedroza is a 60 year old male who is being evaluated via a billable video visit.      The patient has been notified of following:     \"This video visit will be conducted via a call between you and your physician/provider. We have found that certain health care needs can be provided without the need for an in-person physical exam.  This service lets us provide the care you need with a video conversation.  If a prescription is necessary we can send it directly to your pharmacy.  If lab work is needed we can place an order for that and you can then stop by our lab to have the test done at a later time.    Video visits are billed at different rates depending on your insurance coverage.  Please reach out to your insurance provider with any questions.    If during the course of the call the physician/provider feels a video visit is not appropriate, you will not be charged for this service.\"    Patient has given verbal consent for Video visit? Yes  How would you like to obtain your AVS? MyChart  If you are dropped from the video visit, the video invite should be resent to: Text to cell phone: [phone]  Will anyone else be joining your video visit? Yes: Wife, Mckayla . How would they like to receive their invitation? Text to cell phone: [phone]        I have reviewed and updated the patient's allergies and medication list. Patient was asked if they had any patient reported vital signs to present, if yes, please see documented vitals.  Patient was also asked for their current weight and height, if presented, documented in vitals.    Concerns: Patient states there are no new concerns to discuss with provider.  Dr Wright was not notified.    Refills:     THEODORE Lomax visit, patient at home, providers at [facility]. Times at bottom.      REASON FOR VISIT:  Management of Burkitt lymphoma    HISTORY OF PRESENT ILLNESS:  Mr. Pedroza is a 60 year old man with a history of Burkitt lymphoma.  To " summarize his course, he presented with acute on chronic back pain in 03/2020.  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  CSF was negative.  He was started on steroids followed by treatment with R-CODOX-M/IVAC and IT chemo prophylaxis on 3/18/2020.  His course was complicated by neutropenic sepsis that resolved and right lower extremity below the knee DVT identified on 4/30/2020, so he has been on therapeutic anticoagulation.  PET/CT after cycle 2 (1st R-IVAC) and again after completing treatment confirmed PET-negative complete response.  Visit for ongoing management.    He is with his wife, Mckayla.  In terms of energy, he continues to feel a little worn down. He seemed to be feeling better at first, but now it seems to be a little worse. He is still able to do most activities, but can only do it for a few hours. He can feel it a little in the back and chest area, may be related to surgery. No shortness of breath with exertion. He does feel some pressure in his chest when he inhaled. Doesn't limit him from doing certain things. Abdominal pain/discomfort is essentially resolved. Appetite are improving.  Weight stable.  No fevers, chills, or night sweats.  No headache, vision changes, focal weakness or sensory changes.  Dyspnea with exertion.  No cough or chest pain other than noted above.  Normal bowel function.  No urinary complaints.  No bleeding, bruising, or skin rashes aside from some dry skin.  No pain.  No lumps or bumps.  Overall, managing quite well.    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     REVIEW OF SYSTEMS:  A complete review of systems was negative other than noted.    MEDICATIONS:  Current Outpatient Medications   Medication     acyclovir (ZOVIRAX) 200 MG capsule     dapsone (ACZONE) 100 MG tablet     levothyroxine (SYNTHROID/LEVOTHROID) 175 MCG tablet     acetaminophen (TYLENOL)  325 MG tablet     enoxaparin ANTICOAGULANT (LOVENOX) 100 MG/ML syringe     ondansetron (ZOFRAN) 4 MG tablet     potassium chloride (KLOR-CON) 20 MEQ packet     senna-docusate (SENOKOT-S/PERICOLACE) 8.6-50 MG tablet     No current facility-administered medications for this visit.      PHYSICAL EXAMINATION:  There were no vitals taken for this visit.  Wt Readings from Last 5 Encounters:   06/15/20 97.5 kg (215 lb)   05/27/20 98 kg (216 lb)   05/25/20 97.1 kg (214 lb 1.6 oz)   05/20/20 95.3 kg (210 lb)   05/12/20 99.4 kg (219 lb 3.2 oz)     General: Well appearing.  HEENT: Sclerae anicteric.  Lungs: Breathing comfortably. No cough.  MSK: Grossly normal movement.  Neuro: Grossly non-focal.  Skin/access: Normal skin tone.  Psych: Alert and oriented. No distress.  Performance status: ECOG 0    The rest of a comprehensive physical examination is deferred due to PHE (public health emergency) video visit restrictions    LAB DATA:  Recent Labs   Lab Test 08/26/20 06/29/20  1212 06/18/20   WBC 2.2* 6.1 6.1   HGB 12.7* 10.4* 8.7*    260 250   ANEU 1.34* 4.4 4.22   ALYM 0.31* 0.5* 0.36*     Recent Labs   Lab Test 06/29/20  1212 06/18/20 06/15/20  1335 05/26/20  0355 05/26/20  0355 05/25/20  0400 05/22/20  0421 05/22/20  0421 03/17/20  0556 03/17/20  0556    137 139   < > 138 140   < > 140   < > 139   POTASSIUM 4.2 4.5 3.5   < > 4.0 3.7   < > 3.8   < > 4.0   CHLORIDE 108 103 105   < > 112* 112*   < > 108   < > 107   CO2 28 25 30   < > 23 24   < > 24   < > 26   BUN 15 20* 13   < > 16 15   < > 12   < > 25   CR 0.88 0.86 0.88   < > 0.72 0.70   < > 0.75   < > 0.71   RUTH 8.8 8.8 8.1*   < > 7.3* 7.4*   < > 7.8*   < > 7.2*   MAG  --   --  2.3  --   --  2.3  --  2.0   < > 2.1   PHOS  --   --  3.6  --  2.3* 2.1*   < > 2.5   < > 4.1   URIC  --   --  4.8  --  3.2* 3.1*   < > 6.5   < > 3.7   *  --  304*  --  226 220   < >  --    < >  --    [id number]  --   --   --   --   --   --   --   --   --  2.0    < > = values in this  interval not displayed.     Recent Labs   Lab Test 06/29/20  1212 06/18/20 06/15/20  1335 05/26/20  0355 05/26/20  0355 05/25/20  0400   AST 31 67* 51*   < >  --  18   ALT 85* 164* 85*   < >  --  51   ALKPHOS 65 73 73   < >  --  78   BILITOTAL 0.5 0.6 0.4   < >  --  0.3   INR  --   --  1.04  --  1.03 1.11    < > = values in this interval not displayed.       Reviewed outside labs from [facility] 10/5/20 - WBC 3.3 ANC 2.0 Hb 12.4 plts 191. CMP is stable. LDH is 270.    IMAGING DATA:  6/29/2020 PET/CT scan reviewed by me shows no evidence of lymphoma consistent with complete response and otherwise unremarkable.    IMPRESSION AND PLAN:  Mr. Pedroza is a 60 year old man with a history of Burkitt lymphoma.    Blood counts recovering and there is nothing to suggest recurrent lymphoma.  We again reviewed plans for ongoing monitoring every 3 months for the first 2 years and then every 6 months to 5 years post treatment if things continue to go smoothly.  We also reviewed that the available data do not support routine scans and we will not repeat imaging unless clinically indicated.    For his fatigue, we discussed cancer rehabiltation, including physical therapy, and he prefers something close to home.  We discussed that we would recommend avoiding situations where he could be exposed to COVID or other viral infections and can provide any supportive documentation to his work as needed.    He has no active infections.  He will continue acyclovir and dapsone until his CD4 T cell count has recovered to > 200/mcL.  We will discuss pneumococcal and Shingrix vaccines at his next visit pending his CD4 count. He got his tetanus shot 9/16 with a hand injury.  Will get his flu shot in the next couple of weeks.      He completed apixaban for 3 months of anticoagulation (until 08/2020) for lymphoma-related DVT.  We will encourage him to work with his primary care doctor Dr. Arriaga for his general health issues.  He will continue to  see his endocrinologist Dr. Dunaway for his thyroid replacement and had his thyroid medication lowered a little bit.    We will arrange another visit in about 3 months with labs.  I reminded him to contact if questions, concerns, or new issues arise between visits.    Fercho Scruggs MD  Hematology/Oncology Fellow    Video visit start time: 8:37 AM  Video visit end time: 9:06 AM  Video visit duration: 29 minutes    ATTENDING ADDENDUM:  I was present for the entire virtual visit.  I have reviewed the vital signs, medications, labs, and imaging results independently, and have discussed the patient and plan with the fellow, and agree with the findings and plan outlined in this note.  The impression and plan were jointly made.    Ever Wright MD, PhD  Attending Physician, [facility]   of Medicine, [facility]  Division of Hematology, Oncology, and Transplantation  [phone] [facility]

## 2020-10-07 NOTE — LETTER
"    10/7/2020         RE: Kobe Pedroza  [id number] [facility] [address]        Dear Colleague,    Thank you for referring your patient, Kobe Pedroza, to the [facility]. Please see a copy of my visit note below.    Kobe Pedroza is a 60 year old male who is being evaluated via a billable video visit.      The patient has been notified of following:     \"This video visit will be conducted via a call between you and your physician/provider. We have found that certain health care needs can be provided without the need for an in-person physical exam.  This service lets us provide the care you need with a video conversation.  If a prescription is necessary we can send it directly to your pharmacy.  If lab work is needed we can place an order for that and you can then stop by our lab to have the test done at a later time.    Video visits are billed at different rates depending on your insurance coverage.  Please reach out to your insurance provider with any questions.    If during the course of the call the physician/provider feels a video visit is not appropriate, you will not be charged for this service.\"    Patient has given verbal consent for Video visit? Yes  How would you like to obtain your AVS? MyChart  If you are dropped from the video visit, the video invite should be resent to: Text to cell phone: [phone]  Will anyone else be joining your video visit? Yes: Wife, Mckayla . How would they like to receive their invitation? Text to cell phone: [phone]        I have reviewed and updated the patient's allergies and medication list. Patient was asked if they had any patient reported vital signs to present, if yes, please see documented vitals.  Patient was also asked for their current weight and height, if presented, documented in vitals.    Concerns: Patient states there are no new concerns to discuss with provider.  Dr Wright was not notified.    Refills: "     THEODORE Lomax visit, patient at home, providers at [facility]. Times at bottom.      REASON FOR VISIT:  Management of Burkitt lymphoma    HISTORY OF PRESENT ILLNESS:  Mr. Pedroza is a 60 year old man with a history of Burkitt lymphoma.  To summarize his course, he presented with acute on chronic back pain in 03/2020.  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  CSF was negative.  He was started on steroids followed by treatment with R-CODOX-M/IVAC and IT chemo prophylaxis on 3/18/2020.  His course was complicated by neutropenic sepsis that resolved and right lower extremity below the knee DVT identified on 4/30/2020, so he has been on therapeutic anticoagulation.  PET/CT after cycle 2 (1st R-IVAC) and again after completing treatment confirmed PET-negative complete response.  Visit for ongoing management.    He is with his wife, Mckayla.  In terms of energy, he continues to feel a little worn down. He seemed to be feeling better at first, but now it seems to be a little worse. He is still able to do most activities, but can only do it for a few hours. He can feel it a little in the back and chest area, may be related to surgery. No shortness of breath with exertion. He does feel some pressure in his chest when he inhaled. Doesn't limit him from doing certain things. Abdominal pain/discomfort is essentially resolved. Appetite are improving.  Weight stable.  No fevers, chills, or night sweats.  No headache, vision changes, focal weakness or sensory changes.  Dyspnea with exertion.  No cough or chest pain other than noted above.  Normal bowel function.  No urinary complaints.  No bleeding, bruising, or skin rashes aside from some dry skin.  No pain.  No lumps or bumps.  Overall, managing quite well.    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     REVIEW OF SYSTEMS:  A complete review of  systems was negative other than noted.    MEDICATIONS:  Current Outpatient Medications   Medication     acyclovir (ZOVIRAX) 200 MG capsule     dapsone (ACZONE) 100 MG tablet     levothyroxine (SYNTHROID/LEVOTHROID) 175 MCG tablet     acetaminophen (TYLENOL) 325 MG tablet     enoxaparin ANTICOAGULANT (LOVENOX) 100 MG/ML syringe     ondansetron (ZOFRAN) 4 MG tablet     potassium chloride (KLOR-CON) 20 MEQ packet     senna-docusate (SENOKOT-S/PERICOLACE) 8.6-50 MG tablet     No current facility-administered medications for this visit.      PHYSICAL EXAMINATION:  There were no vitals taken for this visit.  Wt Readings from Last 5 Encounters:   06/15/20 97.5 kg (215 lb)   05/27/20 98 kg (216 lb)   05/25/20 97.1 kg (214 lb 1.6 oz)   05/20/20 95.3 kg (210 lb)   05/12/20 99.4 kg (219 lb 3.2 oz)     General: Well appearing.  HEENT: Sclerae anicteric.  Lungs: Breathing comfortably. No cough.  MSK: Grossly normal movement.  Neuro: Grossly non-focal.  Skin/access: Normal skin tone.  Psych: Alert and oriented. No distress.  Performance status: ECOG 0    The rest of a comprehensive physical examination is deferred due to PHE (public health emergency) video visit restrictions    LAB DATA:  Recent Labs   Lab Test 08/26/20 06/29/20  1212 06/18/20   WBC 2.2* 6.1 6.1   HGB 12.7* 10.4* 8.7*    260 250   ANEU 1.34* 4.4 4.22   ALYM 0.31* 0.5* 0.36*     Recent Labs   Lab Test 06/29/20  1212 06/18/20 06/15/20  1335 05/26/20  0355 05/26/20  0355 05/25/20  0400 05/22/20  0421 05/22/20  0421 03/17/20  0556 03/17/20  0556    137 139   < > 138 140   < > 140   < > 139   POTASSIUM 4.2 4.5 3.5   < > 4.0 3.7   < > 3.8   < > 4.0   CHLORIDE 108 103 105   < > 112* 112*   < > 108   < > 107   CO2 28 25 30   < > 23 24   < > 24   < > 26   BUN 15 20* 13   < > 16 15   < > 12   < > 25   CR 0.88 0.86 0.88   < > 0.72 0.70   < > 0.75   < > 0.71   RUTH 8.8 8.8 8.1*   < > 7.3* 7.4*   < > 7.8*   < > 7.2*   MAG  --   --  2.3  --   --  2.3  --  2.0   < >  2.1   PHOS  --   --  3.6  --  2.3* 2.1*   < > 2.5   < > 4.1   URIC  --   --  4.8  --  3.2* 3.1*   < > 6.5   < > 3.7   *  --  304*  --  226 220   < >  --    < >  --    [id number]  --   --   --   --   --   --   --   --   --  2.0    < > = values in this interval not displayed.     Recent Labs   Lab Test 06/29/20  1212 06/18/20 06/15/20  1335 05/26/20  0355 05/26/20  0355 05/25/20  0400   AST 31 67* 51*   < >  --  18   ALT 85* 164* 85*   < >  --  51   ALKPHOS 65 73 73   < >  --  78   BILITOTAL 0.5 0.6 0.4   < >  --  0.3   INR  --   --  1.04  --  1.03 1.11    < > = values in this interval not displayed.       Reviewed outside labs from [facility] 10/5/20 - WBC 3.3 ANC 2.0 Hb 12.4 plts 191. CMP is stable. LDH is 270.    IMAGING DATA:  6/29/2020 PET/CT scan reviewed by me shows no evidence of lymphoma consistent with complete response and otherwise unremarkable.    IMPRESSION AND PLAN:  Mr. Pedroza is a 60 year old man with a history of Burkitt lymphoma.    Blood counts recovering and there is nothing to suggest recurrent lymphoma.  We again reviewed plans for ongoing monitoring every 3 months for the first 2 years and then every 6 months to 5 years post treatment if things continue to go smoothly.  We also reviewed that the available data do not support routine scans and we will not repeat imaging unless clinically indicated.    For his fatigue, we discussed cancer rehabiltation, including physical therapy, and he prefers something close to home.  We discussed that we would recommend avoiding situations where he could be exposed to COVID or other viral infections and can provide any supportive documentation to his work as needed.    He has no active infections.  He will continue acyclovir and dapsone until his CD4 T cell count has recovered to > 200/mcL.  We will discuss pneumococcal and Shingrix vaccines at his next visit pending his CD4 count. He got his tetanus shot 9/16 with a hand injury.  Will get his flu  shot in the next couple of weeks.      He completed apixaban for 3 months of anticoagulation (until 08/2020) for lymphoma-related DVT.  We will encourage him to work with his primary care doctor Dr. Arriaga for his general health issues.  He will continue to see his endocrinologist Dr. Dunaway for his thyroid replacement and had his thyroid medication lowered a little bit.    We will arrange another visit in about 3 months with labs.  I reminded him to contact if questions, concerns, or new issues arise between visits.    Fercho Scruggs MD  Hematology/Oncology Fellow    Video visit start time: 8:37 AM  Video visit end time: 9:06 AM  Video visit duration: 29 minutes    ATTENDING ADDENDUM:  I was present for the entire virtual visit.  I have reviewed the vital signs, medications, labs, and imaging results independently, and have discussed the patient and plan with the fellow, and agree with the findings and plan outlined in this note.  The impression and plan were jointly made.    Ever Wright MD, PhD  Attending Physician, [facility]   of Medicine, [facility]  Division of Hematology, Oncology, and Transplantation  [phone] clinic        Again, thank you for allowing me to participate in the care of your patient.        Sincerely,        Ever Wright MD

## 2020-10-08 PROBLEM — Z86.718 HISTORY OF DVT (DEEP VEIN THROMBOSIS): Status: ACTIVE | Noted: 2020-06-30

## 2020-10-08 PROBLEM — T45.1X5A CHEMOTHERAPY-INDUCED NEUTROPENIA (H): Status: RESOLVED | Noted: 2020-04-14 | Resolved: 2020-10-08

## 2020-10-08 PROBLEM — R53.83 FATIGUE: Status: RESOLVED | Noted: 2020-10-08 | Resolved: 2020-10-08

## 2020-10-08 PROBLEM — R53.83 FATIGUE: Status: ACTIVE | Noted: 2020-10-08

## 2020-10-08 PROBLEM — D70.1 CHEMOTHERAPY-INDUCED NEUTROPENIA (H): Status: RESOLVED | Noted: 2020-04-14 | Resolved: 2020-10-08

## 2020-10-21 ENCOUNTER — DOCUMENTATION ONLY (OUTPATIENT)
Dept: ONCOLOGY | Facility: CLINIC | Age: 60
End: 2020-10-21

## 2020-10-21 NOTE — PROGRESS NOTES
STD forms received via fax from patient.      Forms to be completed and put in folder for provider to approve.    Fax #:  [phone]   Claim: [id number]    Gayle Springer CMA ([facility])

## 2020-10-21 NOTE — PROGRESS NOTES
STD forms filled out and put in providers folder for review and signature.      Gayle Springer CMA ([facility])

## 2020-10-29 NOTE — PROGRESS NOTES
STD paperwork completed, checked for accuracy, signed and faxed to [facility] [employer] @ [phone]. A copy was made, sent to scanning and original mailed to patient at home address.    Successful transmission verified in Right Fax.      Gayle Springer CMA

## 2020-10-30 ENCOUNTER — PATIENT OUTREACH (OUTPATIENT)
Dept: ONCOLOGY | Facility: CLINIC | Age: 60
End: 2020-10-30

## 2020-10-30 DIAGNOSIS — C83.78 BURKITT LYMPHOMA OF LYMPH NODES OF MULTIPLE REGIONS (H): Primary | ICD-10-CM

## 2020-10-30 NOTE — PROGRESS NOTES
"Kobe Pedroza called with med information. His question was how long on his Dapsone. Will continue this until his CD4 level is > 200.     He continues to complain of chest pain. States it is not his heart. Says it is not new. Feel like a \"strap around his chest\", like pulled muscles. Been there a long time. Wants repeat labs when at [facility] on 11/4.    11/2/2020 States the pain today is \"much better\". Does want repeat labs this week still. Went from almost daily to so far apart so would feel better.     He did a bland diet this weekend and does not know if what helped. He also said his tongue was \"white\" and now that is back to normal. Maybe he had something else. Did talk to him about him still being high risk for the COVID virus and he should be taking precautions. Denied any fevers.    He will make an appointment with his PCP to get checked out for his pain plus he realizes other things he has put on the \"back burner\". If the pain does not get better writer told him we could do a MRI of the back  where he originally had the cancer to see if there is any disc/nerve damage causing the pain.     He also asked about a PET. Writer told him we would not do this unless there was reason for it, which is good. Also when he was first diagnosis they thought it was gallbladder. I told him this would also be something he could ask his PCP.     Writer will watch for labs.     "

## 2020-11-04 ENCOUNTER — HOSPITAL ENCOUNTER (OUTPATIENT)
Dept: PHYSICAL THERAPY | Facility: CLINIC | Age: 60
Setting detail: THERAPIES SERIES
End: 2020-11-04
Attending: FAMILY MEDICINE
Payer: COMMERCIAL

## 2020-11-04 ENCOUNTER — HOSPITAL ENCOUNTER (OUTPATIENT)
Dept: LAB | Facility: CLINIC | Age: 60
Discharge: HOME | End: 2020-11-04
Admitting: INTERNAL MEDICINE
Payer: COMMERCIAL

## 2020-11-04 DIAGNOSIS — C83.78 BURKITT LYMPHOMA OF LYMPH NODES OF MULTIPLE REGIONS (H): ICD-10-CM

## 2020-11-04 LAB
BASOPHILS # BLD AUTO: 0 10E9/L (ref 0–0.2)
BASOPHILS NFR BLD AUTO: 0.4 %
DIFFERENTIAL METHOD BLD: ABNORMAL
EOSINOPHIL # BLD AUTO: 0.1 10E9/L (ref 0–0.7)
EOSINOPHIL NFR BLD AUTO: 1.2 %
ERYTHROCYTE [DISTWIDTH] IN BLOOD BY AUTOMATED COUNT: 13.7 % (ref 10–15)
HCT VFR BLD AUTO: 38 % (ref 40–53)
HGB BLD-MCNC: 13.1 G/DL (ref 13.3–17.7)
IMM GRANULOCYTES # BLD: 0 10E9/L (ref 0–0.4)
IMM GRANULOCYTES NFR BLD: 0.3 %
LYMPHOCYTES # BLD AUTO: 0.5 10E9/L (ref 0.8–5.3)
LYMPHOCYTES NFR BLD AUTO: 7.6 %
MCH RBC QN AUTO: 33.4 PG (ref 26.5–33)
MCHC RBC AUTO-ENTMCNC: 34.5 G/DL (ref 31.5–36.5)
MCV RBC AUTO: 97 FL (ref 78–100)
MONOCYTES # BLD AUTO: 0.9 10E9/L (ref 0–1.3)
MONOCYTES NFR BLD AUTO: 13.1 %
NEUTROPHILS # BLD AUTO: 5.3 10E9/L (ref 1.6–8.3)
NEUTROPHILS NFR BLD AUTO: 77.4 %
NRBC # BLD AUTO: 0 10*3/UL
NRBC BLD AUTO-RTO: 0 /100
PLATELET # BLD AUTO: 198 10E9/L (ref 150–450)
RBC # BLD AUTO: 3.92 10E12/L (ref 4.4–5.9)
WBC # BLD AUTO: 6.8 10E9/L (ref 4–11)

## 2020-11-04 PROCEDURE — 36415 COLL VENOUS BLD VENIPUNCTURE: CPT | Performed by: INTERNAL MEDICINE

## 2020-11-04 PROCEDURE — 97162 PT EVAL MOD COMPLEX 30 MIN: CPT | Mod: GP | Performed by: PHYSICAL THERAPIST

## 2020-11-04 PROCEDURE — 97110 THERAPEUTIC EXERCISES: CPT | Mod: GP | Performed by: PHYSICAL THERAPIST

## 2020-11-04 PROCEDURE — 85025 COMPLETE CBC W/AUTO DIFF WBC: CPT | Performed by: INTERNAL MEDICINE

## 2020-11-04 ASSESSMENT — 6 MINUTE WALK TEST (6MWT)
TOTAL DISTANCE WALKED (METERS): 529
TOTAL DISTANCE WALKED (FT): 1735

## 2020-11-04 NOTE — PROGRESS NOTES
11/04/20 1001   Quick Adds   Type of Visit Initial OP PT Evaluation   General Information   Start of Care Date 11/04/20   Referring Physician Fercho Scruggs MD    Orders Evaluate and Treat as Indicated   Order Date 10/07/20   Medical Diagnosis 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) C83.78   Onset of illness/injury or Date of Surgery 10/07/20  (date of order used)   Precautions/Limitations no known precautions/limitations   Special Instructions recent Burkitt lymphoma, continues to have some back pain and fatigue   Surgical/Medical history reviewed Yes   Pertinent history of current problem (include personal factors and/or comorbidities that impact the POC) Pt presents to PT for Cancer Rehab.  Initially presented to physician with acute on chronic back pain in 03/2020, workup revealed stage IV Burkitt Lymphoma with T5-6 mass and cord compression.  Underwent T5-6 laminectomy 3/15/20.  He underwent chemotherapy treatment, complicated by neutropenic sepsis as well as R LE DVT.  Ever since treatment, he has felt fatigued and unable to tolerate his normal typical hobbies/activities, chest feels tight all the time, back pain has improved overall.   Prior level of function comment Enjoys outdoor yardwork/projects at baseline, enjoys fishing, has cabin up near [place].  Has not returned to work at [employer].  Recently, walking ~1x/wk x 2-3 miles.   Current Community Support   (spouse)   Patient role/Employment history Employed  (on [employer]IDA due to cancer treatments)   Living environment House/[facility]   Home/Community Accessibility Comments multi level home and cabin, stairs, denies any difficulty managing home setup   Assistive Devices Comments none   Patient/Family Goals Statement Improve his endurance and instruct in appropriate progressions/intensity to build his strength with goal to return to work into 2021   Fall Risk Screen   Fall screen completed by PT   Have you fallen 2 or more times in  the past year? No   Have you fallen and had an injury in the past year? No   Is patient a fall risk? No   Fall screen comments low fall risk per gait and balance assessment   Abuse Screen (yes response referral indicated)   Feels Unsafe at Home or Work/School no   Feels Threatened by Someone no   Does Anyone Try to Keep You From Having Contact with Others or Doing Things Outside Your Home? no   Physical Signs of Abuse Present no   System Outcome Measures   Outcome Measures Cancer Rehab   FACIT Fatigue Subscale (score out of 52). The higher the score, the better the QOL. 37.56   Six Minute Walk (meters). An increase of 70 or more meters indicates statistically significant change. 529   Pain   Patient currently in pain No   Pain comments general tightness across the front of his chest, feels it is due to post op changes from back surgery.  Denies much back pain currently.   Cognitive Status Examination   Orientation orientation to person, place and time   Integumentary   Integumentary No deficits were identified   Integumentary Comments well healed scar thoracic spine   Posture   Posture Normal   Range of Motion (ROM)   ROM Comment WFL all extremities. Pt feels tightness across front of chest with overhead reaching, some tightness reported about mid-back correlating with thoracic surgical site.   Strength   Strength Comments WFL and grossly symmetrical strength 5/5 throughout UEs and LEs.   Bed Mobility   Bed Mobility Comments Independent   Transfer Skills   Transfer Comments Independent, minimal use of hands   Gait   Gait Comments Amb with reciprocal gait pattern, no AD used, WFL gait speed and stability.   Gait Special Tests   Gait Special Tests SIX MINUTE WALK TEST   Gait Special Tests Six Minute Walk Test   Feet 1735 Feet   Comments Omni effort rated 4/10   Balance   Balance Comments WFL static standing balance and gait stability. Mild difficulty with EC on conforming surfaces, likely due to pre-existing  neuropathy.   Balance Special Tests   Balance Special Tests Modified CTSIB Conditions;Sit to stand reps;Single leg stance right;Single leg stance left   Balance Special Tests Single Leg Stance Right,   Right, seconds 8 Seconds   Comments multiple attempts needed   Balance Special Tests Single Leg Stance Left   Left, seconds 5 Seconds   Comments multiple attempts needed   Balance Special Tests Modified CTSIB Conditions   Condition 1, seconds 30 Seconds   Condition 2, seconds 30 Seconds   Condition 4, seconds 30 Seconds   Condition 5, seconds 30 Seconds  (mild/moderate sway)   Modified CTSIB Comments WFL testing, mild difficulty condition 5   Balance Special Tests Sit to Stand Reps in 30 Seconds   Reps in 30 seconds 14   Height 18   Comments norms for age > 14-19 reps   Sensory Examination   Sensory Perception Comments reports baseline neuropathy in feet, worsened with chemo   Coordination   Coordination no deficits were identified   Planned Therapy Interventions   Planned Therapy Interventions neuromuscular re-education;strengthening;stretching;ROM;joint mobilization;manual therapy   Clinical Impression   Criteria for Skilled Therapeutic Interventions Met yes, treatment indicated   PT Diagnosis Deconditioned status with fatigue   Influenced by the following impairments fatigue, deconditioning, mild higher level balance impairment, back/chest wall tightness with reduced mobility/ROM   Functional limitations due to impairments Reduced daily activity tolerance, recreational activities, work related tasks   Clinical Presentation Stable/Uncomplicated   Clinical Presentation Rationale improving status, complex medical hx, PLOF, currently out of work   Clinical Decision Making (Complexity) Moderate complexity   Therapy Frequency 1 time/week   Predicted Duration of Therapy Intervention (days/wks) 4 wks   Risk & Benefits of therapy have been explained Yes   Patient, Family & other staff in agreement with plan of care Yes    Clinical Impression Comments Patient demonstrates good functional strength and overall balance/gait stability. Does demonstrate/report a level of fatigue and lack of confidence with intensity progression for strengthening needed for anticipated return to work and resuming desired level of activity.  Anticipate pt will be able to take over an independent, progressive, HEP within a short amount of time.   Education Assessment   Barriers to Learning No barriers   GOALS   PT Eval Goals 1;2   Goal 1   Goal Identifier FACIT   Goal Description Pt will score > 40 on the Facit Fatigue Scale indicating significant improvement in subjective report of fatigue with daily activity and improved QOL.   Target Date 12/18/20   Goal 2   Goal Identifier HEP   Goal Description Pt will demo (I) with HEP addressing higher level strength/balance training as well as progressive exercise in prep for return to work and improved QOL.   Target Date 12/18/20   Total Evaluation Time   PT Kerline, Moderate Complexity Minutes (42054) 40

## 2020-11-05 ENCOUNTER — PATIENT OUTREACH (OUTPATIENT)
Dept: ONCOLOGY | Facility: CLINIC | Age: 60
End: 2020-11-05

## 2020-11-05 NOTE — PROGRESS NOTES
11/4/20 labs called and reviewed with Jonnathan. Continue to improve. He met with Cancer Rehab yesterday and will see his PCP next week. He continues to have the strapping discomfort at this chest. He said they had him do some stretching exercises for this. Possible due to his surgery they thought, per Jonnathan.     Writer will continue to follow.

## 2020-11-10 ENCOUNTER — HOSPITAL ENCOUNTER (OUTPATIENT)
Dept: PHYSICAL THERAPY | Facility: CLINIC | Age: 60
Setting detail: THERAPIES SERIES
End: 2020-11-10
Attending: INTERNAL MEDICINE
Payer: COMMERCIAL

## 2020-11-10 PROCEDURE — 97110 THERAPEUTIC EXERCISES: CPT | Mod: GP | Performed by: PHYSICAL THERAPIST

## 2020-11-18 ENCOUNTER — HOSPITAL ENCOUNTER (OUTPATIENT)
Dept: PHYSICAL THERAPY | Facility: CLINIC | Age: 60
Setting detail: THERAPIES SERIES
End: 2020-11-18
Attending: INTERNAL MEDICINE
Payer: COMMERCIAL

## 2020-11-18 PROCEDURE — 97110 THERAPEUTIC EXERCISES: CPT | Mod: GP | Performed by: PHYSICAL THERAPIST

## 2020-11-24 ENCOUNTER — HOSPITAL ENCOUNTER (OUTPATIENT)
Dept: PHYSICAL THERAPY | Facility: CLINIC | Age: 60
Setting detail: THERAPIES SERIES
End: 2020-11-24
Attending: INTERNAL MEDICINE
Payer: COMMERCIAL

## 2020-11-24 PROCEDURE — 97110 THERAPEUTIC EXERCISES: CPT | Mod: GP | Performed by: PHYSICAL THERAPIST

## 2020-11-29 ENCOUNTER — MYC REFILL (OUTPATIENT)
Dept: ONCOLOGY | Facility: CLINIC | Age: 60
End: 2020-11-29

## 2020-11-29 DIAGNOSIS — C83.78 BURKITT LYMPHOMA OF LYMPH NODES OF MULTIPLE REGIONS (H): ICD-10-CM

## 2020-11-30 RX ORDER — DAPSONE 100 MG/1
100 TABLET ORAL DAILY
Qty: 30 TABLET | Refills: 3 | Status: SHIPPED | OUTPATIENT
Start: 2020-11-30 | End: 2021-01-13

## 2020-12-01 ENCOUNTER — HOSPITAL ENCOUNTER (OUTPATIENT)
Dept: PHYSICAL THERAPY | Facility: CLINIC | Age: 60
Setting detail: THERAPIES SERIES
End: 2020-12-01
Attending: INTERNAL MEDICINE
Payer: COMMERCIAL

## 2020-12-01 PROCEDURE — 97110 THERAPEUTIC EXERCISES: CPT | Mod: GP | Performed by: PHYSICAL THERAPIST

## 2020-12-01 NOTE — PROGRESS NOTES
Outpatient Physical Therapy Discharge Note     Patient: Kobe Pedroza  : 1960    Beginning/End Dates of Reporting Period:  20 to 2020    Referring Provider: Dr. Scruggs    Therapy Diagnosis: Deconditioned status with fatigue     Client Self Report: Back pain moved to L lateral hip pain    Objective Measurements:                                            Goals:  Goal Identifier FACIT   Goal Description Pt will score > 40 on the Facit Fatigue Scale indicating significant improvement in subjective report of fatigue with daily activity and improved QOL.   Target Date 20   Date Met   20   Progress:     Goal Identifier HEP   Goal Description Pt will demo (I) with HEP addressing higher level strength/balance training as well as progressive exercise in prep for return to work and improved QOL.   Target Date 20   Date Met   20   Progress:     Progress:     Goal Identifier     Goal Description     Target Date     Date Met      Progress:     Goal Identifier     Goal Description     Target Date     Date Met      Progress:     Progress Toward Goals:   Progress this reporting period: Jonnathan has made nice improvements with his overall endurance. His FACIT score improved, is above the cut off use to indicate improved quality of life. He is independent with a home program to improve strength, mobility and reduce pain. He is riding his stationary bike regularly as well. Continues to struggle with L hip pain however has stretches to address.     Plan:  Discharge from therapy.    Discharge:    Reason for Discharge: Patient has met all goals.      Discharge Plan: Patient to continue home program.

## 2020-12-04 ENCOUNTER — PATIENT OUTREACH (OUTPATIENT)
Dept: ONCOLOGY | Facility: CLINIC | Age: 60
End: 2020-12-04

## 2020-12-04 NOTE — PROGRESS NOTES
"Writer returned Huey call to go over someone going symptoms of pain he has had with exercise. He has been doing cancer rehab and states the pain in his lower back and left hip has gotten worse with each session. Sitting makes it worse, walking makes it go away.    He also complaint of worsening neuropathy in his feet. Saw a chiropractor and he did some sort of heat test and said that it was not good. Jonnathan reports that the \"burning\" in his feet is worse since the chemo finished.    12/7/20-Left writer a message that his sat's are 92-94% which is worrisome to him.     12/8/2020-Writer called him to say that we are gong to bring him into the clinic to see a provider to be evaluated. He reports that he went to his PCP and they did an CXR and he was told is heart was \"large\". Just a lot of vague symptoms. Some new, some years old. Hard for writer to know how all are related.     When asked today what main two symptoms are bothersome to him. He states the pain as described above and the feet.     Sats low 90's, no other symptoms of respiratory, says they have been there before. Not sure what when he meant. Denies any COVID s/s.  "

## 2020-12-09 NOTE — PROGRESS NOTES
Oncology/Hematology Visit Note  Dec 10, 2020     Reason for Visit: Left hip pain and worsening neuropathy s/p treatment for Burkitt Lymphoma    History of Present Illness:   Mr. Pedroza is a 60 year old man with a history of Burkitt lymphoma.  To summarize his course, he presented with acute on chronic back pain in 03/2020.  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  CSF was negative.  He was started on steroids followed by treatment with R-CODOX-M/IVAC and IT chemo prophylaxis on 3/18/2020.  His course was complicated by neutropenic sepsis that resolved and right lower extremity below the knee DVT identified on 4/30/2020, so he has been on therapeutic anticoagulation.  PET/CT after cycle 2 (1st R-IVAC) and again after completing treatment confirmed PET-negative complete response.      Interval History:  Jonnathan started physical therapy one month ago as part of a cancer rehab program.  His goals were to help manage his fatigue and improve his stamina.  He reports that one month ago, the day after his second physical therapy session, he began having pain in his left hip/left lower back. He describes the pain as a deep pain along his belt line on the left side.  The pain worsens with sitting and is relieved with standing or lying down. Denies radiating pain, lower extremity weakness, or changes to the sensation in his left leg (he has baseline neuropathy in his feet bilaterally).  He has taken Aleve 3-4 times over the past month, but is unable to comment on whether he thought it helped or not. He has not tried any other pharmacologic or non-pharmacologic pain relief methods.    He also wishes to discuss the neuropathy in his feet bilaterally, which has continued to worsen over the past month.  He describes the sensation as alternating between numbness, tingling, and burning.  Denies tripping, stumbling, or  falling.  He had some sensation testing done by a chiropractor and was informed that his right foot is worse than his left, specifically with temperature sensation.  He notes sensitivity to cold in both feet.       He states that his oxygen saturation readings at PT have been 92-94% recently; he is concerned because he was consistently 96-97% throughout treatment. Denies cough, shortness of breath, or chest pain.    His weight is stable, no fevers, chills, or night sweats.  No headache, vision changes, focal weakness or sensory changes.  Normal bowel function.  No urinary complaints.  No bleeding, bruising, or skin rashes.  No lumps or bumps.          Review of Systems:  Patient denies fevers, chills, night sweats, unexplained weight changes, headaches, dizziness, vision or hearing changes, new lumps or bumps, chest pain, shortness of breath, cough, abdominal pain, nausea, vomiting, changes to bowel or bladder, swelling of extremities, bleeding issues, or rash.    Current Outpatient Medications   Medication Sig Dispense Refill     acyclovir (ZOVIRAX) 200 MG capsule Take 2 capsules (400 mg) by mouth 2 times daily 120 capsule 3     dapsone (ACZONE) 100 MG tablet Take 1 tablet (100 mg) by mouth daily 30 tablet 3     levothyroxine (SYNTHROID/LEVOTHROID) 175 MCG tablet Take 175 mcg by mouth daily          Physical Examination:  There were no vitals taken for this visit.  Wt Readings from Last 10 Encounters:   12/10/20 105.6 kg (232 lb 14.4 oz)   06/15/20 97.5 kg (215 lb)   05/27/20 98 kg (216 lb)   05/25/20 97.1 kg (214 lb 1.6 oz)   05/20/20 95.3 kg (210 lb)   05/12/20 99.4 kg (219 lb 3.2 oz)   05/11/20 98.9 kg (218 lb 1.6 oz)   05/04/20 98 kg (216 lb)   05/01/20 96 kg (211 lb 9.6 oz)   04/21/20 95.2 kg (209 lb 12.8 oz)     Constitutional: Well-appearing male in no acute distress.   Eyes: EOMI, PERRL. No scleral icterus.  ENT: Oral mucosa is moist without lesions or thrush.   Lymphatic: Neck is supple without cervical or  supraclavicular lymphadenopathy. No axillary lymphadenopathy.  Cardiovascular: Regular rate and rhythm. No murmurs, gallops, or rubs. No peripheral edema.  Respiratory: Clear to auscultation bilaterally. No wheezes or crackles.  Gastrointestinal: Bowel sounds present. Abdomen soft, non-tender. No palpable hepatosplenomegaly or masses.   Neurologic: Cranial nerves II through XII are grossly intact. DTRs +2 in bilateral lower extremity.  Strength 5/5 in all extremities. Normal gait.  Straight leg raise test negative on both sides.  Skin: No rashes, petechiae, or bruising noted on exposed skin.    Laboratory Data:  Results for POONAM CHOWDHURY (MRN [id number]) as of 12/10/2020 13:59   Ref. Range 12/10/2020 12:21   Sodium Latest Ref Range: 133 - 144 mmol/L 139   Potassium Latest Ref Range: 3.4 - 5.3 mmol/L 3.8   Chloride Latest Ref Range: 94 - 109 mmol/L 109   Carbon Dioxide Latest Ref Range: 20 - 32 mmol/L 27   Urea Nitrogen Latest Ref Range: 7 - 30 mg/dL 20   Creatinine Latest Ref Range: 0.66 - 1.25 mg/dL 0.93   GFR Estimate Latest Ref Range: >60 mL/min/1.73_m2 89   GFR Estimate If Black Latest Ref Range: >60 mL/min/1.73_m2 >90   Calcium Latest Ref Range: 8.5 - 10.1 mg/dL 8.6   Anion Gap Latest Ref Range: 3 - 14 mmol/L 4   Albumin Latest Ref Range: 3.4 - 5.0 g/dL 4.4   Protein Total Latest Ref Range: 6.8 - 8.8 g/dL 7.5   Bilirubin Total Latest Ref Range: 0.2 - 1.3 mg/dL 0.7   Alkaline Phosphatase Latest Ref Range: 40 - 150 U/L 89   ALT Latest Ref Range: 0 - 70 U/L 53   AST Latest Ref Range: 0 - 45 U/L 32   Lactate Dehydrogenase Latest Ref Range: 85 - 227 U/L 268 (H)   Glucose Latest Ref Range: 70 - 99 mg/dL 95   WBC Latest Ref Range: 4.0 - 11.0 10e9/L 5.7   Hemoglobin Latest Ref Range: 13.3 - 17.7 g/dL 12.9 (L)   Hematocrit Latest Ref Range: 40.0 - 53.0 % 37.8 (L)   Platelet Count Latest Ref Range: 150 - 450 10e9/L 201   RBC Count Latest Ref Range: 4.4 - 5.9 10e12/L 3.88 (L)   MCV Latest Ref Range: 78 - 100 fl 97    MCH Latest Ref Range: 26.5 - 33.0 pg 33.2 (H)   MCHC Latest Ref Range: 31.5 - 36.5 g/dL 34.1   RDW Latest Ref Range: 10.0 - 15.0 % 13.1   Diff Method Unknown Automated Method   % Neutrophils Latest Units: % 75.2   % Lymphocytes Latest Units: % 9.7   % Monocytes Latest Units: % 12.6   % Eosinophils Latest Units: % 1.4   % Basophils Latest Units: % 0.4   % Immature Granulocytes Latest Units: % 0.7   Nucleated RBCs Latest Ref Range: 0 /100 0   Absolute Neutrophil Latest Ref Range: 1.6 - 8.3 10e9/L 4.3   Absolute Lymphocytes Latest Ref Range: 0.8 - 5.3 10e9/L 0.6 (L)   Absolute Monocytes Latest Ref Range: 0.0 - 1.3 10e9/L 0.7   Absolute Eosinophils Latest Ref Range: 0.0 - 0.7 10e9/L 0.1   Absolute Basophils Latest Ref Range: 0.0 - 0.2 10e9/L 0.0   Abs Immature Granulocytes Latest Ref Range: 0 - 0.4 10e9/L 0.0   Absolute Nucleated RBC Unknown 0.0         Assessment and Plan:  Burkitt lymphoma.   - Blood counts are reassuring and there is nothing to suggest recurrent lymphoma.   - Continue ongoing monitoring every 3 months (due next month) for the first 2 years and then every 6 months to 5 years post treatment if things continue to go smoothly.  Previously reviewed that the available data do not support routine scans and we will not repeat imaging unless clinically indicated.   - Continue acyclovir and dapsone until his CD4 T cell count has recovered to > 200/mcL.  - Consider pneumococcal and Shingrix vaccines based on CD4 count at next visit  -He has received an influenza vaccine      Left hip pain  - Likely inflammatory in nature.  - Recommend ibuprofen 600 mg three times daily for five days.  Instructed him to take the medication with food.  - Okay to use heat or ice for comfort.    Neuropathy  - Discussed typical course of neuropathy, including slow onset and slow recovery.  - Recommend trying topical symptomatic treatment with capsicin cream    Health Maintenance  - Encouraged him to have a routine physical with  his PCP     DVT  H/o lymphoma-related DVT. He completed apixaban for 3 months of anticoagulation (until 08/2020)     Kobe Pedroza's questions were answered/addressed and he is aware to contact the clinic if any of the aforementioned symptoms worsen/change or if new concerning symptoms arise.     Edith Trejo, DNP/FNP Student    Micaela Snyder PA-C  [facility]  [address], [place] [id number] [phone]

## 2020-12-10 ENCOUNTER — APPOINTMENT (OUTPATIENT)
Dept: LAB | Facility: CLINIC | Age: 60
End: 2020-12-10
Attending: INTERNAL MEDICINE
Payer: COMMERCIAL

## 2020-12-10 ENCOUNTER — ONCOLOGY VISIT (OUTPATIENT)
Dept: ONCOLOGY | Facility: CLINIC | Age: 60
End: 2020-12-10
Attending: PHYSICIAN ASSISTANT
Payer: COMMERCIAL

## 2020-12-10 VITALS
HEART RATE: 76 BPM | SYSTOLIC BLOOD PRESSURE: 133 MMHG | TEMPERATURE: 97.7 F | BODY MASS INDEX: 33.8 KG/M2 | DIASTOLIC BLOOD PRESSURE: 81 MMHG | OXYGEN SATURATION: 94 % | RESPIRATION RATE: 18 BRPM | WEIGHT: 232.9 LBS

## 2020-12-10 DIAGNOSIS — C83.78 BURKITT LYMPHOMA OF LYMPH NODES OF MULTIPLE REGIONS (H): Primary | ICD-10-CM

## 2020-12-10 LAB
ALBUMIN SERPL-MCNC: 4.4 G/DL (ref 3.4–5)
ALP SERPL-CCNC: 89 U/L (ref 40–150)
ALT SERPL W P-5'-P-CCNC: 53 U/L (ref 0–70)
ANION GAP SERPL CALCULATED.3IONS-SCNC: 4 MMOL/L (ref 3–14)
AST SERPL W P-5'-P-CCNC: 32 U/L (ref 0–45)
BASOPHILS # BLD AUTO: 0 10E9/L (ref 0–0.2)
BASOPHILS NFR BLD AUTO: 0.4 %
BILIRUB SERPL-MCNC: 0.7 MG/DL (ref 0.2–1.3)
BUN SERPL-MCNC: 20 MG/DL (ref 7–30)
CALCIUM SERPL-MCNC: 8.6 MG/DL (ref 8.5–10.1)
CHLORIDE SERPL-SCNC: 109 MMOL/L (ref 94–109)
CO2 SERPL-SCNC: 27 MMOL/L (ref 20–32)
CREAT SERPL-MCNC: 0.93 MG/DL (ref 0.66–1.25)
DIFFERENTIAL METHOD BLD: ABNORMAL
EOSINOPHIL # BLD AUTO: 0.1 10E9/L (ref 0–0.7)
EOSINOPHIL NFR BLD AUTO: 1.4 %
ERYTHROCYTE [DISTWIDTH] IN BLOOD BY AUTOMATED COUNT: 13.1 % (ref 10–15)
GFR SERPL CREATININE-BSD FRML MDRD: 89 ML/MIN/{1.73_M2}
GLUCOSE SERPL-MCNC: 95 MG/DL (ref 70–99)
HCT VFR BLD AUTO: 37.8 % (ref 40–53)
HGB BLD-MCNC: 12.9 G/DL (ref 13.3–17.7)
IMM GRANULOCYTES # BLD: 0 10E9/L (ref 0–0.4)
IMM GRANULOCYTES NFR BLD: 0.7 %
LDH SERPL L TO P-CCNC: 268 U/L (ref 85–227)
LYMPHOCYTES # BLD AUTO: 0.6 10E9/L (ref 0.8–5.3)
LYMPHOCYTES NFR BLD AUTO: 9.7 %
MCH RBC QN AUTO: 33.2 PG (ref 26.5–33)
MCHC RBC AUTO-ENTMCNC: 34.1 G/DL (ref 31.5–36.5)
MCV RBC AUTO: 97 FL (ref 78–100)
MONOCYTES # BLD AUTO: 0.7 10E9/L (ref 0–1.3)
MONOCYTES NFR BLD AUTO: 12.6 %
NEUTROPHILS # BLD AUTO: 4.3 10E9/L (ref 1.6–8.3)
NEUTROPHILS NFR BLD AUTO: 75.2 %
NRBC # BLD AUTO: 0 10*3/UL
NRBC BLD AUTO-RTO: 0 /100
PLATELET # BLD AUTO: 201 10E9/L (ref 150–450)
POTASSIUM SERPL-SCNC: 3.8 MMOL/L (ref 3.4–5.3)
PROT SERPL-MCNC: 7.5 G/DL (ref 6.8–8.8)
RBC # BLD AUTO: 3.88 10E12/L (ref 4.4–5.9)
SODIUM SERPL-SCNC: 139 MMOL/L (ref 133–144)
WBC # BLD AUTO: 5.7 10E9/L (ref 4–11)

## 2020-12-10 PROCEDURE — 85025 COMPLETE CBC W/AUTO DIFF WBC: CPT | Performed by: PHYSICIAN ASSISTANT

## 2020-12-10 PROCEDURE — G0463 HOSPITAL OUTPT CLINIC VISIT: HCPCS

## 2020-12-10 PROCEDURE — 99214 OFFICE O/P EST MOD 30 MIN: CPT | Performed by: PHYSICIAN ASSISTANT

## 2020-12-10 PROCEDURE — 36415 COLL VENOUS BLD VENIPUNCTURE: CPT

## 2020-12-10 PROCEDURE — 80053 COMPREHEN METABOLIC PANEL: CPT | Performed by: PHYSICIAN ASSISTANT

## 2020-12-10 PROCEDURE — 83615 LACTATE (LD) (LDH) ENZYME: CPT | Performed by: PHYSICIAN ASSISTANT

## 2020-12-10 ASSESSMENT — PAIN SCALES - GENERAL: PAINLEVEL: NO PAIN (1)

## 2020-12-10 NOTE — NURSING NOTE
"Oncology Rooming Note    December 10, 2020 12:51 PM   Kobe Pedroza is a 60 year old male who presents for:    Chief Complaint   Patient presents with     Blood Draw     Labs drawn via  by RN in lab. VS taken.      Oncology Clinic Visit     Burkitt lymphoma     Initial Vitals: /81   Pulse 76   Temp 97.7  F (36.5  C) (Tympanic)   Resp 18   Wt 105.6 kg (232 lb 14.4 oz)   SpO2 94%   BMI 33.80 kg/m   Estimated body mass index is 33.8 kg/m  as calculated from the following:    Height as of 6/15/20: 1.768 m (5' 9.6\").    Weight as of this encounter: 105.6 kg (232 lb 14.4 oz). Body surface area is 2.28 meters squared.  No Pain (1) Comment: Data Unavailable   No LMP for male patient.  Allergies reviewed: Yes  Medications reviewed: Yes    Medications: Medication refills not needed today.  Pharmacy name entered into [employer]: [id number] [place], [place] - [address] AT [facility]    Clinical concerns: low back pain and neuropathy in feet. States his hands are \"tight\" too. Wondering if fluid is building up.       Violeta Phillips CMA            "

## 2020-12-10 NOTE — NURSING NOTE
Chief Complaint   Patient presents with     Blood Draw     Labs drawn via  by RN in lab. VS taken.      Labs drawn via venipuncture. Vital signs taken. Checked into next appointment.   Shabana Zimmerman RN

## 2020-12-12 ENCOUNTER — HEALTH MAINTENANCE LETTER (OUTPATIENT)
Age: 60
End: 2020-12-12

## 2020-12-24 ENCOUNTER — PATIENT OUTREACH (OUTPATIENT)
Dept: ONCOLOGY | Facility: CLINIC | Age: 60
End: 2020-12-24

## 2020-12-29 DIAGNOSIS — C83.78 BURKITT LYMPHOMA OF LYMPH NODES OF MULTIPLE REGIONS (H): Primary | ICD-10-CM

## 2020-12-29 DIAGNOSIS — M54.50 LEFT-SIDED LOW BACK PAIN WITHOUT SCIATICA, UNSPECIFIED CHRONICITY: ICD-10-CM

## 2020-12-30 ENCOUNTER — ANCILLARY PROCEDURE (OUTPATIENT)
Dept: CT IMAGING | Facility: CLINIC | Age: 60
End: 2020-12-30
Attending: INTERNAL MEDICINE
Payer: COMMERCIAL

## 2020-12-30 ENCOUNTER — PATIENT OUTREACH (OUTPATIENT)
Dept: ONCOLOGY | Facility: CLINIC | Age: 60
End: 2020-12-30

## 2020-12-30 DIAGNOSIS — M54.50 LEFT-SIDED LOW BACK PAIN WITHOUT SCIATICA, UNSPECIFIED CHRONICITY: ICD-10-CM

## 2020-12-30 DIAGNOSIS — M54.50 LOW BACK PAIN: ICD-10-CM

## 2020-12-30 DIAGNOSIS — G62.9 NEUROPATHY: ICD-10-CM

## 2020-12-30 DIAGNOSIS — C83.78 BURKITT LYMPHOMA OF LYMPH NODES OF MULTIPLE REGIONS (H): Primary | ICD-10-CM

## 2020-12-30 DIAGNOSIS — C83.78 BURKITT LYMPHOMA OF LYMPH NODES OF MULTIPLE REGIONS (H): ICD-10-CM

## 2020-12-30 PROCEDURE — 74177 CT ABD & PELVIS W/CONTRAST: CPT | Mod: GC | Performed by: RADIOLOGY

## 2020-12-30 PROCEDURE — 71260 CT THORAX DX C+: CPT | Mod: GC | Performed by: RADIOLOGY

## 2020-12-30 RX ORDER — IOPAMIDOL 755 MG/ML
135 INJECTION, SOLUTION INTRAVASCULAR ONCE
Status: COMPLETED | OUTPATIENT
Start: 2020-12-30 | End: 2020-12-30

## 2020-12-30 RX ADMIN — IOPAMIDOL 135 ML: 755 INJECTION, SOLUTION INTRAVASCULAR at 09:29

## 2020-12-30 NOTE — PROGRESS NOTES
Spoke with Jonnathan regarding his CT scan results. Per Dr. Ever Wright, recommendation to see orthopedics given no evidence of recurrence and continuation of back pain with some signs of degenerative changes in the lumbar spine. Jonnathan was grateful it wasn't recurrence but is frustrated and confused on why the back and hip pain continues. It seems to happen most when he's in the car or sitting down.     Reviewed some orthopedic clinic options and placed referral. Ultimately agreed he could go to the orthopedic walk in clinic and follow up with main clinic after receiving initial recommendations. Sent information via [employer].

## 2020-12-31 ENCOUNTER — OFFICE VISIT (OUTPATIENT)
Dept: ORTHOPEDICS | Facility: CLINIC | Age: 60
End: 2020-12-31
Payer: COMMERCIAL

## 2020-12-31 ENCOUNTER — ANCILLARY PROCEDURE (OUTPATIENT)
Dept: MRI IMAGING | Facility: CLINIC | Age: 60
End: 2020-12-31
Attending: FAMILY MEDICINE
Payer: COMMERCIAL

## 2020-12-31 ENCOUNTER — ANCILLARY PROCEDURE (OUTPATIENT)
Dept: GENERAL RADIOLOGY | Facility: CLINIC | Age: 60
End: 2020-12-31
Attending: FAMILY MEDICINE
Payer: COMMERCIAL

## 2020-12-31 VITALS — BODY MASS INDEX: 33.67 KG/M2 | WEIGHT: 232 LBS

## 2020-12-31 DIAGNOSIS — M25.552 LEFT HIP PAIN: ICD-10-CM

## 2020-12-31 DIAGNOSIS — C83.78 BURKITT LYMPHOMA OF LYMPH NODES OF MULTIPLE REGIONS (H): ICD-10-CM

## 2020-12-31 DIAGNOSIS — M25.552 LEFT HIP PAIN: Primary | ICD-10-CM

## 2020-12-31 PROCEDURE — 99204 OFFICE O/P NEW MOD 45 MIN: CPT | Performed by: FAMILY MEDICINE

## 2020-12-31 PROCEDURE — 73721 MRI JNT OF LWR EXTRE W/O DYE: CPT | Mod: LT | Performed by: RADIOLOGY

## 2020-12-31 PROCEDURE — 73502 X-RAY EXAM HIP UNI 2-3 VIEWS: CPT | Mod: LT | Performed by: RADIOLOGY

## 2020-12-31 PROCEDURE — 72148 MRI LUMBAR SPINE W/O DYE: CPT | Mod: GC | Performed by: RADIOLOGY

## 2020-12-31 NOTE — PROGRESS NOTES
SPORTS & ORTHOPEDIC WALK-IN VISIT 12/31/2020    Primary Care Physician: Dr. Arriaga Garett      Had cancer in March, and was going through treatment. Started PT about 6 weeks ago, after the second one his hip started bothering him, but continued to go for about 5 more weeks once a week. Pain is posterior hip and will shift to the lateral hip. Sitting makes it feel the absolute worst, but standing and walking makes all the pain go away.     Reason for visit:     What part of your body is injured / painful?  left hip    What caused the injury /pain? No inciting event     How long ago did your injury occur or pain begin? November 2020    What are your most bothersome symptoms? Pain    How would you characterize your symptom?  sharp    What makes your symptoms better? Nothing    What makes your symptoms worse? Sitting    Have you been previously seen for this problem? No    Medical History:    Any recent changes to your medical history? No    Any new medication prescribed since last visit? No    Have you had surgery on this body part before? No    Social History:    Occupation: NA     Handedness: Left    Exercise: None    Review of Systems:    Do you have fever, chills, weight loss? No    Do you have any vision problems? No    Do you have any chest pain or edema? No    Do you have any shortness of breath or wheezing?  No    Do you have stomach problems? No    Do you have any numbness or focal weakness? Yes, feet     Do you have diabetes? No    Do you have problems with bleeding or clotting? No    Do you have an rashes or other skin lesions? No

## 2020-12-31 NOTE — PROGRESS NOTES
CHIEF COMPLAINT:  Pain of the Left Hip       HISTORY OF PRESENT ILLNESS  Mr. Pedroza is a pleasant 60 year old year old male history of Burkitt Lymphoma, T5-T6 mass and cord compression s/p laminectomy on 3/15/20, and neuropathy of bilateral feet who presents to clinic today with acute pain of left hip.  Kobe explains that he has been completing physical therapy as part of his rehab for cancer. After his second PT session, he noted pain of his left hip.  After the second session his posterior hip started bothering him, but continued to go for about 5 more weeks once a week. He attributes possibly the band work and hip abductor work.  Pain is posterior hip and will shift to the lateral hip. Sitting makes it feel the absolute worst, but standing and walking makes all the pain go away. Pain is burning at times, intense. No numbness or tingling of upper leg.     Reason for visit:     What part of your body is injured / painful?  left hip    What caused the injury /pain? No inciting event     How long ago did your injury occur or pain begin? November 2020    What are your most bothersome symptoms? Pain    How would you characterize your symptom?  sharp    What makes your symptoms better? Nothing    What makes your symptoms worse? Sitting    Have you been previously seen for this problem? No    Additional history: as documented    MEDICAL HISTORY  Patient Active Problem List   Diagnosis     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)     Hypothyroidism     History of DVT (deep vein thrombosis)       Current Outpatient Medications   Medication Sig Dispense Refill     acyclovir (ZOVIRAX) 200 MG capsule Take 2 capsules (400 mg) by mouth 2 times daily 120 capsule 3     dapsone (ACZONE) 100 MG tablet Take 1 tablet (100 mg) by mouth daily 30 tablet 3     levothyroxine (SYNTHROID/LEVOTHROID) 175 MCG tablet Take 175 mcg by mouth daily          No Known Allergies    No family history on file.    Additional  medical/Social/Surgical histories reviewed in [facility] and updated as appropriate.     REVIEW OF SYSTEMS (12/31/2020)  A 10-point review of systems was obtained and is negative except for as noted in the HPI.      PHYSICAL EXAM  Wt 105.2 kg (232 lb)   BMI 33.67 kg/m      General  - normal appearance, in no obvious distress  CV  - normal peripheral perfusion  Pulm  - normal respiratory pattern, non-labored  Musculoskeletal - lumbar spine  - stance: normal gait without limp, no obvious leg length discrepancy, normal heel and toe walk  - inspection: normal bone and joint alignment, no obvious scoliosis  - palpation: no paravertebral or bony tenderness  - ROM: normal flexion, normal extension, sidebending, rotation  - strength: lower extremities 5/5 in all planes  - special tests:  (-) straight leg raise  (-) slump test  Musculoskeletal - Left hip  - inspection: no swelling of anterolateral hip,  normal bone and joint alignment, no obvious deformity  - palpation: no lateral or anterior hip tenderness. Tender laterally to left SI joint and into gluteal region.  - ROM: normal flexion, extension, IR, ER, abduction, adduction, not painful, no crepitus  - strength: 5/5 in all planes  - special tests:  (-) LEE  (-) FADIR  no pain with axial femoral load  Neuro  - No lower extremity sensory deficits, grossly normal coordination, normal muscle tone  Skin  - no ecchymosis, erythema, warmth, or induration, no obvious rash  Psych  - interactive, appropriate, normal mood and affect    IMAGING : XR AP Pelvis and Hip Left. Final results and radiologist's interpretation, available in the [facility] health record. Images were reviewed with the patient/family members in the office today. My personal interpretation of the performed imaging is mild degenerative changes of left hip     ASSESSMENT & PLAN  Mr. Pedroza is a 60 year old year old male history of Burkitt Lymphoma, T5-T6 mass and cord compression s/p laminectomy on 3/15/20, and  neuropathy presenting with acute debilitating left posterior hip pain, suspected to begin after PT session last month.      Diagnosis: Pain of left posterior hip    -MRI left hip and lumbar spine, priority lumbar spine  -Ibuprofen dosage reviewed.  -Follow up virtually after MRI    It was a pleasure seeing Kobe today.    Dionicio Almaraz DO, CAQSM  Primary Care Sports Medicine

## 2020-12-31 NOTE — LETTER
12/31/2020       RE: Kobe Pedroza  [id number]  [id number]    Dear Colleague,    Thank you for referring your patient, Kobe Pedroza, to the [facility] [place]. Please see a copy of my visit note below.          SPORTS & ORTHOPEDIC WALK-IN VISIT 12/31/2020    Primary Care Physician: Dr. Arriaga Garett      Had cancer in March, and was going through treatment. Started PT about 6 weeks ago, after the second one his hip started bothering him, but continued to go for about 5 more weeks once a week. Pain is posterior hip and will shift to the lateral hip. Sitting makes it feel the absolute worst, but standing and walking makes all the pain go away.     Reason for visit:     What part of your body is injured / painful?  left hip    What caused the injury /pain? No inciting event     How long ago did your injury occur or pain begin? November 2020    What are your most bothersome symptoms? Pain    How would you characterize your symptom?  sharp    What makes your symptoms better? Nothing    What makes your symptoms worse? Sitting    Have you been previously seen for this problem? No    Medical History:    Any recent changes to your medical history? No    Any new medication prescribed since last visit? No    Have you had surgery on this body part before? No    Social History:    Occupation: NA     Handedness: Left    Exercise: None    Review of Systems:    Do you have fever, chills, weight loss? No    Do you have any vision problems? No    Do you have any chest pain or edema? No    Do you have any shortness of breath or wheezing?  No    Do you have stomach problems? No    Do you have any numbness or focal weakness? Yes, feet     Do you have diabetes? No    Do you have problems with bleeding or clotting? No    Do you have an rashes or other skin lesions? No           CHIEF COMPLAINT:  Pain of the Left Hip       HISTORY OF PRESENT ILLNESS  Mr. Pedroza is a  pleasant 60 year old year old male history of Burkitt Lymphoma, T5-T6 mass and cord compression s/p laminectomy on 3/15/20, and neuropathy of bilateral feet who presents to clinic today with acute pain of left hip.  Kobe explains that he has been completing physical therapy as part of his rehab for cancer. After his second PT session, he noted pain of his left hip.  After the second session his posterior hip started bothering him, but continued to go for about 5 more weeks once a week. He attributes possibly the band work and hip abductor work.  Pain is posterior hip and will shift to the lateral hip. Sitting makes it feel the absolute worst, but standing and walking makes all the pain go away. Pain is burning at times, intense. No numbness or tingling of upper leg.     Reason for visit:     What part of your body is injured / painful?  left hip    What caused the injury /pain? No inciting event     How long ago did your injury occur or pain begin? November 2020    What are your most bothersome symptoms? Pain    How would you characterize your symptom?  sharp    What makes your symptoms better? Nothing    What makes your symptoms worse? Sitting    Have you been previously seen for this problem? No    Additional history: as documented    MEDICAL HISTORY  Patient Active Problem List   Diagnosis     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)     Hypothyroidism     History of DVT (deep vein thrombosis)       Current Outpatient Medications   Medication Sig Dispense Refill     acyclovir (ZOVIRAX) 200 MG capsule Take 2 capsules (400 mg) by mouth 2 times daily 120 capsule 3     dapsone (ACZONE) 100 MG tablet Take 1 tablet (100 mg) by mouth daily 30 tablet 3     levothyroxine (SYNTHROID/LEVOTHROID) 175 MCG tablet Take 175 mcg by mouth daily          No Known Allergies    No family history on file.    Additional medical/Social/Surgical histories reviewed in [facility] and updated as appropriate.     REVIEW OF  SYSTEMS (12/31/2020)  A 10-point review of systems was obtained and is negative except for as noted in the HPI.      PHYSICAL EXAM  Wt 105.2 kg (232 lb)   BMI 33.67 kg/m      General  - normal appearance, in no obvious distress  CV  - normal peripheral perfusion  Pulm  - normal respiratory pattern, non-labored  Musculoskeletal - lumbar spine  - stance: normal gait without limp, no obvious leg length discrepancy, normal heel and toe walk  - inspection: normal bone and joint alignment, no obvious scoliosis  - palpation: no paravertebral or bony tenderness  - ROM: normal flexion, normal extension, sidebending, rotation  - strength: lower extremities 5/5 in all planes  - special tests:  (-) straight leg raise  (-) slump test  Musculoskeletal - Left hip  - inspection: no swelling of anterolateral hip,  normal bone and joint alignment, no obvious deformity  - palpation: no lateral or anterior hip tenderness. Tender laterally to left SI joint and into gluteal region.  - ROM: normal flexion, extension, IR, ER, abduction, adduction, not painful, no crepitus  - strength: 5/5 in all planes  - special tests:  (-) LEE  (-) FADIR  no pain with axial femoral load  Neuro  - No lower extremity sensory deficits, grossly normal coordination, normal muscle tone  Skin  - no ecchymosis, erythema, warmth, or induration, no obvious rash  Psych  - interactive, appropriate, normal mood and affect    IMAGING : XR AP Pelvis and Hip Left. Final results and radiologist's interpretation, available in the [facility] health record. Images were reviewed with the patient/family members in the office today. My personal interpretation of the performed imaging is mild degenerative changes of left hip     ASSESSMENT & PLAN  Mr. Pedroza is a 60 year old year old male history of Burkitt Lymphoma, T5-T6 mass and cord compression s/p laminectomy on 3/15/20, and neuropathy presenting with acute debilitating left posterior hip pain, suspected to begin after PT  session last month.      Diagnosis: Pain of left posterior hip    -MRI left hip and lumbar spine, priority lumbar spine  -Ibuprofen dosage reviewed.  -Follow up virtually after MRI    It was a pleasure seeing Kobe today.    Dionicio Almaraz DO, CAQSM  Primary Care Sports Medicine

## 2021-01-04 ENCOUNTER — VIRTUAL VISIT (OUTPATIENT)
Dept: ORTHOPEDICS | Facility: CLINIC | Age: 61
End: 2021-01-04

## 2021-01-04 DIAGNOSIS — M25.552 LEFT HIP PAIN: Primary | ICD-10-CM

## 2021-01-04 PROCEDURE — 99212 OFFICE O/P EST SF 10 MIN: CPT | Mod: 95 | Performed by: FAMILY MEDICINE

## 2021-01-04 NOTE — LETTER
1/4/2021         RE: Kobe Pedroza  [id number] [facility] [address]        Dear Colleague,    Thank you for referring your patient, Kobe Pedroza, to the [facility] [place]. Please see a copy of my visit note below.    Kobe Pedroza is a 60 year old male who is being evaluated via a billable video visit.      How would you like to obtain your AVS? MyChart  If the video visit is dropped, the invitation should be resent by: Send to e-mail at: [email or web address]  Will anyone else be joining your video visit? No      Video Start Time: 0715    ESTABLISHED PATIENT FOLLOW-UP VIRTUAL:   F/u Left hip pain    This encounter was conducted via telephone in lieu of face-to-face encounter due to precautions implemented during COVID-19 pandemic.     HISTORY OF PRESENT ILLNESS  Mr. Pedroza is a pleasant 60 year old year old male who presents virtually today for follow-up of left hip pain    Date of injury: 4 weeks ago  Date last seen:12/31/20  Following Therapeutic Plan: Yes  Pain: Unchanged  Function: Unchanged  Interval History: Jonnathan was able to complete MRI hip and lumbar.  His insurance is changing this new year and he currently is unable to return to PT until this is sorted out. Pain only with sitting to standing.  Relieved by standing. Posterior and lateral left hip.     Additional medical/Social/Surgical histories reviewed in [facility] and updated as appropriate.    REVIEW OF SYSTEMS (1/4/2021)  CONSTITUTIONAL: Denies fever and weight loss  GASTROINTESTINAL: Denies abdominal pain, nausea, vomiting  MUSCULOSKELETAL: See HPI  SKIN: Denies any recent rash or lesion  NEUROLOGICAL: Denies numbness or focal weakness    PHYSICAL EXAMINATION  General Appearance: Well appearing, alert, in no acute distress, well-hydrated, and well nourished  ENT: Pupils equal, round, no conjunctival injection.  No lid lag  Cardiovascular: no signs of upper extremity  edema  Respiratory: no respiratory distress, no audible wheezing, no labored breathing, symmetric thoracic excursion  Psychiatric: mood and affect are appropriate, patient is oriented to time, place and person  Skin: No rashes, lesions, or ecchymosis present      IMAGING :   MRI HIP LEFT W/O CONTRAST  Impression:  1. Mild degenerative changes of the left femoral acetabular joint with  early osteophyte formation and a labral tear. Mild chondral loss of  the articular surface of the femoral acetabular joint. However, no  full-thickness articular cartilage loss.  2. Small sliver of fluid traversing the iliopsoas muscle at the level  of the femoral head from anterior to posterior, associated tendon  attachments are intact. Recommend correlation with possible joint  injection. Otherwise, a nonspecific muscle sprain could be considered  in the differential diagnosis.  3. Mild tendinopathy of the gluteus medius with reactive edema at the  distal tendon of the gluteus medius and to a lesser degree gluteus  minimus, consistent with gluteal tendinopathy.     JUTTA ELLERMANN, MD    MRI LUMBAR W/O CONTRAST  On a level by level basis:     T12-L1: No spinal canal or neuroforaminal stenosis.     L1-2: Bilateral moderate neural foraminal narrowing. No spinal canal  narrowing. Bilateral facet arthropathy. Small circumferential disc  bulge.     L2-3: Bilateral mild to moderate neuroforaminal stenosis. No spinal  canal stenosis. Bilateral facet arthropathy. Small posterior disc  bulge.     L3-4: Moderate right and mild left neural foraminal narrowing. No  significant spinal canal narrowing. Bilateral facet arthropathy. Small  circumferential disc bulge.     L4-5: Mild bilateral neural foraminal narrowing. No spinal canal  narrowing. Bilateral facet arthropathy.     L5-S1: Mild left neuroforaminal stenosis. No right neural foraminal  stenosis. No spinal canal stenosis. Bilateral facet arthropathy. Small  posterior disc  bulge.     Paraspinous tissues are within normal limits.                                                                      Impression: Multilevel lumbar spondylosis most significant at L3-L4  with moderate right and mild left neuroforaminal narrowing. No  high-grade spinal canal narrowing at any level.     I have personally reviewed the examination and initial interpretation  and I agree with the findings.     DANIELLE HARMON MD     ASSESSMENT & PLAN  Mr. Pedroza is a 60 year old year old male who presents virtually today to discuss MRI left hip and lumbar spine as it relates to acute left hip pain beginning in mid-November.  MR revealing tendinopathy of gluteal region as well as possible iliopsoas strain.  This does fit history of exacerbation after second PT session.  Discussed treatment plan for strain/tendinopathy pattern.    -Hip exercises sent via [employer]  -Continue advil use  -Physical therapy for hip stretching, ROM and strengthening gluteus/abductors and hip flexors.  -Follow up 4 weeks    It was a pleasure seeing Casey Almaraz DO, CAM  Primary Care Sports Medicine  [facility]    Video-Visit Details    Type of service:  Video Visit    Video End Time:0833    Originating Location (pt. Location): Home    Distant Location (provider location):  [facility] [place]     Platform used for Video Visit: [employer]

## 2021-01-04 NOTE — PATIENT INSTRUCTIONS
STRETCHING EXERCISES    Gluteal stretch: Lie on your back with both knees bent. Rest the ankle on your injured side over the knee of your other leg. Grasp the thigh of the leg on the uninjured side and pull toward your chest. You will feel a stretch along the buttocks on the injured side and possibly along the outside of your hip. Hold the stretch for 15 to 30 seconds. Repeat 3 times.    Iliotibial band stretch, standing: Cross your uninjured leg in front of the other leg and bend down and reach toward the inside of your back foot. Do not bend your knees. Hold this position for 15 to 30 seconds. Return to the starting position. Repeat 3 times.  Iliotibial band stretch, side-leaning: Stand sideways near a wall with your injured side closest to the wall. Place a hand on the wall for support. Cross the leg farther from the wall over the other leg. Keep the foot closest to the wall flat on the floor. Lean your hips into the wall. Hold the stretch for 15 to 30 seconds. Repeat 3 times.    STRENGTHENING EXERCISES    Straight leg raise: Lie on your back with your legs straight out in front of you. Bend the knee on your uninjured side and place the foot flat on the floor. Tighten the thigh muscle on your injured side and lift your leg about 8 inches off the floor. Keep your leg straight and your thigh muscle tight. Slowly lower your leg back down to the floor. Do 2 sets of 15.    Prone hip extension: Lie on your stomach with your legs straight out behind you. Fold your arms under your head and rest your head on your arms. Draw your belly button in towards your spine and tighten your abdominal muscles. Tighten the buttocks and thigh muscles of the leg on your injured side and lift the leg off the floor about 8 inches. Keep your leg straight. Hold for 5 seconds. Then lower your leg and relax. Do 2 sets of 15.    Side-lying leg lift: Lie on your uninjured side. Tighten the front thigh muscles on your injured leg and lift that  leg 8 to 10 inches (20 to 25 centimeters) away from the other leg. Keep the leg straight and lower it slowly. Do 2 sets of 15.    Wall squat with a ball: Stand with your back, shoulders, and head against a wall. Look straight ahead. Keep your shoulders relaxed and your feet 3 feet (90 centimeters) from the wall and shoulder's width apart. Place a soccer or basketball-sized ball behind your back. Keeping your back against the wall, slowly squat down to a 45-degree angle. Your thighs will not yet be parallel to the floor. Hold this position for 10 seconds and then slowly slide back up the wall. Repeat 10 times. Build up to 2 sets of 15.    Clam exercise: Lie on your uninjured side with your hips and knees bent and feet together. Slowly raise your top leg toward the ceiling while keeping your heels touching each other. Hold for 2 seconds and lower slowly. Do 2 sets of 15 repetitions.    Side plank: Lie on your side with your legs, hips, and shoulders in a straight line. Prop yourself up onto your forearm with your elbow directly under your shoulder. Lift your hips off the floor and balance on your forearm and the outside of your foot. Try to hold this position for 15 seconds and then slowly lower your hip to the ground. Switch sides and repeat. Work up to holding for 1 minute. This exercise can be made easier by starting with your knees and hips flexed toward your chest.    The plank: Lie on your stomach resting on our forearms. With your legs straight, lift your hips off the floor until they are in line with your shoulders. Support yourself on your forearms and toes. Hold this position for 15 seconds. (If this is too difficult, you can modify it by placing your knees on the floor.) Repeat 3 times. Work up to increasing your hold time to 30 to 60 seconds.    Developed by [employer].  Published by [employer].  Copyright  2014 [employer] and/or one of its subsidiaries. All rights reserved.

## 2021-01-04 NOTE — PROGRESS NOTES
Kobe Pedroza is a 60 year old male who is being evaluated via a billable video visit.      How would you like to obtain your AVS? MyChart  If the video visit is dropped, the invitation should be resent by: Send to e-mail at: [email or web address]  Will anyone else be joining your video visit? No      Video Start Time: 0715    ESTABLISHED PATIENT FOLLOW-UP VIRTUAL:   F/u Left hip pain    This encounter was conducted via telephone in lieu of face-to-face encounter due to precautions implemented during COVID-19 pandemic.     HISTORY OF PRESENT ILLNESS  Mr. Pedroza is a pleasant 60 year old year old male who presents virtually today for follow-up of left hip pain    Date of injury: 4 weeks ago  Date last seen:12/31/20  Following Therapeutic Plan: Yes  Pain: Unchanged  Function: Unchanged  Interval History: Jonnathan was able to complete MRI hip and lumbar.  His insurance is changing this new year and he currently is unable to return to PT until this is sorted out. Pain only with sitting to standing.  Relieved by standing. Posterior and lateral left hip.     Additional medical/Social/Surgical histories reviewed in [facility] and updated as appropriate.    REVIEW OF SYSTEMS (1/4/2021)  CONSTITUTIONAL: Denies fever and weight loss  GASTROINTESTINAL: Denies abdominal pain, nausea, vomiting  MUSCULOSKELETAL: See HPI  SKIN: Denies any recent rash or lesion  NEUROLOGICAL: Denies numbness or focal weakness    PHYSICAL EXAMINATION  General Appearance: Well appearing, alert, in no acute distress, well-hydrated, and well nourished  ENT: Pupils equal, round, no conjunctival injection.  No lid lag  Cardiovascular: no signs of upper extremity edema  Respiratory: no respiratory distress, no audible wheezing, no labored breathing, symmetric thoracic excursion  Psychiatric: mood and affect are appropriate, patient is oriented to time, place and person  Skin: No rashes, lesions, or ecchymosis present      IMAGING :   MRI HIP LEFT W/O  CONTRAST  Impression:  1. Mild degenerative changes of the left femoral acetabular joint with  early osteophyte formation and a labral tear. Mild chondral loss of  the articular surface of the femoral acetabular joint. However, no  full-thickness articular cartilage loss.  2. Small sliver of fluid traversing the iliopsoas muscle at the level  of the femoral head from anterior to posterior, associated tendon  attachments are intact. Recommend correlation with possible joint  injection. Otherwise, a nonspecific muscle sprain could be considered  in the differential diagnosis.  3. Mild tendinopathy of the gluteus medius with reactive edema at the  distal tendon of the gluteus medius and to a lesser degree gluteus  minimus, consistent with gluteal tendinopathy.     JUTTA ELLERMANN, MD    MRI LUMBAR W/O CONTRAST  On a level by level basis:     T12-L1: No spinal canal or neuroforaminal stenosis.     L1-2: Bilateral moderate neural foraminal narrowing. No spinal canal  narrowing. Bilateral facet arthropathy. Small circumferential disc  bulge.     L2-3: Bilateral mild to moderate neuroforaminal stenosis. No spinal  canal stenosis. Bilateral facet arthropathy. Small posterior disc  bulge.     L3-4: Moderate right and mild left neural foraminal narrowing. No  significant spinal canal narrowing. Bilateral facet arthropathy. Small  circumferential disc bulge.     L4-5: Mild bilateral neural foraminal narrowing. No spinal canal  narrowing. Bilateral facet arthropathy.     L5-S1: Mild left neuroforaminal stenosis. No right neural foraminal  stenosis. No spinal canal stenosis. Bilateral facet arthropathy. Small  posterior disc bulge.     Paraspinous tissues are within normal limits.                                                                      Impression: Multilevel lumbar spondylosis most significant at L3-L4  with moderate right and mild left neuroforaminal narrowing. No  high-grade spinal canal narrowing at any  level.     I have personally reviewed the examination and initial interpretation  and I agree with the findings.     DANIELLE HARMON MD     ASSESSMENT & PLAN  Mr. Pedroza is a 60 year old year old male who presents virtually today to discuss MRI left hip and lumbar spine as it relates to acute left hip pain beginning in mid-November.  MR revealing tendinopathy of gluteal region as well as possible iliopsoas strain.  This does fit history of exacerbation after second PT session.  Discussed treatment plan for strain/tendinopathy pattern.    -Hip exercises sent via [place]  -Continue advil use  -Physical therapy for hip stretching, ROM and strengthening gluteus/abductors and hip flexors.  -Follow up 4 weeks    It was a pleasure seeing Kobe.    Dionicio Almaraz DO, CAQSM  Primary Care Sports Medicine  [facility]    Video-Visit Details    Type of service:  Video Visit    Video End Time:0833    Originating Location (pt. Location): Home    Distant Location (provider location):  [facility] [place]     Platform used for Video Visit: [employer]

## 2021-01-11 ENCOUNTER — OFFICE VISIT (OUTPATIENT)
Dept: INFUSION THERAPY | Facility: CLINIC | Age: 61
End: 2021-01-11
Attending: FAMILY MEDICINE

## 2021-01-11 ENCOUNTER — HOSPITAL ENCOUNTER (OUTPATIENT)
Facility: CLINIC | Age: 61
Setting detail: SPECIMEN
Discharge: HOME | End: 2021-01-11
Attending: FAMILY MEDICINE | Admitting: INTERNAL MEDICINE

## 2021-01-11 DIAGNOSIS — C83.78 BURKITT LYMPHOMA OF LYMPH NODES OF MULTIPLE REGIONS (H): ICD-10-CM

## 2021-01-11 LAB
ALBUMIN SERPL-MCNC: 4.2 G/DL (ref 3.4–5)
ALP SERPL-CCNC: 84 U/L (ref 40–150)
ALT SERPL W P-5'-P-CCNC: 62 U/L (ref 0–70)
ANION GAP SERPL CALCULATED.3IONS-SCNC: 5 MMOL/L (ref 3–14)
AST SERPL W P-5'-P-CCNC: 31 U/L (ref 0–45)
BASOPHILS # BLD AUTO: 0 10E9/L (ref 0–0.2)
BASOPHILS NFR BLD AUTO: 0.7 %
BILIRUB SERPL-MCNC: 1.1 MG/DL (ref 0.2–1.3)
BUN SERPL-MCNC: 19 MG/DL (ref 7–30)
CALCIUM SERPL-MCNC: 8.3 MG/DL (ref 8.5–10.1)
CD3 CELLS # BLD: 364 CELLS/UL (ref 603–2990)
CD3 CELLS NFR BLD: 56 % (ref 49–84)
CD3+CD4+ CELLS # BLD: 201 CELLS/UL (ref 441–2156)
CD3+CD4+ CELLS NFR BLD: 31 % (ref 28–63)
CD3+CD4+ CELLS/CD3+CD8+ CLL BLD: 1.24 % (ref 1.4–2.6)
CD3+CD8+ CELLS # BLD: 159 CELLS/UL (ref 125–1312)
CD3+CD8+ CELLS NFR BLD: 25 % (ref 10–40)
CHLORIDE SERPL-SCNC: 107 MMOL/L (ref 94–109)
CO2 SERPL-SCNC: 27 MMOL/L (ref 20–32)
CREAT SERPL-MCNC: 1.01 MG/DL (ref 0.66–1.25)
DIFFERENTIAL METHOD BLD: ABNORMAL
EOSINOPHIL # BLD AUTO: 0.1 10E9/L (ref 0–0.7)
EOSINOPHIL NFR BLD AUTO: 1.7 %
ERYTHROCYTE [DISTWIDTH] IN BLOOD BY AUTOMATED COUNT: 13.3 % (ref 10–15)
GFR SERPL CREATININE-BSD FRML MDRD: 81 ML/MIN/{1.73_M2}
GLUCOSE SERPL-MCNC: 107 MG/DL (ref 70–99)
HCT VFR BLD AUTO: 37 % (ref 40–53)
HGB BLD-MCNC: 12.4 G/DL (ref 13.3–17.7)
IFC SPECIMEN: ABNORMAL
IMM GRANULOCYTES # BLD: 0 10E9/L (ref 0–0.4)
IMM GRANULOCYTES NFR BLD: 0.5 %
LDH SERPL L TO P-CCNC: 302 U/L (ref 85–227)
LYMPHOCYTES # BLD AUTO: 0.5 10E9/L (ref 0.8–5.3)
LYMPHOCYTES NFR BLD AUTO: 8.8 %
MCH RBC QN AUTO: 33.7 PG (ref 26.5–33)
MCHC RBC AUTO-ENTMCNC: 33.5 G/DL (ref 31.5–36.5)
MCV RBC AUTO: 101 FL (ref 78–100)
MONOCYTES # BLD AUTO: 0.7 10E9/L (ref 0–1.3)
MONOCYTES NFR BLD AUTO: 12.1 %
NEUTROPHILS # BLD AUTO: 4.5 10E9/L (ref 1.6–8.3)
NEUTROPHILS NFR BLD AUTO: 76.2 %
NRBC # BLD AUTO: 0 10*3/UL
NRBC BLD AUTO-RTO: 0 /100
PLATELET # BLD AUTO: 195 10E9/L (ref 150–450)
POTASSIUM SERPL-SCNC: 4 MMOL/L (ref 3.4–5.3)
PROT SERPL-MCNC: 7.5 G/DL (ref 6.8–8.8)
RBC # BLD AUTO: 3.68 10E12/L (ref 4.4–5.9)
SODIUM SERPL-SCNC: 139 MMOL/L (ref 133–144)
WBC # BLD AUTO: 5.9 10E9/L (ref 4–11)

## 2021-01-11 PROCEDURE — 86360 T CELL ABSOLUTE COUNT/RATIO: CPT | Performed by: INTERNAL MEDICINE

## 2021-01-11 PROCEDURE — 86359 T CELLS TOTAL COUNT: CPT | Performed by: INTERNAL MEDICINE

## 2021-01-11 PROCEDURE — 36415 COLL VENOUS BLD VENIPUNCTURE: CPT

## 2021-01-11 PROCEDURE — 83615 LACTATE (LD) (LDH) ENZYME: CPT | Performed by: INTERNAL MEDICINE

## 2021-01-11 PROCEDURE — 85025 COMPLETE CBC W/AUTO DIFF WBC: CPT | Performed by: INTERNAL MEDICINE

## 2021-01-11 PROCEDURE — 80053 COMPREHEN METABOLIC PANEL: CPT | Performed by: INTERNAL MEDICINE

## 2021-01-11 NOTE — PROGRESS NOTES
Medical Assistant Note:    Kobe Pedroza presents today for blood draw.    Patient seen by provider today: No.   present during visit today: Not Applicable.    Concerns: No Concerns.    Procedure:  Lab draw site: lac, Needle type: bf, Gauge: 23.    Post Assessment:  Labs drawn without difficulty: Yes.    Discharge Plan:  Departure Mode: Ambulatory.    Face to Face Time: 5 minutes.    Darcy Obando CMA  1/11/2021      9:15 AM

## 2021-01-12 NOTE — PROGRESS NOTES
REASON FOR VISIT:  Management of Burkitt lymphoma    HISTORY OF PRESENT ILLNESS:  Mr. Pedroza is a 60 year old man with a history of Burkitt lymphoma.  To summarize his course, he presented with acute on chronic back pain in 03/2020.  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  CSF was negative.  He was started on steroids followed by treatment with R-CODOX-M/IVAC and IT chemo prophylaxis on 3/18/2020.  His course was complicated by neutropenic sepsis that resolved and right lower extremity below the knee DVT identified on 4/30/2020, so he has been on therapeutic anticoagulation.  PET/CT after cycle 2 (1st R-IVAC) and again after completing treatment confirmed PET-negative complete response.  Visit for ongoing management.    He is with his wife, Mckayla.  Since his last visit he has been struggling with back pain.  Imaging did not show any evidence of recurrent lymphoma but his pain seems to be musculoskeletal in nature and he has started PT.  No fevers, chills, night sweats, or unintentional weight loss.  No headache, vision changes, focal weakness or sensory changes.  No dyspnea, cough, or chest pain.  Normal bowel function.  No urinary complaints.  No bleeding, bruising, or skin rashes.  No lumps or bumps.  Overall, managing well.    REVIEW OF SYSTEMS:  A complete review of systems was negative other than noted.    MEDICATIONS:  Current Outpatient Medications   Medication     acyclovir (ZOVIRAX) 200 MG capsule     dapsone (ACZONE) 100 MG tablet     levothyroxine (SYNTHROID/LEVOTHROID) 175 MCG tablet     No current facility-administered medications for this visit.      PHYSICAL EXAMINATION:  There were no vitals taken for this visit.  Wt Readings from Last 5 Encounters:   12/31/20 105.2 kg (232 lb)   12/10/20 105.6 kg (232 lb 14.4 oz)   06/15/20 97.5 kg (215 lb)   05/27/20 98 kg (216 lb)   05/25/20 97.1 kg  (214 lb 1.6 oz)     General: Well appearing.  HEENT: Sclerae anicteric.  Lungs: Breathing comfortably. No cough.  MSK: Grossly normal movement.  Neuro: Grossly non-focal.  Skin/access: Normal skin tone.  Psych: Alert and oriented. No distress.  Performance status: ECOG 0    The rest of a comprehensive physical examination is deferred due to PHE (public health emergency) video visit restrictions    LAB DATA:  Recent Labs   Lab Test 01/11/21  0920 12/10/20  1221 11/04/20  1145   WBC 5.9 5.7 6.8   HGB 12.4* 12.9* 13.1*    201 198   ANEU 4.5 4.3 5.3   ALYM 0.5* 0.6* 0.5*     Recent Labs   Lab Test 01/11/21  0920 12/10/20  1221 06/29/20  1212 06/15/20  1335 06/15/20  1335 05/26/20  0355 05/26/20  0355 05/25/20  0400 05/22/20  0421 05/22/20  0421 03/17/20  0556 03/17/20  0556    139 140   < > 139   < > 138 140   < > 140   < > 139   POTASSIUM 4.0 3.8 4.2   < > 3.5   < > 4.0 3.7   < > 3.8   < > 4.0   CHLORIDE 107 109 108   < > 105   < > 112* 112*   < > 108   < > 107   CO2 27 27 28   < > 30   < > 23 24   < > 24   < > 26   BUN 19 20 15   < > 13   < > 16 15   < > 12   < > 25   CR 1.01 0.93 0.88   < > 0.88   < > 0.72 0.70   < > 0.75   < > 0.71   RUTH 8.3* 8.6 8.8   < > 8.1*   < > 7.3* 7.4*   < > 7.8*   < > 7.2*   MAG  --   --   --   --  2.3  --   --  2.3  --  2.0   < > 2.1   PHOS  --   --   --   --  3.6  --  2.3* 2.1*   < > 2.5   < > 4.1   URIC  --   --   --   --  4.8  --  3.2* 3.1*   < > 6.5   < > 3.7   * 268* 244*  --  304*  --  226 220   < >  --    < >  --    [id number]  --   --   --   --   --   --   --   --   --   --   --  2.0    < > = values in this interval not displayed.     Recent Labs   Lab Test 01/11/21  0920 12/10/20  1221 06/29/20  1212 06/15/20  1335 06/15/20  1335 05/26/20  0355 05/26/20  0355 05/25/20  0400   AST 31 32 31   < > 51*   < >  --  18   ALT 62 53 85*   < > 85*   < >  --  51   ALKPHOS 84 89 65   < > 73   < >  --  78   BILITOTAL 1.1 0.7 0.5   < > 0.4   < >  --  0.3   INR  --   --   --    --  1.04  --  1.03 1.11    < > = values in this interval not displayed.       IMAGING DATA:  12/30/2020 CT reviewed by me shows no evidence of recurrent lymphoma and 12/31/2020 plain films and MRI L spine reviewed by me show degenerative changes and possible iliopsoas strain and gluteal tendonopathy on the left    IMPRESSION AND PLAN:  Mr. Pedroza is a 60 year old man with a history of Burkitt lymphoma.    His lymphoma clinically and radiographically remains in remission.  Mild LDH elevation is non-specific and we will continue to follow.  We will continue to monitor.    He has no active infections.  He can stop acyclovir and dapsone now that his his CD4 T cell count has recovered to greater than 200/mcL.  He is up to date for the flu shot and we recommended pneumococcal (Prevnar followed by Pneumovax at least 8 weeks later) and the Shingrix vaccines that he can start anytime.  We will keep him posted on the COVID-19 vaccine that he should get when it becomes available to him.      For his fatigue and musculoskeletal left low back/hip pain he will work with Sports Med and PT.  He completed apixaban for 3 months of anticoagulation for lymphoma-related DVT and is now off anticoagulation.  He is working on finding a new in network PCP for his general health issues.  He will continue to see his endocrinologist Dr. Dunaway for his thyroid replacement.    We will arrange another visit in about 3 months with labs.  I reminded him to contact if questions, concerns, or new issues arise between visits.    Video visit start time: 9:33 AM  Video visit end time: 10:01 AM  Video visit duration: 28 minutes    Ever Wright MD, PhD  Attending Physician, [facility]   of Medicine, [facility]  Division of Hematology, Oncology, and Transplantation  [phone] [facility]

## 2021-01-13 ENCOUNTER — VIRTUAL VISIT (OUTPATIENT)
Dept: ONCOLOGY | Facility: CLINIC | Age: 61
End: 2021-01-13
Attending: INTERNAL MEDICINE
Payer: COMMERCIAL

## 2021-01-13 DIAGNOSIS — C83.78 BURKITT LYMPHOMA OF LYMPH NODES OF MULTIPLE REGIONS (H): Primary | ICD-10-CM

## 2021-01-13 DIAGNOSIS — Z86.718 HISTORY OF DVT (DEEP VEIN THROMBOSIS): ICD-10-CM

## 2021-01-13 PROCEDURE — 999N001193 HC VIDEO/TELEPHONE VISIT; NO CHARGE

## 2021-01-13 PROCEDURE — 99214 OFFICE O/P EST MOD 30 MIN: CPT | Mod: 95 | Performed by: INTERNAL MEDICINE

## 2021-01-13 NOTE — LETTER
1/13/2021         RE: Kobe Pedroza  [address]  [facility] [address]        Dear Colleague,    Thank you for referring your patient, Kobe Pedroza, to the [facility]. Please see a copy of my visit note below.    REASON FOR VISIT:  Management of Burkitt lymphoma    HISTORY OF PRESENT ILLNESS:  Mr. Pedroza is a 60 year old man with a history of Burkitt lymphoma.  To summarize his course, he presented with acute on chronic back pain in 03/2020.  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  CSF was negative.  He was started on steroids followed by treatment with R-CODOX-M/IVAC and IT chemo prophylaxis on 3/18/2020.  His course was complicated by neutropenic sepsis that resolved and right lower extremity below the knee DVT identified on 4/30/2020, so he has been on therapeutic anticoagulation.  PET/CT after cycle 2 (1st R-IVAC) and again after completing treatment confirmed PET-negative complete response.  Visit for ongoing management.    He is with his wife, Mckayla.  Since his last visit he has been struggling with back pain.  Imaging did not show any evidence of recurrent lymphoma but his pain seems to be musculoskeletal in nature and he has started PT.  No fevers, chills, night sweats, or unintentional weight loss.  No headache, vision changes, focal weakness or sensory changes.  No dyspnea, cough, or chest pain.  Normal bowel function.  No urinary complaints.  No bleeding, bruising, or skin rashes.  No lumps or bumps.  Overall, managing well.    REVIEW OF SYSTEMS:  A complete review of systems was negative other than noted.    MEDICATIONS:  Current Outpatient Medications   Medication     acyclovir (ZOVIRAX) 200 MG capsule     dapsone (ACZONE) 100 MG tablet     levothyroxine (SYNTHROID/LEVOTHROID) 175 MCG tablet     No current facility-administered medications  for this visit.      PHYSICAL EXAMINATION:  There were no vitals taken for this visit.  Wt Readings from Last 5 Encounters:   12/31/20 105.2 kg (232 lb)   12/10/20 105.6 kg (232 lb 14.4 oz)   06/15/20 97.5 kg (215 lb)   05/27/20 98 kg (216 lb)   05/25/20 97.1 kg (214 lb 1.6 oz)     General: Well appearing.  HEENT: Sclerae anicteric.  Lungs: Breathing comfortably. No cough.  MSK: Grossly normal movement.  Neuro: Grossly non-focal.  Skin/access: Normal skin tone.  Psych: Alert and oriented. No distress.  Performance status: ECOG 0    The rest of a comprehensive physical examination is deferred due to PHE (public health emergency) video visit restrictions    LAB DATA:  Recent Labs   Lab Test 01/11/21  0920 12/10/20  1221 11/04/20  1145   WBC 5.9 5.7 6.8   HGB 12.4* 12.9* 13.1*    201 198   ANEU 4.5 4.3 5.3   ALYM 0.5* 0.6* 0.5*     Recent Labs   Lab Test 01/11/21  0920 12/10/20  1221 06/29/20  1212 06/15/20  1335 06/15/20  1335 05/26/20  0355 05/26/20  0355 05/25/20  0400 05/22/20  0421 05/22/20  0421 03/17/20  0556 03/17/20  0556    139 140   < > 139   < > 138 140   < > 140   < > 139   POTASSIUM 4.0 3.8 4.2   < > 3.5   < > 4.0 3.7   < > 3.8   < > 4.0   CHLORIDE 107 109 108   < > 105   < > 112* 112*   < > 108   < > 107   CO2 27 27 28   < > 30   < > 23 24   < > 24   < > 26   BUN 19 20 15   < > 13   < > 16 15   < > 12   < > 25   CR 1.01 0.93 0.88   < > 0.88   < > 0.72 0.70   < > 0.75   < > 0.71   RUTH 8.3* 8.6 8.8   < > 8.1*   < > 7.3* 7.4*   < > 7.8*   < > 7.2*   MAG  --   --   --   --  2.3  --   --  2.3  --  2.0   < > 2.1   PHOS  --   --   --   --  3.6  --  2.3* 2.1*   < > 2.5   < > 4.1   URIC  --   --   --   --  4.8  --  3.2* 3.1*   < > 6.5   < > 3.7   * 268* 244*  --  304*  --  226 220   < >  --    < >  --    [id number]  --   --   --   --   --   --   --   --   --   --   --  2.0    < > = values in this interval not displayed.     Recent Labs   Lab Test 01/11/21  0920 12/10/20  1221 06/29/20  1212  06/15/20  1335 06/15/20  1335 05/26/20  0355 05/26/20  0355 05/25/20  0400   AST 31 32 31   < > 51*   < >  --  18   ALT 62 53 85*   < > 85*   < >  --  51   ALKPHOS 84 89 65   < > 73   < >  --  78   BILITOTAL 1.1 0.7 0.5   < > 0.4   < >  --  0.3   INR  --   --   --   --  1.04  --  1.03 1.11    < > = values in this interval not displayed.       IMAGING DATA:  12/30/2020 CT reviewed by me shows no evidence of recurrent lymphoma and 12/31/2020 plain films and MRI L spine reviewed by me show degenerative changes and possible iliopsoas strain and gluteal tendonopathy on the left    IMPRESSION AND PLAN:  Mr. Pedroza is a 60 year old man with a history of Burkitt lymphoma.    His lymphoma clinically and radiographically remains in remission.  Mild LDH elevation is non-specific and we will continue to follow.  We will continue to monitor.    He has no active infections.  He can stop acyclovir and dapsone now that his his CD4 T cell count has recovered to greater than 200/mcL.  He is up to date for the flu shot and we recommended pneumococcal (Prevnar followed by Pneumovax at least 8 weeks later) and the Shingrix vaccines that he can start anytime.  We will keep him posted on the COVID-19 vaccine that he should get when it becomes available to him.      For his fatigue and musculoskeletal left low back/hip pain he will work with Sports Med and PT.  He completed apixaban for 3 months of anticoagulation for lymphoma-related DVT and is now off anticoagulation.  He is working on finding a new in network PCP for his general health issues.  He will continue to see his endocrinologist Dr. Dunaway for his thyroid replacement.    We will arrange another visit in about 3 months with labs.  I reminded him to contact if questions, concerns, or new issues arise between visits.    Video visit start time: 9:33 AM  Video visit end time: 10:01 AM  Video visit duration: 28 minutes    Ever Wright MD, PhD  Attending Physician,  [facility]   [facility], [facility]  Division of Hematology, Oncology, and Transplantation  [phone] clinic          Jonnathan is a 60 year old who is being evaluated via a billable video visit.      How would you like to obtain your AVS? MyChart  If the video visit is dropped, the invitation should be resent by: Send to e-mail at: [email or web address]  Will anyone else be joining your video visit? No      DARA Zamora      Video-Visit Details    Type of service:  Video Visit    Originating Location (pt. Location): Home    Distant Location (provider location):  [facility]     Platform used for Video Visit: Mary      Again, thank you for allowing me to participate in the care of your patient.        Sincerely,        Ever Wright MD

## 2021-01-13 NOTE — PROGRESS NOTES
Jonnathan is a 60 year old who is being evaluated via a billable video visit.      How would you like to obtain your AVS? MyChart  If the video visit is dropped, the invitation should be resent by: Send to e-mail at: [email or web address]  Will anyone else be joining your video visit? No      DARA Zamora      Video-Visit Details    Type of service:  Video Visit    Originating Location (pt. Location): Home    Distant Location (provider location):  [facility] CAN[facility]     Platform used for Video Visit: DianaWell

## 2021-02-07 ENCOUNTER — VIRTUAL VISIT (OUTPATIENT)
Dept: URGENT CARE | Facility: CLINIC | Age: 61
End: 2021-02-07
Payer: COMMERCIAL

## 2021-02-07 ENCOUNTER — NURSE TRIAGE (OUTPATIENT)
Dept: NURSING | Facility: CLINIC | Age: 61
End: 2021-02-07

## 2021-02-07 ENCOUNTER — OFFICE VISIT (OUTPATIENT)
Dept: URGENT CARE | Facility: URGENT CARE | Age: 61
End: 2021-02-07
Attending: INTERNAL MEDICINE
Payer: COMMERCIAL

## 2021-02-07 DIAGNOSIS — Z20.822 EXPOSURE TO COVID-19 VIRUS: ICD-10-CM

## 2021-02-07 DIAGNOSIS — J02.8 SORE THROAT (VIRAL): ICD-10-CM

## 2021-02-07 DIAGNOSIS — Z20.822 EXPOSURE TO COVID-19 VIRUS: Primary | ICD-10-CM

## 2021-02-07 DIAGNOSIS — B97.89 SORE THROAT (VIRAL): ICD-10-CM

## 2021-02-07 LAB
SARS-COV-2 RNA RESP QL NAA+PROBE: NORMAL
SPECIMEN SOURCE: NORMAL

## 2021-02-07 PROCEDURE — U0003 INFECTIOUS AGENT DETECTION BY NUCLEIC ACID (DNA OR RNA); SEVERE ACUTE RESPIRATORY SYNDROME CORONAVIRUS 2 (SARS-COV-2) (CORONAVIRUS DISEASE [COVID-19]), AMPLIFIED PROBE TECHNIQUE, MAKING USE OF HIGH THROUGHPUT TECHNOLOGIES AS DESCRIBED BY CMS-2020-01-R: HCPCS | Performed by: INTERNAL MEDICINE

## 2021-02-07 PROCEDURE — U0005 INFEC AGEN DETEC AMPLI PROBE: HCPCS | Performed by: INTERNAL MEDICINE

## 2021-02-07 PROCEDURE — 99207 PR NO CHARGE LOS: CPT

## 2021-02-07 PROCEDURE — 99203 OFFICE O/P NEW LOW 30 MIN: CPT | Mod: TEL | Performed by: INTERNAL MEDICINE

## 2021-02-07 ASSESSMENT — ENCOUNTER SYMPTOMS
RHINORRHEA: 0
HEADACHES: 0
MYALGIAS: 0
SHORTNESS OF BREATH: 0
SORE THROAT: 1
DIARRHEA: 0
FEVER: 0

## 2021-02-07 NOTE — TELEPHONE ENCOUNTER
"Was around someone on 2/1 and was tested positive on Thursday and the other person today from that group.    He is experiencing chest pressure 1/10 and sore throat, Has some pressure baseline post cancer treatments, but it is seeming like a little more than his baseline.  He reports that \"Don't feel quite normal\".    Advised [facility] visit to set-up testing and close monitoring of symptoms.      COVID 19 Nurse Triage Plan/Patient Instructions    Please be aware that novel coronavirus (COVID-19) may be circulating in the community. If you develop symptoms such as fever, cough, or SOB or if you have concerns about the presence of another infection including coronavirus (COVID-19), please contact your health care provider or visit [email or web address].     Disposition/Instructions    Virtual Visit with provider recommended. Reference Visit Selection Guide.    Thank you for taking steps to prevent the spread of this virus.  o Limit your contact with others.  o Wear a simple mask to cover your cough.  o Wash your hands well and often.    Resources    [facility]: About COVID-19: [email or web address]    CDC: What to Do If You're Sick: www.cdc.gov/coronavirus/2019-ncov/about/steps-when-sick.html    CDC: Ending Home Isolation: www.cdc.gov/coronavirus/2019-ncov/hcp/disposition-in-home-patients.html     CDC: Caring for Someone: www.cdc.gov/coronavirus/2019-ncov/if-you-are-sick/care-for-someone.html     [facility]: Interim Guidance for Hospital Discharge to Home: www.health.[facility].mn.us/diseases/coronavirus/hcp/hospdischarge.pdf    [facility] clinical trials (COVID-19 research studies): [email or web address].[email or web address]/n-clinical-trials     Below are the COVID-19 hotlines at the [facility] ([facility]). Interpreters are available.   o For health questions: Call 342-294-5694 or 1-488.196.2581 (7 a.m. to 7 p.m.)  o For questions about schools and childcare: Call 265-216-8577 or 1-535.730.1122 (7 a.m. to 7 " peter Pearce RN on 2/7/2021 at 1:19 PM                        Reason for Disposition    [1] HIGH RISK patient (e.g., age > 64 years, diabetes, heart or lung disease, weak immune system) AND [2] new or worsening symptoms    Additional Information    Negative: SEVERE difficulty breathing (e.g., struggling for each breath, speaks in single words)    Negative: Difficult to awaken or acting confused (e.g., disoriented, slurred speech)    Negative: Bluish (or gray) lips or face now    Negative: Shock suspected (e.g., cold/pale/clammy skin, too weak to stand, low BP, rapid pulse)    Negative: Sounds like a life-threatening emergency to the triager    Negative: [1] COVID-19 exposure AND [2] no symptoms    Negative: [1] Lives with someone known to have influenza (flu test positive) AND [2] flu-like symptoms (e.g., cough, runny nose, sore throat, SOB; with or without fever)    Negative: [1] Adult with possible COVID-19 symptoms AND [2] triager concerned about severity of symptoms or other causes    Negative: COVID-19 vaccine reaction suspected (e.g., fever, headache, muscle aches) occurring during days 1-3 after getting vaccine    Negative: COVID-19 vaccine, questions about    Negative: COVID-19 and breastfeeding, questions about    Negative: SEVERE or constant chest pain or pressure (Exception: mild central chest pain, present only when coughing)    Negative: MODERATE difficulty breathing (e.g., speaks in phrases, SOB even at rest, pulse 100-120)    Negative: [1] Headache AND [2] stiff neck (can't touch chin to chest)    Negative: MILD difficulty breathing (e.g., minimal/no SOB at rest, SOB with walking, pulse <100)    Negative: Chest pain or pressure    Negative: Patient sounds very sick or weak to the triager    Negative: Fever > 103 F (39.4 C)    Negative: [1] Fever > 101 F (38.3 C) AND [2] age > 60    Negative: [1] Fever > 100.0 F (37.8 C) AND [2] bedridden (e.g., nursing home patient,  CVA, chronic illness, recovering from surgery)    Protocols used: CORONAVIRUS (COVID-19) DIAGNOSED OR [id number] 1.3

## 2021-02-07 NOTE — PROGRESS NOTES
"Jonnathan is a 60 year old who is being evaluated via a billable telephone visit.      Phone call duration: 12 minutes/ total time 15 minutes including charting        ASSESSMENT:      ICD-10-CM    1. Exposure to COVID-19 virus  Z20.822 Asymptomatic COVID-19 Virus (Coronavirus) by PCR   2. Sore throat (viral)  J02.8     B97.89         Medical Decision Making:    Serious Comorbid Conditions:  History of DVT, Burkitt's lymphoma hypothyroidism      PLAN:    Patient Instructions   Fluids, Rest and Saline gargles    covid test.    Discharge Instructions for COVID-19 Patients  You have--or may have--COVID-19. Please follow the instructions listed below.   If you have a weakened immune system, discuss with your doctor any other actions you need to take.  How can I protect others?  If you have symptoms (fever, cough, body aches or trouble breathing):    Stay home and away from others (self-isolate) until:  ? Your other symptoms have resolved (gotten better). And   ? You've had no fever--and no medicine that reduces fever--for 1 full day (24 hours). And   ? At least 10 days have passed since your symptoms started. (You may need to wait 20 days. Follow the advice of your care team.)  If you don't show symptoms, but testing showed that you have COVID-19:    Stay home and away from others (self-isolate) until at least 10 days have passed since the date of your first positive COVID-19 test.  During this time    Stay in your own room, even for meals. Use your own bathroom if you can.    Stay away from others in your home. No hugging, kissing or shaking hands. No visitors.    Don't go to work, school or anywhere else.    Clean \"high touch\" surfaces often (doorknobs, counters, handles). Use household cleaning spray or wipes.    You'll find a full list of  on the EPA website: www.epa.gov/pesticide-registration/list-n-disinfectants-use-against-sars-cov-2.    Cover your mouth and nose with a mask or other face covering to avoid " spreading germs.    Wash your hands and face often. Use soap and water.    Caregivers in these groups are at risk for severe illness due to COVID-19:  ? People 65 years and older  ? People who live in a nursing home or long-term care facility  ? People with chronic disease (lung, heart, cancer, diabetes, kidney, liver, immunologic)  ? People who have a weakened immune system, including those who:    Are in cancer treatment    Take medicine that weakens the immune system, such as corticosteroids    Had a bone marrow or organ transplant    Have an immune deficiency    Have poorly controlled HIV or AIDS    Are obese (body mass index of 40 or higher)    Smoke regularly    Caregivers should wear gloves while washing dishes, handling laundry and cleaning bedrooms and bathrooms.    Use caution when washing and drying laundry: Don't shake dirty laundry and use the warmest water setting that you can.    For more tips on managing your health at home, go to www.cdc.gov/coronavirus/2019-ncov/downloads/10Things.pdf.  How can I take care of myself at home?  1. Get lots of rest. Drink extra fluids (unless a doctor has told you not to).  2. Take Tylenol (acetaminophen) for fever or pain. If you have liver or kidney problems, ask your family doctor if it's okay to take Tylenol.   Adults can take either:   ? 650 mg (two 325 mg pills) every 4 to 6 hours, or   ? 1,000 mg (two 500 mg pills) every 8 hours as needed.  ? Note: Don't take more than 3,000 mg in one day. Acetaminophen is found in many medicines (both prescribed and over-the-counter medicines). Read all labels to be sure you don't take too much.   For children, check the Tylenol bottle for the right dose. The dose is based on the child's age or weight.  3. If you have other health problems (like cancer, heart failure, an organ transplant or severe kidney disease): Call your specialty clinic if you don't feel better in the next 2 days.  4. Know when to call 911. Emergency  warning signs include:  ? Trouble breathing or shortness of breath  ? Pain or pressure in the chest that doesn't go away  ? Feeling confused like you haven't felt before, or not being able to wake up  ? Bluish-colored lips or face  5. Your doctor may have prescribed a blood thinner medicine. Follow their instructions.  Where can I get more information?    [facility] - About COVID-19:   [email or web address]    CDC - What to Do If You're Sick: www.cdc.gov/coronavirus/2019-ncov/about/steps-when-sick.html    CDC - Ending Home Isolation: www.cdc.gov/coronavirus/2019-ncov/hcp/disposition-in-home-patients.html    CDC - Caring for Someone: www.cdc.gov/coronavirus/2019-ncov/if-you-are-sick/care-for-someone.html    [facility] - Interim Guidance for Hospital Discharge to Home: www.health.[facility].mn./diseases/coronavirus/hcp/hospdischarge.pdf    Below are the COVID-19 hotlines at the [place] Department of Health ([facility]). Interpreters are available.  ? For health questions: Call 728-869-9722 or 1-741.412.5869 (7 a.m. to 7 p.m.)  ? For questions about schools and childcare: Call 455-123-6850 or 1-805.610.9308 (7 a.m. to 7 p.m.)    For informational purposes only. Not to replace the advice of your health care provider. Clinically reviewed by Dr. Galindo Wiggins.   Copyright   2020 [facility]. All rights reserved. [employer] [id number] - REV 01/05/21.            SUBJECTIVE:   Kobe Pedroza is a 60 year old male     He is a new patient of [place].        Adult    Concern with covid exposure  Monday - 6 days ago  Onset of symptoms was 4 day(s) ago.  Current and Associated symptoms:  sore throat & chest tightness   Chest heaviness is long standing.  His doctors are aware of this sx  Predisposing factors include bowling with  people with covid.        Review of Systems   Constitutional: Negative for fever. Fatigue: same.   HENT: Positive for sore throat. Negative for rhinorrhea.         No loss  taste/smell   Respiratory: Negative for shortness of breath. Cough: rare.    Gastrointestinal: Negative for diarrhea.   Musculoskeletal: Negative for myalgias.   Neurological: Negative for headaches.       Past Medical History:   Diagnosis Date     Burkitt's lymphoma (H)      Chemotherapy-induced neutropenia (H) 4/14/2020     Hypothyroidism      Thyroid cancer (H)      Current Outpatient Medications   Medication Sig Dispense Refill     levothyroxine (SYNTHROID/LEVOTHROID) 175 MCG tablet Take 175 mcg by mouth daily        Social History     Tobacco Use     Smoking status: Never Smoker     Smokeless tobacco: Never Used   Substance Use Topics     Alcohol use: Not Currently       OBJECTIVE  There were no vitals taken for this visit.  GENERAL: no distress over the phone  RESP: No audible wheeze, cough,  or increased work of breathing.    PSYCH: Mentation appears normal, affect normal/bright, judgement and insight intact, normal speech

## 2021-02-07 NOTE — PATIENT INSTRUCTIONS
"Fluids, Rest and Saline gargles    covid test.    Discharge Instructions for COVID-19 Patients  You have--or may have--COVID-19. Please follow the instructions listed below.   If you have a weakened immune system, discuss with your doctor any other actions you need to take.  How can I protect others?  If you have symptoms (fever, cough, body aches or trouble breathing):    Stay home and away from others (self-isolate) until:  ? Your other symptoms have resolved (gotten better). And   ? You've had no fever--and no medicine that reduces fever--for 1 full day (24 hours). And   ? At least 10 days have passed since your symptoms started. (You may need to wait 20 days. Follow the advice of your care team.)  If you don't show symptoms, but testing showed that you have COVID-19:    Stay home and away from others (self-isolate) until at least 10 days have passed since the date of your first positive COVID-19 test.  During this time    Stay in your own room, even for meals. Use your own bathroom if you can.    Stay away from others in your home. No hugging, kissing or shaking hands. No visitors.    Don't go to work, school or anywhere else.    Clean \"high touch\" surfaces often (doorknobs, counters, handles). Use household cleaning spray or wipes.    You'll find a full list of  on the EPA website: www.epa.gov/pesticide-registration/list-n-disinfectants-use-against-sars-cov-2.    Cover your mouth and nose with a mask or other face covering to avoid spreading germs.    Wash your hands and face often. Use soap and water.    Caregivers in these groups are at risk for severe illness due to COVID-19:  ? People 65 years and older  ? People who live in a nursing home or long-term care facility  ? People with chronic disease (lung, heart, cancer, diabetes, kidney, liver, immunologic)  ? People who have a weakened immune system, including those who:    Are in cancer treatment    Take medicine that weakens the immune system, such " as corticosteroids    Had a bone marrow or organ transplant    Have an immune deficiency    Have poorly controlled HIV or AIDS    Are obese (body mass index of 40 or higher)    Smoke regularly    Caregivers should wear gloves while washing dishes, handling laundry and cleaning bedrooms and bathrooms.    Use caution when washing and drying laundry: Don't shake dirty laundry and use the warmest water setting that you can.    For more tips on managing your health at home, go to www.cdc.gov/coronavirus/2019-ncov/downloads/10Things.pdf.  How can I take care of myself at home?  1. Get lots of rest. Drink extra fluids (unless a doctor has told you not to).  2. Take Tylenol (acetaminophen) for fever or pain. If you have liver or kidney problems, ask your family doctor if it's okay to take Tylenol.   Adults can take either:   ? 650 mg (two 325 mg pills) every 4 to 6 hours, or   ? 1,000 mg (two 500 mg pills) every 8 hours as needed.  ? Note: Don't take more than 3,000 mg in one day. Acetaminophen is found in many medicines (both prescribed and over-the-counter medicines). Read all labels to be sure you don't take too much.   For children, check the Tylenol bottle for the right dose. The dose is based on the child's age or weight.  3. If you have other health problems (like cancer, heart failure, an organ transplant or severe kidney disease): Call your specialty clinic if you don't feel better in the next 2 days.  4. Know when to call 911. Emergency warning signs include:  ? Trouble breathing or shortness of breath  ? Pain or pressure in the chest that doesn't go away  ? Feeling confused like you haven't felt before, or not being able to wake up  ? Bluish-colored lips or face  5. Your doctor may have prescribed a blood thinner medicine. Follow their instructions.  Where can I get more information?     [facility] - About COVID-19:   [email or web address]    CDC - What to Do If You're Sick:  www.cdc.gov/coronavirus/2019-ncov/about/steps-when-sick.html    CDC - Ending Home Isolation: www.cdc.gov/coronavirus/2019-ncov/hcp/disposition-in-home-patients.html    CDC - Caring for Someone: www.cdc.gov/coronavirus/2019-ncov/if-you-are-sick/care-for-someone.html    [facility] - Interim Guidance for Hospital Discharge to Home: www.health.[facility].mn./diseases/coronavirus/hcp/hospdischarge.pdf    Below are the COVID-19 hotlines at the [place] Department of Health ([facility]). Interpreters are available.  ? For health questions: Call 793-882-1615 or 1-592.121.6177 (7 a.m. to 7 p.m.)  ? For questions about schools and childcare: Call 568-809-9432 or 1-583.970.8828 (7 a.m. to 7 p.m.)    For informational purposes only. Not to replace the advice of your health care provider. Clinically reviewed by Dr. Galindo Wiggins.   Copyright   2020 [facility]. All rights reserved. [employer] [id number] - REV 01/05/21.

## 2021-02-08 LAB
LABORATORY COMMENT REPORT: NORMAL
SARS-COV-2 RNA RESP QL NAA+PROBE: NEGATIVE
SPECIMEN SOURCE: NORMAL

## 2021-02-11 ENCOUNTER — TELEPHONE (OUTPATIENT)
Dept: ONCOLOGY | Facility: CLINIC | Age: 61
End: 2021-02-11

## 2021-02-11 ENCOUNTER — APPOINTMENT (OUTPATIENT)
Dept: ULTRASOUND IMAGING | Facility: CLINIC | Age: 61
End: 2021-02-11
Attending: EMERGENCY MEDICINE
Payer: COMMERCIAL

## 2021-02-11 ENCOUNTER — HOSPITAL ENCOUNTER (EMERGENCY)
Facility: CLINIC | Age: 61
Discharge: HOME | End: 2021-02-11
Attending: EMERGENCY MEDICINE | Admitting: EMERGENCY MEDICINE
Payer: COMMERCIAL

## 2021-02-11 ENCOUNTER — PATIENT OUTREACH (OUTPATIENT)
Dept: ONCOLOGY | Facility: CLINIC | Age: 61
End: 2021-02-11

## 2021-02-11 VITALS
TEMPERATURE: 99.1 F | SYSTOLIC BLOOD PRESSURE: 144 MMHG | HEIGHT: 70 IN | DIASTOLIC BLOOD PRESSURE: 93 MMHG | HEART RATE: 69 BPM | BODY MASS INDEX: 32.93 KG/M2 | RESPIRATION RATE: 16 BRPM | OXYGEN SATURATION: 96 % | WEIGHT: 230 LBS

## 2021-02-11 DIAGNOSIS — E83.41 HYPERMAGNESEMIA: ICD-10-CM

## 2021-02-11 DIAGNOSIS — U07.1 2019 NOVEL CORONAVIRUS DISEASE (COVID-19): ICD-10-CM

## 2021-02-11 DIAGNOSIS — C83.78 BURKITT LYMPHOMA OF LYMPH NODES OF MULTIPLE REGIONS (H): Primary | ICD-10-CM

## 2021-02-11 DIAGNOSIS — D72.819 LEUKOPENIA, UNSPECIFIED TYPE: ICD-10-CM

## 2021-02-11 DIAGNOSIS — M79.661 PAIN OF RIGHT LOWER LEG: ICD-10-CM

## 2021-02-11 DIAGNOSIS — B34.9 VIRAL SYNDROME: ICD-10-CM

## 2021-02-11 LAB
ANION GAP SERPL CALCULATED.3IONS-SCNC: 4 MMOL/L (ref 3–14)
BASOPHILS # BLD AUTO: 0 10E9/L (ref 0–0.2)
BASOPHILS NFR BLD AUTO: 1.1 %
BUN SERPL-MCNC: 12 MG/DL (ref 7–30)
CALCIUM SERPL-MCNC: 8.5 MG/DL (ref 8.5–10.1)
CHLORIDE SERPL-SCNC: 105 MMOL/L (ref 94–109)
CO2 SERPL-SCNC: 30 MMOL/L (ref 20–32)
CREAT SERPL-MCNC: 0.96 MG/DL (ref 0.66–1.25)
DIFFERENTIAL METHOD BLD: ABNORMAL
EOSINOPHIL # BLD AUTO: 0 10E9/L (ref 0–0.7)
EOSINOPHIL NFR BLD AUTO: 0.5 %
ERYTHROCYTE [DISTWIDTH] IN BLOOD BY AUTOMATED COUNT: 11.9 % (ref 10–15)
FLUAV RNA RESP QL NAA+PROBE: NEGATIVE
FLUBV RNA RESP QL NAA+PROBE: NEGATIVE
GFR SERPL CREATININE-BSD FRML MDRD: 87 ML/MIN/{1.73_M2}
GLUCOSE SERPL-MCNC: 96 MG/DL (ref 70–99)
HCT VFR BLD AUTO: 43.7 % (ref 40–53)
HGB BLD-MCNC: 14.9 G/DL (ref 13.3–17.7)
LABORATORY COMMENT REPORT: ABNORMAL
LYMPHOCYTES # BLD AUTO: 0.5 10E9/L (ref 0.8–5.3)
LYMPHOCYTES NFR BLD AUTO: 25.9 %
MAGNESIUM SERPL-MCNC: 2.5 MG/DL (ref 1.6–2.3)
MCH RBC QN AUTO: 33.5 PG (ref 26.5–33)
MCHC RBC AUTO-ENTMCNC: 34.1 G/DL (ref 31.5–36.5)
MCV RBC AUTO: 98 FL (ref 78–100)
MONOCYTES # BLD AUTO: 0.7 10E9/L (ref 0–1.3)
MONOCYTES NFR BLD AUTO: 37 %
NEUTROPHILS # BLD AUTO: 0.6 10E9/L (ref 1.6–8.3)
NEUTROPHILS NFR BLD AUTO: 35.5 %
PLATELET # BLD AUTO: 161 10E9/L (ref 150–450)
POTASSIUM SERPL-SCNC: 3.8 MMOL/L (ref 3.4–5.3)
RBC # BLD AUTO: 4.45 10E12/L (ref 4.4–5.9)
RSV RNA SPEC QL NAA+PROBE: NEGATIVE
SARS-COV-2 RNA RESP QL NAA+PROBE: POSITIVE
SODIUM SERPL-SCNC: 139 MMOL/L (ref 133–144)
SPECIMEN SOURCE: ABNORMAL
VARIANT LYMPHS BLD QL SMEAR: PRESENT
WBC # BLD AUTO: 1.8 10E9/L (ref 4–11)

## 2021-02-11 PROCEDURE — 80048 BASIC METABOLIC PNL TOTAL CA: CPT | Performed by: EMERGENCY MEDICINE

## 2021-02-11 PROCEDURE — 85025 COMPLETE CBC W/AUTO DIFF WBC: CPT | Performed by: EMERGENCY MEDICINE

## 2021-02-11 PROCEDURE — 99284 EMERGENCY DEPT VISIT MOD MDM: CPT | Performed by: EMERGENCY MEDICINE

## 2021-02-11 PROCEDURE — 93971 EXTREMITY STUDY: CPT | Mod: RT

## 2021-02-11 PROCEDURE — 93971 EXTREMITY STUDY: CPT | Mod: 26 | Performed by: RADIOLOGY

## 2021-02-11 PROCEDURE — 83735 ASSAY OF MAGNESIUM: CPT | Performed by: EMERGENCY MEDICINE

## 2021-02-11 PROCEDURE — C9803 HOPD COVID-19 SPEC COLLECT: HCPCS | Performed by: EMERGENCY MEDICINE

## 2021-02-11 PROCEDURE — 99285 EMERGENCY DEPT VISIT HI MDM: CPT | Mod: 25 | Performed by: EMERGENCY MEDICINE

## 2021-02-11 PROCEDURE — 87636 SARSCOV2 & INF A&B AMP PRB: CPT | Performed by: EMERGENCY MEDICINE

## 2021-02-11 RX ORDER — CYCLOBENZAPRINE HCL 10 MG
10 TABLET ORAL 3 TIMES DAILY PRN
Qty: 20 TABLET | Refills: 0 | Status: SHIPPED | OUTPATIENT
Start: 2021-02-11 | End: 2021-02-17

## 2021-02-11 ASSESSMENT — MIFFLIN-ST. JEOR: SCORE: 1869.52

## 2021-02-11 NOTE — DISCHARGE INSTRUCTIONS
TODAY'S VISIT:  You were seen today for leg pain  -   - If you had any labs or imaging/radiology tests performed today, you should also discuss these tests with your usual provider.     FOLLOW-UP:  Please make an appointment to follow up with:  - Your Primary Care Provider. If you do not have a PCP, please call the Primary Care Center (phone: [phone] for an appointment    - Have your provider review the results from today's visit with you again to make sure no further follow-up or additional testing is needed based on those results.     RETURN TO THE EMERGENCY DEPARTMENT  Return to the Emergency Department at any time for any new or worsening symptoms or any concerns    TODAY'S VISIT  YOU ARE BEING TESTED FOR COVID-19 (NOVEL CORONAVIRUS).   -  The cause of your symptoms is not yet known, but you are being tested for COVID-19.  -  It has been determined that you do not medically need to be hospitalized at this time, and  you can be monitored with self-isolation at home.     YOUR TESTS FOR THIS ILLNESS ARE CURRENTLY IN PROCESS.    -  These tests are performed by the [place] [facility].  -  Our staff will contact you with your results, likely within the next 24-72 hours.  -  If your test is positive, you will also be contacted by the [place] [facility].   -  Please remain under home isolation precautions until your healthcare provider and/or state and local health department tell you it is safe, and you no longer need to self-isolate at home.     SELF-ISOLATION INSTRUCTIONS  STAY HOME. Do not go to work, school, or public areas. Avoid using public transportation, ride-sharing (Uber/Lyft), or taxis. You should only leave your home if you require medical attention (See instructions below, please contact your provider first.)   SEPARATE YOURSELF FROM OTHER PEOPLE AND ANIMALS. As much as possible, you should stay in a specific room and away from other people in your home. You should use a  separate bathroom, if available. Avoid contact with pets and other animals. When possible, have another household member care for your  family and animals while you are sick.   DO NOT SHARE HOUSEHOLD ITEMS. Do not share dishes, drinking glasses, eating utensils, towels, bedding, etc., with others or pets in your home. These items should be washed with soap and water.   WEAR A FACEMASK. Wear a facemask if you need to be around other people, and cover your mouth and nose with tissue when you cough or sneeze.  WASH YOUR HANDS OFTEN. Wash your hands with soap and water for at least 20 second, or use hand  regularly. Avoid touching your face with unwashed hands.     SELF-MONITORING INSTRUCTIONS  SEEK PROMPT MEDICAL ATTENTION IF YOUR ILLNESS IS WORSENING. Watch closely for new or worsening symptoms, such as fever, cough, shortness of breath or difficulty breathing.   SIGN UP FOR [employer]. Sign up for the [employer] application, using the information at the end of this document. Your results and a message about those results can be sent through [employer]. If you do not have [employer], we will call you with your results, but it may take longer.  IF YOU NEED MEDICAL CARE OR HAVE A MEDICAL APPOINTMENT (and it is not an emergency):   -  CALL YOUR HEALTHCARE PROVIDER BEFORE GOING TO THE [facility]  -  TELL THEM THAT YOU ARE BEING TESTING FOR AND MAY HAVE COVID-19.  YOUR CLOSE CONTACTS SHOULD MONITOR THEIR HEALTH. If your close contacts develop symptoms, they should visit [email or web address] to determine if testing is needed, and where this is done.   If you have any questions, please contact your usual health care provider or the [place] [facility] ([facility]) at [phone]    EMERGENCY INSTRUCTIONS  IF YOU NEED EMERGENCY MEDICAL ATTENTION, CALL 911 AND LET THEM KNOW YOU MAY HAVE ARE BEING EVALUATED FOR COVID-19.      WHAT IS COVID-19 (CORONAVIRUS)  COVID-19 is a viral respiratory illness caused by a newly identified  coronavirus that was discovered in late 2019 in [place]. Coronaviruses are a large family of viruses. Some coronaviruses cause illnesses in people and others only circulate among animals. Rarely, animal coronaviruses can evolve and infect people. The virus causing COVID-19 may have emerged from an animal source, and it is now able to spread from person to person.     How does it spread?   The virus is thought to spread between people who are in close contact (within about six feet) through respiratory droplets produced when an infected person coughs, sneezes, or talks. It may also spread when one person touches a surface or object that has the virus on it and then touches their mouth, nose, or eyes. However, this is not thought to be the main way the virus is transmitted.    SYMPTOMS  This coronavirus causes mild to severe respiratory illness with often with fever, cough, and/or difficulty breathing. After exposure, symptoms typically present within 2 to 14 days.     TREATMENTS AND PREVENTION  Patients may also be asked to self-quarantine at home in order to prevent the spread of the coronavirus. There is no antiviral treatment recommended for COVID-19. People with COVID-19 may receive supportive care to help relieve symptoms.    Prevention  No vaccine is currently available for the coronavirus causing COVID-19. The best way to prevent spread of the illness is to avoid exposure through simple precautions. Prevention steps include:   Stay home if you're feeling sick.   Avoid close contact with others, and try to stay at least 6-feet away from others if you're ill.   Wash your hands often with soap and warm water for at least 20 seconds, especially after going to the bathroom; before eating; and after blowing your nose, coughing, or sneezing. Use an alcohol-based hand  with at least 60 percent alcohol if soap and water are not readily available.   Clean and disinfect frequently touched objects and surfaces  using a regular household cleaning spray or wipe.     Thank you for your understanding and cooperation.

## 2021-02-11 NOTE — TELEPHONE ENCOUNTER
"Pt called in to triage reporting worsening cough and sore throat. He was exposed to a positive Covid person on 2/1, tested negative for Covid on 2/7 but symptoms continue. Last night had a temp of 99, this morning is 97.9. Feels chest is heavy but denied sob, wheezing or pain. Last year had a DVT RLE, stated one week ago felt cramping come back \"feels the same\" in calf area, denied swelling, redness r warmth. Advised pt he needs to be seen with US doppler, covid, throat swap and labs he may try to get into pcp, he stated he did not have a pcp. Informed him availability at [facility] is very limited may be best if he go to the ED for all needed assessments, he stated he still wants to hear what Dr. Wright recommends first. Dr. Wright out of the office, paged Micaela Snyder PAC.  "

## 2021-02-11 NOTE — PROGRESS NOTES
Writer received call from Jonnathan who states that he was in the ED today for concern of returned DVT. Tested positive for COVID-19 and is questioning next steps. Reviewed advice per ED notes. He is asking does he need to come in for treatment, advised that at this time, we would not bring him in to our clinic and that he needs to self-isolate and his wife should get tested and not provide anyone care as he states she is a PCA. He would like to hear from his care team, routing encounter to care team, advised him to return to ED if any further symptoms develop or worsen. He voiced understanding.

## 2021-02-11 NOTE — ED TRIAGE NOTES
Pt arrives to triage with complaints of R leg swelling. Hx DVT in R leg in the past. Pt reports running nose with cough, tested on Sunday and negative for COVID, symptoms continue. A&O

## 2021-02-11 NOTE — TELEPHONE ENCOUNTER
Per Micaela: recommend ED, relayed information to pt who verbalized understanding and will come to ED, unsure if [place] or [facility] is closer. Informed him someone else should drive him, he stated understanding.

## 2021-02-11 NOTE — ED PROVIDER NOTES
[place] EMERGENCY DEPARTMENT ([facility])  February 11, 2021  History     Chief Complaint   Patient presents with     Leg Swelling     HPI  Kobe Pedroza is a 60 year old male with a PMH of Burkitt's lymphoma, DVT, obesity, hypothyroidism, thyroid cancer S/P thyroidectomy, S/P laminectomy, and chemo induced neutropenia who presents to the ED today complaining of right leg pain.  The patient is concerned about a blood clot in his leg.  He has had a DVT in his right leg in the past and symptoms are similar today. He also feels like he has had muscle spasms in the affected leg. No associated fever.  The patient states he was on blood thinners for about 6 months after his blood clot was diagnosed.  He taking any blood thinners.  His wife reports that he was initially on Lovenox and then was resistant to an oral anticoagulant.  Previous DVT was diagnosed last spring.  He states he has felt systemically worse since stopping the blood clots, with increasing fatigue and left hip pain, he states he is starting to feel almost as bad as he did while he was undergoing his cancer treatment.  He states that after his cancer treatment had finished, he was initially feeling quite well for several months..  He is wondering if this could be related and if the blood thinners could have made him better General.  He denies any new chest pain (he does state he has some at baseline ever since he had a prior back surgery, but this is unchanged from his baseline), shortness of breath, or dizziness.  Patient states he has also had rhinorrhea, sore throat, and cough, he was tested on Sunday and was found to be negative for Covid.  However, his symptoms continue, having worsened on Tuesday.  The patient is not currently on any blood thinners.  Patient presents to the emergency department today accompanied by his wife.    I have reviewed the Medications, Allergies, Past Medical and Surgical History, and Social History in the  [employer] system.  PAST MEDICAL HISTORY:   Past Medical History:   Diagnosis Date     Burkitt's lymphoma (H)      Chemotherapy-induced neutropenia (H) 4/14/2020     Hypothyroidism      Thyroid cancer (H)        PAST SURGICAL HISTORY:   Past Surgical History:   Procedure Laterality Date     IR PICC VASCULAR  4/8/2020     LAMINECTOMY, EXCISE TUMOR THORACIC, COMBINED N/A 3/15/2020    Procedure: LAMINECTOMY, SPINE, THORACIC, 2 levels, T-5, T-6;  Surgeon: Heather Cespedes MD;  Location: [facility] OR     PICC DOUBLE LUMEN PLACEMENT Right 04/30/2020    5FR DL PICC inserted at right basilic vein , length- 39cm (1cm out), tip at low SVC.     THYROIDECTOMY          Past medical history, past surgical history, medications, and allergies were reviewed with the patient. Additional pertinent items: None    FAMILY HISTORY: No family history on file.    SOCIAL HISTORY:   Social History     Tobacco Use     Smoking status: Never Smoker     Smokeless tobacco: Never Used   Substance Use Topics     Alcohol use: Not Currently     Social history was reviewed with the patient. Additional pertinent items: None      Patient's Medications   New Prescriptions    No medications on file   Previous Medications    LEVOTHYROXINE (SYNTHROID/LEVOTHROID) 175 MCG TABLET    Take 175 mcg by mouth daily    Modified Medications    No medications on file   Discontinued Medications    No medications on file        No Known Allergies     Review of Systems   General: No fevers or chills  Skin: No rash or diaphoresis  Eyes: No eye redness or discharge  Ears/Nose/Throat: No rhinorrhea or nasal congestion  Respiratory: No cough or SOB  Cardiovascular: No chest pain or palpitations  Gastrointestinal: No nausea, vomiting, or diarrhea  Genitourinary: No urinary frequency, hematuria, or dysuria  Musculoskeletal: No arthralgias or myalgias  Neurologic: No numbness or weakness  Hematologic/Lymphatic/Immunologic: Positive for right leg swelling, no easy  "bruising/bleeding  Endocrine: No polyuria/polydypsia      A complete review of systems was performed with pertinent positives and negatives noted in the HPI, and all other systems negative.    Physical Exam   BP: (!) 149/93  Pulse: 68  Temp: 99.1  F (37.3  C)  Resp: 16  Height: 177.8 cm (5' 10\")  Weight: 104.3 kg (230 lb)  SpO2: 96 %      Physical Exam  General: Well nourished, well developed, NAD  HEENT: EOMI, anicteric. NCAT, MMM  Neck: no jugular venous distension, supple, nl ROM  Cardiac: Regular rate and rhythm. No murmurs, rubs, or gallops. Normal S1, S2.  Intact peripheral pulses  Pulm: CTAB, no stridor, wheezes, rales, rhonchi  Abd: Soft, nontender, nondistended.  No masses palpated.    Skin: Warm and dry to the touch.  No rash  Extremities: No LE edema, no cyanosis, w/w/p, mild tenderness to palpation posterior right calf, distally neurovascularly intact  Neuro: A&Ox3, no gross focal deficits        ED Course        Procedures                           Results for orders placed or performed during the hospital encounter of 02/11/21 (from the past 24 hour(s))   CBC with platelets differential   Result Value Ref Range    WBC 1.8 (L) 4.0 - 11.0 10e9/L    RBC Count 4.45 4.4 - 5.9 10e12/L    Hemoglobin 14.9 13.3 - 17.7 g/dL    Hematocrit 43.7 40.0 - 53.0 %    MCV 98 78 - 100 fl    MCH 33.5 (H) 26.5 - 33.0 pg    MCHC 34.1 31.5 - 36.5 g/dL    RDW 11.9 10.0 - 15.0 %    Platelet Count 161 150 - 450 10e9/L    Diff Method Manual Differential     % Neutrophils 35.5 %    % Lymphocytes 25.9 %    % Monocytes 37.0 %    % Eosinophils 0.5 %    % Basophils 1.1 %    Absolute Neutrophil 0.6 (L) 1.6 - 8.3 10e9/L    Absolute Lymphocytes 0.5 (L) 0.8 - 5.3 10e9/L    Absolute Monocytes 0.7 0.0 - 1.3 10e9/L    Absolute Eosinophils 0.0 0.0 - 0.7 10e9/L    Absolute Basophils 0.0 0.0 - 0.2 10e9/L    Reactive Lymphs Present    Basic metabolic panel   Result Value Ref Range    Sodium 139 133 - 144 mmol/L    Potassium 3.8 3.4 - 5.3 mmol/L "    Chloride 105 94 - 109 mmol/L    Carbon Dioxide 30 20 - 32 mmol/L    Anion Gap 4 3 - 14 mmol/L    Glucose 96 70 - 99 mg/dL    Urea Nitrogen 12 7 - 30 mg/dL    Creatinine 0.96 0.66 - 1.25 mg/dL    GFR Estimate 87 >60 mL/min/[1.73_m2]    GFR Estimate If Black >90 >60 mL/min/[1.73_m2]    Calcium 8.5 8.5 - 10.1 mg/dL   Magnesium   Result Value Ref Range    Magnesium 2.5 (H) 1.6 - 2.3 mg/dL   US Lower Extremity Venous Duplex Right    Narrative    EXAMINATION: DOPPLER VENOUS ULTRASOUND OF THE RIGHT LOWER EXTREMITY,  2/11/2021 1:37 PM     COMPARISON: Venous ultrasound of the right lower extremity dated  4/30/2020    HISTORY: Leg swelling, history of DVT    TECHNIQUE:  Gray-scale evaluation with compression, spectral flow, and  color Doppler assessment of the deep venous system of the right leg  from groin to knee, and then at the ankle.    FINDINGS:  In the right lower extremity, the common femoral, femoral, popliteal  and posterior tibial veins demonstrate normal compressibility and  blood flow. The left common femoral vein demonstrates normal  compressibility and blood flow.  The previously visualized right peroneal vein thrombosis has resolved.      Impression    IMPRESSION:  1.  No evidence of right lower extremity deep venous thrombosis.  2.  Interval resolution of right peroneal vein thrombus    I have personally reviewed the examination and initial interpretation  and I agree with the findings.    RADHA GARCIA MD     Medications - No data to display     Labs, vital signs, and imaging studies were reviewed by me.         Assessments & Plan (with Medical Decision Making)   The patient is a 60-year-old male with a past medical history of lymphoma and DVT who presents with right lower extremity swelling.  Differential diagnosis includes DVT, venous insufficiency, cellulitis.  Pt also c/o cough and mild sore throat. Ddx for this includes pneumonia, bronchitis, influenza, covid-19 infection, other viral syndrome.  Labs, covid test and ultrasound ordered to further evaluate the patient.  Patient also complains of feeling generally unwell since stopping his blood thinners, he denies any chest pain, shortness of breath, or dizziness.  I doubt that this is related, but urged patient to follow-up with his PCP regarding his new symptoms.    Ultrasound shows no DVT.    Labs are remarkable for leukopenia and hypomagnesemia    Covid test is pending at time of discharge.  Patient is advised to self isolate until this is resulted negative and advised that he should receive a call with further instructions for self-isolation if this results positive.    I have reviewed the nursing notes.    I have reviewed the findings, diagnosis, plan and need for follow up with the patient.    Patient to be discharged home. Advised to follow up with PCP within 1 week. To return to ER immediately with any new/worsening symptoms. Plan of care discussed with patient who expresses understanding and agrees with plan of care.  Prescription for Flexeril for symptomatic relief at home was provided to the patient.    After patient was discharged from the emergency department, I received a call from the microbiology lab informing me that the patient's COVID-19 test resulted positive.  I did call and inform the patient and his wife of this result and advised patient to continue to self isolate for at least 10 days after symptom onset or 24 hours after fevers/other symptoms have resolved, whichever is longer.  Patient's wife advised to self isolate at home with the patient and to receive Covid testing herself.  They are advised to inform anyone he may have had contact with within the last several days of patient's positive Covid test result ended advised them to self isolate/get tested for Covid as well.  Patient is advised to return to the emergency department should his symptoms worsen.  Patient is also advised on ways to minimize Covid transmission within the  home to other family members (including frequent handwashing disinfection of surfaces, sleeping in separate bedrooms/using separate bathrooms if possible avoiding sharing dishes/other personal items)      New Prescriptions    CYCLOBENZAPRINE (FLEXERIL) 10 MG TABLET    Take 1 tablet (10 mg) by mouth 3 times daily as needed for muscle spasms       Final diagnoses:   Hypermagnesemia   Leukopenia, unspecified type   Viral syndrome   Pain of right lower leg   2019 novel coronavirus disease (COVID-19)     Eloise Pratt MD  2/11/2021   [facility] EMERGENCY DEPARTMENT     Eloise Pratt MD  02/11/21 [id number]       Eloise Pratt MD  02/11/21 [id number]

## 2021-02-12 ENCOUNTER — HOME INFUSION (PRE-WILLOW HOME INFUSION) (OUTPATIENT)
Dept: PHARMACY | Facility: CLINIC | Age: 61
End: 2021-02-12

## 2021-02-12 DIAGNOSIS — U07.1 2019 NOVEL CORONAVIRUS DISEASE (COVID-19): ICD-10-CM

## 2021-02-12 RX ORDER — HEPARIN SODIUM (PORCINE) LOCK FLUSH IV SOLN 100 UNIT/ML 100 UNIT/ML
5 SOLUTION INTRAVENOUS
Status: CANCELLED | OUTPATIENT
Start: 2021-02-12

## 2021-02-12 RX ORDER — HEPARIN SODIUM,PORCINE 10 UNIT/ML
5 VIAL (ML) INTRAVENOUS
Status: CANCELLED | OUTPATIENT
Start: 2021-02-12

## 2021-02-15 ENCOUNTER — HOME INFUSION (PRE-WILLOW HOME INFUSION) (OUTPATIENT)
Dept: PHARMACY | Facility: CLINIC | Age: 61
End: 2021-02-15

## 2021-02-15 ENCOUNTER — DOCUMENTATION ONLY (OUTPATIENT)
Dept: PHARMACY | Facility: CLINIC | Age: 61
End: 2021-02-15

## 2021-02-15 NOTE — PROGRESS NOTES
This is a recent snapshot of the patient's [facility] medical record.  For current drug dose and complete information and questions, call [phone] or In Basket pool, [facility] home infusion ([id number])  CSN Number:  [id number]

## 2021-02-15 NOTE — PROGRESS NOTES
Skilled Nurse visit in the [facility] to administer Bamlanivimab 700mg in 200mL sodium chloride 0.9% IV via CADD pump over ~1hour followed by 1 hour post infusion observation. PIV placed L AC, 1 attempt/s.Infusion completed without complication or reaction.  Jennifer Carlos RN  [facility]  [email or web address]  [phone]

## 2021-02-16 NOTE — PROGRESS NOTES
This is a recent snapshot of the patient's [facility] medical record.  For current drug dose and complete information and questions, call [phone] or In Basket pool, [facility] home infusion ([id number])  CSN Number:  [id number]

## 2021-05-03 ENCOUNTER — HOSPITAL ENCOUNTER (OUTPATIENT)
Dept: LAB | Facility: CLINIC | Age: 61
Discharge: HOME | End: 2021-05-03
Attending: INTERNAL MEDICINE | Admitting: INTERNAL MEDICINE
Payer: COMMERCIAL

## 2021-05-03 DIAGNOSIS — C83.78 BURKITT LYMPHOMA OF LYMPH NODES OF MULTIPLE REGIONS (H): ICD-10-CM

## 2021-05-03 LAB
ALBUMIN SERPL-MCNC: 4.3 G/DL (ref 3.4–5)
ALP SERPL-CCNC: 77 U/L (ref 40–150)
ALT SERPL W P-5'-P-CCNC: 59 U/L (ref 0–70)
ANION GAP SERPL CALCULATED.3IONS-SCNC: 4 MMOL/L (ref 3–14)
AST SERPL W P-5'-P-CCNC: 35 U/L (ref 0–45)
BASOPHILS # BLD AUTO: 0 10E9/L (ref 0–0.2)
BASOPHILS NFR BLD AUTO: 0.6 %
BILIRUB SERPL-MCNC: 0.8 MG/DL (ref 0.2–1.3)
BUN SERPL-MCNC: 18 MG/DL (ref 7–30)
CALCIUM SERPL-MCNC: 8.7 MG/DL (ref 8.5–10.1)
CHLORIDE SERPL-SCNC: 106 MMOL/L (ref 94–109)
CO2 SERPL-SCNC: 31 MMOL/L (ref 20–32)
CREAT SERPL-MCNC: 1.03 MG/DL (ref 0.66–1.25)
DIFFERENTIAL METHOD BLD: NORMAL
EOSINOPHIL # BLD AUTO: 0.1 10E9/L (ref 0–0.7)
EOSINOPHIL NFR BLD AUTO: 1.2 %
ERYTHROCYTE [DISTWIDTH] IN BLOOD BY AUTOMATED COUNT: 12.8 % (ref 10–15)
GFR SERPL CREATININE-BSD FRML MDRD: 79 ML/MIN/{1.73_M2}
GLUCOSE SERPL-MCNC: 92 MG/DL (ref 70–99)
HCT VFR BLD AUTO: 42.2 % (ref 40–53)
HGB BLD-MCNC: 14.4 G/DL (ref 13.3–17.7)
IMM GRANULOCYTES # BLD: 0 10E9/L (ref 0–0.4)
IMM GRANULOCYTES NFR BLD: 0.3 %
LDH SERPL L TO P-CCNC: 201 U/L (ref 85–227)
LYMPHOCYTES # BLD AUTO: 1 10E9/L (ref 0.8–5.3)
LYMPHOCYTES NFR BLD AUTO: 13.1 %
MCH RBC QN AUTO: 31 PG (ref 26.5–33)
MCHC RBC AUTO-ENTMCNC: 34.1 G/DL (ref 31.5–36.5)
MCV RBC AUTO: 91 FL (ref 78–100)
MONOCYTES # BLD AUTO: 0.8 10E9/L (ref 0–1.3)
MONOCYTES NFR BLD AUTO: 11.2 %
NEUTROPHILS # BLD AUTO: 5.3 10E9/L (ref 1.6–8.3)
NEUTROPHILS NFR BLD AUTO: 73.6 %
NRBC # BLD AUTO: 0 10*3/UL
NRBC BLD AUTO-RTO: 0 /100
PLATELET # BLD AUTO: 235 10E9/L (ref 150–450)
POTASSIUM SERPL-SCNC: 3.8 MMOL/L (ref 3.4–5.3)
PROT SERPL-MCNC: 7.6 G/DL (ref 6.8–8.8)
RBC # BLD AUTO: 4.64 10E12/L (ref 4.4–5.9)
SODIUM SERPL-SCNC: 141 MMOL/L (ref 133–144)
WBC # BLD AUTO: 7.3 10E9/L (ref 4–11)

## 2021-05-03 PROCEDURE — 83615 LACTATE (LD) (LDH) ENZYME: CPT | Performed by: INTERNAL MEDICINE

## 2021-05-03 PROCEDURE — 80053 COMPREHEN METABOLIC PANEL: CPT | Performed by: INTERNAL MEDICINE

## 2021-05-03 PROCEDURE — 85025 COMPLETE CBC W/AUTO DIFF WBC: CPT | Performed by: INTERNAL MEDICINE

## 2021-05-03 PROCEDURE — 36415 COLL VENOUS BLD VENIPUNCTURE: CPT | Performed by: INTERNAL MEDICINE

## 2021-05-04 NOTE — PROGRESS NOTES
"Jonnathan is a 61 year old who is being evaluated via a billable video visit.      How would you like to obtain your AVS? MyChart  If the video visit is dropped, the invitation should be resent by: Text to cell phone: [phone]  Will anyone else be joining your video visit? No    Vitals - Patient Reported  Weight (Patient Reported): 102.1 kg (225 lb)  Height (Patient Reported): 177.8 cm (5' 10\")  BMI (Based on Pt Reported Ht/Wt): 32.28  Pain Score: No Pain (0)    Video-Visit Details    Type of service:  Video Visit    Originating Location (pt. Location): Home    Distant Location (provider location):  [facility]     Platform used for Video Visit: Mary Zamora MA          REASON FOR VISIT:  Management of Burkitt lymphoma    HISTORY OF PRESENT ILLNESS:  Mr. Pedroza is a 61 year old man with a history of Burkitt lymphoma.  To summarize his course, he presented with acute on chronic back pain in 03/2020.  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  CSF was negative.  He was started on steroids followed by treatment with R-CODOX-M/IVAC and IT chemo prophylaxis on 3/18/2020.  His course was complicated by neutropenic sepsis that resolved and right lower extremity below the knee DVT identified on 4/30/2020, so he has been on therapeutic anticoagulation.  PET/CT after cycle 2 (1st R-IVAC) and again after completing treatment confirmed PET-negative complete response.  Visit for ongoing management.    He is with his wife, Mckayla.  Since his last visit, he has been feeling OK.  He recovered from COVID-19 without major issues.  Back pain waxes and wanes but nothing new.  No fevers, night sweats, or unintentional weight loss.  No headache or focal weakness or sensory changes.  No dyspnea, cough, or chest pain.  No lumps or bumps.  Overall, managing well.    MEDICATIONS:  Current " Outpatient Medications   Medication     levothyroxine (SYNTHROID/LEVOTHROID) 175 MCG tablet     No current facility-administered medications for this visit.      PHYSICAL EXAMINATION:  There were no vitals taken for this visit.  Wt Readings from Last 5 Encounters:   02/11/21 104.3 kg (230 lb)   12/31/20 105.2 kg (232 lb)   12/10/20 105.6 kg (232 lb 14.4 oz)   06/15/20 97.5 kg (215 lb)   05/27/20 98 kg (216 lb)     General: Well appearing.  HEENT: Sclerae anicteric.  Lungs: Breathing comfortably. No cough.  MSK: Grossly normal movement.  Neuro: Grossly non-focal.  Skin/access: Normal skin tone.  Psych: Alert and oriented. No distress.  Performance status: ECOG 0    The rest of a comprehensive physical examination is deferred due to PHE (public health emergency) video visit restrictions    LAB DATA:  Reviewed by me  Recent Labs   Lab Test 05/03/21 1712 02/11/21  1127 01/11/21  0920   WBC 7.3 1.8* 5.9   HGB 14.4 14.9 12.4*    161 195   ANEU 5.3 0.6* 4.5   ALYM 1.0 0.5* 0.5*     Recent Labs   Lab Test 05/03/21  1712 02/11/21  1127 01/11/21  0920 12/10/20  1221 06/15/20  1335 06/15/20  1335 05/26/20  0355 05/26/20  0355 05/25/20  0400 03/17/20  0556 03/17/20  0556    139 139 139   < > 139   < > 138 140   < > 139   POTASSIUM 3.8 3.8 4.0 3.8   < > 3.5   < > 4.0 3.7   < > 4.0   CHLORIDE 106 105 107 109   < > 105   < > 112* 112*   < > 107   CO2 31 30 27 27   < > 30   < > 23 24   < > 26   BUN 18 12 19 20   < > 13   < > 16 15   < > 25   CR 1.03 0.96 1.01 0.93   < > 0.88   < > 0.72 0.70   < > 0.71   RUTH 8.7 8.5 8.3* 8.6   < > 8.1*   < > 7.3* 7.4*   < > 7.2*   MAG  --  2.5*  --   --   --  2.3  --   --  2.3   < > 2.1   PHOS  --   --   --   --   --  3.6  --  2.3* 2.1*   < > 4.1   URIC  --   --   --   --   --  4.8  --  3.2* 3.1*   < > 3.7     --  302* 268*   < > 304*  --  226 220   < >  --    [id number]  --   --   --   --   --   --   --   --   --   --  2.0    < > = values in this interval not displayed.      Recent Labs   Lab Test 05/03/21  1712 01/11/21  0920 12/10/20  1221 06/15/20  1335 06/15/20  1335 05/26/20  0355 05/26/20  0355 05/25/20  0400   AST 35 31 32   < > 51*   < >  --  18   ALT 59 62 53   < > 85*   < >  --  51   ALKPHOS 77 84 89   < > 73   < >  --  78   BILITOTAL 0.8 1.1 0.7   < > 0.4   < >  --  0.3   INR  --   --   --   --  1.04  --  1.03 1.11    < > = values in this interval not displayed.     IMPRESSION AND PLAN:  Mr. Pedroza is a 61 year old man with a history of Burkitt lymphoma.    His lymphoma clinically remains in remission.  There is nothing to suggest recurrence.  Labs look great.  We will continue to monitor.    He has no active infections.  He received monoclonal antibody treatment and recovered from COVID-19 without major complications.  He is up to date for the flu shot and I recommended pneumococcal (Prevnar followed by Pneumovax at least 8 weeks later) and Shingrix vaccines that he can start anytime - but need to be spaced at least 2 weeks from COVID-19 vaccines.  We discussed the importance of following local and federal guidance regarding measures to reduce the risk of COVID-19 given the increased risk of complications in the setting of an immunocompromised state.  He will get a COVID-19 vaccine at least 90 days after receiving monoclonal antibody treatment.      He will worked with a chiropractor and his PCP for his musculoskeletal left low back/hip pain that is getting better.  He completed apixaban for 3 months for lymphoma-related DVT.  He is working on finding a new in network PCP for his general health issues.  He will continue to see his endocrinologist Dr. Dunaway for his thyroid replacement.    We will arrange another visit in about 3 months with labs.  I reminded him to contact if questions, concerns, or new issues arise between visits.    Video visit start time: 9:20 AM  Video visit end time: 9:38 AM  Video visit duration: 18 minutes    Ever Wright MD, PhD  Attending  Physician, [facility]   of Medicine, [facility]  Division of Hematology, Oncology, and Transplantation  [phone] clinic

## 2021-05-05 ENCOUNTER — VIRTUAL VISIT (OUTPATIENT)
Dept: ONCOLOGY | Facility: CLINIC | Age: 61
End: 2021-05-05
Attending: INTERNAL MEDICINE
Payer: COMMERCIAL

## 2021-05-05 DIAGNOSIS — Z86.718 HISTORY OF DVT (DEEP VEIN THROMBOSIS): ICD-10-CM

## 2021-05-05 DIAGNOSIS — C83.78 BURKITT LYMPHOMA OF LYMPH NODES OF MULTIPLE REGIONS (H): Primary | ICD-10-CM

## 2021-05-05 PROCEDURE — 999N001193 HC VIDEO/TELEPHONE VISIT; NO CHARGE

## 2021-05-05 PROCEDURE — 99213 OFFICE O/P EST LOW 20 MIN: CPT | Mod: GT | Performed by: INTERNAL MEDICINE

## 2021-05-05 NOTE — LETTER
"    5/5/2021         RE: Kobe Pedroza  [address]  [facility] [address]        Dear Colleague,    Thank you for referring your patient, Kobe Pedroza, to the [facility]. Please see a copy of my visit note below.    Jonnathan is a 61 year old who is being evaluated via a billable video visit.      How would you like to obtain your AVS? MyChart  If the video visit is dropped, the invitation should be resent by: Text to cell phone: [phone]  Will anyone else be joining your video visit? No    Vitals - Patient Reported  Weight (Patient Reported): 102.1 kg (225 lb)  Height (Patient Reported): 177.8 cm (5' 10\")  BMI (Based on Pt Reported Ht/Wt): 32.28  Pain Score: No Pain (0)    Video-Visit Details    Type of service:  Video Visit    Originating Location (pt. Location): Home    Distant Location (provider location):  [facility]     Platform used for Video Visit: Mary Zamora MA          REASON FOR VISIT:  Management of Burkitt lymphoma    HISTORY OF PRESENT ILLNESS:  Mr. Pedroza is a 61 year old man with a history of Burkitt lymphoma.  To summarize his course, he presented with acute on chronic back pain in 03/2020.  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  CSF was negative.  He was started on steroids followed by treatment with R-CODOX-M/IVAC and IT chemo prophylaxis on 3/18/2020.  His course was complicated by neutropenic sepsis that resolved and right lower extremity below the knee DVT identified on 4/30/2020, so he has been on therapeutic anticoagulation.  PET/CT after cycle 2 (1st R-IVAC) and again after completing treatment confirmed PET-negative complete response.  Visit for ongoing management.    He is with his wife, Mckayla.  Since his last visit, he has been feeling OK.  He recovered from COVID-19 without major " issues.  Back pain waxes and wanes but nothing new.  No fevers, night sweats, or unintentional weight loss.  No headache or focal weakness or sensory changes.  No dyspnea, cough, or chest pain.  No lumps or bumps.  Overall, managing well.    MEDICATIONS:  Current Outpatient Medications   Medication     levothyroxine (SYNTHROID/LEVOTHROID) 175 MCG tablet     No current facility-administered medications for this visit.      PHYSICAL EXAMINATION:  There were no vitals taken for this visit.  Wt Readings from Last 5 Encounters:   02/11/21 104.3 kg (230 lb)   12/31/20 105.2 kg (232 lb)   12/10/20 105.6 kg (232 lb 14.4 oz)   06/15/20 97.5 kg (215 lb)   05/27/20 98 kg (216 lb)     General: Well appearing.  HEENT: Sclerae anicteric.  Lungs: Breathing comfortably. No cough.  MSK: Grossly normal movement.  Neuro: Grossly non-focal.  Skin/access: Normal skin tone.  Psych: Alert and oriented. No distress.  Performance status: ECOG 0    The rest of a comprehensive physical examination is deferred due to PHE (public health emergency) video visit restrictions    LAB DATA:  Reviewed by me  Recent Labs   Lab Test 05/03/21 1712 02/11/21  1127 01/11/21  0920   WBC 7.3 1.8* 5.9   HGB 14.4 14.9 12.4*    161 195   ANEU 5.3 0.6* 4.5   ALYM 1.0 0.5* 0.5*     Recent Labs   Lab Test 05/03/21  1712 02/11/21  1127 01/11/21  0920 12/10/20  1221 06/15/20  1335 06/15/20  1335 05/26/20  0355 05/26/20  0355 05/25/20  0400 03/17/20  0556 03/17/20  0556    139 139 139   < > 139   < > 138 140   < > 139   POTASSIUM 3.8 3.8 4.0 3.8   < > 3.5   < > 4.0 3.7   < > 4.0   CHLORIDE 106 105 107 109   < > 105   < > 112* 112*   < > 107   CO2 31 30 27 27   < > 30   < > 23 24   < > 26   BUN 18 12 19 20   < > 13   < > 16 15   < > 25   CR 1.03 0.96 1.01 0.93   < > 0.88   < > 0.72 0.70   < > 0.71   URTH 8.7 8.5 8.3* 8.6   < > 8.1*   < > 7.3* 7.4*   < > 7.2*   MAG  --  2.5*  --   --   --  2.3  --   --  2.3   < > 2.1   PHOS  --   --   --   --   --  3.6  --   2.3* 2.1*   < > 4.1   URIC  --   --   --   --   --  4.8  --  3.2* 3.1*   < > 3.7     --  302* 268*   < > 304*  --  226 220   < >  --    [id number]  --   --   --   --   --   --   --   --   --   --  2.0    < > = values in this interval not displayed.     Recent Labs   Lab Test 05/03/21  1712 01/11/21  0920 12/10/20  1221 06/15/20  1335 06/15/20  1335 05/26/20  0355 05/26/20  0355 05/25/20  0400   AST 35 31 32   < > 51*   < >  --  18   ALT 59 62 53   < > 85*   < >  --  51   ALKPHOS 77 84 89   < > 73   < >  --  78   BILITOTAL 0.8 1.1 0.7   < > 0.4   < >  --  0.3   INR  --   --   --   --  1.04  --  1.03 1.11    < > = values in this interval not displayed.     IMPRESSION AND PLAN:  Mr. Pedroza is a 61 year old man with a history of Burkitt lymphoma.    His lymphoma clinically remains in remission.  There is nothing to suggest recurrence.  Labs look great.  We will continue to monitor.    He has no active infections.  He received monoclonal antibody treatment and recovered from COVID-19 without major complications.  He is up to date for the flu shot and I recommended pneumococcal (Prevnar followed by Pneumovax at least 8 weeks later) and Shingrix vaccines that he can start anytime - but need to be spaced at least 2 weeks from COVID-19 vaccines.  We discussed the importance of following local and federal guidance regarding measures to reduce the risk of COVID-19 given the increased risk of complications in the setting of an immunocompromised state.  He will get a COVID-19 vaccine at least 90 days after receiving monoclonal antibody treatment.      He will worked with a chiropractor and his PCP for his musculoskeletal left low back/hip pain that is getting better.  He completed apixaban for 3 months for lymphoma-related DVT.  He is working on finding a new in network PCP for his general health issues.  He will continue to see his endocrinologist Dr. Dunaway for his thyroid replacement.    We will arrange another visit  in about 3 months with labs.  I reminded him to contact if questions, concerns, or new issues arise between visits.    Video visit start time: 9:20 AM  Video visit end time: 9:38 AM  Video visit duration: 18 minutes    Ever Wright MD, PhD  Attending Physician, [facility]   of Medicine, [facility]  Division of Hematology, Oncology, and Transplantation  [phone] clinic          Again, thank you for allowing me to participate in the care of your patient.        Sincerely,        Ever Wright MD

## 2021-07-08 ENCOUNTER — PATIENT OUTREACH (OUTPATIENT)
Dept: ONCOLOGY | Facility: CLINIC | Age: 61
End: 2021-07-08

## 2021-07-08 NOTE — PROGRESS NOTES
Jonnathan called and wanted to let us know about what was happening with his mouth and throat. He is having a scope on 7/23.     Complains of a continued cough, thrush is better.     Writer will continue to follow.

## 2021-08-02 PROBLEM — Z86.16 HISTORY OF COVID-19: Status: ACTIVE | Noted: 2021-02-12

## 2021-08-02 NOTE — PROGRESS NOTES
REASON FOR VISIT:  Management of Burkitt lymphoma    HISTORY OF PRESENT ILLNESS:  Mr. Pedroza is a 61 year old man with a history of Burkitt lymphoma.  To summarize his course, he presented with acute on chronic back pain in 03/2020.  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  CSF was negative.  He was was treated with steroids followed by R-CODOX-M/IVAC and IT chemo prophylaxis between 03/2020 and 06/2020.  His course was complicated by neutropenic sepsis that resolved and right lower extremity below the knee DVT identified on 4/30/2020, so he has been on therapeutic anticoagulation.  PET/CT after cycle 2 (1st R-IVAC) and again after completing treatment confirmed PET-negative complete response.  He recovered from COVID-19 after monoclonal antibody treatment in 02/2021.  Visit for ongoing management.    He is not with his wife Mckayla today.  Since his last visit, he has been feeling OK.  Had some GERD and possible thrush that that is better after fluconazole and had EGD and biopsies only with inflammation - getting better.  Back pain waxes and wanes but nothing new.  No fevers, night sweats, or unintentional weight loss.  No headache or focal weakness or sensory changes.  No dyspnea, cough, or chest pain.  No lumps or bumps.  Still worries some about relapse.  Thinking of getting back to work in the next 1-2 months.    MEDICATIONS:  Current Outpatient Medications   Medication     levothyroxine (SYNTHROID/LEVOTHROID) 175 MCG tablet     No current facility-administered medications for this visit.     PHYSICAL EXAMINATION:  /81   Pulse 61   Temp 98.4  F (36.9  C) (Oral)   Resp 18   Wt 108.8 kg (239 lb 14.4 oz)   SpO2 97%   BMI 34.42 kg/m    Wt Readings from Last 5 Encounters:   08/04/21 108.8 kg (239 lb 14.4 oz)   02/11/21 104.3 kg (230 lb)   12/31/20 105.2 kg (232 lb)   12/10/20 105.6 kg (232 lb  14.4 oz)   06/15/20 97.5 kg (215 lb)     General: Well appearing. No distress.  HEENT: Sclerae anicteric. No OP lesions, masses, or tonsilar enlargement.  Lungs: Clear bilaterally without wheezing or crackles.  Heart: Regular rate and rhythm.  Gastrointestinal: Bowel sounds present, no tenderness to palpation, spleen tip not palpable.  Extremities: No lower extremity edema.  Lymph:  No cervical, clavicular, axillary, epitrochlear, or inguinal lymphadenopathy.  Performance status: ECOG 0    LAB DATA:  Reviewed by me  Recent Labs   Lab Test 08/04/21  0748 05/03/21  1712 02/11/21  1127 01/11/21  0920   WBC 6.6 7.3 1.8* 5.9   HGB 14.7 14.4 14.9 12.4*    235 161 195   ANEU  --  5.3 0.6* 4.5   ALYM  --  1.0 0.5* 0.5*     Recent Labs   Lab Test 08/04/21  0748 05/03/21  1712 02/11/21  1127 01/11/21  0920 06/18/20  0000 06/15/20  1335 05/26/20  0355 05/25/20  0400 03/17/20  1214 03/17/20  0556    141 139 139  --  139 138 140   < > 139   POTASSIUM 3.9 3.8 3.8 4.0  --  3.5 4.0 3.7   < > 4.0   CHLORIDE 108 106 105 107  --  105 112* 112*   < > 107   CO2 27 31 30 27  --  30 23 24   < > 26   BUN 16 18 12 19  --  13 16 15   < > 25   CR 1.06 1.03 0.96 1.01  --  0.88 0.72 0.70   < > 0.71   RUTH 8.6 8.7 8.5 8.3*  --  8.1* 7.3* 7.4*   < > 7.2*   MAG  --   --  2.5*  --   --  2.3  --  2.3   < > 2.1   PHOS  --   --   --   --   --  3.6 2.3* 2.1*   < > 4.1   URIC  --   --   --   --   --  4.8 3.2* 3.1*   < > 3.7    201  --  302*   < > 304* 226 220  --   --    [id number]  --   --   --   --   --   --   --   --   --  2.0    < > = values in this interval not displayed.     Recent Labs   Lab Test 08/04/21  0748 05/03/21  1712 01/11/21  0920 06/15/20  1335 05/26/20  0355 05/25/20  0400   AST 20 35 31 51*  --  18   ALT 50 59 62 85*  --  51   ALKPHOS 78 77 84 73  --  78   BILITOTAL 0.5 0.8 1.1 0.4  --  0.3   INR  --   --   --  1.04 1.03 1.11     IMPRESSION AND PLAN:  Mr. Pedroza is a 61 year old man with a history of Burkitt  lymphoma.    His lymphoma clinically remains in remission.  There is nothing to suggest recurrence.  Labs look great.  We will continue to monitor.    He has no active infections.  He is up to date for the flu shot.  He will get Prevnar today.  I recommended Pneumovax at least 8 weeks after Prevnar and Shingrix vaccines that he can start anytime.  We discussed the importance of following up to date local and federal health agency guidance regarding measures to reduce the risk of COVID-19 for immunocompromised individuals.  He recovered from COVID-19 and completed a COVID-19 vaccine.      He completed apixaban for 3 months for lymphoma-related DVT.  He is not interested in PT for his back issues or Health Psych for anxiety around his lymphoma at this time but will let us know if he changes his mind.  I encouraged him to find  a new PCP for his health maintenance and other health issues.  He will continue to see his endocrinologist Dr. Dunaway for his thyroid replacement.    We will arrange another visit in about 3 months with labs.  I reminded him to contact if questions, concerns, or new issues arise between visits.    Total of 45 minutes on patient visit, reviewing records, interpreting test results, placing orders, and documentation on the day of service.    Ever Wright MD, PhD  Attending Physician, [facility]   of Medicine, [facility]  Division of Hematology, Oncology, and Transplantation  [phone] [facility]

## 2021-08-04 ENCOUNTER — APPOINTMENT (OUTPATIENT)
Dept: LAB | Facility: CLINIC | Age: 61
End: 2021-08-04
Attending: INTERNAL MEDICINE
Payer: COMMERCIAL

## 2021-08-04 ENCOUNTER — ONCOLOGY VISIT (OUTPATIENT)
Dept: ONCOLOGY | Facility: CLINIC | Age: 61
End: 2021-08-04
Attending: INTERNAL MEDICINE
Payer: COMMERCIAL

## 2021-08-04 VITALS
OXYGEN SATURATION: 97 % | DIASTOLIC BLOOD PRESSURE: 81 MMHG | BODY MASS INDEX: 34.42 KG/M2 | SYSTOLIC BLOOD PRESSURE: 128 MMHG | RESPIRATION RATE: 18 BRPM | HEART RATE: 61 BPM | WEIGHT: 239.9 LBS | TEMPERATURE: 98.4 F

## 2021-08-04 DIAGNOSIS — Z86.16 HISTORY OF COVID-19: ICD-10-CM

## 2021-08-04 DIAGNOSIS — Z86.718 HISTORY OF DVT (DEEP VEIN THROMBOSIS): ICD-10-CM

## 2021-08-04 DIAGNOSIS — C83.78 BURKITT LYMPHOMA OF LYMPH NODES OF MULTIPLE REGIONS (H): Primary | ICD-10-CM

## 2021-08-04 LAB
ALBUMIN SERPL-MCNC: 4 G/DL (ref 3.4–5)
ALP SERPL-CCNC: 78 U/L (ref 40–150)
ALT SERPL W P-5'-P-CCNC: 50 U/L (ref 0–70)
ANION GAP SERPL CALCULATED.3IONS-SCNC: 3 MMOL/L (ref 3–14)
AST SERPL W P-5'-P-CCNC: 20 U/L (ref 0–45)
BASOPHILS # BLD AUTO: 0.1 10E3/UL (ref 0–0.2)
BASOPHILS NFR BLD AUTO: 1 %
BILIRUB SERPL-MCNC: 0.5 MG/DL (ref 0.2–1.3)
BUN SERPL-MCNC: 16 MG/DL (ref 7–30)
CALCIUM SERPL-MCNC: 8.6 MG/DL (ref 8.5–10.1)
CHLORIDE BLD-SCNC: 108 MMOL/L (ref 94–109)
CO2 SERPL-SCNC: 27 MMOL/L (ref 20–32)
CREAT SERPL-MCNC: 1.06 MG/DL (ref 0.66–1.25)
EOSINOPHIL # BLD AUTO: 0.1 10E3/UL (ref 0–0.7)
EOSINOPHIL NFR BLD AUTO: 2 %
ERYTHROCYTE [DISTWIDTH] IN BLOOD BY AUTOMATED COUNT: 12.6 % (ref 10–15)
GFR SERPL CREATININE-BSD FRML MDRD: 76 ML/MIN/1.73M2
GLUCOSE BLD-MCNC: 108 MG/DL (ref 70–99)
HCT VFR BLD AUTO: 42.8 % (ref 40–53)
HGB BLD-MCNC: 14.7 G/DL (ref 13.3–17.7)
IMM GRANULOCYTES # BLD: 0 10E3/UL
IMM GRANULOCYTES NFR BLD: 0 %
LDH SERPL L TO P-CCNC: 181 U/L (ref 85–227)
LYMPHOCYTES # BLD AUTO: 1.1 10E3/UL (ref 0.8–5.3)
LYMPHOCYTES NFR BLD AUTO: 17 %
MCH RBC QN AUTO: 31.7 PG (ref 26.5–33)
MCHC RBC AUTO-ENTMCNC: 34.3 G/DL (ref 31.5–36.5)
MCV RBC AUTO: 92 FL (ref 78–100)
MONOCYTES # BLD AUTO: 0.8 10E3/UL (ref 0–1.3)
MONOCYTES NFR BLD AUTO: 12 %
NEUTROPHILS # BLD AUTO: 4.5 10E3/UL (ref 1.6–8.3)
NEUTROPHILS NFR BLD AUTO: 68 %
NRBC # BLD AUTO: 0 10E3/UL
NRBC BLD AUTO-RTO: 0 /100
PLATELET # BLD AUTO: 199 10E3/UL (ref 150–450)
POTASSIUM BLD-SCNC: 3.9 MMOL/L (ref 3.4–5.3)
PROT SERPL-MCNC: 7.5 G/DL (ref 6.8–8.8)
RBC # BLD AUTO: 4.64 10E6/UL (ref 4.4–5.9)
SODIUM SERPL-SCNC: 138 MMOL/L (ref 133–144)
WBC # BLD AUTO: 6.6 10E3/UL (ref 4–11)

## 2021-08-04 PROCEDURE — 90670 PCV13 VACCINE IM: CPT | Performed by: INTERNAL MEDICINE

## 2021-08-04 PROCEDURE — 85041 AUTOMATED RBC COUNT: CPT | Performed by: INTERNAL MEDICINE

## 2021-08-04 PROCEDURE — 250N000011 HC RX IP 250 OP 636: Performed by: INTERNAL MEDICINE

## 2021-08-04 PROCEDURE — 82040 ASSAY OF SERUM ALBUMIN: CPT | Performed by: INTERNAL MEDICINE

## 2021-08-04 PROCEDURE — G0009 ADMIN PNEUMOCOCCAL VACCINE: HCPCS | Performed by: INTERNAL MEDICINE

## 2021-08-04 PROCEDURE — 36415 COLL VENOUS BLD VENIPUNCTURE: CPT | Performed by: INTERNAL MEDICINE

## 2021-08-04 PROCEDURE — G0463 HOSPITAL OUTPT CLINIC VISIT: HCPCS

## 2021-08-04 PROCEDURE — 99215 OFFICE O/P EST HI 40 MIN: CPT | Performed by: INTERNAL MEDICINE

## 2021-08-04 PROCEDURE — G0463 HOSPITAL OUTPT CLINIC VISIT: HCPCS | Mod: 25

## 2021-08-04 PROCEDURE — 83615 LACTATE (LD) (LDH) ENZYME: CPT | Performed by: INTERNAL MEDICINE

## 2021-08-04 RX ADMIN — PNEUMOCOCCAL 13-VALENT CONJUGATE VACCINE 0.5 ML: 2.2; 2.2; 2.2; 2.2; 2.2; 4.4; 2.2; 2.2; 2.2; 2.2; 2.2; 2.2; 2.2 INJECTION, SUSPENSION INTRAMUSCULAR at 08:55

## 2021-08-04 ASSESSMENT — PAIN SCALES - GENERAL: PAINLEVEL: NO PAIN (0)

## 2021-08-04 NOTE — NURSING NOTE
"Oncology Rooming Note    August 4, 2021 7:57 AM   Kobe Pedroza is a 61 year old male who presents for:    Chief Complaint   Patient presents with     Blood Draw     Vitals, blood drawn via VPT by LPN. Pt checked into appt.     Oncology Clinic Visit     Burkitt lymphoma     Initial Vitals: /81   Pulse 61   Temp 98.4  F (36.9  C) (Oral)   Resp 18   Wt 108.8 kg (239 lb 14.4 oz)   SpO2 97%   BMI 34.42 kg/m   Estimated body mass index is 34.42 kg/m  as calculated from the following:    Height as of 2/11/21: 1.778 m (5' 10\").    Weight as of this encounter: 108.8 kg (239 lb 14.4 oz). Body surface area is 2.32 meters squared.  No Pain (0) Comment: Data Unavailable   No LMP for male patient.  Allergies reviewed: Yes  Medications reviewed: Yes    Medications: Medication refills not needed today.  Pharmacy name entered into EPIC: Data Unavailable    Clinical concerns: none       Violeta Phillips CMA            "

## 2021-08-04 NOTE — LETTER
8/4/2021         RE: Kobe Pedroza  [address] [facility] [address]        Dear Colleague,    Thank you for referring your patient, Kobe Pedroza, to the [facility]. Please see a copy of my visit note below.    REASON FOR VISIT:  Management of Burkitt lymphoma    HISTORY OF PRESENT ILLNESS:  Mr. Pedroza is a 61 year old man with a history of Burkitt lymphoma.  To summarize his course, he presented with acute on chronic back pain in 03/2020.  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  CSF was negative.  He was was treated with steroids followed by R-CODOX-M/IVAC and IT chemo prophylaxis between 03/2020 and 06/2020.  His course was complicated by neutropenic sepsis that resolved and right lower extremity below the knee DVT identified on 4/30/2020, so he has been on therapeutic anticoagulation.  PET/CT after cycle 2 (1st R-IVAC) and again after completing treatment confirmed PET-negative complete response.  He recovered from COVID-19 after monoclonal antibody treatment in 02/2021.  Visit for ongoing management.    He is not with his wife Mckayla today.  Since his last visit, he has been feeling OK.  Had some GERD and possible thrush that that is better after fluconazole and had EGD and biopsies only with inflammation - getting better.  Back pain waxes and wanes but nothing new.  No fevers, night sweats, or unintentional weight loss.  No headache or focal weakness or sensory changes.  No dyspnea, cough, or chest pain.  No lumps or bumps.  Still worries some about relapse.  Thinking of getting back to work in the next 1-2 months.    MEDICATIONS:  Current Outpatient Medications   Medication     levothyroxine (SYNTHROID/LEVOTHROID) 175 MCG tablet     No current facility-administered medications for this visit.     PHYSICAL EXAMINATION:  /81   Pulse 61   Temp  98.4  F (36.9  C) (Oral)   Resp 18   Wt 108.8 kg (239 lb 14.4 oz)   SpO2 97%   BMI 34.42 kg/m    Wt Readings from Last 5 Encounters:   08/04/21 108.8 kg (239 lb 14.4 oz)   02/11/21 104.3 kg (230 lb)   12/31/20 105.2 kg (232 lb)   12/10/20 105.6 kg (232 lb 14.4 oz)   06/15/20 97.5 kg (215 lb)     General: Well appearing. No distress.  HEENT: Sclerae anicteric. No OP lesions, masses, or tonsilar enlargement.  Lungs: Clear bilaterally without wheezing or crackles.  Heart: Regular rate and rhythm.  Gastrointestinal: Bowel sounds present, no tenderness to palpation, spleen tip not palpable.  Extremities: No lower extremity edema.  Lymph:  No cervical, clavicular, axillary, epitrochlear, or inguinal lymphadenopathy.  Performance status: ECOG 0    LAB DATA:  Reviewed by me  Recent Labs   Lab Test 08/04/21  0748 05/03/21  1712 02/11/21  1127 01/11/21  0920   WBC 6.6 7.3 1.8* 5.9   HGB 14.7 14.4 14.9 12.4*    235 161 195   ANEU  --  5.3 0.6* 4.5   ALYM  --  1.0 0.5* 0.5*     Recent Labs   Lab Test 08/04/21  0748 05/03/21  1712 02/11/21  1127 01/11/21  0920 06/18/20  0000 06/15/20  1335 05/26/20  0355 05/25/20  0400 03/17/20  1214 03/17/20  0556    141 139 139  --  139 138 140   < > 139   POTASSIUM 3.9 3.8 3.8 4.0  --  3.5 4.0 3.7   < > 4.0   CHLORIDE 108 106 105 107  --  105 112* 112*   < > 107   CO2 27 31 30 27  --  30 23 24   < > 26   BUN 16 18 12 19  --  13 16 15   < > 25   CR 1.06 1.03 0.96 1.01  --  0.88 0.72 0.70   < > 0.71   RUTH 8.6 8.7 8.5 8.3*  --  8.1* 7.3* 7.4*   < > 7.2*   MAG  --   --  2.5*  --   --  2.3  --  2.3   < > 2.1   PHOS  --   --   --   --   --  3.6 2.3* 2.1*   < > 4.1   URIC  --   --   --   --   --  4.8 3.2* 3.1*   < > 3.7    201  --  302*   < > 304* 226 220  --   --    [id number]  --   --   --   --   --   --   --   --   --  2.0    < > = values in this interval not displayed.     Recent Labs   Lab Test 08/04/21  0748 05/03/21  1712 01/11/21  0920 06/15/20  1335 05/26/20  0355  05/25/20  0400   AST 20 35 31 51*  --  18   ALT 50 59 62 85*  --  51   ALKPHOS 78 77 84 73  --  78   BILITOTAL 0.5 0.8 1.1 0.4  --  0.3   INR  --   --   --  1.04 1.03 1.11     IMPRESSION AND PLAN:  Mr. Pedroza is a 61 year old man with a history of Burkitt lymphoma.    His lymphoma clinically remains in remission.  There is nothing to suggest recurrence.  Labs look great.  We will continue to monitor.    He has no active infections.  He is up to date for the flu shot.  He will get Prevnar today.  I recommended Pneumovax at least 8 weeks after Prevnar and Shingrix vaccines that he can start anytime.  We discussed the importance of following up to date local and federal health agency guidance regarding measures to reduce the risk of COVID-19 for immunocompromised individuals.  He recovered from COVID-19 and completed a COVID-19 vaccine.      He completed apixaban for 3 months for lymphoma-related DVT.  He is not interested in PT for his back issues or Health Psych for anxiety around his lymphoma at this time but will let us know if he changes his mind.  I encouraged him to find  a new PCP for his health maintenance and other health issues.  He will continue to see his endocrinologist Dr. Dunaway for his thyroid replacement.    We will arrange another visit in about 3 months with labs.  I reminded him to contact if questions, concerns, or new issues arise between visits.    Total of 45 minutes on patient visit, reviewing records, interpreting test results, placing orders, and documentation on the day of service.    Ever Wright MD, PhD  Attending Physician, [facility]   of Medicine, [facility]  Division of Hematology, Oncology, and Transplantation  [phone] clinic          Again, thank you for allowing me to participate in the care of your patient.        Sincerely,        Ever Wright MD

## 2021-08-04 NOTE — NURSING NOTE
Chief Complaint   Patient presents with     Blood Draw     Vitals, blood drawn via VPT by LPN. Pt checked into appt.     MIRIAN Marroquin LPN

## 2021-08-04 NOTE — NURSING NOTE
Prevnar was given to the patient on right arm. Patient tolerated. Patient was discharged.     Kiki Breen,   (Student MA)    Prevnar injection completed by Marianela Mcallister MA.      All steps completed per protocol.    Gayle Springer CMA (AAMA)

## 2021-08-07 ENCOUNTER — NURSE TRIAGE (OUTPATIENT)
Dept: NURSING | Facility: CLINIC | Age: 61
End: 2021-08-07

## 2021-08-07 NOTE — TELEPHONE ENCOUNTER
"    Reason for Disposition    Injection site reaction to any vaccine    Additional Information    Negative: [1] Difficulty with breathing or swallowing AND [2] starts within 2 hours after injection    Negative: Difficult to awaken or acting confused (e.g., disoriented, slurred speech)    Negative: Unresponsive, passed out, or very weak    Negative: Sounds like a life-threatening emergency to the triager    Negative: Fever > 104 F (40 C)    Negative: [1] Fever > 101 F (38.3 C) AND [2] age > 60    Negative: [1] Fever > 100.0 F (37.8 C) AND [2] bedridden (e.g., nursing home patient, CVA, chronic illness, recovering from surgery)    Negative: [1] Fever > 100.0 F (37.8 C) AND [2] diabetes mellitus or weak immune system (e.g., HIV positive, cancer chemo, splenectomy, organ transplant, chronic steroids)    Negative: [1] Measles vaccine rash (onset day 6-12) AND [2] purple or blood-colored    Negative: Sounds like a severe, unusual reaction to the triager    Negative: [1] Redness or red streak around the injection site AND [2] begins > 48 hours after shot AND [3] fever    Negative: [1] Redness or red streak around the injection site AND [2] begins > 48 hours after shot AND [3] no fever  (Exception: red area < 1 inch or 2.5 cm wide)    Negative: Fever present > 3 days (72 hours)    Negative: [1] Over 3 days (72 hours) since shot AND [2] redness, swelling or pain getting worse    Negative: [1] Smallpox vaccine and [2] eye pain, eye redness, or rash on eyelids    Negative: [1] Pain, tenderness, or swelling at the injection site AND [2] persists > 3 days    Negative: [1] Measles vaccine rash (onset day 6-12) AND [2] persists > 3 days    Negative: [1] Deep lump follows (in 2 to 8 weeks) Td or TDaP  shot AND [2] becomes tender to the touch    Negative: Immunization needed, questions about    Answer Assessment - Initial Assessment Questions  1. SYMPTOMS: \"What is the main symptom?\" (e.g., redness, swelling, pain)       Swelling, " "redness, pain  2. ONSET: \"When was the vaccine (shot) given?\" \"How much later did the injection on Wednesday begin?\" (e.g., hours, days ago)       Symptoms still there  3. SEVERITY: \"How bad is it?\"       mild  4. FEVER: \"Is there a fever?\" If so, ask: \"What is it, how was it measured, and when did it start?\"       No fever  5. IMMUNIZATIONS GIVEN: \"What shots have you recently received?\"      pneumonia  6. PAST REACTIONS: \"Have you reacted to immunizations before?\" If so, ask: \"What happened?\"      no  7. OTHER SYMPTOMS: \"Do you have any other symptoms?\"      no    Protocols used: IMMUNIZATION [id number]      "

## 2021-09-26 ENCOUNTER — HEALTH MAINTENANCE LETTER (OUTPATIENT)
Age: 61
End: 2021-09-26

## 2021-10-13 ENCOUNTER — PATIENT OUTREACH (OUTPATIENT)
Dept: ONCOLOGY | Facility: CLINIC | Age: 61
End: 2021-10-13

## 2021-10-13 DIAGNOSIS — G62.9 NEUROPATHY: Primary | ICD-10-CM

## 2021-10-13 NOTE — PROGRESS NOTES
"Jonnathan called in to say that his neuropathy is really bad right now. He has had problems for a few years, 2-3 but the last few weeks it has been really bad. \"Like my toes are on fire.\" Cannot move his toes at times. Is now back to work so on his feet more. Tried new shoes and this did not help. Also tried some old gabapentin, 2 pills twice now but his is making him light headed. Is a  so told him not to be taking the gabapentin and driving and working.     Is willing to see someone for this as it is really bothersome for him and effecting his QOL and ability to do things.     Referral into Doctor Yuridia Crawford.   "

## 2021-10-29 ENCOUNTER — ONCOLOGY VISIT (OUTPATIENT)
Dept: ONCOLOGY | Facility: CLINIC | Age: 61
End: 2021-10-29
Attending: INTERNAL MEDICINE
Payer: COMMERCIAL

## 2021-10-29 VITALS
HEIGHT: 70 IN | OXYGEN SATURATION: 96 % | HEART RATE: 67 BPM | TEMPERATURE: 98.2 F | WEIGHT: 249.4 LBS | SYSTOLIC BLOOD PRESSURE: 125 MMHG | DIASTOLIC BLOOD PRESSURE: 78 MMHG | BODY MASS INDEX: 35.71 KG/M2

## 2021-10-29 DIAGNOSIS — C83.78 BURKITT LYMPHOMA OF LYMPH NODES OF MULTIPLE REGIONS (H): Primary | ICD-10-CM

## 2021-10-29 DIAGNOSIS — G62.0 CHEMOTHERAPY-INDUCED PERIPHERAL NEUROPATHY (H): ICD-10-CM

## 2021-10-29 DIAGNOSIS — T45.1X5A CHEMOTHERAPY-INDUCED PERIPHERAL NEUROPATHY (H): ICD-10-CM

## 2021-10-29 DIAGNOSIS — R53.83 FATIGUE, UNSPECIFIED TYPE: ICD-10-CM

## 2021-10-29 PROCEDURE — 99205 OFFICE O/P NEW HI 60 MIN: CPT | Performed by: STUDENT IN AN ORGANIZED HEALTH CARE EDUCATION/TRAINING PROGRAM

## 2021-10-29 PROCEDURE — G0463 HOSPITAL OUTPT CLINIC VISIT: HCPCS

## 2021-10-29 PROCEDURE — 99417 PROLNG OP E/M EACH 15 MIN: CPT | Performed by: STUDENT IN AN ORGANIZED HEALTH CARE EDUCATION/TRAINING PROGRAM

## 2021-10-29 RX ORDER — GABAPENTIN 300 MG/1
600 CAPSULE ORAL 3 TIMES DAILY
COMMUNITY
End: 2022-02-24

## 2021-10-29 RX ORDER — GABAPENTIN 300 MG/1
300 CAPSULE ORAL AT BEDTIME
Qty: 60 CAPSULE | Refills: 1 | Status: SHIPPED | OUTPATIENT
Start: 2021-10-29 | End: 2022-02-24

## 2021-10-29 ASSESSMENT — PAIN SCALES - GENERAL: PAINLEVEL: MILD PAIN (2)

## 2021-10-29 ASSESSMENT — MIFFLIN-ST. JEOR: SCORE: 1947.52

## 2021-10-29 NOTE — NURSING NOTE
"Oncology Rooming Note    October 29, 2021 8:26 AM   Kobe Pedroza is a 61 year old male who presents for:    Chief Complaint   Patient presents with     Oncology Clinic Visit     neuropathy     Initial Vitals: Ht 1.77 m (5' 9.69\")   Wt 113.1 kg (249 lb 6.4 oz)   BMI 36.11 kg/m   Estimated body mass index is 36.11 kg/m  as calculated from the following:    Height as of this encounter: 1.77 m (5' 9.69\").    Weight as of this encounter: 113.1 kg (249 lb 6.4 oz). Body surface area is 2.36 meters squared.  Mild Pain (2) Comment: Data Unavailable   No LMP for male patient.  Allergies reviewed: Yes  Medications reviewed: Yes    Medications: Medication refills not needed today.  Pharmacy name entered into EPIC: [facility] - [place], [place] - [facility] [address]    Clinical concerns: Started taking gabapentin d/t discomfort in feet. Says it was prescribed about 2 years ago but he still had the bottle and started taking them about a month ago and is not noticing much help. 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 Denies any radiation of numbness/tingling or pain into legs but does note tingling in his hands for the last week or so. Denies changes in  and radiation into arms. Pain interferes with sleep as well.  Has sought help with a chiropractor but it is not covered by insurance and is not an expense that if affordable currently. No PT/OT or injections tried for feet. Massage does help short term.        Violeta Phillips CMA            "

## 2021-10-29 NOTE — LETTER
10/29/2021         RE: Kobe Pedroza  [address] [address]        Dear Colleague,    Thank you for referring your patient, Kobe Pedroza, to the [facility]. Please see a copy of my visit note below.           PM&R Clinic Note     Patient Name: Kobe Pedroza : 1960 Medical Record: [id number]     Requesting Physician/clinician: Ever Wright MD, Natacha Agarwal RN           History of Present Illness:     Kobe Pedroza is a 61 year old male with history of Burkitt lymphoma who presents to [facility] Cancer Re[facility] clinic for evaluation of peripheral neuropathy.    Oncology History:  -Patient initially presented with acute on chronic back pain in 3/2020.  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  CSF was negative.  -Patient was treated with steroids followed by R-CODOX-M/IVAC and IT chemoprophylaxis between 3/20/2020 and 2020.  -His course was complicated by neutropenic sepsis that resolved and a right lower extremity DVT which was diagnosed on 2020.  Patient has been on therapeutic anticoagulation due to this.  -PET/CT after cycle 2 and again after completing treatment confirmed PET negative complete response.  -Patient became infected with COVID-19, and recovered after monoclonal antibody treatment in 2021.  -Patient called and spoke to Natacha Agarwal regarding his neuropathy which has significantly worsened.  He feels that worsening has occurred over the last 2 to 3 weeks.  He states he cannot move his toes at times.  He had tried some gabapentin which he had.    EMG  PT for balance/neuropathy  Gabapentin 300/300/600      Symptoms,  Patient was seen for an initial evaluation today.  He states that he has noticed worsening of his peripheral neuropathy symptoms over the last 2 to 3 weeks.  He complains mainly of tingling, burning,  and stabbing pain in his feet.  He also complains of similar symptoms in his fingertips.  His back pain which he originally had has almost resolved after he started seeing a chiropractor.  He states that he has not had any back pain for the last 2 months.  In regards to his peripheral neuropathy, he states that his feet are the worst.  His symptoms were initially in his toes, but now have progressed over the last 2 to 3 weeks and include his arch.  He has tried some gabapentin which he had at home, and has been taking 600 mg at a time when needed for pain relief.  He states that the gabapentin has significantly helped his pain.  However, he does state that there was one instance where he had some dizziness with the gabapentin when he took it at bedtime.  He has taken it a few times during the day over the last week while he was at work, and feels that it helped him.  He does endorse some significant balance difficulties due to his peripheral neuropathy, however does not endorse any near falls or falls.  Last night, he had to take gabapentin at 6 PM and took 600 mg, and then had to take another 600 mg late at night.  He states that after he took these doses he had significant pain relief.  He states that he cannot feel his toes at times, and feels like he cannot move his toes at times.  He also endorses some recent onset of symptoms in his hands, mainly tingling in his fingertips especially when driving.  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  He is thinking of retiring soon.  He denies any difficulty with fine motor movements in his hand or  strength.  He does endorse a shoulder replacement surgery in his left shoulder 2 years ago.      Therapies/HEP,  Patient has not tried any outpatient physical therapy to help with peripheral neuropathy symptoms or balance difficulties.      Functionally,    Patient remains independent for mobility, ADLs and IADLs.             Past Medical and Surgical History:     Past Medical History:   Diagnosis Date     Burkitt's lymphoma (H)      Chemotherapy-induced neutropenia (H) 4/14/2020     Hypothyroidism      Thyroid cancer (H)      Past Surgical History:   Procedure Laterality Date     IR PICC VASCULAR  4/8/2020     LAMINECTOMY, EXCISE TUMOR THORACIC, COMBINED N/A 3/15/2020    Procedure: LAMINECTOMY, SPINE, THORACIC, 2 levels, T-5, T-6;  Surgeon: Heather Cespedes MD;  Location: [facility] OR     PICC DOUBLE LUMEN PLACEMENT Right 04/30/2020    5FR DL PICC inserted at right basilic vein , length- 39cm (1cm out), tip at low SVC.     THYROIDECTOMY               Social History:     Social History     Tobacco Use     Smoking status: Never Smoker     Smokeless tobacco: Never Used   Substance Use Topics     Alcohol use: Not Currently       Marital Status: Patient lives with his wifeMckayla  Living situation: Currently lives in his cabin near [place], [place]  Family support: Has a daughter in the [facility].  Also has other children.  Vocational History: , works on cars.           Functional history:     Kobe Pedroza is independent with all aspects of his life.    ADLs: Independent  Assistive devices: None  iADLs (medication management and finances): Independent  Hand dominance: left  Driving: no difficulties, currently driving           Family History:     No family history on file.         Medications:     Current Outpatient Medications   Medication Sig Dispense Refill     gabapentin (NEURONTIN) 300 MG capsule Take 600 mg by mouth 3 times daily Takes PRN       levothyroxine (SYNTHROID/LEVOTHROID) 175 MCG tablet Take 175 mcg by mouth daily               Allergies:     No Known Allergies           ROS:     A focused ROS is negative other than the symptoms noted above in the HPI.           Physical Examiniation:     VITAL SIGNS: /78   Pulse 67   Temp 98.2  F (36.8  " C) (Oral)   Ht 1.77 m (5' 9.69\")   Wt 113.1 kg (249 lb 6.4 oz)   SpO2 96%   BMI 36.11 kg/m    BMI: Estimated body mass index is 36.11 kg/m  as calculated from the following:    Height as of this encounter: 1.77 m (5' 9.69\").    Weight as of this encounter: 113.1 kg (249 lb 6.4 oz).    Gen: NAD, pleasant and cooperative  HEENT: Normocephalic, atraumatic, extra-ocular movements appear intact  Pulm: non-labored breathing in room air  Ext: no edema in BLE, no tenderness in calves  Neuro/MSK:   Orientation: Oriented to person, place, time, situation. Exhibits good insight into his condition and ongoing management/symptoms.  Motor: 5/5 with bilateral shoulder abduction, elbow extension, wrist extension and  strength/finger intrinsics. 4+/5 with bilateral hip flexion, 5/5 with right knee extension, ankle dorsiflexion, EHL, plantar flexion.  5/5 with left knee extension, 4+/5 with left ankle dorsiflexion, 5/5 with EHL and plantar flexion on the left.  Sensory: Decreased to monofilament over lateral aspects of the dorsum and plantar surfaces of both feet bilaterally and significantly decreased sensation to light touch and monofilament over the plantar surfaces of all digits and arch of feet bilaterally.  Otherwise intact to light touch and pinprick throughout all dermatomes in bilateral upper lower extremities.  Reflexes: intact, 2+ and symmetric in bilateral upper and lower extremities with biceps, triceps, BR, patellar and achilles.  Back Exam: No deformities, erythema or swelling noted on inspection. No lumbar paraspinal tenderness to palpation.  Mild tenderness to palpation over lumbar paraspinal area on left, no tenderness to palpation of thoracic and lumbar spinous processes or thoracic paraspinal muscles. No SI joint tenderness or tenderness over GT bilaterally. No exacerbation of pain with lumbar flexion or extension.  Negative straight leg raise test on the right, positive straight leg raise test on the " left.             Laboratory/Imaging:     US Lower Extremity Ultrasound Right (2/11/21):  IMPRESSION:  1.  No evidence of right lower extremity deep venous thrombosis.  2.  Interval resolution of right peroneal vein thrombus         Assessment/Plan:   Kobe Pedroza is a 61 year old male with history of Burkitt lymphoma who presents to [facility] clinic for evaluation of peripheral neuropathy.  As discussed with Jonnathan, his neuropathy appears to be rapidly progressive and he has positive signs of possible lumbar radicular etiology that may be contributing to his symptoms on exam today.  In the setting of his progressively worsening polyneuropathy as well, it would be appropriate to obtain an EMG to help further delineate severity of polyneuropathy and any signs of nerve recovery, as well as evaluating for possible lumbar radiculopathy.  Patient is agreeable to this.  In addition, due to his balance difficulties that he has recently started experiencing, recommendation is to start outpatient physical therapy for balance and gait training in the setting of his peripheral neuropathy for targeted strengthening and to learn compensatory techniques.  Lastly, we discussed at length my strong recommendation not to take 4 tablets of gabapentin as he did last night, as this can cause excessive drowsiness which can contribute to worsening balance.  Since he states that it is helping him during the day, we discussed trying at 300 mg dose in the morning , however if he is working and driving he is not to take this dose due to risk of falls- and just continue with 600 mg at bedtime.  If he stops working as planned, we could consider starting 300 mg in the morning in addition to his bedtime dose.        1. Patient education: In depth discussion and education was provided about the assessment and implications of each of the below recommendations for management. Patient indicated readiness to learn, all questions were  answered and understanding of material presented was confirmed.  2. Work-up:  1. EMG ordered to further evaluate worsening peripheral neuropathy and possible lumbar radiculopathy.  3. Therapy/equipment/braces:  1. Outpatient physical therapy order has been placed to help with balance and gait training in the setting of peripheral neuropathy.  4. Medications:  1. Gabapentin 600 mg at bedtime dose was prescribed.  5. Follow up: 3 months    I appreciate the opportunity to participate in the care of your patient.   90 minutes spent on the date of the encounter doing chart review, history and exam, documentation and further activities as noted above.        Again, thank you for allowing me to participate in the care of your patient.      Sincerely,    Yuridia Crawford MD

## 2021-10-29 NOTE — PROGRESS NOTES
PM[facility] Clinic Note     Patient Name: Kobe Pedroza : 1960 Medical Record: [id number]     Requesting Physician/clinician: Ever Wright MD, Natacha Agarwal RN           History of Present Illness:     Kobe Pedroza is a 61 year old male with history of Burkitt lymphoma who presents to [facility] clinic for evaluation of peripheral neuropathy.    Oncology History:  -Patient initially presented with acute on chronic back pain in 3/2020.  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  CSF was negative.  -Patient was treated with steroids followed by R-CODOX-M/IVAC and IT chemoprophylaxis between 3/20/2020 and 2020.  -His course was complicated by neutropenic sepsis that resolved and a right lower extremity DVT which was diagnosed on 2020.  Patient has been on therapeutic anticoagulation due to this.  -PET/CT after cycle 2 and again after completing treatment confirmed PET negative complete response.  -Patient became infected with COVID-19, and recovered after monoclonal antibody treatment in 2021.  -Patient called and spoke to Natacha Agarwal regarding his neuropathy which has significantly worsened.  He feels that worsening has occurred over the last 2 to 3 weeks.  He states he cannot move his toes at times.  He had tried some gabapentin which he had.    EMG  PT for balance/neuropathy  Gabapentin 300/300/600      Symptoms,  Patient was seen for an initial evaluation today.  He states that he has noticed worsening of his peripheral neuropathy symptoms over the last 2 to 3 weeks.  He complains mainly of tingling, burning, and stabbing pain in his feet.  He also complains of similar symptoms in his fingertips.  His back pain which he originally had has almost resolved after he started seeing a chiropractor.  He states that he has not had any back pain for the last 2 months.  In  regards to his peripheral neuropathy, he states that his feet are the worst.  His symptoms were initially in his toes, but now have progressed over the last 2 to 3 weeks and include his arch.  He has tried some gabapentin which he had at home, and has been taking 600 mg at a time when needed for pain relief.  He states that the gabapentin has significantly helped his pain.  However, he does state that there was one instance where he had some dizziness with the gabapentin when he took it at bedtime.  He has taken it a few times during the day over the last week while he was at work, and feels that it helped him.  He does endorse some significant balance difficulties due to his peripheral neuropathy, however does not endorse any near falls or falls.  Last night, he had to take gabapentin at 6 PM and took 600 mg, and then had to take another 600 mg late at night.  He states that after he took these doses he had significant pain relief.  He states that he cannot feel his toes at times, and feels like he cannot move his toes at times.  He also endorses some recent onset of symptoms in his hands, mainly tingling in his fingertips especially when driving.  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  He is thinking of retiring soon.  He denies any difficulty with fine motor movements in his hand or  strength.  He does endorse a shoulder replacement surgery in his left shoulder 2 years ago.      Therapies/HEP,  Patient has not tried any outpatient physical therapy to help with peripheral neuropathy symptoms or balance difficulties.      Functionally,   Patient remains independent for mobility, ADLs and IADLs.             Past Medical and Surgical History:     Past Medical History:   Diagnosis Date     Burkitt's lymphoma (H)      Chemotherapy-induced neutropenia (H) 4/14/2020     Hypothyroidism      Thyroid  "cancer (H)      Past Surgical History:   Procedure Laterality Date     IR PICC VASCULAR  4/8/2020     LAMINECTOMY, EXCISE TUMOR THORACIC, COMBINED N/A 3/15/2020    Procedure: LAMINECTOMY, SPINE, THORACIC, 2 levels, T-5, T-6;  Surgeon: Heather Cespedes MD;  Location: [facility] OR     PICC DOUBLE LUMEN PLACEMENT Right 04/30/2020    5FR DL PICC inserted at right basilic vein , length- 39cm (1cm out), tip at low SVC.     THYROIDECTOMY               Social History:     Social History     Tobacco Use     Smoking status: Never Smoker     Smokeless tobacco: Never Used   Substance Use Topics     Alcohol use: Not Currently       Marital Status: Patient lives with his wifeMckayla  Living situation: Currently lives in his cabin near [place], [place]  Family support: Has a daughter in the [facility].  Also has other children.  Vocational History: , works on cars.           Functional history:     Kobe Pedroza is independent with all aspects of his life.    ADLs: Independent  Assistive devices: None  iADLs (medication management and finances): Independent  Hand dominance: left  Driving: no difficulties, currently driving           Family History:     No family history on file.         Medications:     Current Outpatient Medications   Medication Sig Dispense Refill     gabapentin (NEURONTIN) 300 MG capsule Take 600 mg by mouth 3 times daily Takes PRN       levothyroxine (SYNTHROID/LEVOTHROID) 175 MCG tablet Take 175 mcg by mouth daily               Allergies:     No Known Allergies           ROS:     A focused ROS is negative other than the symptoms noted above in the HPI.           Physical Examiniation:     VITAL SIGNS: /78   Pulse 67   Temp 98.2  F (36.8  C) (Oral)   Ht 1.77 m (5' 9.69\")   Wt 113.1 kg (249 lb 6.4 oz)   SpO2 96%   BMI 36.11 kg/m    BMI: Estimated body mass index is 36.11 kg/m  as calculated from the following:    Height as of this encounter: 1.77 m (5' 9.69\").    Weight as of this encounter: " 113.1 kg (249 lb 6.4 oz).    Gen: NAD, pleasant and cooperative  HEENT: Normocephalic, atraumatic, extra-ocular movements appear intact  Pulm: non-labored breathing in room air  Ext: no edema in BLE, no tenderness in calves  Neuro/MSK:   Orientation: Oriented to person, place, time, situation. Exhibits good insight into his condition and ongoing management/symptoms.  Motor: 5/5 with bilateral shoulder abduction, elbow extension, wrist extension and  strength/finger intrinsics. 4+/5 with bilateral hip flexion, 5/5 with right knee extension, ankle dorsiflexion, EHL, plantar flexion.  5/5 with left knee extension, 4+/5 with left ankle dorsiflexion, 5/5 with EHL and plantar flexion on the left.  Sensory: Decreased to monofilament over lateral aspects of the dorsum and plantar surfaces of both feet bilaterally and significantly decreased sensation to light touch and monofilament over the plantar surfaces of all digits and arch of feet bilaterally.  Otherwise intact to light touch and pinprick throughout all dermatomes in bilateral upper lower extremities.  Reflexes: intact, 2+ and symmetric in bilateral upper and lower extremities with biceps, triceps, BR, patellar and achilles.  Back Exam: No deformities, erythema or swelling noted on inspection. No lumbar paraspinal tenderness to palpation.  Mild tenderness to palpation over lumbar paraspinal area on left, no tenderness to palpation of thoracic and lumbar spinous processes or thoracic paraspinal muscles. No SI joint tenderness or tenderness over GT bilaterally. No exacerbation of pain with lumbar flexion or extension.  Negative straight leg raise test on the right, positive straight leg raise test on the left.             Laboratory/Imaging:     US Lower Extremity Ultrasound Right (2/11/21):  IMPRESSION:  1.  No evidence of right lower extremity deep venous thrombosis.  2.  Interval resolution of right peroneal vein thrombus         Assessment/Plan:   Kobe CRAWLEY  Kasia is a 61 year old male with history of Burkitt lymphoma who presents to [facility] clinic for evaluation of peripheral neuropathy.  As discussed with Jonnathan, his neuropathy appears to be rapidly progressive and he has positive signs of possible lumbar radicular etiology that may be contributing to his symptoms on exam today.  In the setting of his progressively worsening polyneuropathy as well, it would be appropriate to obtain an EMG to help further delineate severity of polyneuropathy and any signs of nerve recovery, as well as evaluating for possible lumbar radiculopathy.  Patient is agreeable to this.  In addition, due to his balance difficulties that he has recently started experiencing, recommendation is to start outpatient physical therapy for balance and gait training in the setting of his peripheral neuropathy for targeted strengthening and to learn compensatory techniques.  Lastly, we discussed at length my strong recommendation not to take 4 tablets of gabapentin as he did last night, as this can cause excessive drowsiness which can contribute to worsening balance.  Since he states that it is helping him during the day, we discussed trying at 300 mg dose in the morning , however if he is working and driving he is not to take this dose due to risk of falls- and just continue with 600 mg at bedtime.  If he stops working as planned, we could consider starting 300 mg in the morning in addition to his bedtime dose.        1. Patient education: In depth discussion and education was provided about the assessment and implications of each of the below recommendations for management. Patient indicated readiness to learn, all questions were answered and understanding of material presented was confirmed.  2. Work-up:  1. EMG ordered to further evaluate worsening peripheral neuropathy and possible lumbar radiculopathy.  3. Therapy/equipment/braces:  1. Outpatient physical therapy order has been placed to  help with balance and gait training in the setting of peripheral neuropathy.  4. Medications:  1. Gabapentin 600 mg at bedtime dose was prescribed.  5. Follow up: 3 months    Yuridia Crawford MD  Physical Medicine & Rehabilitation    I appreciate the opportunity to participate in the care of your patient.     90 minutes spent on the date of the encounter doing chart review, history and exam, documentation and further activities as noted above.

## 2021-10-30 NOTE — PATIENT INSTRUCTIONS
1.  An outpatient physical therapy referral was placed to help with strengthening and your balance in the setting of your peripheral neuropathy symptoms in your feet.  2.  An EMG order was placed to further evaluate your symptoms as we discussed.  3.  Gabapentin 600 mg at bedtime was prescribed.  If you continue working and driving, Dr. Crawfrod would not recommend adding the morning and afternoon dose as was discussed at today's visit.  If you stop working, and your bedtime dose is not causing too much drowsiness or dizziness, you can try a morning dose of 300 mg in addition to your bedtime dose of 600 mg.  But you should not be driving or working when you try the morning dose.  4.  Return to clinic with Dr. Crawford in 3 months.

## 2021-11-09 ENCOUNTER — PATIENT OUTREACH (OUTPATIENT)
Dept: ONCOLOGY | Facility: CLINIC | Age: 61
End: 2021-11-09
Payer: COMMERCIAL

## 2021-11-09 NOTE — PROGRESS NOTES
PT was ordered by  and Paolo attempted to schedule with the patient.   He decline and desires a location in [place].    He has choosen [email or web address]    Phone [phone]  Fax [phone]     states they take [employer] for insurance.    Patient does not want to start therapy for at least another week.    Face sheet/ order and progress faxed on 11/9/2021  [employer] sent to patient to follow up with them for scheduling

## 2021-11-16 NOTE — PROGRESS NOTES
REASON FOR VISIT:  Management of Burkitt lymphoma    HISTORY OF PRESENT ILLNESS:  Mr. Pedroza is a 61 year old man with a history of Burkitt lymphoma.  To summarize his course, he presented with acute on chronic back pain in 03/2020.  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  CSF was negative.  He was was treated with steroids followed by R-CODOX-M/IVAC and IT chemo prophylaxis between ending in 06/2020.  His course was complicated by neutropenic sepsis that resolved and right lower extremity below the knee DVT for which he completed a course of anticoagulation.  PET/CT after cycle 2 (1st R-IVAC) and again after completing treatment confirmed PET-negative complete response.  He recovered from COVID-19 after monoclonal antibody treatment in 02/2021.  Visit for ongoing management of lymphoma.    He is not with his wife Mckayla today.  Since his last visit, he has been feeling OK.  Neuropathy in his feet has been the major issue - has been working with Dr. Crawford and PT.  Back pain better now.  No fevers, night sweats, or unintentional weight loss.  No dyspnea, cough, or chest pain.  No lumps or bumps.  Thinking of getting back to work in the next 1-2 months.  Moving into his new house in December.  Still on disability, planning to stop working.    MEDICATIONS:  Current Outpatient Medications   Medication     gabapentin (NEURONTIN) 300 MG capsule     gabapentin (NEURONTIN) 300 MG capsule     levothyroxine (SYNTHROID/LEVOTHROID) 175 MCG tablet     No current facility-administered medications for this visit.     PHYSICAL EXAMINATION:  BP (!) 145/82 (BP Location: Right arm, Patient Position: Sitting, Cuff Size: Adult Large)   Pulse 70   Temp 97.7  F (36.5  C) (Tympanic)   Resp 16   Wt 112.8 kg (248 lb 11.2 oz)   SpO2 97%   BMI 36.01 kg/m    Wt Readings from Last 5 Encounters:   11/17/21 112.8 kg (248 lb 11.2 oz)    10/29/21 113.1 kg (249 lb 6.4 oz)   08/04/21 108.8 kg (239 lb 14.4 oz)   02/11/21 104.3 kg (230 lb)   12/31/20 105.2 kg (232 lb)     General: Well appearing. No distress.  HEENT: Sclerae anicteric. No OP lesions, masses, or tonsilar enlargement.  Lungs: Clear bilaterally without wheezing or crackles.  Heart: Regular rate and rhythm.  Gastrointestinal: Bowel sounds present, no tenderness to palpation, spleen tip not palpable.  Extremities: No lower extremity edema.  Lymph:  No cervical, clavicular, axillary, epitrochlear, or inguinal lymphadenopathy.  Performance status: ECOG 0    LAB DATA:  Reviewed by me  Recent Labs   Lab Test 11/17/21  0717 08/04/21  0748 05/03/21 1712 02/11/21  1127 01/11/21  0920   WBC 5.7 6.6 7.3 1.8* 5.9   HGB 14.6 14.7 14.4 14.9 12.4*    199 235 161 195   ANEU  --   --  5.3 0.6* 4.5   ANEUTAUTO 4.3 4.5  --   --   --    ALYM  --   --  1.0 0.5* 0.5*   ALYMPAUTO 0.8 1.1  --   --   --      Recent Labs   Lab Test 11/17/21  0717 08/04/21  0748 05/03/21  1712 02/11/21  1127 06/18/20  0000 06/15/20  1335 06/01/20  0000 05/26/20  0355 05/25/20  0400 03/17/20  1214 03/17/20  0556    138 141 139   < > 139   < > 138 140   < > 139   POTASSIUM 3.9 3.9 3.8 3.8   < > 3.5   < > 4.0 3.7   < > 4.0   CHLORIDE 108 108 106 105   < > 105   < > 112* 112*   < > 107   CO2 27 27 31 30   < > 30   < > 23 24   < > 26   BUN 20 16 18 12   < > 13   < > 16 15   < > 25   CR 1.02 1.06 1.03 0.96   < > 0.88   < > 0.72 0.70   < > 0.71   RUTH 8.3* 8.6 8.7 8.5   < > 8.1*   < > 7.3* 7.4*   < > 7.2*   MAG  --   --   --  2.5*  --  2.3  --   --  2.3   < > 2.1   PHOS  --   --   --   --   --  3.6  --  2.3* 2.1*   < > 4.1   URIC  --   --   --   --   --  4.8  --  3.2* 3.1*   < > 3.7    181 201  --    < > 304*  --  226 220   < >  --    [id number]  --   --   --   --   --   --   --   --   --   --  2.0    < > = values in this interval not displayed.     Recent Labs   Lab Test 11/17/21  0717 08/04/21  0748 05/03/21  6347  06/18/20  0000 06/15/20  1335 06/01/20  0000 05/26/20  0355 05/25/20  0400   AST 21 20 35   < > 51*   < >  --  18   ALT 41 50 59   < > 85*   < >  --  51   ALKPHOS 87 78 77   < > 73   < >  --  78   BILITOTAL 0.5 0.5 0.8   < > 0.4   < >  --  0.3   INR  --   --   --   --  1.04  --  1.03 1.11    < > = values in this interval not displayed.     IMPRESSION AND PLAN:  Mr. Pedroza is a 61 year old man with a history of Burkitt lymphoma.    His lymphoma clinically remains in remission.  There is nothing to suggest recurrence.  Labs look great.  We will continue to monitor.    He has no active infections.  He is up to date for the flu shot.  He will get Prevnar today.  He got Prevnar last visit and got Pneumovax today.  He will get Shingrix vaccine series at his next visit.  We discussed the importance of following up to date local and federal health agency guidance regarding measures to reduce the risk of COVID-19 for immunocompromised individuals.  He recovered from COVID-19 and completed a COVID-19 vaccine and I recommended that he get a booster in December (6 months after last shot).      He completed apixaban for 3 months for lymphoma-related DVT.  He will work with Dr. Crawford for management of neuropathy, back pain, and physical therapy.  I encouraged him again to find  a new PCP for his health maintenance and other health issues.  He will continue to see his endocrinologist Dr. Dunaway for his thyroid replacement.    We will arrange another visit in about 3 months with labs.  I reminded him to contact if questions, concerns, or new issues arise between visits.    Ever Wright MD, PhD  Attending Physician, [facility]   of Medicine, [facility]  Division of Hematology, Oncology, and Transplantation  [phone] [facility]

## 2021-11-17 ENCOUNTER — APPOINTMENT (OUTPATIENT)
Dept: LAB | Facility: CLINIC | Age: 61
End: 2021-11-17
Attending: INTERNAL MEDICINE
Payer: COMMERCIAL

## 2021-11-17 ENCOUNTER — ONCOLOGY VISIT (OUTPATIENT)
Dept: ONCOLOGY | Facility: CLINIC | Age: 61
End: 2021-11-17
Attending: INTERNAL MEDICINE
Payer: COMMERCIAL

## 2021-11-17 VITALS
SYSTOLIC BLOOD PRESSURE: 145 MMHG | DIASTOLIC BLOOD PRESSURE: 82 MMHG | RESPIRATION RATE: 16 BRPM | TEMPERATURE: 97.7 F | HEART RATE: 70 BPM | BODY MASS INDEX: 36.01 KG/M2 | WEIGHT: 248.7 LBS | OXYGEN SATURATION: 97 %

## 2021-11-17 DIAGNOSIS — C83.78 BURKITT LYMPHOMA OF LYMPH NODES OF MULTIPLE REGIONS (H): Primary | ICD-10-CM

## 2021-11-17 DIAGNOSIS — Z23 NEED FOR PROPHYLACTIC VACCINATION AND INOCULATION AGAINST INFLUENZA: ICD-10-CM

## 2021-11-17 LAB
ALBUMIN SERPL-MCNC: 4 G/DL (ref 3.4–5)
ALP SERPL-CCNC: 87 U/L (ref 40–150)
ALT SERPL W P-5'-P-CCNC: 41 U/L (ref 0–70)
ANION GAP SERPL CALCULATED.3IONS-SCNC: 7 MMOL/L (ref 3–14)
AST SERPL W P-5'-P-CCNC: 21 U/L (ref 0–45)
BASOPHILS # BLD AUTO: 0 10E3/UL (ref 0–0.2)
BASOPHILS NFR BLD AUTO: 1 %
BILIRUB SERPL-MCNC: 0.5 MG/DL (ref 0.2–1.3)
BUN SERPL-MCNC: 20 MG/DL (ref 7–30)
CALCIUM SERPL-MCNC: 8.3 MG/DL (ref 8.5–10.1)
CHLORIDE BLD-SCNC: 108 MMOL/L (ref 94–109)
CO2 SERPL-SCNC: 27 MMOL/L (ref 20–32)
CREAT SERPL-MCNC: 1.02 MG/DL (ref 0.66–1.25)
EOSINOPHIL # BLD AUTO: 0.2 10E3/UL (ref 0–0.7)
EOSINOPHIL NFR BLD AUTO: 3 %
ERYTHROCYTE [DISTWIDTH] IN BLOOD BY AUTOMATED COUNT: 12.3 % (ref 10–15)
GFR SERPL CREATININE-BSD FRML MDRD: 80 ML/MIN/1.73M2
GLUCOSE BLD-MCNC: 151 MG/DL (ref 70–99)
HCT VFR BLD AUTO: 43.6 % (ref 40–53)
HGB BLD-MCNC: 14.6 G/DL (ref 13.3–17.7)
IMM GRANULOCYTES # BLD: 0 10E3/UL
IMM GRANULOCYTES NFR BLD: 0 %
LDH SERPL L TO P-CCNC: 176 U/L (ref 85–227)
LYMPHOCYTES # BLD AUTO: 0.8 10E3/UL (ref 0.8–5.3)
LYMPHOCYTES NFR BLD AUTO: 13 %
MCH RBC QN AUTO: 31 PG (ref 26.5–33)
MCHC RBC AUTO-ENTMCNC: 33.5 G/DL (ref 31.5–36.5)
MCV RBC AUTO: 93 FL (ref 78–100)
MONOCYTES # BLD AUTO: 0.5 10E3/UL (ref 0–1.3)
MONOCYTES NFR BLD AUTO: 8 %
NEUTROPHILS # BLD AUTO: 4.3 10E3/UL (ref 1.6–8.3)
NEUTROPHILS NFR BLD AUTO: 75 %
NRBC # BLD AUTO: 0 10E3/UL
NRBC BLD AUTO-RTO: 0 /100
PLATELET # BLD AUTO: 199 10E3/UL (ref 150–450)
POTASSIUM BLD-SCNC: 3.9 MMOL/L (ref 3.4–5.3)
PROT SERPL-MCNC: 7.2 G/DL (ref 6.8–8.8)
RBC # BLD AUTO: 4.71 10E6/UL (ref 4.4–5.9)
SODIUM SERPL-SCNC: 142 MMOL/L (ref 133–144)
WBC # BLD AUTO: 5.7 10E3/UL (ref 4–11)

## 2021-11-17 PROCEDURE — 36415 COLL VENOUS BLD VENIPUNCTURE: CPT | Performed by: INTERNAL MEDICINE

## 2021-11-17 PROCEDURE — G0009 ADMIN PNEUMOCOCCAL VACCINE: HCPCS | Performed by: INTERNAL MEDICINE

## 2021-11-17 PROCEDURE — 99213 OFFICE O/P EST LOW 20 MIN: CPT | Performed by: INTERNAL MEDICINE

## 2021-11-17 PROCEDURE — 82040 ASSAY OF SERUM ALBUMIN: CPT | Performed by: INTERNAL MEDICINE

## 2021-11-17 PROCEDURE — 83615 LACTATE (LD) (LDH) ENZYME: CPT | Performed by: INTERNAL MEDICINE

## 2021-11-17 PROCEDURE — 85025 COMPLETE CBC W/AUTO DIFF WBC: CPT | Performed by: INTERNAL MEDICINE

## 2021-11-17 PROCEDURE — 90732 PPSV23 VACC 2 YRS+ SUBQ/IM: CPT | Performed by: INTERNAL MEDICINE

## 2021-11-17 PROCEDURE — G0463 HOSPITAL OUTPT CLINIC VISIT: HCPCS

## 2021-11-17 PROCEDURE — 250N000011 HC RX IP 250 OP 636: Performed by: COLON & RECTAL SURGERY

## 2021-11-17 PROCEDURE — 250N000011 HC RX IP 250 OP 636: Performed by: INTERNAL MEDICINE

## 2021-11-17 PROCEDURE — 90682 RIV4 VACC RECOMBINANT DNA IM: CPT | Performed by: COLON & RECTAL SURGERY

## 2021-11-17 PROCEDURE — G0008 ADMIN INFLUENZA VIRUS VAC: HCPCS | Performed by: COLON & RECTAL SURGERY

## 2021-11-17 RX ADMIN — INFLUENZA A VIRUS A/WISCONSIN/588/2019 (H1N1) RECOMBINANT HEMAGGLUTININ ANTIGEN, INFLUENZA A VIRUS A/TASMANIA/503/2020 (H3N2) RECOMBINANT HEMAGGLUTININ ANTIGEN, INFLUENZA B VIRUS B/WASHINGTON/02/2019 RECOMBINANT HEMAGGLUTININ ANTIGEN, AND INFLUENZA B VIRUS B/PHUKET/3073/2013 RECOMBINANT HEMAGGLUTININ ANTIGEN 0.5 ML: 45; 45; 45; 45 INJECTION INTRAMUSCULAR at 08:43

## 2021-11-17 RX ADMIN — PNEUMOCOCCAL VACCINE POLYVALENT 0.5 ML
25; 25; 25; 25; 25; 25; 25; 25; 25; 25; 25; 25; 25; 25; 25; 25; 25; 25; 25; 25; 25; 25; 25 INJECTION, SOLUTION INTRAMUSCULAR; SUBCUTANEOUS at 08:44

## 2021-11-17 ASSESSMENT — PAIN SCALES - GENERAL: PAINLEVEL: NO PAIN (0)

## 2021-11-17 NOTE — ADDENDUM NOTE
Addended by: BALBINA FLEMING on: 11/17/2021 08:34 AM     Modules accepted: Orders, Level of Service, SmartSet

## 2021-11-17 NOTE — NURSING NOTE
Pneumonia and Influenza vaccine given to patient in Right Deltoid . Patient tolerated injection without any incidents.     Has the patient received the information for the injectable influenza vaccine? YES    Whitley Huffman, THEODORE -  November 17, 2021 8:47 AM

## 2021-11-17 NOTE — LETTER
11/17/2021         RE: Kobe Pedroza  [id number] [facility] [address]        Dear Colleague,    Thank you for referring your patient, Kobe Pedroza, to the [facility]. Please see a copy of my visit note below.    REASON FOR VISIT:  Management of Burkitt lymphoma    HISTORY OF PRESENT ILLNESS:  Mr. Pedroza is a 61 year old man with a history of Burkitt lymphoma.  To summarize his course, he presented with acute on chronic back pain in 03/2020.  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  CSF was negative.  He was was treated with steroids followed by R-CODOX-M/IVAC and IT chemo prophylaxis between ending in 06/2020.  His course was complicated by neutropenic sepsis that resolved and right lower extremity below the knee DVT for which he completed a course of anticoagulation.  PET/CT after cycle 2 (1st R-IVAC) and again after completing treatment confirmed PET-negative complete response.  He recovered from COVID-19 after monoclonal antibody treatment in 02/2021.  Visit for ongoing management of lymphoma.    He is not with his wife Mckayla today.  Since his last visit, he has been feeling OK.  Neuropathy in his feet has been the major issue - has been working with Dr. Crawford and PT.  Back pain better now.  No fevers, night sweats, or unintentional weight loss.  No dyspnea, cough, or chest pain.  No lumps or bumps.  Thinking of getting back to work in the next 1-2 months.  Moving into his new house in December.  Still on disability, planning to stop working.    MEDICATIONS:  Current Outpatient Medications   Medication     gabapentin (NEURONTIN) 300 MG capsule     gabapentin (NEURONTIN) 300 MG capsule     levothyroxine (SYNTHROID/LEVOTHROID) 175 MCG tablet     No current facility-administered medications for this visit.     PHYSICAL EXAMINATION:  BP (!) 145/82 (BP  Location: Right arm, Patient Position: Sitting, Cuff Size: Adult Large)   Pulse 70   Temp 97.7  F (36.5  C) (Tympanic)   Resp 16   Wt 112.8 kg (248 lb 11.2 oz)   SpO2 97%   BMI 36.01 kg/m    Wt Readings from Last 5 Encounters:   11/17/21 112.8 kg (248 lb 11.2 oz)   10/29/21 113.1 kg (249 lb 6.4 oz)   08/04/21 108.8 kg (239 lb 14.4 oz)   02/11/21 104.3 kg (230 lb)   12/31/20 105.2 kg (232 lb)     General: Well appearing. No distress.  HEENT: Sclerae anicteric. No OP lesions, masses, or tonsilar enlargement.  Lungs: Clear bilaterally without wheezing or crackles.  Heart: Regular rate and rhythm.  Gastrointestinal: Bowel sounds present, no tenderness to palpation, spleen tip not palpable.  Extremities: No lower extremity edema.  Lymph:  No cervical, clavicular, axillary, epitrochlear, or inguinal lymphadenopathy.  Performance status: ECOG 0    LAB DATA:  Reviewed by me  Recent Labs   Lab Test 11/17/21  0717 08/04/21  0748 05/03/21 1712 02/11/21  1127 01/11/21  0920   WBC 5.7 6.6 7.3 1.8* 5.9   HGB 14.6 14.7 14.4 14.9 12.4*    199 235 161 195   ANEU  --   --  5.3 0.6* 4.5   ANEUTAUTO 4.3 4.5  --   --   --    ALYM  --   --  1.0 0.5* 0.5*   ALYMPAUTO 0.8 1.1  --   --   --      Recent Labs   Lab Test 11/17/21  0717 08/04/21  0748 05/03/21  1712 02/11/21  1127 06/18/20  0000 06/15/20  1335 06/01/20  0000 05/26/20  0355 05/25/20  0400 03/17/20  1214 03/17/20  0556    138 141 139   < > 139   < > 138 140   < > 139   POTASSIUM 3.9 3.9 3.8 3.8   < > 3.5   < > 4.0 3.7   < > 4.0   CHLORIDE 108 108 106 105   < > 105   < > 112* 112*   < > 107   CO2 27 27 31 30   < > 30   < > 23 24   < > 26   BUN 20 16 18 12   < > 13   < > 16 15   < > 25   CR 1.02 1.06 1.03 0.96   < > 0.88   < > 0.72 0.70   < > 0.71   RUTH 8.3* 8.6 8.7 8.5   < > 8.1*   < > 7.3* 7.4*   < > 7.2*   MAG  --   --   --  2.5*  --  2.3  --   --  2.3   < > 2.1   PHOS  --   --   --   --   --  3.6  --  2.3* 2.1*   < > 4.1   URIC  --   --   --   --   --  4.8   --  3.2* 3.1*   < > 3.7    181 201  --    < > 304*  --  226 220   < >  --    [id number]  --   --   --   --   --   --   --   --   --   --  2.0    < > = values in this interval not displayed.     Recent Labs   Lab Test 11/17/21  0717 08/04/21  0748 05/03/21  1712 06/18/20  0000 06/15/20  1335 06/01/20  0000 05/26/20  0355 05/25/20  0400   AST 21 20 35   < > 51*   < >  --  18   ALT 41 50 59   < > 85*   < >  --  51   ALKPHOS 87 78 77   < > 73   < >  --  78   BILITOTAL 0.5 0.5 0.8   < > 0.4   < >  --  0.3   INR  --   --   --   --  1.04  --  1.03 1.11    < > = values in this interval not displayed.     IMPRESSION AND PLAN:  Mr. Pedroza is a 61 year old man with a history of Burkitt lymphoma.    His lymphoma clinically remains in remission.  There is nothing to suggest recurrence.  Labs look great.  We will continue to monitor.    He has no active infections.  He is up to date for the flu shot.  He will get Prevnar today.  He got Prevnar last visit and got Pneumovax today.  He will get Shingrix vaccine series at his next visit.  We discussed the importance of following up to date local and federal health agency guidance regarding measures to reduce the risk of COVID-19 for immunocompromised individuals.  He recovered from COVID-19 and completed a COVID-19 vaccine and I recommended that he get a booster in December (6 months after last shot).      He completed apixaban for 3 months for lymphoma-related DVT.  He will work with Dr. Crawford for management of neuropathy, back pain, and physical therapy.  I encouraged him again to find  a new PCP for his health maintenance and other health issues.  He will continue to see his endocrinologist Dr. Dunaway for his thyroid replacement.    We will arrange another visit in about 3 months with labs.  I reminded him to contact if questions, concerns, or new issues arise between visits.    Ever Wright MD, PhD  Attending Physician, [facility]    of Medicine, [facility]  Division of Hematology, Oncology, and Transplantation  [phone] clinic

## 2021-11-17 NOTE — NURSING NOTE
"Oncology Rooming Note    November 17, 2021 8:01 AM   Kobe Pedroza is a 61 year old male who presents for:    Chief Complaint   Patient presents with     Blood Draw     labs drawn by venipuncture by rn in lab. vital signs taken.     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    Weight as of this encounter: 112.8 kg (248 lb 11.2 oz). Body surface area is 2.35 meters squared.  No Pain (0) Comment: Data Unavailable   No LMP for male patient.  Allergies reviewed: Yes  Medications reviewed: Yes    Medications: Medication refills not needed today.  Pharmacy name entered into EPIC: [facility] - [place], [place] - [facility] [address]    Clinical concerns: Ongoing neuropathy in feet.        Whitley Huffman CMA            "

## 2021-11-17 NOTE — NURSING NOTE
Chief Complaint   Patient presents with     Blood Draw     labs drawn by venipuncture by rn in lab. vital signs taken.     Labs drawn by venipuncture by RN in lab.  Vital signs taken.  Pt checked in to next appointment.    Alethea Lizarraga RN

## 2021-12-06 ENCOUNTER — OFFICE VISIT (OUTPATIENT)
Dept: NEUROLOGY | Facility: CLINIC | Age: 61
End: 2021-12-06
Attending: STUDENT IN AN ORGANIZED HEALTH CARE EDUCATION/TRAINING PROGRAM
Payer: COMMERCIAL

## 2021-12-06 ENCOUNTER — PATIENT OUTREACH (OUTPATIENT)
Dept: ONCOLOGY | Facility: CLINIC | Age: 61
End: 2021-12-06

## 2021-12-06 DIAGNOSIS — C83.78 BURKITT LYMPHOMA OF LYMPH NODES OF MULTIPLE REGIONS (H): ICD-10-CM

## 2021-12-06 DIAGNOSIS — G62.0 CHEMOTHERAPY-INDUCED PERIPHERAL NEUROPATHY (H): ICD-10-CM

## 2021-12-06 DIAGNOSIS — R53.83 FATIGUE, UNSPECIFIED TYPE: ICD-10-CM

## 2021-12-06 DIAGNOSIS — T45.1X5A CHEMOTHERAPY-INDUCED PERIPHERAL NEUROPATHY (H): ICD-10-CM

## 2021-12-06 PROCEDURE — 95886 MUSC TEST DONE W/N TEST COMP: CPT | Performed by: PSYCHIATRY & NEUROLOGY

## 2021-12-06 PROCEDURE — 95885 MUSC TST DONE W/NERV TST LIM: CPT | Mod: 59 | Performed by: PSYCHIATRY & NEUROLOGY

## 2021-12-06 PROCEDURE — 95912 NRV CNDJ TEST 11-12 STUDIES: CPT | Performed by: PSYCHIATRY & NEUROLOGY

## 2021-12-06 NOTE — LETTER
2021       RE: Kobe Pedroza  [address] [facility] [address]     Dear Colleague,    Thank you for referring your patient, Kobe Pedroza, to the [facility] EMG CLINIC [place] at [facility]. Please see a copy of my visit note below.          [facility]  Electrodiagnostic Laboratory                 Department of Neurology    Test Date:  2021    Patient: Jonnathan Pedroza : 1960 Physician: Kennedy Whitfield MD   Sex: Male AGE: 61 year Ref Phys:    ID#: [id number]   Technician: Peterson Green     Clinical Information:  61 year old man with a history of Burkitt lymphoma with extensive visceral and bony disease and a T5-6 mass with cord compression without neurologic deficits treated with R-CODOX-M/IVAC and IT reports increasing pain and paresthesias in distal lower limbs. Evaluate for polyneuropathy.     Techniques:  Motor and sensory conduction studies were done with surface recording electrodes. EMG was done with a concentric needle electrode.     Results:  Nerve conduction studies:   1. Bilateral sural and right superficial peroneal sensory responses are absent.   2. Right ulnar-D5 sensory response shows normal CV and mildly reduced amplitude.   3. Right median-D2 and radial sensory responses are normal.   4. Bilateral peroneal-EDB, right tibial-AH, right median-APB and right ulnar-ADM motor responses are normal.     Needle EM. Fibrillation potentials and positive sharp waves were seen in the right gastrocnemius and LS paraspinal muscles.   2. Long duration motor unit potentials (MUP) with increased polyphasia were seen in the right gastrocnemius muscle.  3. Recruitment patterns were normal.     Interpretation:  This is an abnormal study. 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 In addition, there is also evidence of (2) active denervation and  chronic reinnveration changes in the right gastrocnemius muscle. Although this finding can be seen within the context of an S1/S2 radiculopathy, lumbosacral plexopathy or sciatic neuropathy, similar changes were not appreciated in other muscles with shared innervation. As such, the gastrocnemius muscle abnormalities are of uncertain localization or clinical significance. An isolated tibial neuropathy cannot be excluded. Clinical correlation is recommended.     Kennedy Whitfield MD  Department of Neurology    Nerve Conduction Studies  Motor Sites      Latency Amplitude Neg. Amp Diff Segment Distance Velocity Neg. Dur Neg Area Diff Temperature Comment   Site (ms) Norm (mV) Norm %  cm m/s Norm ms %  C    Left Fibular (EDB) Motor   Ankle 5.9  < 6.0 3.5  > 2.5  Ankle-EDB 8   5.4      Bel Fib Head 15.1 - 2.7 - -22.9 Bel Fib Head-Ankle 38 41  > 38 6.0 -18.1     Pop Fossa 17.1 - 3.1 - 14.8 Pop Fossa-Bel Fib Head 8 40  > 38 5.9 10.5     Right Fibular (EDB) Motor   Ankle 4.3  < 6.0 3.6  > 2.5  Ankle-EDB 8   4.5  30.5    Bel Fib Head 13.7 - 3.2 - -11.1 Bel Fib Head-Ankle 36.3 39  > 38 5.9 -2.4 30.6    Pop Fossa 15.4 - 3.1 - -3.1 Pop Fossa-Bel Fib Head 7 41  > 38 5.9 -1.23 30.6    Right Median (APB) Motor   Wrist 4.2  < 4.2 6.9  > 5.0  Wrist-APB 8   5.9  32.3    Elbow 8.4 - 6.6 - -4.3 Elbow-Wrist 23.5 56  > 48 6.1 -3.7 32.2    Right Tibial (AHB) Motor   Ankle 4.5  < 6.5 5.9  > 4.4  Ankle-AHB 8   5.1  30.9    Knee 14.4 - 4.0 - -32.2 Knee-Ankle 38 38  > 38 8.1 -3.4 33.1    Right Ulnar (ADM) Motor   Wrist 2.7  < 3.5 9.0  > 3.0  Wrist-ADM 8   4.5  34    Bel Elbow 5.8 - 8.4 - -6.7 Bel Elbow-Wrist 19 61  > 48 4.9 -2.3 33.9    Abv Elbow 7.7 - 8.3 - -1.19 Abv Elbow-Bel Elbow 12.3 65  > 48 4.9 -1.99 33.8      F-Wave Sites      Min F-Lat Max-Min F-Lat Mean F-Lat   Site (ms) ms ms   Right Tibial F-Wave   Ankle *63.3 34.5 -     Sensory Sites      Onset Lat Neg Peak Lat Amp (O-P) Amp (P-P) Segment Distance Velocity Temperature Comment   Site ms  ms  V Norm  V  cm m/s Norm  C    Right Median Sensory   Wrist-Dig II 2.7 3.4 18  > 10 26 Wrist-Dig II 14 52  > 48 33.7    Right Radial Sensory   Forearm-Wrist 1.45 2.1 16  > 15 19 Forearm-Wrist 10 69 - 32.4    Right Superficial Fibular Sensory   14 cm-Ankle *NR *NR *NR  > 3 *NR 14 cm-Ankle 12.5 *NR  > 38 31.1    Left Sural Sensory   Calf-Lat Mall *NR *NR *NR  > 5 *NR Calf-Lat Mall 14 *NR  > 38 33.5    Right Sural Sensory   Calf-Lat Mall *NR *NR *NR  > 5 *NR Calf-Lat Mall 14 *NR  > 38 31.8    Right Ulnar Sensory   Wrist-Dig V 1.73 2.6 *7  > 8 - Wrist-Dig V 12.5 72  > 48 34      Inter-Nerve Comparisons     Nerve 1 Value 1 Nerve 2 Value 2 Parameter Result Normal   Sensory Sites   R Median Palm-Wrist 2.1 ms R Ulnar Palm-Wrist 1.8 ms Peak Lat Diff *0.32 ms <0.30       Electromyography     Side Muscle Ins Act Fibs/PSW Fasc HF Amp Dur Poly Recrt Int Pat Comment   Right Tib Anterior Nml None Nml 0 Nml Nml 0 Nml Nml    Right Gastroc *Incr *3+ Nml 0 Nml *1+ *2+ Nml Nml    Right Fib Longus Nml None Nml 0 Nml Nml 0 Nml Nml    Right Vastus Lat Nml None Nml 0 Nml Nml 0 Nml Nml    Right Gluteus Max Nml None Nml 0 Nml Nml 2+ Nml Nml    Right Lumbo Parasp (Lower) Nml None Nml 0         Left  Gastroc Nml None Nml 0 nml Nml 0 Nml Nml          NCS Waveforms:    Motor                  F-Wave       Sensory                                         Again, thank you for allowing me to participate in the care of your patient.      Sincerely,    Kennedy Whitfield MD

## 2021-12-06 NOTE — PROGRESS NOTES
[facility]  Electrodiagnostic Laboratory                 Department of Neurology                                                                                                         Test Date:  2021    Patient: Jonnathan Pedroza : 1960 Physician: Kennedy Whitfield MD   Sex: Male AGE: 61 year Ref Phys:    ID#: [id number]   Technician: Peterson Green     Clinical Information:  61 year old man with a history of Burkitt lymphoma with extensive visceral and bony disease and a T5-6 mass with cord compression without neurologic deficits treated with R-CODOX-M/IVAC and IT reports increasing pain and paresthesias in distal lower limbs. Evaluate for polyneuropathy.     Techniques:  Motor and sensory conduction studies were done with surface recording electrodes. EMG was done with a concentric needle electrode.     Results:  Nerve conduction studies:   1. Bilateral sural and right superficial peroneal sensory responses are absent.   2. Right ulnar-D5 sensory response shows normal CV and mildly reduced amplitude.   3. Right median-D2 and radial sensory responses are normal.   4. Bilateral peroneal-EDB, right tibial-AH, right median-APB and right ulnar-ADM motor responses are normal.     Needle EM. Fibrillation potentials and positive sharp waves were seen in the right gastrocnemius and LS paraspinal muscles.   2. Long duration motor unit potentials (MUP) with increased polyphasia were seen in the right gastrocnemius muscle.  3. Recruitment patterns were normal.     Interpretation:  This is an abnormal study. 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 In addition, there is also evidence of (2) active denervation and chronic reinnveration changes in the right gastrocnemius muscle. Although this finding can be seen within the context of an S1/S2 radiculopathy, lumbosacral plexopathy or sciatic neuropathy, similar  changes were not appreciated in other muscles with shared innervation. As such, the gastrocnemius muscle abnormalities are of uncertain localization or clinical significance. An isolated tibial neuropathy cannot be excluded. Clinical correlation is recommended.     Kennedy Whitfield MD  Department of Neurology    Nerve Conduction Studies  Motor Sites      Latency Amplitude Neg. Amp Diff Segment Distance Velocity Neg. Dur Neg Area Diff Temperature Comment   Site (ms) Norm (mV) Norm %  cm m/s Norm ms %  C    Left Fibular (EDB) Motor   Ankle 5.9  < 6.0 3.5  > 2.5  Ankle-EDB 8   5.4      Bel Fib Head 15.1 - 2.7 - -22.9 Bel Fib Head-Ankle 38 41  > 38 6.0 -18.1     Pop Fossa 17.1 - 3.1 - 14.8 Pop Fossa-Bel Fib Head 8 40  > 38 5.9 10.5     Right Fibular (EDB) Motor   Ankle 4.3  < 6.0 3.6  > 2.5  Ankle-EDB 8   4.5  30.5    Bel Fib Head 13.7 - 3.2 - -11.1 Bel Fib Head-Ankle 36.3 39  > 38 5.9 -2.4 30.6    Pop Fossa 15.4 - 3.1 - -3.1 Pop Fossa-Bel Fib Head 7 41  > 38 5.9 -1.23 30.6    Right Median (APB) Motor   Wrist 4.2  < 4.2 6.9  > 5.0  Wrist-APB 8   5.9  32.3    Elbow 8.4 - 6.6 - -4.3 Elbow-Wrist 23.5 56  > 48 6.1 -3.7 32.2    Right Tibial (AHB) Motor   Ankle 4.5  < 6.5 5.9  > 4.4  Ankle-AHB 8   5.1  30.9    Knee 14.4 - 4.0 - -32.2 Knee-Ankle 38 38  > 38 8.1 -3.4 33.1    Right Ulnar (ADM) Motor   Wrist 2.7  < 3.5 9.0  > 3.0  Wrist-ADM 8   4.5  34    Bel Elbow 5.8 - 8.4 - -6.7 Bel Elbow-Wrist 19 61  > 48 4.9 -2.3 33.9    Abv Elbow 7.7 - 8.3 - -1.19 Abv Elbow-Bel Elbow 12.3 65  > 48 4.9 -1.99 33.8      F-Wave Sites      Min F-Lat Max-Min F-Lat Mean F-Lat   Site (ms) ms ms   Right Tibial F-Wave   Ankle *63.3 34.5 -     Sensory Sites      Onset Lat Neg Peak Lat Amp (O-P) Amp (P-P) Segment Distance Velocity Temperature Comment   Site ms ms  V Norm  V  cm m/s Norm  C    Right Median Sensory   Wrist-Dig II 2.7 3.4 18  > 10 26 Wrist-Dig II 14 52  > 48 33.7    Right Radial Sensory   Forearm-Wrist 1.45 2.1 16  > 15 19 Forearm-Wrist 10  69 - 32.4    Right Superficial Fibular Sensory   14 cm-Ankle *NR *NR *NR  > 3 *NR 14 cm-Ankle 12.5 *NR  > 38 31.1    Left Sural Sensory   Calf-Lat Mall *NR *NR *NR  > 5 *NR Calf-Lat Mall 14 *NR  > 38 33.5    Right Sural Sensory   Calf-Lat Mall *NR *NR *NR  > 5 *NR Calf-Lat Mall 14 *NR  > 38 31.8    Right Ulnar Sensory   Wrist-Dig V 1.73 2.6 *7  > 8 - Wrist-Dig V 12.5 72  > 48 34      Inter-Nerve Comparisons     Nerve 1 Value 1 Nerve 2 Value 2 Parameter Result Normal   Sensory Sites   R Median Palm-Wrist 2.1 ms R Ulnar Palm-Wrist 1.8 ms Peak Lat Diff *0.32 ms <0.30       Electromyography     Side Muscle Ins Act Fibs/PSW Fasc HF Amp Dur Poly Recrt Int Pat Comment   Right Tib Anterior Nml None Nml 0 Nml Nml 0 Nml Nml    Right Gastroc *Incr *3+ Nml 0 Nml *1+ *2+ Nml Nml    Right Fib Longus Nml None Nml 0 Nml Nml 0 Nml Nml    Right Vastus Lat Nml None Nml 0 Nml Nml 0 Nml Nml    Right Gluteus Max Nml None Nml 0 Nml Nml 2+ Nml Nml    Right Lumbo Parasp (Lower) Nml None Nml 0         Left  Gastroc Nml None Nml 0 nml Nml 0 Nml Nml          NCS Waveforms:    Motor                  F-Wave       Sensory

## 2021-12-06 NOTE — PROGRESS NOTES
"Kobe Pedroza called writer to update on his neuropathy. Currently upstairs on 3rd floor for an EMG. Very discouraged with the neuropathy and quality of life in general. He says that he is \"always sick\". Something is always wrong. Feels that it started up again when he got stung by the bees this past fall. His tongue swelled and it is still not right. He cannot stand on his feet for any period of time. Had his colon and stomach looked at. Is done with work now, on disability. Moving into their new home in 10 days. Will not be able to do much.Working with Doctor Yuridia Crawford and neurology for his neuropathy.     Listened, provided support. Offered Jose Jones. He will think about this. Have to get this feet thing work out first.   "

## 2022-01-16 ENCOUNTER — HEALTH MAINTENANCE LETTER (OUTPATIENT)
Age: 62
End: 2022-01-16

## 2022-01-25 ASSESSMENT — ENCOUNTER SYMPTOMS
SHORTNESS OF BREATH: 0
HEADACHES: 0
JOINT SWELLING: 1
EYE PAIN: 0
DIZZINESS: 0
DIARRHEA: 0
CHILLS: 1
ARTHRALGIAS: 1
WEAKNESS: 1
ABDOMINAL PAIN: 1
CONSTIPATION: 0
NERVOUS/ANXIOUS: 0
PALPITATIONS: 0
HEMATURIA: 0
NAUSEA: 0
FEVER: 0
PARESTHESIAS: 1
FREQUENCY: 0
COUGH: 0
HEARTBURN: 1
MYALGIAS: 0
SORE THROAT: 0
DYSURIA: 0
HEMATOCHEZIA: 0

## 2022-01-28 ENCOUNTER — ONCOLOGY VISIT (OUTPATIENT)
Dept: ONCOLOGY | Facility: CLINIC | Age: 62
End: 2022-01-28
Attending: STUDENT IN AN ORGANIZED HEALTH CARE EDUCATION/TRAINING PROGRAM
Payer: COMMERCIAL

## 2022-01-28 VITALS
SYSTOLIC BLOOD PRESSURE: 133 MMHG | BODY MASS INDEX: 35.73 KG/M2 | OXYGEN SATURATION: 95 % | TEMPERATURE: 98.5 F | DIASTOLIC BLOOD PRESSURE: 83 MMHG | WEIGHT: 241.2 LBS | HEART RATE: 64 BPM | HEIGHT: 69 IN

## 2022-01-28 DIAGNOSIS — R53.81 PHYSICAL DECONDITIONING: ICD-10-CM

## 2022-01-28 DIAGNOSIS — T45.1X5A CHEMOTHERAPY-INDUCED PERIPHERAL NEUROPATHY (H): ICD-10-CM

## 2022-01-28 DIAGNOSIS — M54.50 LOW BACK PAIN, UNSPECIFIED BACK PAIN LATERALITY, UNSPECIFIED CHRONICITY, UNSPECIFIED WHETHER SCIATICA PRESENT: ICD-10-CM

## 2022-01-28 DIAGNOSIS — G62.0 CHEMOTHERAPY-INDUCED PERIPHERAL NEUROPATHY (H): ICD-10-CM

## 2022-01-28 DIAGNOSIS — C83.78 BURKITT LYMPHOMA OF LYMPH NODES OF MULTIPLE REGIONS (H): Primary | ICD-10-CM

## 2022-01-28 PROCEDURE — G0463 HOSPITAL OUTPT CLINIC VISIT: HCPCS

## 2022-01-28 PROCEDURE — 99215 OFFICE O/P EST HI 40 MIN: CPT | Performed by: STUDENT IN AN ORGANIZED HEALTH CARE EDUCATION/TRAINING PROGRAM

## 2022-01-28 RX ORDER — GABAPENTIN 300 MG/1
CAPSULE ORAL
Qty: 120 CAPSULE | Refills: 1 | Status: SHIPPED | OUTPATIENT
Start: 2022-01-28 | End: 2022-04-05

## 2022-01-28 ASSESSMENT — PAIN SCALES - GENERAL: PAINLEVEL: MILD PAIN (3)

## 2022-01-28 ASSESSMENT — MIFFLIN-ST. JEOR: SCORE: 1899.46

## 2022-01-28 NOTE — LETTER
1/28/2022         RE: Kobe Pedroza  [address]  [facility] [address]        Dear Colleague,    Thank you for referring your patient, Kobe Pedroza, to the [facility] CANCER [facility]INIC. Please see a copy of my visit note below.    [facility]   PM&R clinic note        Interval history:     Kobe Pedroza presents to clinic today for follow up reg his rehab needs.   He has h/o Burkitt lymphoma.  Was last seen in clinic on 10/29/21.  Recommendations included:  1.  Work-up:   1. EMG ordered to further evaluate worsening peripheral neuropathy and possible lumbar radiculopathy.  2. Therapy/equipment/braces:   1. Outpatient physical therapy order has been placed to help with balance and gait training in the setting of peripheral neuropathy.  3. Medications:   1. Gabapentin 600 mg at bedtime dose was prescribed.  4. Follow up: 3 months    Oncology History:  -Patient initially presented with acute on chronic back pain in 3/2020.  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  CSF was negative.  -Patient was treated with steroids followed by R-CODOX-M/IVAC and IT chemoprophylaxis between 3/20/2020 and 6/20/2020.  -His course was complicated by neutropenic sepsis that resolved and a right lower extremity DVT which was diagnosed on 4/30/2020.  Patient has been on therapeutic anticoagulation due to this.  -PET/CT after cycle 2 and again after completing treatment confirmed PET negative complete response.  -Patient became infected with COVID-19, and recovered after monoclonal antibody treatment in 2/2021.  -Patient called and spoke to Natacha Agarwal regarding his neuropathy which has significantly worsened.  He feels that worsening has occurred over the last 2 to 3 weeks.  He states he cannot move his toes at times.  He had tried some gabapentin which he had.  1  11/17/21- Saw Dr. Wright for follow up. Neuropathy still difficult. Back pain has been better. Thinking of getting back to work in next 1-2 months. Lymphoma clinically remains in remission. Labs good. Continue to monitor. Encouraged to find a new PCP.  - Had EMG done on 12/6/21 with Dr. 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 Findings of active denervation and chronic reinnervation changes in right gastrocnemius muscle, could be findings of S1/S2 radiculopathy, lumbosacral plexopathy or sciatic neuropathy, but similar changes were not appreciated in other muscles with shared innervation. So, gastrocnemius changes are of uncertain localization or clinical significance. Tibial neuropathy cannot be excluded.      Symptoms,  Patient presents for a return visit. Since his last visit, neuropathy is still very difficult. He tried to go back to work, but couldn't work and had to stop because of his numbness and pain. He has been taking gabapentin the mornings- 1 tablet and evenings- 2 tablets with no drowsiness and some relief.  He is also seeing a chiropractor and physical therapist (therapist in [place]). Chiropractor felt that nerves might be compressed by tight muscles and is what might be contributing to neuropathy.   Pain gets much worse in his feet when he lies supine and is less when he is sitting.   Patient has been working on balance with physical therapy and he still feels like it is not very good.   He is discouraged about the neuropathy as he feels like it is not getting any better, nor is balance getting better.  He has his first PCP visit with a new doctor in the [employer] system and will check in about his diabetes which is certainly possibly contributing to his neuropathy to see if he could get his blood sugar under better control.         Therapies/HEP,  Patient has been participating in outpatient PT to help with gait and balance in the setting of his  "neuropathy.      Functionally,   Patient remains independent for mobility, ADLs and IADLs.       Social history is unchanged.        Medications:  Current Outpatient Medications   Medication Sig Dispense Refill     gabapentin (NEURONTIN) 300 MG capsule Take 600 mg by mouth 3 times daily Takes PRN       gabapentin (NEURONTIN) 300 MG capsule Take 1 capsule (300 mg) by mouth At Bedtime 60 capsule 1     levothyroxine (SYNTHROID/LEVOTHROID) 175 MCG tablet Take 175 mcg by mouth daily                 Physical Exam:   /83 (BP Location: Left arm, Patient Position: Sitting, Cuff Size: Adult Large)   Pulse 64   Temp 98.5  F (36.9  C) (Oral)   Ht 1.753 m (5' 9\")   Wt 109.4 kg (241 lb 3.2 oz)   SpO2 95%   BMI 35.62 kg/m    Gen: NAD, pleasant and cooperative  HEENT: Normocephalic, atraumatic, extra-ocular movements appear intact  Pulm: non-labored breathing in room air  Ext: no edema in BLE, no tenderness in calves  Neuro/MSK:   Orientation: Oriented to person, place, time, situation. Exhibits good insight into his condition and ongoing management/symptoms.  Motor: 5/5 with bilateral shoulder abduction, elbow extension, wrist extension and  strength/finger intrinsics. 4+/5 with bilateral hip flexion, 5/5 with right knee extension, ankle dorsiflexion, EHL, plantar flexion.  5/5 with left knee extension, 4+/5 with left ankle dorsiflexion, 5/5 with EHL and plantar flexion on the left.  Gait: loss of balance observed with heel to toe when looking forward. Otherwise, with walking, gait is non antalgic with good step and stride length.    Labs/Imaging:  Lab Results   Component Value Date    WBC 5.7 11/17/2021    HGB 14.6 11/17/2021    HCT 43.6 11/17/2021    MCV 93 11/17/2021     11/17/2021     Lab Results   Component Value Date     11/17/2021    POTASSIUM 3.9 11/17/2021    CHLORIDE 108 11/17/2021    CO2 27 11/17/2021     (H) 11/17/2021     Lab Results   Component Value Date    GFRESTIMATED 80 " 11/17/2021    GFRESTBLACK >90 05/03/2021     Lab Results   Component Value Date    AST 21 11/17/2021    ALT 41 11/17/2021    ALKPHOS 87 11/17/2021    BILITOTAL 0.5 11/17/2021     Lab Results   Component Value Date    INR 1.04 06/15/2020     Lab Results   Component Value Date    BUN 20 11/17/2021    CR 1.02 11/17/2021              Assessment/Plan   Kobe Pedroza presents to clinic today for follow up reg his rehab needs.   He has h/o Burkitt lymphoma. Was last seen in clinic on 10/29/21. As discussed with Jonnathan, we will plan to increase his gabapentin to 300 mg/300 mg/600 mg to see if this helps better with his neuropathic pain. He was instructed to decrease daytime doses if he experiences too much drowsiness or sleepiness in daytime hours. We reviewed there results of his EMG. In addition, he should continue PT, chiropractic care and could try acupuncture to help with symptoms. We discussed trialing walkasins, and he will talk with his physical therapist further about this. Lastly, he should keep scheduled PCP appointment to establish care and get his diabetes under better control, which could be contributing to his neuropathy. Patient will return to clinic for a visit in 3 months. Patient is in agreement with the above plan.        1. Therapy/equipment/braces:  1. Continue outpatient PT.  2. Continue chiropractic care.  3. Could consider walkasins for help with neuropathy and balance if could be covered by insurance. He will discuss with his therapist.  4. Could also try acupuncture to help with neuropathy symptoms.  2. Medications:  1. Increased gabapentin to 300 mg/300 mg/600 mg three times daily to see if this helps improve symptoms.  3. Referral / follow up with other providers:  1. Keep scheduled appointment with PCP to establish care and for diabetes management.  4. Follow up:      50 minutes spent on the date of the encounter doing chart review, history and exam, documentation and further activities as  noted above.              Again, thank you for allowing me to participate in the care of your patient.      Sincerely,    Yuridia Crawford MD

## 2022-01-28 NOTE — PATIENT INSTRUCTIONS
1. Continue physical therapy.  2. We changed your gabapentin dose to 300 mg in morning, 300 mg in the afternoon and 600 mg at bedtime to see if this helps better with your neuropathy symptoms. A prescription was sent to your pharmacy.  3. As discussed, you could try acupuncture for symptom relief and you could also think about walkasins- you will discuss with your therapist.  4. Keep your scheduled appointment with your primary care physician to establish care and for diabetes management.  5. Follow up with Dr. Crawford in 3 months.

## 2022-01-28 NOTE — NURSING NOTE
"Oncology Rooming Note    January 28, 2022 8:13 AM   Kobe Pedroza is a 61 year old male who presents for:    Chief Complaint   Patient presents with     Oncology Clinic Visit     Burkitt lymphoma      Initial Vitals: There were no vitals taken for this visit. Estimated body mass index is 36.01 kg/m  as calculated from the following:    Height as of 10/29/21: 1.77 m (5' 9.69\").    Weight as of 11/17/21: 112.8 kg (248 lb 11.2 oz). There is no height or weight on file to calculate BSA.  Data Unavailable Comment: Data Unavailable   No LMP for male patient.  Allergies reviewed: Yes  Medications reviewed: Yes    Medications: MEDICATION REFILLS NEEDED TODAY. Provider was notified.     PT NEEDS A REFILL ON GABAPENTIN.    Pharmacy [facility] entered into [id number] [place] [facility] [address]    Clinical concerns:     Pt concerned that his gabapentin dosage is incorrect.      Pt would like elvia meds to relieve his constant foot pain. He feels like the pain is slowly getting worse. He can only be on his feet for a few hours at a time. He's also noticed the pain amplifies then he lays down.     Michael Byrne MA            "

## 2022-01-28 NOTE — PROGRESS NOTES
[facility]   PM&R clinic note        Interval history:     Kobe Pedroza presents to clinic today for follow up reg his rehab needs.   He has h/o Burkitt lymphoma.  Was last seen in clinic on 10/29/21.  Recommendations included:  1.  Work-up:   1. EMG ordered to further evaluate worsening peripheral neuropathy and possible lumbar radiculopathy.  2. Therapy/equipment/braces:   1. Outpatient physical therapy order has been placed to help with balance and gait training in the setting of peripheral neuropathy.  3. Medications:   1. Gabapentin 600 mg at bedtime dose was prescribed.  4. Follow up: 3 months    Oncology History:  -Patient initially presented with acute on chronic back pain in 3/2020.  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  CSF was negative.  -Patient was treated with steroids followed by R-CODOX-M/IVAC and IT chemoprophylaxis between 3/20/2020 and 6/20/2020.  -His course was complicated by neutropenic sepsis that resolved and a right lower extremity DVT which was diagnosed on 4/30/2020.  Patient has been on therapeutic anticoagulation due to this.  -PET/CT after cycle 2 and again after completing treatment confirmed PET negative complete response.  -Patient became infected with COVID-19, and recovered after monoclonal antibody treatment in 2/2021.  -Patient called and spoke to Natacha Agarwal regarding his neuropathy which has significantly worsened.  He feels that worsening has occurred over the last 2 to 3 weeks.  He states he cannot move his toes at times.  He had tried some gabapentin which he had.  1 11/17/21- Saw Dr. Wright for follow up. Neuropathy still difficult. Back pain has been better. Thinking of getting back to work in next 1-2 months. Lymphoma clinically remains in remission. Labs good. Continue to monitor. Encouraged to find a new PCP.  -  Had EMG done on 12/6/21 with Dr. 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 Findings of active denervation and chronic reinnervation changes in right gastrocnemius muscle, could be findings of S1/S2 radiculopathy, lumbosacral plexopathy or sciatic neuropathy, but similar changes were not appreciated in other muscles with shared innervation. So, gastrocnemius changes are of uncertain localization or clinical significance. Tibial neuropathy cannot be excluded.      Symptoms,  Patient presents for a return visit. Since his last visit, neuropathy is still very difficult. He tried to go back to work, but couldn't work and had to stop because of his numbness and pain. He has been taking gabapentin the mornings- 1 tablet and evenings- 2 tablets with no drowsiness and some relief.  He is also seeing a chiropractor and physical therapist (therapist in [place]). Chiropractor felt that nerves might be compressed by tight muscles and is what might be contributing to neuropathy.   Pain gets much worse in his feet when he lies supine and is less when he is sitting.   Patient has been working on balance with physical therapy and he still feels like it is not very good.   He is discouraged about the neuropathy as he feels like it is not getting any better, nor is balance getting better.  He has his first PCP visit with a new doctor in the [facility] and will check in about his diabetes which is certainly possibly contributing to his neuropathy to see if he could get his blood sugar under better control.         Therapies/HEP,  Patient has been participating in outpatient PT to help with gait and balance in the setting of his neuropathy.      Functionally,   Patient remains independent for mobility, ADLs and IADLs.       Social history is unchanged.        Medications:  Current Outpatient Medications   Medication Sig Dispense Refill     gabapentin (NEURONTIN) 300 MG capsule Take  "600 mg by mouth 3 times daily Takes PRN       gabapentin (NEURONTIN) 300 MG capsule Take 1 capsule (300 mg) by mouth At Bedtime 60 capsule 1     levothyroxine (SYNTHROID/LEVOTHROID) 175 MCG tablet Take 175 mcg by mouth daily                 Physical Exam:   /83 (BP Location: Left arm, Patient Position: Sitting, Cuff Size: Adult Large)   Pulse 64   Temp 98.5  F (36.9  C) (Oral)   Ht 1.753 m (5' 9\")   Wt 109.4 kg (241 lb 3.2 oz)   SpO2 95%   BMI 35.62 kg/m    Gen: NAD, pleasant and cooperative  HEENT: Normocephalic, atraumatic, extra-ocular movements appear intact  Pulm: non-labored breathing in room air  Ext: no edema in BLE, no tenderness in calves  Neuro/MSK:   Orientation: Oriented to person, place, time, situation. Exhibits good insight into his condition and ongoing management/symptoms.  Motor: 5/5 with bilateral shoulder abduction, elbow extension, wrist extension and  strength/finger intrinsics. 4+/5 with bilateral hip flexion, 5/5 with right knee extension, ankle dorsiflexion, EHL, plantar flexion.  5/5 with left knee extension, 4+/5 with left ankle dorsiflexion, 5/5 with EHL and plantar flexion on the left.  Gait: loss of balance observed with heel to toe when looking forward. Otherwise, with walking, gait is non antalgic with good step and stride length.    Labs/Imaging:  Lab Results   Component Value Date    WBC 5.7 11/17/2021    HGB 14.6 11/17/2021    HCT 43.6 11/17/2021    MCV 93 11/17/2021     11/17/2021     Lab Results   Component Value Date     11/17/2021    POTASSIUM 3.9 11/17/2021    CHLORIDE 108 11/17/2021    CO2 27 11/17/2021     (H) 11/17/2021     Lab Results   Component Value Date    GFRESTIMATED 80 11/17/2021    GFRESTBLACK >90 05/03/2021     Lab Results   Component Value Date    AST 21 11/17/2021    ALT 41 11/17/2021    ALKPHOS 87 11/17/2021    BILITOTAL 0.5 11/17/2021     Lab Results   Component Value Date    INR 1.04 06/15/2020     Lab Results   Component " Value Date    BUN 20 11/17/2021    CR 1.02 11/17/2021              Assessment/Plan   Kobe Pedroza presents to clinic today for follow up reg his rehab needs.   He has h/o Burkitt lymphoma. Was last seen in clinic on 10/29/21. As discussed with Jonnathan, we will plan to increase his gabapentin to 300 mg/300 mg/600 mg to see if this helps better with his neuropathic pain. He was instructed to decrease daytime doses if he experiences too much drowsiness or sleepiness in daytime hours. We reviewed there results of his EMG. In addition, he should continue PT, chiropractic care and could try acupuncture to help with symptoms. We discussed trialing walkasins, and he will talk with his physical therapist further about this. Lastly, he should keep scheduled PCP appointment to establish care and get his diabetes under better control, which could be contributing to his neuropathy. Patient will return to clinic for a visit in 3 months. Patient is in agreement with the above plan.        1. Therapy/equipment/braces:  1. Continue outpatient PT.  2. Continue chiropractic care.  3. Could consider walkasins for help with neuropathy and balance if could be covered by insurance. He will discuss with his therapist.  4. Could also try acupuncture to help with neuropathy symptoms.  2. Medications:  1. Increased gabapentin to 300 mg/300 mg/600 mg three times daily to see if this helps improve symptoms.  3. Referral / follow up with other providers:  1. Keep scheduled appointment with PCP to establish care and for diabetes management.  4. Follow up:      Yuridia Crawford MD  Physical Medicine & Rehabilitation      50 minutes spent on the date of the encounter doing chart review, history and exam, documentation and further activities as noted above.

## 2022-02-01 ENCOUNTER — OFFICE VISIT (OUTPATIENT)
Dept: FAMILY MEDICINE | Facility: CLINIC | Age: 62
End: 2022-02-01
Payer: COMMERCIAL

## 2022-02-01 VITALS
BODY MASS INDEX: 35.73 KG/M2 | OXYGEN SATURATION: 96 % | TEMPERATURE: 97.9 F | WEIGHT: 241.2 LBS | DIASTOLIC BLOOD PRESSURE: 66 MMHG | SYSTOLIC BLOOD PRESSURE: 118 MMHG | HEIGHT: 69 IN | RESPIRATION RATE: 16 BRPM | HEART RATE: 68 BPM

## 2022-02-01 DIAGNOSIS — E66.09 CLASS 2 OBESITY DUE TO EXCESS CALORIES WITHOUT SERIOUS COMORBIDITY WITH BODY MASS INDEX (BMI) OF 36.0 TO 36.9 IN ADULT: ICD-10-CM

## 2022-02-01 DIAGNOSIS — G62.9 POLYNEUROPATHY: ICD-10-CM

## 2022-02-01 DIAGNOSIS — Z13.220 SCREENING FOR HYPERLIPIDEMIA: ICD-10-CM

## 2022-02-01 DIAGNOSIS — E66.812 CLASS 2 OBESITY DUE TO EXCESS CALORIES WITHOUT SERIOUS COMORBIDITY WITH BODY MASS INDEX (BMI) OF 36.0 TO 36.9 IN ADULT: ICD-10-CM

## 2022-02-01 DIAGNOSIS — C83.78 BURKITT LYMPHOMA OF LYMPH NODES OF MULTIPLE REGIONS (H): ICD-10-CM

## 2022-02-01 DIAGNOSIS — Z00.00 ANNUAL PHYSICAL EXAM: Primary | ICD-10-CM

## 2022-02-01 DIAGNOSIS — Z13.1 SCREENING FOR DIABETES MELLITUS: ICD-10-CM

## 2022-02-01 DIAGNOSIS — E89.0 POSTSURGICAL HYPOTHYROIDISM: ICD-10-CM

## 2022-02-01 DIAGNOSIS — Z23 NEED FOR ZOSTER VACCINATION: ICD-10-CM

## 2022-02-01 LAB
ALBUMIN SERPL-MCNC: 4.1 G/DL (ref 3.4–5)
ALP SERPL-CCNC: 83 U/L (ref 40–150)
ALT SERPL W P-5'-P-CCNC: 40 U/L (ref 0–70)
ANION GAP SERPL CALCULATED.3IONS-SCNC: 4 MMOL/L (ref 3–14)
AST SERPL W P-5'-P-CCNC: 19 U/L (ref 0–45)
BILIRUB SERPL-MCNC: 0.5 MG/DL (ref 0.2–1.3)
BUN SERPL-MCNC: 20 MG/DL (ref 7–30)
CALCIUM SERPL-MCNC: 8.7 MG/DL (ref 8.5–10.1)
CHLORIDE BLD-SCNC: 107 MMOL/L (ref 94–109)
CHOLEST SERPL-MCNC: 251 MG/DL
CO2 SERPL-SCNC: 27 MMOL/L (ref 20–32)
CREAT SERPL-MCNC: 0.97 MG/DL (ref 0.66–1.25)
ERYTHROCYTE [DISTWIDTH] IN BLOOD BY AUTOMATED COUNT: 12.8 % (ref 10–15)
FASTING STATUS PATIENT QL REPORTED: NO
GFR SERPL CREATININE-BSD FRML MDRD: 90 ML/MIN/1.73M2
GLUCOSE BLD-MCNC: 97 MG/DL (ref 70–99)
HBA1C MFR BLD: 5.8 % (ref 0–5.6)
HCT VFR BLD AUTO: 43.3 % (ref 40–53)
HDLC SERPL-MCNC: 37 MG/DL
HGB BLD-MCNC: 14.6 G/DL (ref 13.3–17.7)
LDLC SERPL CALC-MCNC: 171 MG/DL
MCH RBC QN AUTO: 30.7 PG (ref 26.5–33)
MCHC RBC AUTO-ENTMCNC: 33.7 G/DL (ref 31.5–36.5)
MCV RBC AUTO: 91 FL (ref 78–100)
NONHDLC SERPL-MCNC: 214 MG/DL
PLATELET # BLD AUTO: 210 10E3/UL (ref 150–450)
POTASSIUM BLD-SCNC: 3.8 MMOL/L (ref 3.4–5.3)
PROT SERPL-MCNC: 7.4 G/DL (ref 6.8–8.8)
RBC # BLD AUTO: 4.76 10E6/UL (ref 4.4–5.9)
SODIUM SERPL-SCNC: 138 MMOL/L (ref 133–144)
T4 FREE SERPL-MCNC: 1.46 NG/DL (ref 0.76–1.46)
TOTAL PROTEIN SERUM FOR ELP: 7.3 G/DL (ref 6.8–8.8)
TRIGL SERPL-MCNC: 214 MG/DL
TSH SERPL DL<=0.005 MIU/L-ACNC: 0.16 MU/L (ref 0.4–4)
VIT B12 SERPL-MCNC: 376 PG/ML (ref 193–986)
WBC # BLD AUTO: 6.7 10E3/UL (ref 4–11)

## 2022-02-01 PROCEDURE — 83036 HEMOGLOBIN GLYCOSYLATED A1C: CPT | Performed by: INTERNAL MEDICINE

## 2022-02-01 PROCEDURE — 85027 COMPLETE CBC AUTOMATED: CPT | Performed by: INTERNAL MEDICINE

## 2022-02-01 PROCEDURE — 90471 IMMUNIZATION ADMIN: CPT | Performed by: INTERNAL MEDICINE

## 2022-02-01 PROCEDURE — 84155 ASSAY OF PROTEIN SERUM: CPT | Mod: 59 | Performed by: INTERNAL MEDICINE

## 2022-02-01 PROCEDURE — 84439 ASSAY OF FREE THYROXINE: CPT | Performed by: INTERNAL MEDICINE

## 2022-02-01 PROCEDURE — 90750 HZV VACC RECOMBINANT IM: CPT | Performed by: INTERNAL MEDICINE

## 2022-02-01 PROCEDURE — 36415 COLL VENOUS BLD VENIPUNCTURE: CPT | Performed by: INTERNAL MEDICINE

## 2022-02-01 PROCEDURE — 80061 LIPID PANEL: CPT | Performed by: INTERNAL MEDICINE

## 2022-02-01 PROCEDURE — 82607 VITAMIN B-12: CPT | Performed by: INTERNAL MEDICINE

## 2022-02-01 PROCEDURE — 84165 PROTEIN E-PHORESIS SERUM: CPT | Performed by: PATHOLOGY

## 2022-02-01 PROCEDURE — 99396 PREV VISIT EST AGE 40-64: CPT | Mod: 25 | Performed by: INTERNAL MEDICINE

## 2022-02-01 PROCEDURE — 99214 OFFICE O/P EST MOD 30 MIN: CPT | Mod: 25 | Performed by: INTERNAL MEDICINE

## 2022-02-01 PROCEDURE — 80053 COMPREHEN METABOLIC PANEL: CPT | Performed by: INTERNAL MEDICINE

## 2022-02-01 PROCEDURE — 84443 ASSAY THYROID STIM HORMONE: CPT | Performed by: INTERNAL MEDICINE

## 2022-02-01 ASSESSMENT — ENCOUNTER SYMPTOMS
FREQUENCY: 0
MYALGIAS: 0
ARTHRALGIAS: 1
PARESTHESIAS: 1
SORE THROAT: 0
SHORTNESS OF BREATH: 0
HEMATURIA: 0
ABDOMINAL PAIN: 1
CONSTIPATION: 0
HEARTBURN: 1
EYE PAIN: 0
WEAKNESS: 1
HEADACHES: 0
DYSURIA: 0
CHILLS: 1
COUGH: 0
DIARRHEA: 0
PALPITATIONS: 0
DIZZINESS: 0
NERVOUS/ANXIOUS: 0
JOINT SWELLING: 1
FEVER: 0
HEMATOCHEZIA: 0
NAUSEA: 0

## 2022-02-01 ASSESSMENT — MIFFLIN-ST. JEOR: SCORE: 1891.52

## 2022-02-01 ASSESSMENT — PAIN SCALES - GENERAL: PAINLEVEL: MILD PAIN (2)

## 2022-02-01 NOTE — PROGRESS NOTES
Prior to immunization administration, verified patients identity using patient s name and date of birth. Please see Immunization Activity for additional information.  Screening Questionnaire for Adult Immunization    Are you sick today?   No   Do you have allergies to medications, food, a vaccine component or latex?   No   Have you ever had a serious reaction after receiving a vaccination?   No   Do you have a long-term health problem with heart, lung, kidney, or metabolic disease (e.g., diabetes), asthma, a blood disorder, no spleen, complement component deficiency, a cochlear implant, or a spinal fluid leak?  Are you on long-term aspirin therapy?   No   Do you have cancer, leukemia, HIV/AIDS, or any other immune system problem?   No   Do you have a parent, brother, or sister with an immune system problem?   No   In the past 3 months, have you taken medications that affect  your immune system, such as prednisone, other steroids, or anticancer drugs; drugs for the treatment of rheumatoid arthritis, Crohn s disease, or psoriasis; or have you had radiation treatments?   No   Have you had a seizure, or a brain or other nervous system problem?   Yes   During the past year, have you received a transfusion of blood or blood    products, or been given immune (gamma) globulin or antiviral drug?   No   For women: Are you pregnant or is there a chance you could become       pregnant during the next month?   No   Have you received any vaccinations in the past 4 weeks?   No     Immunization questionnaire was positive for at least one answer.  Notified patient had blood transfusion in Jan 2021.        Per orders of Dr. Menchaca, injection of Shingrix given by Ashwin Loco MA. Patient instructed to remain in clinic for 15 minutes afterwards, and to report any adverse reaction to me immediately.       Screening performed by Ashwin Loco MA on 2/1/2022 at 3:00 PM.  Answers for HPI/ROS submitted by the patient on  1/25/2022  Frequency of exercise:: None  Getting at least 3 servings of Calcium per day:: NO  Diet:: Regular (no restrictions)  Taking medications regularly:: Yes  Medication side effects:: None  Bi-annual eye exam:: Yes  Dental care twice a year:: NO  Sleep apnea or symptoms of sleep apnea:: Excessive snoring  abdominal pain: Yes  Blood in stool: No  Blood in urine: No  chest pain: No  chills: Yes  congestion: No  constipation: No  cough: No  diarrhea: No  dizziness: No  ear pain: No  eye pain: No  nervous/anxious: No  fever: No  frequency: No  genital sores: No  headaches: No  hearing loss: No  heartburn: Yes  arthralgias: Yes  joint swelling: Yes  peripheral edema: Yes  mood changes: No  myalgias: No  nausea: No  dysuria: No  palpitations: No  Skin sensation changes: Yes  sore throat: No  urgency: No  rash: No  shortness of breath: No  visual disturbance: No  weakness: Yes  impotence: No  penile discharge: No  Additional concerns today:: Yes

## 2022-02-01 NOTE — PROGRESS NOTES
SUBJECTIVE:   CC: Kobe Pedroza is an 61 year old male who presents for preventative health visit.     61-year-old gentleman presents for annual physical examination.  He is also come in to establish care.  He was diagnosed with Burkitt's lymphoma stage IV in March 2020.  He underwent chemotherapy as well as decompressive surgery at level T5-T6 related to mass from lymphoma.  He completed chemotherapy in June 2020.  He has been experiencing some numbness and pain in the lower extremity that has progressively gotten worst in 3 years.  He thinks he had a little bit of numbness of the feet even prior to being diagnosed with lymphoma.  He has been closely followed by oncology and his recent exams have been negative for recurrence.  Recently had EMG nerve conduction study and was seen by neurologist.  Is been treated with gabapentin with some response.  He does not do any regular exercise.      Patient has been advised of split billing requirements and indicates understanding: Yes  Healthy Habits:     Getting at least 3 servings of Calcium per day:  NO    Bi-annual eye exam:  Yes    Dental care twice a year:  NO    Sleep apnea or symptoms of sleep apnea:  Excessive snoring    Diet:  Regular (no restrictions)    Frequency of exercise:  None    Taking medications regularly:  Yes    Medication side effects:  None    PHQ-2 Total Score: 0    Additional concerns today:  Yes  Imm/Inj  Associated symptoms include abdominal pain, arthralgias, chills, joint swelling and weakness. Pertinent negatives include no chest pain, congestion, coughing, fever, headaches, myalgias, nausea, rash or sore throat.           Pt is here to establish care   Pt is concerned with possible diabetes, pt would like to discuss oncology   Discuss possible allergic reaction to medication given for bee stings    Today's PHQ-2 Score:   PHQ-2 ( 1999 Pfizer) 1/25/2022   Q1: Little interest or pleasure in doing things 0   Q2: Feeling down, depressed or  hopeless 0   PHQ-2 Score 0   PHQ-2 Total Score (12-17 Years)- Positive if 3 or more points; Administer PHQ-A if positive -   Q1: Little interest or pleasure in doing things Not at all   Q2: Feeling down, depressed or hopeless Not at all   PHQ-2 Score 0       Abuse: Current or Past(Physical, Sexual or Emotional)- No  Do you feel safe in your environment? Yes        Social History     Tobacco Use     Smoking status: Never Smoker     Smokeless tobacco: Never Used   Substance Use Topics     Alcohol use: Not Currently     If you drink alcohol do you typically have >3 drinks per day or >7 drinks per week? No    Alcohol Use 1/25/2022   Prescreen: >3 drinks/day or >7 drinks/week? No       Last PSA: No results found for: PSA    Reviewed orders with patient. Reviewed health maintenance and updated orders accordingly - Yes  BP Readings from Last 3 Encounters:   02/01/22 118/66   01/28/22 133/83   11/17/21 (!) 145/82    Wt Readings from Last 3 Encounters:   02/01/22 109.4 kg (241 lb 3.2 oz)   01/28/22 109.4 kg (241 lb 3.2 oz)   11/17/21 112.8 kg (248 lb 11.2 oz)                  Patient Active Problem List   Diagnosis     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)     History of DVT (deep vein thrombosis)     History of COVID-19     Postsurgical hypothyroidism     Polyneuropathy     Screening for hyperlipidemia     Past Surgical History:   Procedure Laterality Date     IR PICC VASCULAR  04/08/2020     LAMINECTOMY, EXCISE TUMOR THORACIC, COMBINED N/A 03/15/2020    Procedure: LAMINECTOMY, SPINE, THORACIC, 2 levels, T-5, T-6;  Surgeon: Heather Cespedes MD;  Location: [facility] OR     PICC DOUBLE LUMEN PLACEMENT Right 04/30/2020    5FR DL PICC inserted at right basilic vein , length- 39cm (1cm out), tip at low SVC.     THYROIDECTOMY  Bilateral 2011       Social History     Tobacco Use     Smoking status: Never Smoker     Smokeless tobacco: Never Used   Substance Use Topics     Alcohol use: Not Currently     Family History    Problem Relation Age of Onset     Neuropathy Mother      Polymyalgia rheumatica Mother      Hypertension Father          Current Outpatient Medications   Medication Sig Dispense Refill     gabapentin (NEURONTIN) 300 MG capsule Take 1 capsule (300 mg) by mouth every morning AND 1 capsule (300 mg) daily (with lunch) AND 2 capsules (600 mg) At Bedtime. 120 capsule 1     gabapentin (NEURONTIN) 300 MG capsule Take 600 mg by mouth 3 times daily Takes PRN       gabapentin (NEURONTIN) 300 MG capsule Take 1 capsule (300 mg) by mouth At Bedtime 60 capsule 1     levothyroxine (SYNTHROID/LEVOTHROID) 175 MCG tablet Take 175 mcg by mouth daily        No Known Allergies    Reviewed and updated as needed this visit by clinical staff                Reviewed and updated as needed this visit by Provider               Past Medical History:   Diagnosis Date     Acute deep vein thrombosis (DVT) of popliteal vein of right lower extremity (H) 04/30/2020    Below knee right leg while on chemotherapy and positive COVID     Burkitt's lymphoma (H) 02/2020    treated with R-CODOX - MM/OVAC AND IT Chemo completed 06/2020     Chemotherapy-induced neutropenia (H) 04/14/2020     Hypothyroidism      Polyneuropathy 2/1/2022     Postsurgical hypothyroidism 2/1/2022     Thyroid cancer (H)       Past Surgical History:   Procedure Laterality Date     IR PICC VASCULAR  04/08/2020     LAMINECTOMY, EXCISE TUMOR THORACIC, COMBINED N/A 03/15/2020    Procedure: LAMINECTOMY, SPINE, THORACIC, 2 levels, T-5, T-6;  Surgeon: Heather Cespedes MD;  Location: [facility] OR     PICC DOUBLE LUMEN PLACEMENT Right 04/30/2020    5FR DL PICC inserted at right basilic vein , length- 39cm (1cm out), tip at low SVC.     THYROIDECTOMY  Bilateral 2011       Review of Systems   Constitutional: Positive for chills. Negative for fever.   HENT: Negative for congestion, ear pain, hearing loss and sore throat.    Eyes: Negative for pain and visual disturbance.   Respiratory: Negative for  cough and shortness of breath.    Cardiovascular: Positive for peripheral edema. Negative for chest pain and palpitations.   Gastrointestinal: Positive for abdominal pain and heartburn. Negative for constipation, diarrhea, hematochezia and nausea.   Genitourinary: Negative for dysuria, frequency, genital sores, hematuria, impotence, penile discharge and urgency.   Musculoskeletal: Positive for arthralgias and joint swelling. Negative for myalgias.   Skin: Negative for rash.   Neurological: Positive for weakness and paresthesias. Negative for dizziness and headaches.   Psychiatric/Behavioral: Negative for mood changes. The patient is not nervous/anxious.      CONSTITUTIONAL: NEGATIVE for fever, chills, change in weight  INTEGUMENTARY/SKIN: NEGATIVE for worrisome rashes, moles or lesions  EYES: NEGATIVE for vision changes or irritation  ENT: NEGATIVE for ear, mouth and throat problems  RESP: NEGATIVE for significant cough or SOB  CV: NEGATIVE for chest pain, palpitations or peripheral edema  GI: NEGATIVE for nausea, abdominal pain, heartburn, or change in bowel habits   male: negative for dysuria, hematuria, decreased urinary stream, erectile dysfunction, urethral discharge  MUSCULOSKELETAL: NEGATIVE for significant arthralgias or myalgia  NEURO: NEGATIVE for weakness, dizziness.  Has tingling numbness and pain lower extremity.  ENDOCRINE: NEGATIVE for temperature intolerance, skin/hair changes  HEME/ALLERGY/IMMUNE: NEGATIVE for bleeding problems  PSYCHIATRIC: NEGATIVE for changes in mood or affect    OBJECTIVE:   There were no vitals taken for this visit.    Physical Exam  GENERAL: healthy, alert and no distress  EYES: Eyes grossly normal to inspection, PERRL and conjunctivae and sclerae normal  HENT: ear canals and TM's normal, nose and mouth without ulcers or lesions  NECK: no adenopathy, no asymmetry, masses, or scars and thyroid normal to palpation  RESP: lungs clear to auscultation - no rales, rhonchi or  wheezes  CV: regular rate and rhythm, normal S1 S2, no S3 or S4, no murmur, click or rub, no peripheral edema and peripheral pulses strong  ABDOMEN: soft, nontender, no hepatosplenomegaly, no masses and bowel sounds normal   (male): normal male genitalia without lesions or urethral discharge, no hernia  RECTAL: normal sphincter tone, no rectal masses, prostate normal size, smooth, nontender without nodules or masses  MS: no gross musculoskeletal defects noted, no edema  SKIN: no suspicious lesions or rashes  NEURO: Normal strength and tone, mentation intact and speech normal.  Pain and touch seems slightly diminished in the feet compared to upper lower extremity.  PSYCH: mentation appears normal, affect normal/bright  LYMPH: no cervical, supraclavicular, axillary, or inguinal adenopathy        ASSESSMENT/PLAN:     1.  Annual physical exam completed and overall good.  2.  Burkitt's lymphoma multiple region stage IV including spine at level T5-T6 requiring surgical decompression followed by treatment with chemotherapy.  Treatment started following back surgery March 2020 and completed June 2020.  No evidence thus far of recurrence.  3.  Polyneuropathy with increased symptom.  Currently being treated with gabapentin by neurologist.  Had recent EMG nerve conduction study.  4.  Postsurgical hypothyroidism following thyroidectomy in 2011 for thyroid cancer.  No evidence of recurrence.  5.  Screening for hyperlipidemia and diabetes by performing lab.  6.  Shingrix vaccine initiated.  7.  Class II obesity.  Discussed weight reduction.  BMI is 36.  8.  History of provoked right below-knee DVT following back surgery in 2020.  Treat with anticoagulation therapy for 6 months.    Patient will have CBC, CMP, A1c, lipids, prolactin pheresis, TSH and B12 performed.  I will get back to the results and recommendation.        COUNSELING:       Estimated body mass index is 35.62 kg/m  as calculated from the following:    Height as of  "1/28/22: 1.753 m (5' 9\").    Weight as of 1/28/22: 109.4 kg (241 lb 3.2 oz).     Weight management plan: Discussed healthy diet and exercise guidelines    He reports that he has never smoked. He has never used smokeless tobacco.      Counseling Resources:  ATP IV Guidelines  Pooled Cohorts Equation Calculator  FRAX Risk Assessment  ICSI Preventive Guidelines  Dietary Guidelines for Americans, 2010  USDA's MyPlate  ASA Prophylaxis  Lung CA Screening    Truong Menchaca MD  [facility]  "

## 2022-02-02 LAB
ALBUMIN SERPL ELPH-MCNC: 4.9 G/DL (ref 3.7–5.1)
ALPHA1 GLOB SERPL ELPH-MCNC: 0.3 G/DL (ref 0.2–0.4)
ALPHA2 GLOB SERPL ELPH-MCNC: 0.6 G/DL (ref 0.5–0.9)
B-GLOBULIN SERPL ELPH-MCNC: 0.8 G/DL (ref 0.6–1)
GAMMA GLOB SERPL ELPH-MCNC: 0.8 G/DL (ref 0.7–1.6)
M PROTEIN SERPL ELPH-MCNC: 0 G/DL
PROT PATTERN SERPL ELPH-IMP: NORMAL

## 2022-02-03 ENCOUNTER — MYC MEDICAL ADVICE (OUTPATIENT)
Dept: FAMILY MEDICINE | Facility: CLINIC | Age: 62
End: 2022-02-03
Payer: COMMERCIAL

## 2022-02-03 DIAGNOSIS — E89.0 POSTSURGICAL HYPOTHYROIDISM: ICD-10-CM

## 2022-02-03 DIAGNOSIS — E78.2 MIXED DYSLIPIDEMIA: Primary | ICD-10-CM

## 2022-02-03 DIAGNOSIS — R73.01 IFG (IMPAIRED FASTING GLUCOSE): ICD-10-CM

## 2022-02-03 RX ORDER — LEVOTHYROXINE SODIUM 175 UG/1
175 TABLET ORAL DAILY
Qty: 90 TABLET | Refills: 3 | Status: SHIPPED | OUTPATIENT
Start: 2022-02-03 | End: 2022-11-03

## 2022-02-03 RX ORDER — ATORVASTATIN CALCIUM 10 MG/1
10 TABLET, FILM COATED ORAL DAILY
Qty: 90 TABLET | Refills: 1 | Status: SHIPPED | OUTPATIENT
Start: 2022-02-03 | End: 2022-08-22

## 2022-02-04 ENCOUNTER — TELEPHONE (OUTPATIENT)
Dept: FAMILY MEDICINE | Facility: CLINIC | Age: 62
End: 2022-02-04
Payer: COMMERCIAL

## 2022-02-04 NOTE — TELEPHONE ENCOUNTER
Called patient and discussed follow up and fasting labs for lipids. Patient would like to add lipid labs to his current appt on 02/23/3022 at [facility] and schedule a follow up afterwards with Nikolai    Will call patient back confirming the lipid lab on other appt day at [facility]

## 2022-02-04 NOTE — TELEPHONE ENCOUNTER
Patient would like a reminder notification or call to schedule a follow up with  at [facility] in April

## 2022-02-07 ENCOUNTER — TELEPHONE (OUTPATIENT)
Dept: FAMILY MEDICINE | Facility: CLINIC | Age: 62
End: 2022-02-07
Payer: COMMERCIAL

## 2022-02-07 NOTE — TELEPHONE ENCOUNTER
Patient is calling in regards to a voicemail that was left on his phone to call back the [facility]. Please see telephone encounter on 2/7/22.     Patient was made a follow up appointment on 4/1/22.     Patient is reaching out to provider wondering if he should reschedule his fasting lab appointment on 2/23/22 to some time before his appointment on 4/1/22 or if he should just keep his original date on 2/23/22.     Routing to provider to review and advise.     Karla Zeng RN, BSN  [facility]

## 2022-02-07 NOTE — TELEPHONE ENCOUNTER
Called patient to schedule appointment, patient did not answer. A detailed voicemail was left and the number to call us back if they would like our assistance in making the appointment. Also advised that he may use his [employer] to make an appointment as well. Thank you.

## 2022-02-07 NOTE — TELEPHONE ENCOUNTER
The lab orders for Feb are from Dr. Wright and he should do that lab in Feb for him. Those lab are not for cholesterol follow-up.   I will see him in April with fasting lab for cholesterol.

## 2022-02-07 NOTE — TELEPHONE ENCOUNTER
Called patient to relay provider message as written below.    Patient verbalized understanding and had no further questions at this time.    Zuri Galindo RN, BSN  [facility]

## 2022-02-23 ENCOUNTER — ONCOLOGY VISIT (OUTPATIENT)
Dept: ONCOLOGY | Facility: CLINIC | Age: 62
End: 2022-02-23
Attending: INTERNAL MEDICINE
Payer: COMMERCIAL

## 2022-02-23 ENCOUNTER — APPOINTMENT (OUTPATIENT)
Dept: LAB | Facility: CLINIC | Age: 62
End: 2022-02-23
Attending: INTERNAL MEDICINE
Payer: COMMERCIAL

## 2022-02-23 VITALS
SYSTOLIC BLOOD PRESSURE: 121 MMHG | TEMPERATURE: 98.4 F | WEIGHT: 241.9 LBS | OXYGEN SATURATION: 97 % | RESPIRATION RATE: 16 BRPM | DIASTOLIC BLOOD PRESSURE: 76 MMHG | BODY MASS INDEX: 36.25 KG/M2 | HEART RATE: 66 BPM

## 2022-02-23 DIAGNOSIS — E78.2 MIXED DYSLIPIDEMIA: ICD-10-CM

## 2022-02-23 DIAGNOSIS — Z86.718 HISTORY OF DVT (DEEP VEIN THROMBOSIS): ICD-10-CM

## 2022-02-23 DIAGNOSIS — C83.78 BURKITT LYMPHOMA OF LYMPH NODES OF MULTIPLE REGIONS (H): Primary | ICD-10-CM

## 2022-02-23 LAB
ALBUMIN SERPL-MCNC: 4 G/DL (ref 3.4–5)
ALP SERPL-CCNC: 86 U/L (ref 40–150)
ALT SERPL W P-5'-P-CCNC: 40 U/L (ref 0–70)
ANION GAP SERPL CALCULATED.3IONS-SCNC: 7 MMOL/L (ref 3–14)
AST SERPL W P-5'-P-CCNC: 21 U/L (ref 0–45)
BASOPHILS # BLD AUTO: 0 10E3/UL (ref 0–0.2)
BASOPHILS NFR BLD AUTO: 0 %
BILIRUB SERPL-MCNC: 0.4 MG/DL (ref 0.2–1.3)
BUN SERPL-MCNC: 25 MG/DL (ref 7–30)
CALCIUM SERPL-MCNC: 8.3 MG/DL (ref 8.5–10.1)
CHLORIDE BLD-SCNC: 109 MMOL/L (ref 94–109)
CHOLEST SERPL-MCNC: 176 MG/DL
CO2 SERPL-SCNC: 25 MMOL/L (ref 20–32)
CREAT SERPL-MCNC: 0.87 MG/DL (ref 0.66–1.25)
EOSINOPHIL # BLD AUTO: 0.1 10E3/UL (ref 0–0.7)
EOSINOPHIL NFR BLD AUTO: 2 %
ERYTHROCYTE [DISTWIDTH] IN BLOOD BY AUTOMATED COUNT: 12.6 % (ref 10–15)
FASTING STATUS PATIENT QL REPORTED: YES
GFR SERPL CREATININE-BSD FRML MDRD: >90 ML/MIN/1.73M2
GLUCOSE BLD-MCNC: 116 MG/DL (ref 70–99)
HCT VFR BLD AUTO: 41.9 % (ref 40–53)
HDLC SERPL-MCNC: 39 MG/DL
HGB BLD-MCNC: 14.6 G/DL (ref 13.3–17.7)
IMM GRANULOCYTES # BLD: 0 10E3/UL
IMM GRANULOCYTES NFR BLD: 0 %
LDH SERPL L TO P-CCNC: 166 U/L (ref 85–227)
LDLC SERPL CALC-MCNC: 106 MG/DL
LYMPHOCYTES # BLD AUTO: 1 10E3/UL (ref 0.8–5.3)
LYMPHOCYTES NFR BLD AUTO: 14 %
MCH RBC QN AUTO: 30.9 PG (ref 26.5–33)
MCHC RBC AUTO-ENTMCNC: 34.8 G/DL (ref 31.5–36.5)
MCV RBC AUTO: 89 FL (ref 78–100)
MONOCYTES # BLD AUTO: 0.6 10E3/UL (ref 0–1.3)
MONOCYTES NFR BLD AUTO: 9 %
NEUTROPHILS # BLD AUTO: 5.3 10E3/UL (ref 1.6–8.3)
NEUTROPHILS NFR BLD AUTO: 75 %
NONHDLC SERPL-MCNC: 137 MG/DL
NRBC # BLD AUTO: 0 10E3/UL
NRBC BLD AUTO-RTO: 0 /100
PLATELET # BLD AUTO: 204 10E3/UL (ref 150–450)
POTASSIUM BLD-SCNC: 3.9 MMOL/L (ref 3.4–5.3)
PROT SERPL-MCNC: 7.3 G/DL (ref 6.8–8.8)
RBC # BLD AUTO: 4.73 10E6/UL (ref 4.4–5.9)
SODIUM SERPL-SCNC: 141 MMOL/L (ref 133–144)
TRIGL SERPL-MCNC: 153 MG/DL
WBC # BLD AUTO: 7.1 10E3/UL (ref 4–11)

## 2022-02-23 PROCEDURE — 80053 COMPREHEN METABOLIC PANEL: CPT | Performed by: INTERNAL MEDICINE

## 2022-02-23 PROCEDURE — 83615 LACTATE (LD) (LDH) ENZYME: CPT | Performed by: INTERNAL MEDICINE

## 2022-02-23 PROCEDURE — 99214 OFFICE O/P EST MOD 30 MIN: CPT | Mod: GC | Performed by: INTERNAL MEDICINE

## 2022-02-23 PROCEDURE — 85004 AUTOMATED DIFF WBC COUNT: CPT | Performed by: INTERNAL MEDICINE

## 2022-02-23 PROCEDURE — 80061 LIPID PANEL: CPT | Performed by: INTERNAL MEDICINE

## 2022-02-23 PROCEDURE — 82040 ASSAY OF SERUM ALBUMIN: CPT | Performed by: INTERNAL MEDICINE

## 2022-02-23 PROCEDURE — G0463 HOSPITAL OUTPT CLINIC VISIT: HCPCS

## 2022-02-23 PROCEDURE — 36415 COLL VENOUS BLD VENIPUNCTURE: CPT | Performed by: INTERNAL MEDICINE

## 2022-02-23 NOTE — NURSING NOTE
"Oncology Rooming Note    February 23, 2022 8:49 AM   Kobe Pedroza is a 61 year old male who presents for:    Chief Complaint   Patient presents with     Blood Draw     Labs drawn via VPT by RN in lab. VS taken.     Oncology Clinic Visit     Return; Burkitt Lymphoma     Initial Vitals: /76   Pulse 66   Temp 98.4  F (36.9  C) (Oral)   Resp 16   Wt 109.7 kg (241 lb 14.4 oz)   SpO2 97%   BMI 36.25 kg/m   Estimated body mass index is 36.25 kg/m  as calculated from the following:    Height as of 2/1/22: 1.74 m (5' 8.5\").    Weight as of this encounter: 109.7 kg (241 lb 14.4 oz). Body surface area is 2.3 meters squared.  Data Unavailable Comment: Data Unavailable   No LMP for male patient.  Allergies reviewed: Yes  Medications reviewed: Yes    Medications: Medication refills not needed today.  Pharm[facility] entered into [employer]: [id number] [place], [place] - [address]    Clinical concerns: Pt concerned about neuropathy in both feet.  Has worsened over the past 6 months. Dr Wright was notified.      Gayle Springer CMA              "

## 2022-02-23 NOTE — PROGRESS NOTES
REASON FOR VISIT:  Management of Burkitt lymphoma    HISTORY OF PRESENT ILLNESS:  Mr. Pedroza is a 61 year old man with a history of Burkitt lymphoma.  To summarize his course, he presented with acute on chronic back pain in 03/2020.  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  CSF was negative.  He was was treated with steroids followed by R-CODOX-M/IVAC and IT chemo prophylaxis between ending in 06/2020.  His course was complicated by neutropenic sepsis that resolved and right lower extremity below the knee DVT for which he completed a course of anticoagulation.  PET/CT after cycle 2 (1st R-IVAC) and again after completing treatment confirmed PET-negative complete response.  He recovered from COVID-19 after monoclonal antibody treatment in 02/2021.  Visit for ongoing management of lymphoma.    He is with his wife Mckayla today. Neuropathy in his feet remains the major issue. He had an EMG done 12/06/21 with Dr. Whitfield (neurology) which showed electrophysiologic evidence of a mild to moderate axonal, length-dependant sensory polyneuropathy, and additionally, active denervation and chronic reinnveration changes in the right gastrocnemius muscle. He was referred to physical therapy which he did for about 2 months but did not find particularly helpful. He went back to work but could not continue beyond ~3 months given problems with neuropathy. His back pain is improved to resolved now. They did move into their new house in December 2021. No constitutional symptoms, fevers, drenching night sweats, or unintentional weight loss.  No dyspnea, cough, or chest pain.  No lumps or bumps.     MEDICATIONS:  Current Outpatient Medications   Medication     atorvastatin (LIPITOR) 10 MG tablet     gabapentin (NEURONTIN) 300 MG capsule     levothyroxine (SYNTHROID/LEVOTHROID) 175 MCG tablet     gabapentin (NEURONTIN) 300 MG  capsule     gabapentin (NEURONTIN) 300 MG capsule     No current facility-administered medications for this visit.     PHYSICAL EXAMINATION:  /76   Pulse 66   Temp 98.4  F (36.9  C) (Oral)   Resp 16   Wt 109.7 kg (241 lb 14.4 oz)   SpO2 97%   BMI 36.25 kg/m    Wt Readings from Last 5 Encounters:   02/23/22 109.7 kg (241 lb 14.4 oz)   02/01/22 109.4 kg (241 lb 3.2 oz)   01/28/22 109.4 kg (241 lb 3.2 oz)   11/17/21 112.8 kg (248 lb 11.2 oz)   10/29/21 113.1 kg (249 lb 6.4 oz)     General: Well appearing. No distress.  HEENT: Sclerae anicteric. No OP lesions, masses, or tonsilar enlargement.  Lungs: Clear bilaterally without wheezing or crackles.  Heart: Regular rate and rhythm.  Gastrointestinal: Bowel sounds present, no tenderness to palpation, spleen tip not palpable.  Extremities: No lower extremity edema.  Lymph:  No cervical, clavicular, axillary, epitrochlear, or inguinal lymphadenopathy.  Performance status: ECOG 0    LAB DATA:  Reviewed by me  Recent Labs   Lab Test 02/23/22 0842 02/01/22  1517 11/17/21  0717 08/04/21  0748 08/04/21  0748 05/03/21  1712 02/11/21  1127 01/11/21  0920   WBC 7.1 6.7 5.7   < > 6.6 7.3 1.8* 5.9   HGB 14.6 14.6 14.6   < > 14.7 14.4 14.9 12.4*    210 199   < > 199 235 161 195   ANEU  --   --   --   --   --  5.3 0.6* 4.5   ANEUTAUTO 5.3  --  4.3  --  4.5  --   --   --    ALYM  --   --   --   --   --  1.0 0.5* 0.5*   ALYMPAUTO 1.0  --  0.8  --  1.1  --   --   --     < > = values in this interval not displayed.     Recent Labs   Lab Test 02/23/22  0842 02/01/22  1517 11/17/21  0717 08/04/21  0748 05/03/21  1712 02/11/21  1127 06/18/20  0000 06/15/20  1335 06/01/20  0000 05/26/20  0355 05/25/20  0400 03/17/20  1214 03/17/20  0556    138 142 138   < > 139   < > 139   < > 138 140   < > 139   POTASSIUM 3.9 3.8 3.9 3.9   < > 3.8   < > 3.5   < > 4.0 3.7   < > 4.0   CHLORIDE 109 107 108 108   < > 105   < > 105   < > 112* 112*   < > 107   CO2 25 27 27 27   < > 30   < >  30   < > 23 24   < > 26   BUN 25 20 20 16   < > 12   < > 13   < > 16 15   < > 25   CR 0.87 0.97 1.02 1.06   < > 0.96   < > 0.88   < > 0.72 0.70   < > 0.71   RUTH 8.3* 8.7 8.3* 8.6   < > 8.5   < > 8.1*   < > 7.3* 7.4*   < > 7.2*   MAG  --   --   --   --   --  2.5*  --  2.3  --   --  2.3   < > 2.1   PHOS  --   --   --   --   --   --   --  3.6  --  2.3* 2.1*   < > 4.1   URIC  --   --   --   --   --   --   --  4.8  --  3.2* 3.1*   < > 3.7     --  176 181   < >  --    < > 304*  --  226 220   < >  --    HATO2YJSP  --   --   --   --   --   --   --   --   --   --   --   --  2.0    < > = values in this interval not displayed.     Recent Labs   Lab Test 02/23/22  0842 02/01/22  1517 11/17/21  0717 06/18/20  0000 06/15/20  1335 06/01/20  0000 05/26/20  0355 05/25/20  0400   AST 21 19 21   < > 51*   < >  --  18   ALT 40 40 41   < > 85*   < >  --  51   ALKPHOS 86 83 87   < > 73   < >  --  78   BILITOTAL 0.4 0.5 0.5   < > 0.4   < >  --  0.3   INR  --   --   --   --  1.04  --  1.03 1.11    < > = values in this interval not displayed.     IMPRESSION AND PLAN:  Mr. Pedroza is a 61 year old man with a history of Burkitt lymphoma.    His lymphoma clinically remains in remission.  There is nothing to suggest recurrence on history, exam or labs. We will have him return to the clinic in 3 months, and if lymphoma continues to remain in remission, will space out the visits to every 6 months as he is approaching 2 years since completion of treatment.    He has no active infections.  He is up to date for the flu shot.  He completed Prevnar on 8/4/21 followed by Pneumovax on 11/17/21. He initiated Shingrix vaccine series on 2/1/22 (dose 1 of 2) with his primary care provider. We advised him that he will be due for the 2nd dose in 2-6 months.  We discussed the importance of following up to date local and federal health agency guidance regarding measures to reduce the risk of COVID-19 for immunocompromised individuals.  He recovered from  COVID-19 and completed the primary COVID-19 vaccine series on 5/5/21 and 6/3/21 followed by a booster dose on 12/28/21.      He completed apixaban for 3 months for lymphoma-related DVT.  He will continue to work with PM&R (Dr. Crawford), physical therapy and neurology for the management of neuropathy and back pain. He will continue to work with PCP Dr. Menchaca for his health maintenance and other health issues. He will continue to see his endocrinologist Dr. Dunaway for his thyroid replacement.    We will arrange another visit in about 3 months with labs.  We reminded him to contact if questions, concerns, or new issues arise between visits.    Patient was seen and plan of care developed with Dr. Wright.     Tarun Chavez  Hematology/Oncology Fellow  [phone]      ATTENDING ATTESTATION:  The patient was seen and evaluated by me.  I have reviewed the vital signs, medications, labs, and imaging results independently, and have discussed the patient and plan with the resident/fellow, and agree with the findings and plan outlined in this note.  The impression and plan were jointly made.    Ever Wright MD, PhD  Attending Physician, [facility]   of Medicine, [facility]  Division of Hematology, Oncology, and Transplantation  [phone] [facility]

## 2022-02-23 NOTE — LETTER
2/23/2022         RE: Kobe Pedroza  [address]  [place] [address]        Dear Colleague,    Thank you for referring your patient, Kobe Pedroza, to the [facility]. Please see a copy of my visit note below.    REASON FOR VISIT:  Management of Burkitt lymphoma    HISTORY OF PRESENT ILLNESS:  Mr. Pedroza is a 61 year old man with a history of Burkitt lymphoma.  To summarize his course, he presented with acute on chronic back pain in 03/2020.  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  CSF was negative.  He was was treated with steroids followed by R-CODOX-M/IVAC and IT chemo prophylaxis between ending in 06/2020.  His course was complicated by neutropenic sepsis that resolved and right lower extremity below the knee DVT for which he completed a course of anticoagulation.  PET/CT after cycle 2 (1st R-IVAC) and again after completing treatment confirmed PET-negative complete response.  He recovered from COVID-19 after monoclonal antibody treatment in 02/2021.  Visit for ongoing management of lymphoma.    He is with his wife Mckayla today. Neuropathy in his feet remains the major issue. He had an EMG done 12/06/21 with Dr. Whitfield (neurology) which showed electrophysiologic evidence of a mild to moderate axonal, length-dependant sensory polyneuropathy, and additionally, active denervation and chronic reinnveration changes in the right gastrocnemius muscle. He was referred to physical therapy which he did for about 2 months but did not find particularly helpful. He went back to work but could not continue beyond ~3 months given problems with neuropathy. His back pain is improved to resolved now. They did move into their new house in December 2021. No constitutional symptoms, fevers, drenching night sweats, or unintentional weight loss.  No dyspnea, cough, or chest pain.   No lumps or bumps.     MEDICATIONS:  Current Outpatient Medications   Medication     atorvastatin (LIPITOR) 10 MG tablet     gabapentin (NEURONTIN) 300 MG capsule     levothyroxine (SYNTHROID/LEVOTHROID) 175 MCG tablet     gabapentin (NEURONTIN) 300 MG capsule     gabapentin (NEURONTIN) 300 MG capsule     No current facility-administered medications for this visit.     PHYSICAL EXAMINATION:  /76   Pulse 66   Temp 98.4  F (36.9  C) (Oral)   Resp 16   Wt 109.7 kg (241 lb 14.4 oz)   SpO2 97%   BMI 36.25 kg/m    Wt Readings from Last 5 Encounters:   02/23/22 109.7 kg (241 lb 14.4 oz)   02/01/22 109.4 kg (241 lb 3.2 oz)   01/28/22 109.4 kg (241 lb 3.2 oz)   11/17/21 112.8 kg (248 lb 11.2 oz)   10/29/21 113.1 kg (249 lb 6.4 oz)     General: Well appearing. No distress.  HEENT: Sclerae anicteric. No OP lesions, masses, or tonsilar enlargement.  Lungs: Clear bilaterally without wheezing or crackles.  Heart: Regular rate and rhythm.  Gastrointestinal: Bowel sounds present, no tenderness to palpation, spleen tip not palpable.  Extremities: No lower extremity edema.  Lymph:  No cervical, clavicular, axillary, epitrochlear, or inguinal lymphadenopathy.  Performance status: ECOG 0    LAB DATA:  Reviewed by me  Recent Labs   Lab Test 02/23/22  0842 02/01/22  1517 11/17/21  0717 08/04/21  0748 08/04/21  0748 05/03/21  1712 02/11/21  1127 01/11/21  0920   WBC 7.1 6.7 5.7   < > 6.6 7.3 1.8* 5.9   HGB 14.6 14.6 14.6   < > 14.7 14.4 14.9 12.4*    210 199   < > 199 235 161 195   ANEU  --   --   --   --   --  5.3 0.6* 4.5   ANEUTAUTO 5.3  --  4.3  --  4.5  --   --   --    ALYM  --   --   --   --   --  1.0 0.5* 0.5*   ALYMPAUTO 1.0  --  0.8  --  1.1  --   --   --     < > = values in this interval not displayed.     Recent Labs   Lab Test 02/23/22  0842 02/01/22  1517 11/17/21  0717 08/04/21  0748 05/03/21  1712 02/11/21  1127 06/18/20  0000 06/15/20  1335 06/01/20  0000 05/26/20  0355 05/25/20  0400 03/17/20  1214  03/17/20  0556    138 142 138   < > 139   < > 139   < > 138 140   < > 139   POTASSIUM 3.9 3.8 3.9 3.9   < > 3.8   < > 3.5   < > 4.0 3.7   < > 4.0   CHLORIDE 109 107 108 108   < > 105   < > 105   < > 112* 112*   < > 107   CO2 25 27 27 27   < > 30   < > 30   < > 23 24   < > 26   BUN 25 20 20 16   < > 12   < > 13   < > 16 15   < > 25   CR 0.87 0.97 1.02 1.06   < > 0.96   < > 0.88   < > 0.72 0.70   < > 0.71   RUTH 8.3* 8.7 8.3* 8.6   < > 8.5   < > 8.1*   < > 7.3* 7.4*   < > 7.2*   MAG  --   --   --   --   --  2.5*  --  2.3  --   --  2.3   < > 2.1   PHOS  --   --   --   --   --   --   --  3.6  --  2.3* 2.1*   < > 4.1   URIC  --   --   --   --   --   --   --  4.8  --  3.2* 3.1*   < > 3.7     --  176 181   < >  --    < > 304*  --  226 220   < >  --    [id number]  --   --   --   --   --   --   --   --   --   --   --   --  2.0    < > = values in this interval not displayed.     Recent Labs   Lab Test 02/23/22  0842 02/01/22  1517 11/17/21  0717 06/18/20  0000 06/15/20  1335 06/01/20  0000 05/26/20  0355 05/25/20  0400   AST 21 19 21   < > 51*   < >  --  18   ALT 40 40 41   < > 85*   < >  --  51   ALKPHOS 86 83 87   < > 73   < >  --  78   BILITOTAL 0.4 0.5 0.5   < > 0.4   < >  --  0.3   INR  --   --   --   --  1.04  --  1.03 1.11    < > = values in this interval not displayed.     IMPRESSION AND PLAN:  Mr. Pedroza is a 61 year old man with a history of Burkitt lymphoma.    His lymphoma clinically remains in remission.  There is nothing to suggest recurrence on history, exam or labs. We will have him return to the clinic in 3 months, and if lymphoma continues to remain in remission, will space out the visits to every 6 months as he is approaching 2 years since completion of treatment.    He has no active infections.  He is up to date for the flu shot.  He completed Prevnar on 8/4/21 followed by Pneumovax on 11/17/21. He initiated Shingrix vaccine series on 2/1/22 (dose 1 of 2) with his primary care provider. We  advised him that he will be due for the 2nd dose in 2-6 months.  We discussed the importance of following up to date local and federal health agency guidance regarding measures to reduce the risk of COVID-19 for immunocompromised individuals.  He recovered from COVID-19 and completed the primary COVID-19 vaccine series on 5/5/21 and 6/3/21 followed by a booster dose on 12/28/21.      He completed apixaban for 3 months for lymphoma-related DVT.  He will continue to work with PM&R (Dr. Crawford), physical therapy and neurology for the management of neuropathy and back pain. He will continue to work with PCP Dr. Menchaca for his health maintenance and other health issues. He will continue to see his endocrinologist Dr. Dunaway for his thyroid replacement.    We will arrange another visit in about 3 months with labs.  We reminded him to contact if questions, concerns, or new issues arise between visits.    Patient was seen and plan of care developed with Dr. Wright.     Tarun Chavez  Hematology/Oncology Fellow  [phone]      ATTENDING ATTESTATION:  The patient was seen and evaluated by me.  I have reviewed the vital signs, medications, labs, and imaging results independently, and have discussed the patient and plan with the resident/fellow, and agree with the findings and plan outlined in this note.  The impression and plan were jointly made.    Ever Wright MD, PhD  Attending Physician, [facility]   of Medicine, [facility]  Division of Hematology, Oncology, and Transplantation  [phone] [facility]

## 2022-02-23 NOTE — NURSING NOTE
Chief Complaint   Patient presents with     Blood Draw     Labs drawn via VPT by RN in lab. VS taken.     Labs collected from venipuncture by RN. Vitals taken. Checked in for appointment(s).    Shana MCDONALD RN PHN BSN  BMT/Oncology Lab

## 2022-04-01 ENCOUNTER — OFFICE VISIT (OUTPATIENT)
Dept: FAMILY MEDICINE | Facility: CLINIC | Age: 62
End: 2022-04-01
Payer: COMMERCIAL

## 2022-04-01 VITALS
HEART RATE: 58 BPM | DIASTOLIC BLOOD PRESSURE: 82 MMHG | SYSTOLIC BLOOD PRESSURE: 142 MMHG | BODY MASS INDEX: 36.94 KG/M2 | WEIGHT: 246.5 LBS | TEMPERATURE: 97.3 F | RESPIRATION RATE: 16 BRPM | OXYGEN SATURATION: 98 %

## 2022-04-01 DIAGNOSIS — Z23 NEED FOR ZOSTER VACCINATION: ICD-10-CM

## 2022-04-01 DIAGNOSIS — G62.9 POLYNEUROPATHY: ICD-10-CM

## 2022-04-01 DIAGNOSIS — E66.01 MORBID OBESITY (H): ICD-10-CM

## 2022-04-01 DIAGNOSIS — C83.78 BURKITT LYMPHOMA OF LYMPH NODES OF MULTIPLE REGIONS (H): ICD-10-CM

## 2022-04-01 DIAGNOSIS — R73.01 IFG (IMPAIRED FASTING GLUCOSE): ICD-10-CM

## 2022-04-01 DIAGNOSIS — E78.2 MIXED DYSLIPIDEMIA: Primary | ICD-10-CM

## 2022-04-01 PROBLEM — Z13.220 SCREENING FOR HYPERLIPIDEMIA: Status: RESOLVED | Noted: 2022-02-01 | Resolved: 2022-04-01

## 2022-04-01 PROCEDURE — 90750 HZV VACC RECOMBINANT IM: CPT | Performed by: INTERNAL MEDICINE

## 2022-04-01 PROCEDURE — 99214 OFFICE O/P EST MOD 30 MIN: CPT | Mod: 25 | Performed by: INTERNAL MEDICINE

## 2022-04-01 PROCEDURE — 90471 IMMUNIZATION ADMIN: CPT | Performed by: INTERNAL MEDICINE

## 2022-04-01 NOTE — PROGRESS NOTES
Assessment & Plan     1.  Mixed dyslipidemia.  Recently started atorvastatin 10 mg a day.  Patient tolerating it well.  Total cholesterol dropped from 251 to 176.  The LDL dropped from 171 to 106.  There is a good response and will continue with atorvastatin 10 mg a day.  2.  Polyneuropathy.  Patient has been on gabapentin for quite some time and lately in the past month or 2 is noticed some benefit and able to sleep at night without too much discomfort.  Discussed the possibility of increasing the dose if it shows benefit.  3.  Impaired fasting glucose with recent blood sugar of 116 on 2/23/2022 and A1c recently at 5.8.  Discussed low-carb diet and weight management.  4.  Morbid obesity.  Diet and exercise discussed.  Because of polyneuropathy has a difficulty with walking.  Bicycling and recumbent exercise machine discussed.  5.  Burkitt's lymphoma.  Chemotherapy completed been followed by oncology service.    Return in about 4 months (around 8/9/2022) for visit with fasting lab, Routine Visit.  With fasting lab of A1c and lipids..    Truong Menchaca MD  [facility]    Janae De Santiago is a 62 year old who presents for the following health issues     History of Present Illness       Reason for visit:  Neuropathy  Symptom onset:  More than a month  Symptom intensity:  Severe  Symptom progression:  Staying the same    He eats 0-1 servings of fruits and vegetables daily.He consumes 1 sweetened beverage(s) daily.He exercises with enough effort to increase his heart rate 9 or less minutes per day.  He exercises with enough effort to increase his heart rate 3 or less days per week.   He is taking medications regularly.       62-year-old patient with history of Burkitt's lymphoma, mixed dyslipidemia, obesity class II, impaired fasting glucose, postsurgical hypothyroidism has come in for follow-up particularly regarding recently started atorvastatin for hyperlipidemia.  He is tolerating it well.   He has issue with peripheral neuropathy and is under care of neurology service.  He is on gabapentin.  Indicates that only in the past month or 2 the gabapentin seems to have kicked in.  It is helping some.      Review of Systems   Constitutional, HEENT, cardiovascular, pulmonary, GI, , musculoskeletal, neuro, skin, endocrine and psych systems are negative, except as otherwise noted.      Objective    BP (!) 142/82 (BP Location: Right arm, Patient Position: Sitting, Cuff Size: Adult Large)   Pulse 58   Temp 97.3  F (36.3  C) (Temporal)   Resp 16   Wt 111.8 kg (246 lb 8 oz)   SpO2 98%   BMI 36.94 kg/m    Body mass index is 36.94 kg/m .  Physical Exam   GENERAL: healthy, alert and no distress  NECK: no adenopathy, no asymmetry, masses, or scars and thyroid normal to palpation  RESP: lungs clear to auscultation - no rales, rhonchi or wheezes  CV: regular rate and rhythm, normal S1 S2, no S3 or S4, no murmur, click or rub, no peripheral edema and peripheral pulses strong  ABDOMEN: soft, nontender, no hepatosplenomegaly, no masses and bowel sounds normal  MS: no gross musculoskeletal defects noted, no edema  NEURO: Normal strength and tone, sensory deficit - pain and touch decreased in the feet and mentation intact    Oncology Visit on 02/23/2022   Component Date Value Ref Range Status     Cholesterol 02/23/2022 176  <200 mg/dL Final     Triglycerides 02/23/2022 153 (A) <150 mg/dL Final     Direct Measure HDL 02/23/2022 39 (A) >=40 mg/dL Final     LDL Cholesterol Calculated 02/23/2022 106 (A) <=100 mg/dL Final     Non HDL Cholesterol 02/23/2022 137 (A) <130 mg/dL Final     Patient Fasting > 8hrs? 02/23/2022 Yes   Final     Lactate Dehydrogenase 02/23/2022 166  85 - 227 U/L Final     Sodium 02/23/2022 141  133 - 144 mmol/L Final     Potassium 02/23/2022 3.9  3.4 - 5.3 mmol/L Final     Chloride 02/23/2022 109  94 - 109 mmol/L Final     Carbon Dioxide (CO2) 02/23/2022 25  20 - 32 mmol/L Final     Anion Gap  02/23/2022 7  3 - 14 mmol/L Final     Urea Nitrogen 02/23/2022 25  7 - 30 mg/dL Final     Creatinine 02/23/2022 0.87  0.66 - 1.25 mg/dL Final     Calcium 02/23/2022 8.3 (A) 8.5 - 10.1 mg/dL Final     Glucose 02/23/2022 116 (A) 70 - 99 mg/dL Final     Alkaline Phosphatase 02/23/2022 86  40 - 150 U/L Final     AST 02/23/2022 21  0 - 45 U/L Final     ALT 02/23/2022 40  0 - 70 U/L Final     Protein Total 02/23/2022 7.3  6.8 - 8.8 g/dL Final     Albumin 02/23/2022 4.0  3.4 - 5.0 g/dL Final     Bilirubin Total 02/23/2022 0.4  0.2 - 1.3 mg/dL Final     GFR Estimate 02/23/2022 >90  >60 mL/min/1.73m2 Final    Effective December 21, 2021 eGFRcr in adults is calculated using the 2021 CKD-EPI creatinine equation which includes age and gender (Irma et al., NEJ, DOI: 10.1056/UFQGns0533573)     WBC Count 02/23/2022 7.1  4.0 - 11.0 10e3/uL Final     RBC Count 02/23/2022 4.73  4.40 - 5.90 10e6/uL Final     Hemoglobin 02/23/2022 14.6  13.3 - 17.7 g/dL Final     Hematocrit 02/23/2022 41.9  40.0 - 53.0 % Final     MCV 02/23/2022 89  78 - 100 fL Final     MCH 02/23/2022 30.9  26.5 - 33.0 pg Final     MCHC 02/23/2022 34.8  31.5 - 36.5 g/dL Final     RDW 02/23/2022 12.6  10.0 - 15.0 % Final     Platelet Count 02/23/2022 204  150 - 450 10e3/uL Final     % Neutrophils 02/23/2022 75  % Final     % Lymphocytes 02/23/2022 14  % Final     % Monocytes 02/23/2022 9  % Final     % Eosinophils 02/23/2022 2  % Final     % Basophils 02/23/2022 0  % Final     % Immature Granulocytes 02/23/2022 0  % Final     NRBCs per 100 WBC 02/23/2022 0  <1 /100 Final     Absolute Neutrophils 02/23/2022 5.3  1.6 - 8.3 10e3/uL Final     Absolute Lymphocytes 02/23/2022 1.0  0.8 - 5.3 10e3/uL Final     Absolute Monocytes 02/23/2022 0.6  0.0 - 1.3 10e3/uL Final     Absolute Eosinophils 02/23/2022 0.1  0.0 - 0.7 10e3/uL Final     Absolute Basophils 02/23/2022 0.0  0.0 - 0.2 10e3/uL Final     Absolute Immature Granulocytes 02/23/2022 0.0  <=0.4 10e3/uL Final      Absolute NRBCs 02/23/2022 0.0  10e3/uL Final

## 2022-04-05 ENCOUNTER — PATIENT OUTREACH (OUTPATIENT)
Dept: ONCOLOGY | Facility: CLINIC | Age: 62
End: 2022-04-05
Payer: COMMERCIAL

## 2022-04-05 DIAGNOSIS — T45.1X5A CHEMOTHERAPY-INDUCED PERIPHERAL NEUROPATHY (H): ICD-10-CM

## 2022-04-05 DIAGNOSIS — M54.50 LOW BACK PAIN, UNSPECIFIED BACK PAIN LATERALITY, UNSPECIFIED CHRONICITY, UNSPECIFIED WHETHER SCIATICA PRESENT: ICD-10-CM

## 2022-04-05 DIAGNOSIS — C83.78 BURKITT LYMPHOMA OF LYMPH NODES OF MULTIPLE REGIONS (H): ICD-10-CM

## 2022-04-05 DIAGNOSIS — G62.0 CHEMOTHERAPY-INDUCED PERIPHERAL NEUROPATHY (H): ICD-10-CM

## 2022-04-05 DIAGNOSIS — R53.81 PHYSICAL DECONDITIONING: ICD-10-CM

## 2022-04-05 RX ORDER — GABAPENTIN 300 MG/1
CAPSULE ORAL
Qty: 120 CAPSULE | Refills: 0 | Status: SHIPPED | OUTPATIENT
Start: 2022-04-05 | End: 2022-04-27

## 2022-04-05 NOTE — PROGRESS NOTES
Jonnathan called looking for a refill of gabapentin and referral for acupuncture with [facility].       Refill provided.    Referral for acupuncture placed.  Closest location is [facility].  However, when he called he was told the therapist is out on maternity leave.     I encouraged him to look locally for acupuncture providers

## 2022-04-27 ENCOUNTER — ONCOLOGY VISIT (OUTPATIENT)
Dept: ONCOLOGY | Facility: CLINIC | Age: 62
End: 2022-04-27
Attending: STUDENT IN AN ORGANIZED HEALTH CARE EDUCATION/TRAINING PROGRAM
Payer: COMMERCIAL

## 2022-04-27 VITALS
OXYGEN SATURATION: 97 % | SYSTOLIC BLOOD PRESSURE: 127 MMHG | WEIGHT: 252 LBS | TEMPERATURE: 98.7 F | BODY MASS INDEX: 37.76 KG/M2 | DIASTOLIC BLOOD PRESSURE: 76 MMHG | HEART RATE: 71 BPM

## 2022-04-27 DIAGNOSIS — R53.81 PHYSICAL DECONDITIONING: ICD-10-CM

## 2022-04-27 DIAGNOSIS — G62.0 CHEMOTHERAPY-INDUCED PERIPHERAL NEUROPATHY (H): ICD-10-CM

## 2022-04-27 DIAGNOSIS — T45.1X5A CHEMOTHERAPY-INDUCED PERIPHERAL NEUROPATHY (H): ICD-10-CM

## 2022-04-27 DIAGNOSIS — M54.50 LOW BACK PAIN, UNSPECIFIED BACK PAIN LATERALITY, UNSPECIFIED CHRONICITY, UNSPECIFIED WHETHER SCIATICA PRESENT: ICD-10-CM

## 2022-04-27 DIAGNOSIS — C83.78 BURKITT LYMPHOMA OF LYMPH NODES OF MULTIPLE REGIONS (H): Primary | ICD-10-CM

## 2022-04-27 PROCEDURE — 99215 OFFICE O/P EST HI 40 MIN: CPT | Performed by: STUDENT IN AN ORGANIZED HEALTH CARE EDUCATION/TRAINING PROGRAM

## 2022-04-27 PROCEDURE — G0463 HOSPITAL OUTPT CLINIC VISIT: HCPCS

## 2022-04-27 RX ORDER — GABAPENTIN 300 MG/1
CAPSULE ORAL
Qty: 120 CAPSULE | Refills: 0 | Status: SHIPPED | OUTPATIENT
Start: 2022-04-27 | End: 2022-05-24

## 2022-04-27 ASSESSMENT — PAIN SCALES - GENERAL: PAINLEVEL: MILD PAIN (3)

## 2022-04-27 NOTE — LETTER
4/27/2022         RE: Kobe Pedroza  [address]  [facility] [address]        Dear Colleague,    Thank you for referring your patient, Kobe Pedroza, to the [facility]. Please see a copy of my visit note below.    [facility]   PM&R clinic note        Interval history:     Kobe Pedroza presents to clinic today for follow up reg his rehab needs.   He has h/o Burkitt lymphoma.  Was last seen in clinic on 1/28/22.  Recommendations included:  1. Therapy/equipment/braces:  1. Continue outpatient PT.  2. Continue chiropractic care.  3. Could consider walkasins for help with neuropathy and balance if could be covered by insurance. He will discuss with his therapist.  4. Could also try acupuncture to help with neuropathy symptoms.  2. Medications:  1. Increased gabapentin to 300 mg/300 mg/600 mg three times daily to see if this helps improve symptoms.  3. Referral / follow up with other providers:  1. Keep scheduled appointment with PCP to establish care and for diabetes management.  4. Follow up: 3 months    Oncology History:  -Patient initially presented with acute on chronic back pain in 3/2020.  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  CSF was negative.  -Patient was treated with steroids followed by R-CODOX-M/IVAC and IT chemoprophylaxis between 3/20/2020 and 6/20/2020.  -His course was complicated by neutropenic sepsis that resolved and a right lower extremity DVT which was diagnosed on 4/30/2020.  Patient has been on therapeutic anticoagulation due to this.  -PET/CT after cycle 2 and again after completing treatment confirmed PET negative complete response.  -Patient became infected with COVID-19, and recovered after monoclonal antibody treatment in 2/2021.  -Patient called and spoke to Natacha Agarwal regarding his  neuropathy which has significantly worsened.  He feels that worsening has occurred over the last 2 to 3 weeks.  He states he cannot move his toes at times.  He had tried some gabapentin which he had.  1 11/17/21- Saw Dr. Wright for follow up. Neuropathy still difficult. Back pain has been better. Thinking of getting back to work in next 1-2 months. Lymphoma clinically remains in remission. Labs good. Continue to monitor. Encouraged to find a new PCP.  - Had EMG done on 12/6/21 with Dr. 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 Findings of active denervation and chronic reinnervation changes in right gastrocnemius muscle, could be findings of S1/S2 radiculopathy, lumbosacral plexopathy or sciatic neuropathy, but similar changes were not appreciated in other muscles with shared innervation. So, gastrocnemius changes are of uncertain localization or clinical significance. Tibial neuropathy cannot be excluded.  - Has established care with IM, Dr. Menchaca. HbA1c of 5.8. Mixed dyslipidemia and recently started atorvastatin 10 mg daily and LDL dropped.  2/23/22- Follow up with Dr. Wright. Lymphoma remains in remission. Plan to return to clinic in 3 months, and if still in remission, will space out visits to every 6 months.     Symptoms,  Patient presents for a return visit. He states that he tried PT for about 6 weeks, and then quit. He felt that there are some exercises that he did with PT helped, including stretching and strengthening. He worked on balance, but did not feel that he was stronger.  He also reached out to a chiropractor and tried that a few times. He states that it really did not help his symptoms at all.   Patient endorses a fall 2 weeks ago. He was carrying boxes down the stairs and couldn't see the last few steps and fell forward onto his knees. He bruised/cut his left elbow.  He has been able to work in the garage, painting the walls and has been able  to tolerate this activity.   He continues with gabapentin at 300/300/600 and feels that it is helping significantly. He still has difficulty walking due to pain. Pain is still a tingling in toes/feet, especially when walking.   He started acupuncture, and has had 3 sessions with the last one being yesterday. He states after the first few times, he felt better. After the last one, he is more sore today.    Therapies/HEP,  Not currently in outpatient therapy.      Functionally,   Patient remains independent with mobility and ADLs.      Social history is unchanged.        Medications:  Current Outpatient Medications   Medication Sig Dispense Refill     atorvastatin (LIPITOR) 10 MG tablet Take 1 tablet (10 mg) by mouth daily 90 tablet 1     gabapentin (NEURONTIN) 300 MG capsule Take 1 capsule (300 mg) by mouth every morning AND 1 capsule (300 mg) daily (with lunch) AND 2 capsules (600 mg) At Bedtime. 120 capsule 0     levothyroxine (SYNTHROID/LEVOTHROID) 175 MCG tablet Take 1 tablet (175 mcg) by mouth daily 90 tablet 3              Physical Exam:   /76 (BP Location: Right arm, Patient Position: Sitting, Cuff Size: Adult Large)   Pulse 71   Temp 98.7  F (37.1  C) (Oral)   Wt 114.3 kg (252 lb)   SpO2 97%   BMI 37.76 kg/m    Gen: NAD, pleasant and cooperative  HEENT: Normocephalic, atraumatic, extra-ocular movements appear intact  Pulm: non-labored breathing in room air  Ext: no edema in BLE, no tenderness in calves  Neuro/MSK:   Orientation: Oriented to person, place, time, situation. Exhibits good insight into his condition and ongoing management/symptoms.  Motor: 5/5 with bilateral shoulder abduction, elbow extension, wrist extension and  strength/finger intrinsics. 4+/5 with bilateral hip flexion, 5/5 with right knee extension, ankle dorsiflexion, EHL, plantar flexion.  5/5 with left knee extension, 4+/5 with left ankle dorsiflexion, 5/5 with EHL and plantar flexion on the left.  Gait: loss of balance  observed with heel to toe when looking forward. Otherwise, with walking, gait is non antalgic with good step and stride length.    Labs/Imaging:  Lab Results   Component Value Date    WBC 7.1 02/23/2022    HGB 14.6 02/23/2022    HCT 41.9 02/23/2022    MCV 89 02/23/2022     02/23/2022     Lab Results   Component Value Date     02/23/2022    POTASSIUM 3.9 02/23/2022    CHLORIDE 109 02/23/2022    CO2 25 02/23/2022     (H) 02/23/2022     Lab Results   Component Value Date    GFRESTIMATED >90 02/23/2022    GFRESTBLACK >90 05/03/2021     Lab Results   Component Value Date    AST 21 02/23/2022    ALT 40 02/23/2022    ALKPHOS 86 02/23/2022    BILITOTAL 0.4 02/23/2022     Lab Results   Component Value Date    INR 1.04 06/15/2020     Lab Results   Component Value Date    BUN 25 02/23/2022    CR 0.87 02/23/2022              Assessment/Plan   Kobe Pedroza presents to clinic today for follow up reg his rehab needs. He has h/o Burkitt lymphoma. Was last seen in clinic on 1/28/22. Patient seen for follow up today in clinic. Overall doing well with mobility, but neuropathic symptoms remain difficult which are affecting his balance at times. Gabapentin dose increased to 600 mg in the morning, see daily dosing below. He should continue his daily exercise regimen and acupuncture for symptom relief. Will plan follow up in 3 months. Patient is in agreement with the above plan.      1. Therapy/equipment/braces:  1. Continue regular exercise regimen as tolerated.  2. Continue acupuncture for relief of symptoms.   2. Medications:  3. Increased gabapentin to 600 mg/300 mg/600 mg three times daily to see if this helps improve symptoms. Prescription refilled today with new dosing.  3. Referral / follow up with other providers:  4. Continue follow up with IM for management of dyslipidemia and diabetes.  5. Follow up: 3 month.        50 minutes spent on the date of the encounter doing chart review, history and exam,  documentation and further activities as noted above.          Again, thank you for allowing me to participate in the care of your patient.      Sincerely,    Yuridia Crawford MD

## 2022-04-27 NOTE — NURSING NOTE
"Oncology Rooming Note    April 27, 2022 8:00 AM   Kobe Pedroza is a 62 year old male who presents for:    Chief Complaint   Patient presents with     Oncology Clinic Visit     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      Initial Vitals: /76 (BP Location: Right arm, Patient Position: Sitting, Cuff Size: Adult Large)   Pulse 71   Temp 98.7  F (37.1  C) (Oral)   Wt 114.3 kg (252 lb)   SpO2 97%   BMI 37.76 kg/m   Estimated body mass index is 37.76 kg/m  as calculated from the following:    Height as of 2/1/22: 1.74 m (5' 8.5\").    Weight as of this encounter: 114.3 kg (252 lb). Body surface area is 2.35 meters squared.  Mild Pain (3) Comment: Data Unavailable   No LMP for male patient.  Allergies reviewed: Yes  Medications reviewed: Yes    Medications: Medication refills not needed today.  Pharmacy name entered into [facility]: [id number] [place], [place] - [address]    Clinical concerns: NONE       Sean Scales            "

## 2022-04-27 NOTE — PATIENT INSTRUCTIONS
Continue to remain active and continue to do stretches regularly three times per week.  Increase gabapentin to 600 mg/300 mg/600 mg three times daily.  Continue acupuncture for your symptoms.  Follow up with Dr. Carwford in 3 months.

## 2022-04-27 NOTE — PROGRESS NOTES
[facility]   PM&R clinic note        Interval history:     Kobe Pedroza presents to clinic today for follow up reg his rehab needs.   He has h/o Burkitt lymphoma.  Was last seen in clinic on 1/28/22.  Recommendations included:  1. Therapy/equipment/braces:  1. Continue outpatient PT.  2. Continue chiropractic care.  3. Could consider walkasins for help with neuropathy and balance if could be covered by insurance. He will discuss with his therapist.  4. Could also try acupuncture to help with neuropathy symptoms.  2. Medications:  1. Increased gabapentin to 300 mg/300 mg/600 mg three times daily to see if this helps improve symptoms.  3. Referral / follow up with other providers:  1. Keep scheduled appointment with PCP to establish care and for diabetes management.  4. Follow up: 3 months    Oncology History:  -Patient initially presented with acute on chronic back pain in 3/2020.  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  CSF was negative.  -Patient was treated with steroids followed by R-CODOX-M/IVAC and IT chemoprophylaxis between 3/20/2020 and 6/20/2020.  -His course was complicated by neutropenic sepsis that resolved and a right lower extremity DVT which was diagnosed on 4/30/2020.  Patient has been on therapeutic anticoagulation due to this.  -PET/CT after cycle 2 and again after completing treatment confirmed PET negative complete response.  -Patient became infected with COVID-19, and recovered after monoclonal antibody treatment in 2/2021.  -Patient called and spoke to Natacha Agarwal regarding his neuropathy which has significantly worsened.  He feels that worsening has occurred over the last 2 to 3 weeks.  He states he cannot move his toes at times.  He had tried some gabapentin which he had.  1 11/17/21- Saw Dr. Wright for follow up. Neuropathy still  difficult. Back pain has been better. Thinking of getting back to work in next 1-2 months. Lymphoma clinically remains in remission. Labs good. Continue to monitor. Encouraged to find a new PCP.  - Had EMG done on 12/6/21 with Dr. 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 Findings of active denervation and chronic reinnervation changes in right gastrocnemius muscle, could be findings of S1/S2 radiculopathy, lumbosacral plexopathy or sciatic neuropathy, but similar changes were not appreciated in other muscles with shared innervation. So, gastrocnemius changes are of uncertain localization or clinical significance. Tibial neuropathy cannot be excluded.  - Has established care with IM, Dr. Menchaca. HbA1c of 5.8. Mixed dyslipidemia and recently started atorvastatin 10 mg daily and LDL dropped.  2/23/22- Follow up with Dr. Wright. Lymphoma remains in remission. Plan to return to clinic in 3 months, and if still in remission, will space out visits to every 6 months.     Symptoms,  Patient presents for a return visit. He states that he tried PT for about 6 weeks, and then quit. He felt that there are some exercises that he did with PT helped, including stretching and strengthening. He worked on balance, but did not feel that he was stronger.  He also reached out to a chiropractor and tried that a few times. He states that it really did not help his symptoms at all.   Patient endorses a fall 2 weeks ago. He was carrying boxes down the stairs and couldn't see the last few steps and fell forward onto his knees. He bruised/cut his left elbow.  He has been able to work in the garage, painting the walls and has been able to tolerate this activity.   He continues with gabapentin at 300/300/600 and feels that it is helping significantly. He still has difficulty walking due to pain. Pain is still a tingling in toes/feet, especially when walking.   He started acupuncture, and has had  3 sessions with the last one being yesterday. He states after the first few times, he felt better. After the last one, he is more sore today.    Therapies/HEP,  Not currently in outpatient therapy.      Functionally,   Patient remains independent with mobility and ADLs.      Social history is unchanged.        Medications:  Current Outpatient Medications   Medication Sig Dispense Refill     atorvastatin (LIPITOR) 10 MG tablet Take 1 tablet (10 mg) by mouth daily 90 tablet 1     gabapentin (NEURONTIN) 300 MG capsule Take 1 capsule (300 mg) by mouth every morning AND 1 capsule (300 mg) daily (with lunch) AND 2 capsules (600 mg) At Bedtime. 120 capsule 0     levothyroxine (SYNTHROID/LEVOTHROID) 175 MCG tablet Take 1 tablet (175 mcg) by mouth daily 90 tablet 3              Physical Exam:   /76 (BP Location: Right arm, Patient Position: Sitting, Cuff Size: Adult Large)   Pulse 71   Temp 98.7  F (37.1  C) (Oral)   Wt 114.3 kg (252 lb)   SpO2 97%   BMI 37.76 kg/m    Gen: NAD, pleasant and cooperative  HEENT: Normocephalic, atraumatic, extra-ocular movements appear intact  Pulm: non-labored breathing in room air  Ext: no edema in BLE, no tenderness in calves  Neuro/MSK:   Orientation: Oriented to person, place, time, situation. Exhibits good insight into his condition and ongoing management/symptoms.  Motor: 5/5 with bilateral shoulder abduction, elbow extension, wrist extension and  strength/finger intrinsics. 4+/5 with bilateral hip flexion, 5/5 with right knee extension, ankle dorsiflexion, EHL, plantar flexion.  5/5 with left knee extension, 4+/5 with left ankle dorsiflexion, 5/5 with EHL and plantar flexion on the left.  Gait: loss of balance observed with heel to toe when looking forward. Otherwise, with walking, gait is non antalgic with good step and stride length.    Labs/Imaging:  Lab Results   Component Value Date    WBC 7.1 02/23/2022    HGB 14.6 02/23/2022    HCT 41.9 02/23/2022    MCV 89  02/23/2022     02/23/2022     Lab Results   Component Value Date     02/23/2022    POTASSIUM 3.9 02/23/2022    CHLORIDE 109 02/23/2022    CO2 25 02/23/2022     (H) 02/23/2022     Lab Results   Component Value Date    GFRESTIMATED >90 02/23/2022    GFRESTBLACK >90 05/03/2021     Lab Results   Component Value Date    AST 21 02/23/2022    ALT 40 02/23/2022    ALKPHOS 86 02/23/2022    BILITOTAL 0.4 02/23/2022     Lab Results   Component Value Date    INR 1.04 06/15/2020     Lab Results   Component Value Date    BUN 25 02/23/2022    CR 0.87 02/23/2022              Assessment/Plan   Kobe Pedroza presents to clinic today for follow up reg his rehab needs. He has h/o Burkitt lymphoma. Was last seen in clinic on 1/28/22. Patient seen for follow up today in clinic. Overall doing well with mobility, but neuropathic symptoms remain difficult which are affecting his balance at times. Gabapentin dose increased to 600 mg in the morning, see daily dosing below. He should continue his daily exercise regimen and acupuncture for symptom relief. Will plan follow up in 3 months. Patient is in agreement with the above plan.      1. Therapy/equipment/braces:  1. Continue regular exercise regimen as tolerated.  2. Continue acupuncture for relief of symptoms.   2. Medications:  3. Increased gabapentin to 600 mg/300 mg/600 mg three times daily to see if this helps improve symptoms. Prescription refilled today with new dosing.  3. Referral / follow up with other providers:  4. Continue follow up with IM for management of dyslipidemia and diabetes.  5. Follow up: 3 month.        Yuridia Crawford MD  Physical Medicine & Rehabilitation      50 minutes spent on the date of the encounter doing chart review, history and exam, documentation and further activities as noted above.

## 2022-05-17 NOTE — PROGRESS NOTES
REASON FOR VISIT:  Management of Burkitt lymphoma    HISTORY OF PRESENT ILLNESS:  Mr. Pedroza is a 62 year old man with a history of Burkitt lymphoma.  To summarize his course, he presented with acute on chronic back pain in 03/2020.  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  CSF was negative.  He was was treated with steroids followed by R-CODOX-M/IVAC and IT chemo prophylaxis ending in 06/2020.  His course was complicated by neutropenic sepsis that resolved and right lower extremity below the knee DVT for which he completed a course of anticoagulation.  PET/CT after cycle 2 (1st R-IVAC) and again after completing treatment confirmed PET-negative complete response.  He recovered from COVID-19 after monoclonal antibody treatment in 02/2021.  Visit for ongoing management of lymphoma.    He is not with his wife Mckayla today. Neuropathy in his feet is still an issue and he is working with Dr. Crawford and other providers for this.  He recently began to see a chiropractic. He is not doing physical therapy any longer. He has tried acupuncture in the past, but will not be doing it going forward due to insurance issues. No fevers, drenching night sweats, or unintentional weight loss.  No dyspnea, cough, or chest pain.  No new lumps or bumps.  Remainder of a 12-point ROS was unremarkable. He has decided to retire, and is keeping occupied with housing projects and outdoor activities.     MEDICATIONS:  Current Outpatient Medications   Medication     atorvastatin (LIPITOR) 10 MG tablet     gabapentin (NEURONTIN) 300 MG capsule     levothyroxine (SYNTHROID/LEVOTHROID) 175 MCG tablet     No current facility-administered medications for this visit.     PHYSICAL EXAMINATION:  /73   Pulse 65   Temp 97.7  F (36.5  C) (Oral)   Resp 18   Wt 111.7 kg (246 lb 3.2 oz)   SpO2 95%   BMI 36.89 kg/m    Wt Readings from Last 5  Encounters:   05/18/22 111.7 kg (246 lb 3.2 oz)   04/27/22 114.3 kg (252 lb)   04/01/22 111.8 kg (246 lb 8 oz)   02/23/22 109.7 kg (241 lb 14.4 oz)   02/01/22 109.4 kg (241 lb 3.2 oz)     General: Well appearing. No distress.  HEENT: Sclerae anicteric. No OP lesions, masses, or tonsilar enlargement.  Lungs: Clear bilaterally without wheezing or crackles.  Heart: Regular rate and rhythm.  Gastrointestinal: Bowel sounds present, no tenderness to palpation, spleen tip not palpable.  Extremities: No lower extremity edema.  Lymph:  No cervical, clavicular, axillary, epitrochlear, or inguinal lymphadenopathy.  Performance status: ECOG 0    LAB DATA:  Reviewed by us  Recent Labs   Lab Test 05/18/22  0733 02/23/22  0842 02/01/22  1517 11/17/21  0717 08/04/21  0748 05/03/21  1712 02/11/21  1127 01/11/21  0920   WBC 6.4 7.1 6.7 5.7   < > 7.3 1.8* 5.9   HGB 14.8 14.6 14.6 14.6   < > 14.4 14.9 12.4*    204 210 199   < > 235 161 195   ANEU  --   --   --   --   --  5.3 0.6* 4.5   ANEUTAUTO 4.3 5.3  --  4.3   < >  --   --   --    ALYM  --   --   --   --   --  1.0 0.5* 0.5*   ALYMPAUTO 1.2 1.0  --  0.8   < >  --   --   --     < > = values in this interval not displayed.     Recent Labs   Lab Test 02/23/22  0842 02/01/22  1517 11/17/21  0717 08/04/21  0748 05/03/21  1712 02/11/21  1127 06/18/20  0000 06/15/20  1335 06/01/20  0000 05/26/20  0355 05/25/20  0400 03/17/20  1214 03/17/20  0556    138 142 138   < > 139   < > 139   < > 138 140   < > 139   POTASSIUM 3.9 3.8 3.9 3.9   < > 3.8   < > 3.5   < > 4.0 3.7   < > 4.0   CHLORIDE 109 107 108 108   < > 105   < > 105   < > 112* 112*   < > 107   CO2 25 27 27 27   < > 30   < > 30   < > 23 24   < > 26   BUN 25 20 20 16   < > 12   < > 13   < > 16 15   < > 25   CR 0.87 0.97 1.02 1.06   < > 0.96   < > 0.88   < > 0.72 0.70   < > 0.71   RUTH 8.3* 8.7 8.3* 8.6   < > 8.5   < > 8.1*   < > 7.3* 7.4*   < > 7.2*   MAG  --   --   --   --   --  2.5*  --  2.3  --   --  2.3   < > 2.1   PHOS   --   --   --   --   --   --   --  3.6  --  2.3* 2.1*   < > 4.1   URIC  --   --   --   --   --   --   --  4.8  --  3.2* 3.1*   < > 3.7     --  176 181   < >  --    < > 304*  --  226 220   < >  --    [id number]  --   --   --   --   --   --   --   --   --   --   --   --  2.0    < > = values in this interval not displayed.     Recent Labs   Lab Test 02/23/22  0842 02/01/22  1517 11/17/21  0717 06/18/20  0000 06/15/20  1335 06/01/20  0000 05/26/20  0355 05/25/20  0400   AST 21 19 21   < > 51*   < >  --  18   ALT 40 40 41   < > 85*   < >  --  51   ALKPHOS 86 83 87   < > 73   < >  --  78   BILITOTAL 0.4 0.5 0.5   < > 0.4   < >  --  0.3   INR  --   --   --   --  1.04  --  1.03 1.11    < > = values in this interval not displayed.       IMPRESSION AND PLAN:  Mr. Pedroza is a 62 year old man with a history of Burkitt lymphoma.    His lymphoma clinically remains in remission.  There is nothing to suggest recurrence.  We will continue to monitor.    He has no active ID issues.  He is up to date with pneumococcal and Shingrix vaccines.  We discussed the importance of following up to date local and federal health agency guidance regarding measures to reduce the risk of COVID-19 for immunocompromised individuals.  He completed a primary COVID-19 vaccine series and we recommended a 4th dose booster.      He completed apixaban for 3 months for lymphoma-related DVT.  He will continue to work with PM&R (Dr. Crawford), physical therapy, and neurology as needed for neuropathy and back pain.  He will continue to work with PCP Dr. Menchaca for his health maintenance (next apt 9/8/22) and other health issues and his endocrinologist Dr. Dunaway for his thyroid issues.    We will arrange another visit in about 6 months with labs.  We reminded him to contact if questions, concerns, or new issues arise between visits.    Patient was seen and plan of care developed with Dr. Wright.     Tarun Chavez  Heme/Onc Fellow  [phone]    ATTENDING  ATTESTATION:  The patient was seen and evaluated by me.  I have reviewed the vital signs, medications, labs, and imaging results independently, and have discussed the patient and plan with the resident/fellow, and agree with the findings and plan outlined in this note.  The impression and plan were jointly made.    Ever Wright MD, PhD  Attending Physician, [facility]   of Medicine, [facility]  Division of Hematology, Oncology, and Transplantation  [phone] [facility]

## 2022-05-18 ENCOUNTER — ONCOLOGY VISIT (OUTPATIENT)
Dept: ONCOLOGY | Facility: CLINIC | Age: 62
End: 2022-05-18
Attending: INTERNAL MEDICINE
Payer: COMMERCIAL

## 2022-05-18 ENCOUNTER — APPOINTMENT (OUTPATIENT)
Dept: LAB | Facility: CLINIC | Age: 62
End: 2022-05-18
Attending: INTERNAL MEDICINE
Payer: COMMERCIAL

## 2022-05-18 VITALS
RESPIRATION RATE: 18 BRPM | TEMPERATURE: 97.7 F | WEIGHT: 246.2 LBS | HEART RATE: 65 BPM | DIASTOLIC BLOOD PRESSURE: 73 MMHG | BODY MASS INDEX: 36.89 KG/M2 | OXYGEN SATURATION: 95 % | SYSTOLIC BLOOD PRESSURE: 119 MMHG

## 2022-05-18 DIAGNOSIS — C83.78 BURKITT LYMPHOMA OF LYMPH NODES OF MULTIPLE REGIONS (H): ICD-10-CM

## 2022-05-18 LAB
ALBUMIN SERPL-MCNC: 3.9 G/DL (ref 3.4–5)
ALP SERPL-CCNC: 98 U/L (ref 40–150)
ALT SERPL W P-5'-P-CCNC: 48 U/L (ref 0–70)
ANION GAP SERPL CALCULATED.3IONS-SCNC: 8 MMOL/L (ref 3–14)
AST SERPL W P-5'-P-CCNC: 24 U/L (ref 0–45)
BASOPHILS # BLD AUTO: 0 10E3/UL (ref 0–0.2)
BASOPHILS NFR BLD AUTO: 1 %
BILIRUB SERPL-MCNC: 0.5 MG/DL (ref 0.2–1.3)
BUN SERPL-MCNC: 22 MG/DL (ref 7–30)
CALCIUM SERPL-MCNC: 8.2 MG/DL (ref 8.5–10.1)
CHLORIDE BLD-SCNC: 110 MMOL/L (ref 94–109)
CO2 SERPL-SCNC: 23 MMOL/L (ref 20–32)
CREAT SERPL-MCNC: 0.85 MG/DL (ref 0.66–1.25)
EOSINOPHIL # BLD AUTO: 0.2 10E3/UL (ref 0–0.7)
EOSINOPHIL NFR BLD AUTO: 2 %
ERYTHROCYTE [DISTWIDTH] IN BLOOD BY AUTOMATED COUNT: 12.6 % (ref 10–15)
GFR SERPL CREATININE-BSD FRML MDRD: >90 ML/MIN/1.73M2
GLUCOSE BLD-MCNC: 120 MG/DL (ref 70–99)
HCT VFR BLD AUTO: 42.7 % (ref 40–53)
HGB BLD-MCNC: 14.8 G/DL (ref 13.3–17.7)
IMM GRANULOCYTES # BLD: 0 10E3/UL
IMM GRANULOCYTES NFR BLD: 0 %
LDH SERPL L TO P-CCNC: 223 U/L (ref 85–227)
LYMPHOCYTES # BLD AUTO: 1.2 10E3/UL (ref 0.8–5.3)
LYMPHOCYTES NFR BLD AUTO: 19 %
MCH RBC QN AUTO: 30.9 PG (ref 26.5–33)
MCHC RBC AUTO-ENTMCNC: 34.7 G/DL (ref 31.5–36.5)
MCV RBC AUTO: 89 FL (ref 78–100)
MONOCYTES # BLD AUTO: 0.7 10E3/UL (ref 0–1.3)
MONOCYTES NFR BLD AUTO: 11 %
NEUTROPHILS # BLD AUTO: 4.3 10E3/UL (ref 1.6–8.3)
NEUTROPHILS NFR BLD AUTO: 67 %
NRBC # BLD AUTO: 0 10E3/UL
NRBC BLD AUTO-RTO: 0 /100
PLATELET # BLD AUTO: 211 10E3/UL (ref 150–450)
POTASSIUM BLD-SCNC: 3.7 MMOL/L (ref 3.4–5.3)
PROT SERPL-MCNC: 7.3 G/DL (ref 6.8–8.8)
RBC # BLD AUTO: 4.79 10E6/UL (ref 4.4–5.9)
SODIUM SERPL-SCNC: 141 MMOL/L (ref 133–144)
WBC # BLD AUTO: 6.4 10E3/UL (ref 4–11)

## 2022-05-18 PROCEDURE — 36415 COLL VENOUS BLD VENIPUNCTURE: CPT | Performed by: INTERNAL MEDICINE

## 2022-05-18 PROCEDURE — 80053 COMPREHEN METABOLIC PANEL: CPT | Performed by: INTERNAL MEDICINE

## 2022-05-18 PROCEDURE — 85004 AUTOMATED DIFF WBC COUNT: CPT | Performed by: INTERNAL MEDICINE

## 2022-05-18 PROCEDURE — G0463 HOSPITAL OUTPT CLINIC VISIT: HCPCS

## 2022-05-18 PROCEDURE — 83615 LACTATE (LD) (LDH) ENZYME: CPT | Performed by: INTERNAL MEDICINE

## 2022-05-18 PROCEDURE — 99213 OFFICE O/P EST LOW 20 MIN: CPT | Mod: GC | Performed by: INTERNAL MEDICINE

## 2022-05-18 ASSESSMENT — PAIN SCALES - GENERAL: PAINLEVEL: MILD PAIN (3)

## 2022-05-18 NOTE — LETTER
5/18/2022         RE: Kobe Pedroza  [address]  [facility] [address]        Dear Colleague,    Thank you for referring your patient, Kobe Pedroza, to the [facility]. Please see a copy of my visit note below.    REASON FOR VISIT:  Management of Burkitt lymphoma    HISTORY OF PRESENT ILLNESS:  Mr. Pedroza is a 62 year old man with a history of Burkitt lymphoma.  To summarize his course, he presented with acute on chronic back pain in 03/2020.  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  CSF was negative.  He was was treated with steroids followed by R-CODOX-M/IVAC and IT chemo prophylaxis ending in 06/2020.  His course was complicated by neutropenic sepsis that resolved and right lower extremity below the knee DVT for which he completed a course of anticoagulation.  PET/CT after cycle 2 (1st R-IVAC) and again after completing treatment confirmed PET-negative complete response.  He recovered from COVID-19 after monoclonal antibody treatment in 02/2021.  Visit for ongoing management of lymphoma.    He is not with his wife Mckayla today. Neuropathy in his feet is still an issue and he is working with Dr. Crawford and other providers for this.  He recently began to see a chiropractic. He is not doing physical therapy any longer. He has tried acupuncture in the past, but will not be doing it going forward due to insurance issues. No fevers, drenching night sweats, or unintentional weight loss.  No dyspnea, cough, or chest pain.  No new lumps or bumps.  Remainder of a 12-point ROS was unremarkable. He has decided to retire, and is keeping occupied with housing projects and outdoor activities.     MEDICATIONS:  Current Outpatient Medications   Medication     atorvastatin (LIPITOR) 10 MG tablet     gabapentin (NEURONTIN) 300 MG capsule     levothyroxine (SYNTHROID/LEVOTHROID) 175  MCG tablet     No current facility-administered medications for this visit.     PHYSICAL EXAMINATION:  /73   Pulse 65   Temp 97.7  F (36.5  C) (Oral)   Resp 18   Wt 111.7 kg (246 lb 3.2 oz)   SpO2 95%   BMI 36.89 kg/m    Wt Readings from Last 5 Encounters:   05/18/22 111.7 kg (246 lb 3.2 oz)   04/27/22 114.3 kg (252 lb)   04/01/22 111.8 kg (246 lb 8 oz)   02/23/22 109.7 kg (241 lb 14.4 oz)   02/01/22 109.4 kg (241 lb 3.2 oz)     General: Well appearing. No distress.  HEENT: Sclerae anicteric. No OP lesions, masses, or tonsilar enlargement.  Lungs: Clear bilaterally without wheezing or crackles.  Heart: Regular rate and rhythm.  Gastrointestinal: Bowel sounds present, no tenderness to palpation, spleen tip not palpable.  Extremities: No lower extremity edema.  Lymph:  No cervical, clavicular, axillary, epitrochlear, or inguinal lymphadenopathy.  Performance status: ECOG 0    LAB DATA:  Reviewed by us  Recent Labs   Lab Test 05/18/22  0733 02/23/22  0842 02/01/22  1517 11/17/21  0717 08/04/21  0748 05/03/21 1712 02/11/21  1127 01/11/21  0920   WBC 6.4 7.1 6.7 5.7   < > 7.3 1.8* 5.9   HGB 14.8 14.6 14.6 14.6   < > 14.4 14.9 12.4*    204 210 199   < > 235 161 195   ANEU  --   --   --   --   --  5.3 0.6* 4.5   ANEUTAUTO 4.3 5.3  --  4.3   < >  --   --   --    ALYM  --   --   --   --   --  1.0 0.5* 0.5*   ALYMPAUTO 1.2 1.0  --  0.8   < >  --   --   --     < > = values in this interval not displayed.     Recent Labs   Lab Test 02/23/22  0842 02/01/22  1517 11/17/21  0717 08/04/21  0748 05/03/21  1712 02/11/21  1127 06/18/20  0000 06/15/20  1335 06/01/20  0000 05/26/20  0355 05/25/20  0400 03/17/20  1214 03/17/20  0556    138 142 138   < > 139   < > 139   < > 138 140   < > 139   POTASSIUM 3.9 3.8 3.9 3.9   < > 3.8   < > 3.5   < > 4.0 3.7   < > 4.0   CHLORIDE 109 107 108 108   < > 105   < > 105   < > 112* 112*   < > 107   CO2 25 27 27 27   < > 30   < > 30   < > 23 24   < > 26   BUN 25 20 20 16   < >  12   < > 13   < > 16 15   < > 25   CR 0.87 0.97 1.02 1.06   < > 0.96   < > 0.88   < > 0.72 0.70   < > 0.71   RUTH 8.3* 8.7 8.3* 8.6   < > 8.5   < > 8.1*   < > 7.3* 7.4*   < > 7.2*   MAG  --   --   --   --   --  2.5*  --  2.3  --   --  2.3   < > 2.1   PHOS  --   --   --   --   --   --   --  3.6  --  2.3* 2.1*   < > 4.1   URIC  --   --   --   --   --   --   --  4.8  --  3.2* 3.1*   < > 3.7     --  176 181   < >  --    < > 304*  --  226 220   < >  --    [id number]  --   --   --   --   --   --   --   --   --   --   --   --  2.0    < > = values in this interval not displayed.     Recent Labs   Lab Test 02/23/22  0842 02/01/22  1517 11/17/21  0717 06/18/20  0000 06/15/20  1335 06/01/20  0000 05/26/20  0355 05/25/20  0400   AST 21 19 21   < > 51*   < >  --  18   ALT 40 40 41   < > 85*   < >  --  51   ALKPHOS 86 83 87   < > 73   < >  --  78   BILITOTAL 0.4 0.5 0.5   < > 0.4   < >  --  0.3   INR  --   --   --   --  1.04  --  1.03 1.11    < > = values in this interval not displayed.       IMPRESSION AND PLAN:  Mr. Pedroza is a 62 year old man with a history of Burkitt lymphoma.    His lymphoma clinically remains in remission.  There is nothing to suggest recurrence.  We will continue to monitor.    He has no active ID issues.  He is up to date with pneumococcal and Shingrix vaccines.  We discussed the importance of following up to date local and federal health agency guidance regarding measures to reduce the risk of COVID-19 for immunocompromised individuals.  He completed a primary COVID-19 vaccine series and we recommended a 4th dose booster.      He completed apixaban for 3 months for lymphoma-related DVT.  He will continue to work with PM&R (Dr. Crawford), physical therapy, and neurology as needed for neuropathy and back pain.  He will continue to work with PCP Dr. Menchaca for his health maintenance (next apt 9/8/22) and other health issues and his endocrinologist Dr. Dunaway for his thyroid issues.    We will arrange  another visit in about 6 months with labs.  We reminded him to contact if questions, concerns, or new issues arise between visits.    Patient was seen and plan of care developed with Dr. Wright.     Tarun Chavez  Heme/Onc Fellow  [phone]    ATTENDING ATTESTATION:  The patient was seen and evaluated by me.  I have reviewed the vital signs, medications, labs, and imaging results independently, and have discussed the patient and plan with the resident/fellow, and agree with the findings and plan outlined in this note.  The impression and plan were jointly made.      Ever Wright MD, PhD  Attending Physician, [facility]   of Medicine, [facility]  Division of Hematology, Oncology, and Transplantation  [phone] [facility]

## 2022-05-18 NOTE — NURSING NOTE
Chief Complaint   Patient presents with     Labs Only     Labs drawn via vpt by RN in lab,vitals completed.     Pt was checked in for next appt.    Aleyda Peguero RN

## 2022-05-24 DIAGNOSIS — C83.78 BURKITT LYMPHOMA OF LYMPH NODES OF MULTIPLE REGIONS (H): ICD-10-CM

## 2022-05-24 DIAGNOSIS — G62.0 CHEMOTHERAPY-INDUCED PERIPHERAL NEUROPATHY (H): ICD-10-CM

## 2022-05-24 DIAGNOSIS — R53.81 PHYSICAL DECONDITIONING: ICD-10-CM

## 2022-05-24 DIAGNOSIS — T45.1X5A CHEMOTHERAPY-INDUCED PERIPHERAL NEUROPATHY (H): ICD-10-CM

## 2022-05-24 DIAGNOSIS — M54.50 LOW BACK PAIN, UNSPECIFIED BACK PAIN LATERALITY, UNSPECIFIED CHRONICITY, UNSPECIFIED WHETHER SCIATICA PRESENT: ICD-10-CM

## 2022-05-24 RX ORDER — GABAPENTIN 300 MG/1
CAPSULE ORAL
Qty: 120 CAPSULE | Refills: 0 | Status: SHIPPED | OUTPATIENT
Start: 2022-05-24 | End: 2022-06-24

## 2022-05-24 NOTE — TELEPHONE ENCOUNTER
Last prescribing provider: Dr Crawford    Last clinic visit date: 4/27/22 with Dr Crawford    Any missed appointments or no-shows since last clinic visit?: No    1. Recommendations for requested medication (if none, N/A): Medications:  1. Increased gabapentin to 300 mg/300 mg/600 mg three times daily to see if this helps improve symptoms.      Next clinic visit date: 8/5/22    Any other pertinent information (if none, N/A): N/A

## 2022-06-24 ENCOUNTER — PATIENT OUTREACH (OUTPATIENT)
Dept: ONCOLOGY | Facility: CLINIC | Age: 62
End: 2022-06-24

## 2022-06-24 DIAGNOSIS — M54.50 LOW BACK PAIN, UNSPECIFIED BACK PAIN LATERALITY, UNSPECIFIED CHRONICITY, UNSPECIFIED WHETHER SCIATICA PRESENT: ICD-10-CM

## 2022-06-24 DIAGNOSIS — R53.81 PHYSICAL DECONDITIONING: ICD-10-CM

## 2022-06-24 DIAGNOSIS — G62.0 CHEMOTHERAPY-INDUCED PERIPHERAL NEUROPATHY (H): ICD-10-CM

## 2022-06-24 DIAGNOSIS — T45.1X5A CHEMOTHERAPY-INDUCED PERIPHERAL NEUROPATHY (H): ICD-10-CM

## 2022-06-24 DIAGNOSIS — C83.78 BURKITT LYMPHOMA OF LYMPH NODES OF MULTIPLE REGIONS (H): ICD-10-CM

## 2022-06-24 RX ORDER — GABAPENTIN 300 MG/1
CAPSULE ORAL
Qty: 150 CAPSULE | Refills: 1 | Status: SHIPPED | OUTPATIENT
Start: 2022-06-24 | End: 2022-08-19

## 2022-06-24 RX ORDER — GABAPENTIN 300 MG/1
CAPSULE ORAL
Qty: 120 CAPSULE | Refills: 0 | Status: SHIPPED | OUTPATIENT
Start: 2022-06-24 | End: 2023-01-09

## 2022-06-24 NOTE — PROGRESS NOTES
Returned call to pt; he stated he is almost out of Gabapentin, confirmed he is taking 600 mg in AM and bedtime, 300 mg in afternoon so 5 capsules daily. Current dose is working well for him. He wonders if he can get more refills on this so he doesn't have to call back monthly, or a 3 month supply. Follow-up with Dr. Crawford is 8/5/22.

## 2022-06-24 NOTE — TELEPHONE ENCOUNTER
Gabapentin Refill   Last prescribing provider: Dr Crawford     Last clinic visit date: 4/27/22 Dr Crawford     Any missed appointments or no-shows since last clinic visit?: no     Recommendations for requested medication (if none, N/A): Copied from chart note 4/27/22 Dr Crawford   Continue to remain active and continue to do stretches regularly three times per week.  Increase gabapentin to 600 mg/300 mg/600 mg three times daily.      Next clinic visit date: 8/5/22 Dr Crawford     Any other pertinent information (if none, N/A): N/A

## 2022-08-18 ENCOUNTER — PATIENT OUTREACH (OUTPATIENT)
Dept: ONCOLOGY | Facility: CLINIC | Age: 62
End: 2022-08-18

## 2022-08-18 NOTE — PROGRESS NOTES
[facility]: Physical Medicine and Rehabilitation                                                                                   Jonnathan calls for refill of gabapentin and to move his appointment with     We were able to reschedule to 8/19/2022.  He will discuss refill at appointment  with  as he is not sure about the dosing and if increase or change of medication is warranted      Mara Marti LPN  Oncology PM&R Care Coordination  [phone]

## 2022-08-19 ENCOUNTER — ONCOLOGY VISIT (OUTPATIENT)
Dept: ONCOLOGY | Facility: CLINIC | Age: 62
End: 2022-08-19
Attending: STUDENT IN AN ORGANIZED HEALTH CARE EDUCATION/TRAINING PROGRAM
Payer: COMMERCIAL

## 2022-08-19 VITALS
SYSTOLIC BLOOD PRESSURE: 118 MMHG | TEMPERATURE: 97.9 F | BODY MASS INDEX: 35.63 KG/M2 | WEIGHT: 248.9 LBS | DIASTOLIC BLOOD PRESSURE: 70 MMHG | HEART RATE: 69 BPM | OXYGEN SATURATION: 94 % | RESPIRATION RATE: 18 BRPM | HEIGHT: 70 IN

## 2022-08-19 DIAGNOSIS — T45.1X5A CHEMOTHERAPY-INDUCED PERIPHERAL NEUROPATHY (H): ICD-10-CM

## 2022-08-19 DIAGNOSIS — R53.81 PHYSICAL DECONDITIONING: ICD-10-CM

## 2022-08-19 DIAGNOSIS — C83.78 BURKITT LYMPHOMA OF LYMPH NODES OF MULTIPLE REGIONS (H): Primary | ICD-10-CM

## 2022-08-19 DIAGNOSIS — G62.0 CHEMOTHERAPY-INDUCED PERIPHERAL NEUROPATHY (H): ICD-10-CM

## 2022-08-19 DIAGNOSIS — M54.50 LOW BACK PAIN, UNSPECIFIED BACK PAIN LATERALITY, UNSPECIFIED CHRONICITY, UNSPECIFIED WHETHER SCIATICA PRESENT: ICD-10-CM

## 2022-08-19 PROCEDURE — G0463 HOSPITAL OUTPT CLINIC VISIT: HCPCS

## 2022-08-19 PROCEDURE — 99215 OFFICE O/P EST HI 40 MIN: CPT | Performed by: STUDENT IN AN ORGANIZED HEALTH CARE EDUCATION/TRAINING PROGRAM

## 2022-08-19 RX ORDER — GABAPENTIN 300 MG/1
CAPSULE ORAL
Qty: 150 CAPSULE | Refills: 1 | Status: SHIPPED | OUTPATIENT
Start: 2022-08-19 | End: 2022-11-01

## 2022-08-19 ASSESSMENT — PAIN SCALES - GENERAL: PAINLEVEL: MILD PAIN (3)

## 2022-08-19 NOTE — NURSING NOTE
"Oncology Rooming Note    August 19, 2022 8:00 AM   Kobe Pedroza is a 62 year old male who presents for:    Chief Complaint   Patient presents with     Oncology Clinic Visit     Pt is here for Burkitt Lymphoma     Initial Vitals: Blood Pressure 118/70   Pulse 69   Temperature 97.9  F (36.6  C) (Oral)   Respiration 18   Height 1.773 m (5' 9.8\")   Weight 112.9 kg (248 lb 14.4 oz)   Oxygen Saturation 94%   Body Mass Index 35.92 kg/m   Estimated body mass index is 35.92 kg/m  as calculated from the following:    Height as of this encounter: 1.773 m (5' 9.8\").    Weight as of this encounter: 112.9 kg (248 lb 14.4 oz). Body surface area is 2.36 meters squared.  Mild Pain (3) Comment: Data Unavailable   No LMP for male patient.  Allergies reviewed: Yes  Medications reviewed: Yes    Medications: Medication refills not needed today.  Pharmacy [facility] entered into [facility]: [id number] [place], [place] - [address]    Clinical concerns: none       Ritu Howard MA            "

## 2022-08-19 NOTE — PATIENT INSTRUCTIONS
Continue gabapentin at your current dose.  A referral was placed to Dr. Torres for consideration of a lumbar steroid injection to help relieve your symptoms.  Continue your exercises as instructed by PT.  Follow up with Dr. Crawford in 3 months.

## 2022-08-19 NOTE — PROGRESS NOTES
[facility]   PM&R clinic note        Interval history:     Kobe Pedroza presents to clinic today for follow up reg his rehab needs.   He has h/o Burkitt lymphoma.  Was last seen in clinic on 4/27/22.  Recommendations included:  1.           Therapy/equipment/braces:  1. Continue regular exercise regimen as tolerated.  2. Continue acupuncture for relief of symptoms.   2.           Medications:  1. Increased gabapentin to 600 mg/300 mg/600 mg three times daily to see if this helps improve symptoms. Prescription refilled today with new dosing.  3. Referral / follow up with other providers:  2. Continue follow up with IM for management of dyslipidemia and diabetes.  4. Follow up: 3 month.    Oncology History:  -Patient initially presented with acute on chronic back pain in 3/2020.  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  CSF was negative.  -Patient was treated with steroids followed by R-CODOX-M/IVAC and IT chemoprophylaxis between 3/20/2020 and 6/20/2020.  -His course was complicated by neutropenic sepsis that resolved and a right lower extremity DVT which was diagnosed on 4/30/2020.  Patient has been on therapeutic anticoagulation due to this.  -PET/CT after cycle 2 and again after completing treatment confirmed PET negative complete response.  -Patient became infected with COVID-19, and recovered after monoclonal antibody treatment in 2/2021.  -Patient called and spoke to Natacha Agarwal regarding his neuropathy which has significantly worsened.  He feels that worsening has occurred over the last 2 to 3 weeks.  He states he cannot move his toes at times.  He had tried some gabapentin which he had.  1 11/17/21- Saw Dr. Wright for follow up. Neuropathy still difficult. Back pain has been better. Thinking of getting back to work in next 1-2 months. Lymphoma clinically  remains in remission. Labs good. Continue to monitor. Encouraged to find a new PCP.  - Had EMG done on 12/6/21 with Dr. 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 Findings of active denervation and chronic reinnervation changes in right gastrocnemius muscle, could be findings of S1/S2 radiculopathy, lumbosacral plexopathy or sciatic neuropathy, but similar changes were not appreciated in other muscles with shared innervation. So, gastrocnemius changes are of uncertain localization or clinical significance. Tibial neuropathy cannot be excluded.  - Has established care with IM, Dr. Menchaca. HbA1c of 5.8. Mixed dyslipidemia and recently started atorvastatin 10 mg daily and LDL dropped.  2/23/22- Follow up with Dr. Wright. Lymphoma remains in remission. Plan to return to clinic in 3 months, and if still in remission, will space out visits to every 6 months.   5/18/22- Had follow up with Dr. Wright. Continues to struggle with neuropathy. Stopped PT by that time.      Symptoms,  Patient presents for a return visit today. He complains of ongoing neuropathic pain that is about the same, not gotten worse since his last visit.  He took a trip to [facility] recently as his mom had surgery and he went out to be with her. He also took several other road trips during this time.   He is still on the same dose of gabapentin compared to the last visit- 600/300/600 mg daily. He cannot say when his neuropathy symptoms are worse, it varies during the day/evening.  He was doing some exercises with his legs recently and it seemed to help. He does endorse ongoing back pain that radiates down his left leg. He is interested in trying back ADIEL.  He denies any excess drowsiness with the gabapentin during the day.   He is interested in trying/adding Cymbalta.  He stopped the acupuncture as he tried 5 sessions and it was not really helpful. He learned from it.           Therapies/HEP,  Not currently  "participating in therapy. He is doing his regular home exercise program.      Functionally,   No changes since his last visit.      Social history is unchanged.        Medications:  Current Outpatient Medications   Medication Sig Dispense Refill     atorvastatin (LIPITOR) 10 MG tablet Take 1 tablet (10 mg) by mouth daily 90 tablet 1     gabapentin (NEURONTIN) 300 MG capsule Take 2 capsules (600 mg) by mouth every morning AND 1 capsule (300 mg) daily (with lunch) AND 2 capsules (600 mg) At Bedtime. 150 capsule 1     levothyroxine (SYNTHROID/LEVOTHROID) 175 MCG tablet Take 1 tablet (175 mcg) by mouth daily 90 tablet 3     gabapentin (NEURONTIN) 300 MG capsule Take 2 capsules (600 mg) by mouth every morning AND 1 capsule (300 mg) daily (with lunch) AND 2 capsules (600 mg) At Bedtime. (Patient not taking: Reported on 8/19/2022) 120 capsule 0              Physical Exam:   /70   Pulse 69   Temp 97.9  F (36.6  C) (Oral)   Resp 18   Ht 1.773 m (5' 9.8\")   Wt 112.9 kg (248 lb 14.4 oz)   SpO2 94%   BMI 35.92 kg/m    Gen: NAD, pleasant and cooperative  HEENT: Normocephalic, atraumatic, extra-ocular movements appear intact  Pulm: non-labored breathing in room air  Ext: no edema in BLE, no tenderness in calves  Neuro/MSK:   Orientation: Oriented to person, place, time, situation. Exhibits good insight into his condition and ongoing management/symptoms.  Motor: 5/5 with bilateral shoulder abduction, elbow extension, wrist extension and  strength/finger intrinsics. 4+/5 with bilateral hip flexion, 5/5 with right knee extension, ankle dorsiflexion, EHL, plantar flexion.  5/5 with left knee extension, 4+/5 with left ankle dorsiflexion, 5/5 with EHL and plantar flexion on the left.  Gait: Gait is non antalgic with good step and stride length.    Labs/Imaging:  Lab Results   Component Value Date    WBC 6.4 05/18/2022    HGB 14.8 05/18/2022    HCT 42.7 05/18/2022    MCV 89 05/18/2022     05/18/2022     Lab " Results   Component Value Date     05/18/2022    POTASSIUM 3.7 05/18/2022    CHLORIDE 110 (H) 05/18/2022    CO2 23 05/18/2022     (H) 05/18/2022     Lab Results   Component Value Date    GFRESTIMATED >90 05/18/2022    GFRESTBLACK >90 05/03/2021     Lab Results   Component Value Date    AST 24 05/18/2022    ALT 48 05/18/2022    ALKPHOS 98 05/18/2022    BILITOTAL 0.5 05/18/2022     Lab Results   Component Value Date    INR 1.04 06/15/2020     Lab Results   Component Value Date    BUN 22 05/18/2022    CR 0.85 05/18/2022              Assessment/Plan   Kobe Pedroza presents to clinic today for follow up reg his rehab needs.   He has h/o Burkitt lymphoma. Was last seen in clinic on 4/27/22. As discussed with Jonnathan, we will plan to increase his dose of gabapentin to see if this better helps with his neuropathic symptoms. We reviewed the possibility of trying to add Cymbalta to see if this helps with his symptoms, and as he would like to try an ADIEL, we will hold off on adding Cymbalta for now to see if ADIEL helps. He should continue his home exercise program as previously instructed by PT. For his low back pain and radicular symptoms, will refer to Dr. Torres for consideration of lumbar ADIEL to see if this helps relieve his symptoms.      1. Therapy/equipment/braces:  1. Continue regular home exercise regimen as previously instructed by PT.  2. Medications:  1. Continue gabapentin at 600/300/600.  2. Can consider adding Cymbalta at our next visit if needed.  3. Interventions:  1. Can consider an lumbar ADIEL, will place a referral to Dr. Torres for further evaluation.  4. Follow up: 3 months.      Yuridia Crawford MD  Physical Medicine & Rehabilitation      50 minutes spent on the date of the encounter doing chart review, history and exam, documentation and further activities as noted above.

## 2022-08-19 NOTE — LETTER
8/19/2022         RE: Kobe Pedroza  [address]  [facility] [address]        Dear Colleague,    Thank you for referring your patient, Kobe Pedroza, to the [facility]. Please see a copy of my visit note below.    [facility]   PM&R clinic note        Interval history:     Kobe Pedroza presents to clinic today for follow up reg his rehab needs.   He has h/o Burkitt lymphoma.  Was last seen in clinic on 4/27/22.  Recommendations included:  1.           Therapy/equipment/braces:  1. Continue regular exercise regimen as tolerated.  2. Continue acupuncture for relief of symptoms.   2.           Medications:  1. Increased gabapentin to 600 mg/300 mg/600 mg three times daily to see if this helps improve symptoms. Prescription refilled today with new dosing.  3. Referral / follow up with other providers:  2. Continue follow up with IM for management of dyslipidemia and diabetes.  4. Follow up: 3 month.    Oncology History:  -Patient initially presented with acute on chronic back pain in 3/2020.  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  CSF was negative.  -Patient was treated with steroids followed by R-CODOX-M/IVAC and IT chemoprophylaxis between 3/20/2020 and 6/20/2020.  -His course was complicated by neutropenic sepsis that resolved and a right lower extremity DVT which was diagnosed on 4/30/2020.  Patient has been on therapeutic anticoagulation due to this.  -PET/CT after cycle 2 and again after completing treatment confirmed PET negative complete response.  -Patient became infected with COVID-19, and recovered after monoclonal antibody treatment in 2/2021.  -Patient called and spoke to Natacha Agarwal regarding his neuropathy which has significantly worsened.  He feels that worsening has occurred over the last 2 to 3 weeks.  He  states he cannot move his toes at times.  He had tried some gabapentin which he had.  1 11/17/21- Saw Dr. Wright for follow up. Neuropathy still difficult. Back pain has been better. Thinking of getting back to work in next 1-2 months. Lymphoma clinically remains in remission. Labs good. Continue to monitor. Encouraged to find a new PCP.  - Had EMG done on 12/6/21 with Dr. 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 Findings of active denervation and chronic reinnervation changes in right gastrocnemius muscle, could be findings of S1/S2 radiculopathy, lumbosacral plexopathy or sciatic neuropathy, but similar changes were not appreciated in other muscles with shared innervation. So, gastrocnemius changes are of uncertain localization or clinical significance. Tibial neuropathy cannot be excluded.  - Has established care with IM, Dr. Menchaca. HbA1c of 5.8. Mixed dyslipidemia and recently started atorvastatin 10 mg daily and LDL dropped.  2/23/22- Follow up with Dr. Wright. Lymphoma remains in remission. Plan to return to clinic in 3 months, and if still in remission, will space out visits to every 6 months.   5/18/22- Had follow up with Dr. Wright. Continues to struggle with neuropathy. Stopped PT by that time.      Symptoms,  Patient presents for a return visit today. He complains of ongoing neuropathic pain that is about the same, not gotten worse since his last visit.  He took a trip to [facility] recently as his mom had surgery and he went out to be with her. He also took several other road trips during this time.   He is still on the same dose of gabapentin compared to the last visit- 600/300/600 mg daily. He cannot say when his neuropathy symptoms are worse, it varies during the day/evening.  He was doing some exercises with his legs recently and it seemed to help. He does endorse ongoing back pain that radiates down his left leg. He is interested in trying back  "ADIEL.  He denies any excess drowsiness with the gabapentin during the day.   He is interested in trying/adding Cymbalta.  He stopped the acupuncture as he tried 5 sessions and it was not really helpful. He learned from it.           Therapies/HEP,  Not currently participating in therapy. He is doing his regular home exercise program.      Functionally,   No changes since his last visit.      Social history is unchanged.        Medications:  Current Outpatient Medications   Medication Sig Dispense Refill     atorvastatin (LIPITOR) 10 MG tablet Take 1 tablet (10 mg) by mouth daily 90 tablet 1     gabapentin (NEURONTIN) 300 MG capsule Take 2 capsules (600 mg) by mouth every morning AND 1 capsule (300 mg) daily (with lunch) AND 2 capsules (600 mg) At Bedtime. 150 capsule 1     levothyroxine (SYNTHROID/LEVOTHROID) 175 MCG tablet Take 1 tablet (175 mcg) by mouth daily 90 tablet 3     gabapentin (NEURONTIN) 300 MG capsule Take 2 capsules (600 mg) by mouth every morning AND 1 capsule (300 mg) daily (with lunch) AND 2 capsules (600 mg) At Bedtime. (Patient not taking: Reported on 8/19/2022) 120 capsule 0              Physical Exam:   /70   Pulse 69   Temp 97.9  F (36.6  C) (Oral)   Resp 18   Ht 1.773 m (5' 9.8\")   Wt 112.9 kg (248 lb 14.4 oz)   SpO2 94%   BMI 35.92 kg/m    Gen: NAD, pleasant and cooperative  HEENT: Normocephalic, atraumatic, extra-ocular movements appear intact  Pulm: non-labored breathing in room air  Ext: no edema in BLE, no tenderness in calves  Neuro/MSK:   Orientation: Oriented to person, place, time, situation. Exhibits good insight into his condition and ongoing management/symptoms.  Motor: 5/5 with bilateral shoulder abduction, elbow extension, wrist extension and  strength/finger intrinsics. 4+/5 with bilateral hip flexion, 5/5 with right knee extension, ankle dorsiflexion, EHL, plantar flexion.  5/5 with left knee extension, 4+/5 with left ankle dorsiflexion, 5/5 with EHL and " plantar flexion on the left.  Gait: Gait is non antalgic with good step and stride length.    Labs/Imaging:  Lab Results   Component Value Date    WBC 6.4 05/18/2022    HGB 14.8 05/18/2022    HCT 42.7 05/18/2022    MCV 89 05/18/2022     05/18/2022     Lab Results   Component Value Date     05/18/2022    POTASSIUM 3.7 05/18/2022    CHLORIDE 110 (H) 05/18/2022    CO2 23 05/18/2022     (H) 05/18/2022     Lab Results   Component Value Date    GFRESTIMATED >90 05/18/2022    GFRESTBLACK >90 05/03/2021     Lab Results   Component Value Date    AST 24 05/18/2022    ALT 48 05/18/2022    ALKPHOS 98 05/18/2022    BILITOTAL 0.5 05/18/2022     Lab Results   Component Value Date    INR 1.04 06/15/2020     Lab Results   Component Value Date    BUN 22 05/18/2022    CR 0.85 05/18/2022              Assessment/Plan   Kobe Pedroza presents to clinic today for follow up reg his rehab needs.   He has h/o Burkitt lymphoma. Was last seen in clinic on 4/27/22. As discussed with Jonnathan, we will plan to increase his dose of gabapentin to see if this better helps with his neuropathic symptoms. We reviewed the possibility of trying to add Cymbalta to see if this helps with his symptoms, and as he would like to try an ADIEL, we will hold off on adding Cymbalta for now to see if ADIEL helps. He should continue his home exercise program as previously instructed by PT. For his low back pain and radicular symptoms, will refer to Dr. Torres for consideration of lumbar ADIEL to see if this helps relieve his symptoms.      1. Therapy/equipment/braces:  1. Continue regular home exercise regimen as previously instructed by PT.  2. Medications:  1. Continue gabapentin at 600/300/600.  2. Can consider adding Cymbalta at our next visit if needed.  3. Interventions:  1. Can consider an lumbar ADIEL, will place a referral to Dr. Torres for further evaluation.  4. Follow up: 3 months.      Yuridia Crawford MD  Physical Medicine &  Rehabilitation      50 minutes spent on the date of the encounter doing chart review, history and exam, documentation and further activities as noted above.

## 2022-08-21 DIAGNOSIS — E78.2 MIXED DYSLIPIDEMIA: ICD-10-CM

## 2022-08-22 RX ORDER — ATORVASTATIN CALCIUM 10 MG/1
TABLET, FILM COATED ORAL
Qty: 90 TABLET | Refills: 1 | Status: SHIPPED | OUTPATIENT
Start: 2022-08-22 | End: 2023-02-02

## 2022-08-22 NOTE — TELEPHONE ENCOUNTER
SPINE PATIENTS - NEW PROTOCOL PREVISIT    RECORDS RECEIVED FROM: Internal   REASON FOR VISIT: History of Burkitt lymphoma, lumbar facet degenerative changes, low back pain and neuropathic pain. Please schedule with Dr. Torres at [place] for consideration of ADIEL   Date of Appt: 11/04/2022   NOTES (FOR ALL VISITS) STATUS DETAILS   OFFICE NOTE from referring provider Internal 08/19/2022 Dr Crawford [facility]    OFFICE NOTE from other specialist N/A    DISCHARGE SUMMARY from hospital N/A    DISCHARGE REPORT from ER N/A    EMG REPORT N/A    MEDICATION LIST N/A    IMAGING  (FOR ALL VISITS)     MRI (HEAD, NECK, SPINE) Internal 12/31/2020 LFT hip, lumbar spine   XRAY (SPINE) *NEUROSURGERY* Internal 12/31/2020 pelvis hip   CT (HEAD, NECK, SPINE) N/A

## 2022-08-22 NOTE — TELEPHONE ENCOUNTER
Prescription approved per [facility] [place] [place] Refill Protocol.    Shelley Salazar, RN, BSN

## 2022-09-08 ENCOUNTER — APPOINTMENT (OUTPATIENT)
Dept: LAB | Facility: CLINIC | Age: 62
End: 2022-09-08
Payer: COMMERCIAL

## 2022-09-08 ENCOUNTER — OFFICE VISIT (OUTPATIENT)
Dept: FAMILY MEDICINE | Facility: CLINIC | Age: 62
End: 2022-09-08
Payer: COMMERCIAL

## 2022-09-08 VITALS
RESPIRATION RATE: 15 BRPM | BODY MASS INDEX: 37.18 KG/M2 | SYSTOLIC BLOOD PRESSURE: 115 MMHG | OXYGEN SATURATION: 96 % | DIASTOLIC BLOOD PRESSURE: 79 MMHG | HEART RATE: 64 BPM | TEMPERATURE: 97.6 F | WEIGHT: 251 LBS | HEIGHT: 69 IN

## 2022-09-08 DIAGNOSIS — E78.2 MIXED DYSLIPIDEMIA: ICD-10-CM

## 2022-09-08 DIAGNOSIS — E66.09 CLASS 2 OBESITY DUE TO EXCESS CALORIES WITHOUT SERIOUS COMORBIDITY WITH BODY MASS INDEX (BMI) OF 36.0 TO 36.9 IN ADULT: ICD-10-CM

## 2022-09-08 DIAGNOSIS — G62.9 POLYNEUROPATHY: ICD-10-CM

## 2022-09-08 DIAGNOSIS — R73.01 IFG (IMPAIRED FASTING GLUCOSE): ICD-10-CM

## 2022-09-08 DIAGNOSIS — Z00.00 ANNUAL PHYSICAL EXAM: Primary | ICD-10-CM

## 2022-09-08 DIAGNOSIS — Z23 NEED FOR PROPHYLACTIC VACCINATION AND INOCULATION AGAINST INFLUENZA: ICD-10-CM

## 2022-09-08 DIAGNOSIS — L23.7 CONTACT DERMATITIS DUE TO POISON IVY: ICD-10-CM

## 2022-09-08 DIAGNOSIS — C83.78 BURKITT LYMPHOMA OF LYMPH NODES OF MULTIPLE REGIONS (H): ICD-10-CM

## 2022-09-08 DIAGNOSIS — E89.0 POSTSURGICAL HYPOTHYROIDISM: ICD-10-CM

## 2022-09-08 DIAGNOSIS — E66.812 CLASS 2 OBESITY DUE TO EXCESS CALORIES WITHOUT SERIOUS COMORBIDITY WITH BODY MASS INDEX (BMI) OF 36.0 TO 36.9 IN ADULT: ICD-10-CM

## 2022-09-08 PROBLEM — Z86.718 HISTORY OF DVT (DEEP VEIN THROMBOSIS): Status: RESOLVED | Noted: 2020-06-30 | Resolved: 2022-09-08

## 2022-09-08 PROBLEM — Z86.16 HISTORY OF COVID-19: Status: RESOLVED | Noted: 2021-02-12 | Resolved: 2022-09-08

## 2022-09-08 PROCEDURE — 90686 IIV4 VACC NO PRSV 0.5 ML IM: CPT | Performed by: INTERNAL MEDICINE

## 2022-09-08 PROCEDURE — G0008 ADMIN INFLUENZA VIRUS VAC: HCPCS | Performed by: INTERNAL MEDICINE

## 2022-09-08 PROCEDURE — 99214 OFFICE O/P EST MOD 30 MIN: CPT | Mod: 25 | Performed by: INTERNAL MEDICINE

## 2022-09-08 PROCEDURE — 99396 PREV VISIT EST AGE 40-64: CPT | Mod: 25 | Performed by: INTERNAL MEDICINE

## 2022-09-08 RX ORDER — DULOXETIN HYDROCHLORIDE 20 MG/1
20 CAPSULE, DELAYED RELEASE ORAL 2 TIMES DAILY
Qty: 60 CAPSULE | Refills: 3 | Status: SHIPPED | OUTPATIENT
Start: 2022-09-08 | End: 2022-11-01

## 2022-09-08 ASSESSMENT — PAIN SCALES - GENERAL: PAINLEVEL: MILD PAIN (2)

## 2022-09-08 ASSESSMENT — ACTIVITIES OF DAILY LIVING (ADL): CURRENT_FUNCTION: NO ASSISTANCE NEEDED

## 2022-09-08 NOTE — PROGRESS NOTES
SUBJECTIVE:   Kobe Pedroza is a 62 year old male who presents for Preventive Visit.    62-year-old gentleman comes in for a annual physical examination.  His biggest concern and complaint is bilateral leg pain related to polyneuropathy.  He also gets nighttime leg cramps.  He has been seen by podiatry, neurologist and even has had acute puncture.  Currently on gabapentin.  Lumbar ADIEL is being considered.  He has had some recent weight gain.  Denies chest pain or shortness of breath.  Recently he was in a grassy area and  Return noticed rash both lower leg above the sock line.  Denies itching.  At times he does feel down.    Patient has been advised of split billing requirements and indicates understanding: Yes  Are you in the first 12 months of your Medicare coverage?  No    Healthy Habits:    In general, how would you rate your overall health?  Fair    Frequency of exercise:  2-3 days/week    Duration of exercise:  15-30 minutes    Taking medications regularly:  Yes    Barriers to taking medications:  None    Medication side effects:  None    Ability to successfully perform activities of daily living:  No assistance needed    Home Safety:  No safety concerns identified    Hearing Impairment:  No hearing concerns    In the past 6 months, have you been bothered by leaking of urine?  No    In general, how would you rate your overall mental or emotional health?  Good      PHQ-2 Total Score:    Additional concerns today:  Yes (discuss delroy gain, neuropathy and recent labs)  History of Present Illness       Reason for visit:  Six month check up feet still bothering me    He eats 0-1 servings of fruits and vegetables daily.He consumes 1 sweetened beverage(s) daily.He exercises with enough effort to increase his heart rate 10 to 19 minutes per day.  He exercises with enough effort to increase his heart rate 3 or less days per week.   He is not taking prescribed medications regularly due to None.    Do you feel safe  in your environment? Yes    Have you ever done Advance Care Planning? (For example, a Health Directive, POLST, or a discussion with a medical provider or your loved ones about your wishes): No, advance care planning information given to patient to review.  Patient declined advance care planning discussion at this time.       Fall risk  Fallen 2 or more times in the past year?: No  Any fall with injury in the past year?: No  click delete button to remove this line now  Cognitive Screening   1) Repeat 3 items (Leader, Season, Table)    2) Clock draw: NORMAL  3) 3 item recall: Recalls 1 object   Results: NORMAL clock, 1-2 items recalled: COGNITIVE IMPAIRMENT LESS LIKELY    Mini-CogTM Copyright S Jose A. Licensed by the author for use in [employer] [facility]; reprinted with permission ([email or web address]). All rights reserved.      Do you have sleep apnea, excessive snoring or daytime drowsiness?: no    Reviewed and updated as needed this visit by clinical staff   Tobacco  Allergies  Meds   Med Hx  Surg Hx  Fam Hx  Soc Hx          Reviewed and updated as needed this visit by Provider                   Social History     Tobacco Use     Smoking status: Never Smoker     Smokeless tobacco: Never Used   Substance Use Topics     Alcohol use: Not Currently         Alcohol Use 2/1/2022   Prescreen: >3 drinks/day or >7 drinks/week? -   Prescreen: >3 drinks/day or >7 drinks/week? No       Current providers sharing in care for this patient include:   Patient Care Team:  Garett Arriaga MD as PCP - General  Ever Wright MD as MD (Internal Medicine - Hematology)  Truong Menchaca MD as Assigned PCP  Yuridia Crawford MD as Assigned Neuroscience Provider  Natacha Agarwal RN as Specialty Care Coordinator (Hematology & Oncology)    The following health maintenance items are reviewed in Epic and correct as of today:  Health Maintenance Due   Topic Date Due     COVID-19 Vaccine (4 - Booster for Moderna series) 03/28/2022      INFLUENZA VACCINE (1) 09/01/2022     BP Readings from Last 3 Encounters:   09/08/22 115/79   08/19/22 118/70   05/18/22 119/73    Wt Readings from Last 3 Encounters:   09/08/22 113.9 kg (251 lb)   08/19/22 112.9 kg (248 lb 14.4 oz)   05/18/22 111.7 kg (246 lb 3.2 oz)                  Patient Active Problem List   Diagnosis     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)     Postsurgical hypothyroidism     Polyneuropathy     Class 2 obesity due to excess calories without serious comorbidity in adult     Mixed dyslipidemia     IFG (impaired fasting glucose)     Morbid obesity (H)     Past Surgical History:   Procedure Laterality Date     IR PICC VASCULAR  04/08/2020     LAMINECTOMY, EXCISE TUMOR THORACIC, COMBINED N/A 03/15/2020    Procedure: LAMINECTOMY, SPINE, THORACIC, 2 levels, T-5, T-6;  Surgeon: Heather Cespedes MD;  Location: [facility] OR     PICC DOUBLE LUMEN PLACEMENT Right 04/30/2020    5FR DL PICC inserted at right basilic vein , length- 39cm (1cm out), tip at low SVC.     THYROIDECTOMY  Bilateral 2011       Social History     Tobacco Use     Smoking status: Never Smoker     Smokeless tobacco: Never Used   Substance Use Topics     Alcohol use: Not Currently     Family History   Problem Relation Age of Onset     Neuropathy Mother      Polymyalgia rheumatica Mother      Diabetes Type 2  Mother      Hypertension Father          Current Outpatient Medications   Medication Sig Dispense Refill     atorvastatin (LIPITOR) 10 MG tablet TAKE 1 TABLET(10 MG) BY MOUTH DAILY 90 tablet 1     DULoxetine (CYMBALTA) 20 MG capsule Take 1 capsule (20 mg) by mouth 2 times daily 60 capsule 3     gabapentin (NEURONTIN) 300 MG capsule Take 2 capsules (600 mg) by mouth every morning AND 1 capsule (300 mg) daily (with lunch) AND 2 capsules (600 mg) At Bedtime. 120 capsule 0     levothyroxine (SYNTHROID/LEVOTHROID) 175 MCG tablet Take 1 tablet (175 mcg) by mouth daily 90 tablet 3     gabapentin (NEURONTIN) 300 MG capsule  "Take 2 capsules (600 mg) by mouth every morning AND 1 capsule (300 mg) daily (with lunch) AND 2 capsules (600 mg) At Bedtime. 150 capsule 1     No Known Allergies  Recent Labs   Lab Test 09/08/22  0948 09/08/22  0947 05/18/22  0733 02/23/22  0842 02/01/22  1517 08/04/21  0748 05/03/21  1712 02/11/21  1127 12/10/20  1221 08/26/20  0000 03/15/20  0733 03/14/20  0439   A1C  --  6.4  --   --  5.8*  --   --   --   --   --   --  5.5   *  --   --  106* 171*  --   --   --   --   --   --   --    HDL 43  --   --  39* 37*  --   --   --   --   --   --   --    TRIG 181*  --   --  153* 214*  --   --   --   --   --   --   --    ALT  --   --  48 40 40   < > 59  --    < >  --    < > 25   CR  --   --  0.85 0.87 0.97   < > 1.03 0.96   < >  --    < > 0.74   GFRESTIMATED  --   --  >90 >90 90   < > 79 87   < >  --    < > >90   GFRESTBLACK  --   --   --   --   --   --  >90 >90   < >  --    < > >90   POTASSIUM  --   --  3.7 3.9 3.8   < > 3.8 3.8   < >  --    < > 4.3   TSH  --   --   --   --  0.16*  --   --   --   --  0.01*  --   --     < > = values in this interval not displayed.       Review of Systems  Constitutional, HEENT, cardiovascular, pulmonary, GI, , musculoskeletal, neuro, skin, endocrine and psych systems are negative, except as otherwise noted.    OBJECTIVE:   /79   Pulse 64   Temp 97.6  F (36.4  C) (Tympanic)   Resp 15   Ht 1.753 m (5' 9\")   Wt 113.9 kg (251 lb)   SpO2 96%   BMI 37.07 kg/m   Estimated body mass index is 37.07 kg/m  as calculated from the following:    Height as of this encounter: 1.753 m (5' 9\").    Weight as of this encounter: 113.9 kg (251 lb).  Physical Exam  GENERAL: healthy, alert and no distress  EYES: Eyes grossly normal to inspection, PERRL and conjunctivae and sclerae normal  HENT: ear canals and TM's normal, nose and mouth without ulcers or lesions  NECK: no adenopathy, no asymmetry, masses, or scars and thyroid normal to palpation  RESP: lungs clear to auscultation - no rales, " rhonchi or wheezes  CV: regular rate and rhythm, normal S1 S2, no S3 or S4, no murmur, click or rub, no peripheral edema and peripheral pulses strong  ABDOMEN: soft, nontender, no hepatosplenomegaly, no masses and bowel sounds normal   (male): normal male genitalia without lesions or urethral discharge, no hernia  RECTAL (male): normal sphincter tone, no rectal masses, prostate normal size, smooth, nontender without nodules or masses  MS: no gross musculoskeletal defects noted, no edema  SKIN: Patient has patches of slightly raised erythematous rash both lower extremity above sock line.  Suspect poison ivy contact dermatitis.  NEURO: Normal strength and tone, mentation intact  BACK: no CVA tenderness, no paralumbar tenderness  PSYCH: mentation appears normal, affect normal/bright  LYMPH: no cervical, supraclavicular, axillary, or inguinal adenopathy    Diagnostic Test Results:  Labs reviewed in Epic  Results for orders placed or performed in visit on 09/07/22 (from the past 24 hour(s))   Hemoglobin A1c   Result Value Ref Range    Hemoglobin A1C 6.4 %   Lipid Profile   Result Value Ref Range    Cholesterol 187 <200 mg/dL    Triglycerides 181 (H) <150 mg/dL    Direct Measure HDL 43 >=40 mg/dL    LDL Cholesterol Calculated 108 (H) <=100 mg/dL    Non HDL Cholesterol 144 (H) <130 mg/dL    Patient Fasting > 8hrs? Yes     Narrative    Cholesterol  Desirable:  <200 mg/dL    Triglycerides  Normal:  Less than 150 mg/dL  Borderline High:  150-199 mg/dL  High:  200-499 mg/dL  Very High:  Greater than or equal to 500 mg/dL    Direct Measure HDL  Female:  Greater than or equal to 50 mg/dL   Male:  Greater than or equal to 40 mg/dL    LDL Cholesterol  Desirable:  <100mg/dL  Above Desirable:  100-129 mg/dL   Borderline High:  130-159 mg/dL   High:  160-189 mg/dL   Very High:  >= 190 mg/dL    Non HDL Cholesterol  Desirable:  130 mg/dL  Above Desirable:  130-159 mg/dL  Borderline High:  160-189 mg/dL  High:  190-219 mg/dL  Very  "High:  Greater than or equal to 220 mg/dL       ASSESSMENT / PLAN:     1.  Annual physical exam completed.  2.  Mixed dyslipidemia currently being treated with atorvastatin 10 mg a day.  Cholesterol 187 HDL 43  and triglyceride 181..  Under good control so continue current treatment.  3.  Postsurgical hypothyroidism.  TSH ordered and pending I will get back to the results.  Currently on 175 mcg a day of levothyroxine..  4.  Burkitt's lymphoma currently in remission.  5.  Class II obesity.  Weight is up another 10 pounds.  Could partly be related to gabapentin.  6.  Polyneuropathy with inadequately controlled symptoms.  Currently on gabapentin.  Decide to add duloxetine 20 mg twice a day.  He has an upcoming follow-up appointment in November.  7.  Impaired fasting glucose.  A1c is now gone up to 6.4.  Low-carb diet discussed.  Follow-up with A1c and fasting blood sugar in 6 months.  Recent weight gain obviously has a lot to do with it.  8.  Contact dermatitis.  Mild symptoms.  Patient decided to just use over-the-counter cortisone cream.  9.  Influenza vaccine provided today.      COUNSELING:  Reviewed preventive health counseling, as reflected in patient instructions       Regular exercise       Healthy diet/nutrition       Vision screening    Estimated body mass index is 37.07 kg/m  as calculated from the following:    Height as of this encounter: 1.753 m (5' 9\").    Weight as of this encounter: 113.9 kg (251 lb).    Weight management plan: Discussed healthy diet and exercise guidelines    He reports that he has never smoked. He has never used smokeless tobacco.      Appropriate preventive services were discussed with this patient, including applicable screening as appropriate for cardiovascular disease, diabetes, osteopenia/osteoporosis, and glaucoma.  As appropriate for age/gender, discussed screening for colorectal cancer, prostate cancer, breast cancer, and cervical cancer. Checklist reviewing preventive " services available has been given to the patient.    Reviewed patients plan of care and provided an AVS. The Basic Care Plan (routine screening as documented in Health Maintenance) for Kobe meets the Care Plan requirement. This Care Plan has been established and reviewed with the Patient.    Counseling Resources:  ATP IV Guidelines  Pooled Cohorts Equation Calculator  Breast Cancer Risk Calculator  Breast Cancer: Medication to Reduce Risk  FRAX Risk Assessment  ICSI Preventive Guidelines  Dietary Guidelines for Americans, 2010  USDA's MyPlate  ASA Prophylaxis  Lung CA Screening    Truong Menchaca MD  [facility]    Identified Health Risks:

## 2022-10-31 DIAGNOSIS — R53.81 PHYSICAL DECONDITIONING: ICD-10-CM

## 2022-10-31 DIAGNOSIS — G62.0 CHEMOTHERAPY-INDUCED PERIPHERAL NEUROPATHY (H): ICD-10-CM

## 2022-10-31 DIAGNOSIS — M54.50 LOW BACK PAIN, UNSPECIFIED BACK PAIN LATERALITY, UNSPECIFIED CHRONICITY, UNSPECIFIED WHETHER SCIATICA PRESENT: ICD-10-CM

## 2022-10-31 DIAGNOSIS — T45.1X5A CHEMOTHERAPY-INDUCED PERIPHERAL NEUROPATHY (H): ICD-10-CM

## 2022-10-31 DIAGNOSIS — C83.78 BURKITT LYMPHOMA OF LYMPH NODES OF MULTIPLE REGIONS (H): ICD-10-CM

## 2022-10-31 NOTE — TELEPHONE ENCOUNTER
Gabapentin Refill   Last prescribing provider: Dr Crawford     Last clinic visit date: 8/19/22 DR Crawford     Recommendations for requested medication (if none, N/A): Copied from chart note 8/19/22 Dr Crawford   He is still on the same dose of gabapentin compared to the last visit- 600/300/600 mg daily. He cannot say when his neuropathy symptoms are worse, it varies during the day/evening.  He was doing some exercises with his legs recently and it seemed to help. He does endorse ongoing back pain that radiates down his left leg. He is interested in trying back ADIEL.  He denies any excess drowsiness with the gabapentin during the day.       Any other pertinent information (if none, N/A): N/A    Refilled: Y/N, if NO, why?

## 2022-10-31 NOTE — PROGRESS NOTES
"REASON FOR VISIT:  Management of Burkitt lymphoma    HISTORY OF PRESENT ILLNESS:  Mr. Pedroza is a 62 year old man with a history of Burkitt lymphoma.  To summarize his course, he presented with acute on chronic back pain in 03/2020.  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  CSF was negative.  He was was treated with steroids followed by R-CODOX-M/IVAC and IT chemo prophylaxis ending in 06/2020.  His course was complicated by neutropenic sepsis that resolved and right lower extremity below the knee DVT for which he completed a course of anticoagulation.  PET/CT after cycle 2 (1st R-IVAC) and again after completing treatment confirmed PET-negative complete response.  He recovered from COVID-19 after monoclonal antibody treatment in 02/2021.  Visit for ongoing management of lymphoma.    He is not with his wife Mckayla today.  Neuropathy in his feet is still an issue and he is working with Dr. Crawford and other providers for this.  No fevers, drenching night sweats, or unintentional weight loss.  No dyspnea, cough, or chest pain.  No new lumps or bumps.  Enjoying long term.  No big plans for the winter.    MEDICATIONS:  Current Outpatient Medications   Medication     atorvastatin (LIPITOR) 10 MG tablet     DULoxetine (CYMBALTA) 20 MG capsule     gabapentin (NEURONTIN) 300 MG capsule     levothyroxine (SYNTHROID/LEVOTHROID) 175 MCG tablet     gabapentin (NEURONTIN) 300 MG capsule     No current facility-administered medications for this visit.     PHYSICAL EXAMINATION:  /80   Pulse 72   Temp 97.8  F (36.6  C) (Oral)   Resp 18   Ht 1.773 m (5' 9.8\")   Wt 112.6 kg (248 lb 3.8 oz)   SpO2 97%   BMI 35.82 kg/m    Wt Readings from Last 5 Encounters:   11/02/22 112.6 kg (248 lb 3.8 oz)   09/08/22 113.9 kg (251 lb)   08/19/22 112.9 kg (248 lb 14.4 oz)   05/18/22 111.7 kg (246 lb 3.2 oz)   04/27/22 114.3 kg " (252 lb)     General: Well appearing. No distress.  HEENT: Sclerae anicteric.  Lungs: Clear bilaterally without wheezing or crackles.  Heart: Regular rate and rhythm.  Gastrointestinal: Bowel sounds present, no tenderness to palpation, spleen tip not palpable.  Extremities: No lower extremity edema.  Lymph:  No cervical, clavicular, axillary, epitrochlear, or inguinal lymphadenopathy.  Performance status: ECOG 0    LAB DATA:  Reviewed by me  Recent Labs   Lab Test 11/02/22  1313 05/18/22  0733 02/23/22  0842 08/04/21  0748 05/03/21  1712 02/11/21  1127 01/11/21  0920   WBC 7.9 6.4 7.1   < > 7.3 1.8* 5.9   HGB 14.8 14.8 14.6   < > 14.4 14.9 12.4*    211 204   < > 235 161 195   ANEU  --   --   --   --  5.3 0.6* 4.5   ANEUTAUTO 5.2 4.3 5.3   < >  --   --   --    ALYM  --   --   --   --  1.0 0.5* 0.5*   ALYMPAUTO 1.7 1.2 1.0   < >  --   --   --     < > = values in this interval not displayed.     Recent Labs   Lab Test 11/02/22  1313 05/18/22  0733 02/23/22  0842 02/01/22  1517 11/17/21  0717 05/03/21  1712 02/11/21  1127 06/18/20  0000 06/15/20  1335 06/01/20  0000 05/26/20  0355 05/25/20  0400 03/17/20  1214 03/17/20  0556    141 141   < > 142   < > 139   < > 139   < > 138 140   < > 139   POTASSIUM 3.9 3.7 3.9   < > 3.9   < > 3.8   < > 3.5   < > 4.0 3.7   < > 4.0   CHLORIDE 102 110* 109   < > 108   < > 105   < > 105   < > 112* 112*   < > 107   CO2 25 23 25   < > 27   < > 30   < > 30   < > 23 24   < > 26   BUN 17.5 22 25   < > 20   < > 12   < > 13   < > 16 15   < > 25   CR 0.93 0.85 0.87   < > 1.02   < > 0.96   < > 0.88   < > 0.72 0.70   < > 0.71   RUTH 8.9 8.2* 8.3*   < > 8.3*   < > 8.5   < > 8.1*   < > 7.3* 7.4*   < > 7.2*   MAG  --   --   --   --   --   --  2.5*  --  2.3  --   --  2.3   < > 2.1   PHOS  --   --   --   --   --   --   --   --  3.6  --  2.3* 2.1*   < > 4.1   URIC  --   --   --   --   --   --   --   --  4.8  --  3.2* 3.1*   < > 3.7   LDH  --  223 166  --  176   < >  --    < > 304*  --  226  220   < >  --    [id number]  --   --   --   --   --   --   --   --   --   --   --   --   --  2.0    < > = values in this interval not displayed.     Recent Labs   Lab Test 11/02/22  1313 05/18/22  0733 02/23/22  0842 06/18/20  0000 06/15/20  1335 06/01/20  0000 05/26/20  0355 05/25/20  0400   AST 27 24 21   < > 51*   < >  --  18   ALT 36 48 40   < > 85*   < >  --  51   ALKPHOS 103 98 86   < > 73   < >  --  78   BILITOTAL 0.4 0.5 0.4   < > 0.4   < >  --  0.3   INR  --   --   --   --  1.04  --  1.03 1.11    < > = values in this interval not displayed.     IMPRESSION AND PLAN:  Mr. Pedroza is a 62 year old man with a history of Burkitt lymphoma.    His lymphoma clinically remains in remission.  There is nothing to suggest recurrence.  We will continue to monitor.    He has no active ID issues.  He is up to date with pneumococcal and Shingrix vaccines.  We discussed the importance of following up to date local and federal health agency guidance regarding measures to reduce the risk of COVID-19 for immunocompromised individuals.  He received a COVID-19 vaccine and I recommended a bivalent booster.  He already received a seasonal flu shot.      He completed apixaban for 3 months for lymphoma-related DVT.  He will continue to work with PM&R (Dr. Crawford), physical therapy, and neurology as needed for neuropathy and back pain.  He will continue to work with PCP Dr. Menchaca for his health maintenance and other health issues and his endocrinologist Dr. Dunaway for his thyroid issues.    We will arrange another visit in about 6 months with labs.  We reminded him to contact if questions, concerns, or new issues arise between visits.    Ever Wright MD, PhD  Attending Physician, [facility], [facility]  Division of Hematology, Oncology, and Transplantation  [phone] [facility]

## 2022-11-01 RX ORDER — GABAPENTIN 300 MG/1
CAPSULE ORAL
Qty: 150 CAPSULE | Refills: 1 | Status: SHIPPED | OUTPATIENT
Start: 2022-11-01 | End: 2022-11-14

## 2022-11-02 ENCOUNTER — APPOINTMENT (OUTPATIENT)
Dept: LAB | Facility: CLINIC | Age: 62
End: 2022-11-02
Attending: INTERNAL MEDICINE
Payer: COMMERCIAL

## 2022-11-02 ENCOUNTER — ONCOLOGY VISIT (OUTPATIENT)
Dept: ONCOLOGY | Facility: CLINIC | Age: 62
End: 2022-11-02
Attending: INTERNAL MEDICINE
Payer: COMMERCIAL

## 2022-11-02 VITALS
BODY MASS INDEX: 35.54 KG/M2 | HEIGHT: 70 IN | RESPIRATION RATE: 18 BRPM | OXYGEN SATURATION: 97 % | TEMPERATURE: 97.8 F | DIASTOLIC BLOOD PRESSURE: 80 MMHG | SYSTOLIC BLOOD PRESSURE: 128 MMHG | HEART RATE: 72 BPM | WEIGHT: 248.24 LBS

## 2022-11-02 DIAGNOSIS — C83.78 BURKITT LYMPHOMA OF LYMPH NODES OF MULTIPLE REGIONS (H): Primary | ICD-10-CM

## 2022-11-02 LAB
ALBUMIN SERPL BCG-MCNC: 4.6 G/DL (ref 3.5–5.2)
ALP SERPL-CCNC: 103 U/L (ref 40–129)
ALT SERPL W P-5'-P-CCNC: 36 U/L (ref 10–50)
ANION GAP SERPL CALCULATED.3IONS-SCNC: 11 MMOL/L (ref 7–15)
AST SERPL W P-5'-P-CCNC: 27 U/L (ref 10–50)
BASOPHILS # BLD AUTO: 0.1 10E3/UL (ref 0–0.2)
BASOPHILS NFR BLD AUTO: 1 %
BILIRUB SERPL-MCNC: 0.4 MG/DL
BUN SERPL-MCNC: 17.5 MG/DL (ref 8–23)
CALCIUM SERPL-MCNC: 8.9 MG/DL (ref 8.8–10.2)
CHLORIDE SERPL-SCNC: 102 MMOL/L (ref 98–107)
CREAT SERPL-MCNC: 0.93 MG/DL (ref 0.67–1.17)
DEPRECATED HCO3 PLAS-SCNC: 25 MMOL/L (ref 22–29)
EOSINOPHIL # BLD AUTO: 0.1 10E3/UL (ref 0–0.7)
EOSINOPHIL NFR BLD AUTO: 2 %
ERYTHROCYTE [DISTWIDTH] IN BLOOD BY AUTOMATED COUNT: 12.5 % (ref 10–15)
GFR SERPL CREATININE-BSD FRML MDRD: >90 ML/MIN/1.73M2
GLUCOSE SERPL-MCNC: 108 MG/DL (ref 70–99)
HCT VFR BLD AUTO: 43 % (ref 40–53)
HGB BLD-MCNC: 14.8 G/DL (ref 13.3–17.7)
IMM GRANULOCYTES # BLD: 0 10E3/UL
IMM GRANULOCYTES NFR BLD: 0 %
LDH SERPL L TO P-CCNC: 203 U/L (ref 0–250)
LYMPHOCYTES # BLD AUTO: 1.7 10E3/UL (ref 0.8–5.3)
LYMPHOCYTES NFR BLD AUTO: 22 %
MCH RBC QN AUTO: 30.5 PG (ref 26.5–33)
MCHC RBC AUTO-ENTMCNC: 34.4 G/DL (ref 31.5–36.5)
MCV RBC AUTO: 89 FL (ref 78–100)
MONOCYTES # BLD AUTO: 0.7 10E3/UL (ref 0–1.3)
MONOCYTES NFR BLD AUTO: 9 %
NEUTROPHILS # BLD AUTO: 5.2 10E3/UL (ref 1.6–8.3)
NEUTROPHILS NFR BLD AUTO: 66 %
NRBC # BLD AUTO: 0 10E3/UL
NRBC BLD AUTO-RTO: 0 /100
PLATELET # BLD AUTO: 220 10E3/UL (ref 150–450)
POTASSIUM SERPL-SCNC: 3.9 MMOL/L (ref 3.4–5.3)
PROT SERPL-MCNC: 7.3 G/DL (ref 6.4–8.3)
RBC # BLD AUTO: 4.86 10E6/UL (ref 4.4–5.9)
SODIUM SERPL-SCNC: 138 MMOL/L (ref 136–145)
WBC # BLD AUTO: 7.9 10E3/UL (ref 4–11)

## 2022-11-02 PROCEDURE — 99214 OFFICE O/P EST MOD 30 MIN: CPT | Performed by: INTERNAL MEDICINE

## 2022-11-02 PROCEDURE — 80053 COMPREHEN METABOLIC PANEL: CPT | Performed by: INTERNAL MEDICINE

## 2022-11-02 PROCEDURE — G0463 HOSPITAL OUTPT CLINIC VISIT: HCPCS

## 2022-11-02 PROCEDURE — 83615 LACTATE (LD) (LDH) ENZYME: CPT | Performed by: INTERNAL MEDICINE

## 2022-11-02 PROCEDURE — 85004 AUTOMATED DIFF WBC COUNT: CPT | Performed by: INTERNAL MEDICINE

## 2022-11-02 PROCEDURE — 36415 COLL VENOUS BLD VENIPUNCTURE: CPT | Performed by: INTERNAL MEDICINE

## 2022-11-02 ASSESSMENT — PAIN SCALES - GENERAL: PAINLEVEL: MILD PAIN (2)

## 2022-11-02 NOTE — LETTER
"    11/2/2022         RE: Kobe Pedroza  [address]  [place] [address]        Dear Colleague,    Thank you for referring your patient, Kobe Pedroza, to the [facility]. Please see a copy of my visit note below.    REASON FOR VISIT:  Management of Burkitt lymphoma    HISTORY OF PRESENT ILLNESS:  Mr. Pedroza is a 62 year old man with a history of Burkitt lymphoma.  To summarize his course, he presented with acute on chronic back pain in 03/2020.  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  CSF was negative.  He was was treated with steroids followed by R-CODOX-M/IVAC and IT chemo prophylaxis ending in 06/2020.  His course was complicated by neutropenic sepsis that resolved and right lower extremity below the knee DVT for which he completed a course of anticoagulation.  PET/CT after cycle 2 (1st R-IVAC) and again after completing treatment confirmed PET-negative complete response.  He recovered from COVID-19 after monoclonal antibody treatment in 02/2021.  Visit for ongoing management of lymphoma.    He is not with his wife Mckayla today.  Neuropathy in his feet is still an issue and he is working with Dr. Crawford and other providers for this.  No fevers, drenching night sweats, or unintentional weight loss.  No dyspnea, cough, or chest pain.  No new lumps or bumps.  Enjoying [facility].  No big plans for the winter.    MEDICATIONS:  Current Outpatient Medications   Medication     atorvastatin (LIPITOR) 10 MG tablet     DULoxetine (CYMBALTA) 20 MG capsule     gabapentin (NEURONTIN) 300 MG capsule     levothyroxine (SYNTHROID/LEVOTHROID) 175 MCG tablet     gabapentin (NEURONTIN) 300 MG capsule     No current facility-administered medications for this visit.     PHYSICAL EXAMINATION:  /80   Pulse 72   Temp 97.8  F (36.6  C) (Oral)   Resp 18   Ht 1.773 m (5' 9.8\")   " Wt 112.6 kg (248 lb 3.8 oz)   SpO2 97%   BMI 35.82 kg/m    Wt Readings from Last 5 Encounters:   11/02/22 112.6 kg (248 lb 3.8 oz)   09/08/22 113.9 kg (251 lb)   08/19/22 112.9 kg (248 lb 14.4 oz)   05/18/22 111.7 kg (246 lb 3.2 oz)   04/27/22 114.3 kg (252 lb)     General: Well appearing. No distress.  HEENT: Sclerae anicteric.  Lungs: Clear bilaterally without wheezing or crackles.  Heart: Regular rate and rhythm.  Gastrointestinal: Bowel sounds present, no tenderness to palpation, spleen tip not palpable.  Extremities: No lower extremity edema.  Lymph:  No cervical, clavicular, axillary, epitrochlear, or inguinal lymphadenopathy.  Performance status: ECOG 0    LAB DATA:  Reviewed by me  Recent Labs   Lab Test 11/02/22  1313 05/18/22  0733 02/23/22  0842 08/04/21  0748 05/03/21  1712 02/11/21  1127 01/11/21  0920   WBC 7.9 6.4 7.1   < > 7.3 1.8* 5.9   HGB 14.8 14.8 14.6   < > 14.4 14.9 12.4*    211 204   < > 235 161 195   ANEU  --   --   --   --  5.3 0.6* 4.5   ANEUTAUTO 5.2 4.3 5.3   < >  --   --   --    ALYM  --   --   --   --  1.0 0.5* 0.5*   ALYMPAUTO 1.7 1.2 1.0   < >  --   --   --     < > = values in this interval not displayed.     Recent Labs   Lab Test 11/02/22  1313 05/18/22  0733 02/23/22  0842 02/01/22  1517 11/17/21  0717 05/03/21  1712 02/11/21  1127 06/18/20  0000 06/15/20  1335 06/01/20  0000 05/26/20  0355 05/25/20  0400 03/17/20  1214 03/17/20  0556    141 141   < > 142   < > 139   < > 139   < > 138 140   < > 139   POTASSIUM 3.9 3.7 3.9   < > 3.9   < > 3.8   < > 3.5   < > 4.0 3.7   < > 4.0   CHLORIDE 102 110* 109   < > 108   < > 105   < > 105   < > 112* 112*   < > 107   CO2 25 23 25   < > 27   < > 30   < > 30   < > 23 24   < > 26   BUN 17.5 22 25   < > 20   < > 12   < > 13   < > 16 15   < > 25   CR 0.93 0.85 0.87   < > 1.02   < > 0.96   < > 0.88   < > 0.72 0.70   < > 0.71   RUTH 8.9 8.2* 8.3*   < > 8.3*   < > 8.5   < > 8.1*   < > 7.3* 7.4*   < > 7.2*   MAG  --   --   --   --   --    --  2.5*  --  2.3  --   --  2.3   < > 2.1   PHOS  --   --   --   --   --   --   --   --  3.6  --  2.3* 2.1*   < > 4.1   URIC  --   --   --   --   --   --   --   --  4.8  --  3.2* 3.1*   < > 3.7   LDH  --  223 166  --  176   < >  --    < > 304*  --  226 220   < >  --    [id number]  --   --   --   --   --   --   --   --   --   --   --   --   --  2.0    < > = values in this interval not displayed.     Recent Labs   Lab Test 11/02/22  1313 05/18/22  0733 02/23/22  0842 06/18/20  0000 06/15/20  1335 06/01/20  0000 05/26/20  0355 05/25/20  0400   AST 27 24 21   < > 51*   < >  --  18   ALT 36 48 40   < > 85*   < >  --  51   ALKPHOS 103 98 86   < > 73   < >  --  78   BILITOTAL 0.4 0.5 0.4   < > 0.4   < >  --  0.3   INR  --   --   --   --  1.04  --  1.03 1.11    < > = values in this interval not displayed.     IMPRESSION AND PLAN:  Mr. Pedroza is a 62 year old man with a history of Burkitt lymphoma.    His lymphoma clinically remains in remission.  There is nothing to suggest recurrence.  We will continue to monitor.    He has no active ID issues.  He is up to date with pneumococcal and Shingrix vaccines.  We discussed the importance of following up to date local and federal health agency guidance regarding measures to reduce the risk of COVID-19 for immunocompromised individuals.  He received a COVID-19 vaccine and I recommended a bivalent booster.  He already received a seasonal flu shot.      He completed apixaban for 3 months for lymphoma-related DVT.  He will continue to work with PM&R (Dr. Crawford), physical therapy, and neurology as needed for neuropathy and back pain.  He will continue to work with PCP Dr. Menchaca for his health maintenance and other health issues and his endocrinologist Dr. Dunaway for his thyroid issues.    We will arrange another visit in about 6 months with labs.  We reminded him to contact if questions, concerns, or new issues arise between visits.        Again, thank you for allowing me to participate  in the care of your patient.      Sincerely,    Ever Wright MD

## 2022-11-02 NOTE — NURSING NOTE
"Oncology Rooming Note    November 2, 2022 1:37 PM   Kobe Pedroza is a 62 year old male who presents for:    Chief Complaint   Patient presents with     Blood Draw     Labs drawn by Vascular RN in Lab via Left Hand VPT using US.      Oncology Clinic Visit     [facility] RETURN - BURKITT LYMPHOMA      Initial Vitals: /80   Pulse 72   Temp 97.8  F (36.6  C) (Oral)   Resp 18   Ht 1.773 m (5' 9.8\")   Wt 112.6 kg (248 lb 3.8 oz)   SpO2 97%   BMI 35.82 kg/m   Estimated body mass index is 35.82 kg/m  as calculated from the following:    Height as of this encounter: 1.773 m (5' 9.8\").    Weight as of this encounter: 112.6 kg (248 lb 3.8 oz). Body surface area is 2.35 meters squared.  Mild Pain (2) Comment: Data Unavailable   No LMP for male patient.  Allergies reviewed: Yes  Medications reviewed: Yes    Medications: Medication refills not needed today.  Pharmacy name entered into [employer]: [id number] - [place] - [address]    Charan Bond LPN            "

## 2022-11-02 NOTE — NURSING NOTE
"Pt rating the Pain in his Calfs and Feet a \"2/10\". Pt states the Pain never fully goes away.     Chief Complaint   Patient presents with     Blood Draw     Labs drawn by Vascular RN in Lab via Left Hand VPT using US.      Cathleen Orona RN    "

## 2022-11-04 ENCOUNTER — TELEPHONE (OUTPATIENT)
Dept: PALLIATIVE MEDICINE | Facility: CLINIC | Age: 62
End: 2022-11-04

## 2022-11-04 ENCOUNTER — OFFICE VISIT (OUTPATIENT)
Dept: NEUROSURGERY | Facility: CLINIC | Age: 62
End: 2022-11-04
Payer: COMMERCIAL

## 2022-11-04 ENCOUNTER — PRE VISIT (OUTPATIENT)
Dept: NEUROSURGERY | Facility: CLINIC | Age: 62
End: 2022-11-04

## 2022-11-04 VITALS
WEIGHT: 248 LBS | HEART RATE: 66 BPM | DIASTOLIC BLOOD PRESSURE: 80 MMHG | SYSTOLIC BLOOD PRESSURE: 145 MMHG | BODY MASS INDEX: 35.79 KG/M2

## 2022-11-04 DIAGNOSIS — M79.2 NEUROPATHIC PAIN: Primary | ICD-10-CM

## 2022-11-04 DIAGNOSIS — C83.78 BURKITT LYMPHOMA OF LYMPH NODES OF MULTIPLE REGIONS (H): ICD-10-CM

## 2022-11-04 DIAGNOSIS — M54.50 LOW BACK PAIN, UNSPECIFIED BACK PAIN LATERALITY, UNSPECIFIED CHRONICITY, UNSPECIFIED WHETHER SCIATICA PRESENT: ICD-10-CM

## 2022-11-04 DIAGNOSIS — T45.1X5A CHEMOTHERAPY-INDUCED PERIPHERAL NEUROPATHY (H): ICD-10-CM

## 2022-11-04 DIAGNOSIS — G62.0 CHEMOTHERAPY-INDUCED PERIPHERAL NEUROPATHY (H): ICD-10-CM

## 2022-11-04 DIAGNOSIS — R53.81 PHYSICAL DECONDITIONING: ICD-10-CM

## 2022-11-04 PROCEDURE — 99215 OFFICE O/P EST HI 40 MIN: CPT | Performed by: PHYSICAL MEDICINE & REHABILITATION

## 2022-11-04 ASSESSMENT — PAIN SCALES - GENERAL: PAINLEVEL: NO PAIN (0)

## 2022-11-04 NOTE — PATIENT INSTRUCTIONS
Preparing for Spine Injection Therapy              Why Do I Need Spine Injection Therapy?  Your Health Care Provider is recommending spine injection therapy to  help relieve your back and neck pain. This will be in addition to other therapies such as medications and physical therapy. The purpose of these injections is to reduce the amount of inflammation (swelling, pain, heat, redness, loss of body function) around the nerves thus reducing the amount of pain.     The medications you will receive with the injections will include:   Anesthetic - medication to numb the painful area.    Steroid - medication that prevents or reduces swelling and pain (anti-inflammatory).   To reduce your discomfort during the injection procedure, you will receive a numbing medication injection prior to the placement of the needles. You will be lying on your stomach during the injection procedure. We will use a low-dose x-ray (fluoroscopy) to help guide needle placement.  You must have a  for this procedure. We will need to reschedule your injection if you do not have a  with you.      What are the different types of injections and procedure?  Below, is a brief description of the different types of injections we use to deliver pain medication as close as possible to the nerves in the painful area:    Epidural injection: (picture on the right) Epidural injections place 2 medications in your epidural space.  This is the space alongside your spinal canal (not inside of it). Nerves from your spinal cord pass through this space. The medications will bathe those nerves.           What Should I Expect if I Receive Sedation? THIS IS ORDERED BY THE PHYSICIAN  This is conscious sedation.  The medications we give to you will help you relax and reduce your anxiety.  You will still be awake for the procedure so we can ask you questions and hear your answers.     What Are My Responsibilities With Sedation?  You must stop eating and  drinking 8 (eight) hours before your procedure. You can have clear fluids (water, coffee/tea without milk) up to 2 hours prior to the injections. Nothing by mouth for 2 hours prior to injection.  This includes gum, mints, or chew.  Take your morning medications with a small sip of water.    You must have a  who will check-in with you, and stay in the building while the procedure is underway.  If you do not have a  your sedation will be cancelled.  We will monitor you for at least 30 minutes after the procedure before being discharged home.      What Are the Risks and Complications For This Procedure?  Risks and complication are rare, but can still occur.  You should understand, discuss, and accept these risks before agreeing to the procedure. They include, but are not limited to:  infection  nerve damage   paralysis  injection failure or a need for further injections or additional procedures  continued or worsening of symptoms/pain,   medication reaction,  dural leak (into the hole covering around the spinal cord. This may cause a a spinal headache)  leak of the medication into the spinal canal, nerves, or blood vessel.  death       What do I need to do before the procedure?   If you do not follow these instructions your procedure may be cancelled.  Tell us if you are on major blood thinners such as Coumadin, Xarelto , Plavix, Eliquis , Pradaxa , or others.    Contact the doctor who prescribed your blood thinner to ask for permission to stop taking it before you have the injection.    Schedule your pain injection procedure after your doctor gave their permission.   We will notify you when to stop and re-start your blood thinner.  Tell us if you have any allergies to contrast dye.  If you do, we may give additional medications to take before the procedure.  Tell us if you are pregnant, or possibly pregnant.  If so, you cannot receive steroid medications or be exposed to fluoroscopic X-rays.  Tell us if  you have been sick during the 10 days before the procedure. This includes:   colds   gastrointestinal illness or discomfort  dental sores,   skin infection, or any other type of infection.  Tell us if you have taken antibiotics during the 10 days prior to the procedure.  Do not drink alcohol the night before or on the day of the procedure.  You must shower the night before and on the day of your procedure.  Wear comfortable, clean clothing.  If you have an outside MRI (Magnetic Resonance Imaging photo), please bring it with you.    What Will Happen After the Procedure?  If you did not receive sedation we will monitor you for 15 minutes after the procedure. If you received sedation, we will monitor you for at least 30 minutes after the procedure     How soon can I expect pain relief?  You have received 2 types of medications with your injection:    Anesthetic - numbing medication which only acts for a few hours    Steroid which may take 3-14 days to be effective.   You can expect to feel your normal pain after the anesthetic wears off, until the steroid becomes effective.    How should I care for myself at home?  Get plenty of rest and avoid twisting, bending movements, heavy lifting, or strenuous activity for the first 24 hours. This will help the steroid be more effective. Medial Branch Block injections will have different discharge instructions.  Those will be discussed at that injection appointment.  Resume your pain medications  Apply ice packs (on for 20 minutes at a time), every 2-3 hours to your injection area for the first 2-3 days to help with pain control.    Avoid heat (pads or water bottles), which can cause the veins to open up, making the steroid less effective. You can use heat after 48 hours.  Take showers only for the first 48 hours.  No baths, hot tubs, swimming, or soaking for 48 hours to reduce the risk of infection.     When should I call the doctor?  Call us if you have any of the following:    Fever more than 100.5 degrees Fahrenheit   Signs of infection   Severe headache   Severe back pain   Increased numbness or weakness in your legs or arms   Loss of bladder or bowel control  Nausea   Other concerns    What is the contact information?  During business hours Monday-Friday 8a-5p call [phone].   After business hours, on weekends and holidays call [phone] and ask for the PM&R doctor on call

## 2022-11-04 NOTE — LETTER
11/4/2022         RE: Kobe Pedroza  [id number] [facility] [id number]        Dear Colleague,    Thank you for referring your patient, Kobe Pedroza, to the [facility] [place]. Please see a copy of my visit note below.    Patient seen at the request of Dr. Yuridia Crawford for an opinion and evaluation of neuropathic pain.      HISTORY OF PRESENT ILLNESS:  Kobe Pedroza is a 62 year old male with history of Burkitt's lymphoma who presents with a chief complaint of bilateral foot numbness/tingling.  He is accompanied today by his wife.    Patient has been seen and evaluated by my colleague in the cancer rehabilitation clinic, Dr. Yuridia Crawford, and referred to our PM&R Spine Clinic for low back pain in the setting of lumbar facet arthropathy and neuropathic pain and consideration of ADIEL.  He has completed prior physical therapy and worked with a chiropractor several times, the latter which did not help symptoms significantly.  He was instructed by Dr. Crawford to uptitrate his gabapentin.  His PCP also started him on Cymbalta she is taking 20 mg twice daily now since September.  He had also been attending acupuncture.  Prior EMG/NCS December 2021 shows electrodiagnostic evidence of mild to moderate axonal, length dependent sensory peripheral polyneuropathy without radiculopathy.  Patient completed chemotherapy for Burkitt's lymphoma.  He states that symptoms arose in his feet with associated painful numbness/tingling approximately 1 year after.  He has been dealing with symptoms for +1 year.     He localizes symptoms to the plantar surface of his feet bilaterally and describes it as painful numb/tingling and cramping in nature.  Symptoms are constant but worse at nighttime and with weightbearing/ambulation.  Pain is reported as 3/10 at rest today and up to 7/10 at its worst in the last week.  He feels quite functionally limited due to the symptoms.  He can only go to the  grocery store, for example, before needing to rest due to symptoms.      PRIOR INJURIES/TREATMENT:   Physical Therapy:   Cancer rehab      - Current Pain Medications -   Gabapentin 600mg AM 600mg PM  Cymbalta 20mg BID    Prior Procedures:  Date    Procedure   Improvement (%)  None              Prior Related Surgery:   03/15/2020 - Laminectomy, excise tumor thoracic (T5/6)   Other (acupuncture, OMT, CMM, TENS, DME, etc.):   +chiropractic care minimal improvement  Acupuncture    Specialists Seen - (with most recent, available notes and clinic visits reviewed)   1. Oncology   2. Cancer rehab clinic - Dr. Yuridia Crawford  3.  Primary care sports medicine -Dr. Dionicio Almaraz.   4. Neurosurgery - Dr. Heather Cespedes.     IMAGING - reviewed   12/06/2021 - EMG/NCS  Interpretation:  This is an abnormal study. 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 In addition, there is also evidence of (2) active denervation and chronic reinnveration changes in the right gastrocnemius muscle. Although this finding can be seen within the context of an S1/S2 radiculopathy, lumbosacral plexopathy or sciatic neuropathy, similar changes were not appreciated in other muscles with shared innervation. As such, the gastrocnemius muscle abnormalities are of uncertain localization or clinical significance. An isolated tibial neuropathy cannot be excluded. Clinical correlation is recommended.     12/31/2020 - MRI Lumbar Spine  Findings: There are 5 lumbar-type vertebrae assumed for the purposes  of this dictation.  The tip of the conus medullaris is at L1/L2.   Normal lumbar vertebral alignment.  There is moderate intervertebral  disc height loss at L1-L2 and L3-L4. Loss of intrinsic T2 signal  within the intervertebral discs of L1-L2 and L3-L4. Grade 1  retrolisthesis of L1 on L2. Hemangioma within the L4 vertebral body.  Normal marrow signal.     On a level by level basis:     T12-L1: No  spinal canal or neuroforaminal stenosis.   L1-2: Bilateral moderate neural foraminal narrowing. No spinal canal  narrowing. Bilateral facet arthropathy. Small circumferential disc  bulge.   L2-3: Bilateral mild to moderate neuroforaminal stenosis. No spinal  canal stenosis. Bilateral facet arthropathy. Small posterior disc  bulge.   L3-4: Moderate right and mild left neural foraminal narrowing. No  significant spinal canal narrowing. Bilateral facet arthropathy. Small  circumferential disc bulge.   L4-5: Mild bilateral neural foraminal narrowing. No spinal canal  narrowing. Bilateral facet arthropathy.  L5-S1: Mild left neuroforaminal stenosis. No right neural foraminal  stenosis. No spinal canal stenosis. Bilateral facet arthropathy. Small  posterior disc bulge.     Paraspinous tissues are within normal limits.                                                                   Impression: Multilevel lumbar spondylosis most significant at L3-L4  with moderate right and mild left neuroforaminal narrowing. No  high-grade spinal canal narrowing at any level.    12/31/2020 MR Left hip  Impression:  1. Mild degenerative changes of the left femoral acetabular joint with  early osteophyte formation and a labral tear. Mild chondral loss of  the articular surface of the femoral acetabular joint. However, no  full-thickness articular cartilage loss.  2. Small sliver of fluid traversing the iliopsoas muscle at the level  of the femoral head from anterior to posterior, associated tendon  attachments are intact. Recommend correlation with possible joint  injection. Otherwise, a nonspecific muscle sprain could be considered  in the differential diagnosis.  3. Mild tendinopathy of the gluteus medius with reactive edema at the  distal tendon of the gluteus medius and to a lesser degree gluteus  minimus, consistent with gluteal tendinopathy.    Review Of Systems:  I am responding to those symptoms which are directly relevant to the  specific indication for my consultation. I recommend that the patient follow up with their primary or referring provider to pursue any other symptoms which may be of concern.       Medical History:  He  has a past medical history of Acute deep vein thrombosis (DVT) of popliteal vein of right lower extremity (H) (04/30/2020), Burkitt's lymphoma (H) (02/2020), Chemotherapy-induced neutropenia (H) (04/14/2020), Class 2 obesity due to excess calories without serious comorbidity in adult (02/01/2022), IFG (impaired fasting glucose) (02/03/2022), Mixed dyslipidemia (02/03/2022), Polyneuropathy (02/01/2022), Postsurgical hypothyroidism (02/01/2022), and Thyroid cancer (H).     He  has a past surgical history that includes Laminectomy, excise tumor thoracic, combined (N/A, 03/15/2020); IR PICC Vascular (04/08/2020); thyroidectomy  (Bilateral, 2011); and PICC/Midline Placement (Right, 04/30/2020).    Family History  His family history includes Diabetes Type 2  in his mother; Hypertension in his father; Neuropathy in his mother; Polymyalgia rheumatica in his mother.     Social History:  Work: Not working  Current living situation: lives with wife.   He  reports that he has never smoked. He has never used smokeless tobacco. He reports that he does not currently use alcohol. He reports that he does not use drugs.        Current Medications:   He has a current medication list which includes the following prescription(s): atorvastatin, duloxetine, gabapentin, gabapentin, and levothyroxine.     Allergies:   No Known Allergies    PHYSICAL EXAMINATION:  BP (!) 145/80 (BP Location: Right arm, Patient Position: Sitting, Cuff Size: Adult Large)   Pulse 66   Wt 112.5 kg (248 lb)   BMI 35.79 kg/m     General: Pleasant, straightforward, WDWN individual.  Mental Status: Pleasant, direct, appropriate mood and affect  Resp: breathing is unlabored without audible wheeze  Vascular: Palpable pedal pulses, no cyanosis, no venous stasis  changes  Heme: no visible ecchymosis or erythema on extremities  Skin: No notable rash    Neurologic:  Strength: All major muscle groups of the bilateral lower extremities have normal and symmetric muscle strength     Sensation is decreased to light touch in the plantar surface of his foot.  Patient has difficulty with identifying paresthesias to LT at the level of the foot/ankle dorsum of foot    DTRs: bilateral lower extremity stretch reflexes are equal and symmetric NC 2+ AR 2+ on the left, difficulty relaxing on the right and unable to obtain    Musculoskeletal:  Gait - very mildly unsteady with careful foot placement, plodding.        ASSESSMENT:  Kobe Pedroza is a pleasant 62 year old male who presents with:    #. Neuropathic pain.   #. Chemotherapy-induced peripheral neuropathy (H)  #. 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)    While it does seem that his symptoms are largely related to chemo induced peripheral polyneuropathy.  He does have some areas of mild-moderate neuroforaminal stenosis and prior extradural thoracic lesion.  Given the functionally limiting nature of his pain symptoms I think would be reasonable to attempt a lumbar epidural steroid injection.  However, depending on his response I would suggest that he continue with neuropathic pain medication optimization, as well as, consideration of pain clinic referral for additional pharmacologic options including possible medical cannabis, and/or SCS trial.     PLAN:  -Refer for fluoroscopically guided L5-S1 interlaminar epidural steroid injection   -He can continue to optimize his Cymbalta through his primary care physician.  I would suggest that he increase to 60 mg total daily dose whether 60 mg once daily or 30 mg twice daily and is tolerating  - RTC for procedure    Ready to learn, no apparent learning barriers.  Education provided on treatment plan according to patient's preferred learning style.  Patient verbalizes  understanding.   __________________________________  Piter Torres MD  Physical Medicine & Rehabilitation        45 minutes spent on the date of the encounter doing chart review, review of test results, patient visit, documentation and discussion with family           Again, thank you for allowing me to participate in the care of your patient.        Sincerely,        Piter Torres MD

## 2022-11-04 NOTE — TELEPHONE ENCOUNTER
Called patient and LVM to schedule injection with Dr. Torres. Provided direct call back number to writer [phone].

## 2022-11-04 NOTE — PROGRESS NOTES
Patient seen at the request of Dr. Yuridia Crawford for an opinion and evaluation of neuropathic pain.      HISTORY OF PRESENT ILLNESS:  Kobe Pedroza is a 62 year old male with history of Burkitt's lymphoma who presents with a chief complaint of bilateral foot numbness/tingling.  He is accompanied today by his wife.    Patient has been seen and evaluated by my colleague in the cancer rehabilitation clinic, Dr. Yuridia Crawford, and referred to our PM&R Spine Clinic for low back pain in the setting of lumbar facet arthropathy and neuropathic pain and consideration of ADIEL.  He has completed prior physical therapy and worked with a chiropractor several times, the latter which did not help symptoms significantly.  He was instructed by Dr. Crawford to uptitrate his gabapentin.  His PCP also started him on Cymbalta she is taking 20 mg twice daily now since September.  He had also been attending acupuncture.  Prior EMG/NCS December 2021 shows electrodiagnostic evidence of mild to moderate axonal, length dependent sensory peripheral polyneuropathy without radiculopathy.  Patient completed chemotherapy for Burkitt's lymphoma.  He states that symptoms arose in his feet with associated painful numbness/tingling approximately 1 year after.  He has been dealing with symptoms for +1 year.     He localizes symptoms to the plantar surface of his feet bilaterally and describes it as painful numb/tingling and cramping in nature.  Symptoms are constant but worse at nighttime and with weightbearing/ambulation.  Pain is reported as 3/10 at rest today and up to 7/10 at its worst in the last week.  He feels quite functionally limited due to the symptoms.  He can only go to the grocery store, for example, before needing to rest due to symptoms.      PRIOR INJURIES/TREATMENT:   Physical Therapy:   Cancer rehab      - Current Pain Medications -   Gabapentin 600mg AM 600mg PM  Cymbalta 20mg BID    Prior Procedures:  Date    Procedure    Improvement (%)  None              Prior Related Surgery:   03/15/2020 - Laminectomy, excise tumor thoracic (T5/6)   Other (acupuncture, OMT, CMM, TENS, DME, etc.):   +chiropractic care minimal improvement  Acupuncture    Specialists Seen - (with most recent, available notes and clinic visits reviewed)   1. Oncology   2. Cancer rehab clinic - Dr. Yuridia Crawford  3.  Primary care sports medicine -Dr. Dionicio Almaraz.   4. Neurosurgery - Dr. Heather Cespedes.     IMAGING - reviewed   12/06/2021 - EMG/NCS  Interpretation:  This is an abnormal study. 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 In addition, there is also evidence of (2) active denervation and chronic reinnveration changes in the right gastrocnemius muscle. Although this finding can be seen within the context of an S1/S2 radiculopathy, lumbosacral plexopathy or sciatic neuropathy, similar changes were not appreciated in other muscles with shared innervation. As such, the gastrocnemius muscle abnormalities are of uncertain localization or clinical significance. An isolated tibial neuropathy cannot be excluded. Clinical correlation is recommended.     12/31/2020 - MRI Lumbar Spine  Findings: There are 5 lumbar-type vertebrae assumed for the purposes  of this dictation.  The tip of the conus medullaris is at L1/L2.   Normal lumbar vertebral alignment.  There is moderate intervertebral  disc height loss at L1-L2 and L3-L4. Loss of intrinsic T2 signal  within the intervertebral discs of L1-L2 and L3-L4. Grade 1  retrolisthesis of L1 on L2. Hemangioma within the L4 vertebral body.  Normal marrow signal.     On a level by level basis:     T12-L1: No spinal canal or neuroforaminal stenosis.   L1-2: Bilateral moderate neural foraminal narrowing. No spinal canal  narrowing. Bilateral facet arthropathy. Small circumferential disc  bulge.   L2-3: Bilateral mild to moderate neuroforaminal stenosis. No  spinal  canal stenosis. Bilateral facet arthropathy. Small posterior disc  bulge.   L3-4: Moderate right and mild left neural foraminal narrowing. No  significant spinal canal narrowing. Bilateral facet arthropathy. Small  circumferential disc bulge.   L4-5: Mild bilateral neural foraminal narrowing. No spinal canal  narrowing. Bilateral facet arthropathy.  L5-S1: Mild left neuroforaminal stenosis. No right neural foraminal  stenosis. No spinal canal stenosis. Bilateral facet arthropathy. Small  posterior disc bulge.     Paraspinous tissues are within normal limits.                                                                   Impression: Multilevel lumbar spondylosis most significant at L3-L4  with moderate right and mild left neuroforaminal narrowing. No  high-grade spinal canal narrowing at any level.    12/31/2020 MR Left hip  Impression:  1. Mild degenerative changes of the left femoral acetabular joint with  early osteophyte formation and a labral tear. Mild chondral loss of  the articular surface of the femoral acetabular joint. However, no  full-thickness articular cartilage loss.  2. Small sliver of fluid traversing the iliopsoas muscle at the level  of the femoral head from anterior to posterior, associated tendon  attachments are intact. Recommend correlation with possible joint  injection. Otherwise, a nonspecific muscle sprain could be considered  in the differential diagnosis.  3. Mild tendinopathy of the gluteus medius with reactive edema at the  distal tendon of the gluteus medius and to a lesser degree gluteus  minimus, consistent with gluteal tendinopathy.    Review Of Systems:  I am responding to those symptoms which are directly relevant to the specific indication for my consultation. I recommend that the patient follow up with their primary or referring provider to pursue any other symptoms which may be of concern.       Medical History:  He  has a past medical history of Acute deep vein  thrombosis (DVT) of popliteal vein of right lower extremity (H) (04/30/2020), Burkitt's lymphoma (H) (02/2020), Chemotherapy-induced neutropenia (H) (04/14/2020), Class 2 obesity due to excess calories without serious comorbidity in adult (02/01/2022), IFG (impaired fasting glucose) (02/03/2022), Mixed dyslipidemia (02/03/2022), Polyneuropathy (02/01/2022), Postsurgical hypothyroidism (02/01/2022), and Thyroid cancer (H).     He  has a past surgical history that includes Laminectomy, excise tumor thoracic, combined (N/A, 03/15/2020); IR PICC Vascular (04/08/2020); thyroidectomy  (Bilateral, 2011); and PICC/Midline Placement (Right, 04/30/2020).    Family History  His family history includes Diabetes Type 2  in his mother; Hypertension in his father; Neuropathy in his mother; Polymyalgia rheumatica in his mother.     Social History:  Work: Not working  Current living situation: lives with wife.   He  reports that he has never smoked. He has never used smokeless tobacco. He reports that he does not currently use alcohol. He reports that he does not use drugs.        Current Medications:   He has a current medication list which includes the following prescription(s): atorvastatin, duloxetine, gabapentin, gabapentin, and levothyroxine.     Allergies:   No Known Allergies    PHYSICAL EXAMINATION:  BP (!) 145/80 (BP Location: Right arm, Patient Position: Sitting, Cuff Size: Adult Large)   Pulse 66   Wt 112.5 kg (248 lb)   BMI 35.79 kg/m     General: Pleasant, straightforward, WDWN individual.  Mental Status: Pleasant, direct, appropriate mood and affect  Resp: breathing is unlabored without audible wheeze  Vascular: Palpable pedal pulses, no cyanosis, no venous stasis changes  Heme: no visible ecchymosis or erythema on extremities  Skin: No notable rash    Neurologic:  Strength: All major muscle groups of the bilateral lower extremities have normal and symmetric muscle strength     Sensation is decreased to light touch  in the plantar surface of his foot.  Patient has difficulty with identifying paresthesias to LT at the level of the foot/ankle dorsum of foot    DTRs: bilateral lower extremity stretch reflexes are equal and symmetric MT 2+ AR 2+ on the left, difficulty relaxing on the right and unable to obtain    Musculoskeletal:  Gait - very mildly unsteady with careful foot placement, plodding.        ASSESSMENT:  Kobe Pedroza is a pleasant 62 year old male who presents with:    #. Neuropathic pain.   #. Chemotherapy-induced peripheral neuropathy (H)  #. 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)    While it does seem that his symptoms are largely related to chemo induced peripheral polyneuropathy.  He does have some areas of mild-moderate neuroforaminal stenosis and prior extradural thoracic lesion.  Given the functionally limiting nature of his pain symptoms I think would be reasonable to attempt a lumbar epidural steroid injection.  However, depending on his response I would suggest that he continue with neuropathic pain medication optimization, as well as, consideration of pain clinic referral for additional pharmacologic options including possible medical cannabis, and/or SCS trial.     PLAN:  -Refer for fluoroscopically guided L5-S1 interlaminar epidural steroid injection   -He can continue to optimize his Cymbalta through his primary care physician.  I would suggest that he increase to 60 mg total daily dose whether 60 mg once daily or 30 mg twice daily and is tolerating  - RTC for procedure    Ready to learn, no apparent learning barriers.  Education provided on treatment plan according to patient's preferred learning style.  Patient verbalizes understanding.   __________________________________  Piter Torres MD  Physical Medicine & Rehabilitation        45 minutes spent on the date of the encounter doing chart review, review of test results, patient visit, documentation and discussion with family

## 2022-11-07 ENCOUNTER — TELEPHONE (OUTPATIENT)
Dept: PHYSICAL MEDICINE AND REHAB | Facility: CLINIC | Age: 62
End: 2022-11-07

## 2022-11-07 PROBLEM — M79.2 NEUROPATHIC PAIN: Status: ACTIVE | Noted: 2022-11-07

## 2022-11-07 PROBLEM — M54.50 LOW BACK PAIN, UNSPECIFIED BACK PAIN LATERALITY, UNSPECIFIED CHRONICITY, UNSPECIFIED WHETHER SCIATICA PRESENT: Status: ACTIVE | Noted: 2022-11-07

## 2022-11-07 NOTE — TELEPHONE ENCOUNTER
Injection scheduled for [place] on 11/14. He will take a COVID test 1-2 days before and bring a picture of the result.    Sent message to [facility]  as well.

## 2022-11-14 ENCOUNTER — ANCILLARY PROCEDURE (OUTPATIENT)
Dept: RADIOLOGY | Facility: AMBULATORY SURGERY CENTER | Age: 62
End: 2022-11-14
Attending: PHYSICAL MEDICINE & REHABILITATION
Payer: COMMERCIAL

## 2022-11-14 ENCOUNTER — HOSPITAL ENCOUNTER (OUTPATIENT)
Facility: AMBULATORY SURGERY CENTER | Age: 62
Discharge: HOME | End: 2022-11-14
Attending: PHYSICAL MEDICINE & REHABILITATION | Admitting: PHYSICAL MEDICINE & REHABILITATION
Payer: COMMERCIAL

## 2022-11-14 VITALS
RESPIRATION RATE: 18 BRPM | BODY MASS INDEX: 36.73 KG/M2 | HEIGHT: 69 IN | WEIGHT: 248 LBS | SYSTOLIC BLOOD PRESSURE: 134 MMHG | TEMPERATURE: 97.1 F | HEART RATE: 61 BPM | OXYGEN SATURATION: 96 % | DIASTOLIC BLOOD PRESSURE: 80 MMHG

## 2022-11-14 DIAGNOSIS — M79.2 NEUROPATHIC PAIN: ICD-10-CM

## 2022-11-14 DIAGNOSIS — M54.50 LOW BACK PAIN, UNSPECIFIED BACK PAIN LATERALITY, UNSPECIFIED CHRONICITY, UNSPECIFIED WHETHER SCIATICA PRESENT: ICD-10-CM

## 2022-11-14 PROCEDURE — 62323 NJX INTERLAMINAR LMBR/SAC: CPT

## 2022-11-14 RX ORDER — LIDOCAINE HYDROCHLORIDE 10 MG/ML
INJECTION, SOLUTION EPIDURAL; INFILTRATION; INTRACAUDAL; PERINEURAL DAILY PRN
Status: DISCONTINUED | OUTPATIENT
Start: 2022-11-14 | End: 2022-11-14 | Stop reason: HOSPADM

## 2022-11-14 RX ORDER — DEXAMETHASONE SODIUM PHOSPHATE 10 MG/ML
INJECTION INTRAMUSCULAR; INTRAVENOUS DAILY PRN
Status: DISCONTINUED | OUTPATIENT
Start: 2022-11-14 | End: 2022-11-14 | Stop reason: HOSPADM

## 2022-11-14 RX ORDER — IOPAMIDOL 408 MG/ML
INJECTION, SOLUTION INTRATHECAL DAILY PRN
Status: DISCONTINUED | OUTPATIENT
Start: 2022-11-14 | End: 2022-11-14 | Stop reason: HOSPADM

## 2022-11-14 NOTE — PROCEDURES
PROCEDURE NOTE: Lumbar Interlaminar Epidural Steroid Injection    PROCEDURE DATE: 2022    PATIENT NAME: Kobe Pedroza  YOB: 1960    ATTENDING PHYSICIAN: Piter Torres MD    PREOPERATIVE DIAGNOSIS:   Low back pain, unspecified back pain laterality, unspecified chronicity, unspecified whether sciatica present  Neuropathic pain   POSTOPERATIVE DIAGNOSIS: Same    PROCEDURE PERFORMED: Interlaminar Epidural Steroid Injection at the L5-S1 level, with a right paramedian approach under fluoroscopic guidance       FLUOROSCOPY WAS USED.     INDICATIONS FOR PROCEDURE:   This is a 62 year old male with a clinical picture consistent with the above-mentioned diagnosis, resulting in neuropathic pain to the right and left lower extremity.    PROCEDURE AND FINDINGS:   The patient was greeted in the pre procedure holding area. The risk, benefits and alternatives to the procedure were again reviewed with the patient and written informed consent was placed in the chart. Prior to the procedure a time out was completed, verifying correct patient, procedure, site, positioning, and implants and/or special equipment.    An IV line was not placed. The patient was taken to the procedure room and positioned prone on the fluoroscopy table. Routine monitors were applied including blood pressure cuff, and pulse oximetry. Then a  film was taken to identify the correct level. The skin was prepped and draped in the usual sterile fashion. The overlying skin and subcutaneous tissue was anesthetized using a 27-guage 1-1/4 inch needle with 1% preservative-free lidocaine for a total volume of  4mls. Then an 20.5-inch Tuohy needle was advanced under fluorosocpic guidance using an AP lateral views into the interlaminar space. A loss of resistance syringe was attached and loss of resistance to saline.    After negative aspiration, 1mls of isovue-M contrast was injected under AP view with live fluoroscopy and confirmed  adequate spread along the nerve root and in the epidural space. There was no evidence of intravascular uptake or intrathecal spread on imaging. A lateral view was also taken confirming adequate epidural spread.     At this point, after negative aspiration, a total 5mL volume of treatment injectate, consisting of 1mL Dexamethasone (10mg/mL) and 4mL of preservative free normal saline (PFNS) was injected easily.  The needle was then flushed with 1 ml of PFNS and removed. The needle insertion site was dressed appropriately.     The patient was taken to the recovery room where they were monitored for a brief period of time. He tolerated the procedure well and were discharged home in stable condition with post procedural instructions.     Follow-up will be in clinic.     COMPLICATIONS: None    COMMENTS: None

## 2022-11-14 NOTE — DISCHARGE INSTRUCTIONS
Home Care Instructions after an Epidural Steroid Pain Injection    A lumbar epidural steroid injection delivers steroid medication directly into the area that may be causing your lower back pain and/or leg pain. A cervical or thoracic epidural steroid injection delivers steroids into the epidural space surrounding spinal nerve roots to help relieve pain in the upper spine/neck.    Activity  -Rest today  -Do not work today  -Resume normal activity tomorrow  -DO NOT shower for 24 hours  -DO NOT remove bandaid for 24 hours    Pain  -You may experience soreness at the injection site for one or two days  -You may use an ice pack for 20 minutes every 2 hours for the first 24 hours  -You may use a heating pad after the first 24 hours  -You may use Tylenol (acetaminophen) every 4 hours or other pain medicines as     directed by your physician    You may experience numbness radiating into your legs or arms (depending on the procedure location). This numbness may last several hours. Until sensation returns to normal; please use caution in walking, climbing stairs, and stepping out of your vehicle, etc.    Common side effects of steroids:  Not everyone will experience corticosteroid side effects. If side effects are experienced, they will gradually subside in the 7-10 day period following an injection. Most common side effects include:  -Flushed face and/or chest  -Feeling of warmth, particularly in the face but could be an overall feeling of warmth  -Increased blood sugar in diabetic patients  -Menstrual irregularities my occur. If taking hormone-based birth control an alternate method of birth control is recommended  -Sleep disturbances and/or mood swings are possible  -Leg cramps    Please contact us if you have:  -Severe pain  -Fever more than 101.5 degrees Fahrenheit  -Signs of infection at the injection site (redness, swelling, or drainage)    FOR PAIN CENTER PATIENTS:  If you have questions, please contact the Pain  Clinic at [phone] Option #1 between the hours of 7:00 am and 3:00 pm Monday through Friday. After office hours you can contact the on call provider by dialing [phone]. If you need immediate attention, we recommend that you go to a hospital emergency room or dial [phone].      FOR PM&R PATIENTS:  For patients seen by the PM and R service, please call [phone]. If you need to fax a pain diary to PM&R the fax number is [phone]. If you are unable to fax, uploading to [employer] is encouraged, then send to provider. If you have procedure scheduling questions please call [phone] Option #2.

## 2022-11-23 ENCOUNTER — ONCOLOGY VISIT (OUTPATIENT)
Dept: ONCOLOGY | Facility: CLINIC | Age: 62
End: 2022-11-23
Attending: STUDENT IN AN ORGANIZED HEALTH CARE EDUCATION/TRAINING PROGRAM
Payer: COMMERCIAL

## 2022-11-23 VITALS
OXYGEN SATURATION: 97 % | BODY MASS INDEX: 36.55 KG/M2 | RESPIRATION RATE: 16 BRPM | SYSTOLIC BLOOD PRESSURE: 121 MMHG | DIASTOLIC BLOOD PRESSURE: 77 MMHG | TEMPERATURE: 97.7 F | HEART RATE: 71 BPM | WEIGHT: 247.5 LBS

## 2022-11-23 DIAGNOSIS — M54.50 LOW BACK PAIN, UNSPECIFIED BACK PAIN LATERALITY, UNSPECIFIED CHRONICITY, UNSPECIFIED WHETHER SCIATICA PRESENT: ICD-10-CM

## 2022-11-23 DIAGNOSIS — G62.0 CHEMOTHERAPY-INDUCED PERIPHERAL NEUROPATHY (H): ICD-10-CM

## 2022-11-23 DIAGNOSIS — T45.1X5A CHEMOTHERAPY-INDUCED PERIPHERAL NEUROPATHY (H): ICD-10-CM

## 2022-11-23 DIAGNOSIS — R53.81 PHYSICAL DECONDITIONING: ICD-10-CM

## 2022-11-23 DIAGNOSIS — C83.78 BURKITT LYMPHOMA OF LYMPH NODES OF MULTIPLE REGIONS (H): Primary | ICD-10-CM

## 2022-11-23 PROCEDURE — G0463 HOSPITAL OUTPT CLINIC VISIT: HCPCS

## 2022-11-23 PROCEDURE — 99215 OFFICE O/P EST HI 40 MIN: CPT | Performed by: STUDENT IN AN ORGANIZED HEALTH CARE EDUCATION/TRAINING PROGRAM

## 2022-11-23 RX ORDER — CYCLOBENZAPRINE HCL 5 MG
5 TABLET ORAL 3 TIMES DAILY PRN
Qty: 90 TABLET | Refills: 1 | Status: SHIPPED | OUTPATIENT
Start: 2022-11-23 | End: 2023-12-06

## 2022-11-23 ASSESSMENT — PAIN SCALES - GENERAL: PAINLEVEL: MILD PAIN (3)

## 2022-11-23 NOTE — LETTER
11/23/2022         RE: Kobe Pedroza  [address]  [place] [id number]        Dear Colleague,    Thank you for referring your patient, Kobe Pedroza, to the [facility]. Please see a copy of my visit note below.    [facility]   PM&R clinic note        Interval history:     Kobe Pedroza presents to clinic today for follow up reg his rehab needs.   He has h/o Burkitt lymphoma.  Was last seen in clinic on 8/9/22.  Recommendations included:  1. Therapy/equipment/braces:  1. Continue regular home exercise regimen as previously instructed by PT.  2. Medications:  1. Continue gabapentin at 600/300/600.  2. Can consider adding Cymbalta at our next visit if needed.  3. Interventions:  1. Can consider an lumbar ADIEL, will place a referral to Dr. Torres for further evaluation.  4. Follow up: 3 months.      Oncology History:  -Patient initially presented with acute on chronic back pain in 3/2020.  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  CSF was negative.  -Patient was treated with steroids followed by R-CODOX-M/IVAC and IT chemoprophylaxis between 3/20/2020 and 6/20/2020.  -His course was complicated by neutropenic sepsis that resolved and a right lower extremity DVT which was diagnosed on 4/30/2020.  Patient has been on therapeutic anticoagulation due to this.  -PET/CT after cycle 2 and again after completing treatment confirmed PET negative complete response.  -Patient became infected with COVID-19, and recovered after monoclonal antibody treatment in 2/2021.  -Patient called and spoke to Natacha Agarwal regarding his neuropathy which has significantly worsened.  He feels that worsening has occurred over the last 2 to 3 weeks.  He states he cannot move his toes at times.  He had tried some gabapentin which he had.  1 11/17/21- Saw   Adriana for follow up. Neuropathy still difficult. Back pain has been better. Thinking of getting back to work in next 1-2 months. Lymphoma clinically remains in remission. Labs good. Continue to monitor. Encouraged to find a new PCP.  - Had EMG done on 12/6/21 with Dr. 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 Findings of active denervation and chronic reinnervation changes in right gastrocnemius muscle, could be findings of S1/S2 radiculopathy, lumbosacral plexopathy or sciatic neuropathy, but similar changes were not appreciated in other muscles with shared innervation. So, gastrocnemius changes are of uncertain localization or clinical significance. Tibial neuropathy cannot be excluded.  - Has established care with IM, Dr. Menchaca. HbA1c of 5.8. Mixed dyslipidemia and recently started atorvastatin 10 mg daily and LDL dropped.  2/23/22- Follow up with Dr. Wright. Lymphoma remains in remission. Plan to return to clinic in 3 months, and if still in remission, will space out visits to every 6 months.   5/18/22- Had follow up with Dr. Wright. Continues to struggle with neuropathy. Stopped PT by that time.  11/2/22- Saw Dr. Wright for follow up. No active ID issues. Lymphoma clinically remains in remission. Continue to monitor.  11/14/22- Had lumbar interlaminar epidural steroid injection with Dr. Torres.      Symptoms,  Patient was seen for a return visit today.  3 days after his epidural injection, he felt worse, but he is now back to his normal level of pain and pressure in his back. It has been a week, and he hasn't had any improvement.   He is still on the gabapentin and is taking 600 mg BID. He denies drowsiness with the medication.  He was started on cymbalta 20 mg BID by his PCP.  He denies any falls or near falls since his last visit. His strength is good overall. He still struggles with balance slightly at times. This does not affect his activity and he  remains active at home and out in the community.      Therapies/HEP,  Currently walking every day, tries for a regular home exercise regimen.      Functionally,   No changes      Social history is unchanged.      Medications:  Current Outpatient Medications   Medication Sig Dispense Refill     atorvastatin (LIPITOR) 10 MG tablet TAKE 1 TABLET(10 MG) BY MOUTH DAILY 90 tablet 1     DULoxetine (CYMBALTA) 20 MG capsule Take 1 capsule (20 mg) by mouth 2 times daily 180 capsule 3     gabapentin (NEURONTIN) 300 MG capsule Take 2 capsules (600 mg) by mouth every morning AND 1 capsule (300 mg) daily (with lunch) AND 2 capsules (600 mg) At Bedtime. (Patient taking differently: Take 2 capsules (600 mg) by mouth every morning AND 1 capsule (300 mg) daily (with lunch) AND 2 capsules (600 mg) At Bedtime. Doesn't take the lunch dose) 120 capsule 0     levothyroxine (SYNTHROID/LEVOTHROID) 175 MCG tablet Take 1 tablet (175 mcg) by mouth daily 90 tablet 3              Physical Exam:   /77 (BP Location: Right arm, Patient Position: Sitting, Cuff Size: Adult Large)   Pulse 71   Temp 97.7  F (36.5  C) (Oral)   Resp 16   Wt 112.3 kg (247 lb 8 oz)   SpO2 97%   BMI 36.55 kg/m    Gen: NAD, pleasant and cooperative  HEENT: Normocephalic, atraumatic, extra-ocular movements appear intact  Pulm: non-labored breathing in room air  Ext: no edema in BLE, no tenderness in calves  Neuro/MSK:   Orientation: Oriented to person, place, time, situation. Exhibits good insight into his condition and ongoing management/symptoms.  Motor: 5/5 with bilateral shoulder abduction, elbow extension, wrist extension and  strength/finger intrinsics. 4+/5 with bilateral hip flexion, 5/5 with right knee extension, ankle dorsiflexion, EHL, plantar flexion.  5/5 with left knee extension, 4+/5 with left ankle dorsiflexion, 5/5 with EHL and plantar flexion on the left.  Gait: Gait is non antalgic with good step and stride length.    Labs/Imaging:  Lab  Results   Component Value Date    WBC 7.9 11/02/2022    HGB 14.8 11/02/2022    HCT 43.0 11/02/2022    MCV 89 11/02/2022     11/02/2022     Lab Results   Component Value Date     11/02/2022    POTASSIUM 3.9 11/02/2022    CHLORIDE 102 11/02/2022    CO2 25 11/02/2022     (H) 11/02/2022     Lab Results   Component Value Date    GFRESTIMATED >90 11/02/2022    GFRESTBLACK >90 05/03/2021     Lab Results   Component Value Date    AST 27 11/02/2022    ALT 36 11/02/2022    ALKPHOS 103 11/02/2022    BILITOTAL 0.4 11/02/2022     Lab Results   Component Value Date    INR 1.04 06/15/2020     Lab Results   Component Value Date    BUN 17.5 11/02/2022    CR 0.93 11/02/2022              Assessment/Plan   Kobe Pedroza presents to clinic today for follow up reg his rehab needs.   He has h/o Burkitt lymphoma. Was last seen in clinic on 8/9/22. Overall, patient's neuropathic/back symptoms did not improve with the ADIEL he had recently. Cymbalta helped a little, will plan to increase 60 mg daily. Continue on gabapentin at current dosing. Will plan 4 month return visit. Patient is in agreement with this plan.      1. Therapy/equipment/braces:  1. Continue regular home exercise regimen as previously instructed by PT.  2. Medications:  1. Continue gabapentin at 600/600.  2. Increase cymbalta dose from 40 mg total daily to 60 mg total daily.  3. Flexeril 5 mg TID prn was prescribed for muscle cramps.  3. Interventions:  1. Will continue to monitor for any relief from recent ADIEL.  5. Follow up: 4 months.      Yuridia Crawford MD  Physical Medicine & Rehabilitation      50 minutes spent on the date of the encounter doing chart review, history and exam, documentation and further activities as noted above.

## 2022-11-23 NOTE — PROGRESS NOTES
[facility]   PM&R clinic note        Interval history:     Kobe Pedroza presents to clinic today for follow up reg his rehab needs.   He has h/o Burkitt lymphoma.  Was last seen in clinic on 8/9/22.  Recommendations included:  1. Therapy/equipment/braces:  1. Continue regular home exercise regimen as previously instructed by PT.  2. Medications:  1. Continue gabapentin at 600/300/600.  2. Can consider adding Cymbalta at our next visit if needed.  3. Interventions:  1. Can consider an lumbar ADIEL, will place a referral to Dr. Torres for further evaluation.  4. Follow up: 3 months.      Oncology History:  -Patient initially presented with acute on chronic back pain in 3/2020.  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  CSF was negative.  -Patient was treated with steroids followed by R-CODOX-M/IVAC and IT chemoprophylaxis between 3/20/2020 and 6/20/2020.  -His course was complicated by neutropenic sepsis that resolved and a right lower extremity DVT which was diagnosed on 4/30/2020.  Patient has been on therapeutic anticoagulation due to this.  -PET/CT after cycle 2 and again after completing treatment confirmed PET negative complete response.  -Patient became infected with COVID-19, and recovered after monoclonal antibody treatment in 2/2021.  -Patient called and spoke to Natacha Agarwal regarding his neuropathy which has significantly worsened.  He feels that worsening has occurred over the last 2 to 3 weeks.  He states he cannot move his toes at times.  He had tried some gabapentin which he had.  1 11/17/21- Saw Dr. Wright for follow up. Neuropathy still difficult. Back pain has been better. Thinking of getting back to work in next 1-2 months. Lymphoma clinically remains in remission. Labs good. Continue to monitor. Encouraged to find a new PCP.  - Had EMG done on  12/6/21 with Dr. 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 Findings of active denervation and chronic reinnervation changes in right gastrocnemius muscle, could be findings of S1/S2 radiculopathy, lumbosacral plexopathy or sciatic neuropathy, but similar changes were not appreciated in other muscles with shared innervation. So, gastrocnemius changes are of uncertain localization or clinical significance. Tibial neuropathy cannot be excluded.  - Has established care with IM, Dr. Menchaca. HbA1c of 5.8. Mixed dyslipidemia and recently started atorvastatin 10 mg daily and LDL dropped.  2/23/22- Follow up with Dr. Wright. Lymphoma remains in remission. Plan to return to clinic in 3 months, and if still in remission, will space out visits to every 6 months.   5/18/22- Had follow up with Dr. Wright. Continues to struggle with neuropathy. Stopped PT by that time.  11/2/22- Saw Dr. Wright for follow up. No active ID issues. Lymphoma clinically remains in remission. Continue to monitor.  11/14/22- Had lumbar interlaminar epidural steroid injection with Dr. Torres.      Symptoms,  Patient was seen for a return visit today.  3 days after his epidural injection, he felt worse, but he is now back to his normal level of pain and pressure in his back. It has been a week, and he hasn't had any improvement.   He is still on the gabapentin and is taking 600 mg BID. He denies drowsiness with the medication.  He was started on cymbalta 20 mg BID by his PCP.  He denies any falls or near falls since his last visit. His strength is good overall. He still struggles with balance slightly at times. This does not affect his activity and he remains active at home and out in the community.      Therapies/HEP,  Currently walking every day, tries for a regular home exercise regimen.      Functionally,   No changes      Social history is unchanged.      Medications:  Current Outpatient  Medications   Medication Sig Dispense Refill     atorvastatin (LIPITOR) 10 MG tablet TAKE 1 TABLET(10 MG) BY MOUTH DAILY 90 tablet 1     DULoxetine (CYMBALTA) 20 MG capsule Take 1 capsule (20 mg) by mouth 2 times daily 180 capsule 3     gabapentin (NEURONTIN) 300 MG capsule Take 2 capsules (600 mg) by mouth every morning AND 1 capsule (300 mg) daily (with lunch) AND 2 capsules (600 mg) At Bedtime. (Patient taking differently: Take 2 capsules (600 mg) by mouth every morning AND 1 capsule (300 mg) daily (with lunch) AND 2 capsules (600 mg) At Bedtime. Doesn't take the lunch dose) 120 capsule 0     levothyroxine (SYNTHROID/LEVOTHROID) 175 MCG tablet Take 1 tablet (175 mcg) by mouth daily 90 tablet 3              Physical Exam:   /77 (BP Location: Right arm, Patient Position: Sitting, Cuff Size: Adult Large)   Pulse 71   Temp 97.7  F (36.5  C) (Oral)   Resp 16   Wt 112.3 kg (247 lb 8 oz)   SpO2 97%   BMI 36.55 kg/m    Gen: NAD, pleasant and cooperative  HEENT: Normocephalic, atraumatic, extra-ocular movements appear intact  Pulm: non-labored breathing in room air  Ext: no edema in BLE, no tenderness in calves  Neuro/MSK:   Orientation: Oriented to person, place, time, situation. Exhibits good insight into his condition and ongoing management/symptoms.  Motor: 5/5 with bilateral shoulder abduction, elbow extension, wrist extension and  strength/finger intrinsics. 4+/5 with bilateral hip flexion, 5/5 with right knee extension, ankle dorsiflexion, EHL, plantar flexion.  5/5 with left knee extension, 4+/5 with left ankle dorsiflexion, 5/5 with EHL and plantar flexion on the left.  Gait: Gait is non antalgic with good step and stride length.    Labs/Imaging:  Lab Results   Component Value Date    WBC 7.9 11/02/2022    HGB 14.8 11/02/2022    HCT 43.0 11/02/2022    MCV 89 11/02/2022     11/02/2022     Lab Results   Component Value Date     11/02/2022    POTASSIUM 3.9 11/02/2022    CHLORIDE 102  11/02/2022    CO2 25 11/02/2022     (H) 11/02/2022     Lab Results   Component Value Date    GFRESTIMATED >90 11/02/2022    GFRESTBLACK >90 05/03/2021     Lab Results   Component Value Date    AST 27 11/02/2022    ALT 36 11/02/2022    ALKPHOS 103 11/02/2022    BILITOTAL 0.4 11/02/2022     Lab Results   Component Value Date    INR 1.04 06/15/2020     Lab Results   Component Value Date    BUN 17.5 11/02/2022    CR 0.93 11/02/2022              Assessment/Plan   Kobe Pedroza presents to clinic today for follow up reg his rehab needs.   He has h/o Burkitt lymphoma. Was last seen in clinic on 8/9/22. Overall, patient's neuropathic/back symptoms did not improve with the ADIEL he had recently. Cymbalta helped a little, will plan to increase 60 mg daily. Continue on gabapentin at current dosing. Will plan 4 month return visit. Patient is in agreement with this plan.      1. Therapy/equipment/braces:  1. Continue regular home exercise regimen as previously instructed by PT.  2. Medications:  1. Continue gabapentin at 600/600.  2. Increase cymbalta dose from 40 mg total daily to 60 mg total daily.  3. Flexeril 5 mg TID prn was prescribed for muscle cramps.  3. Interventions:  1. Will continue to monitor for any relief from recent ADIEL.  5. Follow up: 4 months.      Yuridia Crawford MD  Physical Medicine & Rehabilitation      50 minutes spent on the date of the encounter doing chart review, history and exam, documentation and further activities as noted above.

## 2022-11-23 NOTE — PATIENT INSTRUCTIONS
Flexeril 5 mg three times daily as needed.  Continue gabapentin 600 mg twice daily.  Increase cymbalta to 60 mg total daily.  Continue your regular home exercise regimen.  Continue to monitor for any relief from your recent epidural steroid injection.  Follow up with Dr. Crawford in 4 months.

## 2022-11-23 NOTE — NURSING NOTE
"Oncology Rooming Note    November 23, 2022 8:21 AM   Kobe Pedroza is a 62 year old male who presents for:    Chief Complaint   Patient presents with     Oncology Clinic Visit     Burkitt Lymphoma     Initial Vitals: /77 (BP Location: Right arm, Patient Position: Sitting, Cuff Size: Adult Large)   Pulse 71   Temp 97.7  F (36.5  C) (Oral)   Resp 16   Wt 112.3 kg (247 lb 8 oz)   SpO2 97%   BMI 36.55 kg/m   Estimated body mass index is 36.55 kg/m  as calculated from the following:    Height as of 11/14/22: 1.753 m (5' 9\").    Weight as of this encounter: 112.3 kg (247 lb 8 oz). Body surface area is 2.34 meters squared.  Mild Pain (3) Comment: Data Unavailable   No LMP for male patient.  Allergies reviewed: Yes  Medications reviewed: Yes    Medications: Medication refills not needed today.  Pharmacy name entered into [employer]: [id number] [place], [place] - [address]    Clinical concerns: Pt presents today for f/u.  Pt had cortisone injection last week in his back and feels his pain is worse.       Ariadna Alvarez LPN  11/23/2022              "

## 2022-12-31 ENCOUNTER — MYC MEDICAL ADVICE (OUTPATIENT)
Dept: ONCOLOGY | Facility: CLINIC | Age: 62
End: 2022-12-31

## 2022-12-31 DIAGNOSIS — T45.1X5A CHEMOTHERAPY-INDUCED PERIPHERAL NEUROPATHY (H): ICD-10-CM

## 2022-12-31 DIAGNOSIS — C83.78 BURKITT LYMPHOMA OF LYMPH NODES OF MULTIPLE REGIONS (H): ICD-10-CM

## 2022-12-31 DIAGNOSIS — M54.50 LOW BACK PAIN, UNSPECIFIED BACK PAIN LATERALITY, UNSPECIFIED CHRONICITY, UNSPECIFIED WHETHER SCIATICA PRESENT: ICD-10-CM

## 2022-12-31 DIAGNOSIS — G62.0 CHEMOTHERAPY-INDUCED PERIPHERAL NEUROPATHY (H): ICD-10-CM

## 2022-12-31 DIAGNOSIS — R53.81 PHYSICAL DECONDITIONING: ICD-10-CM

## 2023-01-09 RX ORDER — GABAPENTIN 300 MG/1
600 CAPSULE ORAL 2 TIMES DAILY
Qty: 120 CAPSULE | Refills: 3 | Status: SHIPPED | OUTPATIENT
Start: 2023-01-09 | End: 2023-08-03

## 2023-01-09 NOTE — TELEPHONE ENCOUNTER
[facility]: Physical Medicine and Rehabilitation                                                                                       Spoke with Kobe to confirm pharmacy and doing of gabapentin.  He is taking 600mg BID and has stopped the mid day dose. He is no longer using [facility] for refills     Mara Marti LPN  Oncology PM&R Care Coordination  [phone]

## 2023-01-26 ENCOUNTER — PATIENT OUTREACH (OUTPATIENT)
Dept: ONCOLOGY | Facility: CLINIC | Age: 63
End: 2023-01-26
Payer: COMMERCIAL

## 2023-01-26 DIAGNOSIS — G62.9 POLYNEUROPATHY: ICD-10-CM

## 2023-01-26 RX ORDER — DULOXETIN HYDROCHLORIDE 20 MG/1
CAPSULE, DELAYED RELEASE ORAL
Qty: 270 CAPSULE | Refills: 0 | Status: SHIPPED | OUTPATIENT
Start: 2023-01-26 | End: 2023-01-27

## 2023-01-26 NOTE — PROGRESS NOTES
[facility]: Physical Medicine and Rehabilitation                                                                                     Kobe calls for refill of duloxetine  At November visit  increased to 60mg  Refill sent to Kim in [place] Marti LPN  Oncology PM&R Care Coordination  [phone]

## 2023-01-27 ENCOUNTER — TELEPHONE (OUTPATIENT)
Dept: FAMILY MEDICINE | Facility: CLINIC | Age: 63
End: 2023-01-27
Payer: COMMERCIAL

## 2023-01-27 DIAGNOSIS — G62.9 POLYNEUROPATHY: ICD-10-CM

## 2023-01-27 RX ORDER — DULOXETIN HYDROCHLORIDE 20 MG/1
CAPSULE, DELAYED RELEASE ORAL
Qty: 270 CAPSULE | Refills: 2 | Status: SHIPPED | OUTPATIENT
Start: 2023-01-27 | End: 2023-12-06

## 2023-01-27 NOTE — TELEPHONE ENCOUNTER
DULoxetine (CYMBALTA) 20 MG capsule    Patient states that he is taking 3 daily versus 2.   Please send new Rx for new dose if appropriate.

## 2023-02-02 DIAGNOSIS — E78.2 MIXED DYSLIPIDEMIA: ICD-10-CM

## 2023-02-02 RX ORDER — ATORVASTATIN CALCIUM 10 MG/1
TABLET, FILM COATED ORAL
Qty: 90 TABLET | Refills: 1 | Status: SHIPPED | OUTPATIENT
Start: 2023-02-02 | End: 2023-10-02

## 2023-03-07 ENCOUNTER — LAB (OUTPATIENT)
Dept: LAB | Facility: CLINIC | Age: 63
End: 2023-03-07
Payer: COMMERCIAL

## 2023-03-07 DIAGNOSIS — E89.0 POSTSURGICAL HYPOTHYROIDISM: ICD-10-CM

## 2023-03-07 DIAGNOSIS — E78.2 MIXED DYSLIPIDEMIA: ICD-10-CM

## 2023-03-07 DIAGNOSIS — R73.01 IFG (IMPAIRED FASTING GLUCOSE): ICD-10-CM

## 2023-03-07 LAB
FASTING STATUS PATIENT QL REPORTED: YES
GLUCOSE BLD-MCNC: 143 MG/DL (ref 70–99)
HBA1C MFR BLD: 6.4 % (ref 0–5.6)

## 2023-03-07 PROCEDURE — 80061 LIPID PANEL: CPT

## 2023-03-07 PROCEDURE — 36415 COLL VENOUS BLD VENIPUNCTURE: CPT

## 2023-03-07 PROCEDURE — 83036 HEMOGLOBIN GLYCOSYLATED A1C: CPT

## 2023-03-07 PROCEDURE — 82947 ASSAY GLUCOSE BLOOD QUANT: CPT

## 2023-03-07 PROCEDURE — 84443 ASSAY THYROID STIM HORMONE: CPT

## 2023-03-09 ENCOUNTER — OFFICE VISIT (OUTPATIENT)
Dept: FAMILY MEDICINE | Facility: CLINIC | Age: 63
End: 2023-03-09
Payer: COMMERCIAL

## 2023-03-09 VITALS
WEIGHT: 244.4 LBS | TEMPERATURE: 97.5 F | HEIGHT: 69 IN | BODY MASS INDEX: 36.2 KG/M2 | RESPIRATION RATE: 18 BRPM | DIASTOLIC BLOOD PRESSURE: 81 MMHG | SYSTOLIC BLOOD PRESSURE: 124 MMHG | OXYGEN SATURATION: 97 % | HEART RATE: 71 BPM

## 2023-03-09 DIAGNOSIS — E11.9 TYPE 2 DIABETES MELLITUS WITHOUT COMPLICATION, WITHOUT LONG-TERM CURRENT USE OF INSULIN (H): Primary | ICD-10-CM

## 2023-03-09 DIAGNOSIS — C83.78 BURKITT LYMPHOMA OF LYMPH NODES OF MULTIPLE REGIONS (H): ICD-10-CM

## 2023-03-09 DIAGNOSIS — E89.0 POSTSURGICAL HYPOTHYROIDISM: ICD-10-CM

## 2023-03-09 DIAGNOSIS — T45.1X5A CHEMOTHERAPY-INDUCED PERIPHERAL NEUROPATHY (H): ICD-10-CM

## 2023-03-09 DIAGNOSIS — E78.2 MIXED DYSLIPIDEMIA: ICD-10-CM

## 2023-03-09 DIAGNOSIS — E66.01 MORBID OBESITY (H): ICD-10-CM

## 2023-03-09 DIAGNOSIS — G62.0 CHEMOTHERAPY-INDUCED PERIPHERAL NEUROPATHY (H): ICD-10-CM

## 2023-03-09 PROBLEM — G62.9 POLYNEUROPATHY: Status: RESOLVED | Noted: 2022-02-01 | Resolved: 2023-03-09

## 2023-03-09 LAB
CHOLEST SERPL-MCNC: 187 MG/DL
FASTING STATUS PATIENT QL REPORTED: YES
HDLC SERPL-MCNC: 40 MG/DL
LDLC SERPL CALC-MCNC: 112 MG/DL
NONHDLC SERPL-MCNC: 147 MG/DL
TRIGL SERPL-MCNC: 175 MG/DL
TSH SERPL DL<=0.005 MIU/L-ACNC: 2.35 MU/L (ref 0.4–4)

## 2023-03-09 PROCEDURE — 99214 OFFICE O/P EST MOD 30 MIN: CPT | Performed by: INTERNAL MEDICINE

## 2023-03-09 RX ORDER — METFORMIN HCL 500 MG
500 TABLET, EXTENDED RELEASE 24 HR ORAL
Qty: 90 TABLET | Refills: 3 | Status: SHIPPED | OUTPATIENT
Start: 2023-03-09 | End: 2023-12-06

## 2023-03-09 ASSESSMENT — ENCOUNTER SYMPTOMS: HEADACHES: 1

## 2023-03-09 ASSESSMENT — PAIN SCALES - GENERAL: PAINLEVEL: NO PAIN (0)

## 2023-03-09 NOTE — PROGRESS NOTES
"  Assessment & Plan     1.  Type 2 diabetes mellitus based on a fasting blood sugar of 143 though A1c was 6.4.  Extensively discussed diet.  Also talked about exercise and weight reduction.  He has managed to lose 3 pounds since I last saw him.  Options of treatment discussed including rechecking A1c without starting medication and see how he does with diet and exercise.  Patient was interested in starting some treatment while he works on diet and exercise.  Prescribed metformin XR 5 mg daily.  Side effects discussed.  Return for follow-up in 3 months with A1c and fasting blood sugar..  2.  Postsurgical hypothyroidism.  I will check TSH and get back to the results.  Continue levothyroxine 175 mcg daily.  3.  Morbid obesity.  Weight reduction, diet and exercise discussed today.  4.  Chemotherapy-induced peripheral neuropathy.  Symptoms are persistent but little better with duloxetine and gabapentin.  He will continue both for now.  5.  Burkitt's lymphoma previously treated with chemotherapy and now in remission.  Followed by oncology service.  6.  Mixed dyslipidemia on atorvastatin 10 mg a day.  I will have lipids checked and get back to the results.    Plan to see him back in 3 months.       BMI:   Estimated body mass index is 36.09 kg/m  as calculated from the following:    Height as of this encounter: 1.753 m (5' 9\").    Weight as of this encounter: 110.9 kg (244 lb 6.4 oz).   Weight management plan: Discussed healthy diet and exercise guidelines      Return in about 3 months (around 6/9/2023) for visit with fasting lab.    Truong Menchaca MD  [facility] [place]    Janae De Santiago is a 63 year old, presenting for the following health issues:  Diabetes, Lipids, Headache, and Thyroid Problem      Headache     History of Present Illness       Diabetes:   He presents for follow up of diabetes.  He is not checking blood glucose. He is concerned about other.  He is having numbness in feet and burning " "in feet.         Hyperlipidemia:  He presents for follow up of hyperlipidemia.  He is taking medication to lower cholesterol. He is not having myalgia or other side effects to statin medications.    Hypothyroidism:     Since last visit, patient describes the following symptoms::  None    Headaches:   Since the patient's last clinic visit, headaches are: no change  The patient is getting headaches:  Every day  He is able to do normal daily activities when he has a migraine.  The patient is taking the following rescue/relief medications:  No rescue/relief medications   Patient states \"I get total relief\" from the rescue/relief medications.   The patient is taking the following medications to prevent migraines:  No medications to prevent migraines  In the past 4 weeks, the patient has gone to an Urgent Care or Emergency Room 0 times times due to headaches.    He eats 2-3 servings of fruits and vegetables daily.He consumes 1 sweetened beverage(s) daily.He exercises with enough effort to increase his heart rate 30 to 60 minutes per day.  He exercises with enough effort to increase his heart rate 4 days per week. He is missing 1 dose(s) of medications per week.  He is not taking prescribed medications regularly due to remembering to take.     63-year-old gentleman comes in accompanied by his wife.  He had A1c performed couple days ago which was at 6.4 and a fasting blood sugar 143.  He is now concerned about diabetes.  He states he has started working on diet the past couple of weeks.  He is down by 3 pounds.  He is not exercising much.  His chemotherapy-induced neuropathy is little better with combination of duloxetine and gabapentin though still bothers him particularly at night.        Review of Systems   Neurological: Positive for headaches.      Constitutional, HEENT, cardiovascular, pulmonary, GI, , musculoskeletal, neuro, skin, endocrine and psych systems are negative, except as otherwise noted.      Objective  "   There were no vitals taken for this visit.  There is no height or weight on file to calculate BMI.  Physical Exam   GENERAL: healthy, alert and no distress  NECK: no adenopathy, no asymmetry, masses, or scars and thyroid normal to palpation  RESP: lungs clear to auscultation - no rales, rhonchi or wheezes  CV: regular rate and rhythm, normal S1 S2, no S3 or S4, no murmur, click or rub, no peripheral edema and peripheral pulses strong  ABDOMEN: soft, nontender, no hepatosplenomegaly, no masses and bowel sounds normal  MS: no gross musculoskeletal defects noted, no edema

## 2023-03-30 ENCOUNTER — ONCOLOGY VISIT (OUTPATIENT)
Dept: ONCOLOGY | Facility: CLINIC | Age: 63
End: 2023-03-30
Attending: STUDENT IN AN ORGANIZED HEALTH CARE EDUCATION/TRAINING PROGRAM
Payer: COMMERCIAL

## 2023-03-30 VITALS
BODY MASS INDEX: 35.87 KG/M2 | OXYGEN SATURATION: 96 % | SYSTOLIC BLOOD PRESSURE: 125 MMHG | DIASTOLIC BLOOD PRESSURE: 76 MMHG | WEIGHT: 242.9 LBS | TEMPERATURE: 98.9 F | HEART RATE: 69 BPM

## 2023-03-30 DIAGNOSIS — G62.0 CHEMOTHERAPY-INDUCED PERIPHERAL NEUROPATHY (H): ICD-10-CM

## 2023-03-30 DIAGNOSIS — T45.1X5A CHEMOTHERAPY-INDUCED PERIPHERAL NEUROPATHY (H): ICD-10-CM

## 2023-03-30 DIAGNOSIS — M54.50 LOW BACK PAIN, UNSPECIFIED BACK PAIN LATERALITY, UNSPECIFIED CHRONICITY, UNSPECIFIED WHETHER SCIATICA PRESENT: ICD-10-CM

## 2023-03-30 DIAGNOSIS — R53.81 PHYSICAL DECONDITIONING: ICD-10-CM

## 2023-03-30 DIAGNOSIS — C83.78 BURKITT LYMPHOMA OF LYMPH NODES OF MULTIPLE REGIONS (H): Primary | ICD-10-CM

## 2023-03-30 PROCEDURE — G0463 HOSPITAL OUTPT CLINIC VISIT: HCPCS | Performed by: STUDENT IN AN ORGANIZED HEALTH CARE EDUCATION/TRAINING PROGRAM

## 2023-03-30 PROCEDURE — 99215 OFFICE O/P EST HI 40 MIN: CPT | Performed by: STUDENT IN AN ORGANIZED HEALTH CARE EDUCATION/TRAINING PROGRAM

## 2023-03-30 ASSESSMENT — PAIN SCALES - GENERAL: PAINLEVEL: MILD PAIN (2)

## 2023-03-30 NOTE — PATIENT INSTRUCTIONS
Continue gabapentin and cymbalta at your current dosing.  Follow up with Dr. Crawford in 6-8 months.

## 2023-03-30 NOTE — PROGRESS NOTES
Patient has started to notice some erectile dysfunction since starting Cymbalta in January.  He is taking Cymbalta along with gabapentin for neuropathy.  The dose of Cymbalta recently increased to 60 mg.  He is able to get partial erection.  His desire is good.  I gave him the option of considering Viagra like medication.  The other option is for him to stop Cymbalta for over a short time and see if it makes a difference.  He is going to give that a try.

## 2023-03-30 NOTE — PROGRESS NOTES
[facility]   PM&R clinic note        Interval history:     Kobe Pedroza presents to clinic today for follow up reg his rehab needs.   He has h/o Burkitt lymphoma.  Was last seen in clinic on 11/23/22.  Recommendations included:  1.           Therapy/equipment/braces:  2. Continue regular home exercise regimen as previously instructed by PT.  2.           Medications:  1.    Continue gabapentin at 600/600.  3. Increase cymbalta dose from 40 mg total daily to 60 mg total daily.  4. Flexeril 5 mg TID prn was prescribed for muscle cramps.  3.           Interventions:  1.    Will continue to monitor for any relief from recent ADIEL.  5. Follow up: 4 months.    Oncology History:  -Patient initially presented with acute on chronic back pain in 3/2020.  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  CSF was negative.  -Patient was treated with steroids followed by R-CODOX-M/IVAC and IT chemoprophylaxis between 3/20/2020 and 6/20/2020.  -His course was complicated by neutropenic sepsis that resolved and a right lower extremity DVT which was diagnosed on 4/30/2020.  Patient has been on therapeutic anticoagulation due to this.  -PET/CT after cycle 2 and again after completing treatment confirmed PET negative complete response.  -Patient became infected with COVID-19, and recovered after monoclonal antibody treatment in 2/2021.  -Patient called and spoke to Natacha Agarwal regarding his neuropathy which has significantly worsened.  He feels that worsening has occurred over the last 2 to 3 weeks.  He states he cannot move his toes at times.  He had tried some gabapentin which he had.  1 11/17/21- Saw Dr. Wright for follow up. Neuropathy still difficult. Back pain has been better. Thinking of getting back to work in next 1-2 months. Lymphoma clinically remains in remission. Labs good.  Continue to monitor. Encouraged to find a new PCP.  - Had EMG done on 12/6/21 with Dr. 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 Findings of active denervation and chronic reinnervation changes in right gastrocnemius muscle, could be findings of S1/S2 radiculopathy, lumbosacral plexopathy or sciatic neuropathy, but similar changes were not appreciated in other muscles with shared innervation. So, gastrocnemius changes are of uncertain localization or clinical significance. Tibial neuropathy cannot be excluded.  - Has established care with IM, Dr. Menchaca. HbA1c of 5.8. Mixed dyslipidemia and recently started atorvastatin 10 mg daily and LDL dropped.  2/23/22- Follow up with Dr. Wright. Lymphoma remains in remission. Plan to return to clinic in 3 months, and if still in remission, will space out visits to every 6 months.   5/18/22- Had follow up with Dr. Wright. Continues to struggle with neuropathy. Stopped PT by that time.  11/2/22- Saw Dr. Wright for follow up. No active ID issues. Lymphoma clinically remains in remission. Continue to monitor.  11/14/22- Had lumbar interlaminar epidural steroid injection with Dr. Torres.        Symptoms,  Patient has been doing ok, and tingling and numbness in his feet still comes and goes. He still has cold intolerance at night in his feet, and wears socks which helps.  He continues to take gabapentin, and takes 600 mg am/600 mg bedtime. He also takes cymbalta 20 mg in morning, 40 mg at bedtime.   He denies any drowsiness or loopiness during the day. He continues to have difficulty falling asleep, and states that he just doesn't get tired at tight.   He complains of erectile dysfunction and is wondering if Cymbalta is affecting this function. It has been going on since mid January.   He denies any balance difficulties or falls.      Therapies/HEP,  Continuing with a home exercise regimen.       Functionally,   No  changes.      Social history is unchanged.      Medications:  Current Outpatient Medications   Medication Sig Dispense Refill     atorvastatin (LIPITOR) 10 MG tablet TAKE 1 TABLET(10 MG) BY MOUTH DAILY 90 tablet 1     cyclobenzaprine (FLEXERIL) 5 MG tablet Take 1 tablet (5 mg) by mouth 3 times daily as needed for muscle spasms 90 tablet 1     DULoxetine (CYMBALTA) 20 MG capsule One capsule in morning and two capsules in the evening by month. 270 capsule 2     gabapentin (NEURONTIN) 300 MG capsule Take 2 capsules (600 mg) by mouth 2 times daily 120 capsule 3     levothyroxine (SYNTHROID/LEVOTHROID) 175 MCG tablet Take 1 tablet (175 mcg) by mouth daily 90 tablet 3     metFORMIN (GLUCOPHAGE XR) 500 MG 24 hr tablet Take 1 tablet (500 mg) by mouth daily (with dinner) 90 tablet 3              Physical Exam:   /76 (BP Location: Right arm, Patient Position: Sitting, Cuff Size: Adult Regular)   Pulse 69   Temp 98.9  F (37.2  C) (Oral)   Wt 110.2 kg (242 lb 14.4 oz)   SpO2 96%   BMI 35.87 kg/m    Gen: NAD, pleasant and cooperative  HEENT: Normocephalic, atraumatic, extra-ocular movements appear intact  Pulm: non-labored breathing in room air  Ext: no edema in BLE, no tenderness in calves  Neuro/MSK:   Orientation: Oriented to person, place, time, situation. Exhibits good insight into his condition and ongoing management/symptoms.  Motor: 5/5 with bilateral shoulder abduction, elbow extension, wrist extension and  strength/finger intrinsics. 4+/5 with bilateral hip flexion, 5/5 with right knee extension, ankle dorsiflexion, EHL, plantar flexion.  5/5 with left knee extension, 4+/5 with left ankle dorsiflexion, 5/5 with EHL and plantar flexion on the left.  Gait: Gait is non antalgic with good step and stride length.  Labs/Imaging:  Lab Results   Component Value Date    WBC 7.9 11/02/2022    HGB 14.8 11/02/2022    HCT 43.0 11/02/2022    MCV 89 11/02/2022     11/02/2022     Lab Results   Component Value  Date     11/02/2022    POTASSIUM 3.9 11/02/2022    CHLORIDE 102 11/02/2022    CO2 25 11/02/2022     (H) 03/07/2023     Lab Results   Component Value Date    GFRESTIMATED >90 11/02/2022    GFRESTBLACK >90 05/03/2021     Lab Results   Component Value Date    AST 27 11/02/2022    ALT 36 11/02/2022    ALKPHOS 103 11/02/2022    BILITOTAL 0.4 11/02/2022     Lab Results   Component Value Date    INR 1.04 06/15/2020     Lab Results   Component Value Date    BUN 17.5 11/02/2022    CR 0.93 11/02/2022              Assessment/Plan   Kobe Pedroza presents to clinic today for follow up reg his rehab needs.   He has h/o Burkitt lymphoma. Was last seen in clinic on 11/23/22. Please see below recommendations from today's visit. Patient is stable with his neuropathic symptoms and mobility. Will plan a 6-8 month return visit. Patient is in agreement with this plan.      1.           Therapy/equipment/braces:  1. Continue regular home exercise regimen as previously instructed by PT.  2.           Medications:  1.    Continue gabapentin at 600/600.  2. Continue cymbalta at 60 mg total daily.  6. Follow up:  1. Patient with erectile dysfunction symptoms and would like to have this further evaluated. Will reach out to his PCP- Dr. Menchaca to help with further work up/management.  7. Follow up: 6-8 months.      Yuridia Crawford MD  Physical Medicine & Rehabilitation      50 minutes spent on the date of the encounter doing chart review, history and exam, documentation and further activities as noted above.

## 2023-03-30 NOTE — LETTER
3/30/2023         RE: Kobe Pedroza  [address]  [place] [address]        Dear Colleague,    Thank you for referring your patient, Kobe Pedroza, to the [facility]. Please see a copy of my visit note below.    [facility]   PM&R clinic note        Interval history:     Kobe Pedroza presents to clinic today for follow up reg his rehab needs.   He has h/o Burkitt lymphoma.  Was last seen in clinic on 11/23/22.  Recommendations included:  1.           Therapy/equipment/braces:  2. Continue regular home exercise regimen as previously instructed by PT.  2.           Medications:  1.    Continue gabapentin at 600/600.  3. Increase cymbalta dose from 40 mg total daily to 60 mg total daily.  4. Flexeril 5 mg TID prn was prescribed for muscle cramps.  3.           Interventions:  1.    Will continue to monitor for any relief from recent ADIEL.  5. Follow up: 4 months.    Oncology History:  -Patient initially presented with acute on chronic back pain in 3/2020.  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  CSF was negative.  -Patient was treated with steroids followed by R-CODOX-M/IVAC and IT chemoprophylaxis between 3/20/2020 and 6/20/2020.  -His course was complicated by neutropenic sepsis that resolved and a right lower extremity DVT which was diagnosed on 4/30/2020.  Patient has been on therapeutic anticoagulation due to this.  -PET/CT after cycle 2 and again after completing treatment confirmed PET negative complete response.  -Patient became infected with COVID-19, and recovered after monoclonal antibody treatment in 2/2021.  -Patient called and spoke to Natacha Agarwal regarding his neuropathy which has significantly worsened.  He feels that worsening has occurred over the last 2 to 3 weeks.  He states he cannot move his toes at  times.  He had tried some gabapentin which he had.  1 11/17/21- Saw Dr. Wright for follow up. Neuropathy still difficult. Back pain has been better. Thinking of getting back to work in next 1-2 months. Lymphoma clinically remains in remission. Labs good. Continue to monitor. Encouraged to find a new PCP.  - Had EMG done on 12/6/21 with Dr. 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 Findings of active denervation and chronic reinnervation changes in right gastrocnemius muscle, could be findings of S1/S2 radiculopathy, lumbosacral plexopathy or sciatic neuropathy, but similar changes were not appreciated in other muscles with shared innervation. So, gastrocnemius changes are of uncertain localization or clinical significance. Tibial neuropathy cannot be excluded.  - Has established care with IM, Dr. Menchaca. HbA1c of 5.8. Mixed dyslipidemia and recently started atorvastatin 10 mg daily and LDL dropped.  2/23/22- Follow up with Dr. Wright. Lymphoma remains in remission. Plan to return to clinic in 3 months, and if still in remission, will space out visits to every 6 months.   5/18/22- Had follow up with Dr. Wright. Continues to struggle with neuropathy. Stopped PT by that time.  11/2/22- Saw Dr. Wright for follow up. No active ID issues. Lymphoma clinically remains in remission. Continue to monitor.  11/14/22- Had lumbar interlaminar epidural steroid injection with Dr. Torres.        Symptoms,  Patient has been doing ok, and tingling and numbness in his feet still comes and goes. He still has cold intolerance at night in his feet, and wears socks which helps.  He continues to take gabapentin, and takes 600 mg am/600 mg bedtime. He also takes cymbalta 20 mg in morning, 40 mg at bedtime.   He denies any drowsiness or loopiness during the day. He continues to have difficulty falling asleep, and states that he just doesn't get tired at tight.   He complains of erectile  dysfunction and is wondering if Cymbalta is affecting this function. It has been going on since mid January.   He denies any balance difficulties or falls.      Therapies/HEP,  Continuing with a home exercise regimen.       Functionally,   No changes.      Social history is unchanged.      Medications:  Current Outpatient Medications   Medication Sig Dispense Refill     atorvastatin (LIPITOR) 10 MG tablet TAKE 1 TABLET(10 MG) BY MOUTH DAILY 90 tablet 1     cyclobenzaprine (FLEXERIL) 5 MG tablet Take 1 tablet (5 mg) by mouth 3 times daily as needed for muscle spasms 90 tablet 1     DULoxetine (CYMBALTA) 20 MG capsule One capsule in morning and two capsules in the evening by month. 270 capsule 2     gabapentin (NEURONTIN) 300 MG capsule Take 2 capsules (600 mg) by mouth 2 times daily 120 capsule 3     levothyroxine (SYNTHROID/LEVOTHROID) 175 MCG tablet Take 1 tablet (175 mcg) by mouth daily 90 tablet 3     metFORMIN (GLUCOPHAGE XR) 500 MG 24 hr tablet Take 1 tablet (500 mg) by mouth daily (with dinner) 90 tablet 3              Physical Exam:   /76 (BP Location: Right arm, Patient Position: Sitting, Cuff Size: Adult Regular)   Pulse 69   Temp 98.9  F (37.2  C) (Oral)   Wt 110.2 kg (242 lb 14.4 oz)   SpO2 96%   BMI 35.87 kg/m    Gen: NAD, pleasant and cooperative  HEENT: Normocephalic, atraumatic, extra-ocular movements appear intact  Pulm: non-labored breathing in room air  Ext: no edema in BLE, no tenderness in calves  Neuro/MSK:   Orientation: Oriented to person, place, time, situation. Exhibits good insight into his condition and ongoing management/symptoms.  Motor: 5/5 with bilateral shoulder abduction, elbow extension, wrist extension and  strength/finger intrinsics. 4+/5 with bilateral hip flexion, 5/5 with right knee extension, ankle dorsiflexion, EHL, plantar flexion.  5/5 with left knee extension, 4+/5 with left ankle dorsiflexion, 5/5 with EHL and plantar flexion on the left.  Gait: Gait is non  antalgic with good step and stride length.  Labs/Imaging:  Lab Results   Component Value Date    WBC 7.9 11/02/2022    HGB 14.8 11/02/2022    HCT 43.0 11/02/2022    MCV 89 11/02/2022     11/02/2022     Lab Results   Component Value Date     11/02/2022    POTASSIUM 3.9 11/02/2022    CHLORIDE 102 11/02/2022    CO2 25 11/02/2022     (H) 03/07/2023     Lab Results   Component Value Date    GFRESTIMATED >90 11/02/2022    GFRESTBLACK >90 05/03/2021     Lab Results   Component Value Date    AST 27 11/02/2022    ALT 36 11/02/2022    ALKPHOS 103 11/02/2022    BILITOTAL 0.4 11/02/2022     Lab Results   Component Value Date    INR 1.04 06/15/2020     Lab Results   Component Value Date    BUN 17.5 11/02/2022    CR 0.93 11/02/2022              Assessment/Plan   Kobe Pedroza presents to clinic today for follow up reg his rehab needs.   He has h/o Burkitt lymphoma. Was last seen in clinic on 11/23/22. Please see below recommendations from today's visit. Patient is stable with his neuropathic symptoms and mobility. Will plan a 6-8 month return visit. Patient is in agreement with this plan.      1.           Therapy/equipment/braces:  1. Continue regular home exercise regimen as previously instructed by PT.  2.           Medications:  1.    Continue gabapentin at 600/600.  2. Continue cymbalta at 60 mg total daily.  6. Follow up:  1. Patient with erectile dysfunction symptoms and would like to have this further evaluated. Will reach out to his PCP- Dr. Menchaca to help with further work up/management.  7. Follow up: 6-8 months.      Yuridia Crawford MD  Physical Medicine & Rehabilitation      50 minutes spent on the date of the encounter doing chart review, history and exam, documentation and further activities as noted above.

## 2023-03-30 NOTE — NURSING NOTE
"Oncology Rooming Note    March 30, 2023 2:22 PM   Kobe Pedroza is a 63 year old male who presents for:    Chief Complaint   Patient presents with     Oncology Clinic Visit     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      Initial Vitals: There were no vitals taken for this visit. Estimated body mass index is 36.09 kg/m  as calculated from the following:    Height as of 3/9/23: 1.753 m (5' 9\").    Weight as of 3/9/23: 110.9 kg (244 lb 6.4 oz). There is no height or weight on file to calculate BSA.  Data Unavailable Comment: Data Unavailable   No LMP for male patient.  Allergies reviewed: Yes  Medications reviewed: Yes    Medications: Medication refills not needed today.  Pharmacy [facility] entered into [id number] [place] [place] [place] [facility] [address]    Clinical concerns: may need refill for gabapentin.    Pt has pain in toes, hurts when provoked via sitting or laying for a good amount of time.    Rakesh Reddy"

## 2023-05-09 NOTE — PROGRESS NOTES
Infusion Nursing Note:  Kobe Pedroza presents today for neulasta injection.    Patient seen by provider today: No   present during visit today: Not Applicable.    Note: Patient given neulasta injection subcutaneous in upper right abdomen.    Treatment Conditions:  Not Applicable.    Post Infusion Assessment:  Patient tolerated injection without incident.     Discharge Plan:   Patient declined prescription refills.  Discharge instructions reviewed with: Patient.  Copy of AVS reviewed with patient and/or family.  Patient will have phone appointment on 4/1/20 for next appointment.    Kimmie Lyles RN                         Spoke to pt and scheduled appt.

## 2023-05-10 ENCOUNTER — LAB (OUTPATIENT)
Dept: LAB | Facility: CLINIC | Age: 63
End: 2023-05-10
Payer: COMMERCIAL

## 2023-05-10 DIAGNOSIS — C83.78 BURKITT LYMPHOMA OF LYMPH NODES OF MULTIPLE REGIONS (H): ICD-10-CM

## 2023-05-10 LAB
ALBUMIN SERPL BCG-MCNC: 4.6 G/DL (ref 3.5–5.2)
ALP SERPL-CCNC: 104 U/L (ref 40–129)
ALT SERPL W P-5'-P-CCNC: 33 U/L (ref 10–50)
ANION GAP SERPL CALCULATED.3IONS-SCNC: 11 MMOL/L (ref 7–15)
AST SERPL W P-5'-P-CCNC: 28 U/L (ref 10–50)
BASOPHILS # BLD AUTO: 0 10E3/UL (ref 0–0.2)
BASOPHILS NFR BLD AUTO: 1 %
BILIRUB SERPL-MCNC: 0.5 MG/DL
BUN SERPL-MCNC: 22.5 MG/DL (ref 8–23)
CALCIUM SERPL-MCNC: 8.7 MG/DL (ref 8.8–10.2)
CHLORIDE SERPL-SCNC: 104 MMOL/L (ref 98–107)
CREAT SERPL-MCNC: 0.99 MG/DL (ref 0.67–1.17)
DEPRECATED HCO3 PLAS-SCNC: 25 MMOL/L (ref 22–29)
EOSINOPHIL # BLD AUTO: 0.2 10E3/UL (ref 0–0.7)
EOSINOPHIL NFR BLD AUTO: 3 %
ERYTHROCYTE [DISTWIDTH] IN BLOOD BY AUTOMATED COUNT: 12.5 % (ref 10–15)
GFR SERPL CREATININE-BSD FRML MDRD: 87 ML/MIN/1.73M2
GLUCOSE SERPL-MCNC: 128 MG/DL (ref 70–99)
HCT VFR BLD AUTO: 45 % (ref 40–53)
HGB BLD-MCNC: 15.3 G/DL (ref 13.3–17.7)
IMM GRANULOCYTES # BLD: 0 10E3/UL
IMM GRANULOCYTES NFR BLD: 0 %
LDH SERPL L TO P-CCNC: 205 U/L (ref 0–250)
LYMPHOCYTES # BLD AUTO: 1.2 10E3/UL (ref 0.8–5.3)
LYMPHOCYTES NFR BLD AUTO: 19 %
MCH RBC QN AUTO: 30.4 PG (ref 26.5–33)
MCHC RBC AUTO-ENTMCNC: 34 G/DL (ref 31.5–36.5)
MCV RBC AUTO: 89 FL (ref 78–100)
MONOCYTES # BLD AUTO: 0.6 10E3/UL (ref 0–1.3)
MONOCYTES NFR BLD AUTO: 10 %
NEUTROPHILS # BLD AUTO: 4.2 10E3/UL (ref 1.6–8.3)
NEUTROPHILS NFR BLD AUTO: 68 %
PLATELET # BLD AUTO: 196 10E3/UL (ref 150–450)
POTASSIUM SERPL-SCNC: 4.2 MMOL/L (ref 3.4–5.3)
PROT SERPL-MCNC: 7.1 G/DL (ref 6.4–8.3)
RBC # BLD AUTO: 5.04 10E6/UL (ref 4.4–5.9)
SODIUM SERPL-SCNC: 140 MMOL/L (ref 136–145)
WBC # BLD AUTO: 6.2 10E3/UL (ref 4–11)

## 2023-05-10 PROCEDURE — 80053 COMPREHEN METABOLIC PANEL: CPT

## 2023-05-10 PROCEDURE — 83615 LACTATE (LD) (LDH) ENZYME: CPT

## 2023-05-10 PROCEDURE — 85025 COMPLETE CBC W/AUTO DIFF WBC: CPT

## 2023-05-10 PROCEDURE — 36415 COLL VENOUS BLD VENIPUNCTURE: CPT

## 2023-05-11 ENCOUNTER — TELEPHONE (OUTPATIENT)
Dept: FAMILY MEDICINE | Facility: CLINIC | Age: 63
End: 2023-05-11
Payer: COMMERCIAL

## 2023-05-11 DIAGNOSIS — E89.0 POSTSURGICAL HYPOTHYROIDISM: ICD-10-CM

## 2023-05-11 RX ORDER — LEVOTHYROXINE SODIUM 175 UG/1
175 TABLET ORAL DAILY
Qty: 90 TABLET | Refills: 0 | Status: SHIPPED | OUTPATIENT
Start: 2023-05-11 | End: 2023-08-03

## 2023-05-11 NOTE — TELEPHONE ENCOUNTER
Reason for Call:  Medication or medication refill:    Do you use a [facility]?  Name of the pharmacy and phone number for the current request:  [facility]     Name of the medication requested: levothyroxine (SYNTHROID/LEVOTHROID) 175 MCG tablet    Other request:    Can we leave a detailed message on this number? YES    Phone number patient can be reached at: Cell number on file:    Telephone Information:   Mobile [phone]       Call taken on 5/11/2023 at 8:42 AM by Evelyn Lake

## 2023-05-11 NOTE — TELEPHONE ENCOUNTER
Routing request below to refill pool to review.     CHACORTA Varela  [facility]

## 2023-05-11 NOTE — TELEPHONE ENCOUNTER
Called Pt regarding note encounter Pt wanted to send message to provider regarding Glucose Pt stated that it has been elavated and Pt can;t seem to get it lower then 112. Pt just wanted message sent even though Pt has Apt regarding that.     Evelyn MCKAY Visit Facilitator

## 2023-05-22 NOTE — PROGRESS NOTES
Virtual Visit Details    Type of service:  Video Visit     Originating Location (pt. Location): Home    Distant Location (provider location):  On-site  Platform used for Video Visit: Mary         REASON FOR VISIT:  Management of Burkitt lymphoma    HISTORY OF PRESENT ILLNESS:  Jonnathan Pedroza is a 63 year old with a history of Burkitt lymphoma.  To summarize his course, he presented with acute on chronic back pain in 03/2020.  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  CSF was negative.  He was was treated with steroids followed by R-CODOX-M/IVAC and IT chemo prophylaxis ending in 06/2020.  His course was complicated by neutropenic sepsis that resolved and right lower extremity below the knee DVT for which he completed a course of anticoagulation.  PET/CT after cycle 2 (1st R-IVAC) and again after completing treatment confirmed PET-negative complete response.  Visit for ongoing management of Burkitt lymphoma.    He is not with his wife Mckayla today.  Neuropathy in his feet is still an issue and he is working with Dr. Crawford and other providers for this.  No fevers, drenching night sweats, or unintentional weight loss.  No dyspnea, cough, or chest pain.  No new lumps or bumps.  No other specific concerns today.    MEDICATIONS:  Current Outpatient Medications   Medication     atorvastatin (LIPITOR) 10 MG tablet     cyclobenzaprine (FLEXERIL) 5 MG tablet     DULoxetine (CYMBALTA) 20 MG capsule     gabapentin (NEURONTIN) 300 MG capsule     levothyroxine (SYNTHROID/LEVOTHROID) 175 MCG tablet     metFORMIN (GLUCOPHAGE XR) 500 MG 24 hr tablet     No current facility-administered medications for this visit.     PHYSICAL EXAMINATION:  There were no vitals taken for this visit.  Wt Readings from Last 5 Encounters:   03/30/23 110.2 kg (242 lb 14.4 oz)   03/09/23 110.9 kg (244 lb 6.4 oz)   11/23/22 112.3 kg (247 lb 8 oz)    11/14/22 112.5 kg (248 lb)   11/04/22 112.5 kg (248 lb)     General: Well appearing.  HEENT: Sclerae anicteric.  Lungs: Breathing comfortably. No cough.  MSK: Grossly normal movement.  Neuro: Grossly non-focal.  Skin/access: Normal skin tone.  Psych: Alert and oriented. No distress.  Performance status: ECOG 0    LABRATORY DATA:  Reviewed by me  Recent Labs   Lab Test 05/10/23  0842 11/02/22  1313 05/18/22  0733 08/04/21  0748 05/03/21  1712 02/11/21  1127 01/11/21  0920   WBC 6.2 7.9 6.4   < > 7.3 1.8* 5.9   HGB 15.3 14.8 14.8   < > 14.4 14.9 12.4*    220 211   < > 235 161 195   ANEU  --   --   --   --  5.3 0.6* 4.5   ANEUTAUTO 4.2 5.2 4.3   < >  --   --   --    ALYM  --   --   --   --  1.0 0.5* 0.5*   ALYMPAUTO 1.2 1.7 1.2   < >  --   --   --     < > = values in this interval not displayed.     Recent Labs   Lab Test 05/10/23  0842 11/02/22  1313 05/18/22  0733 05/03/21  1712 02/11/21  1127 06/18/20  0000 06/15/20  1335 06/01/20  0000 05/26/20  0355 05/25/20  0400 03/17/20  1214 03/17/20  0556    138 141   < > 139   < > 139   < > 138 140   < > 139   POTASSIUM 4.2 3.9 3.7   < > 3.8   < > 3.5   < > 4.0 3.7   < > 4.0   CHLORIDE 104 102 110*   < > 105   < > 105   < > 112* 112*   < > 107   CO2 25 25 23   < > 30   < > 30   < > 23 24   < > 26   BUN 22.5 17.5 22   < > 12   < > 13   < > 16 15   < > 25   CR 0.99 0.93 0.85   < > 0.96   < > 0.88   < > 0.72 0.70   < > 0.71   RUTH 8.7* 8.9 8.2*   < > 8.5   < > 8.1*   < > 7.3* 7.4*   < > 7.2*   MAG  --   --   --   --  2.5*  --  2.3  --   --  2.3   < > 2.1   PHOS  --   --   --   --   --   --  3.6  --  2.3* 2.1*   < > 4.1   URIC  --   --   --   --   --   --  4.8  --  3.2* 3.1*   < > 3.7    203 223   < >  --    < > 304*  --  226 220   < >  --    [id number]  --   --   --   --   --   --   --   --   --   --   --  2.0    < > = values in this interval not displayed.     Recent Labs   Lab Test 05/10/23  0842 11/02/22  1313 05/18/22  0733 06/18/20  0000 06/15/20  1335  06/01/20  0000 05/26/20  0355 05/25/20  0400   AST 28 27 24   < > 51*   < >  --  18   ALT 33 36 48   < > 85*   < >  --  51   ALKPHOS 104 103 98   < > 73   < >  --  78   BILITOTAL 0.5 0.4 0.5   < > 0.4   < >  --  0.3   INR  --   --   --   --  1.04  --  1.03 1.11    < > = values in this interval not displayed.     IMPRESSION AND PLAN:  Mr. Pedroza is a 63 year old with a history of Burkitt lymphoma.    Burkitt lymphoma clinically remains in remission.  Labs look great.  There is nothing to suggest recurrence.  We will continue to monitor with active surviellance.    He has no active ID issues.  He is up to date with pneumococcal and Shingrix vaccines.  We reviewed the current COVID-19 vaccine guidelines.        He completed apixaban for 3 months for lymphoma-related DVT.  He will continue to work with PM&R (Dr. Crawford), physical therapy, and neurology as needed for neuropathy and back pain.  He will continue to work with PCP Dr. Truong Menchaca for his health maintenance and other medical issues including glucose control.    We will arrange another visit in about 6 months with labs.  We reminded him to contact if questions, concerns, or new issues arise between visits.    Video start time: 12:59 PM  Video end time: 1:19 PM  Video duration: 20 minutes    Total of 30 minutes on patient visit, reviewing records, interpreting test results, placing orders, and documentation on the day of service.    Ever Wright MD, PhD  Attending Physician, [facility]   of Medicine, [facility]  Division of Hematology, Oncology, and Transplantation  [phone] [facility]

## 2023-05-23 ENCOUNTER — VIRTUAL VISIT (OUTPATIENT)
Dept: ONCOLOGY | Facility: CLINIC | Age: 63
End: 2023-05-23
Attending: INTERNAL MEDICINE
Payer: COMMERCIAL

## 2023-05-23 DIAGNOSIS — G62.0 CHEMOTHERAPY-INDUCED PERIPHERAL NEUROPATHY (H): ICD-10-CM

## 2023-05-23 DIAGNOSIS — T45.1X5A CHEMOTHERAPY-INDUCED PERIPHERAL NEUROPATHY (H): ICD-10-CM

## 2023-05-23 DIAGNOSIS — C83.78 BURKITT LYMPHOMA OF LYMPH NODES OF MULTIPLE REGIONS (H): Primary | ICD-10-CM

## 2023-05-23 PROCEDURE — 99214 OFFICE O/P EST MOD 30 MIN: CPT | Mod: VID | Performed by: INTERNAL MEDICINE

## 2023-05-23 NOTE — LETTER
5/23/2023         RE: Kobe Pedroza  [address]  [place] [address]        Dear Colleague,    Thank you for referring your patient, Kobe Pedroza, to the [facility]. Please see a copy of my visit note below.    Virtual Visit Details    Type of service:  Video Visit     Originating Location (pt. Location): Home    Distant Location (provider location):  On-site  Platform used for Video Visit: DianaWell         REASON FOR VISIT:  Management of Burkitt lymphoma    HISTORY OF PRESENT ILLNESS:  Jonnathan Pedroza is a 63 year old with a history of Burkitt lymphoma.  To summarize his course, he presented with acute on chronic back pain in 03/2020.  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  CSF was negative.  He was was treated with steroids followed by R-CODOX-M/IVAC and IT chemo prophylaxis ending in 06/2020.  His course was complicated by neutropenic sepsis that resolved and right lower extremity below the knee DVT for which he completed a course of anticoagulation.  PET/CT after cycle 2 (1st R-IVAC) and again after completing treatment confirmed PET-negative complete response.  Visit for ongoing management of Burkitt lymphoma.    He is not with his wife Mckayla today.  Neuropathy in his feet is still an issue and he is working with Dr. Crawford and other providers for this.  No fevers, drenching night sweats, or unintentional weight loss.  No dyspnea, cough, or chest pain.  No new lumps or bumps.  No other specific concerns today.    MEDICATIONS:  Current Outpatient Medications   Medication    atorvastatin (LIPITOR) 10 MG tablet    cyclobenzaprine (FLEXERIL) 5 MG tablet    DULoxetine (CYMBALTA) 20 MG capsule    gabapentin (NEURONTIN) 300 MG capsule    levothyroxine (SYNTHROID/LEVOTHROID) 175 MCG tablet    metFORMIN (GLUCOPHAGE XR) 500 MG 24 hr tablet     No current  facility-administered medications for this visit.     PHYSICAL EXAMINATION:  There were no vitals taken for this visit.  Wt Readings from Last 5 Encounters:   03/30/23 110.2 kg (242 lb 14.4 oz)   03/09/23 110.9 kg (244 lb 6.4 oz)   11/23/22 112.3 kg (247 lb 8 oz)   11/14/22 112.5 kg (248 lb)   11/04/22 112.5 kg (248 lb)     General: Well appearing.  HEENT: Sclerae anicteric.  Lungs: Breathing comfortably. No cough.  MSK: Grossly normal movement.  Neuro: Grossly non-focal.  Skin/access: Normal skin tone.  Psych: Alert and oriented. No distress.  Performance status: ECOG 0    LABRATORY DATA:  Reviewed by me  Recent Labs   Lab Test 05/10/23  0842 11/02/22  1313 05/18/22  0733 08/04/21  0748 05/03/21  1712 02/11/21  1127 01/11/21  0920   WBC 6.2 7.9 6.4   < > 7.3 1.8* 5.9   HGB 15.3 14.8 14.8   < > 14.4 14.9 12.4*    220 211   < > 235 161 195   ANEU  --   --   --   --  5.3 0.6* 4.5   ANEUTAUTO 4.2 5.2 4.3   < >  --   --   --    ALYM  --   --   --   --  1.0 0.5* 0.5*   ALYMPAUTO 1.2 1.7 1.2   < >  --   --   --     < > = values in this interval not displayed.     Recent Labs   Lab Test 05/10/23  0842 11/02/22  1313 05/18/22  0733 05/03/21  1712 02/11/21  1127 06/18/20  0000 06/15/20  1335 06/01/20  0000 05/26/20  0355 05/25/20  0400 03/17/20  1214 03/17/20  0556    138 141   < > 139   < > 139   < > 138 140   < > 139   POTASSIUM 4.2 3.9 3.7   < > 3.8   < > 3.5   < > 4.0 3.7   < > 4.0   CHLORIDE 104 102 110*   < > 105   < > 105   < > 112* 112*   < > 107   CO2 25 25 23   < > 30   < > 30   < > 23 24   < > 26   BUN 22.5 17.5 22   < > 12   < > 13   < > 16 15   < > 25   CR 0.99 0.93 0.85   < > 0.96   < > 0.88   < > 0.72 0.70   < > 0.71   RUTH 8.7* 8.9 8.2*   < > 8.5   < > 8.1*   < > 7.3* 7.4*   < > 7.2*   MAG  --   --   --   --  2.5*  --  2.3  --   --  2.3   < > 2.1   PHOS  --   --   --   --   --   --  3.6  --  2.3* 2.1*   < > 4.1   URIC  --   --   --   --   --   --  4.8  --  3.2* 3.1*   < > 3.7    203 223   <  >  --    < > 304*  --  226 220   < >  --    [id number]  --   --   --   --   --   --   --   --   --   --   --  2.0    < > = values in this interval not displayed.     Recent Labs   Lab Test 05/10/23  0842 11/02/22  1313 05/18/22  0733 06/18/20  0000 06/15/20  1335 06/01/20  0000 05/26/20  0355 05/25/20  0400   AST 28 27 24   < > 51*   < >  --  18   ALT 33 36 48   < > 85*   < >  --  51   ALKPHOS 104 103 98   < > 73   < >  --  78   BILITOTAL 0.5 0.4 0.5   < > 0.4   < >  --  0.3   INR  --   --   --   --  1.04  --  1.03 1.11    < > = values in this interval not displayed.     IMPRESSION AND PLAN:  Mr. Pedroza is a 63 year old with a history of Burkitt lymphoma.    Burkitt lymphoma clinically remains in remission.  Labs look great.  There is nothing to suggest recurrence.  We will continue to monitor with active surviellance.    He has no active ID issues.  He is up to date with pneumococcal and Shingrix vaccines.  We reviewed the current COVID-19 vaccine guidelines.        He completed apixaban for 3 months for lymphoma-related DVT.  He will continue to work with PM&R (Dr. Crawford), physical therapy, and neurology as needed for neuropathy and back pain.  He will continue to work with PCP Dr. Truong Menchaca for his health maintenance and other medical issues including glucose control.    We will arrange another visit in about 6 months with labs.  We reminded him to contact if questions, concerns, or new issues arise between visits.    Video start time: 12:59 PM  Video end time: 1:19 PM  Video duration: 20 minutes    Total of 30 minutes on patient visit, reviewing records, interpreting test results, placing orders, and documentation on the day of service.    Ever Wright MD, PhD  Attending Physician, [facility]   of Medicine, [facility]  Division of Hematology, Oncology, and Transplantation  [phone] [facility]

## 2023-05-23 NOTE — NURSING NOTE
Is the patient currently in the state of [place]? YES    Visit mode:VIDEO    If the visit is dropped, the patient can be reconnected by: VIDEO VISIT: Text to cell phone: [phone]    Will anyone else be joining the visit? YES: How would they like to receive their invitation?       How would you like to obtain your AVS? MyChart    Are changes needed to the allergy or medication list? NO    Patient denies any changes since echeck-in regarding medication and allergies and states all information entered during echeck-in remains accurate.    Reason for visit: RECHECK    Anderson SOLIS

## 2023-06-06 ENCOUNTER — LAB (OUTPATIENT)
Dept: LAB | Facility: CLINIC | Age: 63
End: 2023-06-06
Payer: COMMERCIAL

## 2023-06-06 DIAGNOSIS — E11.9 TYPE 2 DIABETES MELLITUS WITHOUT COMPLICATION, WITHOUT LONG-TERM CURRENT USE OF INSULIN (H): ICD-10-CM

## 2023-06-06 LAB
FASTING STATUS PATIENT QL REPORTED: YES
GLUCOSE SERPL-MCNC: 109 MG/DL (ref 70–99)
HBA1C MFR BLD: 6.1 % (ref 0–5.6)

## 2023-06-06 PROCEDURE — 36415 COLL VENOUS BLD VENIPUNCTURE: CPT

## 2023-06-06 PROCEDURE — 82947 ASSAY GLUCOSE BLOOD QUANT: CPT

## 2023-06-06 PROCEDURE — 83036 HEMOGLOBIN GLYCOSYLATED A1C: CPT

## 2023-06-08 ENCOUNTER — OFFICE VISIT (OUTPATIENT)
Dept: FAMILY MEDICINE | Facility: CLINIC | Age: 63
End: 2023-06-08
Payer: COMMERCIAL

## 2023-06-08 VITALS
DIASTOLIC BLOOD PRESSURE: 87 MMHG | TEMPERATURE: 98.2 F | SYSTOLIC BLOOD PRESSURE: 138 MMHG | HEIGHT: 70 IN | WEIGHT: 236.8 LBS | HEART RATE: 65 BPM | OXYGEN SATURATION: 95 % | BODY MASS INDEX: 33.9 KG/M2 | RESPIRATION RATE: 15 BRPM

## 2023-06-08 DIAGNOSIS — E11.9 TYPE 2 DIABETES MELLITUS WITHOUT COMPLICATION, WITHOUT LONG-TERM CURRENT USE OF INSULIN (H): Primary | ICD-10-CM

## 2023-06-08 DIAGNOSIS — T45.1X5A CHEMOTHERAPY-INDUCED PERIPHERAL NEUROPATHY (H): ICD-10-CM

## 2023-06-08 DIAGNOSIS — E89.0 POSTSURGICAL HYPOTHYROIDISM: ICD-10-CM

## 2023-06-08 DIAGNOSIS — G62.0 CHEMOTHERAPY-INDUCED PERIPHERAL NEUROPATHY (H): ICD-10-CM

## 2023-06-08 DIAGNOSIS — E78.2 MIXED DYSLIPIDEMIA: ICD-10-CM

## 2023-06-08 PROCEDURE — 99214 OFFICE O/P EST MOD 30 MIN: CPT | Performed by: INTERNAL MEDICINE

## 2023-06-08 ASSESSMENT — PAIN SCALES - GENERAL: PAINLEVEL: MILD PAIN (3)

## 2023-06-08 NOTE — PROGRESS NOTES
Assessment & Plan     1.  Type 2 diabetes mellitus without complication.  A1c has now dropped to 6.1 and fasting blood sugar 109.  Previous A1c was 6.4.  Advised to continue metformin 500 mg a day.  Referral placed for diabetic educator.  Follow-up in 6 months.  2.  Postsurgical hypothyroidism well-controlled with levothyroxine 75 mcg a day.  We will recheck in 6 months.  3.  Mixed dyslipidemia been treated with atorvastatin 10 mg a day.  I will recheck it in 6 months.  4.  Chemotherapy-induced peripheral neuropathy with symptoms remaining unchanged.  He is currently on duloxetine and gabapentin.  He is requesting that I take over those prescriptions because he is not planning to continue follow-up with a neurologist.    Truong Menchaca MD  [facility]    Janae De Santiago is a 63 year old, presenting for the following health issues:  No chief complaint on file.         View : No data to display.              History of Present Illness       Diabetes:   He presents for follow up of diabetes.  He is checking home blood glucose a few times a week. He checks blood glucose at bedtime.  Blood glucose is sometimes over 200 and never under 70. When his blood glucose is low, the patient is asymptomatic for confusion, blurred vision, lethargy and reports not feeling dizzy, shaky, or weak.  He is concerned about other.  He is having burning in feet and redness, sores, or blisters on feet. The patient has not had a diabetic eye exam in the last 12 months.         He eats 0-1 servings of fruits and vegetables daily.He consumes 0 sweetened beverage(s) daily.He exercises with enough effort to increase his heart rate 10 to 19 minutes per day.  He exercises with enough effort to increase his heart rate 3 or less days per week.   He is taking medications regularly.    Patient with history of Burkitt's lymphoma treated with chemotherapy with good response and chemotherapy-induced peripheral neuropathy has  come in for follow-up on diabetes.  3 months ago he was started on metformin 500 mg a day and is tolerated it well.  Overall he is doing well.  Has a hard time losing weight.    Review of Systems   Constitutional, HEENT, cardiovascular, pulmonary, GI, , musculoskeletal, neuro, skin, endocrine and psych systems are negative, except as otherwise noted.      Objective    There were no vitals taken for this visit.  There is no height or weight on file to calculate BMI.  Physical Exam   GENERAL: healthy, alert and no distress  NECK: no adenopathy, no asymmetry, masses, or scars and thyroid normal to palpation  RESP: lungs clear to auscultation - no rales, rhonchi or wheezes  CV: regular rate and rhythm, normal S1 S2, no S3 or S4, no murmur, click or rub, no peripheral edema and peripheral pulses strong  ABDOMEN: soft, nontender, no hepatosplenomegaly, no masses and bowel sounds normal  MS: no gross musculoskeletal defects noted, no edema

## 2023-07-06 ENCOUNTER — ALLIED HEALTH/NURSE VISIT (OUTPATIENT)
Dept: EDUCATION SERVICES | Facility: CLINIC | Age: 63
End: 2023-07-06
Payer: COMMERCIAL

## 2023-07-06 DIAGNOSIS — E11.9 TYPE 2 DIABETES MELLITUS WITHOUT COMPLICATION, WITHOUT LONG-TERM CURRENT USE OF INSULIN (H): ICD-10-CM

## 2023-07-06 PROCEDURE — G0108 DIAB MANAGE TRN  PER INDIV: HCPCS

## 2023-07-06 NOTE — PROGRESS NOTES
DIABETES SELF MANAGEMENT EDUCATION: Previous Diagnosis/Individual Diabetes Review    Kobe Pedroza presents today for education related to Type 2 diabetes.  He is accompanied by spouse, Mckayla    Past Diabetes Education: No  Support system: spouse/significant other  Living Situation: lives with his wife.  Employment: disabled. Cancer diagnosis in 2020  (Tumor on his spine)    DIABETES RELATED CONCERNS/COMMENTS: general Type-2 Diabetes Mellitus with specific nutrional guidelines.    Patient's emotional response to diabetes: expresses readiness to learn    ASSESSMENT:  Patient Problem List and Medication List reviewed for relevant medical history, current medical status, and diabetes risk factors.    MEDICATION:    Current Diabetes Management per Patient:  Taking diabetes medications? Yes  Metformin 500 mg every evening with supper.  Denies missing doses and tolerating medication without difficulty.    MONITORING:  Patient glucose self monitoring as follows: admits he has not been testing his blood sugars for about a month. Fasting blood sugars were running between 110-120 mg/dl.    Patient's most recent   Lab Results   Component Value Date    A1C 6.1 06/06/2023    A1C 5.5 03/14/2020      Patient's A1C goal: <7.0    PHYSICAL ACTIVITY:  no regular exercise program. Has trouble with neuropathy in his feet most likely due to the chemo.    NUTRITION:  Patient skips breakfast regularly. No structured meal plan.  Beverages:   Cultural/Hindu diet restrictions: No   Biggest Challenge to Healthy Eating: knowing what to eat    Diabetes knowledge and skills assessment:   Barriers to Learning Assessment: No Barriers identified    Problem Solving:    Patient is at risk of hypoglycemia?: NO  Hospitalizations for hyper or hypoglycemia: No    Healthy Coping and Stress Management:   Sources of stress identified by patient My health    EDUCATION and INSTRUCTION PROVIDED AT THIS VISIT:    -Basic pathophysiology of T2DM,  relationship between carbohydrate intake, release of glucose from the liver, exercise and weight management on glucose control.   -Target blood glucose for pre-meal and 2 hour post-prandial glucose   -Action, timing and potential side-effects of Metformin diabetes medications:   -Basic meal planning using the plate method and carb counting, emphasizing portion control, healthy food choices and eliminating or minimizing sugared beverages.   -Need for consistent physical activity, 30-45 minutes duration, preferably 4-6 days per week.      Pt verbalized understanding of concepts discussed and recommendations provided today.       PLAN:  Test blood sugars fasting first thing in the morning 2 to 3 times per week with target blood sugars between  mg/dl  He will work on carb counting 30-45 grams at each meal.  Try to get 20-30 min of exercise per day and try not to miss more than 2 days in a row without exercise.    See patient instructions/AVS    FOLLOW-UP:    PRN. He will work on healthy eating and exercise. He can always MyChart message me or set up another appointment if further questions or concerns.    Time Spent: 60 minutes  Encounter Type: Individual    Any diabetes medication dose changes were made via the CDE Protocol and Collaborative Practice Agreement with [place] [facility].  A copy of this encounter was provided to patient's referring provider.    Xin Webster RN, [facility]

## 2023-08-03 DIAGNOSIS — C83.78 BURKITT LYMPHOMA OF LYMPH NODES OF MULTIPLE REGIONS (H): ICD-10-CM

## 2023-08-03 DIAGNOSIS — E89.0 POSTSURGICAL HYPOTHYROIDISM: ICD-10-CM

## 2023-08-03 DIAGNOSIS — R53.81 PHYSICAL DECONDITIONING: ICD-10-CM

## 2023-08-03 DIAGNOSIS — G62.0 CHEMOTHERAPY-INDUCED PERIPHERAL NEUROPATHY (H): ICD-10-CM

## 2023-08-03 DIAGNOSIS — M54.50 LOW BACK PAIN, UNSPECIFIED BACK PAIN LATERALITY, UNSPECIFIED CHRONICITY, UNSPECIFIED WHETHER SCIATICA PRESENT: ICD-10-CM

## 2023-08-03 DIAGNOSIS — T45.1X5A CHEMOTHERAPY-INDUCED PERIPHERAL NEUROPATHY (H): ICD-10-CM

## 2023-08-03 RX ORDER — LEVOTHYROXINE SODIUM 175 UG/1
175 TABLET ORAL DAILY
Qty: 90 TABLET | Refills: 0 | Status: SHIPPED | OUTPATIENT
Start: 2023-08-03 | End: 2023-11-01

## 2023-08-03 NOTE — TELEPHONE ENCOUNTER
Augmentin BID + Diflucan 200mg PO daily x 7 days Gabapentin Refill   Last prescribing provider:  Dr Crawford     Last clinic visit date: 3/30/23  Dr Crawford     Recommendations for requested medication (if none, N/A): Copied from chart note   3/30/23  Dr Crawford        2. Medications:  1. Continue gabapentin at 600/600.    He continues to take gabapentin, and takes 600 mg am/600 mg bedtime. He also takes cymbalta 20 mg in morning, 40 mg at bedtime.     1. Continue gabapentin at 600/600.      Any other pertinent information (if none, N/A): N/A    Refilled: Y/N, if NO, why?

## 2023-08-04 RX ORDER — GABAPENTIN 300 MG/1
600 CAPSULE ORAL 2 TIMES DAILY
Qty: 120 CAPSULE | Refills: 3 | Status: SHIPPED | OUTPATIENT
Start: 2023-08-04 | End: 2023-12-01

## 2023-08-09 ENCOUNTER — PATIENT OUTREACH (OUTPATIENT)
Dept: CARE COORDINATION | Facility: CLINIC | Age: 63
End: 2023-08-09
Payer: COMMERCIAL

## 2023-08-15 ENCOUNTER — TRANSFERRED RECORDS (OUTPATIENT)
Dept: HEALTH INFORMATION MANAGEMENT | Facility: CLINIC | Age: 63
End: 2023-08-15
Payer: COMMERCIAL

## 2023-08-15 LAB — RETINOPATHY: NORMAL

## 2023-08-23 ENCOUNTER — PATIENT OUTREACH (OUTPATIENT)
Dept: CARE COORDINATION | Facility: CLINIC | Age: 63
End: 2023-08-23
Payer: COMMERCIAL

## 2023-09-24 ENCOUNTER — HEALTH MAINTENANCE LETTER (OUTPATIENT)
Age: 63
End: 2023-09-24

## 2023-10-02 DIAGNOSIS — E78.2 MIXED DYSLIPIDEMIA: ICD-10-CM

## 2023-10-02 RX ORDER — ATORVASTATIN CALCIUM 10 MG/1
TABLET, FILM COATED ORAL
Qty: 90 TABLET | Refills: 1 | Status: SHIPPED | OUTPATIENT
Start: 2023-10-02 | End: 2023-12-06

## 2023-11-01 DIAGNOSIS — E89.0 POSTSURGICAL HYPOTHYROIDISM: ICD-10-CM

## 2023-11-01 RX ORDER — LEVOTHYROXINE SODIUM 175 UG/1
175 TABLET ORAL DAILY
Qty: 90 TABLET | Refills: 0 | Status: SHIPPED | OUTPATIENT
Start: 2023-11-01 | End: 2023-12-06

## 2023-11-27 NOTE — PROGRESS NOTES
"REASON FOR VISIT:  Management of Burkitt lymphoma    HISTORY OF PRESENT ILLNESS:  Jonnathan Pedroza is a 63 year old with a history of Burkitt lymphoma.  To summarize his course, he presented with acute on chronic back pain in 03/2020.  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  CSF was negative.  He was was treated with steroids followed by R-CODOX-M/IVAC and IT chemo prophylaxis ending in 06/2020.  His course was complicated by neutropenic sepsis that resolved and right lower extremity below the knee DVT for which he completed a course of anticoagulation.  PET/CT after cycle 2 (1st R-IVAC) and again after completing treatment confirmed PET-negative complete response.  Visit for ongoing management of Burkitt lymphoma.    He is not with his wife Mckayla today.  Neuropathy in his feet is and intermittent back pain are still an issue.  Back pain worse recently.  Still able to be active, bowling, working around the house - may be causing more back pain?  No fevers, new night sweats, or unintentional weight loss.  No dyspnea, cough, or chest pain.  No new lumps or bumps.  No other specific concerns today.  Building a [employer] in [facility].    MEDICATIONS:  Current Outpatient Medications   Medication    atorvastatin (LIPITOR) 10 MG tablet    DULoxetine (CYMBALTA) 20 MG capsule    gabapentin (NEURONTIN) 300 MG capsule    levothyroxine (SYNTHROID/LEVOTHROID) 175 MCG tablet    metFORMIN (GLUCOPHAGE XR) 500 MG 24 hr tablet    cyclobenzaprine (FLEXERIL) 5 MG tablet     No current facility-administered medications for this visit.     PHYSICAL EXAMINATION:  BP (!) 151/93 (BP Location: Right arm, Patient Position: Sitting, Cuff Size: Adult Large)   Pulse 73   Temp 98.3  F (36.8  C) (Oral)   Resp 16   Ht 1.778 m (5' 10\")   Wt 110.8 kg (244 lb 3.2 oz)   SpO2 96%   BMI 35.04 kg/m    Wt Readings from Last 5 Encounters: "   11/29/23 110.8 kg (244 lb 3.2 oz)   06/08/23 107.4 kg (236 lb 12.8 oz)   03/30/23 110.2 kg (242 lb 14.4 oz)   03/09/23 110.9 kg (244 lb 6.4 oz)   11/23/22 112.3 kg (247 lb 8 oz)     General: Well appearing. No distress.  HEENT: Sclerae anicteric.  Lungs: Clear bilaterally without wheezing or crackles.  Heart: Regular rate and rhythm.  Gastrointestinal: Bowel sounds present, no tenderness to palpation, spleen tip not palpable.  Extremities: No lower extremity edema.  Lymph:  No cervical, clavicular, axillary, epitrochlear, or inguinal lymphadenopathy.  Performance status: ECOG 1    LABRATORY DATA:  Reviewed by me  Recent Labs   Lab Test 11/29/23  1326 05/10/23  0842 11/02/22  1313 08/04/21  0748 05/03/21  1712 02/11/21  1127 01/11/21  0920   WBC 7.2 6.2 7.9   < > 7.3 1.8* 5.9   HGB 15.2 15.3 14.8   < > 14.4 14.9 12.4*    196 220   < > 235 161 195   ANEU  --   --   --   --  5.3 0.6* 4.5   ANEUTAUTO 4.7 4.2 5.2   < >  --   --   --    ALYM  --   --   --   --  1.0 0.5* 0.5*   ALYMPAUTO 1.8 1.2 1.7   < >  --   --   --     < > = values in this interval not displayed.     Recent Labs   Lab Test 11/29/23  1326 05/10/23  0842 11/02/22  1313 05/03/21  1712 02/11/21  1127 06/18/20  0000 06/15/20  1335 06/01/20  0000 05/26/20  0355 05/25/20  0400 03/17/20  1214 03/17/20  0556    140 138   < > 139   < > 139   < > 138 140   < > 139   POTASSIUM 4.3 4.2 3.9   < > 3.8   < > 3.5   < > 4.0 3.7   < > 4.0   CHLORIDE 102 104 102   < > 105   < > 105   < > 112* 112*   < > 107   CO2 24 25 25   < > 30   < > 30   < > 23 24   < > 26   BUN 23.0 22.5 17.5   < > 12   < > 13   < > 16 15   < > 25   CR 0.95 0.99 0.93   < > 0.96   < > 0.88   < > 0.72 0.70   < > 0.71   RUTH 9.1 8.7* 8.9   < > 8.5   < > 8.1*   < > 7.3* 7.4*   < > 7.2*   MAG  --   --   --   --  2.5*  --  2.3  --   --  2.3   < > 2.1   PHOS  --   --   --   --   --   --  3.6  --  2.3* 2.1*   < > 4.1   URIC  --   --   --   --   --   --  4.8  --  3.2* 3.1*   < > 3.7     205 203   < >  --    < > 304*  --  226 220   < >  --    [id number]  --   --   --   --   --   --   --   --   --   --   --  2.0    < > = values in this interval not displayed.     Recent Labs   Lab Test 11/29/23  1326 05/10/23  0842 11/02/22  1313 06/18/20  0000 06/15/20  1335 06/01/20  0000 05/26/20  0355 05/25/20  0400   AST 39 28 27   < > 51*   < >  --  18   ALT 45 33 36   < > 85*   < >  --  51   ALKPHOS 94 104 103   < > 73   < >  --  78   BILITOTAL 0.6 0.5 0.4   < > 0.4   < >  --  0.3   INR  --   --   --   --  1.04  --  1.03 1.11    < > = values in this interval not displayed.     IMPRESSION AND PLAN:  Mr. Pedroza is a 63 year old with a history of Burkitt lymphoma.    Burkitt lymphoma clinically remains in remission.  Labs look great.  He continues to deal with long term effects of lymphoma and treatment, but there is nothing to suggest recurrence.  We will continue to monitor.    He has no active ID issues.  He is up to date with pneumococcal and Shingrix vaccines.  We reviewed the current COVID-19 vaccine guidelines.  I recommended a flu shot, RSV vaccine, and updated COVID-19 vaccine if he has not had them.      He completed apixaban for 3 months for lymphoma-related DVT.  We will place a referral for Palliative Care to see if they can help with ongoing neuropathy and back pain.  He did not find our Cancer Rehab Med Team very helpful and is not interested in PT - although I encouraged him to consider it.  He will discuss with Palliative Care.  He will continue to work with PCP Dr. Truong Menchaca for his health maintenance and other medical issues.    We will arrange another visit in about 6 months with labs.  I reminded him to contact if questions, concerns, or new issues arise between visits.    Total of 35 minutes on patient visit, reviewing records, interpreting test results, placing orders, and documentation on the day of service.    Ever Wright MD, PhD  Attending Physician, [facility]  Care  [phone] clinic

## 2023-11-29 ENCOUNTER — APPOINTMENT (OUTPATIENT)
Dept: LAB | Facility: CLINIC | Age: 63
End: 2023-11-29
Attending: INTERNAL MEDICINE
Payer: COMMERCIAL

## 2023-11-29 ENCOUNTER — ONCOLOGY VISIT (OUTPATIENT)
Dept: ONCOLOGY | Facility: CLINIC | Age: 63
End: 2023-11-29
Attending: INTERNAL MEDICINE
Payer: COMMERCIAL

## 2023-11-29 VITALS
WEIGHT: 244.2 LBS | SYSTOLIC BLOOD PRESSURE: 151 MMHG | HEART RATE: 73 BPM | OXYGEN SATURATION: 96 % | RESPIRATION RATE: 16 BRPM | DIASTOLIC BLOOD PRESSURE: 93 MMHG | TEMPERATURE: 98.3 F | BODY MASS INDEX: 34.96 KG/M2 | HEIGHT: 70 IN

## 2023-11-29 DIAGNOSIS — C83.78 BURKITT LYMPHOMA OF LYMPH NODES OF MULTIPLE REGIONS (H): Primary | ICD-10-CM

## 2023-11-29 DIAGNOSIS — M54.41 CHRONIC BILATERAL LOW BACK PAIN WITH BILATERAL SCIATICA: ICD-10-CM

## 2023-11-29 DIAGNOSIS — G89.29 CHRONIC BILATERAL LOW BACK PAIN WITH BILATERAL SCIATICA: ICD-10-CM

## 2023-11-29 DIAGNOSIS — M54.42 CHRONIC BILATERAL LOW BACK PAIN WITH BILATERAL SCIATICA: ICD-10-CM

## 2023-11-29 LAB
ALBUMIN SERPL BCG-MCNC: 4.6 G/DL (ref 3.5–5.2)
ALP SERPL-CCNC: 94 U/L (ref 40–150)
ALT SERPL W P-5'-P-CCNC: 45 U/L (ref 0–70)
ANION GAP SERPL CALCULATED.3IONS-SCNC: 11 MMOL/L (ref 7–15)
AST SERPL W P-5'-P-CCNC: 39 U/L (ref 0–45)
BASOPHILS # BLD AUTO: 0.1 10E3/UL (ref 0–0.2)
BASOPHILS NFR BLD AUTO: 1 %
BILIRUB SERPL-MCNC: 0.6 MG/DL
BUN SERPL-MCNC: 23 MG/DL (ref 8–23)
CALCIUM SERPL-MCNC: 9.1 MG/DL (ref 8.8–10.2)
CHLORIDE SERPL-SCNC: 102 MMOL/L (ref 98–107)
CREAT SERPL-MCNC: 0.95 MG/DL (ref 0.67–1.17)
DEPRECATED HCO3 PLAS-SCNC: 24 MMOL/L (ref 22–29)
EGFRCR SERPLBLD CKD-EPI 2021: >90 ML/MIN/1.73M2
EOSINOPHIL # BLD AUTO: 0.1 10E3/UL (ref 0–0.7)
EOSINOPHIL NFR BLD AUTO: 2 %
ERYTHROCYTE [DISTWIDTH] IN BLOOD BY AUTOMATED COUNT: 12.6 % (ref 10–15)
GLUCOSE SERPL-MCNC: 98 MG/DL (ref 70–99)
HCT VFR BLD AUTO: 44.2 % (ref 40–53)
HGB BLD-MCNC: 15.2 G/DL (ref 13.3–17.7)
IMM GRANULOCYTES # BLD: 0 10E3/UL
IMM GRANULOCYTES NFR BLD: 0 %
LDH SERPL L TO P-CCNC: 239 U/L (ref 0–250)
LYMPHOCYTES # BLD AUTO: 1.8 10E3/UL (ref 0.8–5.3)
LYMPHOCYTES NFR BLD AUTO: 24 %
MCH RBC QN AUTO: 30.2 PG (ref 26.5–33)
MCHC RBC AUTO-ENTMCNC: 34.4 G/DL (ref 31.5–36.5)
MCV RBC AUTO: 88 FL (ref 78–100)
MONOCYTES # BLD AUTO: 0.5 10E3/UL (ref 0–1.3)
MONOCYTES NFR BLD AUTO: 7 %
NEUTROPHILS # BLD AUTO: 4.7 10E3/UL (ref 1.6–8.3)
NEUTROPHILS NFR BLD AUTO: 66 %
NRBC # BLD AUTO: 0 10E3/UL
NRBC BLD AUTO-RTO: 0 /100
PLATELET # BLD AUTO: 241 10E3/UL (ref 150–450)
POTASSIUM SERPL-SCNC: 4.3 MMOL/L (ref 3.4–5.3)
PROT SERPL-MCNC: 7.5 G/DL (ref 6.4–8.3)
RBC # BLD AUTO: 5.03 10E6/UL (ref 4.4–5.9)
SODIUM SERPL-SCNC: 137 MMOL/L (ref 135–145)
WBC # BLD AUTO: 7.2 10E3/UL (ref 4–11)

## 2023-11-29 PROCEDURE — 83615 LACTATE (LD) (LDH) ENZYME: CPT | Performed by: INTERNAL MEDICINE

## 2023-11-29 PROCEDURE — 80053 COMPREHEN METABOLIC PANEL: CPT | Performed by: INTERNAL MEDICINE

## 2023-11-29 PROCEDURE — 99214 OFFICE O/P EST MOD 30 MIN: CPT | Performed by: INTERNAL MEDICINE

## 2023-11-29 PROCEDURE — 36415 COLL VENOUS BLD VENIPUNCTURE: CPT | Performed by: INTERNAL MEDICINE

## 2023-11-29 PROCEDURE — G0463 HOSPITAL OUTPT CLINIC VISIT: HCPCS | Performed by: INTERNAL MEDICINE

## 2023-11-29 PROCEDURE — 85025 COMPLETE CBC W/AUTO DIFF WBC: CPT | Performed by: INTERNAL MEDICINE

## 2023-11-29 ASSESSMENT — PAIN SCALES - GENERAL: PAINLEVEL: NO PAIN (0)

## 2023-11-29 NOTE — NURSING NOTE
Chief Complaint   Patient presents with    Blood Draw     Labs drawn with  by rn.  VS taken.     Labs drawn with  by rn.  Pt tolerated well.  VS taken.  Pt checked in for next appt.  Daya Gamble RN

## 2023-11-29 NOTE — NURSING NOTE
"Oncology Rooming Note    November 29, 2023 1:41 PM   Kobe Pedroza is a 63 year old male who presents for:    Chief Complaint   Patient presents with    Blood Draw     Labs drawn with  by rn.  VS taken.    Oncology Clinic Visit     [facility] RETURN - BURKITT LYMPHOMA      Initial Vitals: BP (!) 151/93 (BP Location: Right arm, Patient Position: Sitting, Cuff Size: Adult Large)   Pulse 73   Temp 98.3  F (36.8  C) (Oral)   Resp 16   Ht 1.778 m (5' 10\")   Wt 110.8 kg (244 lb 3.2 oz)   SpO2 96%   BMI 35.04 kg/m   Estimated body mass index is 35.04 kg/m  as calculated from the following:    Height as of this encounter: 1.778 m (5' 10\").    Weight as of this encounter: 110.8 kg (244 lb 3.2 oz). Body surface area is 2.34 meters squared.  No Pain (0) Comment: Data Unavailable   No LMP for male patient.  Allergies reviewed: Yes  Medications reviewed: Yes    Medications: Medication refills not needed today.  Pharmacy name entered into [employer]: [id number] - [place], [place] - [address]    Charan Bond LPN              "

## 2023-11-29 NOTE — LETTER
11/29/2023         RE: Kobe Pedroza  [address]  [place] [address]        Dear Colleague,    Thank you for referring your patient, Kobe Pedroza, to the [facility]. Please see a copy of my visit note below.    REASON FOR VISIT:  Management of Burkitt lymphoma    HISTORY OF PRESENT ILLNESS:  Jonnathan Pedroza is a 63 year old with a history of Burkitt lymphoma.  To summarize his course, he presented with acute on chronic back pain in 03/2020.  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  CSF was negative.  He was was treated with steroids followed by R-CODOX-M/IVAC and IT chemo prophylaxis ending in 06/2020.  His course was complicated by neutropenic sepsis that resolved and right lower extremity below the knee DVT for which he completed a course of anticoagulation.  PET/CT after cycle 2 (1st R-IVAC) and again after completing treatment confirmed PET-negative complete response.  Visit for ongoing management of Burkitt lymphoma.    He is not with his wife Mckayla today.  Neuropathy in his feet is and intermittent back pain are still an issue.  Back pain worse recently.  Still able to be active, bowling, working around the house - may be causing more back pain?  No fevers, new night sweats, or unintentional weight loss.  No dyspnea, cough, or chest pain.  No new lumps or bumps.  No other specific concerns today.  Building a [facility] in [facility].    MEDICATIONS:  Current Outpatient Medications   Medication    atorvastatin (LIPITOR) 10 MG tablet    DULoxetine (CYMBALTA) 20 MG capsule    gabapentin (NEURONTIN) 300 MG capsule    levothyroxine (SYNTHROID/LEVOTHROID) 175 MCG tablet    metFORMIN (GLUCOPHAGE XR) 500 MG 24 hr tablet    cyclobenzaprine (FLEXERIL) 5 MG tablet     No current facility-administered medications for this visit.     PHYSICAL EXAMINATION:  BP (!)  "151/93 (BP Location: Right arm, Patient Position: Sitting, Cuff Size: Adult Large)   Pulse 73   Temp 98.3  F (36.8  C) (Oral)   Resp 16   Ht 1.778 m (5' 10\")   Wt 110.8 kg (244 lb 3.2 oz)   SpO2 96%   BMI 35.04 kg/m    Wt Readings from Last 5 Encounters:   11/29/23 110.8 kg (244 lb 3.2 oz)   06/08/23 107.4 kg (236 lb 12.8 oz)   03/30/23 110.2 kg (242 lb 14.4 oz)   03/09/23 110.9 kg (244 lb 6.4 oz)   11/23/22 112.3 kg (247 lb 8 oz)     General: Well appearing. No distress.  HEENT: Sclerae anicteric.  Lungs: Clear bilaterally without wheezing or crackles.  Heart: Regular rate and rhythm.  Gastrointestinal: Bowel sounds present, no tenderness to palpation, spleen tip not palpable.  Extremities: No lower extremity edema.  Lymph:  No cervical, clavicular, axillary, epitrochlear, or inguinal lymphadenopathy.  Performance status: ECOG 1    LABRATORY DATA:  Reviewed by me  Recent Labs   Lab Test 11/29/23  1326 05/10/23  0842 11/02/22  1313 08/04/21  0748 05/03/21  1712 02/11/21  1127 01/11/21  0920   WBC 7.2 6.2 7.9   < > 7.3 1.8* 5.9   HGB 15.2 15.3 14.8   < > 14.4 14.9 12.4*    196 220   < > 235 161 195   ANEU  --   --   --   --  5.3 0.6* 4.5   ANEUTAUTO 4.7 4.2 5.2   < >  --   --   --    ALYM  --   --   --   --  1.0 0.5* 0.5*   ALYMPAUTO 1.8 1.2 1.7   < >  --   --   --     < > = values in this interval not displayed.     Recent Labs   Lab Test 11/29/23  1326 05/10/23  0842 11/02/22  1313 05/03/21  1712 02/11/21  1127 06/18/20  0000 06/15/20  1335 06/01/20  0000 05/26/20  0355 05/25/20  0400 03/17/20  1214 03/17/20  0556    140 138   < > 139   < > 139   < > 138 140   < > 139   POTASSIUM 4.3 4.2 3.9   < > 3.8   < > 3.5   < > 4.0 3.7   < > 4.0   CHLORIDE 102 104 102   < > 105   < > 105   < > 112* 112*   < > 107   CO2 24 25 25   < > 30   < > 30   < > 23 24   < > 26   BUN 23.0 22.5 17.5   < > 12   < > 13   < > 16 15   < > 25   CR 0.95 0.99 0.93   < > 0.96   < > 0.88   < > 0.72 0.70   < > 0.71   RUTH 9.1 " 8.7* 8.9   < > 8.5   < > 8.1*   < > 7.3* 7.4*   < > 7.2*   MAG  --   --   --   --  2.5*  --  2.3  --   --  2.3   < > 2.1   PHOS  --   --   --   --   --   --  3.6  --  2.3* 2.1*   < > 4.1   URIC  --   --   --   --   --   --  4.8  --  3.2* 3.1*   < > 3.7    205 203   < >  --    < > 304*  --  226 220   < >  --    [id number]  --   --   --   --   --   --   --   --   --   --   --  2.0    < > = values in this interval not displayed.     Recent Labs   Lab Test 11/29/23  1326 05/10/23  0842 11/02/22  1313 06/18/20  0000 06/15/20  1335 06/01/20  0000 05/26/20  0355 05/25/20  0400   AST 39 28 27   < > 51*   < >  --  18   ALT 45 33 36   < > 85*   < >  --  51   ALKPHOS 94 104 103   < > 73   < >  --  78   BILITOTAL 0.6 0.5 0.4   < > 0.4   < >  --  0.3   INR  --   --   --   --  1.04  --  1.03 1.11    < > = values in this interval not displayed.     IMPRESSION AND PLAN:  Mr. Pedroza is a 63 year old with a history of Burkitt lymphoma.    Burkitt lymphoma clinically remains in remission.  Labs look great.  He continues to deal with long term effects of lymphoma and treatment, but there is nothing to suggest recurrence.  We will continue to monitor.    He has no active ID issues.  He is up to date with pneumococcal and Shingrix vaccines.  We reviewed the current COVID-19 vaccine guidelines.  I recommended a flu shot, RSV vaccine, and updated COVID-19 vaccine if he has not had them.      He completed apixaban for 3 months for lymphoma-related DVT.  We will place a referral for Palliative Care to see if they can help with ongoing neuropathy and back pain.  He did not find our Cancer Rehab Med Team very helpful and is not interested in PT - although I encouraged him to consider it.  He will discuss with Palliative Care.  He will continue to work with PCP Dr. Truong Menchaca for his health maintenance and other medical issues.    We will arrange another visit in about 6 months with labs.  I reminded him to contact if questions,  concerns, or new issues arise between visits.    Total of 35 minutes on patient visit, reviewing records, interpreting test results, placing orders, and documentation on the day of service.    Ever Wright MD, PhD  Attending Physician, [facility]  [phone] [facility]

## 2023-12-01 ENCOUNTER — PATIENT OUTREACH (OUTPATIENT)
Dept: ONCOLOGY | Facility: CLINIC | Age: 63
End: 2023-12-01
Payer: COMMERCIAL

## 2023-12-01 DIAGNOSIS — G62.0 CHEMOTHERAPY-INDUCED PERIPHERAL NEUROPATHY (H): ICD-10-CM

## 2023-12-01 DIAGNOSIS — R53.81 PHYSICAL DECONDITIONING: ICD-10-CM

## 2023-12-01 DIAGNOSIS — C83.78 BURKITT LYMPHOMA OF LYMPH NODES OF MULTIPLE REGIONS (H): ICD-10-CM

## 2023-12-01 DIAGNOSIS — M54.50 LOW BACK PAIN, UNSPECIFIED BACK PAIN LATERALITY, UNSPECIFIED CHRONICITY, UNSPECIFIED WHETHER SCIATICA PRESENT: ICD-10-CM

## 2023-12-01 DIAGNOSIS — T45.1X5A CHEMOTHERAPY-INDUCED PERIPHERAL NEUROPATHY (H): ICD-10-CM

## 2023-12-01 RX ORDER — GABAPENTIN 300 MG/1
600 CAPSULE ORAL 2 TIMES DAILY
Qty: 120 CAPSULE | Refills: 2 | Status: SHIPPED | OUTPATIENT
Start: 2023-12-01 | End: 2023-12-06

## 2023-12-01 NOTE — PROGRESS NOTES
[facility]: Cancer Care                                                                                          Completed chart audit to assign Oncology Care Coordination enrollment status.  My chart message sent to pt to introduce self and role as RNCC.  Contact information for writer provided to pt.    BOLIVAR Baltazar, RN  RN Care Coordinator  [facility]

## 2023-12-01 NOTE — TELEPHONE ENCOUNTER
"Gabapentin 300mg capsule  Last prescribing provider: Dr. Yuridia Crawford    Last clinic visit date: 3/30/23    Recommendations for requested medication (if none, N/A): 3/30/23, \" Continue gabapentin at 600/600.\"    Any other pertinent information (if none, N/A): fax request    Refilled: Y/N, if NO, why?    "

## 2023-12-03 ENCOUNTER — HEALTH MAINTENANCE LETTER (OUTPATIENT)
Age: 63
End: 2023-12-03

## 2023-12-04 ENCOUNTER — LAB (OUTPATIENT)
Dept: LAB | Facility: CLINIC | Age: 63
End: 2023-12-04
Payer: COMMERCIAL

## 2023-12-04 DIAGNOSIS — E78.2 MIXED DYSLIPIDEMIA: ICD-10-CM

## 2023-12-04 DIAGNOSIS — E89.0 POSTSURGICAL HYPOTHYROIDISM: ICD-10-CM

## 2023-12-04 DIAGNOSIS — E11.9 TYPE 2 DIABETES MELLITUS WITHOUT COMPLICATION, WITHOUT LONG-TERM CURRENT USE OF INSULIN (H): ICD-10-CM

## 2023-12-04 LAB
ANION GAP SERPL CALCULATED.3IONS-SCNC: 12 MMOL/L (ref 7–15)
BUN SERPL-MCNC: 15.3 MG/DL (ref 8–23)
CALCIUM SERPL-MCNC: 8.4 MG/DL (ref 8.8–10.2)
CHLORIDE SERPL-SCNC: 104 MMOL/L (ref 98–107)
CHOLEST SERPL-MCNC: 194 MG/DL
CREAT SERPL-MCNC: 0.95 MG/DL (ref 0.67–1.17)
CREAT UR-MCNC: 178 MG/DL
DEPRECATED HCO3 PLAS-SCNC: 24 MMOL/L (ref 22–29)
EGFRCR SERPLBLD CKD-EPI 2021: >90 ML/MIN/1.73M2
GLUCOSE SERPL-MCNC: 129 MG/DL (ref 70–99)
HBA1C MFR BLD: 6.3 % (ref 0–5.6)
HDLC SERPL-MCNC: 37 MG/DL
LDLC SERPL CALC-MCNC: 123 MG/DL
MICROALBUMIN UR-MCNC: 18.6 MG/L
MICROALBUMIN/CREAT UR: 10.45 MG/G CR (ref 0–17)
NONHDLC SERPL-MCNC: 157 MG/DL
POTASSIUM SERPL-SCNC: 4.1 MMOL/L (ref 3.4–5.3)
SODIUM SERPL-SCNC: 140 MMOL/L (ref 135–145)
T4 FREE SERPL-MCNC: 2.01 NG/DL (ref 0.9–1.7)
TRIGL SERPL-MCNC: 168 MG/DL
TSH SERPL DL<=0.005 MIU/L-ACNC: 0.24 UIU/ML (ref 0.3–4.2)

## 2023-12-04 PROCEDURE — 84443 ASSAY THYROID STIM HORMONE: CPT

## 2023-12-04 PROCEDURE — 36415 COLL VENOUS BLD VENIPUNCTURE: CPT

## 2023-12-04 PROCEDURE — 82570 ASSAY OF URINE CREATININE: CPT

## 2023-12-04 PROCEDURE — 80061 LIPID PANEL: CPT

## 2023-12-04 PROCEDURE — 80048 BASIC METABOLIC PNL TOTAL CA: CPT

## 2023-12-04 PROCEDURE — 82043 UR ALBUMIN QUANTITATIVE: CPT

## 2023-12-04 PROCEDURE — 84439 ASSAY OF FREE THYROXINE: CPT

## 2023-12-04 PROCEDURE — 83036 HEMOGLOBIN GLYCOSYLATED A1C: CPT

## 2023-12-06 ENCOUNTER — OFFICE VISIT (OUTPATIENT)
Dept: FAMILY MEDICINE | Facility: CLINIC | Age: 63
End: 2023-12-06
Payer: COMMERCIAL

## 2023-12-06 VITALS
DIASTOLIC BLOOD PRESSURE: 70 MMHG | OXYGEN SATURATION: 97 % | SYSTOLIC BLOOD PRESSURE: 142 MMHG | WEIGHT: 242.7 LBS | HEIGHT: 70 IN | RESPIRATION RATE: 15 BRPM | HEART RATE: 72 BPM | TEMPERATURE: 96.6 F | BODY MASS INDEX: 34.75 KG/M2

## 2023-12-06 DIAGNOSIS — Z23 ENCOUNTER FOR IMMUNIZATION: ICD-10-CM

## 2023-12-06 DIAGNOSIS — Z00.00 MEDICARE ANNUAL WELLNESS VISIT, SUBSEQUENT: ICD-10-CM

## 2023-12-06 DIAGNOSIS — E11.9 TYPE 2 DIABETES MELLITUS WITHOUT COMPLICATION, WITHOUT LONG-TERM CURRENT USE OF INSULIN (H): ICD-10-CM

## 2023-12-06 DIAGNOSIS — E66.811 OBESITY, CLASS I, BMI 30-34.9: ICD-10-CM

## 2023-12-06 DIAGNOSIS — E78.2 MIXED DYSLIPIDEMIA: ICD-10-CM

## 2023-12-06 DIAGNOSIS — M54.50 LOW BACK PAIN, UNSPECIFIED BACK PAIN LATERALITY, UNSPECIFIED CHRONICITY, UNSPECIFIED WHETHER SCIATICA PRESENT: ICD-10-CM

## 2023-12-06 DIAGNOSIS — C83.78 BURKITT LYMPHOMA OF LYMPH NODES OF MULTIPLE REGIONS (H): ICD-10-CM

## 2023-12-06 DIAGNOSIS — T45.1X5A CHEMOTHERAPY-INDUCED PERIPHERAL NEUROPATHY (H): ICD-10-CM

## 2023-12-06 DIAGNOSIS — E89.0 POSTSURGICAL HYPOTHYROIDISM: ICD-10-CM

## 2023-12-06 DIAGNOSIS — Z00.00 ENCOUNTER FOR MEDICARE ANNUAL WELLNESS EXAM: Primary | ICD-10-CM

## 2023-12-06 DIAGNOSIS — R03.0 ELEVATED BLOOD PRESSURE READING WITHOUT DIAGNOSIS OF HYPERTENSION: ICD-10-CM

## 2023-12-06 DIAGNOSIS — G62.0 CHEMOTHERAPY-INDUCED PERIPHERAL NEUROPATHY (H): ICD-10-CM

## 2023-12-06 PROBLEM — E66.09 CLASS 2 OBESITY DUE TO EXCESS CALORIES WITHOUT SERIOUS COMORBIDITY IN ADULT: Status: RESOLVED | Noted: 2022-02-01 | Resolved: 2023-12-06

## 2023-12-06 PROBLEM — E66.01 MORBID OBESITY (H): Status: RESOLVED | Noted: 2022-04-01 | Resolved: 2023-12-06

## 2023-12-06 PROBLEM — R73.01 IFG (IMPAIRED FASTING GLUCOSE): Status: RESOLVED | Noted: 2022-02-03 | Resolved: 2023-12-06

## 2023-12-06 PROBLEM — E66.812 CLASS 2 OBESITY DUE TO EXCESS CALORIES WITHOUT SERIOUS COMORBIDITY IN ADULT: Status: RESOLVED | Noted: 2022-02-01 | Resolved: 2023-12-06

## 2023-12-06 PROCEDURE — G0439 PPPS, SUBSEQ VISIT: HCPCS | Performed by: INTERNAL MEDICINE

## 2023-12-06 PROCEDURE — 99214 OFFICE O/P EST MOD 30 MIN: CPT | Mod: 25 | Performed by: INTERNAL MEDICINE

## 2023-12-06 PROCEDURE — 90480 ADMN SARSCOV2 VAC 1/ONLY CMP: CPT | Performed by: INTERNAL MEDICINE

## 2023-12-06 PROCEDURE — 90686 IIV4 VACC NO PRSV 0.5 ML IM: CPT | Performed by: INTERNAL MEDICINE

## 2023-12-06 PROCEDURE — 91320 SARSCV2 VAC 30MCG TRS-SUC IM: CPT | Performed by: INTERNAL MEDICINE

## 2023-12-06 PROCEDURE — G0008 ADMIN INFLUENZA VIRUS VAC: HCPCS | Performed by: INTERNAL MEDICINE

## 2023-12-06 RX ORDER — RESPIRATORY SYNCYTIAL VIRUS VACCINE 120MCG/0.5
0.5 KIT INTRAMUSCULAR ONCE
Qty: 1 EACH | Refills: 0 | Status: CANCELLED | OUTPATIENT
Start: 2023-12-06 | End: 2023-12-06

## 2023-12-06 RX ORDER — DULOXETIN HYDROCHLORIDE 20 MG/1
CAPSULE, DELAYED RELEASE ORAL
Qty: 270 CAPSULE | Refills: 2 | Status: SHIPPED | OUTPATIENT
Start: 2023-12-06

## 2023-12-06 RX ORDER — METFORMIN HCL 500 MG
500 TABLET, EXTENDED RELEASE 24 HR ORAL
Qty: 90 TABLET | Refills: 3 | Status: SHIPPED | OUTPATIENT
Start: 2023-12-06 | End: 2024-03-07

## 2023-12-06 RX ORDER — LEVOTHYROXINE SODIUM 175 UG/1
TABLET ORAL
Qty: 90 TABLET | Refills: 0 | Status: SHIPPED | OUTPATIENT
Start: 2023-12-06 | End: 2024-05-29

## 2023-12-06 RX ORDER — GABAPENTIN 300 MG/1
600 CAPSULE ORAL 2 TIMES DAILY
Qty: 120 CAPSULE | Refills: 2 | Status: SHIPPED | OUTPATIENT
Start: 2023-12-06 | End: 2024-05-12

## 2023-12-06 RX ORDER — ATORVASTATIN CALCIUM 20 MG/1
20 TABLET, FILM COATED ORAL DAILY
Qty: 90 TABLET | Refills: 3 | Status: SHIPPED | OUTPATIENT
Start: 2023-12-06 | End: 2024-03-07

## 2023-12-06 ASSESSMENT — PAIN SCALES - GENERAL: PAINLEVEL: NO PAIN (0)

## 2023-12-06 NOTE — PROGRESS NOTES
SUBJECTIVE:   Jonnathan is a 63 year old, presenting for the following:  Follow Up (Back pain)    Patient comes for annual Medicare well wellness check as well as for follow-up on chronic health issues which include diabetes, postsurgical hypothyroidism, dyslipidemia and others mentioned in the problem list.  He he continues to have chronic low back pain.  It only bothers him after he has been standing for a couple of hours then it will last all day until he goes to bed and rests for the night.  He also finds stiffness in his calf muscles when he first wakes up in the morning.  He has to do stretches.  He has drug induced polyneuropathy which he feels is about the same.  Though he feels he is able to move his toes better than he did before.  He does not exercise much.  He has been missing quite a few doses of metformin.        12/6/2023    10:10 AM   Additional Questions   Roomed by Janina Cardenas   Accompanied by self       Are you in the first 12 months of your Medicare coverage?  No    HPI    Today's PHQ-2 Score:       12/5/2023     6:36 PM   PHQ-2 ( 1999 [employer])   Q1: Little interest or pleasure in doing things 2   Q2: Feeling down, depressed or hopeless 0   PHQ-2 Score 2   Q1: Little interest or pleasure in doing things More than half the days   Q2: Feeling down, depressed or hopeless Not at all   PHQ-2 Score 2           Have you ever done Advance Care Planning? (For example, a Health Directive, POLST, or a discussion with a medical provider or your loved ones about your wishes): No, advance care planning information given to patient to review.  Patient declined advance care planning discussion at this time.       Fall risk  Fallen 2 or more times in the past year?: No  Any fall with injury in the past year?: No    Cognitive Screening   1) Repeat 3 items (Leader, Season, Table)    2) Clock draw: NORMAL  3) 3 item recall: Recalls 1 object   Results: NORMAL clock, 1-2 items recalled: COGNITIVE IMPAIRMENT LESS  LIKELY  Mini-CogTM Copyright BRYCE Naranjo. Licensed by the author for use in [facility]; reprinted with permission (karla@[facility]). All rights reserved.      Do you have sleep apnea, excessive snoring or daytime drowsiness? : no    Reviewed and updated as needed this visit by clinical staff                  Reviewed and updated as needed this visit by Provider                 Social History     Tobacco Use     Smoking status: Never     Smokeless tobacco: Never   Substance Use Topics     Alcohol use: Not Currently             1/25/2022    12:10 PM   Alcohol Use   Prescreen: >3 drinks/day or >7 drinks/week? No     Do you have a current opioid prescription? No  Do you use any other controlled substances or medications that are not prescribed by a provider? None        Current providers sharing in care for this patient include:   Patient Care Team:  Truong Menchaca MD as PCP - General  Ever Wright MD as MD (Internal Medicine - Hematology)  Truong Menchaca MD as Assigned PCP  Yuridia Crawford MD as Assigned Neuroscience Provider  Lucinda Rios RN as Specialty Care Coordinator (Hematology & Oncology)  Ever Wirght MD as Assigned Cancer Care Provider  Donita Webster, RN as Registered Nurse  Donita Webster RN as Diabetes Educator    The following health maintenance items are reviewed in Epic and correct as of today:  Health Maintenance   Topic Date Due     DIABETIC FOOT EXAM  Never done     RSV VACCINE (Pregnancy & 60+) (1 - 1-dose 60+ series) Never done     ANNUAL REVIEW OF HM ORDERS  02/01/2023     INFLUENZA VACCINE (1) 09/01/2023     COVID-19 Vaccine (4 - 2023-24 season) 09/01/2023     MEDICARE ANNUAL WELLNESS VISIT  09/08/2023     A1C  03/04/2024     EYE EXAM  08/15/2024     BMP  12/04/2024     LIPID  12/04/2024     MICROALBUMIN  12/04/2024     TSH W/FREE T4 REFLEX  12/04/2024     Pneumococcal Vaccine: Pediatrics (0 to 5 Years) and At-Risk Patients (6 to 64 Years) (3 -  PPSV23 or PCV20) 11/17/2026     ADVANCE CARE PLANNING  09/08/2027     DTAP/TDAP/TD IMMUNIZATION (4 - Td or Tdap) 09/16/2030     COLORECTAL CANCER SCREENING  07/23/2031     HEPATITIS C SCREENING  Completed     HIV SCREENING  Completed     PHQ-2 (once per calendar year)  Completed     ZOSTER IMMUNIZATION  Completed     IPV IMMUNIZATION  Aged Out     HPV IMMUNIZATION  Aged Out     MENINGITIS IMMUNIZATION  Aged Out     RSV MONOCLONAL ANTIBODY  Aged Out     BP Readings from Last 3 Encounters:   12/06/23 (!) 142/70   11/29/23 (!) 151/93   06/08/23 138/87    Wt Readings from Last 3 Encounters:   12/06/23 110.1 kg (242 lb 11.2 oz)   11/29/23 110.8 kg (244 lb 3.2 oz)   06/08/23 107.4 kg (236 lb 12.8 oz)                  Patient Active Problem List   Diagnosis     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)     Postsurgical hypothyroidism     Mixed dyslipidemia     Low back pain, unspecified back pain laterality, unspecified chronicity, unspecified whether sciatica present     Neuropathic pain     Chemotherapy-induced peripheral neuropathy      Type 2 diabetes mellitus without complication, without long-term current use of insulin (H)     Obesity, Class I, BMI 30-34.9     Past Surgical History:   Procedure Laterality Date     INJECT EPIDURAL LUMBAR N/A 11/14/2022    Procedure: L5-S1 interlaminar epidural steroid injection;  Surgeon: Piter Torres MD;  Location: [facility] OR     IR PICC VASCULAR  04/08/2020     LAMINECTOMY, EXCISE TUMOR THORACIC, COMBINED N/A 03/15/2020    Procedure: LAMINECTOMY, SPINE, THORACIC, 2 levels, T-5, T-6;  Surgeon: Heather Cespedes MD;  Location: [facility] OR     PICC DOUBLE LUMEN PLACEMENT Right 04/30/2020    5FR DL PICC inserted at right basilic vein , length- 39cm (1cm out), tip at low SVC.     THYROIDECTOMY  Bilateral 2011       Social History     Tobacco Use     Smoking status: Never     Smokeless tobacco: Never   Substance Use Topics     Alcohol use: Not Currently     Family History  "  Problem Relation Age of Onset     Neuropathy Mother      Polymyalgia rheumatica Mother      Diabetes Type 2  Mother      Diabetes Mother      Hypertension Father      Cerebrovascular Disease Father          Current Outpatient Medications   Medication Sig Dispense Refill     atorvastatin (LIPITOR) 20 MG tablet Take 1 tablet (20 mg) by mouth daily 90 tablet 3     DULoxetine (CYMBALTA) 20 MG capsule One capsule in morning and two capsules in the evening by month. 270 capsule 2     gabapentin (NEURONTIN) 300 MG capsule Take 2 capsules (600 mg) by mouth 2 times daily 120 capsule 2     levothyroxine (SYNTHROID/LEVOTHROID) 175 MCG tablet One tablet daily in the morning except 1/2 tablet every Sunday 90 tablet 0     metFORMIN (GLUCOPHAGE XR) 500 MG 24 hr tablet Take 1 tablet (500 mg) by mouth daily (with dinner) 90 tablet 3     No Known Allergies      Review of Systems  Constitutional, HEENT, cardiovascular, pulmonary, GI, , musculoskeletal, neuro, skin, endocrine and psych systems are negative, except as otherwise noted.    OBJECTIVE:   There were no vitals taken for this visit. Estimated body mass index is 35.04 kg/m  as calculated from the following:    Height as of 11/29/23: 1.778 m (5' 10\").    Weight as of 11/29/23: 110.8 kg (244 lb 3.2 oz).  Physical Exam  GENERAL: healthy, alert and no distress  EYES: Eyes grossly normal to inspection, PERRL and conjunctivae and sclerae normal  HENT: ear canals and TM's normal, nose and mouth without ulcers or lesions  NECK: no adenopathy, no asymmetry, masses, or scars and thyroid normal to palpation  RESP: lungs clear to auscultation - no rales, rhonchi or wheezes  CV: regular rate and rhythm, normal S1 S2, no S3 or S4, no murmur, click or rub, no peripheral edema and peripheral pulses strong  ABDOMEN: soft, nontender, no hepatosplenomegaly, no masses and bowel sounds normal   (male): normal male genitalia without lesions or urethral discharge, no hernia  RECTAL: normal " sphincter tone, no rectal masses, prostate normal size, smooth, nontender without nodules or masses  MS: no gross musculoskeletal defects noted, no edema  SKIN: no suspicious lesions or rashes  NEURO: Normal strength and tone, mentation intact and speech normal  PSYCH: mentation appears normal, affect normal/bright  LYMPH: no cervical, supraclavicular, axillary, or inguinal adenopathy  Diabetic foot exam: normal DP and PT pulses, no trophic changes or ulcerative lesions, and normal sensory exam        ASSESSMENT / PLAN:     1.  Encounter for Medicare annual wellness exam.  Exam completed and patient is good.  2.  Type 2 diabetes mellitus being treated with metformin 500 mg a day.  He is missing doses so advised to take it regularly along with her atorvastatin status of yet.  His A1c is good at 6.3 and fasting blood sugar was 129.  Advised to continue current treatment.  3.  Postsurgical hypothyroidism.  His TSH came back low at 0.25 with free T4 slightly elevated 2.01 so I recommend he cut back levothyroxine.  He has been taking 175 mcg daily.  He will now take half a tablet on Sundays and the rest of the days of the week 175 mcg.  We will recheck in 3 months.  4.  Mixed dyslipidemia being treated with atorvastatin 10 mg a day.  His cholesterol was 194 HDL 37 .  Like to get LDL below 100's advised to increase atorvastatin to 20 mg a day.  We will recheck lipids in 3 months.  5.  Chemotherapy-induced peripheral neuropathy remaining unchanged.  Continue duloxetine and gabapentin.  6.  Obesity class I.  His BMI has dropped slightly now to 34.8.  Exercise and weight reduction discussed.  7.  Elevated blood pressure with without diagnosis of hypertension.  Low-salt diet discussed.  Advised to monitor blood pressure at home we will recheck in 3 months.  8.  Low back pain chronic bilateral advised some core exercises.  9.  Flu and COVID-vaccine provided today.  Advised to get RSV vaccine at the local pharmacy.  10.   "Burkitt's lymphoma with visceral and bone involvement.  Patient had T5-T6 mass with cord compression requiring surgical intervention.  Diagnosed in March 2020 and treated with chemotherapy.  He remains in remission since.  Followed by oncology.      I will get back to the results of A1c lipids TSH.  See him back in March.        Patient has been advised of split billing requirements and indicates understanding: Yes      COUNSELING:  Reviewed preventive health counseling, as reflected in patient instructions       Regular exercise       Healthy diet/nutrition       Vision screening      BMI:   Estimated body mass index is 35.04 kg/m  as calculated from the following:    Height as of 11/29/23: 1.778 m (5' 10\").    Weight as of 11/29/23: 110.8 kg (244 lb 3.2 oz).   Weight management plan: Discussed healthy diet and exercise guidelines      He reports that he has never smoked. He has never used smokeless tobacco.      Appropriate preventive services were discussed with this patient, including applicable screening as appropriate for fall prevention, nutrition, physical activity, Tobacco-use cessation, weight loss and cognition.  Checklist reviewing preventive services available has been given to the patient.    Reviewed patients plan of care and provided an AVS. The Basic Care Plan (routine screening as documented in Health Maintenance) for Kobe meets the Care Plan requirement. This Care Plan has been established and reviewed with the Patient.          Truong Menchaca MD  [facility]    Identified Health Risks:  I have reviewed Opioid Use Disorder and Substance Use Disorder risk factors and made any needed referrals.   "

## 2023-12-06 NOTE — NURSING NOTE
Prior to immunization administration, verified patients identity using patient s name and date of birth. Please see Immunization Activity for additional information.     Screening Questionnaire for Adult Immunization    Are you sick today?   No   Do you have allergies to medications, food, a vaccine component or latex?   No   Have you ever had a serious reaction after receiving a vaccination?   No   Do you have a long-term health problem with heart, lung, kidney, or metabolic disease (e.g., diabetes), asthma, a blood disorder, no spleen, complement component deficiency, a cochlear implant, or a spinal fluid leak?  Are you on long-term aspirin therapy?   No   Do you have cancer, leukemia, HIV/AIDS, or any other immune system problem?   No   Do you have a parent, brother, or sister with an immune system problem?   No   In the past 3 months, have you taken medications that affect  your immune system, such as prednisone, other steroids, or anticancer drugs; drugs for the treatment of rheumatoid arthritis, Crohn s disease, or psoriasis; or have you had radiation treatments?   No   Have you had a seizure, or a brain or other nervous system problem?   No   During the past year, have you received a transfusion of blood or blood    products, or been given immune (gamma) globulin or antiviral drug?   No   For women: Are you pregnant or is there a chance you could become       pregnant during the next month?   No   Have you received any vaccinations in the past 4 weeks?   No     Immunization questionnaire answers were all negative.      Patient instructed to remain in clinic for 15 minutes afterwards, and to report any adverse reactions.     Screening performed by Delaney Figueroa MA on 12/6/2023 at 11:10 AM.

## 2023-12-06 NOTE — PATIENT INSTRUCTIONS
Get RSV vaccine at your local pharmacy  Patient Education   Personalized Prevention Plan  You are due for the preventive services outlined below.  Your care team is available to assist you in scheduling these services.  If you have already completed any of these items, please share that information with your care team to update in your medical record.  Health Maintenance Due   Topic Date Due     Diabetic Foot Exam  Never done     RSV VACCINE (Pregnancy & 60+) (1 - 1-dose 60+ series) Never done     Flu Vaccine (1) 09/01/2023     COVID-19 Vaccine (4 - 2023-24 season) 09/01/2023

## 2024-02-01 NOTE — PROGRESS NOTES
RNReceived: Today   Message Contents   Ever Wright MD Reed, Karen, RN               Please tell him he can restart enoxaparin with platelet recovery   Ever    Notified pt of above recommendations.  We reviewed plt count of 100K today, up from 33K.  Pt voiced understanding of above instructions and information and denied further questions, states his leg is feeling better. Has telemedicine appt with Dr. Wright on 5/20.  Viktoria Mcdermott, RN  RN Care Coordinator  [facility] [address] [id number] [phone]        
23

## 2024-03-05 ENCOUNTER — LAB (OUTPATIENT)
Dept: LAB | Facility: CLINIC | Age: 64
End: 2024-03-05
Payer: COMMERCIAL

## 2024-03-05 DIAGNOSIS — E89.0 POSTSURGICAL HYPOTHYROIDISM: ICD-10-CM

## 2024-03-05 DIAGNOSIS — E78.2 MIXED DYSLIPIDEMIA: ICD-10-CM

## 2024-03-05 DIAGNOSIS — E11.9 TYPE 2 DIABETES MELLITUS WITHOUT COMPLICATION, WITHOUT LONG-TERM CURRENT USE OF INSULIN (H): ICD-10-CM

## 2024-03-05 LAB
CHOLEST SERPL-MCNC: 195 MG/DL
FASTING STATUS PATIENT QL REPORTED: YES
HBA1C MFR BLD: 6.7 % (ref 0–5.6)
HDLC SERPL-MCNC: 38 MG/DL
LDLC SERPL CALC-MCNC: 121 MG/DL
NONHDLC SERPL-MCNC: 157 MG/DL
TRIGL SERPL-MCNC: 178 MG/DL
TSH SERPL DL<=0.005 MIU/L-ACNC: 0.57 UIU/ML (ref 0.3–4.2)

## 2024-03-05 PROCEDURE — 84443 ASSAY THYROID STIM HORMONE: CPT

## 2024-03-05 PROCEDURE — 83036 HEMOGLOBIN GLYCOSYLATED A1C: CPT

## 2024-03-05 PROCEDURE — 36415 COLL VENOUS BLD VENIPUNCTURE: CPT

## 2024-03-05 PROCEDURE — 80061 LIPID PANEL: CPT

## 2024-03-06 ASSESSMENT — PATIENT HEALTH QUESTIONNAIRE - PHQ9
SUM OF ALL RESPONSES TO PHQ QUESTIONS 1-9: 7
SUM OF ALL RESPONSES TO PHQ QUESTIONS 1-9: 7
10. IF YOU CHECKED OFF ANY PROBLEMS, HOW DIFFICULT HAVE THESE PROBLEMS MADE IT FOR YOU TO DO YOUR WORK, TAKE CARE OF THINGS AT HOME, OR GET ALONG WITH OTHER PEOPLE: NOT DIFFICULT AT ALL

## 2024-03-07 ENCOUNTER — OFFICE VISIT (OUTPATIENT)
Dept: FAMILY MEDICINE | Facility: CLINIC | Age: 64
End: 2024-03-07
Payer: COMMERCIAL

## 2024-03-07 VITALS
DIASTOLIC BLOOD PRESSURE: 90 MMHG | OXYGEN SATURATION: 98 % | HEIGHT: 70 IN | BODY MASS INDEX: 34.54 KG/M2 | SYSTOLIC BLOOD PRESSURE: 128 MMHG | HEART RATE: 72 BPM | RESPIRATION RATE: 17 BRPM | WEIGHT: 241.3 LBS | TEMPERATURE: 98.1 F

## 2024-03-07 DIAGNOSIS — E78.2 MIXED DYSLIPIDEMIA: ICD-10-CM

## 2024-03-07 DIAGNOSIS — R03.0 ELEVATED BLOOD PRESSURE READING WITHOUT DIAGNOSIS OF HYPERTENSION: ICD-10-CM

## 2024-03-07 DIAGNOSIS — C83.78 BURKITT LYMPHOMA OF LYMPH NODES OF MULTIPLE REGIONS (H): ICD-10-CM

## 2024-03-07 DIAGNOSIS — E66.811 OBESITY, CLASS I, BMI 30-34.9: ICD-10-CM

## 2024-03-07 DIAGNOSIS — E89.0 POSTSURGICAL HYPOTHYROIDISM: ICD-10-CM

## 2024-03-07 DIAGNOSIS — E11.9 TYPE 2 DIABETES MELLITUS WITHOUT COMPLICATION, WITHOUT LONG-TERM CURRENT USE OF INSULIN (H): Primary | ICD-10-CM

## 2024-03-07 PROCEDURE — 99214 OFFICE O/P EST MOD 30 MIN: CPT | Performed by: INTERNAL MEDICINE

## 2024-03-07 RX ORDER — METFORMIN HCL 500 MG
1000 TABLET, EXTENDED RELEASE 24 HR ORAL
Qty: 180 TABLET | Refills: 3 | Status: SHIPPED | OUTPATIENT
Start: 2024-03-07

## 2024-03-07 RX ORDER — ATORVASTATIN CALCIUM 40 MG/1
40 TABLET, FILM COATED ORAL DAILY
Qty: 90 TABLET | Refills: 3 | Status: SHIPPED | OUTPATIENT
Start: 2024-03-07

## 2024-03-07 NOTE — PROGRESS NOTES
Assessment & Plan     1.  Type 2 diabetes mellitus with A1c having gone up now to 6.7.  Advised to increase metformin from 500 mg daily to 1000 mg daily.  Recheck in 3 months.  2.  Mixed dyslipidemia with LDL not at goal with atorvastatin 20 mg a day.  LDL is 121 cholesterol 195 and HDL 38.  Advised to increase atorvastatin to 40 mg a day and recheck in 3 months.  3.  Postsurgical hypothyroidism which TSH in therapeutic range at 0.57 so we will continue with levothyroxine 175 mcg daily except every Sunday take half a tablet.  4.  Elevated blood pressure without the diagnosis of hypertension.  Discussed low-salt diet, weight reduction.  Will reassess in 3 months.  Advised to monitor at home.  5.  Obesity class I.  6.  Burkitt's lymphoma with vaginal and bone involvement.  Had T5-T6 mass with cord compression requiring surgical intervention.  Treated with chemotherapy in 2020.  Followed by oncologist and is in remission.    Patient will return in 3 months with lipids and A1c.      Janae De Santiago is a 64 year old, presenting for the following health issues:  Hypertension, Diabetes, Lipids, and Thyroid Problem        3/7/2024     8:40 AM   Additional Questions   Roomed by Delaney     History of Present Illness       Diabetes:   He presents for follow up of diabetes.  He is checking home blood glucose a few times a month.   He checks blood glucose before meals.  Blood glucose is never over 200 and never under 70. He is aware of hypoglycemia symptoms including none.   He is concerned about other.   He is having numbness in feet, burning in feet and weight gain.            Hyperlipidemia:  He presents for follow up of hyperlipidemia.   He is taking medication to lower cholesterol. He is not having myalgia or other side effects to statin medications.    Hypertension: He presents for follow up of hypertension.  He does check blood pressure  regularly outside of the clinic. Outside blood pressures have been over 140/90. He  "does not follow a low salt diet.     Hypothyroidism:     Since last visit, patient describes the following symptoms::  None    He eats 2-3 servings of fruits and vegetables daily.He consumes 0 sweetened beverage(s) daily.He exercises with enough effort to increase his heart rate 10 to 19 minutes per day.  He exercises with enough effort to increase his heart rate 4 days per week.   He is taking medications regularly.     Patient has come in regarding diabetes, dyslipidemia, hypothyroidism.  Overall he is doing okay.  When he checks his blood pressure at home it has been at times over 140/90.  In the clinic today was borderline.  He has no chest pain or shortness of breath.      Review of Systems  Constitutional, HEENT, cardiovascular, pulmonary, GI, , musculoskeletal, neuro, skin, endocrine and psych systems are negative, except as otherwise noted.      Objective    /86 (BP Location: Right arm, Patient Position: Sitting, Cuff Size: Adult Large)   Pulse 72   Temp 98.1  F (36.7  C) (Oral)   Resp 17   Ht 1.77 m (5' 9.69\")   Wt 109.5 kg (241 lb 4.8 oz)   SpO2 98%   BMI 34.94 kg/m    Body mass index is 34.94 kg/m .  Physical Exam   GENERAL: alert and no distress  RESP: lungs clear to auscultation - no rales, rhonchi or wheezes  CV: regular rate and rhythm, normal S1 S2, no S3 or S4, no murmur, click or rub, no peripheral edema  MS: no gross musculoskeletal defects noted, no edema     Latest Reference Range & Units 03/05/24 09:15   Cholesterol <200 mg/dL 195   Patient Fasting?  Yes   HDL Cholesterol >=40 mg/dL 38 (L)   Hemoglobin A1C 0.0 - 5.6 % 6.7 (H)   LDL Cholesterol Calculated <=100 mg/dL 121 (H)   Non HDL Cholesterol <130 mg/dL 157 (H)   Triglycerides <150 mg/dL 178 (H)   TSH 0.30 - 4.20 uIU/mL 0.57   (L): Data is abnormally low  (H): Data is abnormally high    Signed Electronically by: Truong Menchaca MD    "

## 2024-03-12 ENCOUNTER — TRANSFERRED RECORDS (OUTPATIENT)
Dept: HEALTH INFORMATION MANAGEMENT | Facility: CLINIC | Age: 64
End: 2024-03-12
Payer: COMMERCIAL

## 2024-04-09 DIAGNOSIS — C83.78 BURKITT LYMPHOMA OF LYMPH NODES OF MULTIPLE REGIONS (H): Primary | ICD-10-CM

## 2024-04-10 ENCOUNTER — ONCOLOGY VISIT (OUTPATIENT)
Dept: ONCOLOGY | Facility: CLINIC | Age: 64
End: 2024-04-10
Attending: INTERNAL MEDICINE
Payer: COMMERCIAL

## 2024-04-10 ENCOUNTER — APPOINTMENT (OUTPATIENT)
Dept: LAB | Facility: CLINIC | Age: 64
End: 2024-04-10
Attending: INTERNAL MEDICINE
Payer: COMMERCIAL

## 2024-04-10 VITALS
TEMPERATURE: 98.5 F | BODY MASS INDEX: 34.57 KG/M2 | OXYGEN SATURATION: 97 % | HEART RATE: 71 BPM | RESPIRATION RATE: 18 BRPM | DIASTOLIC BLOOD PRESSURE: 81 MMHG | SYSTOLIC BLOOD PRESSURE: 136 MMHG | WEIGHT: 238.8 LBS

## 2024-04-10 DIAGNOSIS — C83.78 BURKITT LYMPHOMA OF LYMPH NODES OF MULTIPLE REGIONS (H): ICD-10-CM

## 2024-04-10 LAB
ALBUMIN SERPL BCG-MCNC: 4.6 G/DL (ref 3.5–5.2)
ALP SERPL-CCNC: 98 U/L (ref 40–150)
ALT SERPL W P-5'-P-CCNC: 27 U/L (ref 0–70)
ANION GAP SERPL CALCULATED.3IONS-SCNC: 12 MMOL/L (ref 7–15)
AST SERPL W P-5'-P-CCNC: 23 U/L (ref 0–45)
BASOPHILS # BLD AUTO: 0 10E3/UL (ref 0–0.2)
BASOPHILS NFR BLD AUTO: 1 %
BILIRUB SERPL-MCNC: 0.4 MG/DL
BUN SERPL-MCNC: 17.8 MG/DL (ref 8–23)
CALCIUM SERPL-MCNC: 9 MG/DL (ref 8.8–10.2)
CHLORIDE SERPL-SCNC: 103 MMOL/L (ref 98–107)
CREAT SERPL-MCNC: 0.91 MG/DL (ref 0.67–1.17)
DEPRECATED HCO3 PLAS-SCNC: 24 MMOL/L (ref 22–29)
EGFRCR SERPLBLD CKD-EPI 2021: >90 ML/MIN/1.73M2
EOSINOPHIL # BLD AUTO: 0.1 10E3/UL (ref 0–0.7)
EOSINOPHIL NFR BLD AUTO: 1 %
ERYTHROCYTE [DISTWIDTH] IN BLOOD BY AUTOMATED COUNT: 12.9 % (ref 10–15)
GLUCOSE SERPL-MCNC: 109 MG/DL (ref 70–99)
HCT VFR BLD AUTO: 42.8 % (ref 40–53)
HGB BLD-MCNC: 15 G/DL (ref 13.3–17.7)
IMM GRANULOCYTES # BLD: 0 10E3/UL
IMM GRANULOCYTES NFR BLD: 0 %
LDH SERPL L TO P-CCNC: 177 U/L (ref 0–250)
LYMPHOCYTES # BLD AUTO: 1.8 10E3/UL (ref 0.8–5.3)
LYMPHOCYTES NFR BLD AUTO: 25 %
MCH RBC QN AUTO: 29.8 PG (ref 26.5–33)
MCHC RBC AUTO-ENTMCNC: 35 G/DL (ref 31.5–36.5)
MCV RBC AUTO: 85 FL (ref 78–100)
MONOCYTES # BLD AUTO: 0.8 10E3/UL (ref 0–1.3)
MONOCYTES NFR BLD AUTO: 11 %
NEUTROPHILS # BLD AUTO: 4.4 10E3/UL (ref 1.6–8.3)
NEUTROPHILS NFR BLD AUTO: 62 %
NRBC # BLD AUTO: 0 10E3/UL
NRBC BLD AUTO-RTO: 0 /100
PLATELET # BLD AUTO: 227 10E3/UL (ref 150–450)
POTASSIUM SERPL-SCNC: 4.2 MMOL/L (ref 3.4–5.3)
PROT SERPL-MCNC: 7.6 G/DL (ref 6.4–8.3)
RBC # BLD AUTO: 5.03 10E6/UL (ref 4.4–5.9)
SODIUM SERPL-SCNC: 139 MMOL/L (ref 135–145)
WBC # BLD AUTO: 7.1 10E3/UL (ref 4–11)

## 2024-04-10 PROCEDURE — 99213 OFFICE O/P EST LOW 20 MIN: CPT | Performed by: REGISTERED NURSE

## 2024-04-10 PROCEDURE — 80053 COMPREHEN METABOLIC PANEL: CPT | Performed by: REGISTERED NURSE

## 2024-04-10 PROCEDURE — G0463 HOSPITAL OUTPT CLINIC VISIT: HCPCS | Performed by: REGISTERED NURSE

## 2024-04-10 PROCEDURE — 36415 COLL VENOUS BLD VENIPUNCTURE: CPT | Performed by: REGISTERED NURSE

## 2024-04-10 PROCEDURE — 85025 COMPLETE CBC W/AUTO DIFF WBC: CPT | Performed by: REGISTERED NURSE

## 2024-04-10 PROCEDURE — 83615 LACTATE (LD) (LDH) ENZYME: CPT | Performed by: REGISTERED NURSE

## 2024-04-10 ASSESSMENT — PAIN SCALES - GENERAL: PAINLEVEL: NO PAIN (0)

## 2024-04-10 NOTE — NURSING NOTE
"Oncology Rooming Note    April 10, 2024 12:36 PM   Kobe Pedroza is a 64 year old male who presents for:    Chief Complaint   Patient presents with    Blood Draw     Labs drawn via  by RN. VS taken.    Oncology Clinic Visit     Burkitt lymphoma      Initial Vitals: /81 (BP Location: Right arm, Patient Position: Sitting, Cuff Size: Adult Large)   Pulse 71   Temp 98.5  F (36.9  C) (Oral)   Resp 18   Wt 108.3 kg (238 lb 12.8 oz)   SpO2 97%   BMI 34.57 kg/m   Estimated body mass index is 34.57 kg/m  as calculated from the following:    Height as of 3/7/24: 1.77 m (5' 9.69\").    Weight as of this encounter: 108.3 kg (238 lb 12.8 oz). Body surface area is 2.31 meters squared.  No Pain (0) Comment: Data Unavailable   No LMP for male patient.  Allergies reviewed: Yes  Medications reviewed: Yes    Medications: Medication refills not needed today.  Pharmacy name entered into [facility]: [id number] [place], [place] - [address]    Frailty Screening:   Is the patient here for a new oncology consult visit in cancer care? 2. No      Clinical concerns:        Starla Soler              "

## 2024-04-10 NOTE — PROGRESS NOTES
"REASON FOR VISIT:  Management of Burkitt lymphoma    HISTORY OF PRESENT ILLNESS:  Jonnathan Pedroza is a 64 year old with a history of Burkitt lymphoma.  To summarize his course, he presented with acute on chronic back pain in 03/2020.  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  CSF was negative.  He was was treated with steroids followed by R-CODOX-M/IVAC and IT chemo prophylaxis ending in 06/2020.  His course was complicated by neutropenic sepsis that resolved and right lower extremity below the knee DVT for which he completed a course of anticoagulation.  PET/CT after cycle 2 (1st R-IVAC) and again after completing treatment confirmed PET-negative complete response.  Visit for ongoing management of Burkitt lymphoma.    Neuropathy in his feet remains, bothers him more when he walks, improves with some days of rest.  Denies tripping, stumbling, or falling.  Denies pain, just feels a \"tightness\" in his feet and legs.  No fevers, new night sweats, or unintentional weight loss.  No dyspnea, cough, or chest pain.  No new lumps or bumps.  No respiratory infections over the winter months.  No other specific concerns today.        MEDICATIONS:  Current Outpatient Medications   Medication Sig Dispense Refill    atorvastatin (LIPITOR) 40 MG tablet Take 1 tablet (40 mg) by mouth daily 90 tablet 3    DULoxetine (CYMBALTA) 20 MG capsule One capsule in morning and two capsules in the evening by month. 270 capsule 2    gabapentin (NEURONTIN) 300 MG capsule Take 2 capsules (600 mg) by mouth 2 times daily 120 capsule 2    levothyroxine (SYNTHROID/LEVOTHROID) 175 MCG tablet One tablet daily in the morning except 1/2 tablet every Sunday 90 tablet 0    metFORMIN (GLUCOPHAGE XR) 500 MG 24 hr tablet Take 2 tablets (1,000 mg) by mouth daily (with dinner) 180 tablet 3     No current facility-administered medications for this visit. "     PHYSICAL EXAMINATION:  /81 (BP Location: Right arm, Patient Position: Sitting, Cuff Size: Adult Large)   Pulse 71   Temp 98.5  F (36.9  C) (Oral)   Resp 18   Wt 108.3 kg (238 lb 12.8 oz)   SpO2 97%   BMI 34.57 kg/m    Wt Readings from Last 5 Encounters:   04/10/24 108.3 kg (238 lb 12.8 oz)   03/07/24 109.5 kg (241 lb 4.8 oz)   12/06/23 110.1 kg (242 lb 11.2 oz)   11/29/23 110.8 kg (244 lb 3.2 oz)   06/08/23 107.4 kg (236 lb 12.8 oz)     General: Well appearing. No distress.  HEENT: Sclerae anicteric.  Lungs: Clear bilaterally without wheezing or crackles.  Heart: Regular rate and rhythm.  Gastrointestinal: Bowel sounds present, no tenderness to palpation, spleen tip not palpable.  Extremities: No lower extremity edema.  Lymph:  No cervical, clavicular, axillary, epitrochlear, or inguinal lymphadenopathy.  Performance status: ECOG 1    LABRATORY DATA:  I personally reviewed the following labs:    Most Recent 3 CBC's:  Recent Labs   Lab Test 04/10/24  1227 11/29/23  1326 05/10/23  0842   WBC 7.1 7.2 6.2   HGB 15.0 15.2 15.3   MCV 85 88 89    241 196     Most Recent 3 BMP's:  Recent Labs   Lab Test 04/10/24  1227 12/04/23  0838 11/29/23  1326    140 137   POTASSIUM 4.2 4.1 4.3   CHLORIDE 103 104 102   CO2 24 24 24   BUN 17.8 15.3 23.0   CR 0.91 0.95 0.95   ANIONGAP 12 12 11   RUTH 9.0 8.4* 9.1   * 129* 98     Most Recent 2 LFT's:  Recent Labs   Lab Test 04/10/24  1227 11/29/23  1326   AST 23 39   ALT 27 45   ALKPHOS 98 94   BILITOTAL 0.4 0.6       IMPRESSION AND PLAN:  Mr. Pedroza is a 64 year old with a history of Burkitt lymphoma.    Burkitt lymphoma clinically remains in remission.  Labs look great.  He continues to deal with long term effects of lymphoma and treatment, but there is nothing to suggest recurrence.  We will continue to monitor.    He has no active ID issues.  He is up to date with pneumococcal and Shingrix vaccines.  We reviewed the current COVID-19 vaccine  guidelines.  He had his updated influenza and COVID vaccines in Dec 2023.      He completed apixaban for 3 months for lymphoma-related DVT.   He will continue to work with PCP Dr. Truong Menchaca for his health maintenance and other medical issues.    We will arrange another visit in about 6 months with labs.  I reminded him to contact if questions, concerns, or new issues arise between visits.    20 minutes spent on the date of the encounter doing chart review, review of test results, interpretation of tests, patient visit, and documentation     MICHAEL [facility] [address] [address]  [phone]

## 2024-04-10 NOTE — NURSING NOTE
Chief Complaint   Patient presents with    Blood Draw     Labs drawn via  by RN. VS taken.     Labs collected from venipuncture by RN. Vitals taken. Checked in for appointment(s).     Katelyn Ribera RN

## 2024-04-15 NOTE — LETTER
"    4/10/2024         RE: Kobe Pedroza  [address]  [facility] [address]        Dear Colleague,    Thank you for referring your patient, Kobe Pedroza, to the [facility]. Please see a copy of my visit note below.    REASON FOR VISIT:  Management of Burkitt lymphoma    HISTORY OF PRESENT ILLNESS:  Jonnathan Pedroza is a 64 year old with a history of Burkitt lymphoma.  To summarize his course, he presented with acute on chronic back pain in 03/2020.  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  CSF was negative.  He was was treated with steroids followed by R-CODOX-M/IVAC and IT chemo prophylaxis ending in 06/2020.  His course was complicated by neutropenic sepsis that resolved and right lower extremity below the knee DVT for which he completed a course of anticoagulation.  PET/CT after cycle 2 (1st R-IVAC) and again after completing treatment confirmed PET-negative complete response.  Visit for ongoing management of Burkitt lymphoma.    Neuropathy in his feet remains, bothers him more when he walks, improves with some days of rest.  Denies tripping, stumbling, or falling.  Denies pain, just feels a \"tightness\" in his feet and legs.  No fevers, new night sweats, or unintentional weight loss.  No dyspnea, cough, or chest pain.  No new lumps or bumps.  No respiratory infections over the winter months.  No other specific concerns today.        MEDICATIONS:  Current Outpatient Medications   Medication Sig Dispense Refill    atorvastatin (LIPITOR) 40 MG tablet Take 1 tablet (40 mg) by mouth daily 90 tablet 3    DULoxetine (CYMBALTA) 20 MG capsule One capsule in morning and two capsules in the evening by month. 270 capsule 2    gabapentin (NEURONTIN) 300 MG capsule Take 2 capsules (600 mg) by mouth 2 times daily 120 capsule 2    levothyroxine (SYNTHROID/LEVOTHROID) 175 MCG tablet " Khalif Joshua  [id number]  1949   LOS: 9 days   Patient Care Team:  Renee Liriano MD as PCP - General (Internal Medicine)    Chief Complaint: Follow-up on atrial fibrillation, CAD    Subjective Patient back in sinus rhythm this morning on telemetry, still on amiodarone gtt.  Patient still has chest tubes in but says that CT surgery is supposed to remove these today.  He denies any chest pain, increased shortness of breath, or palpitations.  Currently on bedside commode having bowel movement.  Patient wants to go home.    Objective     Vital Sign Min/Max for last 24 hours  Temp  Min: 97.5 °F (36.4 °C)  Max: 98.3 °F (36.8 °C)   BP  Min: 93/68  Max: 132/74   Pulse  Min: 77  Max: 153   Resp  Min: 18  Max: 20   SpO2  Min: 88 %  Max: 97 %   No data recorded   Weight  Min: 97.6 kg (215 lb 3.2 oz)  Max: 97.6 kg (215 lb 3.2 oz)         04/14/24  0320 04/15/24  0329   Weight: 99.1 kg (218 lb 6.4 oz) 97.6 kg (215 lb 3.2 oz)         Intake/Output Summary (Last 24 hours) at 4/15/2024 0712  Last data filed at 4/15/2024 0600  Gross per 24 hour   Intake --   Output 804 ml   Net -804 ml       Physical Exam:     General Appearance:    Alert, cooperative, in no acute distress   Lungs:   Decreased breath sounds to auscultation,respirations regular, even and                unlabored, bilateral chest tubes placed    Heart:    Regular and normal rate, normal S1 and S2, no            murmur, no gallop, no rub, no click   Abdomen:  Extremities:   Soft, nontender, bowel sounds audible x4    No edema, normal range of motion   Pulses:   Pulses palpable and equal bilaterally    Sternal incision CDI  Results Review:   Results from last 7 days   Lab Units 04/15/24  0558 04/14/24  0509 04/13/24  1546 04/13/24  0433   SODIUM mmol/L 136 135*  --  131*   POTASSIUM mmol/L 3.8 3.6 4.0 3.8   CHLORIDE mmol/L 97* 94*  --  95*   CO2 mmol/L 28.0 30.0*  --  27.0   BUN mg/dL 24* 31*  --  30*   CREATININE mg/dL 0.81 0.81  --  0.83   GLUCOSE mg/dL  111* 120*  --  129*   CALCIUM mg/dL 9.0 9.3  --  8.8     Results from last 7 days   Lab Units 04/15/24  0558 04/14/24  0509 04/13/24  0433 04/12/24  0436   WBC 10*3/mm3 8.70  --  4.92 7.52   HEMOGLOBIN g/dL 9.8* 9.0* 9.8* 9.7*   HEMATOCRIT % 29.9* 27.7* 30.1* 29.9*   PLATELETS 10*3/mm3 241  --  176 165           Chest x-ray 4/14/2024:    Bilateral pleural drains remain in place. Nasogastric tube terminates in the stomach. No discernible persisting pneumothorax on the left, with very small right apical pneumothorax present. Layering small pleural effusion on the left appears likely mildly   increased with adjacent left basilar atelectasis. Unchanged heart and mediastinal contours.     No new ECG to review    Medication Review: Reviewed, amiodarone at 0.5 mg/min    Assessment & Plan   Patient is status post CABG (LIMA to LAD, SVG to diagonal, SVG to OM, SVG to RPL) , POD 5.  Patient had postoperative atrial fib with RVR, postop ileus, and bilateral pneumothorax.  Continue amiodarone per protocol.  Start NOAC once pneumothorax resolved and chest tubes removed.  Hopefully patient home in 24-48 hours.      Electronically signed by MISTI Lowry, 04/15/24, 7:25 AM EDT.     04/15/24  07:12 EDT    One tablet daily in the morning except 1/2 tablet every Sunday 90 tablet 0    metFORMIN (GLUCOPHAGE XR) 500 MG 24 hr tablet Take 2 tablets (1,000 mg) by mouth daily (with dinner) 180 tablet 3     No current facility-administered medications for this visit.     PHYSICAL EXAMINATION:  /81 (BP Location: Right arm, Patient Position: Sitting, Cuff Size: Adult Large)   Pulse 71   Temp 98.5  F (36.9  C) (Oral)   Resp 18   Wt 108.3 kg (238 lb 12.8 oz)   SpO2 97%   BMI 34.57 kg/m    Wt Readings from Last 5 Encounters:   04/10/24 108.3 kg (238 lb 12.8 oz)   03/07/24 109.5 kg (241 lb 4.8 oz)   12/06/23 110.1 kg (242 lb 11.2 oz)   11/29/23 110.8 kg (244 lb 3.2 oz)   06/08/23 107.4 kg (236 lb 12.8 oz)     General: Well appearing. No distress.  HEENT: Sclerae anicteric.  Lungs: Clear bilaterally without wheezing or crackles.  Heart: Regular rate and rhythm.  Gastrointestinal: Bowel sounds present, no tenderness to palpation, spleen tip not palpable.  Extremities: No lower extremity edema.  Lymph:  No cervical, clavicular, axillary, epitrochlear, or inguinal lymphadenopathy.  Performance status: ECOG 1    LABRATORY DATA:  I personally reviewed the following labs:    Most Recent 3 CBC's:  Recent Labs   Lab Test 04/10/24  1227 11/29/23  1326 05/10/23  0842   WBC 7.1 7.2 6.2   HGB 15.0 15.2 15.3   MCV 85 88 89    241 196     Most Recent 3 BMP's:  Recent Labs   Lab Test 04/10/24  1227 12/04/23  0838 11/29/23  1326    140 137   POTASSIUM 4.2 4.1 4.3   CHLORIDE 103 104 102   CO2 24 24 24   BUN 17.8 15.3 23.0   CR 0.91 0.95 0.95   ANIONGAP 12 12 11   RUTH 9.0 8.4* 9.1   * 129* 98     Most Recent 2 LFT's:  Recent Labs   Lab Test 04/10/24  1227 11/29/23  1326   AST 23 39   ALT 27 45   ALKPHOS 98 94   BILITOTAL 0.4 0.6       IMPRESSION AND PLAN:  Mr. Pedroza is a 64 year old with a history of Burkitt lymphoma.    Burkitt lymphoma clinically remains in remission.  Labs look great.  He continues to deal with  long term effects of lymphoma and treatment, but there is nothing to suggest recurrence.  We will continue to monitor.    He has no active ID issues.  He is up to date with pneumococcal and Shingrix vaccines.  We reviewed the current COVID-19 vaccine guidelines.  He had his updated influenza and COVID vaccines in Dec 2023.      He completed apixaban for 3 months for lymphoma-related DVT.   He will continue to work with PCP Dr. Truong Menchaca for his health maintenance and other medical issues.    We will arrange another visit in about 6 months with labs.  I reminded him to contact if questions, concerns, or new issues arise between visits.    20 minutes spent on the date of the encounter doing chart review, review of test results, interpretation of tests, patient visit, and documentation     MICHAEL [facility] [address] [address]  [phone]

## 2024-05-12 DIAGNOSIS — T45.1X5A CHEMOTHERAPY-INDUCED PERIPHERAL NEUROPATHY (H): ICD-10-CM

## 2024-05-12 DIAGNOSIS — C83.78 BURKITT LYMPHOMA OF LYMPH NODES OF MULTIPLE REGIONS (H): ICD-10-CM

## 2024-05-12 DIAGNOSIS — G62.0 CHEMOTHERAPY-INDUCED PERIPHERAL NEUROPATHY (H): ICD-10-CM

## 2024-05-12 DIAGNOSIS — M54.50 LOW BACK PAIN, UNSPECIFIED BACK PAIN LATERALITY, UNSPECIFIED CHRONICITY, UNSPECIFIED WHETHER SCIATICA PRESENT: ICD-10-CM

## 2024-05-12 RX ORDER — GABAPENTIN 300 MG/1
CAPSULE ORAL
Qty: 120 CAPSULE | Refills: 3 | Status: SHIPPED | OUTPATIENT
Start: 2024-05-12 | End: 2024-10-01

## 2024-05-29 DIAGNOSIS — E89.0 POSTSURGICAL HYPOTHYROIDISM: ICD-10-CM

## 2024-05-29 RX ORDER — LEVOTHYROXINE SODIUM 175 UG/1
TABLET ORAL
Qty: 90 TABLET | Refills: 0 | Status: SHIPPED | OUTPATIENT
Start: 2024-05-29

## 2024-06-18 ENCOUNTER — LAB (OUTPATIENT)
Dept: LAB | Facility: CLINIC | Age: 64
End: 2024-06-18
Payer: COMMERCIAL

## 2024-06-18 DIAGNOSIS — E11.9 TYPE 2 DIABETES MELLITUS WITHOUT COMPLICATION, WITHOUT LONG-TERM CURRENT USE OF INSULIN (H): ICD-10-CM

## 2024-06-18 DIAGNOSIS — E78.2 MIXED DYSLIPIDEMIA: ICD-10-CM

## 2024-06-18 LAB
CHOLEST SERPL-MCNC: 150 MG/DL
FASTING STATUS PATIENT QL REPORTED: YES
HBA1C MFR BLD: 6.1 % (ref 0–5.6)
HDLC SERPL-MCNC: 35 MG/DL
LDLC SERPL CALC-MCNC: 90 MG/DL
NONHDLC SERPL-MCNC: 115 MG/DL
TRIGL SERPL-MCNC: 126 MG/DL

## 2024-06-18 PROCEDURE — 36415 COLL VENOUS BLD VENIPUNCTURE: CPT

## 2024-06-18 PROCEDURE — 80061 LIPID PANEL: CPT

## 2024-06-18 PROCEDURE — 83036 HEMOGLOBIN GLYCOSYLATED A1C: CPT

## 2024-06-20 ENCOUNTER — VIRTUAL VISIT (OUTPATIENT)
Dept: FAMILY MEDICINE | Facility: CLINIC | Age: 64
End: 2024-06-20
Payer: COMMERCIAL

## 2024-06-20 DIAGNOSIS — E89.0 POSTSURGICAL HYPOTHYROIDISM: ICD-10-CM

## 2024-06-20 DIAGNOSIS — E78.2 MIXED DYSLIPIDEMIA: ICD-10-CM

## 2024-06-20 DIAGNOSIS — E11.9 TYPE 2 DIABETES MELLITUS WITHOUT COMPLICATION, WITHOUT LONG-TERM CURRENT USE OF INSULIN (H): Primary | ICD-10-CM

## 2024-06-20 PROCEDURE — 99214 OFFICE O/P EST MOD 30 MIN: CPT | Mod: 95 | Performed by: INTERNAL MEDICINE

## 2024-06-20 NOTE — PROGRESS NOTES
"    Instructions Relayed to Patient by Virtual Roomer:     Patient is active on [employer]:   Relayed following to patient: \"It looks like you are active on [employer], are you able to join the visit this way? If not, do you need us to send you a link now or would you like your provider to send a link via text or email when they are ready to initiate the visit?\"      Patient Confirmed they will join visit via: [employer]  Reminded patient to ensure they were logged on to virtual visit by arrival time listed.   Asked if patient has flexibility to initiate visit sooner than arrival time: patient is unable to initiate visit earlier than arrival time     If pediatric virtual visit, ensured pediatric patient along with parent/guardian will be present for video visit.     Patient offered the website [email or web address] and/or phone number to [employer] Help line: [phone]     Jonnathan is a 64 year old who is being evaluated via a billable video visit.    How would you like to obtain your AVS? [employer]  If the video visit is dropped, the invitation should be resent by: Text to cell phone: [phone]  Will anyone else be joining your video visit? No      Assessment & Plan     1.  Type 2 diabetes mellitus being well-controlled now with metformin 1 g a day.  A1c dropped from 6.7 to now 6.1.  Continue same.  Follow-up in December for annual checkup.  2.  Mixed dyslipidemia being treated with atorvastatin 40 mg a day.  His lipid profile is now much better with cholesterol 150 HDL 35 LDL 90 and triglycerides 126.  Continue same.  3.  Postsurgical hypothyroidism.  Currently on levothyroxine 175 mcg a day.  TSH in therapeutic range on 12/4/2023.    Patient is interested in CMG.  He is going to look into a low bit more and get back to me for a prescription in a few weeks.  He will communicate through [employer].  He will return for follow-up in December.    BMI  Estimated body mass index is 34.57 kg/m  as calculated from the " "following:    Height as of 3/7/24: 1.77 m (5' 9.69\").    Weight as of 4/10/24: 108.3 kg (238 lb 12.8 oz).   Weight management plan: Discussed healthy diet and exercise guidelines        Subjective   Jonnathan is a 64 year old, presenting for the following health issues:  RECHECK (Diabetes, Hyperlipidema)      6/20/2024     2:23 PM   Additional Questions   Roomed by Yue VASQUEZ   Accompanied by Self     Video Start Time: 5:46 PM    History of Present Illness       Diabetes:   He presents for follow up of diabetes.  He is checking home blood glucose a few times a week.   He checks blood glucose before meals and at bedtime.  Blood glucose is never over 200 and never under 70.  When his blood glucose is low, the patient is asymptomatic for confusion, blurred vision, lethargy and reports not feeling dizzy, shaky, or weak.  He is concerned about other.   He is having numbness in feet.            Hyperlipidemia:  He presents for follow up of hyperlipidemia.   He is taking medication to lower cholesterol. He is not having myalgia or other side effects to statin medications.    Hypothyroidism:     Since last visit, patient describes the following symptoms::  Weight loss    Weight loss::  6-10 lbs.    He eats 0-1 servings of fruits and vegetables daily.He consumes 0 sweetened beverage(s) daily.He exercises with enough effort to increase his heart rate 9 or less minutes per day.  He exercises with enough effort to increase his heart rate 5 days per week.   He is taking medications regularly.     70-year-old gentleman with diabetes, dyslipidemia has a follow-up following recent adjustment in medication few months ago.  Overall he is doing well.  He had lab work done couple days ago.  Interested in CMG.  He will get back to me regarding prescription for CMG after he talks to his brother who actually does some supplying of CMG.      Review of Systems  Constitutional, HEENT, cardiovascular, pulmonary, GI, , musculoskeletal, neuro, skin, " endocrine and psych systems are negative, except as otherwise noted.      Objective           Vitals:  No vitals were obtained today due to virtual visit.    Physical Exam   GENERAL: alert and no distress  EYES: Eyes grossly normal to inspection.  No discharge or erythema, or obvious scleral/conjunctival abnormalities.  RESP: No audible wheeze, cough, or visible cyanosis.    SKIN: Visible skin clear. No significant rash, abnormal pigmentation or lesions.  NEURO: Cranial nerves grossly intact.  Mentation and speech appropriate for age.  PSYCH: Appropriate affect, tone, and pace of words          Video-Visit Details    Type of service:  Video Visit   Video End Time:5:38 PM  Originating Location (pt. Location): Home    Distant Location (provider location):  On-site  Platform used for Video Visit: Mary  Signed Electronically by: Truong Menchaca MD

## 2024-06-27 ENCOUNTER — PATIENT OUTREACH (OUTPATIENT)
Dept: ONCOLOGY | Facility: CLINIC | Age: 64
End: 2024-06-27
Payer: COMMERCIAL

## 2024-06-27 NOTE — PROGRESS NOTES
[facility]: Cancer Care                                                                                          Completed chart audit to update Oncology Care Coordination enrollment status.  Reviewed POC and pt has appropriate follow up scheduled.     PRESLEY BaltazarN, RN  RN Care Coordinator  [facility]

## 2024-09-06 ENCOUNTER — TRANSFERRED RECORDS (OUTPATIENT)
Dept: MULTI SPECIALTY CLINIC | Facility: CLINIC | Age: 64
End: 2024-09-06

## 2024-09-06 LAB — RETINOPATHY: NORMAL

## 2024-10-01 DIAGNOSIS — C83.78 BURKITT LYMPHOMA OF LYMPH NODES OF MULTIPLE REGIONS (H): ICD-10-CM

## 2024-10-01 DIAGNOSIS — M54.50 LOW BACK PAIN, UNSPECIFIED BACK PAIN LATERALITY, UNSPECIFIED CHRONICITY, UNSPECIFIED WHETHER SCIATICA PRESENT: ICD-10-CM

## 2024-10-01 DIAGNOSIS — G62.0 CHEMOTHERAPY-INDUCED PERIPHERAL NEUROPATHY (H): ICD-10-CM

## 2024-10-01 DIAGNOSIS — T45.1X5A CHEMOTHERAPY-INDUCED PERIPHERAL NEUROPATHY (H): ICD-10-CM

## 2024-10-01 RX ORDER — GABAPENTIN 300 MG/1
CAPSULE ORAL
Qty: 120 CAPSULE | Refills: 3 | Status: SHIPPED | OUTPATIENT
Start: 2024-10-01

## 2024-10-07 DIAGNOSIS — C83.78 BURKITT LYMPHOMA OF LYMPH NODES OF MULTIPLE REGIONS (H): Primary | ICD-10-CM

## 2024-10-07 NOTE — PROGRESS NOTES
REASON FOR VISIT:  Management of Burkitt lymphoma    HISTORY OF PRESENT ILLNESS:  Jonnathan Pedroza is a 64 year old with a history of Burkitt lymphoma.  To summarize his course, he presented with acute on chronic back pain in 03/2020.  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  CSF was negative.  He was was treated with steroids followed by R-CODOX-M/IVAC and IT chemo prophylaxis ending in 06/2020.  His course was complicated by neutropenic sepsis that resolved and right lower extremity below the knee DVT for which he completed a course of anticoagulation.  PET/CT after cycle 2 (1st R-IVAC) and again after completing treatment confirmed PET-negative complete response.  Visit for ongoing management of lymphoma.    He is not with his wife Mckayla today.  Neuropathy seems a bit better.  Still able to be active, bowling, working around the house.  No fevers, new night sweats, or unintentional weight loss.  No dyspnea, cough, or chest pain.  No new lumps or bumps.  Enjoying cottage in [facility].  No lymphoma concerns today.    MEDICATIONS:  Current Outpatient Medications   Medication Sig Dispense Refill    atorvastatin (LIPITOR) 40 MG tablet Take 1 tablet (40 mg) by mouth daily 90 tablet 3    DULoxetine (CYMBALTA) 20 MG capsule One capsule in morning and two capsules in the evening by month. 270 capsule 2    gabapentin (NEURONTIN) 300 MG capsule TAKE 2 CAPSULES(600 MG) BY MOUTH TWICE DAILY 120 capsule 3    levothyroxine (SYNTHROID/LEVOTHROID) 175 MCG tablet TAKE 1 TABLET BY MOUTH DAILY IN THE MORNING. EXCEPT 1/2 TABLET EVERY SUNDAY 90 tablet 0    metFORMIN (GLUCOPHAGE XR) 500 MG 24 hr tablet Take 2 tablets (1,000 mg) by mouth daily (with dinner) 180 tablet 3     No current facility-administered medications for this visit.     PHYSICAL EXAMINATION:  /79 (BP Location: Left arm, Patient Position: Sitting, Cuff  Size: Adult Large)   Pulse 66   Temp 97.9  F (36.6  C) (Oral)   Resp 18   Wt 108.5 kg (239 lb 3.2 oz)   SpO2 98%   BMI 34.63 kg/m    Wt Readings from Last 5 Encounters:   10/09/24 108.5 kg (239 lb 3.2 oz)   04/10/24 108.3 kg (238 lb 12.8 oz)   03/07/24 109.5 kg (241 lb 4.8 oz)   12/06/23 110.1 kg (242 lb 11.2 oz)   11/29/23 110.8 kg (244 lb 3.2 oz)     General: Well appearing. No distress.  HEENT: Sclerae anicteric.  Lungs: Clear bilaterally without wheezing or crackles.  Heart: Regular rate and rhythm.  Gastrointestinal: Bowel sounds present, no tenderness to palpation, spleen tip not palpable.  Extremities: No lower extremity edema.  Lymph:  No cervical, clavicular, axillary, epitrochlear, or inguinal lymphadenopathy.  Performance status: ECOG 0    LABRATORY DATA:  Reviewed by me  Recent Labs   Lab Test 10/09/24  1227 04/10/24  1227 11/29/23  1326 08/04/21  0748 05/03/21  1712 02/11/21  1127 01/11/21  0920   WBC 6.6 7.1 7.2   < > 7.3 1.8* 5.9   HGB 15.4 15.0 15.2   < > 14.4 14.9 12.4*    227 241   < > 235 161 195   ANEU  --   --   --   --  5.3 0.6* 4.5   ANEUTAUTO 4.2 4.4 4.7   < >  --   --   --    ALYM  --   --   --   --  1.0 0.5* 0.5*   ALYMPAUTO 1.5 1.8 1.8   < >  --   --   --     < > = values in this interval not displayed.     Recent Labs   Lab Test 10/09/24  1227 04/10/24  1227 12/04/23  0838 11/29/23  1326 05/03/21  1712 02/11/21  1127 06/18/20  0000 06/15/20  1335 06/01/20  0000 05/26/20  0355 05/25/20  0400 03/17/20  1214 03/17/20  0556    139 140 137   < > 139   < > 139   < > 138 140   < > 139   POTASSIUM 4.0 4.2 4.1 4.3   < > 3.8   < > 3.5   < > 4.0 3.7   < > 4.0   CHLORIDE 103 103 104 102   < > 105   < > 105   < > 112* 112*   < > 107   CO2 24 24 24 24   < > 30   < > 30   < > 23 24   < > 26   BUN 14.4 17.8 15.3 23.0   < > 12   < > 13   < > 16 15   < > 25   CR 0.89 0.91 0.95 0.95   < > 0.96   < > 0.88   < > 0.72 0.70   < > 0.71   RUTH 9.1 9.0 8.4* 9.1   < > 8.5   < > 8.1*   < > 7.3*  7.4*   < > 7.2*   MAG  --   --   --   --   --  2.5*  --  2.3  --   --  2.3   < > 2.1   PHOS  --   --   --   --   --   --   --  3.6  --  2.3* 2.1*   < > 4.1   URIC  --   --   --   --   --   --   --  4.8  --  3.2* 3.1*   < > 3.7    177  --  239   < >  --    < > 304*  --  226 220   < >  --    [id number]  --   --   --   --   --   --   --   --   --   --   --   --  2.0    < > = values in this interval not displayed.     Recent Labs   Lab Test 10/09/24  1227 04/10/24  1227 11/29/23  1326 06/18/20  0000 06/15/20  1335 06/01/20  0000 05/26/20  0355 05/25/20  0400   AST 23 23 39   < > 51*   < >  --  18   ALT 29 27 45   < > 85*   < >  --  51   ALKPHOS 95 98 94   < > 73   < >  --  78   BILITOTAL 0.4 0.4 0.6   < > 0.4   < >  --  0.3   INR  --   --   --   --  1.04  --  1.03 1.11    < > = values in this interval not displayed.     IMPRESSION AND PLAN:  Jonnathan Pedroza is a 64 year old with a history of Burkitt lymphoma.    There is nothing to suggest lymphoma recurrence.  He continues to deal with long term effects of lymphoma and treatment although these seem to be slowly improving.  We agreed to continue active surveillance.    There are no active ID issues.  He is up to date with pneumococcal and Shingrix vaccines.  I recommended a flu shot, RSV vaccine, and updated COVID-19 vaccine if he has not had them.      He completed apixaban for 3 months for lymphoma-related DVT.  He did not find our Cancer Rehab Med Team very helpful and is not interested in PT - although I encouraged him to reconsider if pain continues to be problematic.  He will continue to work with PCP Dr. Truong Menchaca for his health maintenance and other medical issues.    We will request a return visit in about 6 months.  I reminded him to contact if questions, concerns, or new issues come up between visits.    Total of 30 minutes on patient visit, reviewing records, interpreting test results, placing orders, and documentation on the day of service.    The  longitudinal plan of care for the diagnosis(es)/condition(s) as documented were addressed during this visit. Due to the added complexity in care, I will continue to support Jonnathan in the subsequent management and with ongoing continuity of care.    Ever Wright MD, PhD  Attending Physician, [facility]  [phone] [facility]

## 2024-10-09 ENCOUNTER — ONCOLOGY VISIT (OUTPATIENT)
Dept: ONCOLOGY | Facility: CLINIC | Age: 64
End: 2024-10-09
Attending: INTERNAL MEDICINE
Payer: COMMERCIAL

## 2024-10-09 ENCOUNTER — APPOINTMENT (OUTPATIENT)
Dept: LAB | Facility: CLINIC | Age: 64
End: 2024-10-09
Attending: INTERNAL MEDICINE
Payer: COMMERCIAL

## 2024-10-09 VITALS
WEIGHT: 239.2 LBS | SYSTOLIC BLOOD PRESSURE: 126 MMHG | RESPIRATION RATE: 18 BRPM | DIASTOLIC BLOOD PRESSURE: 79 MMHG | BODY MASS INDEX: 34.63 KG/M2 | TEMPERATURE: 97.9 F | OXYGEN SATURATION: 98 % | HEART RATE: 66 BPM

## 2024-10-09 DIAGNOSIS — G62.0 CHEMOTHERAPY-INDUCED PERIPHERAL NEUROPATHY (H): ICD-10-CM

## 2024-10-09 DIAGNOSIS — E11.9 TYPE 2 DIABETES MELLITUS WITHOUT COMPLICATION, WITHOUT LONG-TERM CURRENT USE OF INSULIN (H): ICD-10-CM

## 2024-10-09 DIAGNOSIS — T45.1X5A CHEMOTHERAPY-INDUCED PERIPHERAL NEUROPATHY (H): ICD-10-CM

## 2024-10-09 DIAGNOSIS — C83.78 BURKITT LYMPHOMA OF LYMPH NODES OF MULTIPLE REGIONS (H): Primary | ICD-10-CM

## 2024-10-09 LAB
ALBUMIN SERPL BCG-MCNC: 4.8 G/DL (ref 3.5–5.2)
ALP SERPL-CCNC: 95 U/L (ref 40–150)
ALT SERPL W P-5'-P-CCNC: 29 U/L (ref 0–70)
ANION GAP SERPL CALCULATED.3IONS-SCNC: 10 MMOL/L (ref 7–15)
AST SERPL W P-5'-P-CCNC: 23 U/L (ref 0–45)
BASOPHILS # BLD AUTO: 0 10E3/UL (ref 0–0.2)
BASOPHILS NFR BLD AUTO: 1 %
BILIRUB SERPL-MCNC: 0.4 MG/DL
BUN SERPL-MCNC: 14.4 MG/DL (ref 8–23)
CALCIUM SERPL-MCNC: 9.1 MG/DL (ref 8.8–10.4)
CHLORIDE SERPL-SCNC: 103 MMOL/L (ref 98–107)
CREAT SERPL-MCNC: 0.89 MG/DL (ref 0.67–1.17)
EGFRCR SERPLBLD CKD-EPI 2021: >90 ML/MIN/1.73M2
EOSINOPHIL # BLD AUTO: 0.1 10E3/UL (ref 0–0.7)
EOSINOPHIL NFR BLD AUTO: 2 %
ERYTHROCYTE [DISTWIDTH] IN BLOOD BY AUTOMATED COUNT: 12.7 % (ref 10–15)
GLUCOSE SERPL-MCNC: 131 MG/DL (ref 70–99)
HCO3 SERPL-SCNC: 24 MMOL/L (ref 22–29)
HCT VFR BLD AUTO: 44.5 % (ref 40–53)
HGB BLD-MCNC: 15.4 G/DL (ref 13.3–17.7)
IMM GRANULOCYTES # BLD: 0 10E3/UL
IMM GRANULOCYTES NFR BLD: 1 %
LDH SERPL L TO P-CCNC: 164 U/L (ref 0–250)
LYMPHOCYTES # BLD AUTO: 1.5 10E3/UL (ref 0.8–5.3)
LYMPHOCYTES NFR BLD AUTO: 23 %
MCH RBC QN AUTO: 30.4 PG (ref 26.5–33)
MCHC RBC AUTO-ENTMCNC: 34.6 G/DL (ref 31.5–36.5)
MCV RBC AUTO: 88 FL (ref 78–100)
MONOCYTES # BLD AUTO: 0.7 10E3/UL (ref 0–1.3)
MONOCYTES NFR BLD AUTO: 10 %
NEUTROPHILS # BLD AUTO: 4.2 10E3/UL (ref 1.6–8.3)
NEUTROPHILS NFR BLD AUTO: 63 %
NRBC # BLD AUTO: 0 10E3/UL
NRBC BLD AUTO-RTO: 0 /100
PLATELET # BLD AUTO: 223 10E3/UL (ref 150–450)
POTASSIUM SERPL-SCNC: 4 MMOL/L (ref 3.4–5.3)
PROT SERPL-MCNC: 7.5 G/DL (ref 6.4–8.3)
RBC # BLD AUTO: 5.07 10E6/UL (ref 4.4–5.9)
SODIUM SERPL-SCNC: 137 MMOL/L (ref 135–145)
WBC # BLD AUTO: 6.6 10E3/UL (ref 4–11)

## 2024-10-09 PROCEDURE — 36415 COLL VENOUS BLD VENIPUNCTURE: CPT | Performed by: INTERNAL MEDICINE

## 2024-10-09 PROCEDURE — G0463 HOSPITAL OUTPT CLINIC VISIT: HCPCS | Performed by: INTERNAL MEDICINE

## 2024-10-09 PROCEDURE — 99214 OFFICE O/P EST MOD 30 MIN: CPT | Performed by: INTERNAL MEDICINE

## 2024-10-09 PROCEDURE — 85025 COMPLETE CBC W/AUTO DIFF WBC: CPT | Performed by: INTERNAL MEDICINE

## 2024-10-09 PROCEDURE — 82435 ASSAY OF BLOOD CHLORIDE: CPT | Performed by: INTERNAL MEDICINE

## 2024-10-09 PROCEDURE — G2211 COMPLEX E/M VISIT ADD ON: HCPCS | Performed by: INTERNAL MEDICINE

## 2024-10-09 PROCEDURE — 82947 ASSAY GLUCOSE BLOOD QUANT: CPT | Performed by: INTERNAL MEDICINE

## 2024-10-09 PROCEDURE — 83615 LACTATE (LD) (LDH) ENZYME: CPT | Performed by: INTERNAL MEDICINE

## 2024-10-09 ASSESSMENT — PAIN SCALES - GENERAL: PAINLEVEL: NO PAIN (0)

## 2024-10-09 NOTE — NURSING NOTE
"Oncology Rooming Note    October 9, 2024 12:39 PM   Kobe Pedroza is a 64 year old male who presents for:    Chief Complaint   Patient presents with    Blood Draw     Labs drawn via  by RN. VS taken.    Oncology Clinic Visit     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     Initial Vitals: /79 (BP Location: Left arm, Patient Position: Sitting, Cuff Size: Adult Large)   Pulse 66   Temp 97.9  F (36.6  C) (Oral)   Resp 18   Wt 108.5 kg (239 lb 3.2 oz)   SpO2 98%   BMI 34.63 kg/m   Estimated body mass index is 34.63 kg/m  as calculated from the following:    Height as of 3/7/24: 1.77 m (5' 9.69\").    Weight as of this encounter: 108.5 kg (239 lb 3.2 oz). Body surface area is 2.31 meters squared.  No Pain (0) Comment: Data Unavailable   No LMP for male patient.  Allergies reviewed: Yes  Medications reviewed: Yes    Medications: MEDICATION REFILLS NEEDED TODAY. Provider was notified.  Pharmacy name entered into [employer]: [id number] [place], [place] - [address]    Frailty Screening:   Is the patient here for a new oncology consult visit in cancer care? 2. No      Clinical concerns: Refill: Levothyroxine if possible      Hui Smith, EMT  10/9/2024              "

## 2024-10-09 NOTE — LETTER
10/9/2024      Kobe Pedroza  [address] [facility] [address]      Dear Colleague,    Thank you for referring your patient, Kobe Pedroza, to the [facility]. Please see a copy of my visit note below.    REASON FOR VISIT:  Management of Burkitt lymphoma    HISTORY OF PRESENT ILLNESS:  Jonnathan Pedroza is a 64 year old with a history of Burkitt lymphoma.  To summarize his course, he presented with acute on chronic back pain in 03/2020.  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  CSF was negative.  He was was treated with steroids followed by R-CODOX-M/IVAC and IT chemo prophylaxis ending in 06/2020.  His course was complicated by neutropenic sepsis that resolved and right lower extremity below the knee DVT for which he completed a course of anticoagulation.  PET/CT after cycle 2 (1st R-IVAC) and again after completing treatment confirmed PET-negative complete response.  Visit for ongoing management of lymphoma.    He is not with his wife Mckayla today.  Neuropathy seems a bit better.  Still able to be active, bowling, working around the house.  No fevers, new night sweats, or unintentional weight loss.  No dyspnea, cough, or chest pain.  No new lumps or bumps.  Enjoying cottage in [facility].  No lymphoma concerns today.    MEDICATIONS:  Current Outpatient Medications   Medication Sig Dispense Refill     atorvastatin (LIPITOR) 40 MG tablet Take 1 tablet (40 mg) by mouth daily 90 tablet 3     DULoxetine (CYMBALTA) 20 MG capsule One capsule in morning and two capsules in the evening by month. 270 capsule 2     gabapentin (NEURONTIN) 300 MG capsule TAKE 2 CAPSULES(600 MG) BY MOUTH TWICE DAILY 120 capsule 3     levothyroxine (SYNTHROID/LEVOTHROID) 175 MCG tablet TAKE 1 TABLET BY MOUTH DAILY IN THE MORNING. EXCEPT 1/2 TABLET EVERY SUNDAY 90 tablet 0     metFORMIN (GLUCOPHAGE  XR) 500 MG 24 hr tablet Take 2 tablets (1,000 mg) by mouth daily (with dinner) 180 tablet 3     No current facility-administered medications for this visit.     PHYSICAL EXAMINATION:  /79 (BP Location: Left arm, Patient Position: Sitting, Cuff Size: Adult Large)   Pulse 66   Temp 97.9  F (36.6  C) (Oral)   Resp 18   Wt 108.5 kg (239 lb 3.2 oz)   SpO2 98%   BMI 34.63 kg/m    Wt Readings from Last 5 Encounters:   10/09/24 108.5 kg (239 lb 3.2 oz)   04/10/24 108.3 kg (238 lb 12.8 oz)   03/07/24 109.5 kg (241 lb 4.8 oz)   12/06/23 110.1 kg (242 lb 11.2 oz)   11/29/23 110.8 kg (244 lb 3.2 oz)     General: Well appearing. No distress.  HEENT: Sclerae anicteric.  Lungs: Clear bilaterally without wheezing or crackles.  Heart: Regular rate and rhythm.  Gastrointestinal: Bowel sounds present, no tenderness to palpation, spleen tip not palpable.  Extremities: No lower extremity edema.  Lymph:  No cervical, clavicular, axillary, epitrochlear, or inguinal lymphadenopathy.  Performance status: ECOG 0    LABRATORY DATA:  Reviewed by me  Recent Labs   Lab Test 10/09/24  1227 04/10/24  1227 11/29/23  1326 08/04/21  0748 05/03/21  1712 02/11/21  1127 01/11/21  0920   WBC 6.6 7.1 7.2   < > 7.3 1.8* 5.9   HGB 15.4 15.0 15.2   < > 14.4 14.9 12.4*    227 241   < > 235 161 195   ANEU  --   --   --   --  5.3 0.6* 4.5   ANEUTAUTO 4.2 4.4 4.7   < >  --   --   --    ALYM  --   --   --   --  1.0 0.5* 0.5*   ALYMPAUTO 1.5 1.8 1.8   < >  --   --   --     < > = values in this interval not displayed.     Recent Labs   Lab Test 10/09/24  1227 04/10/24  1227 12/04/23  0838 11/29/23  1326 05/03/21  1712 02/11/21  1127 06/18/20  0000 06/15/20  1335 06/01/20  0000 05/26/20  0355 05/25/20  0400 03/17/20  1214 03/17/20  0556    139 140 137   < > 139   < > 139   < > 138 140   < > 139   POTASSIUM 4.0 4.2 4.1 4.3   < > 3.8   < > 3.5   < > 4.0 3.7   < > 4.0   CHLORIDE 103 103 104 102   < > 105   < > 105   < > 112* 112*   < > 107    CO2 24 24 24 24   < > 30   < > 30   < > 23 24   < > 26   BUN 14.4 17.8 15.3 23.0   < > 12   < > 13   < > 16 15   < > 25   CR 0.89 0.91 0.95 0.95   < > 0.96   < > 0.88   < > 0.72 0.70   < > 0.71   RUTH 9.1 9.0 8.4* 9.1   < > 8.5   < > 8.1*   < > 7.3* 7.4*   < > 7.2*   MAG  --   --   --   --   --  2.5*  --  2.3  --   --  2.3   < > 2.1   PHOS  --   --   --   --   --   --   --  3.6  --  2.3* 2.1*   < > 4.1   URIC  --   --   --   --   --   --   --  4.8  --  3.2* 3.1*   < > 3.7    177  --  239   < >  --    < > 304*  --  226 220   < >  --    [id number]  --   --   --   --   --   --   --   --   --   --   --   --  2.0    < > = values in this interval not displayed.     Recent Labs   Lab Test 10/09/24  1227 04/10/24  1227 11/29/23  1326 06/18/20  0000 06/15/20  1335 06/01/20  0000 05/26/20  0355 05/25/20  0400   AST 23 23 39   < > 51*   < >  --  18   ALT 29 27 45   < > 85*   < >  --  51   ALKPHOS 95 98 94   < > 73   < >  --  78   BILITOTAL 0.4 0.4 0.6   < > 0.4   < >  --  0.3   INR  --   --   --   --  1.04  --  1.03 1.11    < > = values in this interval not displayed.     IMPRESSION AND PLAN:  Jonnathan Pedroza is a 64 year old with a history of Burkitt lymphoma.    There is nothing to suggest lymphoma recurrence.  He continues to deal with long term effects of lymphoma and treatment although these seem to be slowly improving.  We agreed to continue active surveillance.    There are no active ID issues.  He is up to date with pneumococcal and Shingrix vaccines.  I recommended a flu shot, RSV vaccine, and updated COVID-19 vaccine if he has not had them.      He completed apixaban for 3 months for lymphoma-related DVT.  He did not find our Cancer Rehab Med Team very helpful and is not interested in PT - although I encouraged him to reconsider if pain continues to be problematic.  He will continue to work with PCP Dr. Truong Menchaca for his health maintenance and other medical issues.    We will request a return visit in about 6  months.  I reminded him to contact if questions, concerns, or new issues come up between visits.    Total of 30 minutes on patient visit, reviewing records, interpreting test results, placing orders, and documentation on the day of service.    The longitudinal plan of care for the diagnosis(es)/condition(s) as documented were addressed during this visit. Due to the added complexity in care, I will continue to support Jonnathan in the subsequent management and with ongoing continuity of care.    Ever Wright MD, PhD  Attending Physician, [facility]  [phone] clinic    Again, thank you for allowing me to participate in the care of your patient.        Sincerely,        Ever Wright MD

## 2024-10-15 ENCOUNTER — TELEPHONE (OUTPATIENT)
Dept: FAMILY MEDICINE | Facility: CLINIC | Age: 64
End: 2024-10-15
Payer: COMMERCIAL

## 2024-10-15 ENCOUNTER — PATIENT OUTREACH (OUTPATIENT)
Dept: ONCOLOGY | Facility: CLINIC | Age: 64
End: 2024-10-15
Payer: COMMERCIAL

## 2024-10-15 DIAGNOSIS — E11.9 TYPE 2 DIABETES MELLITUS WITHOUT COMPLICATION, WITHOUT LONG-TERM CURRENT USE OF INSULIN (H): Primary | ICD-10-CM

## 2024-10-15 DIAGNOSIS — E89.0 POSTSURGICAL HYPOTHYROIDISM: ICD-10-CM

## 2024-10-15 NOTE — TELEPHONE ENCOUNTER
Medication Question or Refill        What medication are you calling about (include dose and sig)?: levothyroxine (SYNTHROID/LEVOTHROID) 175 MCG tablet    Preferred Pharmacy:  [id number] - [place] - [address]  [place] [address]  Phone: [phone] Fax: [phone]      Controlled Substance Agreement on file:   CSA -- Patient Level:    CSA: None found at the patient level.       Who prescribed the medication?: Truong Menchaca

## 2024-10-15 NOTE — PROGRESS NOTES
[facility]: Cancer Care                                                                                          Completed chart audit to update Oncology Care Coordination enrollment status.  Reviewed POC and pt has appropriate follow up scheduled. My chart message sent to pt to check in and reinforce how to contact clinic if need arise in between visits.    Lucinda Rios, PRESLEYN, RN  RN Care Coordinator  [facility]

## 2024-10-16 RX ORDER — KETOROLAC TROMETHAMINE 30 MG/ML
1 INJECTION, SOLUTION INTRAMUSCULAR; INTRAVENOUS ONCE
Qty: 1 EACH | Refills: 0 | Status: SHIPPED | OUTPATIENT
Start: 2024-10-16 | End: 2024-10-16

## 2024-10-16 RX ORDER — HYDROCHLOROTHIAZIDE 12.5 MG/1
CAPSULE ORAL
Qty: 6 EACH | Refills: 0 | Status: SHIPPED | OUTPATIENT
Start: 2024-10-16

## 2024-10-16 RX ORDER — LEVOTHYROXINE SODIUM 175 UG/1
TABLET ORAL
Qty: 90 TABLET | Refills: 0 | Status: SHIPPED | OUTPATIENT
Start: 2024-10-16

## 2024-10-16 NOTE — TELEPHONE ENCOUNTER
Is she talking ab out Jacque device? She is not on insulin. I don't know if her insurance will cover. I sent a prescription for her to check with insurance.

## 2024-10-17 NOTE — TELEPHONE ENCOUNTER
Sent patient a [employer] message RX for Jacque Sensors were sent to pharmacy

## 2024-10-21 DIAGNOSIS — E11.9 TYPE 2 DIABETES MELLITUS WITHOUT COMPLICATION, WITHOUT LONG-TERM CURRENT USE OF INSULIN (H): ICD-10-CM

## 2024-10-21 DIAGNOSIS — E78.2 MIXED DYSLIPIDEMIA: ICD-10-CM

## 2024-10-21 RX ORDER — ATORVASTATIN CALCIUM 40 MG/1
40 TABLET, FILM COATED ORAL DAILY
Qty: 90 TABLET | Refills: 3 | OUTPATIENT
Start: 2024-10-21

## 2024-10-21 RX ORDER — METFORMIN HYDROCHLORIDE 500 MG/1
1000 TABLET, EXTENDED RELEASE ORAL
Qty: 180 TABLET | Refills: 3 | OUTPATIENT
Start: 2024-10-21

## 2024-10-22 ENCOUNTER — TELEPHONE (OUTPATIENT)
Dept: FAMILY MEDICINE | Facility: CLINIC | Age: 64
End: 2024-10-22
Payer: COMMERCIAL

## 2024-10-22 NOTE — TELEPHONE ENCOUNTER
Received a request for office notes from Kim for patients Free style Jacque 3 [place] Device. Trying to get Jacque covered by Medicare Part B.    Printed notes from 6/20/24 and faxed request and notes to [facility] at [phone]

## 2024-10-23 ENCOUNTER — TELEPHONE (OUTPATIENT)
Dept: FAMILY MEDICINE | Facility: CLINIC | Age: 64
End: 2024-10-23
Payer: COMMERCIAL

## 2024-10-23 NOTE — TELEPHONE ENCOUNTER
Prior Authorization Retail Medication Request    Medication/Dose: Continuous Glucose Sensor (FREESTYLE KHUSHBOO 3 PLUS SENSOR) MISC    Plan does not cover the above med.  Please call plan at [phone] to initiate PA or call/fax pharmacy to change med.     Insurance type and ID number: [id number]    Christopher MARTI May on 10/23/2024 at 11:47 AM

## 2024-10-24 NOTE — TELEPHONE ENCOUNTER
Retail Pharmacy Prior Authorization Team   Phone: [phone]        PA Initiation    Medication: FREESTYLE KHUSHBOO 3 PLUS SENSOR MISC  Insurance Company: [employer] Part D - Phone [phone] Fax [phone]  Pharmacy Filling the Rx: [id number] - [place], [place] - [address]  Filling Pharmacy Phone:    Filling Pharmacy Fax:    Start Date: 10/24/2024

## 2024-10-28 NOTE — TELEPHONE ENCOUNTER
"Retail Pharmacy Prior Authorization Team   Phone: [phone]        PRIOR AUTHORIZATION DENIED    Medication: FREESTYLE KHUSHBOO 3 PLUS SENSOR MISC  Insurance Company: [employer] Part D - Phone [phone] Fax [phone]  Denial Date: 10/25/2024  Denial Reason(s): PATIENT DOES NOT MEET CRITERIA FOR APPROVAL:      Appeal Information: To send an appeal to the patient's insurance, please provide a letter of medical necessity for the requested medication that was denied.  Please use the denial rationale from the patient's insurance to write the letter.  Once it is completed, please have it available under the \"letters tab\" in the patient's chart and route directly back to me: MARVEL CAICEDO and I can send the appeal to the patient's insurance.       Patient Notified: No. The [employer] [facility] PA Team does not notify of the denial outcomes as the patient often will ask what their provider will prescribe in place of the denied medication, or additional information in regards to other therapies they can take in place of the denied medication.  This is not something we can advise on in our department.    "

## 2024-11-06 ENCOUNTER — PATIENT OUTREACH (OUTPATIENT)
Dept: CARE COORDINATION | Facility: CLINIC | Age: 64
End: 2024-11-06
Payer: COMMERCIAL

## 2024-11-16 ENCOUNTER — HEALTH MAINTENANCE LETTER (OUTPATIENT)
Age: 64
End: 2024-11-16

## 2024-11-20 ENCOUNTER — PATIENT OUTREACH (OUTPATIENT)
Dept: CARE COORDINATION | Facility: CLINIC | Age: 64
End: 2024-11-20
Payer: COMMERCIAL

## 2024-11-27 DIAGNOSIS — E78.2 MIXED DYSLIPIDEMIA: ICD-10-CM

## 2024-11-27 RX ORDER — ATORVASTATIN CALCIUM 40 MG/1
40 TABLET, FILM COATED ORAL DAILY
Qty: 90 TABLET | Refills: 3 | OUTPATIENT
Start: 2024-11-27

## 2024-12-04 ENCOUNTER — MYC MEDICAL ADVICE (OUTPATIENT)
Dept: FAMILY MEDICINE | Facility: CLINIC | Age: 64
End: 2024-12-04
Payer: COMMERCIAL

## 2024-12-04 DIAGNOSIS — E11.9 TYPE 2 DIABETES MELLITUS WITHOUT COMPLICATION, WITHOUT LONG-TERM CURRENT USE OF INSULIN (H): ICD-10-CM

## 2024-12-04 RX ORDER — METFORMIN HYDROCHLORIDE 500 MG/1
1000 TABLET, EXTENDED RELEASE ORAL
Qty: 200 TABLET | Refills: 2 | Status: SHIPPED | OUTPATIENT
Start: 2024-12-04

## 2024-12-04 NOTE — TELEPHONE ENCOUNTER
Sent [employer] message to Kobe Pedroza in regards to their metformin being overdue for refill. Per our records last filled 7/24/24 for 90 day supply and is 43 days late to refill.      Patient has [facility] coverage and with this insurance plan, the patient is eligible to receive certain prescriptions as a 100-day supply at the 90-day supply cost.      Prescriptions updated to 100-day supply per protocol: metformin      Whitley Salvador, PharmD, [facility]  Population Health Pharmacist  [phone]

## 2024-12-09 ENCOUNTER — OFFICE VISIT (OUTPATIENT)
Dept: FAMILY MEDICINE | Facility: CLINIC | Age: 64
End: 2024-12-09
Payer: COMMERCIAL

## 2024-12-09 VITALS
TEMPERATURE: 97.1 F | SYSTOLIC BLOOD PRESSURE: 132 MMHG | HEART RATE: 66 BPM | RESPIRATION RATE: 17 BRPM | BODY MASS INDEX: 33.64 KG/M2 | OXYGEN SATURATION: 93 % | DIASTOLIC BLOOD PRESSURE: 83 MMHG | HEIGHT: 70 IN | WEIGHT: 235 LBS

## 2024-12-09 DIAGNOSIS — E89.0 STATUS POST COMPLETE THYROIDECTOMY: ICD-10-CM

## 2024-12-09 DIAGNOSIS — E11.9 TYPE 2 DIABETES MELLITUS WITHOUT COMPLICATION, WITHOUT LONG-TERM CURRENT USE OF INSULIN (H): ICD-10-CM

## 2024-12-09 DIAGNOSIS — G62.0 CHEMOTHERAPY-INDUCED PERIPHERAL NEUROPATHY (H): ICD-10-CM

## 2024-12-09 DIAGNOSIS — E66.811 OBESITY, CLASS I, BMI 30-34.9: ICD-10-CM

## 2024-12-09 DIAGNOSIS — T45.1X5A CHEMOTHERAPY-INDUCED PERIPHERAL NEUROPATHY (H): ICD-10-CM

## 2024-12-09 DIAGNOSIS — E89.0 POSTSURGICAL HYPOTHYROIDISM: ICD-10-CM

## 2024-12-09 DIAGNOSIS — C83.78 BURKITT LYMPHOMA OF LYMPH NODES OF MULTIPLE REGIONS (H): ICD-10-CM

## 2024-12-09 DIAGNOSIS — Z00.00 MEDICARE ANNUAL WELLNESS VISIT, SUBSEQUENT: Primary | ICD-10-CM

## 2024-12-09 PROBLEM — Z98.890 STATUS POST COMPLETE THYROIDECTOMY: Status: ACTIVE | Noted: 2022-02-01

## 2024-12-09 PROBLEM — Z90.89 STATUS POST COMPLETE THYROIDECTOMY: Status: ACTIVE | Noted: 2022-02-01

## 2024-12-09 LAB
EST. AVERAGE GLUCOSE BLD GHB EST-MCNC: 131 MG/DL
HBA1C MFR BLD: 6.2 % (ref 0–5.6)

## 2024-12-09 PROCEDURE — G0439 PPPS, SUBSEQ VISIT: HCPCS | Performed by: INTERNAL MEDICINE

## 2024-12-09 PROCEDURE — 83036 HEMOGLOBIN GLYCOSYLATED A1C: CPT | Performed by: INTERNAL MEDICINE

## 2024-12-09 PROCEDURE — 99214 OFFICE O/P EST MOD 30 MIN: CPT | Mod: 25 | Performed by: INTERNAL MEDICINE

## 2024-12-09 PROCEDURE — 36415 COLL VENOUS BLD VENIPUNCTURE: CPT | Performed by: INTERNAL MEDICINE

## 2024-12-09 PROCEDURE — 91320 SARSCV2 VAC 30MCG TRS-SUC IM: CPT | Performed by: INTERNAL MEDICINE

## 2024-12-09 PROCEDURE — 90673 RIV3 VACCINE NO PRESERV IM: CPT | Performed by: INTERNAL MEDICINE

## 2024-12-09 PROCEDURE — 90480 ADMN SARSCOV2 VAC 1/ONLY CMP: CPT | Performed by: INTERNAL MEDICINE

## 2024-12-09 PROCEDURE — G0008 ADMIN INFLUENZA VIRUS VAC: HCPCS | Performed by: INTERNAL MEDICINE

## 2024-12-09 RX ORDER — LEVOTHYROXINE SODIUM 175 UG/1
TABLET ORAL
Qty: 90 TABLET | Refills: 0 | Status: SHIPPED | OUTPATIENT
Start: 2024-12-09 | End: 2024-12-09

## 2024-12-09 RX ORDER — GABAPENTIN 300 MG/1
600 CAPSULE ORAL 2 TIMES DAILY
Qty: 360 CAPSULE | Refills: 3 | Status: SHIPPED | OUTPATIENT
Start: 2024-12-09

## 2024-12-09 RX ORDER — METFORMIN HYDROCHLORIDE 500 MG/1
1000 TABLET, EXTENDED RELEASE ORAL
Qty: 360 TABLET | Refills: 3 | Status: SHIPPED | OUTPATIENT
Start: 2024-12-09

## 2024-12-09 RX ORDER — DULOXETIN HYDROCHLORIDE 20 MG/1
CAPSULE, DELAYED RELEASE ORAL
Qty: 180 CAPSULE | Refills: 3 | Status: SHIPPED | OUTPATIENT
Start: 2024-12-09

## 2024-12-09 RX ORDER — ATORVASTATIN CALCIUM 40 MG/1
40 TABLET, FILM COATED ORAL DAILY
Qty: 90 TABLET | Refills: 3 | Status: SHIPPED | OUTPATIENT
Start: 2024-12-09

## 2024-12-09 RX ORDER — LEVOTHYROXINE SODIUM 175 UG/1
TABLET ORAL
Qty: 90 TABLET | Refills: 3 | Status: SHIPPED | OUTPATIENT
Start: 2024-12-09

## 2024-12-09 SDOH — HEALTH STABILITY: PHYSICAL HEALTH: ON AVERAGE, HOW MANY DAYS PER WEEK DO YOU ENGAGE IN MODERATE TO STRENUOUS EXERCISE (LIKE A BRISK WALK)?: 2 DAYS

## 2024-12-09 SDOH — HEALTH STABILITY: PHYSICAL HEALTH: ON AVERAGE, HOW MANY MINUTES DO YOU ENGAGE IN EXERCISE AT THIS LEVEL?: 60 MIN

## 2024-12-09 ASSESSMENT — PAIN SCALES - GENERAL: PAINLEVEL_OUTOF10: NO PAIN (0)

## 2024-12-09 ASSESSMENT — SOCIAL DETERMINANTS OF HEALTH (SDOH): HOW OFTEN DO YOU GET TOGETHER WITH FRIENDS OR RELATIVES?: ONCE A WEEK

## 2024-12-09 NOTE — PROGRESS NOTES
"Preventive Care Visit  [facility]  Truong Menchaca MD, Internal Medicine  Dec 9, 2024      Assessment & Plan     1.  Medicare annual wellness visit completed.  2.  Burkitt's lymphoma all of multiple regions.  Stage IV with extensive visceral and bony disease.  MS at T5-T6 with cord compression without neurological deficit.  Treated surgically.  Treated with steroid followed chemotherapy initially diagnosed March show 2020 and ending therapy 06/20/2020.  Appears to be in complete remission.  3.  Type 2 diabetes mellitus currently on metformin 1000 mg a day.  A1c checked today came back at 6.2 which is good.  Will recheck him in 6 months.  4.  Postsurgical hypothyroidism with recent TSH 0.17 and free T4 in the upper limits of normal at 1.66 so advised to continue with present dose of levothyroxine of 175 mcg daily except half a tablet every Sunday.  5.  Obesity class I.  6.  Normal cholesterol on 6/18/2024 with cholesterol 150 HDL 35 LDL 90 triglyceride 126.  7.  Chemotherapy-induced peripheral neuropathy.  Clinically better.  He has cut back duloxetine to 40 mg daily.  Currently on gabapentin 600 mg twice a day.  S/p complete thyroidectomy 2011 for thyroid cancer.    I will have patient return in 6 months particularly to check on diabetes.  He has requested Dexcom 7 continuous glucose monitoring sensor and his insurance had denied coverage.    Patient has been advised of split billing requirements and indicates understanding: Yes        BMI  Estimated body mass index is 34.01 kg/m  as calculated from the following:    Height as of this encounter: 1.77 m (5' 9.7\").    Weight as of this encounter: 106.6 kg (235 lb).   Weight management plan: Discussed healthy diet and exercise guidelines    Counseling  Appropriate preventive services were addressed with this patient via screening, questionnaire, or discussion as appropriate for fall prevention, nutrition, physical activity, Tobacco-use " cessation, social engagement, weight loss and cognition.  Checklist reviewing preventive services available has been given to the patient.  Reviewed patient's diet, addressing concerns and/or questions.   He is at risk for lack of exercise and has been provided with information to increase physical activity for the benefit of his well-being.   Discussed possible causes of fatigue. The patient was provided with written information regarding signs of hearing loss.           Janae De Santiago is a 64 year old, presenting for the following:  Diabetes, Hypertension (High Cholesterol, hypo thyroid ), Scar Management (On back, would like to check ), Recheck Medication, and Physical        12/9/2024    10:17 AM   Additional Questions   Roomed by Hunter   Accompanied by self         12/9/2024    10:17 AM   Patient Reported Additional Medications   Patient reports taking the following new medications no         Via the Health Maintenance questionnaire, the patient has reported the following services have been completed -Eye Exam: [employer] 2024-09-06, this information has been sent to the abstraction team.    JADIEL De Santiago is a 62-year-old gentleman who is here for annual checkup and follow-up on postsurgical hypothyroidism, diabetes.  He has history of Burkitt's lymphoma diagnosed 2020 treated with chemotherapy with good response and has been in remission.  He tried to get Dexcom 7 through his insurance but that was denied.  He has lost some weight since last visit.  He does not exercise a whole lot so we discussed that today.  Otherwise he has been doing well.  No chest pain or shortness of breath no back pain.  He has peripheral neuropathy which actually is little better.  He has cut back duloxetine some and occasionally gabapentin.    Health Care Directive  Patient does not have a Health Care Directive: Discussed advance care planning with patient; however, patient declined at this time.      12/9/2024   General  Health   How would you rate your overall physical health? (!) FAIR   Feel stress (tense, anxious, or unable to sleep) Only a little      (!) STRESS CONCERN      12/9/2024   Nutrition   Diet: Diabetic            12/9/2024   Exercise   Days per week of moderate/strenous exercise 2 days   Average minutes spent exercising at this level 60 min      (!) EXERCISE CONCERN      12/9/2024   Social Factors   Frequency of gathering with friends or relatives Once a week   Worry food won't last until get money to buy more No   Food not last or not have enough money for food? No   Do you have housing? (Housing is defined as stable permanent housing and does not include staying ouside in a car, in a tent, in an abandoned building, in an overnight shelter, or couch-surfing.) Yes   Are you worried about losing your housing? No   Lack of transportation? No   Unable to get utilities (heat,electricity)? No            12/9/2024   Fall Risk   Fallen 2 or more times in the past year? No    Trouble with walking or balance? No        Patient-reported          12/9/2024   Activities of Daily Living- Home Safety   Needs help with the following daily activites None of the above   Safety concerns in the home None of the above            12/9/2024   Dental   Dentist two times every year? Yes            12/9/2024   Hearing Screening   Hearing concerns? (!) I FEEL THAT PEOPLE ARE MUMBLING OR NOT SPEAKING CLEARLY.            12/9/2024   Driving Risk Screening   Patient/family members have concerns about driving No            12/9/2024   General Alertness/Fatigue Screening   Have you been more tired than usual lately? (!) YES            12/9/2024   Urinary Incontinence Screening   Bothered by leaking urine in past 6 months No            12/9/2024   TB Screening   Were you born outside of the [place]? No            Today's PHQ-2 Score:       12/9/2024    10:28 AM   PHQ-2 ( 1999 Pf[employer])   Q1: Little interest or pleasure in doing things 0    Q2: Feeling  "down, depressed or hopeless 0    PHQ-2 Score 0    Q1: Little interest or pleasure in doing things Not at all   Q2: Feeling down, depressed or hopeless Not at all   PHQ-2 Score 0       Patient-reported           2024   Substance Use   Alcohol more than 3/day or more than 7/wk No   Do you have a current opioid prescription? No   How severe/bad is pain from 1 to 10? 0/10 (No Pain)   Do you use any other substances recreationally? No        Social History     Tobacco Use    Smoking status: Never     Passive exposure: Never    Smokeless tobacco: Never   Vaping Use    Vaping status: Never Used   Substance Use Topics    Alcohol use: Not Currently    Drug use: Never       Last PSA: No results found for: \"PSA\"  ASCVD Risk   The 10-year ASCVD risk score (Arash DEWITT, et al., 2019) is: 19.5%    Values used to calculate the score:      Age: 64 years      Sex: Male      Is Non- : No      Diabetic: Yes      Tobacco smoker: No      Systolic Blood Pressure: 132 mmHg      Is BP treated: No      HDL Cholesterol: 35 mg/dL      Total Cholesterol: 150 mg/dL    Fracture Risk Assessment Tool  Link to Frax Calculator  Use the information below to complete the Frax calculator  : 1960  Sex: male  Weight (kg): 106.6 kg (actual weight)  Height (cm): 177 cm  Previous Fragility Fracture:  No  History of parent with fractured hip:  No  Current Smoking:  No  Patient has been on glucocorticoids for more than 3 months (5mg/day or more): No  Rheumatoid Arthritis on Problem List:  No  Secondary Osteoporosis on Problem List:  No  Consumes 3 or more units of alcohol per day: No  Femoral Neck BMD (g/cm2)            Reviewed and updated as needed this visit by Provider                    Past Medical History:   Diagnosis Date    Acute deep vein thrombosis (DVT) of popliteal vein of right lower extremity (H) 2020    Below knee right leg while on chemotherapy and positive COVID    Burkitt's lymphoma (H) " 02/2020    treated with R-CODOX - MM/OVAC AND IT Chemo completed 06/2020    Chemotherapy-induced neutropenia (H) 04/14/2020    Class 2 obesity due to excess calories without serious comorbidity in adult 02/01/2022    IFG (impaired fasting glucose) 02/03/2022    Mixed dyslipidemia 02/03/2022    Obesity, Class I, BMI 30-34.9 12/06/2023    Polyneuropathy 02/01/2022    Postsurgical hypothyroidism 02/01/2022    Thyroid cancer (H)      Past Surgical History:   Procedure Laterality Date    INJECT EPIDURAL LUMBAR N/A 11/14/2022    Procedure: L5-S1 interlaminar epidural steroid injection;  Surgeon: Piter Torres MD;  Location: [facility] OR    IR PICC VASCULAR  04/08/2020    LAMINECTOMY, EXCISE TUMOR THORACIC, COMBINED N/A 03/15/2020    Procedure: LAMINECTOMY, SPINE, THORACIC, 2 levels, T-5, T-6;  Surgeon: Heather Cespedes MD;  Location: [facility] OR    PICC DOUBLE LUMEN PLACEMENT Right 04/30/2020    5FR DL PICC inserted at right basilic vein , length- 39cm (1cm out), tip at low SVC.    THYROIDECTOMY  Bilateral 2011     BP Readings from Last 3 Encounters:   12/09/24 132/83   10/09/24 126/79   04/10/24 136/81    Wt Readings from Last 3 Encounters:   12/09/24 106.6 kg (235 lb)   10/09/24 108.5 kg (239 lb 3.2 oz)   04/10/24 108.3 kg (238 lb 12.8 oz)                  Patient Active Problem List   Diagnosis    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)    Postsurgical hypothyroidism    Mixed dyslipidemia    Low back pain, unspecified back pain laterality, unspecified chronicity, unspecified whether sciatica present    Neuropathic pain    Chemotherapy-induced peripheral neuropathy (H)    Type 2 diabetes mellitus without complication, without long-term current use of insulin (H)    Obesity, Class I, BMI 30-34.9     Past Surgical History:   Procedure Laterality Date    INJECT EPIDURAL LUMBAR N/A 11/14/2022    Procedure: L5-S1 interlaminar epidural steroid injection;  Surgeon: Piter Torres MD;  Location: [facility] OR    IR PICC  VASCULAR  04/08/2020    LAMINECTOMY, EXCISE TUMOR THORACIC, COMBINED N/A 03/15/2020    Procedure: LAMINECTOMY, SPINE, THORACIC, 2 levels, T-5, T-6;  Surgeon: Heather Cespedes MD;  Location: [facility] OR    PICC DOUBLE LUMEN PLACEMENT Right 04/30/2020    5FR DL PICC inserted at right basilic vein , length- 39cm (1cm out), tip at low SVC.    THYROIDECTOMY  Bilateral 2011       Social History     Tobacco Use    Smoking status: Never     Passive exposure: Never    Smokeless tobacco: Never   Substance Use Topics    Alcohol use: Not Currently     Family History   Problem Relation Age of Onset    Neuropathy Mother     Polymyalgia rheumatica Mother     Diabetes Type 2  Mother     Diabetes Mother     Hypertension Father     Cerebrovascular Disease Father          Current Outpatient Medications   Medication Sig Dispense Refill    atorvastatin (LIPITOR) 40 MG tablet Take 1 tablet (40 mg) by mouth daily 90 tablet 3    Continuous Glucose Sensor (FREESTYLE KHUSHBOO 3 PLUS SENSOR) MISC Use 1 sensor every 15 days. Use to read blood sugars per 's instructions. 6 each 0    DULoxetine (CYMBALTA) 20 MG capsule One capsule in morning and two capsules in the evening by month. 270 capsule 2    gabapentin (NEURONTIN) 300 MG capsule TAKE 2 CAPSULES(600 MG) BY MOUTH TWICE DAILY 120 capsule 3    levothyroxine (SYNTHROID/LEVOTHROID) 175 MCG tablet TAKE 1 TABLET BY MOUTH DAILY IN THE MORNING. EXCEPT 1/2 TABLET EVERY SUNDAY 90 tablet 0    metFORMIN (GLUCOPHAGE XR) 500 MG 24 hr tablet Take 2 tablets (1,000 mg) by mouth daily (with dinner). 200 tablet 2     No Known Allergies  Current providers sharing in care for this patient include:  Patient Care Team:  Truong Menchaca MD as PCP - General  Ever Wright MD as MD (Internal Medicine - Hematology)  Truong Menchaca MD as Assigned PCP  Lucinda Rios, RN as Specialty Care Coordinator (Hematology & Oncology)  Ever Wright MD as Assigned Cancer Care Provider  Darin  "Donita, RN as Registered Nurse  Donita Webster, RN as Diabetes Educator    The following health maintenance items are reviewed in Epic and correct as of today:  Health Maintenance   Topic Date Due    RSV VACCINE (1 - Risk 60-74 years 1-dose series) Never done    EYE EXAM  08/15/2024    INFLUENZA VACCINE (1) 09/01/2024    COVID-19 Vaccine (5 - 2024-25 season) 09/01/2024    A1C  09/18/2024    MICROALBUMIN  12/04/2024    DIABETIC FOOT EXAM  12/06/2024    ANNUAL REVIEW OF HM ORDERS  12/06/2024    MEDICARE ANNUAL WELLNESS VISIT  12/06/2024    LIPID  06/18/2025    BMP  10/09/2025    TSH W/FREE T4 REFLEX  12/06/2025    Pneumococcal Vaccine: Pediatrics (0 to 5 Years) and At-Risk Patients (6 to 64 Years) (3 of 3 - PPSV23 or PCV20) 11/17/2026    ADVANCE CARE PLANNING  12/06/2028    DTAP/TDAP/TD IMMUNIZATION (4 - Td or Tdap) 09/16/2030    COLORECTAL CANCER SCREENING  07/23/2031    HEPATITIS C SCREENING  Completed    HIV SCREENING  Completed    PHQ-2 (once per calendar year)  Completed    ZOSTER IMMUNIZATION  Completed    HPV IMMUNIZATION  Aged Out    MENINGITIS IMMUNIZATION  Aged Out    RSV MONOCLONAL ANTIBODY  Aged Out         Review of Systems  Constitutional, HEENT, cardiovascular, pulmonary, GI, , musculoskeletal, neuro, skin, endocrine and psych systems are negative, except as otherwise noted.     Objective    Exam  /83 (BP Location: Right arm, Patient Position: Sitting, Cuff Size: Adult Regular)   Pulse 66   Temp 97.1  F (36.2  C) (Tympanic)   Resp 17   Ht 1.77 m (5' 9.7\")   Wt 106.6 kg (235 lb)   SpO2 93%   BMI 34.01 kg/m     Estimated body mass index is 34.01 kg/m  as calculated from the following:    Height as of this encounter: 1.77 m (5' 9.7\").    Weight as of this encounter: 106.6 kg (235 lb).    Physical Exam  GENERAL: alert and no distress  EYES: Eyes grossly normal to inspection, PERRL and conjunctivae and sclerae normal  HENT: ear canals and TM's normal, nose and mouth without ulcers or " lesions  NECK: no adenopathy, no asymmetry, masses, or scars  RESP: lungs clear to auscultation - no rales, rhonchi or wheezes  CV: regular rate and rhythm, normal S1 S2, no S3 or S4, no murmur, click or rub, no peripheral edema  ABDOMEN: soft, nontender, no hepatosplenomegaly, no masses and bowel sounds normal   (male): normal male genitalia without lesions or urethral discharge, no hernia  RECTAL: normal sphincter tone, no rectal masses, prostate normal size, smooth, nontender without nodules or masses  MS: no gross musculoskeletal defects noted, no edema  SKIN: no suspicious lesions or rashes  NEURO: Normal strength and tone, mentation intact and speech normal  PSYCH: mentation appears normal, affect normal/bright  LYMPH: no cervical, supraclavicular, axillary, or inguinal adenopathy  Diabetic foot exam: normal DP and PT pulses, no trophic changes or ulcerative lesions, and touch perception is reduced in the feet.        12/9/2024   Mini Cog   Clock Draw Score 2 Normal   3 Item Recall 2 objects recalled   Mini Cog Total Score 4                 Signed Electronically by: Truong Menchaca MD

## 2024-12-09 NOTE — PROGRESS NOTES
Prior to immunization administration, verified patients identity using patient s name and date of birth. Please see Immunization Activity for additional information.     Screening Questionnaire for Adult Immunization    Are you sick today?   No   Do you have allergies to medications, food, a vaccine component or latex?   No   Have you ever had a serious reaction after receiving a vaccination?   No   Do you have a long-term health problem with heart, lung, kidney, or metabolic disease (e.g., diabetes), asthma, a blood disorder, no spleen, complement component deficiency, a cochlear implant, or a spinal fluid leak?  Are you on long-term aspirin therapy?   Yes   Do you have cancer, leukemia, HIV/AIDS, or any other immune system problem?   No   Do you have a parent, brother, or sister with an immune system problem?   No   In the past 3 months, have you taken medications that affect  your immune system, such as prednisone, other steroids, or anticancer drugs; drugs for the treatment of rheumatoid arthritis, Crohn s disease, or psoriasis; or have you had radiation treatments?   No   Have you had a seizure, or a brain or other nervous system problem?   No   During the past year, have you received a transfusion of blood or blood    products, or been given immune (gamma) globulin or antiviral drug?   No   For women: Are you pregnant or is there a chance you could become       pregnant during the next month?   No   Have you received any vaccinations in the past 4 weeks?   No     Immunization questionnaire answers were all negative.      Patient instructed to remain in clinic for 15 minutes afterwards, and to report any adverse reactions.     Screening performed by Karen Whitt MA on 12/9/2024 at 11:19 AM.

## 2025-02-03 ENCOUNTER — TRANSFERRED RECORDS (OUTPATIENT)
Dept: HEALTH INFORMATION MANAGEMENT | Facility: CLINIC | Age: 65
End: 2025-02-03

## 2025-03-24 NOTE — PROGRESS NOTES
REASON FOR VISIT:  Management of Burkitt lymphoma    HISTORY OF PRESENT ILLNESS:  Jonnathan Pedroza is a 65 year old with a history of Burkitt lymphoma.  To summarize his course, he presented with acute on chronic back pain in 03/2020.  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  CSF was negative.  He was was treated with steroids followed by R-CODOX-M/IVAC and IT chemo prophylaxis ending in 06/2020.  His course was complicated by neutropenic sepsis that resolved and right lower extremity below the knee DVT for which he completed a course of anticoagulation.  PET/CT after cycle 2 (1st R-IVAC) and again after completing treatment confirmed PET-negative complete response.  Visit for management of lymphoma.    He is not with his wife Mckayla today.  Neuropathy seems a bit better.  Still able to be active, bowling, working around the house.  No fevers, new night sweats, or unintentional weight loss.  No dyspnea, cough, or chest pain.  No new lumps or bumps.  Enjoying their cottage in [facility].  No lymphoma concerns today.    MEDICATIONS:  Current Outpatient Medications   Medication Sig Dispense Refill    atorvastatin (LIPITOR) 40 MG tablet Take 1 tablet (40 mg) by mouth daily. 90 tablet 3    DULoxetine (CYMBALTA) 20 MG capsule Two caps in the evening 180 capsule 3    gabapentin (NEURONTIN) 300 MG capsule Take 2 capsules (600 mg) by mouth 2 times daily. 360 capsule 3    levothyroxine (SYNTHROID/LEVOTHROID) 175 MCG tablet TAKE 1 TABLET BY MOUTH DAILY IN THE MORNING. EXCEPT 1/2 TABLET EVERY SUNDAY 90 tablet 3    metFORMIN (GLUCOPHAGE XR) 500 MG 24 hr tablet Take 2 tablets (1,000 mg) by mouth daily (with dinner). 360 tablet 3    Continuous Glucose Sensor (FREESTYLE KHUSHBOO 3 PLUS SENSOR) MISC Use 1 sensor every 15 days. Use to read blood sugars per 's instructions. 6 each 0     No current  facility-administered medications for this visit.     PHYSICAL EXAMINATION:  /84   Pulse 67   Temp 98.7  F (37.1  C)   Resp 16   Wt 106.1 kg (234 lb)   SpO2 98%   BMI 33.87 kg/m    Wt Readings from Last 5 Encounters:   03/26/25 106.1 kg (234 lb)   12/09/24 106.6 kg (235 lb)   10/09/24 108.5 kg (239 lb 3.2 oz)   04/10/24 108.3 kg (238 lb 12.8 oz)   03/07/24 109.5 kg (241 lb 4.8 oz)     General: Well appearing. No distress.  HEENT: Sclerae anicteric.  Lungs: Clear bilaterally without wheezing or crackles.  Heart: Regular rate and rhythm.  Gastrointestinal: Bowel sounds present, no tenderness to palpation, spleen tip not palpable.  Extremities: No lower extremity edema.  Lymph:  No cervical, clavicular, axillary, epitrochlear, or inguinal lymphadenopathy.  Performance status: ECOG 0    LABRATORY DATA:  Reviewed by me  Recent Labs   Lab Test 03/26/25  1028 10/09/24  1227 04/10/24  1227 08/04/21  0748 05/03/21  1712 02/11/21  1127 01/11/21  0920   WBC 6.5 6.6 7.1   < > 7.3 1.8* 5.9   HGB 15.7 15.4 15.0   < > 14.4 14.9 12.4*    223 227   < > 235 161 195   ANEU  --   --   --   --  5.3 0.6* 4.5   ANEUTAUTO 4.8 4.2 4.4   < >  --   --   --    ALYM  --   --   --   --  1.0 0.5* 0.5*   ALYMPAUTO 1.1 1.5 1.8   < >  --   --   --     < > = values in this interval not displayed.     Recent Labs   Lab Test 10/09/24  1227 04/10/24  1227 12/04/23  0838 11/29/23  1326 05/03/21  1712 02/11/21  1127 06/18/20  0000 06/15/20  1335 06/01/20  0000 05/26/20  0355 05/25/20  0400 03/17/20  1214 03/17/20  0556    139 140 137   < > 139   < > 139   < > 138 140   < > 139   POTASSIUM 4.0 4.2 4.1 4.3   < > 3.8   < > 3.5   < > 4.0 3.7   < > 4.0   CHLORIDE 103 103 104 102   < > 105   < > 105   < > 112* 112*   < > 107   CO2 24 24 24 24   < > 30   < > 30   < > 23 24   < > 26   BUN 14.4 17.8 15.3 23.0   < > 12   < > 13   < > 16 15   < > 25   CR 0.89 0.91 0.95 0.95   < > 0.96   < > 0.88   < > 0.72 0.70   < > 0.71   RUTH 9.1 9.0 8.4*  9.1   < > 8.5   < > 8.1*   < > 7.3* 7.4*   < > 7.2*   MAG  --   --   --   --   --  2.5*  --  2.3  --   --  2.3   < > 2.1   PHOS  --   --   --   --   --   --   --  3.6  --  2.3* 2.1*   < > 4.1   URIC  --   --   --   --   --   --   --  4.8  --  3.2* 3.1*   < > 3.7    177  --  239   < >  --    < > 304*  --  226 220   < >  --    [id number]  --   --   --   --   --   --   --   --   --   --   --   --  2.0    < > = values in this interval not displayed.     Recent Labs   Lab Test 10/09/24  1227 04/10/24  1227 11/29/23  1326 06/18/20  0000 06/15/20  1335 06/01/20  0000 05/26/20  0355 05/25/20  0400   AST 23 23 39   < > 51*   < >  --  18   ALT 29 27 45   < > 85*   < >  --  51   ALKPHOS 95 98 94   < > 73   < >  --  78   BILITOTAL 0.4 0.4 0.6   < > 0.4   < >  --  0.3   INR  --   --   --   --  1.04  --  1.03 1.11    < > = values in this interval not displayed.     IMPRESSION AND PLAN:  Jonnathan Pedroza is a 65 year old with a history of Burkitt lymphoma.    There is nothing to suggest lymphoma recurrence.  He continues to deal with long term effects of lymphoma and treatment that continue to slowly improve.  It will be more than 5 years from treatment in June and risk of lymphoma relapse is very low and cure is likely, so we agreed to stop routine follow-up in our clinic.    There are no active ID issues.  He is up to date with pneumococcal and Shingrix vaccines.  I recommended the RSV vaccine.  He is up to date for the current flu shot and COVID-19 vaccine.      He completed apixaban for 3 months for lymphoma-related DVT.  He did not find our Cancer Rehab Med Team very helpful and is not interested in PT - although I encouraged him to reconsider if pain continues to be problematic.  He will continue to work with PCP Dr. Truong Menchaca for his health maintenance and other medical issues.    We will not schedule routine follow-up in our clinic.  I reminded him to contact us if questions, concerns, or new issues come up in the  future.    Total of 30 minutes on patient visit, reviewing records, interpreting test results, placing orders, and documentation on the day of service.    Ever Wright MD, PhD  Attending Physician, [facility]  [phone] [facility]

## 2025-03-26 ENCOUNTER — APPOINTMENT (OUTPATIENT)
Dept: LAB | Facility: CLINIC | Age: 65
End: 2025-03-26
Attending: INTERNAL MEDICINE
Payer: COMMERCIAL

## 2025-03-26 ENCOUNTER — ONCOLOGY VISIT (OUTPATIENT)
Dept: ONCOLOGY | Facility: CLINIC | Age: 65
End: 2025-03-26
Attending: INTERNAL MEDICINE
Payer: COMMERCIAL

## 2025-03-26 VITALS
BODY MASS INDEX: 33.87 KG/M2 | OXYGEN SATURATION: 98 % | TEMPERATURE: 98.7 F | WEIGHT: 234 LBS | DIASTOLIC BLOOD PRESSURE: 84 MMHG | SYSTOLIC BLOOD PRESSURE: 129 MMHG | RESPIRATION RATE: 16 BRPM | HEART RATE: 67 BPM

## 2025-03-26 DIAGNOSIS — M79.2 NEUROPATHIC PAIN: ICD-10-CM

## 2025-03-26 DIAGNOSIS — T45.1X5A CHEMOTHERAPY-INDUCED PERIPHERAL NEUROPATHY: ICD-10-CM

## 2025-03-26 DIAGNOSIS — G62.0 CHEMOTHERAPY-INDUCED PERIPHERAL NEUROPATHY: ICD-10-CM

## 2025-03-26 DIAGNOSIS — E11.9 TYPE 2 DIABETES MELLITUS WITHOUT COMPLICATION, WITHOUT LONG-TERM CURRENT USE OF INSULIN (H): ICD-10-CM

## 2025-03-26 DIAGNOSIS — C83.78 BURKITT LYMPHOMA OF LYMPH NODES OF MULTIPLE REGIONS (H): Primary | ICD-10-CM

## 2025-03-26 LAB
ALBUMIN SERPL BCG-MCNC: 4.6 G/DL (ref 3.5–5.2)
ALP SERPL-CCNC: 84 U/L (ref 40–150)
ALT SERPL W P-5'-P-CCNC: 36 U/L (ref 0–70)
ANION GAP SERPL CALCULATED.3IONS-SCNC: 13 MMOL/L (ref 7–15)
AST SERPL W P-5'-P-CCNC: 26 U/L (ref 0–45)
BASOPHILS # BLD AUTO: 0 10E3/UL (ref 0–0.2)
BASOPHILS NFR BLD AUTO: 1 %
BILIRUB SERPL-MCNC: 0.4 MG/DL
BUN SERPL-MCNC: 16.2 MG/DL (ref 8–23)
CALCIUM SERPL-MCNC: 9.7 MG/DL (ref 8.8–10.4)
CHLORIDE SERPL-SCNC: 107 MMOL/L (ref 98–107)
CREAT SERPL-MCNC: 0.93 MG/DL (ref 0.67–1.17)
EGFRCR SERPLBLD CKD-EPI 2021: >90 ML/MIN/1.73M2
EOSINOPHIL # BLD AUTO: 0.1 10E3/UL (ref 0–0.7)
EOSINOPHIL NFR BLD AUTO: 1 %
ERYTHROCYTE [DISTWIDTH] IN BLOOD BY AUTOMATED COUNT: 13.3 % (ref 10–15)
GLUCOSE SERPL-MCNC: 139 MG/DL (ref 70–99)
HCO3 SERPL-SCNC: 22 MMOL/L (ref 22–29)
HCT VFR BLD AUTO: 45.9 % (ref 40–53)
HGB BLD-MCNC: 15.7 G/DL (ref 13.3–17.7)
IMM GRANULOCYTES # BLD: 0 10E3/UL
IMM GRANULOCYTES NFR BLD: 0 %
LDH SERPL L TO P-CCNC: 180 U/L (ref 0–250)
LYMPHOCYTES # BLD AUTO: 1.1 10E3/UL (ref 0.8–5.3)
LYMPHOCYTES NFR BLD AUTO: 17 %
MCH RBC QN AUTO: 30.3 PG (ref 26.5–33)
MCHC RBC AUTO-ENTMCNC: 34.2 G/DL (ref 31.5–36.5)
MCV RBC AUTO: 89 FL (ref 78–100)
MONOCYTES # BLD AUTO: 0.4 10E3/UL (ref 0–1.3)
MONOCYTES NFR BLD AUTO: 7 %
NEUTROPHILS # BLD AUTO: 4.8 10E3/UL (ref 1.6–8.3)
NEUTROPHILS NFR BLD AUTO: 74 %
NRBC # BLD AUTO: 0 10E3/UL
NRBC BLD AUTO-RTO: 0 /100
PLATELET # BLD AUTO: 250 10E3/UL (ref 150–450)
POTASSIUM SERPL-SCNC: 4.2 MMOL/L (ref 3.4–5.3)
PROT SERPL-MCNC: 7.5 G/DL (ref 6.4–8.3)
RBC # BLD AUTO: 5.18 10E6/UL (ref 4.4–5.9)
SODIUM SERPL-SCNC: 142 MMOL/L (ref 135–145)
WBC # BLD AUTO: 6.5 10E3/UL (ref 4–11)

## 2025-03-26 PROCEDURE — 36415 COLL VENOUS BLD VENIPUNCTURE: CPT | Performed by: INTERNAL MEDICINE

## 2025-03-26 PROCEDURE — 83615 LACTATE (LD) (LDH) ENZYME: CPT | Performed by: INTERNAL MEDICINE

## 2025-03-26 PROCEDURE — 85041 AUTOMATED RBC COUNT: CPT | Performed by: INTERNAL MEDICINE

## 2025-03-26 PROCEDURE — 85004 AUTOMATED DIFF WBC COUNT: CPT | Performed by: INTERNAL MEDICINE

## 2025-03-26 PROCEDURE — G0463 HOSPITAL OUTPT CLINIC VISIT: HCPCS | Performed by: INTERNAL MEDICINE

## 2025-03-26 PROCEDURE — 80053 COMPREHEN METABOLIC PANEL: CPT | Performed by: INTERNAL MEDICINE

## 2025-03-26 ASSESSMENT — PAIN SCALES - GENERAL: PAINLEVEL_OUTOF10: NO PAIN (0)

## 2025-03-26 NOTE — NURSING NOTE
Chief Complaint   Patient presents with    Blood Draw     Labs collected from venipuncture by RN. Vitals taken. Checked in for appointment(s).      Labs collected from venipuncture by RN. Vitals taken. Checked in for appointment(s).     Radha Mace RN

## 2025-03-26 NOTE — LETTER
3/26/2025      Kobe Pedroza  [address] [facility] [address]      Dear Colleague,    Thank you for referring your patient, Kobe Pedroza, to the [facility]. Please see a copy of my visit note below.    REASON FOR VISIT:  Management of Burkitt lymphoma    HISTORY OF PRESENT ILLNESS:  Jonnathan Pedroza is a 65 year old with a history of Burkitt lymphoma.  To summarize his course, he presented with acute on chronic back pain in 03/2020.  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  CSF was negative.  He was was treated with steroids followed by R-CODOX-M/IVAC and IT chemo prophylaxis ending in 06/2020.  His course was complicated by neutropenic sepsis that resolved and right lower extremity below the knee DVT for which he completed a course of anticoagulation.  PET/CT after cycle 2 (1st R-IVAC) and again after completing treatment confirmed PET-negative complete response.  Visit for management of lymphoma.    He is not with his wife Mckayla today.  Neuropathy seems a bit better.  Still able to be active, bowling, working around the house.  No fevers, new night sweats, or unintentional weight loss.  No dyspnea, cough, or chest pain.  No new lumps or bumps.  Enjoying [place] in [facility].  No lymphoma concerns today.    MEDICATIONS:  Current Outpatient Medications   Medication Sig Dispense Refill     atorvastatin (LIPITOR) 40 MG tablet Take 1 tablet (40 mg) by mouth daily. 90 tablet 3     DULoxetine (CYMBALTA) 20 MG capsule Two caps in the evening 180 capsule 3     gabapentin (NEURONTIN) 300 MG capsule Take 2 capsules (600 mg) by mouth 2 times daily. 360 capsule 3     levothyroxine (SYNTHROID/LEVOTHROID) 175 MCG tablet TAKE 1 TABLET BY MOUTH DAILY IN THE MORNING. EXCEPT 1/2 TABLET EVERY SUNDAY 90 tablet 3     metFORMIN (GLUCOPHAGE XR) 500 MG 24 hr tablet Take 2  tablets (1,000 mg) by mouth daily (with dinner). 360 tablet 3     Continuous Glucose Sensor (FREESTYLE KHUSHBOO 3 PLUS SENSOR) MISC Use 1 sensor every 15 days. Use to read blood sugars per 's instructions. 6 each 0     No current facility-administered medications for this visit.     PHYSICAL EXAMINATION:  /84   Pulse 67   Temp 98.7  F (37.1  C)   Resp 16   Wt 106.1 kg (234 lb)   SpO2 98%   BMI 33.87 kg/m    Wt Readings from Last 5 Encounters:   03/26/25 106.1 kg (234 lb)   12/09/24 106.6 kg (235 lb)   10/09/24 108.5 kg (239 lb 3.2 oz)   04/10/24 108.3 kg (238 lb 12.8 oz)   03/07/24 109.5 kg (241 lb 4.8 oz)     General: Well appearing. No distress.  HEENT: Sclerae anicteric.  Lungs: Clear bilaterally without wheezing or crackles.  Heart: Regular rate and rhythm.  Gastrointestinal: Bowel sounds present, no tenderness to palpation, spleen tip not palpable.  Extremities: No lower extremity edema.  Lymph:  No cervical, clavicular, axillary, epitrochlear, or inguinal lymphadenopathy.  Performance status: ECOG 0    LABRATORY DATA:  Reviewed by me  Recent Labs   Lab Test 03/26/25  1028 10/09/24  1227 04/10/24  1227 08/04/21  0748 05/03/21  1712 02/11/21  1127 01/11/21  0920   WBC 6.5 6.6 7.1   < > 7.3 1.8* 5.9   HGB 15.7 15.4 15.0   < > 14.4 14.9 12.4*    223 227   < > 235 161 195   ANEU  --   --   --   --  5.3 0.6* 4.5   ANEUTAUTO 4.8 4.2 4.4   < >  --   --   --    ALYM  --   --   --   --  1.0 0.5* 0.5*   ALYMPAUTO 1.1 1.5 1.8   < >  --   --   --     < > = values in this interval not displayed.     Recent Labs   Lab Test 10/09/24  1227 04/10/24  1227 12/04/23  0838 11/29/23  1326 05/03/21  1712 02/11/21  1127 06/18/20  0000 06/15/20  1335 06/01/20  0000 05/26/20  0355 05/25/20  0400 03/17/20  1214 03/17/20  0556    139 140 137   < > 139   < > 139   < > 138 140   < > 139   POTASSIUM 4.0 4.2 4.1 4.3   < > 3.8   < > 3.5   < > 4.0 3.7   < > 4.0   CHLORIDE 103 103 104 102   < > 105   < > 105    < > 112* 112*   < > 107   CO2 24 24 24 24   < > 30   < > 30   < > 23 24   < > 26   BUN 14.4 17.8 15.3 23.0   < > 12   < > 13   < > 16 15   < > 25   CR 0.89 0.91 0.95 0.95   < > 0.96   < > 0.88   < > 0.72 0.70   < > 0.71   RUTH 9.1 9.0 8.4* 9.1   < > 8.5   < > 8.1*   < > 7.3* 7.4*   < > 7.2*   MAG  --   --   --   --   --  2.5*  --  2.3  --   --  2.3   < > 2.1   PHOS  --   --   --   --   --   --   --  3.6  --  2.3* 2.1*   < > 4.1   URIC  --   --   --   --   --   --   --  4.8  --  3.2* 3.1*   < > 3.7    177  --  239   < >  --    < > 304*  --  226 220   < >  --    [id number]  --   --   --   --   --   --   --   --   --   --   --   --  2.0    < > = values in this interval not displayed.     Recent Labs   Lab Test 10/09/24  1227 04/10/24  1227 11/29/23  1326 06/18/20  0000 06/15/20  1335 06/01/20  0000 05/26/20  0355 05/25/20  0400   AST 23 23 39   < > 51*   < >  --  18   ALT 29 27 45   < > 85*   < >  --  51   ALKPHOS 95 98 94   < > 73   < >  --  78   BILITOTAL 0.4 0.4 0.6   < > 0.4   < >  --  0.3   INR  --   --   --   --  1.04  --  1.03 1.11    < > = values in this interval not displayed.     IMPRESSION AND PLAN:  Jonnathan Pedroza is a 65 year old with a history of Burkitt lymphoma.    There is nothing to suggest lymphoma recurrence.  He continues to deal with long term effects of lymphoma and treatment that continue to slowly improve.  It will be more than 5 years from treatment in June and risk of lymphoma relapse is very low and cure is likely, so we agreed to stop routine follow-up in our clinic.    There are no active ID issues.  He is up to date with pneumococcal and Shingrix vaccines.  I recommended the RSV vaccine.  He is up to date for the current flu shot and COVID-19 vaccine.      He completed apixaban for 3 months for lymphoma-related DVT.  He did not find our Cancer Rehab Med Team very helpful and is not interested in PT - although I encouraged him to reconsider if pain continues to be problematic.  He will  continue to work with PCP Dr. Truong Menchaca for his health maintenance and other medical issues.    We will not schedule routine follow-up in our clinic.  I reminded him to contact us if questions, concerns, or new issues come up in the future.    Total of 30 minutes on patient visit, reviewing records, interpreting test results, placing orders, and documentation on the day of service.    Ever Wright MD, PhD  Attending Physician, [facility]  [phone] clinic        Again, thank you for allowing me to participate in the care of your patient.        Sincerely,        Ever Wright MD    Electronically signed

## 2025-03-26 NOTE — NURSING NOTE
"Oncology Rooming Note    March 26, 2025 10:35 AM   Kobe Pedroza is a 65 year old male who presents for:    Chief Complaint   Patient presents with    Blood Draw     Labs collected from venipuncture by RN. Vitals taken. Checked in for appointment(s).     Oncology Clinic Visit     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        Initial Vitals: /84   Pulse 67   Temp 98.7  F (37.1  C)   Resp 16   Wt 106.1 kg (234 lb)   SpO2 98%   BMI 33.87 kg/m   Estimated body mass index is 33.87 kg/m  as calculated from the following:    Height as of 12/9/24: 1.77 m (5' 9.7\").    Weight as of this encounter: 106.1 kg (234 lb). Body surface area is 2.28 meters squared.  No Pain (0) Comment: Data Unavailable   No LMP for male patient.  Allergies reviewed: Yes  Medications reviewed: Yes    Medications: Medication refills not needed today.  Pharmacy [facility] entered into [facility]: [id number] [address] [address]    Frailty Screening:   Is the patient here for a new oncology consult visit in cancer care? 2. No    PHQ9:  Did this patient require a PHQ9?: No      Clinical concerns: none       Shefali Stevenson            "

## 2025-03-27 ENCOUNTER — PATIENT OUTREACH (OUTPATIENT)
Dept: ONCOLOGY | Facility: CLINIC | Age: 65
End: 2025-03-27
Payer: COMMERCIAL

## 2025-03-27 NOTE — PROGRESS NOTES
[facility]: Cancer Care                                                                                          Pt has completed follow up for his lymphoma and no longer requires appts with oncology clinic.  Pt has clinic number if needs arise in the future.  Enrollment status updated and removed self from care team.    PRESLEY BaltazarN, RN  RN Care Coordinator  [facility]

## 2025-03-30 ENCOUNTER — HEALTH MAINTENANCE LETTER (OUTPATIENT)
Age: 65
End: 2025-03-30

## 2025-05-19 ENCOUNTER — LAB (OUTPATIENT)
Dept: LAB | Facility: CLINIC | Age: 65
End: 2025-05-19
Payer: COMMERCIAL

## 2025-05-19 DIAGNOSIS — E11.9 TYPE 2 DIABETES MELLITUS WITHOUT COMPLICATION, WITHOUT LONG-TERM CURRENT USE OF INSULIN (H): Primary | ICD-10-CM

## 2025-05-19 DIAGNOSIS — Z12.5 SCREENING FOR PROSTATE CANCER: ICD-10-CM

## 2025-05-19 DIAGNOSIS — E89.0 POSTSURGICAL HYPOTHYROIDISM: ICD-10-CM

## 2025-05-19 DIAGNOSIS — Z13.6 SCREENING FOR CARDIOVASCULAR CONDITION: ICD-10-CM

## 2025-05-19 LAB
CHOLEST SERPL-MCNC: 177 MG/DL
CREAT UR-MCNC: 161 MG/DL
EST. AVERAGE GLUCOSE BLD GHB EST-MCNC: 134 MG/DL
FASTING STATUS PATIENT QL REPORTED: YES
HBA1C MFR BLD: 6.3 % (ref 0–5.6)
HDLC SERPL-MCNC: 38 MG/DL
LDLC SERPL CALC-MCNC: 102 MG/DL
MICROALBUMIN UR-MCNC: <12 MG/L
MICROALBUMIN/CREAT UR: NORMAL MG/G{CREAT}
NONHDLC SERPL-MCNC: 139 MG/DL
TRIGL SERPL-MCNC: 187 MG/DL

## 2025-05-19 PROCEDURE — 82043 UR ALBUMIN QUANTITATIVE: CPT

## 2025-05-19 PROCEDURE — 36415 COLL VENOUS BLD VENIPUNCTURE: CPT

## 2025-05-19 PROCEDURE — 82570 ASSAY OF URINE CREATININE: CPT

## 2025-05-19 PROCEDURE — 83036 HEMOGLOBIN GLYCOSYLATED A1C: CPT

## 2025-05-19 PROCEDURE — G0103 PSA SCREENING: HCPCS

## 2025-05-19 PROCEDURE — 84443 ASSAY THYROID STIM HORMONE: CPT

## 2025-05-19 PROCEDURE — 84439 ASSAY OF FREE THYROXINE: CPT

## 2025-05-19 PROCEDURE — 80061 LIPID PANEL: CPT

## 2025-05-21 ENCOUNTER — OFFICE VISIT (OUTPATIENT)
Dept: FAMILY MEDICINE | Facility: CLINIC | Age: 65
End: 2025-05-21
Attending: INTERNAL MEDICINE
Payer: COMMERCIAL

## 2025-05-21 VITALS
RESPIRATION RATE: 16 BRPM | HEIGHT: 69 IN | BODY MASS INDEX: 35.4 KG/M2 | WEIGHT: 239 LBS | HEART RATE: 74 BPM | TEMPERATURE: 98.7 F | SYSTOLIC BLOOD PRESSURE: 127 MMHG | OXYGEN SATURATION: 96 % | DIASTOLIC BLOOD PRESSURE: 76 MMHG

## 2025-05-21 DIAGNOSIS — E89.0 POSTSURGICAL HYPOTHYROIDISM: ICD-10-CM

## 2025-05-21 DIAGNOSIS — T45.1X5A CHEMOTHERAPY-INDUCED PERIPHERAL NEUROPATHY: ICD-10-CM

## 2025-05-21 DIAGNOSIS — C83.79: ICD-10-CM

## 2025-05-21 DIAGNOSIS — Z12.5 SCREENING FOR PROSTATE CANCER: ICD-10-CM

## 2025-05-21 DIAGNOSIS — E11.9 TYPE 2 DIABETES MELLITUS WITHOUT COMPLICATION, WITHOUT LONG-TERM CURRENT USE OF INSULIN (H): Primary | ICD-10-CM

## 2025-05-21 DIAGNOSIS — E78.2 MIXED DYSLIPIDEMIA: ICD-10-CM

## 2025-05-21 DIAGNOSIS — G62.0 CHEMOTHERAPY-INDUCED PERIPHERAL NEUROPATHY: ICD-10-CM

## 2025-05-21 DIAGNOSIS — E66.811 OBESITY, CLASS I, BMI 30-34.9: ICD-10-CM

## 2025-05-21 DIAGNOSIS — Z98.890 STATUS POST COMPLETE THYROIDECTOMY: ICD-10-CM

## 2025-05-21 DIAGNOSIS — Z90.89 STATUS POST COMPLETE THYROIDECTOMY: ICD-10-CM

## 2025-05-21 PROBLEM — C83.78: Status: RESOLVED | Noted: 2020-03-18 | Resolved: 2025-05-21

## 2025-05-21 LAB
PSA SERPL DL<=0.01 NG/ML-MCNC: 0.58 NG/ML (ref 0–4.5)
T4 FREE SERPL-MCNC: 1.78 NG/DL (ref 0.9–1.7)
TSH SERPL DL<=0.005 MIU/L-ACNC: 0.08 UIU/ML (ref 0.3–4.2)

## 2025-05-21 PROCEDURE — 99214 OFFICE O/P EST MOD 30 MIN: CPT | Performed by: INTERNAL MEDICINE

## 2025-05-21 PROCEDURE — G2211 COMPLEX E/M VISIT ADD ON: HCPCS | Performed by: INTERNAL MEDICINE

## 2025-05-21 PROCEDURE — 3078F DIAST BP <80 MM HG: CPT | Performed by: INTERNAL MEDICINE

## 2025-05-21 PROCEDURE — 3044F HG A1C LEVEL LT 7.0%: CPT | Performed by: INTERNAL MEDICINE

## 2025-05-21 PROCEDURE — 3049F LDL-C 100-129 MG/DL: CPT | Performed by: INTERNAL MEDICINE

## 2025-05-21 PROCEDURE — 3074F SYST BP LT 130 MM HG: CPT | Performed by: INTERNAL MEDICINE

## 2025-05-21 NOTE — PROGRESS NOTES
Assessment & Plan     1.  Type 2 diabetes mellitus well-controlled on metformin  mg a day.  A1c is 6.3 on 5/19/2025.  He will continue same.  2.  Mixed dyslipidemia being treated with atorvastatin 40 mg a day with cholesterol 177 HDL 38  triglyceride 187.  He will continue same.  3.  Postsurgical hypothyroidism.  TSH ordered.  I will get back to her with the results.  Currently on levothyroxine 175 mcg daily.  4.  Status post complete thyroidectomy for thyroid cancer in 2011.  5.  Burkitt's lymphoma multiple region stage IV including spine at level T5-T6 requiring surgical decompression followed by treatment with chemotherapy.  Treatment completed June 2020.  Seems in remission with no evidence of recurrence.  6.  History of provoked right below-knee DVT following back surgery in 2020.  7.  Obesity class I.  8.  Chemotherapy-induced peripheral neuropathy for which she takes gabapentin.  Clinically stable.  9.  Screening for prostate cancer by checking PSA.    I will get back to him with the results of the PSA and TSH which are pending.    He will return for follow-up in 6 months with CBC, CMP, urine microalbumin, A1c, lipids and TSH.      Janae De Santiago is a 65 year old, presenting for the following health issues:  Diabetes        5/21/2025    12:54 PM   Additional Questions   Roomed by Delaney     History of Present Illness       Diabetes:   He presents for follow up of diabetes.  He is checking home blood glucose a few times a month.   He checks blood glucose before meals.  Blood glucose is never over 200 and never under 70.  When his blood glucose is low, the patient is asymptomatic for confusion, blurred vision, lethargy and reports not feeling dizzy, shaky, or weak.  He is concerned about blood sugar frequently over 200.   He is having numbness in feet and burning in feet.               64-year-old gentleman comes in for follow-up on diabetes, dyslipidemia, peripheral neuropathy as well as  "postsurgical hypothyroidism.  He also had Burkitt's lymphoma 5 years ago which currently appears to be in remission.  Overall he claims he is doing quite well.  He offers no new concerns.      Review of Systems  Constitutional, HEENT, cardiovascular, pulmonary, GI, , musculoskeletal, neuro, skin, endocrine and psych systems are negative, except as otherwise noted.      Objective    /76 (BP Location: Right arm, Patient Position: Sitting, Cuff Size: Adult Large)   Pulse 74   Temp 98.7  F (37.1  C) (Oral)   Resp 16   Ht 1.76 m (5' 9.29\")   Wt 108.4 kg (239 lb)   SpO2 96%   BMI 35.00 kg/m    Body mass index is 35 kg/m .  Physical Exam   GENERAL: alert and no distress  HENT: ear canals and TM's normal, nose and mouth without ulcers or lesions  NECK: no adenopathy, no asymmetry, masses, or scars  RESP: lungs clear to auscultation - no rales, rhonchi or wheezes  CV: regular rate and rhythm, normal S1 S2, no S3 or S4, no murmur, click or rub, no peripheral edema  ABDOMEN: soft, nontender, no hepatosplenomegaly, no masses and bowel sounds normal  MS: no gross musculoskeletal defects noted, no edema  SKIN: no suspicious lesions or rashes  NEURO: Normal strength and tone, mentation intact and speech normal  PSYCH: mentation appears normal, affect normal/bright  LYMPH: no cervical, supraclavicular, axillary, or inguinal adenopathy    Lab on 05/19/2025   Component Date Value Ref Range Status    Creatinine Urine mg/dL 05/19/2025 161.0  mg/dL Final    The reference ranges have not been established in urine creatinine. The results should be integrated into the clinical context for interpretation.    Albumin Urine mg/L 05/19/2025 <12.0  mg/L Final    The reference ranges have not been established in urine albumin. The results should be integrated into the clinical context for interpretation.    Albumin Urine mg/g Cr 05/19/2025    Final    Unable to calculate, urine albumin and/or urine creatinine is outside detectable " limits.  Microalbuminuria is defined as an albumin:creatinine ratio of 17 to 299 for males and 25 to 299 for females. A ratio of albumin:creatinine of 300 or higher is indicative of overt proteinuria.  Due to biologic variability, positive results should be confirmed by a second, first-morning random or 24-hour timed urine specimen. If there is discrepancy, a third specimen is recommended. When 2 out of 3 results are in the microalbuminuria range, this is evidence for incipient nephropathy and warrants increased efforts at glucose control, blood pressure control, and institution of therapy with an angiotensin-converting-enzyme (ACE) inhibitor (if the patient can tolerate it).      Estimated Average Glucose 05/19/2025 134 (H)  <117 mg/dL Final    Hemoglobin A1C 05/19/2025 6.3 (H)  0.0 - 5.6 % Final    Normal <5.7%   Prediabetes 5.7-6.4%    Diabetes 6.5% or higher     Note: Adopted from ADA consensus guidelines.    Cholesterol 05/19/2025 177  <200 mg/dL Final    Triglycerides 05/19/2025 187 (H)  <150 mg/dL Final    Direct Measure HDL 05/19/2025 38 (L)  >=40 mg/dL Final    LDL Cholesterol Calculated 05/19/2025 102 (H)  <100 mg/dL Final    Non HDL Cholesterol 05/19/2025 139 (H)  <130 mg/dL Final    Patient Fasting > 8hrs? 05/19/2025 Yes   Final           Signed Electronically by: Truong Menchaca MD

## 2025-05-22 ENCOUNTER — RESULTS FOLLOW-UP (OUTPATIENT)
Dept: FAMILY MEDICINE | Facility: CLINIC | Age: 65
End: 2025-05-22

## 2025-05-22 DIAGNOSIS — E89.0 POSTSURGICAL HYPOTHYROIDISM: Primary | ICD-10-CM

## 2025-05-22 RX ORDER — LEVOTHYROXINE SODIUM 150 UG/1
150 TABLET ORAL
Qty: 90 TABLET | Refills: 0 | Status: SHIPPED | OUTPATIENT
Start: 2025-05-22

## 2025-05-22 NOTE — TELEPHONE ENCOUNTER
Patient's TSH is low and free T4 is high.  He is post 10 years following thyroid surgery for thyroid cancer.  His current daily dose of levothyroxine is on average 162 mcg daily.  He takes 175 mcg daily and a half a tablet on Sundays.  I have reduced his levothyroxine 150 mcg daily and asked that he return for thyroid test in 2 months.

## 2025-07-03 ENCOUNTER — MYC MEDICAL ADVICE (OUTPATIENT)
Dept: FAMILY MEDICINE | Facility: CLINIC | Age: 65
End: 2025-07-03
Payer: COMMERCIAL

## 2025-07-03 NOTE — TELEPHONE ENCOUNTER
Med adherence check: atorvastatin was about a month overdue to be filled. Did fill 7/1/25.    Liliam Segura, PharmD, BCPS  Retail Pharmacy Specialist

## 2025-08-24 ENCOUNTER — HEALTH MAINTENANCE LETTER (OUTPATIENT)
Age: 65
End: 2025-08-24

## 2025-08-25 ENCOUNTER — DOCUMENTATION ONLY (OUTPATIENT)
Dept: LAB | Facility: CLINIC | Age: 65
End: 2025-08-25

## 2025-08-25 ENCOUNTER — LAB (OUTPATIENT)
Dept: LAB | Facility: CLINIC | Age: 65
End: 2025-08-25
Payer: COMMERCIAL

## 2025-08-25 DIAGNOSIS — E89.0 POSTSURGICAL HYPOTHYROIDISM: Primary | ICD-10-CM

## 2025-08-25 DIAGNOSIS — E89.0 POSTSURGICAL HYPOTHYROIDISM: ICD-10-CM

## 2025-08-25 LAB — TSH SERPL DL<=0.005 MIU/L-ACNC: 1.92 UIU/ML (ref 0.3–4.2)

## 2025-08-25 PROCEDURE — 84443 ASSAY THYROID STIM HORMONE: CPT

## 2025-08-25 PROCEDURE — 36415 COLL VENOUS BLD VENIPUNCTURE: CPT

## (undated) DEVICE — NDL EPIDURAL TUOHY 20GA 3.5" 405028

## (undated) DEVICE — DRAPE C-ARM W/STRAPS 42X72" 07-CA104

## (undated) DEVICE — SU ETHILON 3-0 PS-1 18" 1663H

## (undated) DEVICE — LINEN TOWEL PACK X5 5464

## (undated) DEVICE — SOL NACL 0.9% IRRIG 1000ML BOTTLE 2F7124

## (undated) DEVICE — DRAPE STERI TOWEL LG 1010

## (undated) DEVICE — NDL SPINAL 18GA 3.5" 405184

## (undated) DEVICE — GLOVE ESTEEM POWDER FREE 7.0  2D72PL70

## (undated) DEVICE — NDL BLUNT 17GA 1.5" 8881202330

## (undated) DEVICE — GLOVE PROTEXIS MICRO 7.0  2D73PM70

## (undated) DEVICE — GLOVE PROTEXIS BLUE W/NEU-THERA 7.0  2D73EB70

## (undated) DEVICE — DRAPE MICROSCOPE LEICA 54X150" AR8033650

## (undated) DEVICE — DRSG TELFA 3X8" 1238

## (undated) DEVICE — NDL BLUNT 18GA 1" W/O FILTER 305181

## (undated) DEVICE — LINEN TOWEL PACK X6 WHITE 5487

## (undated) DEVICE — PREP CHLORAPREP W/ORANGE TINT 10.5ML 260715

## (undated) DEVICE — SPONGE COTTONOID 1/2X1/2" 80-1400

## (undated) DEVICE — SURGICEL HEMOSTAT 4X8" 1952

## (undated) DEVICE — SU MONOCRYL 3-0 PS-1 27" Y936H

## (undated) DEVICE — SUCTION MANIFOLD DORNOCH ULTRA CART UL-CL500

## (undated) DEVICE — SU VICRYL 2-0 CT-2 CR 8X18" J726D

## (undated) DEVICE — SPONGE SURGIFOAM 100 1974

## (undated) DEVICE — NDL ANGIOCATH 14GA 1.25" 4048

## (undated) DEVICE — DRAIN HEMOVAC RESERVOIR KIT 10FR 1/8" MED 00-2550-002-10

## (undated) DEVICE — PREP CHLORAPREP CLEAR 3ML 260400

## (undated) DEVICE — PACK NEURO MINOR UMMC SNE32MNMU4

## (undated) DEVICE — SU VICRYL 0 CT-1 CR 8X18" J740D

## (undated) DEVICE — WIPES FOLEY CARE SURESTEP PROVON DFC100

## (undated) DEVICE — Device

## (undated) DEVICE — TRAY PAIN INJECTION 97A 640

## (undated) DEVICE — ESU GROUND PAD ADULT W/CORD E7507

## (undated) DEVICE — STPL SKIN 35W ROTATING HEAD PRW35

## (undated) DEVICE — SYR 10ML PERFIX LL 332152

## (undated) DEVICE — SPONGE SURGIFOAM 01GM POWDER 1978

## (undated) DEVICE — SYR 30ML LL W/O NDL 302832

## (undated) DEVICE — IOM SUPPLIES

## (undated) DEVICE — STRAP UNIVERSAL POSITIONING 2-PIECE 4X47X76" 91-287

## (undated) DEVICE — IOM CASE FEE

## (undated) DEVICE — DRAPE MAYO STAND 23X54 8337

## (undated) DEVICE — SOL ADH LIQUID BENZOIN SWAB 0.6ML C1544

## (undated) DEVICE — BUR STRK CARBIDE MATCH HEAD 3.0MM 5820-107-530C

## (undated) DEVICE — SOL WATER IRRIG 1000ML BOTTLE 2F7114

## (undated) DEVICE — LINEN TOWEL PACK X30 5481

## (undated) DEVICE — SPONGE COTTONOID 1/2X1 1/2" 20-06S

## (undated) RX ORDER — PHENYLEPHRINE HCL IN 0.9% NACL 1 MG/10 ML
SYRINGE (ML) INTRAVENOUS
Status: DISPENSED
Start: 2020-03-15

## (undated) RX ORDER — BACITRACIN 50000 [IU]/1
INJECTION, POWDER, FOR SOLUTION INTRAMUSCULAR
Status: DISPENSED
Start: 2020-03-15

## (undated) RX ORDER — LIDOCAINE HYDROCHLORIDE 10 MG/ML
INJECTION, SOLUTION EPIDURAL; INFILTRATION; INTRACAUDAL; PERINEURAL
Status: DISPENSED
Start: 2020-05-26

## (undated) RX ORDER — LIDOCAINE HYDROCHLORIDE AND EPINEPHRINE 10; 10 MG/ML; UG/ML
INJECTION, SOLUTION INFILTRATION; PERINEURAL
Status: DISPENSED
Start: 2020-03-15

## (undated) RX ORDER — ACETAMINOPHEN 325 MG/1
TABLET ORAL
Status: DISPENSED
Start: 2020-03-15

## (undated) RX ORDER — LIDOCAINE HYDROCHLORIDE 10 MG/ML
INJECTION, SOLUTION EPIDURAL; INFILTRATION; INTRACAUDAL; PERINEURAL
Status: DISPENSED
Start: 2020-04-30

## (undated) RX ORDER — FENTANYL CITRATE 50 UG/ML
INJECTION, SOLUTION INTRAMUSCULAR; INTRAVENOUS
Status: DISPENSED
Start: 2020-03-15

## (undated) RX ORDER — LIDOCAINE HYDROCHLORIDE 10 MG/ML
INJECTION, SOLUTION EPIDURAL; INFILTRATION; INTRACAUDAL; PERINEURAL
Status: DISPENSED
Start: 2020-04-08

## (undated) RX ORDER — LIDOCAINE HYDROCHLORIDE 10 MG/ML
INJECTION, SOLUTION EPIDURAL; INFILTRATION; INTRACAUDAL; PERINEURAL
Status: DISPENSED
Start: 2020-04-13

## (undated) RX ORDER — LIDOCAINE HYDROCHLORIDE 10 MG/ML
INJECTION, SOLUTION EPIDURAL; INFILTRATION; INTRACAUDAL; PERINEURAL
Status: DISPENSED
Start: 2020-03-23

## (undated) RX ORDER — SUFENTANIL CITRATE 50 UG/ML
INJECTION EPIDURAL; INTRAVENOUS
Status: DISPENSED
Start: 2020-03-15

## (undated) RX ORDER — HYDROMORPHONE HCL/0.9% NACL/PF 0.2MG/0.2
SYRINGE (ML) INTRAVENOUS
Status: DISPENSED
Start: 2020-03-13

## (undated) RX ORDER — LIDOCAINE HYDROCHLORIDE 10 MG/ML
INJECTION, SOLUTION EPIDURAL; INFILTRATION; INTRACAUDAL; PERINEURAL
Status: DISPENSED
Start: 2020-03-20

## (undated) RX ORDER — GABAPENTIN 100 MG/1
CAPSULE ORAL
Status: DISPENSED
Start: 2020-03-15

## (undated) RX ORDER — LIDOCAINE HYDROCHLORIDE 10 MG/ML
INJECTION, SOLUTION EPIDURAL; INFILTRATION; INTRACAUDAL; PERINEURAL
Status: DISPENSED
Start: 2020-05-21

## (undated) RX ORDER — LIDOCAINE HYDROCHLORIDE 10 MG/ML
INJECTION, SOLUTION EPIDURAL; INFILTRATION; INTRACAUDAL; PERINEURAL
Status: DISPENSED
Start: 2020-06-15